# Patient Record
Sex: MALE | Race: WHITE | NOT HISPANIC OR LATINO | Employment: UNEMPLOYED | URBAN - METROPOLITAN AREA
[De-identification: names, ages, dates, MRNs, and addresses within clinical notes are randomized per-mention and may not be internally consistent; named-entity substitution may affect disease eponyms.]

---

## 2023-05-23 LAB
6-ACETYLMORPHINE: <25 NG/ML
7-AMINOCLONAZEPAM: <25 NG/ML
ALPHA-HYDROXYALPRAZOLAM: <25 NG/ML
ALPHA-HYDROXYMIDAZOLAM: <25 NG/ML
ALPRAZOLAM: <25 NG/ML
AMPHETAMINE (PRESENCE) IN URINE BY SCREEN METHOD: ABNORMAL
BARBITURATES PRESENCE IN URINE BY SCREEN METHOD: ABNORMAL
CANNABINOIDS IN URINE BY SCREEN METHOD: ABNORMAL
CHLORDIAZEPOXIDE: <25 NG/ML
CLONAZEPAM: <25 NG/ML
COCAINE (PRESENCE) IN URINE BY SCREEN METHOD: ABNORMAL
CODEINE: <50 NG/ML
CREATINE, URINE FOR DRUG: 32.2 MG/DL
DIAZEPAM: <25 NG/ML
DRUG SCREEN COMMENT URINE: ABNORMAL
EDDP: <25 NG/ML
FENTANYL CONFIRMATION, URINE: <2.5 NG/ML
HYDROCODONE: 955 NG/ML
HYDROMORPHONE: 92 NG/ML
LORAZEPAM: <25 NG/ML
METHADONE CONFIRMATION,URINE: <25 NG/ML
MIDAZOLAM: <25 NG/ML
MORPHINE URINE: <50 NG/ML
NORDIAZEPAM: <25 NG/ML
NORFENTANYL: <2.5 NG/ML
NORHYDROCODONE: 259 NG/ML
NOROXYCODONE: <25 NG/ML
O-DESMETHYLTRAMADOL: <50 NG/ML
OXAZEPAM: <25 NG/ML
OXYCODONE: <25 NG/ML
OXYMORPHONE: <25 NG/ML
PHENCYCLIDINE (PRESENCE) IN URINE BY SCREEN METHOD: ABNORMAL
TEMAZEPAM: <25 NG/ML
TRAMADOL: <50 NG/ML
ZOLPIDEM METABOLITE (ZCA): <25 NG/ML
ZOLPIDEM: <25 NG/ML

## 2023-10-16 PROBLEM — I10 HYPERTENSION: Status: ACTIVE | Noted: 2023-10-16

## 2023-10-16 PROBLEM — M51.36 LUMBAR DISC NARROWING: Status: ACTIVE | Noted: 2023-10-16

## 2023-10-16 PROBLEM — R68.89 COLD INTOLERANCE: Status: ACTIVE | Noted: 2023-10-16

## 2023-10-16 PROBLEM — M25.559: Status: ACTIVE | Noted: 2023-10-16

## 2023-10-16 PROBLEM — E11.9 TYPE 2 DIABETES MELLITUS (MULTI): Status: ACTIVE | Noted: 2023-10-16

## 2023-10-16 PROBLEM — G62.9 PERIPHERAL NEUROPATHY: Status: ACTIVE | Noted: 2023-10-16

## 2023-10-16 PROBLEM — G56.03 CARPAL TUNNEL SYNDROME ON BOTH SIDES: Status: ACTIVE | Noted: 2023-10-16

## 2023-10-16 PROBLEM — Z79.891 LONG TERM (CURRENT) USE OF OPIATE ANALGESIC: Status: ACTIVE | Noted: 2023-10-16

## 2023-10-16 PROBLEM — M54.16 LEFT LUMBAR RADICULOPATHY: Status: ACTIVE | Noted: 2023-10-16

## 2023-10-16 PROBLEM — N52.9 ERECTILE DYSFUNCTION: Status: ACTIVE | Noted: 2023-10-16

## 2023-10-16 PROBLEM — M51.369 LUMBAR DISC NARROWING: Status: ACTIVE | Noted: 2023-10-16

## 2023-10-16 PROBLEM — M17.12 LEFT KNEE DJD: Status: ACTIVE | Noted: 2023-10-16

## 2023-10-16 PROBLEM — M75.80 ROTATOR CUFF TENDONITIS: Status: ACTIVE | Noted: 2023-10-16

## 2023-10-16 PROBLEM — R60.0 BILATERAL EDEMA OF LOWER EXTREMITY: Status: ACTIVE | Noted: 2023-10-16

## 2023-10-16 PROBLEM — K21.9 GASTROESOPHAGEAL REFLUX DISEASE: Status: ACTIVE | Noted: 2023-10-16

## 2023-10-16 PROBLEM — S41.109A: Status: ACTIVE | Noted: 2023-10-16

## 2023-10-16 PROBLEM — E07.9 THYROID DISORDER: Status: ACTIVE | Noted: 2023-10-16

## 2023-10-16 PROBLEM — F41.8 OTHER SPECIFIED ANXIETY DISORDERS: Status: ACTIVE | Noted: 2023-10-16

## 2023-10-16 PROBLEM — G47.00 INSOMNIA: Status: ACTIVE | Noted: 2023-10-16

## 2023-10-16 PROBLEM — E66.01 MORBID OBESITY (MULTI): Status: ACTIVE | Noted: 2023-10-16

## 2023-10-16 PROBLEM — L98.499 SKIN ULCERATION (MULTI): Status: ACTIVE | Noted: 2023-10-16

## 2023-10-16 PROBLEM — S06.0XAA CONCUSSION: Status: ACTIVE | Noted: 2023-10-16

## 2023-10-16 PROBLEM — D64.9 ANEMIA: Status: ACTIVE | Noted: 2023-10-16

## 2023-10-16 PROBLEM — E78.5 HYPERLIPIDEMIA: Status: ACTIVE | Noted: 2023-10-16

## 2023-10-16 RX ORDER — HYDROCODONE BITARTRATE AND ACETAMINOPHEN 5; 325 MG/1; MG/1
1.5 TABLET ORAL EVERY 6 HOURS
COMMUNITY
End: 2023-10-17 | Stop reason: ALTCHOICE

## 2023-10-16 RX ORDER — HYDROXYZINE HYDROCHLORIDE 25 MG/1
25 TABLET, FILM COATED ORAL
COMMUNITY
Start: 2023-03-29

## 2023-10-16 RX ORDER — ATORVASTATIN CALCIUM 40 MG/1
1 TABLET, FILM COATED ORAL DAILY
COMMUNITY
Start: 2018-01-23

## 2023-10-16 RX ORDER — ASPIRIN 325 MG
50000 TABLET, DELAYED RELEASE (ENTERIC COATED) ORAL
COMMUNITY
Start: 2017-08-10

## 2023-10-16 RX ORDER — FUROSEMIDE 40 MG/1
1 TABLET ORAL DAILY
COMMUNITY
Start: 2017-09-06

## 2023-10-16 RX ORDER — IBUPROFEN 800 MG/1
1 TABLET ORAL EVERY 8 HOURS PRN
COMMUNITY
Start: 2017-12-05

## 2023-10-16 RX ORDER — CHLORTHALIDONE 25 MG/1
1 TABLET ORAL DAILY
COMMUNITY
Start: 2017-04-04

## 2023-10-16 RX ORDER — METFORMIN HYDROCHLORIDE 500 MG/1
1 TABLET ORAL
COMMUNITY
Start: 2017-07-28

## 2023-10-16 RX ORDER — INSULIN ASPART 100 [IU]/ML
INJECTION, SOLUTION INTRAVENOUS; SUBCUTANEOUS
COMMUNITY
Start: 2021-07-20

## 2023-10-16 RX ORDER — SIMVASTATIN 20 MG/1
1 TABLET, FILM COATED ORAL DAILY
COMMUNITY
Start: 2013-08-05

## 2023-10-16 RX ORDER — QUETIAPINE 300 MG/1
1 TABLET, FILM COATED, EXTENDED RELEASE ORAL NIGHTLY
COMMUNITY
Start: 2017-10-02

## 2023-10-16 RX ORDER — METOPROLOL SUCCINATE 50 MG/1
1 TABLET, EXTENDED RELEASE ORAL DAILY
COMMUNITY
Start: 2016-06-22

## 2023-10-16 RX ORDER — PEN NEEDLE, DIABETIC 31 GX5/16"
NEEDLE, DISPOSABLE MISCELLANEOUS
COMMUNITY
Start: 2023-01-03

## 2023-10-16 RX ORDER — AMOXICILLIN 250 MG
2 CAPSULE ORAL 2 TIMES DAILY PRN
COMMUNITY
Start: 2023-01-02

## 2023-10-16 RX ORDER — HYDROCODONE BITARTRATE AND ACETAMINOPHEN 10; 325 MG/1; MG/1
1 TABLET ORAL 3 TIMES DAILY
COMMUNITY
End: 2023-10-17 | Stop reason: ALTCHOICE

## 2023-10-16 RX ORDER — METHOCARBAMOL 500 MG/1
1 TABLET, FILM COATED ORAL 4 TIMES DAILY
COMMUNITY
Start: 2017-08-01 | End: 2024-04-16 | Stop reason: ALTCHOICE

## 2023-10-16 RX ORDER — HYDRALAZINE HYDROCHLORIDE 50 MG/1
1 TABLET, FILM COATED ORAL 3 TIMES DAILY
COMMUNITY
Start: 2016-11-29

## 2023-10-16 RX ORDER — NAPROXEN SODIUM 220 MG
TABLET ORAL
COMMUNITY
Start: 2016-09-15

## 2023-10-16 RX ORDER — POTASSIUM CHLORIDE 750 MG/1
1 CAPSULE, EXTENDED RELEASE ORAL DAILY
COMMUNITY
Start: 2017-09-06

## 2023-10-16 RX ORDER — NYSTATIN 100000 [USP'U]/G
POWDER TOPICAL
COMMUNITY
Start: 2023-03-29

## 2023-10-16 RX ORDER — HYDROCODONE BITARTRATE AND ACETAMINOPHEN 7.5; 325 MG/1; MG/1
1 TABLET ORAL 4 TIMES DAILY PRN
COMMUNITY
Start: 2022-09-22 | End: 2023-10-17 | Stop reason: SDUPTHER

## 2023-10-16 RX ORDER — NALOXONE HYDROCHLORIDE 4 MG/.1ML
1 SPRAY NASAL AS NEEDED
COMMUNITY
Start: 2022-06-21

## 2023-10-16 RX ORDER — QUETIAPINE FUMARATE 200 MG/1
200 TABLET, FILM COATED ORAL 2 TIMES DAILY
COMMUNITY
Start: 2023-01-02

## 2023-10-16 RX ORDER — POTASSIUM CHLORIDE 20 MEQ/1
1 TABLET, EXTENDED RELEASE ORAL DAILY
COMMUNITY
Start: 2017-09-03

## 2023-10-16 RX ORDER — FUROSEMIDE 20 MG/1
1 TABLET ORAL DAILY PRN
COMMUNITY
Start: 2017-08-12

## 2023-10-16 RX ORDER — OMEPRAZOLE 40 MG/1
1 CAPSULE, DELAYED RELEASE ORAL DAILY
COMMUNITY
Start: 2016-10-05

## 2023-10-16 RX ORDER — INSULIN LISPRO 200 [IU]/ML
40 INJECTION, SOLUTION SUBCUTANEOUS 2 TIMES DAILY
COMMUNITY
Start: 2017-08-15

## 2023-10-16 RX ORDER — ATORVASTATIN CALCIUM 80 MG/1
1 TABLET, FILM COATED ORAL DAILY
COMMUNITY

## 2023-10-16 RX ORDER — TORSEMIDE 20 MG/1
TABLET ORAL
COMMUNITY
Start: 2023-03-22

## 2023-10-16 RX ORDER — ASPIRIN 81 MG/1
1 TABLET ORAL DAILY
COMMUNITY
Start: 2022-12-24

## 2023-10-16 RX ORDER — CYCLOBENZAPRINE HCL 5 MG
5 TABLET ORAL 2 TIMES DAILY
COMMUNITY
Start: 2019-08-12 | End: 2023-10-17 | Stop reason: SDUPTHER

## 2023-10-16 RX ORDER — VALSARTAN 320 MG/1
1 TABLET ORAL DAILY
COMMUNITY
Start: 2015-06-19

## 2023-10-16 RX ORDER — BENZONATATE 100 MG/1
200 CAPSULE ORAL 3 TIMES DAILY PRN
COMMUNITY
Start: 2017-09-19

## 2023-10-16 RX ORDER — AMLODIPINE BESYLATE 5 MG/1
1 TABLET ORAL DAILY
COMMUNITY
Start: 2016-10-05

## 2023-10-16 RX ORDER — LISINOPRIL 5 MG/1
5 TABLET ORAL 2 TIMES DAILY
COMMUNITY
Start: 2023-01-02

## 2023-10-16 RX ORDER — DULOXETINE 40 MG/1
40 CAPSULE, DELAYED RELEASE ORAL
COMMUNITY
Start: 2023-03-22

## 2023-10-17 ENCOUNTER — OFFICE VISIT (OUTPATIENT)
Dept: PAIN MEDICINE | Facility: CLINIC | Age: 53
End: 2023-10-17
Payer: MEDICARE

## 2023-10-17 DIAGNOSIS — M17.12 PRIMARY OSTEOARTHRITIS OF LEFT KNEE: ICD-10-CM

## 2023-10-17 DIAGNOSIS — E11.9 TYPE 2 DIABETES MELLITUS WITHOUT COMPLICATION, WITH LONG-TERM CURRENT USE OF INSULIN (MULTI): ICD-10-CM

## 2023-10-17 DIAGNOSIS — M51.36 LUMBAR DISC NARROWING: Primary | ICD-10-CM

## 2023-10-17 DIAGNOSIS — Z79.4 TYPE 2 DIABETES MELLITUS WITHOUT COMPLICATION, WITH LONG-TERM CURRENT USE OF INSULIN (MULTI): ICD-10-CM

## 2023-10-17 DIAGNOSIS — M54.16 LEFT LUMBAR RADICULOPATHY: ICD-10-CM

## 2023-10-17 DIAGNOSIS — G63 POLYNEUROPATHY ASSOCIATED WITH UNDERLYING DISEASE (MULTI): ICD-10-CM

## 2023-10-17 PROCEDURE — 3066F NEPHROPATHY DOC TX: CPT | Performed by: PHYSICAL MEDICINE & REHABILITATION

## 2023-10-17 PROCEDURE — 4010F ACE/ARB THERAPY RXD/TAKEN: CPT | Performed by: PHYSICAL MEDICINE & REHABILITATION

## 2023-10-17 PROCEDURE — 99214 OFFICE O/P EST MOD 30 MIN: CPT | Performed by: PHYSICAL MEDICINE & REHABILITATION

## 2023-10-17 RX ORDER — CYCLOBENZAPRINE HCL 5 MG
5 TABLET ORAL 2 TIMES DAILY
Qty: 60 TABLET | Refills: 1 | Status: SHIPPED | OUTPATIENT
Start: 2023-10-17 | End: 2023-11-16

## 2023-10-17 RX ORDER — HYDROCODONE BITARTRATE AND ACETAMINOPHEN 7.5; 325 MG/1; MG/1
1 TABLET ORAL 4 TIMES DAILY PRN
Qty: 112 TABLET | Refills: 0 | Status: SHIPPED | OUTPATIENT
Start: 2023-11-01 | End: 2023-11-29

## 2023-10-17 RX ORDER — HYDROCODONE BITARTRATE AND ACETAMINOPHEN 7.5; 325 MG/1; MG/1
1 TABLET ORAL 4 TIMES DAILY PRN
Qty: 112 TABLET | Refills: 0 | Status: SHIPPED | OUTPATIENT
Start: 2023-11-29 | End: 2023-12-19 | Stop reason: SDUPTHER

## 2023-10-17 NOTE — PROGRESS NOTES
Chief complaint  Back and feet pain    History  Mr Shrestha returned today for follow up office visit      The pain in the lower limb is burning and tingling. worse with forward flexion and bending. that is burning and tingling on a continuous fashion. the pain goes in aggravation intermittently with sharp lancinating, electrical like jolts and sensations. These are felt on the skin and deeper in the tissues.      he is on schedule with the pain medications and using those as prescribed for the pain control. Denied euphoria associated with the use of the pain medications. the pain is rated at 8 /10 without the medications. But, with the pain medications the pain level drops to 5 /10 because of the recent aggravation of the pain.     opioids treatment agreement renewed in March 2023   Oarrs pulled and scanned in the chart Narx id 361 and ORS is 050.   last urine toxicology testing June 2022 and that was compatible with the pain medications prescribed repeat today      Xray updated last year for the lumbar spine and knee  ORT Score is zero  Pain pathology and pain generators from the lumbar spine and left lumbar radiculopathy and knee osteoarthritis      The control of the pain with the pain medications is helping the control of the symptoms and allowing the function and activities of daily living, enjoyment of life, quality of life and sleep with less interruption by the pain. The goal is symptomatic control of the nonmalignant chronic pain and not to repair the permanent damage in the tissues inducing the chronic pain conditions. We are aiming to shift the focus from the nonmalignant chronic pain to other aspects of life by symptomatically treating this chronic pain.      Review of Systems  Denied any fever or chills. No weight loss and no night sweats. No cough or sputum production. No diarrhea   The constipation has been responding to fibers and over the counter medications.      No bladder and bowel incontinence and  no other changes in bladder and bowel. No skin changes. Reports tiredness and fatigability only if the pain is not controlled.   Denied opioids diversion and abuse and denies alcoholism. Denies overuse of the pain medications.     Physical examination  Alert and oriented for time and place. Well groomed,    breathing with normal rate and rhythm   Head and neck showed colored conjunctivae, The pupils are equal, midsize, reactive to light and accommodation.      he has decreased sensation to light touch at the toes. No fungal infection and no skin breakdowns  positive straight leg raising on the left side at 45 Â° with the pain reaching the calf and lateral malleolus  decreased sensory to light touch over the lateral leg        healed ulcer on the left leg but till have open ulcer on the right leg     Mathew negative for axial loading and log rotation . he has cane for balance     No aberrant pain behavior.     Diagnosis   · Peripheral neuropathy (356.9) (G62.9)   · Left lumbar radiculopathy (724.4) (M54.16)   · Lumbar disc narrowing (722.52) (M51.36)   · Left knee DJD (715.96) (M17.12)    Plan  reviewed the pain generators in the lumbar spine and intervertebral disc and knee  Discussed the mechanism of action of intra articular steroid injection and epidural steroid injections   Went over the pain medications and mechanism of action and side effects  Home exercises program      discussed with him about the Non steroidal anti inflammatory medications and risk on the gastro intestinal cardio vascular and renal side effects      Continue with the treatment plan since that is working and controlling the symptoms. the UDS and Oarrs are compliant. No reported side effects and the daily functions and activities of daily living are improved with the pain control. Additionally other modalities including interventional and non opioids modalities were tried without relief to allow improvement of the activities of daily living  , quality of life and quality of sleep.      Given the opioids therapy , we discussed about the risk for accidental over dose on the pain medications. I went over the mechanism of action and mode of use of the Naloxone according to the  recommendations.      We went over the COVID19 precautions and importance of following recommendations including social distancing and hand washing and avoiding unnecessary trips and going out of the house       hydrocodone to 10/325 orally three times a day I explained the side effects from the acetaminophen in the medication and made aware of those. I also explained about the cumulative effects on the organs and mainly the liver.     Based on the above findings and the clinical response to the opioids medications and improvement of the activities of daily living, sleep, and work performance. We made this complex decision to continue the opioids therapy in light of the evidence of the patient's responsibility in using the pain medications as prescribed for the nonmalignant chronic pain condition. We discussed about the use of the pain medications to treat the symptoms of chronic nonmalignant pain and we are not trying the repair the permanent damage in the tissues, rather we are trying to control the symptoms induced by the permanent damage to the tissues inducing the chronic pain condition and resulting disability. I explained the difference and discussed it with the patient and stressed the importance of knowing the difference especially because of the potential side effects and the potential addicting effect and habit forming nature of the dangerous drugs we are using to treat the symptoms of the chronic pain.   The level of clinical decision making in this office visit,  is high, given the high risks of complications with the morbidity and mortality due to the fact that acute and chronic pain may pose a threat to life and bodily function, if under treated, poorly  treated, or with failure to maintain adequate treatment and timely medical follow up. Additionally over treatment has its own set of complications including overdosing on the pain medications and also the habit forming potentials with the use of the medications used to treat chronic painful conditions including therapeutic classes classified as dangerous medications. Given the serious and fluctuating nature of pain level and instensity with extensive consideration for whenever pain changes, there is always the risk of prolonged functional impairment requiring close patient monitoring with regular assessments and reassessments and high level medical decision making at every office visit. The amount and complexity of data reviewed is high given the patient clinical presentation, labs,  data, radiology reports, and other tests as discussed during office visits. Pertinent data whether positive or negative were taken in consideration in the process of making this high level medical decision.    We discussed that we are prescribing the medications on good alirio and legitimate medical reason.     Follow-up 8 weeks or earlier if needed

## 2023-12-19 ENCOUNTER — TELEPHONE (OUTPATIENT)
Dept: PAIN MEDICINE | Facility: CLINIC | Age: 53
End: 2023-12-19

## 2023-12-19 ENCOUNTER — TELEMEDICINE (OUTPATIENT)
Dept: PAIN MEDICINE | Facility: CLINIC | Age: 53
End: 2023-12-19
Payer: MEDICARE

## 2023-12-19 DIAGNOSIS — M54.16 LEFT LUMBAR RADICULOPATHY: ICD-10-CM

## 2023-12-19 DIAGNOSIS — M51.36 LUMBAR DISC NARROWING: Primary | ICD-10-CM

## 2023-12-19 DIAGNOSIS — Z79.891 LONG TERM (CURRENT) USE OF OPIATE ANALGESIC: ICD-10-CM

## 2023-12-19 DIAGNOSIS — M17.12 PRIMARY OSTEOARTHRITIS OF LEFT KNEE: ICD-10-CM

## 2023-12-19 PROCEDURE — 99214 OFFICE O/P EST MOD 30 MIN: CPT | Performed by: PHYSICAL MEDICINE & REHABILITATION

## 2023-12-19 RX ORDER — HYDROCODONE BITARTRATE AND ACETAMINOPHEN 7.5; 325 MG/1; MG/1
1 TABLET ORAL 4 TIMES DAILY PRN
Qty: 112 TABLET | Refills: 0 | Status: SHIPPED | OUTPATIENT
Start: 2023-12-27 | End: 2024-01-24 | Stop reason: SDUPTHER

## 2023-12-19 RX ORDER — HYDROCODONE BITARTRATE AND ACETAMINOPHEN 7.5; 325 MG/1; MG/1
1 TABLET ORAL 4 TIMES DAILY PRN
Qty: 112 TABLET | Refills: 0 | Status: SHIPPED | OUTPATIENT
Start: 2023-12-27 | End: 2023-12-19 | Stop reason: SDUPTHER

## 2023-12-19 NOTE — PROGRESS NOTES
Chief complaint  Back and lower limbs pain    History  Mr Shrestha is back for visit. This virtual visit. H ehas positive COVID and he is sheltering  The pain in the back is deep achy worse in the mid back area.  This is associated with tight muscle bands.  This limits the range of motion of the lumbar spine mainly in forward flexion.  The pain in the back is radiating around the side on the lateral aspect of the   thighs going down toward the lateral aspect of the legs into the lateral malleosli toward the lateral aspect of the feet towards the big toes.    This pain down to the lower limbs is more of a burning tingling sensation.  The pain in the   lower limbs worsens with bending forward or with any lifting, it improves with laying on the side and resting.  This is similar to the associated pain in the middle to lower back.  Denied any bowel or bladder incontinence.  With the worst pain there is tendency to catch the toe especially on carpeted area.  This occurs mostly when tired and toward the end of the day.       Pain level without medication is 8/10 , with the medication pain level 5/10.     The pain meds are helping control the pain and improving Activities of Daily living and quality of life and quality of sleep.    opioids treatment agreement 2023  Oarrs pulled and scanned in the chart  no concerns  last urine toxicology testing earlier this year and it was compliant we will repeat  Xray updated spine   ORT Score is  0   Pain pathology and pain generators spine   Modalities tried injection, surgery, physical therapy, TENS unit, nonsteroidal anti-inflammatory medication       Denied any fever or chills. No weight loss and no night sweats. No cough or sputum production. No diarrhea   The constipation has been responding to fibers and over the counter medications.     No bladder and bowel incontinence and no other changes in bladder and bowel. No skin changes.  Reports tiredness and fatigability only if the pain  is not controlled.   Denied opioids diversion and abuse and denies alcoholism. Denies overuse of the pain medications.    The control of the pain with the pain medications is helping the control of the symptoms and allowing the function and activities of daily living, enjoyment of life, improving the quality of life and sleep with less interruption by the pain. The goal is symptomatic control of the nonmalignant chronic pain and not to repair the permanent damage in the tissues inducing the chronic pain conditions. We are aiming to shift the focus from the nonmalignant chronic pain to other aspects of life by symptomatically treating this chronic pain. If this pain is not treated it will lead to major morbidity and it is also associated with increased risks of mortality. The patient understands those very clearly and also understand high risks of morbidity and mortality if not strictly adherent to the treatment recommendations and reporting any associated side effects. Also patient understand the full responsibility associated with these medications to avoid abuse or overuse or any use of these medications for anything besides treating the patient's own chronic pain and nothing else under any circumstances.      Also The pain in the left  knee is deep achy stabbing.  It is both on the medial and lateral aspects and behind the kneecap.  The knee pain is associated with sensation of crunching upon range of motion and weightbearing.  The pain is continuous at rest associated with stiffness with prolonged sitting and get worse with standing walking or any weightbearing activity.  Occasionally there is knee swelling.  No change in color or texture of the skin.  No pain proximal to the thigh or distal to the leg associated with the knee pain.  With episodes of aggravation of the pain there are occasion of sensation of instability but no falls.     Physical examination  Awake, alert and oriented for time place and persons      This is a virtual visit    Diagnosis  Problem List Items Addressed This Visit       Left knee DJD    Lumbar disc narrowing - Primary    Left lumbar radiculopathy    Long term (current) use of opiate analgesic        Plan  Reviewed the pain generators.  Went over the types of pain with neuropathic and nociceptive and different pathologies and therapeutic modalities. Discussed the mechanism of action of interventions from acupuncture, physical therapy , regular exercises, injections, botox, spinal cord stimulation, and role of surgery     Went over pathology of the intervertebral disc displacement and the anatomical relation to the Nerve roots and relation to the radicular symptoms. Went over treatment modalities with conservative treatment including acupuncture   and epidural steroid injection with fluoroscopy guidance and last resort of surgery    Based on the above findings and the clinical response to the opioids medications and improvement of the activities of daily living, sleep, and work performance. We made this complex decision to continue the opioids therapy in light of the evidence of the patient's responsibility in using the pain medications as prescribed for the nonmalignant chronic pain condition. We discussed about the use of the pain medications to treat the symptoms of chronic nonmalignant pain and we are not trying the repair the permanent damage in the tissues, rather we are trying to control the symptoms induced by the permanent damage to the tissues inducing the chronic pain condition and resulting disability. I explained the difference and discussed it with the patient and stressed the importance of knowing the difference especially because of the potential side effects and the potential addicting effect and habit forming nature of the dangerous drugs we are using to treat the symptoms of the chronic pain.      We discussed that we are prescribing the medications on good alirio and legitimate  medical reason.     We reviewed the side effects and precautions of opioids prescriptions as discussed in the opioids treatment agreement.    realizes the interaction between the therapeutic classes including the respiratory depression and potential death     Random drug testing twice in 6 months we will submit     Hydrocodone 7.5 mg qid     Discussed about NSAIDS and I explained about the opioids sparing effect to allow keeping the opioids dose at minimal effective dose.   I went over the potential side effects of the NSAIDS on the gastrointestinal, renal and cardiovascular systems.      I detailed the side effects from the acetaminophen in the medication and made aware of those. I also explained about the cumulative effects on the organs and mainly the liver.     Given the opioids therapy , we discussed about the risk for accidental over dose on the pain medications, either for patient or other household. I went over the mechanism of action and mode of use of the Naloxone according to the  recommendations. I will provide a prescription for a kit.     Follow-up 8 weeks or earlier if needed     The level of clinical decision making in this office visit,  is high, given the high risks of complications with the morbidity and mortality due to the fact that acute and chronic pain may pose a threat to life and bodily function, if under treated, poorly treated, or with failure to maintain adequate treatment and timely medical follow up. Additionally over treatment has its own set of complications including overdosing on the pain medications and also the habit forming potentials with the use of the medications used to treat chronic painful conditions including therapeutic classes classified as dangerous medications. Given the serious and fluctuating nature of pain level and instensity with extensive consideration for whenever pain changes, there is always the risk of prolonged functional impairment requiring  close patient monitoring with regular assessments and reassessments and high level medical decision making at every office visit. The amount and complexity of data reviewed is high given the patient clinical presentation, labs,  data, radiology reports, and other tests as discussed during office visits. Pertinent data whether positive or negative were taken in consideration in the process of making this high level medical decision.

## 2024-01-24 ENCOUNTER — TELEMEDICINE (OUTPATIENT)
Dept: PAIN MEDICINE | Facility: CLINIC | Age: 54
End: 2024-01-24
Payer: MEDICARE

## 2024-01-24 ENCOUNTER — TELEPHONE (OUTPATIENT)
Dept: PAIN MEDICINE | Facility: CLINIC | Age: 54
End: 2024-01-24

## 2024-01-24 DIAGNOSIS — M51.36 LUMBAR DISC NARROWING: Primary | ICD-10-CM

## 2024-01-24 DIAGNOSIS — M54.16 LEFT LUMBAR RADICULOPATHY: ICD-10-CM

## 2024-01-24 DIAGNOSIS — G63 POLYNEUROPATHY ASSOCIATED WITH UNDERLYING DISEASE (MULTI): ICD-10-CM

## 2024-01-24 PROCEDURE — 99214 OFFICE O/P EST MOD 30 MIN: CPT | Performed by: PHYSICAL MEDICINE & REHABILITATION

## 2024-01-24 RX ORDER — HYDROCODONE BITARTRATE AND ACETAMINOPHEN 7.5; 325 MG/1; MG/1
1 TABLET ORAL 4 TIMES DAILY PRN
Qty: 112 TABLET | Refills: 0 | Status: SHIPPED | OUTPATIENT
Start: 2024-01-24 | End: 2024-02-20 | Stop reason: SDUPTHER

## 2024-01-24 NOTE — PROGRESS NOTES
Chief complaint  Backadn left leg pain   Toe ulceration of hte R big toe    History  He is having upper resp tract infection  This AV visit  Continue with pain in the back left leg The pain in the back is deep achy worse in the mid back area.  This is associated with tight muscle bands.  This limits the range of motion of the lumbar spine mainly in forward flexion.  The pain in the back is radiating around the side on the lateral aspect of the left thigh going down toward the lateral aspect of the left leg into the lateral malleolus toward the lateral aspect of the foot towards the left big toe.    This pain down to the left lower limb is more of a burning tingling sensation.  The pain in the left lower limb worsens with bending forward or with any lifting, it improves with laying on the side and resting.  This is similar to the associated pain in the middle to lower back.  Denied any bowel or bladder incontinence.  With the worst pain there is tendency to catch the toe especially on carpeted area.  This occurs mostly when tired and toward the end of the day.    The skin is intact with no breakdown.  No vesicles.    Alsohaving neuropathy from DM the ulcer is related getting wound care       Pain level without medication is 8/10 , with the medication pain level 5/10.     The pain meds are helping control the pain and improving Activities of Daily living and quality of life and quality of sleep.    opioids treatment agreement will updated at next visit   PDI (Pain Disability Index) score: 40  Oarrs pulled and scanned in the chart  no concerns  last urine toxicology testing earlier this year and it was compliant we will repeat  Xray updated spine   ORT Score is  0  Pain pathology and pain generators spine   Modalities tried injection, surgery, physical therapy, TENS unit, nonsteroidal anti-inflammatory medication       Denied any fever or chills. No weight loss and no night sweats. No cough or sputum production. No  diarrhea   The constipation has been responding to fibers and over the counter medications.     No bladder and bowel incontinence and no other changes in bladder and bowel. No skin changes.  Reports tiredness and fatigability only if the pain is not controlled.   Denied opioids diversion and abuse and denies alcoholism. Denies overuse of the pain medications.    The control of the pain with the pain medications is helping the control of the symptoms and allowing the function and activities of daily living, enjoyment of life, improving the quality of life and sleep with less interruption by the pain. The goal is symptomatic control of the nonmalignant chronic pain and not to repair the permanent damage in the tissues inducing the chronic pain conditions. We are aiming to shift the focus from the nonmalignant chronic pain to other aspects of life by symptomatically treating this chronic pain. If this pain is not treated it will lead to major morbidity and it is also associated with increased risks of mortality. The patient understands those very clearly and also understand high risks of morbidity and mortality if not strictly adherent to the treatment recommendations and reporting any associated side effects. Also patient understand the full responsibility associated with these medications to avoid abuse or overuse or any use of these medications for anything besides treating the patient's own chronic pain and nothing else under any circumstances.        Physical examination  Awake, alert and oriented for time place and persons   This is a secure AV visit      Diagnosis  Problem List Items Addressed This Visit       Lumbar disc narrowing - Primary    Relevant Medications    HYDROcodone-acetaminophen (Norco) 7.5-325 mg tablet    Left lumbar radiculopathy    Peripheral neuropathy        Plan  Reviewed the pain generators.  Went over the types of pain with neuropathic and nociceptive and different pathologies and  therapeutic modalities. Discussed the mechanism of action of interventions from acupuncture, physical therapy , regular exercises, injections, botox, spinal cord stimulation, and role of surgery     Went over pathology of the intervertebral disc displacement and the anatomical relation to the Nerve roots and relation to the radicular symptoms. Went over treatment modalities with conservative treatment including acupuncture   and epidural steroid injection with fluoroscopy guidance and last resort of surgery    Based on the above findings and the clinical response to the opioids medications and improvement of the activities of daily living, sleep, and work performance. We made this complex decision to continue the opioids therapy in light of the evidence of the patient's responsibility in using the pain medications as prescribed for the nonmalignant chronic pain condition. We discussed about the use of the pain medications to treat the symptoms of chronic nonmalignant pain and we are not trying the repair the permanent damage in the tissues, rather we are trying to control the symptoms induced by the permanent damage to the tissues inducing the chronic pain condition and resulting disability. I explained the difference and discussed it with the patient and stressed the importance of knowing the difference especially because of the potential side effects and the potential addicting effect and habit forming nature of the dangerous drugs we are using to treat the symptoms of the chronic pain.      We discussed that we are prescribing the medications on good alirio and legitimate medical reason.     We reviewed the side effects and precautions of opioids prescriptions as discussed in the opioids treatment agreement.    realizes the interaction between the therapeutic classes including the respiratory depression and potential death     Random drug testing twice in 6 months we will submit     Hydrocodone 7.5/325 mg qid    has a narcan at home know how and when to use it if needed.     Discussed about NSAIDS and I explained about the opioids sparing effect to allow keeping the opioids dose at minimal effective dose.   I went over the potential side effects of the NSAIDS on the gastrointestinal, renal and cardiovascular systems.      I detailed the side effects from the acetaminophen in the medication and made aware of those. I also explained about the cumulative effects on the organs and mainly the liver.     Given the opioids therapy , we discussed about the risk for accidental over dose on the pain medications, either for patient or other household. I went over the mechanism of action and mode of use of the Naloxone according to the  recommendations. I will provide a prescription for a kit.     Follow-up 8 weeks or earlier if needed     The level of clinical decision making in this office visit,  is high, given the high risks of complications with the morbidity and mortality due to the fact that acute and chronic pain may pose a threat to life and bodily function, if under treated, poorly treated, or with failure to maintain adequate treatment and timely medical follow up. Additionally over treatment has its own set of complications including overdosing on the pain medications and also the habit forming potentials with the use of the medications used to treat chronic painful conditions including therapeutic classes classified as dangerous medications. Given the serious and fluctuating nature of pain level and instensity with extensive consideration for whenever pain changes, there is always the risk of prolonged functional impairment requiring close patient monitoring with regular assessments and reassessments and high level medical decision making at every office visit. The amount and complexity of data reviewed is high given the patient clinical presentation, labs,  data, radiology reports, and other tests as  discussed during office visits. Pertinent data whether positive or negative were taken in consideration in the process of making this high level medical decision.

## 2024-01-24 NOTE — TELEPHONE ENCOUNTER
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 He was not aware that he only received 1 month after VV.  He is asking for VV today because he still can't get to the office, plus his dad  yesterday.  Pharmacy is [facility] [address]

## 2024-02-20 ENCOUNTER — OFFICE VISIT (OUTPATIENT)
Dept: PAIN MEDICINE | Facility: CLINIC | Age: 54
End: 2024-02-20
Payer: MEDICARE

## 2024-02-20 DIAGNOSIS — G63 POLYNEUROPATHY ASSOCIATED WITH UNDERLYING DISEASE (MULTI): ICD-10-CM

## 2024-02-20 DIAGNOSIS — Z79.891 LONG TERM CURRENT USE OF OPIATE ANALGESIC: Primary | ICD-10-CM

## 2024-02-20 DIAGNOSIS — M54.16 LEFT LUMBAR RADICULOPATHY: ICD-10-CM

## 2024-02-20 DIAGNOSIS — Z79.891 LONG TERM (CURRENT) USE OF OPIATE ANALGESIC: ICD-10-CM

## 2024-02-20 DIAGNOSIS — M51.36 LUMBAR DISC NARROWING: ICD-10-CM

## 2024-02-20 PROCEDURE — 99214 OFFICE O/P EST MOD 30 MIN: CPT | Performed by: PHYSICAL MEDICINE & REHABILITATION

## 2024-02-20 PROCEDURE — 4010F ACE/ARB THERAPY RXD/TAKEN: CPT | Performed by: PHYSICAL MEDICINE & REHABILITATION

## 2024-02-20 RX ORDER — HYDROCODONE BITARTRATE AND ACETAMINOPHEN 7.5; 325 MG/1; MG/1
1 TABLET ORAL 4 TIMES DAILY PRN
Qty: 112 TABLET | Refills: 0 | Status: SHIPPED | OUTPATIENT
Start: 2024-03-20 | End: 2024-04-16 | Stop reason: SDUPTHER

## 2024-02-20 RX ORDER — HYDROCODONE BITARTRATE AND ACETAMINOPHEN 7.5; 325 MG/1; MG/1
1 TABLET ORAL 4 TIMES DAILY PRN
Qty: 112 TABLET | Refills: 0 | Status: SHIPPED | OUTPATIENT
Start: 2024-02-21 | End: 2024-03-20

## 2024-02-20 NOTE — PROGRESS NOTES
Chief complaint  Back and leg pain  Feet pain     History  Mr Jeferson is back for visit. He lost his father  Continue with pain in back and sciatica but worse pain in feet. The pain burning and tingling on a continuous fashion. The pain goes in aggravation intermittently with sharp lancinating, electrical like jolts and sensations. These are felt on the skin and deeper in the tissues.  The pain is affected with colder weather and with wet and humid conditions.   He has open ulcer over r big toe and he gets wound care three times per week  That is close.    Pain meds are helping  Jeffrey did not help neither lyrica     Pain level without medication is 7/10 , with the medication pain level 2/10.     The pain meds are helping control the pain and improving Activities of Daily living and quality of life and quality of sleep.    opioids treatment agreement Feb 2024  PDI (Pain Disability Index) score: 52   Oarrs pulled and scanned in the chart  no concerns  last urine toxicology testing earlier this year and it was compliant we will repeat  Xray updated spein   ORT Score is  0  Pain pathology and pain generators spine   Modalities tried injection, surgery, physical therapy, TENS unit, nonsteroidal anti-inflammatory medication       Denied any fever or chills. No weight loss and no night sweats. No cough or sputum production. No diarrhea   The constipation has been responding to fibers and over the counter medications.     No bladder and bowel incontinence and no other changes in bladder and bowel. No skin changes.  Reports tiredness and fatigability only if the pain is not controlled.   Denied opioids diversion and abuse and denies alcoholism. Denies overuse of the pain medications.    The control of the pain with the pain medications is helping the control of the symptoms and allowing the function and activities of daily living, enjoyment of life, improving the quality of life and sleep with less interruption by the pain. The  goal is symptomatic control of the nonmalignant chronic pain and not to repair the permanent damage in the tissues inducing the chronic pain conditions. We are aiming to shift the focus from the nonmalignant chronic pain to other aspects of life by symptomatically treating this chronic pain. If this pain is not treated it will lead to major morbidity and it is also associated with increased risks of mortality. The patient understands those very clearly and also understand high risks of morbidity and mortality if not strictly adherent to the treatment recommendations and reporting any associated side effects. Also patient understand the full responsibility associated with these medications to avoid abuse or overuse or any use of these medications for anything besides treating the patient's own chronic pain and nothing else under any circumstances.        Physical examination  Awake, alert and oriented for time place and persons   declined Chaperone for the visit and was adequately  draped for the exam.    The skin is mottled colder to touch.  It is sweating compared to the other side and the rest of his skin.  The nail plates are brittle and thin.   The skin is shiny but stiff and difficult to raise a skin fold.  Capillary refill is 4 seconds.  There is diffuse stiffness in the joints.    Hehas healing ulcer over R foot       Diagnosis  Problem List Items Addressed This Visit       Lumbar disc narrowing    Relevant Medications    HYDROcodone-acetaminophen (Norco) 7.5-325 mg tablet (Start on 2/21/2024)    HYDROcodone-acetaminophen (Norco) 7.5-325 mg tablet (Start on 3/20/2024)    Left lumbar radiculopathy    Peripheral neuropathy    Long term current use of opiate analgesic - Primary    Relevant Orders    Opiate/Opioid/Benzo Prescription Compliance        Plan  Reviewed the pain generators.  Went over the types of pain with neuropathic and nociceptive and different pathologies and therapeutic modalities. Discussed the  mechanism of action of interventions from acupuncture, physical therapy , regular exercises, injections, botox, spinal cord stimulation, and role of surgery     Went over pathology of the intervertebral disc displacement and the anatomical relation to the Nerve roots and relation to the radicular symptoms. Went over treatment modalities with conservative treatment including acupuncture   and epidural steroid injection with fluoroscopy guidance and last resort of surgery    Based on the above findings and the clinical response to the opioids medications and improvement of the activities of daily living, sleep, and work performance. We made this complex decision to continue the opioids therapy in light of the evidence of the patient's responsibility in using the pain medications as prescribed for the nonmalignant chronic pain condition. We discussed about the use of the pain medications to treat the symptoms of chronic nonmalignant pain and we are not trying the repair the permanent damage in the tissues, rather we are trying to control the symptoms induced by the permanent damage to the tissues inducing the chronic pain condition and resulting disability. I explained the difference and discussed it with the patient and stressed the importance of knowing the difference especially because of the potential side effects and the potential addicting effect and habit forming nature of the dangerous drugs we are using to treat the symptoms of the chronic pain.      We discussed that we are prescribing the medications on good alirio and legitimate medical reason.     We reviewed the side effects and precautions of opioids prescriptions as discussed in the opioids treatment agreement.    realizes the interaction between the therapeutic classes including the respiratory depression and potential death     Random drug testing twice in 6 months we will submit     Hydrocodone 7.5 mg qid  has a narcan at home know how and when to use  it if needed.  Consider cut back on pain  Continue with foot care     Discussed about NSAIDS and I explained about the opioids sparing effect to allow keeping the opioids dose at minimal effective dose.   I went over the potential side effects of the NSAIDS on the gastrointestinal, renal and cardiovascular systems.      I detailed the side effects from the acetaminophen in the medication and made aware of those. I also explained about the cumulative effects on the organs and mainly the liver.     Given the opioids therapy , we discussed about the risk for accidental over dose on the pain medications, either for patient or other household. I went over the mechanism of action and mode of use of the Naloxone according to the  recommendations. I will provide a prescription for a kit.     Follow-up 8 weeks or earlier if needed     The level of clinical decision making in this office visit,  is high, given the high risks of complications with the morbidity and mortality due to the fact that acute and chronic pain may pose a threat to life and bodily function, if under treated, poorly treated, or with failure to maintain adequate treatment and timely medical follow up. Additionally over treatment has its own set of complications including overdosing on the pain medications and also the habit forming potentials with the use of the medications used to treat chronic painful conditions including therapeutic classes classified as dangerous medications. Given the serious and fluctuating nature of pain level and instensity with extensive consideration for whenever pain changes, there is always the risk of prolonged functional impairment requiring close patient monitoring with regular assessments and reassessments and high level medical decision making at every office visit. The amount and complexity of data reviewed is high given the patient clinical presentation, labs,  data, radiology reports, and other tests as  discussed during office visits. Pertinent data whether positive or negative were taken in consideration in the process of making this high level medical decision.

## 2024-03-15 ENCOUNTER — LAB (OUTPATIENT)
Dept: LAB | Facility: LAB | Age: 54
End: 2024-03-15
Payer: MEDICARE

## 2024-03-15 DIAGNOSIS — Z79.891 LONG TERM CURRENT USE OF OPIATE ANALGESIC: ICD-10-CM

## 2024-03-15 LAB
AMPHETAMINES UR QL SCN: NORMAL
BARBITURATES UR QL SCN: NORMAL
BZE UR QL SCN: NORMAL
CANNABINOIDS UR QL SCN: NORMAL
CREAT UR-MCNC: 68.7 MG/DL (ref 20–370)
PCP UR QL SCN: NORMAL

## 2024-03-15 PROCEDURE — 80346 BENZODIAZEPINES1-12: CPT

## 2024-03-15 PROCEDURE — 80307 DRUG TEST PRSMV CHEM ANLYZR: CPT

## 2024-03-15 PROCEDURE — 82570 ASSAY OF URINE CREATININE: CPT

## 2024-03-15 PROCEDURE — 80361 OPIATES 1 OR MORE: CPT

## 2024-03-15 PROCEDURE — 80373 DRUG SCREENING TRAMADOL: CPT

## 2024-03-15 PROCEDURE — 80354 DRUG SCREENING FENTANYL: CPT

## 2024-03-15 PROCEDURE — 80365 DRUG SCREENING OXYCODONE: CPT

## 2024-03-15 PROCEDURE — 80368 SEDATIVE HYPNOTICS: CPT

## 2024-03-15 PROCEDURE — 80358 DRUG SCREENING METHADONE: CPT

## 2024-03-21 LAB
1OH-MIDAZOLAM UR CFM-MCNC: <25 NG/ML
6MAM UR CFM-MCNC: <25 NG/ML
7AMINOCLONAZEPAM UR CFM-MCNC: <25 NG/ML
A-OH ALPRAZ UR CFM-MCNC: <25 NG/ML
ALPRAZ UR CFM-MCNC: <25 NG/ML
CHLORDIAZEP UR CFM-MCNC: <25 NG/ML
CLONAZEPAM UR CFM-MCNC: <25 NG/ML
CODEINE UR CFM-MCNC: <50 NG/ML
DIAZEPAM UR CFM-MCNC: <25 NG/ML
EDDP UR CFM-MCNC: <25 NG/ML
FENTANYL UR CFM-MCNC: <2.5 NG/ML
HYDROCODONE CTO UR CFM-MCNC: 308 NG/ML
HYDROMORPHONE UR CFM-MCNC: 112 NG/ML
LORAZEPAM UR CFM-MCNC: <25 NG/ML
METHADONE UR CFM-MCNC: <25 NG/ML
MIDAZOLAM UR CFM-MCNC: <25 NG/ML
MORPHINE UR CFM-MCNC: <50 NG/ML
NORDIAZEPAM UR CFM-MCNC: <25 NG/ML
NORFENTANYL UR CFM-MCNC: <2.5 NG/ML
NORHYDROCODONE UR CFM-MCNC: 232 NG/ML
NOROXYCODONE UR CFM-MCNC: <25 NG/ML
NORTRAMADOL UR-MCNC: <50 NG/ML
OXAZEPAM UR CFM-MCNC: <25 NG/ML
OXYCODONE UR CFM-MCNC: <25 NG/ML
OXYMORPHONE UR CFM-MCNC: <25 NG/ML
TEMAZEPAM UR CFM-MCNC: <25 NG/ML
TRAMADOL UR CFM-MCNC: <50 NG/ML
ZOLPIDEM UR CFM-MCNC: <25 NG/ML
ZOLPIDEM UR-MCNC: <25 NG/ML

## 2024-04-16 ENCOUNTER — OFFICE VISIT (OUTPATIENT)
Dept: PAIN MEDICINE | Facility: CLINIC | Age: 54
End: 2024-04-16
Payer: MEDICARE

## 2024-04-16 DIAGNOSIS — G63 POLYNEUROPATHY ASSOCIATED WITH UNDERLYING DISEASE (MULTI): ICD-10-CM

## 2024-04-16 DIAGNOSIS — M51.36 LUMBAR DISC NARROWING: ICD-10-CM

## 2024-04-16 DIAGNOSIS — M54.16 LEFT LUMBAR RADICULOPATHY: Primary | ICD-10-CM

## 2024-04-16 PROCEDURE — 4010F ACE/ARB THERAPY RXD/TAKEN: CPT | Performed by: PHYSICAL MEDICINE & REHABILITATION

## 2024-04-16 PROCEDURE — 3060F POS MICROALBUMINURIA REV: CPT | Performed by: PHYSICAL MEDICINE & REHABILITATION

## 2024-04-16 PROCEDURE — 99214 OFFICE O/P EST MOD 30 MIN: CPT | Performed by: PHYSICAL MEDICINE & REHABILITATION

## 2024-04-16 RX ORDER — HYDROCODONE BITARTRATE AND ACETAMINOPHEN 7.5; 325 MG/1; MG/1
1 TABLET ORAL 4 TIMES DAILY PRN
Qty: 112 TABLET | Refills: 0 | Status: ON HOLD | OUTPATIENT
Start: 2024-05-15 | End: 2024-06-12

## 2024-04-16 RX ORDER — HYDROCODONE BITARTRATE AND ACETAMINOPHEN 7.5; 325 MG/1; MG/1
1 TABLET ORAL 4 TIMES DAILY PRN
Qty: 112 TABLET | Refills: 0 | Status: SHIPPED | OUTPATIENT
Start: 2024-04-17 | End: 2024-05-15

## 2024-04-16 RX ORDER — CYCLOBENZAPRINE HCL 5 MG
5 TABLET ORAL 2 TIMES DAILY PRN
Qty: 60 TABLET | Refills: 1 | Status: SHIPPED | OUTPATIENT
Start: 2024-04-16 | End: 2024-05-16

## 2024-04-16 NOTE — PROGRESS NOTES
Chief complaint  Back and left leg pain     History  Jai Shrestha is back for pain management office visit  Continue with back pain and left sciatica.   Did not want ENIO bc of DM and not SCS  The pain is interfering with activities of daily living, quality of life and quality of sleep. It is limiting the functions and everything takes longer to complete because of the slowing related to the pain. Movements are cautious to avoid aggravation of the symptoms.   The pain in the back is deep achy worse in the mid back area.  This is associated with tight muscle bands.  This limits the range of motion of the lumbar spine mainly in forward flexion.  The pain in the back is radiating around the side on the lateral aspect of the left thigh going down toward the lateral aspect of the left leg into the lateral malleolus toward the lateral aspect of the foot towards the left big toe.    This pain down to the left lower limb is more of a burning tingling sensation.  The pain in the left lower limb worsens with bending forward or with any lifting, it improves with laying on the side and resting.  This is similar to the associated pain in the middle to lower back.  Denied any bowel or bladder incontinence.  With the worst pain there is tendency to catch the toe especially on carpeted area.  This occurs mostly when tired and toward the end of the day.    The skin is intact with no breakdown.  No vesicles.       Pain level without medication is 8/10 , with the medication pain level 2/10.     The pain meds are helping control the pain and improving Activities of Daily living and quality of life and quality of sleep.    opioids treatment agreement jan 2024     Oarrs pulled and scanned in the chart  no concerns  last urine toxicology testing earlier this year and it was compliant we will repeat  Xray updated spine   ORT Score is  1 for depression but that is controlled   Pain pathology and pain generators spine   Modalities tried  injection, surgery, physical therapy, TENS unit, nonsteroidal anti-inflammatory medication       Denied any fever or chills. No weight loss and no night sweats. No cough or sputum production. No diarrhea   The constipation has been responding to fibers and over the counter medications.     No bladder and bowel incontinence and no other changes in bladder and bowel. No skin changes.  Reports tiredness and fatigability only if the pain is not controlled.   Denied opioids diversion and abuse and denies alcoholism. Denies overuse of the pain medications.    The control of the pain with the pain medications is helping the control of the symptoms and allowing the function and activities of daily living, enjoyment of life, improving the quality of life and sleep with less interruption by the pain. The goal is symptomatic control of the nonmalignant chronic pain and not to repair the permanent damage in the tissues inducing the chronic pain conditions. We are aiming to shift the focus from the nonmalignant chronic pain to other aspects of life by symptomatically treating this chronic pain. If this pain is not treated it will lead to major morbidity and it is also associated with increased risks of mortality. The patient understands those very clearly and also understand high risks of morbidity and mortality if not strictly adherent to the treatment recommendations and reporting any associated side effects. Also patient understand the full responsibility associated with these medications to avoid abuse or overuse or any use of these medications for anything besides treating the patient's own chronic pain and nothing else under any circumstances.        Physical examination  Awake, alert and oriented for time place and persons   declined Chaperone for the visit and was adequately  draped for the exam.    Examination of the lumbar spine showed tight muscle bands in the mid and lower back area, this is more pronounced on the  left compared to the right.  Additionally, this is inducing mild reversal of the lumbar lordosis with functional scoliosis with left-sided concavity.  This scoliosis corrects with  bending of the lumbar spine.     Straight leg raising increased the pain in the back and down the lateral aspect of the left knee onto the lateral leg to the left lateral malleolus and foot.     There is decreased sensory to light touch on the lateral aspect of the thigh and around the left knee going down to the lateral aspect of the leg to the left lateral malleolus into the dorsum of the left foot.   Deep tendon reflexes is present for the patellar tendons bilaterally.  Achilles reflexes are present bilaterally and symmetric.    Medial Hamstrings reflex is decreased on the left compared to the right side.  Plantar cutaneous are down going bilaterally.  Ankle extension are  5/5 bilaterally. Plantar flexion of the ankles are 5/5 bilaterally.  Big toe extension is 4/5 on the left compared to 5/5 on the right side.    Negative Tinel's sign over the left peroneal nerve at the fibular neck.  Mathew sign for axial loading and global rotation are negative.  No aberrant pain behavior.         Diagnosis  Problem List Items Addressed This Visit       Lumbar disc narrowing    Relevant Medications    HYDROcodone-acetaminophen (Norco) 7.5-325 mg tablet (Start on 4/17/2024)    HYDROcodone-acetaminophen (Norco) 7.5-325 mg tablet (Start on 5/15/2024)    cyclobenzaprine (Flexeril) 5 mg tablet    Left lumbar radiculopathy - Primary    Relevant Medications    HYDROcodone-acetaminophen (Norco) 7.5-325 mg tablet (Start on 4/17/2024)    HYDROcodone-acetaminophen (Norco) 7.5-325 mg tablet (Start on 5/15/2024)    cyclobenzaprine (Flexeril) 5 mg tablet    Peripheral neuropathy    Relevant Medications    HYDROcodone-acetaminophen (Norco) 7.5-325 mg tablet (Start on 4/17/2024)    HYDROcodone-acetaminophen (Norco) 7.5-325 mg tablet (Start on 5/15/2024)     cyclobenzaprine (Flexeril) 5 mg tablet        Plan  Reviewed the pain generators.  Went over the types of pain with neuropathic and nociceptive and different pathologies and therapeutic modalities. Discussed the mechanism of action of interventions from acupuncture, physical therapy , regular exercises, injections, botox, spinal cord stimulation, and role of surgery     Went over pathology of the intervertebral disc displacement and the anatomical relation to the Nerve roots and relation to the radicular symptoms. Went over treatment modalities with conservative treatment including acupuncture   and epidural steroid injection with fluoroscopy guidance and last resort of surgery    Based on the above findings and the clinical response to the opioids medications and improvement of the activities of daily living, sleep, and work performance. We made this complex decision to continue the opioids therapy in light of the evidence of the patient's responsibility in using the pain medications as prescribed for the nonmalignant chronic pain condition. We discussed about the use of the pain medications to treat the symptoms of chronic nonmalignant pain and we are not trying the repair the permanent damage in the tissues, rather we are trying to control the symptoms induced by the permanent damage to the tissues inducing the chronic pain condition and resulting disability. I explained the difference and discussed it with the patient and stressed the importance of knowing the difference especially because of the potential side effects and the potential addicting effect and habit forming nature of the dangerous drugs we are using to treat the symptoms of the chronic pain.      We discussed that we are prescribing the medications on good alirio and legitimate medical reason.     We reviewed the side effects and precautions of opioids prescriptions as discussed in the opioids treatment agreement.    realizes the interaction between  the therapeutic classes including the respiratory depression and potential death     Random drug testing twice in 6 months we will submit     Long discussion about putting effort in cutting back on pain medications. Discussed about pain level changing and we do not know if the medications at this amount are still needed until we try and cut back slowly on pain medications. If the cut is tolerated then we continue. If cut not tolerated then, will go back on the pain medications level.  The goal from this is to keep the pain medications at the lowest effective dose.  I discussed about  about considering increasing the dose of the long acting and cut back the number of doses of the short acting medications. That will decrease the number of doses being dispensed daily.  Discussed about considering cut back on pain medications  Hydrocodone 7.5 mg qid     Discussed about NSAIDS and I explained about the opioids sparing effect to allow keeping the opioids dose at minimal effective dose.   I went over the potential side effects of the NSAIDS on the gastrointestinal, renal and cardiovascular systems.      I detailed the side effects from the acetaminophen in the medication and made aware of those. I also explained about the cumulative effects on the organs and mainly the liver.     Given the opioids therapy , we discussed about the risk for accidental over dose on the pain medications, either for patient or other household. I went over the mechanism of action and mode of use of the Naloxone according to the  recommendations. I will provide a prescription for a kit.     Follow-up 8 weeks or earlier if needed     The level of clinical decision making in this office visit,  is high, given the high risks of complications with the morbidity and mortality due to the fact that acute and chronic pain may pose a threat to life and bodily function, if under treated, poorly treated, or with failure to maintain adequate  treatment and timely medical follow up. Additionally over treatment has its own set of complications including overdosing on the pain medications and also the habit forming potentials with the use of the medications used to treat chronic painful conditions including therapeutic classes classified as dangerous medications. Given the serious and fluctuating nature of pain level and instensity with extensive consideration for whenever pain changes, there is always the risk of prolonged functional impairment requiring close patient monitoring with regular assessments and reassessments and high level medical decision making at every office visit. The amount and complexity of data reviewed is high given the patient clinical presentation, labs,  data, radiology reports, and other tests as discussed during office visits. Pertinent data whether positive or negative were taken in consideration in the process of making this high level medical decision.

## 2024-06-11 ENCOUNTER — APPOINTMENT (OUTPATIENT)
Dept: CARDIOLOGY | Facility: HOSPITAL | Age: 54
End: 2024-06-11
Payer: MEDICARE

## 2024-06-11 ENCOUNTER — HOSPITAL ENCOUNTER (OUTPATIENT)
Facility: HOSPITAL | Age: 54
Setting detail: OBSERVATION
Discharge: HOME | End: 2024-06-13
Attending: EMERGENCY MEDICINE | Admitting: INTERNAL MEDICINE
Payer: MEDICARE

## 2024-06-11 ENCOUNTER — APPOINTMENT (OUTPATIENT)
Dept: PAIN MEDICINE | Facility: CLINIC | Age: 54
End: 2024-06-11
Payer: MEDICARE

## 2024-06-11 ENCOUNTER — APPOINTMENT (OUTPATIENT)
Dept: RADIOLOGY | Facility: HOSPITAL | Age: 54
End: 2024-06-11
Payer: MEDICARE

## 2024-06-11 DIAGNOSIS — R73.9 HYPERGLYCEMIA: ICD-10-CM

## 2024-06-11 DIAGNOSIS — G89.29 CHRONIC BACK PAIN, UNSPECIFIED BACK LOCATION, UNSPECIFIED BACK PAIN LATERALITY: ICD-10-CM

## 2024-06-11 DIAGNOSIS — Z79.4 TYPE 2 DIABETES MELLITUS WITHOUT COMPLICATION, WITH LONG-TERM CURRENT USE OF INSULIN (MULTI): ICD-10-CM

## 2024-06-11 DIAGNOSIS — M54.9 CHRONIC BACK PAIN, UNSPECIFIED BACK LOCATION, UNSPECIFIED BACK PAIN LATERALITY: ICD-10-CM

## 2024-06-11 DIAGNOSIS — E11.9 TYPE 2 DIABETES MELLITUS WITHOUT COMPLICATION, WITH LONG-TERM CURRENT USE OF INSULIN (MULTI): ICD-10-CM

## 2024-06-11 DIAGNOSIS — N17.9 AKI (ACUTE KIDNEY INJURY) (CMS-HCC): Primary | ICD-10-CM

## 2024-06-11 PROBLEM — N18.9 CKD (CHRONIC KIDNEY DISEASE): Status: ACTIVE | Noted: 2024-06-11

## 2024-06-11 PROBLEM — I50.30: Status: ACTIVE | Noted: 2024-06-11

## 2024-06-11 PROBLEM — R33.9 URINARY RETENTION: Status: ACTIVE | Noted: 2024-06-11

## 2024-06-11 LAB
ALBUMIN SERPL BCP-MCNC: 4.2 G/DL (ref 3.4–5)
ALP SERPL-CCNC: 94 U/L (ref 33–120)
ALT SERPL W P-5'-P-CCNC: 15 U/L (ref 10–52)
ANION GAP SERPL CALC-SCNC: 16 MMOL/L (ref 10–20)
AST SERPL W P-5'-P-CCNC: 17 U/L (ref 9–39)
BASOPHILS # BLD AUTO: 0.04 X10*3/UL (ref 0–0.1)
BASOPHILS NFR BLD AUTO: 0.4 %
BILIRUB SERPL-MCNC: 0.5 MG/DL (ref 0–1.2)
BNP SERPL-MCNC: 57 PG/ML (ref 0–99)
BUN SERPL-MCNC: 69 MG/DL (ref 6–23)
CALCIUM SERPL-MCNC: 9.1 MG/DL (ref 8.6–10.3)
CHLORIDE SERPL-SCNC: 93 MMOL/L (ref 98–107)
CO2 SERPL-SCNC: 26 MMOL/L (ref 21–32)
CREAT SERPL-MCNC: 2.41 MG/DL (ref 0.5–1.3)
EGFRCR SERPLBLD CKD-EPI 2021: 31 ML/MIN/1.73M*2
EOSINOPHIL # BLD AUTO: 0.09 X10*3/UL (ref 0–0.7)
EOSINOPHIL NFR BLD AUTO: 0.9 %
ERYTHROCYTE [DISTWIDTH] IN BLOOD BY AUTOMATED COUNT: 14.8 % (ref 11.5–14.5)
EST. AVERAGE GLUCOSE BLD GHB EST-MCNC: 212 MG/DL
GLUCOSE BLD MANUAL STRIP-MCNC: 144 MG/DL (ref 74–99)
GLUCOSE BLD MANUAL STRIP-MCNC: 200 MG/DL (ref 74–99)
GLUCOSE BLD MANUAL STRIP-MCNC: 538 MG/DL (ref 74–99)
GLUCOSE SERPL-MCNC: 603 MG/DL (ref 74–99)
HBA1C MFR BLD: 9 %
HCT VFR BLD AUTO: 36.4 % (ref 41–52)
HGB BLD-MCNC: 11 G/DL (ref 13.5–17.5)
IMM GRANULOCYTES # BLD AUTO: 0.14 X10*3/UL (ref 0–0.7)
IMM GRANULOCYTES NFR BLD AUTO: 1.4 % (ref 0–0.9)
LACTATE SERPL-SCNC: 1.5 MMOL/L (ref 0.4–2)
LACTATE SERPL-SCNC: 2.4 MMOL/L (ref 0.4–2)
LYMPHOCYTES # BLD AUTO: 1.11 X10*3/UL (ref 1.2–4.8)
LYMPHOCYTES NFR BLD AUTO: 10.8 %
MAGNESIUM SERPL-MCNC: 2.7 MG/DL (ref 1.6–2.4)
MCH RBC QN AUTO: 26.6 PG (ref 26–34)
MCHC RBC AUTO-ENTMCNC: 30.2 G/DL (ref 32–36)
MCV RBC AUTO: 88 FL (ref 80–100)
MONOCYTES # BLD AUTO: 0.5 X10*3/UL (ref 0.1–1)
MONOCYTES NFR BLD AUTO: 4.9 %
NEUTROPHILS # BLD AUTO: 8.42 X10*3/UL (ref 1.2–7.7)
NEUTROPHILS NFR BLD AUTO: 81.6 %
NRBC BLD-RTO: 0 /100 WBCS (ref 0–0)
PLATELET # BLD AUTO: 250 X10*3/UL (ref 150–450)
POTASSIUM SERPL-SCNC: 5.5 MMOL/L (ref 3.5–5.3)
PROT SERPL-MCNC: 7.7 G/DL (ref 6.4–8.2)
RBC # BLD AUTO: 4.14 X10*6/UL (ref 4.5–5.9)
SODIUM SERPL-SCNC: 129 MMOL/L (ref 136–145)
WBC # BLD AUTO: 10.3 X10*3/UL (ref 4.4–11.3)

## 2024-06-11 PROCEDURE — 93005 ELECTROCARDIOGRAM TRACING: CPT

## 2024-06-11 PROCEDURE — 2500000002 HC RX 250 W HCPCS SELF ADMINISTERED DRUGS (ALT 637 FOR MEDICARE OP, ALT 636 FOR OP/ED): Mod: MUE | Performed by: EMERGENCY MEDICINE

## 2024-06-11 PROCEDURE — 83605 ASSAY OF LACTIC ACID: CPT | Mod: 91 | Performed by: INTERNAL MEDICINE

## 2024-06-11 PROCEDURE — 2500000005 HC RX 250 GENERAL PHARMACY W/O HCPCS: Performed by: INTERNAL MEDICINE

## 2024-06-11 PROCEDURE — 83036 HEMOGLOBIN GLYCOSYLATED A1C: CPT | Mod: AHULAB | Performed by: INTERNAL MEDICINE

## 2024-06-11 PROCEDURE — 99285 EMERGENCY DEPT VISIT HI MDM: CPT | Mod: 25

## 2024-06-11 PROCEDURE — 2500000001 HC RX 250 WO HCPCS SELF ADMINISTERED DRUGS (ALT 637 FOR MEDICARE OP): Performed by: INTERNAL MEDICINE

## 2024-06-11 PROCEDURE — 2500000004 HC RX 250 GENERAL PHARMACY W/ HCPCS (ALT 636 FOR OP/ED): Performed by: EMERGENCY MEDICINE

## 2024-06-11 PROCEDURE — 2500000001 HC RX 250 WO HCPCS SELF ADMINISTERED DRUGS (ALT 637 FOR MEDICARE OP): Performed by: NURSE PRACTITIONER

## 2024-06-11 PROCEDURE — 99223 1ST HOSP IP/OBS HIGH 75: CPT | Performed by: INTERNAL MEDICINE

## 2024-06-11 PROCEDURE — 83735 ASSAY OF MAGNESIUM: CPT | Performed by: INTERNAL MEDICINE

## 2024-06-11 PROCEDURE — 36415 COLL VENOUS BLD VENIPUNCTURE: CPT | Performed by: EMERGENCY MEDICINE

## 2024-06-11 PROCEDURE — 74176 CT ABD & PELVIS W/O CONTRAST: CPT | Performed by: RADIOLOGY

## 2024-06-11 PROCEDURE — 83605 ASSAY OF LACTIC ACID: CPT | Performed by: EMERGENCY MEDICINE

## 2024-06-11 PROCEDURE — 51702 INSERT TEMP BLADDER CATH: CPT

## 2024-06-11 PROCEDURE — 74176 CT ABD & PELVIS W/O CONTRAST: CPT

## 2024-06-11 PROCEDURE — G0378 HOSPITAL OBSERVATION PER HR: HCPCS

## 2024-06-11 PROCEDURE — 82947 ASSAY GLUCOSE BLOOD QUANT: CPT

## 2024-06-11 PROCEDURE — 82947 ASSAY GLUCOSE BLOOD QUANT: CPT | Mod: 91

## 2024-06-11 PROCEDURE — 2500000002 HC RX 250 W HCPCS SELF ADMINISTERED DRUGS (ALT 637 FOR MEDICARE OP, ALT 636 FOR OP/ED): Performed by: INTERNAL MEDICINE

## 2024-06-11 PROCEDURE — 85025 COMPLETE CBC W/AUTO DIFF WBC: CPT | Performed by: EMERGENCY MEDICINE

## 2024-06-11 PROCEDURE — 83880 ASSAY OF NATRIURETIC PEPTIDE: CPT | Performed by: INTERNAL MEDICINE

## 2024-06-11 PROCEDURE — 84075 ASSAY ALKALINE PHOSPHATASE: CPT | Performed by: EMERGENCY MEDICINE

## 2024-06-11 RX ORDER — INSULIN LISPRO 100 [IU]/ML
20 INJECTION, SOLUTION INTRAVENOUS; SUBCUTANEOUS
Status: DISCONTINUED | OUTPATIENT
Start: 2024-06-11 | End: 2024-06-11 | Stop reason: RX

## 2024-06-11 RX ORDER — DEXTROSE 50 % IN WATER (D50W) INTRAVENOUS SYRINGE
25
Status: DISCONTINUED | OUTPATIENT
Start: 2024-06-11 | End: 2024-06-13 | Stop reason: HOSPADM

## 2024-06-11 RX ORDER — INSULIN LISPRO 100 [IU]/ML
20 INJECTION, SOLUTION INTRAVENOUS; SUBCUTANEOUS ONCE
Status: COMPLETED | OUTPATIENT
Start: 2024-06-11 | End: 2024-06-11

## 2024-06-11 RX ORDER — INSULIN GLARGINE 100 [IU]/ML
80 INJECTION, SOLUTION SUBCUTANEOUS 3 TIMES DAILY
Status: ON HOLD | COMMUNITY
End: 2024-06-13

## 2024-06-11 RX ORDER — ASPIRIN 81 MG/1
81 TABLET ORAL DAILY
Status: DISCONTINUED | OUTPATIENT
Start: 2024-06-11 | End: 2024-06-13 | Stop reason: HOSPADM

## 2024-06-11 RX ORDER — ACETAMINOPHEN 325 MG/1
975 TABLET ORAL EVERY 8 HOURS PRN
Status: DISCONTINUED | OUTPATIENT
Start: 2024-06-11 | End: 2024-06-13 | Stop reason: HOSPADM

## 2024-06-11 RX ORDER — SPIRONOLACTONE 25 MG/1
50 TABLET ORAL DAILY
Status: DISCONTINUED | OUTPATIENT
Start: 2024-06-11 | End: 2024-06-13 | Stop reason: HOSPADM

## 2024-06-11 RX ORDER — ACETAMINOPHEN 160 MG/5ML
650 SOLUTION ORAL EVERY 6 HOURS PRN
Status: DISCONTINUED | OUTPATIENT
Start: 2024-06-11 | End: 2024-06-13 | Stop reason: HOSPADM

## 2024-06-11 RX ORDER — SPIRONOLACTONE 50 MG/1
50 TABLET, FILM COATED ORAL DAILY
COMMUNITY

## 2024-06-11 RX ORDER — HEPARIN SODIUM 5000 [USP'U]/ML
5000 INJECTION, SOLUTION INTRAVENOUS; SUBCUTANEOUS EVERY 8 HOURS
Status: DISCONTINUED | OUTPATIENT
Start: 2024-06-12 | End: 2024-06-12

## 2024-06-11 RX ORDER — AMOXICILLIN 250 MG
2 CAPSULE ORAL 2 TIMES DAILY
Status: DISCONTINUED | OUTPATIENT
Start: 2024-06-11 | End: 2024-06-13 | Stop reason: HOSPADM

## 2024-06-11 RX ORDER — DEXTROSE 50 % IN WATER (D50W) INTRAVENOUS SYRINGE
12.5
Status: DISCONTINUED | OUTPATIENT
Start: 2024-06-11 | End: 2024-06-13 | Stop reason: HOSPADM

## 2024-06-11 RX ORDER — HYDROCODONE BITARTRATE AND ACETAMINOPHEN 5; 325 MG/1; MG/1
2 TABLET ORAL EVERY 6 HOURS PRN
Status: DISCONTINUED | OUTPATIENT
Start: 2024-06-11 | End: 2024-06-13 | Stop reason: HOSPADM

## 2024-06-11 RX ORDER — DULOXETIN HYDROCHLORIDE 30 MG/1
120 CAPSULE, DELAYED RELEASE ORAL DAILY
Status: DISCONTINUED | OUTPATIENT
Start: 2024-06-11 | End: 2024-06-13 | Stop reason: HOSPADM

## 2024-06-11 RX ORDER — ONDANSETRON 4 MG/1
4 TABLET, FILM COATED ORAL EVERY 8 HOURS PRN
Status: DISCONTINUED | OUTPATIENT
Start: 2024-06-11 | End: 2024-06-13 | Stop reason: HOSPADM

## 2024-06-11 RX ORDER — DIAZEPAM 5 MG/1
10 TABLET ORAL ONCE
Status: COMPLETED | OUTPATIENT
Start: 2024-06-11 | End: 2024-06-11

## 2024-06-11 RX ORDER — INSULIN GLARGINE 100 [IU]/ML
80 INJECTION, SOLUTION SUBCUTANEOUS ONCE
Status: DISCONTINUED | OUTPATIENT
Start: 2024-06-11 | End: 2024-06-11 | Stop reason: RX

## 2024-06-11 RX ORDER — INSULIN LISPRO 100 [IU]/ML
0-10 INJECTION, SOLUTION INTRAVENOUS; SUBCUTANEOUS
Status: DISCONTINUED | OUTPATIENT
Start: 2024-06-11 | End: 2024-06-12

## 2024-06-11 RX ORDER — DULOXETIN HYDROCHLORIDE 60 MG/1
120 CAPSULE, DELAYED RELEASE ORAL DAILY
COMMUNITY

## 2024-06-11 RX ORDER — LISINOPRIL 5 MG/1
5 TABLET ORAL DAILY
Status: DISCONTINUED | OUTPATIENT
Start: 2024-06-11 | End: 2024-06-13 | Stop reason: HOSPADM

## 2024-06-11 RX ORDER — LORAZEPAM 0.5 MG/1
0.5 TABLET ORAL ONCE
Status: COMPLETED | OUTPATIENT
Start: 2024-06-11 | End: 2024-06-11

## 2024-06-11 RX ORDER — TALC
6 POWDER (GRAM) TOPICAL NIGHTLY
Status: DISCONTINUED | OUTPATIENT
Start: 2024-06-11 | End: 2024-06-13 | Stop reason: HOSPADM

## 2024-06-11 RX ORDER — LIDOCAINE 560 MG/1
1 PATCH PERCUTANEOUS; TOPICAL; TRANSDERMAL DAILY
Status: DISCONTINUED | OUTPATIENT
Start: 2024-06-11 | End: 2024-06-13 | Stop reason: HOSPADM

## 2024-06-11 RX ORDER — FERROUS SULFATE 325(65) MG
325 TABLET ORAL EVERY OTHER DAY
COMMUNITY

## 2024-06-11 RX ORDER — ATORVASTATIN CALCIUM 80 MG/1
80 TABLET, FILM COATED ORAL NIGHTLY
Status: DISCONTINUED | OUTPATIENT
Start: 2024-06-11 | End: 2024-06-13 | Stop reason: HOSPADM

## 2024-06-11 RX ORDER — TORSEMIDE 20 MG/1
60 TABLET ORAL 2 TIMES DAILY
Status: DISCONTINUED | OUTPATIENT
Start: 2024-06-11 | End: 2024-06-13 | Stop reason: HOSPADM

## 2024-06-11 RX ORDER — ONDANSETRON HYDROCHLORIDE 2 MG/ML
4 INJECTION, SOLUTION INTRAVENOUS EVERY 8 HOURS PRN
Status: DISCONTINUED | OUTPATIENT
Start: 2024-06-11 | End: 2024-06-13 | Stop reason: HOSPADM

## 2024-06-11 RX ORDER — PANTOPRAZOLE SODIUM 40 MG/1
40 TABLET, DELAYED RELEASE ORAL
Status: DISCONTINUED | OUTPATIENT
Start: 2024-06-11 | End: 2024-06-13 | Stop reason: HOSPADM

## 2024-06-11 RX ORDER — POLYETHYLENE GLYCOL 3350 17 G/17G
17 POWDER, FOR SOLUTION ORAL DAILY
Status: DISCONTINUED | OUTPATIENT
Start: 2024-06-11 | End: 2024-06-13 | Stop reason: HOSPADM

## 2024-06-11 RX ORDER — AMLODIPINE BESYLATE 10 MG/1
10 TABLET ORAL DAILY
Status: DISCONTINUED | OUTPATIENT
Start: 2024-06-11 | End: 2024-06-13 | Stop reason: HOSPADM

## 2024-06-11 RX ORDER — INSULIN GLARGINE 100 [IU]/ML
60 INJECTION, SOLUTION SUBCUTANEOUS ONCE
Status: COMPLETED | OUTPATIENT
Start: 2024-06-11 | End: 2024-06-11

## 2024-06-11 RX ORDER — ACETAMINOPHEN 650 MG/1
650 SUPPOSITORY RECTAL EVERY 4 HOURS PRN
Status: DISCONTINUED | OUTPATIENT
Start: 2024-06-11 | End: 2024-06-13 | Stop reason: HOSPADM

## 2024-06-11 SDOH — SOCIAL STABILITY: SOCIAL INSECURITY: HAS ANYONE EVER THREATENED TO HURT YOUR FAMILY OR YOUR PETS?: NO

## 2024-06-11 SDOH — SOCIAL STABILITY: SOCIAL INSECURITY: HAVE YOU HAD ANY THOUGHTS OF HARMING ANYONE ELSE?: NO

## 2024-06-11 SDOH — SOCIAL STABILITY: SOCIAL INSECURITY: ARE THERE ANY APPARENT SIGNS OF INJURIES/BEHAVIORS THAT COULD BE RELATED TO ABUSE/NEGLECT?: NO

## 2024-06-11 SDOH — SOCIAL STABILITY: SOCIAL INSECURITY: DO YOU FEEL ANYONE HAS EXPLOITED OR TAKEN ADVANTAGE OF YOU FINANCIALLY OR OF YOUR PERSONAL PROPERTY?: NO

## 2024-06-11 SDOH — SOCIAL STABILITY: SOCIAL INSECURITY: WERE YOU ABLE TO COMPLETE ALL THE BEHAVIORAL HEALTH SCREENINGS?: YES

## 2024-06-11 SDOH — SOCIAL STABILITY: SOCIAL INSECURITY: ARE YOU OR HAVE YOU BEEN THREATENED OR ABUSED PHYSICALLY, EMOTIONALLY, OR SEXUALLY BY ANYONE?: NO

## 2024-06-11 SDOH — SOCIAL STABILITY: SOCIAL INSECURITY: HAVE YOU HAD THOUGHTS OF HARMING ANYONE ELSE?: NO

## 2024-06-11 SDOH — SOCIAL STABILITY: SOCIAL INSECURITY: ABUSE: ADULT

## 2024-06-11 SDOH — SOCIAL STABILITY: SOCIAL INSECURITY: DO YOU FEEL UNSAFE GOING BACK TO THE PLACE WHERE YOU ARE LIVING?: NO

## 2024-06-11 SDOH — SOCIAL STABILITY: SOCIAL INSECURITY: DOES ANYONE TRY TO KEEP YOU FROM HAVING/CONTACTING OTHER FRIENDS OR DOING THINGS OUTSIDE YOUR HOME?: NO

## 2024-06-11 ASSESSMENT — ACTIVITIES OF DAILY LIVING (ADL)
LACK_OF_TRANSPORTATION: NO
ADEQUATE_TO_COMPLETE_ADL: YES
GROOMING: NEEDS ASSISTANCE
PATIENT'S MEMORY ADEQUATE TO SAFELY COMPLETE DAILY ACTIVITIES?: YES
FEEDING YOURSELF: NEEDS ASSISTANCE
TOILETING: NEEDS ASSISTANCE
WALKS IN HOME: NEEDS ASSISTANCE
ASSISTIVE_DEVICE: EYEGLASSES
BATHING: NEEDS ASSISTANCE
JUDGMENT_ADEQUATE_SAFELY_COMPLETE_DAILY_ACTIVITIES: YES
HEARING - RIGHT EAR: FUNCTIONAL
DRESSING YOURSELF: NEEDS ASSISTANCE
HEARING - LEFT EAR: FUNCTIONAL

## 2024-06-11 ASSESSMENT — LIFESTYLE VARIABLES
AUDIT-C TOTAL SCORE: 0
HOW MANY STANDARD DRINKS CONTAINING ALCOHOL DO YOU HAVE ON A TYPICAL DAY: PATIENT DOES NOT DRINK
SKIP TO QUESTIONS 9-10: 1
HOW OFTEN DO YOU HAVE 6 OR MORE DRINKS ON ONE OCCASION: NEVER
HOW OFTEN DO YOU HAVE A DRINK CONTAINING ALCOHOL: NEVER
AUDIT-C TOTAL SCORE: 0

## 2024-06-11 ASSESSMENT — COGNITIVE AND FUNCTIONAL STATUS - GENERAL
WALKING IN HOSPITAL ROOM: A LOT
DRESSING REGULAR UPPER BODY CLOTHING: A LITTLE
TOILETING: A LITTLE
STANDING UP FROM CHAIR USING ARMS: A LITTLE
DAILY ACTIVITIY SCORE: 19
CLIMB 3 TO 5 STEPS WITH RAILING: A LOT
STANDING UP FROM CHAIR USING ARMS: A LITTLE
DRESSING REGULAR UPPER BODY CLOTHING: A LITTLE
HELP NEEDED FOR BATHING: A LITTLE
WALKING IN HOSPITAL ROOM: A LOT
MOVING TO AND FROM BED TO CHAIR: A LITTLE
TURNING FROM BACK TO SIDE WHILE IN FLAT BAD: A LITTLE
PERSONAL GROOMING: A LITTLE
PATIENT BASELINE BEDBOUND: NO
CLIMB 3 TO 5 STEPS WITH RAILING: A LOT
DRESSING REGULAR LOWER BODY CLOTHING: A LITTLE
MOVING FROM LYING ON BACK TO SITTING ON SIDE OF FLAT BED WITH BEDRAILS: A LITTLE
MOBILITY SCORE: 16
PERSONAL GROOMING: A LITTLE
DAILY ACTIVITIY SCORE: 19
HELP NEEDED FOR BATHING: A LITTLE
MOVING FROM LYING ON BACK TO SITTING ON SIDE OF FLAT BED WITH BEDRAILS: A LITTLE
MOVING TO AND FROM BED TO CHAIR: A LITTLE
MOBILITY SCORE: 16
TOILETING: A LITTLE
TURNING FROM BACK TO SIDE WHILE IN FLAT BAD: A LITTLE
DRESSING REGULAR LOWER BODY CLOTHING: A LITTLE

## 2024-06-11 ASSESSMENT — PATIENT HEALTH QUESTIONNAIRE - PHQ9
SUM OF ALL RESPONSES TO PHQ9 QUESTIONS 1 & 2: 4
2. FEELING DOWN, DEPRESSED OR HOPELESS: MORE THAN HALF THE DAYS
1. LITTLE INTEREST OR PLEASURE IN DOING THINGS: MORE THAN HALF THE DAYS

## 2024-06-11 ASSESSMENT — ENCOUNTER SYMPTOMS
RESPIRATORY NEGATIVE: 1
MUSCULOSKELETAL NEGATIVE: 1
NEUROLOGICAL NEGATIVE: 1
PSYCHIATRIC NEGATIVE: 1
CONSTITUTIONAL NEGATIVE: 1
GASTROINTESTINAL NEGATIVE: 1

## 2024-06-11 ASSESSMENT — PAIN - FUNCTIONAL ASSESSMENT: PAIN_FUNCTIONAL_ASSESSMENT: 0-10

## 2024-06-11 ASSESSMENT — PAIN SCALES - GENERAL
PAINLEVEL_OUTOF10: 7
PAINLEVEL_OUTOF10: 0 - NO PAIN

## 2024-06-11 ASSESSMENT — COLUMBIA-SUICIDE SEVERITY RATING SCALE - C-SSRS
1. IN THE PAST MONTH, HAVE YOU WISHED YOU WERE DEAD OR WISHED YOU COULD GO TO SLEEP AND NOT WAKE UP?: NO
6. HAVE YOU EVER DONE ANYTHING, STARTED TO DO ANYTHING, OR PREPARED TO DO ANYTHING TO END YOUR LIFE?: NO
2. HAVE YOU ACTUALLY HAD ANY THOUGHTS OF KILLING YOURSELF?: NO

## 2024-06-11 ASSESSMENT — PAIN DESCRIPTION - DESCRIPTORS: DESCRIPTORS: PATIENT UNABLE TO DESCRIBE

## 2024-06-11 NOTE — PROGRESS NOTES
Transitional Care Coordination Progress Note:  Plan per Medical/Surgical team: treatment of hyperglycemia (603) with insulin  Status: Observation  Payor source: medicare A/B  Discharge disposition: Home alone   Self care for foot wounds through [facility]  Potential Barriers: glucose 603, , K5.5, renal 69/2.41, lactate 2.4  depressed over recent death of father, anxious trying to manage estate, follows @ [employer] ([facility])  ADOD: 6/12/2024   SUNG Lyles RN, BSN Transitional Care Coordinator ED# [phone]      06/11/24 1324   Discharge Planning   Living Arrangements Alone   Support Systems Friends/neighbors;/   Assistance Needed feet wound care   Type of Residence Private residence   Number of Stairs to Enter Residence 2   Number of Stairs Within Residence 0   Do you have animals or pets at home? No   Home or Post Acute Services In home services   Type of Home Care Services Home nursing visits   Patient expects to be discharged to: Home alone   HHC nurse 2x week for feet wound care  Agency unknown- message placed to  to clarify   Does the patient need discharge transport arranged? Yes   RoundTrip coordination needed? Yes   Has discharge transport been arranged? No   Financial Resource Strain   How hard is it for you to pay for the very basics like food, housing, medical care, and heating? Somewhat   Housing Stability   In the last 12 months, was there a time when you were not able to pay the mortgage or rent on time? N   In the last 12 months, how many places have you lived? 1   In the last 12 months, was there a time when you did not have a steady place to sleep or slept in a shelter (including now)? N   Transportation Needs   In the past 12 months, has lack of transportation kept you from medical appointments or from getting medications? no   In the past 12 months, has lack of transportation kept you from meetings, work, or from getting things needed for daily living?  No

## 2024-06-11 NOTE — PROGRESS NOTES
Pharmacy Medication History Review   Tried to speak with the patient, he was not a good historian he was unsure of what he takes and the doses.  Called his pharmacy and spoke with a pharmacists, we went over all his current medications. Pt takes some unusual doses. His Lantus is written 80 units TID, the pharmacist double checked the Rx.  Pt took no meds today.  I updated his OMR, only current medications are listed      Jai Shrestha is a 54 y.o. male admitted for No Principal Problem: There is no principal problem currently on the Problem List. Please update the Problem List and refresh.. Pharmacy reviewed the patient's [id number] medications and allergies for accuracy.    The list below reflectives the updated PTA list. Please review each medication in order reconciliation for additional clarification and justification.  Prior to Admission Medications   Prescriptions Last Dose Informant   DULoxetine (Cymbalta) 60 mg DR capsule 6/10/2024    Sig: Take 2 capsules (120 mg) by mouth once daily. Do not crush or chew.   HYDROcodone-acetaminophen (Norco) 7.5-325 mg tablet     Sig: Take 1 tablet by mouth 4 times a day as needed for moderate pain (4 - 6) or severe pain (7 - 10) for up to 28 days. Do not start before May 15, 2024.   QUEtiapine (SEROquel) 200 mg tablet 6/9/2024 at pm    Sig: Take 2 tablets (400 mg) by mouth once daily at bedtime.   amLODIPine (Norvasc) 5 mg tablet 6/10/2024    Sig: Take 2 tablets (10 mg) by mouth once daily.   aspirin 81 mg EC tablet 6/10/2024    Sig: Take 1 tablet (81 mg) by mouth once daily.   atorvastatin (Lipitor) 80 mg tablet 6/10/2024    Sig: Take 1 tablet (80 mg) by mouth once daily.   cholecalciferol (Vitamin D-3) 1,250 mcg (50,000 unit) capsule 6/10/2024    Sig: Take 1 capsule (50,000 Units) by mouth 1 (one) time per week. Every monday   ferrous sulfate, 325 mg ferrous sulfate, tablet 6/9/2024    Sig: Take 1 tablet by mouth every other day.   insulin aspart (NovoLOG U-100  Insulin aspart) 100 unit/mL injection     Sig: Inject 50 Units under the skin 3 times a day before meals.   insulin glargine (Lantus) 100 unit/mL (3 mL) pen 6/10/2024    Sig: Inject 80 Units under the skin 3 times a day. Take as directed per insulin instructions.   lisinopril 5 mg tablet 6/10/2024    Sig: Take 1 tablet (5 mg) by mouth once daily.   naloxone (Narcan) 4 mg/0.1 mL nasal spray     Sig: Administer 1 spray (4 mg) into affected nostril(s) if needed (while patient lay on there back , repeat if needed).   omeprazole (PriLOSEC) 40 mg DR capsule     Sig: Take 1 capsule (40 mg) by mouth 2 times a day.   sennosides-docusate sodium (Yasemin-Colace) 8.6-50 mg tablet     Sig: Take 2 tablets by mouth 2 times a day as needed.   spironolactone (Aldactone) 50 mg tablet 6/10/2024    Sig: Take 1 tablet (50 mg) by mouth once daily.   torsemide (Demadex) 20 mg tablet 6/10/2024    Sig: Take 3 tablets (60 mg) by mouth 2 times a day.      Facility-Administered Medications: None       The list below reflectives the updated allergy list. Please review each documented allergy for additional clarification and justification.  Allergies  Reviewed by Angelika North RN on 6/11/2024        Severity Reactions Comments    Metformin Not Specified Unknown             Below are additional concerns with the patient's PTA list.      Ambreen Green

## 2024-06-11 NOTE — ED PROVIDER NOTES
HPI   No chief complaint on file.      Chief complaint: Elevated blood glucose    History of present illness: Patient is a 54-year-old male with history of type 2 diabetes chronic kidney disease heart failure presenting to the emergency department with complaints of an elevated blood glucose.  According to the patient, he has been feeling increasingly weak recently.  The patient checked his blood sugar and it was elevated at 541.  As result, the patient gave himself insulin.  After giving himself insulin, he checked his blood glucose again and it only read high.  The patient has a known history of diabetes.  Patient denies any fever at this time.  Concern, the patient presents to the emergency department for further evaluation.      History provided by:  Patient   used: No                        No data recorded                   Patient History   No past medical history on file.  No past surgical history on file.  No family history on file.  Social History     Tobacco Use    Smoking status: Not on file    Smokeless tobacco: Not on file   Substance Use Topics    Alcohol use: Not on file    Drug use: Not on file       Physical Exam   ED Triage Vitals   Temp Pulse Resp BP   -- -- -- --      SpO2 Temp src Heart Rate Source Patient Position   -- -- -- --      BP Location FiO2 (%)     -- --       Physical Exam  Vitals and nursing note reviewed.   Constitutional:       General: He is not in acute distress.     Appearance: He is well-developed. He is morbidly obese.   HENT:      Head: Normocephalic and atraumatic.   Eyes:      Conjunctiva/sclera: Conjunctivae normal.   Cardiovascular:      Rate and Rhythm: Normal rate and regular rhythm.      Heart sounds: No murmur heard.  Pulmonary:      Effort: Pulmonary effort is normal. No respiratory distress.      Breath sounds: Normal breath sounds.   Abdominal:      Palpations: Abdomen is soft.      Tenderness: There is abdominal tenderness. There is no  guarding or rebound.      Comments: Patient's abdomen is obese.   Musculoskeletal:         General: No swelling.      Cervical back: Neck supple.   Skin:     General: Skin is warm and dry.      Capillary Refill: Capillary refill takes less than 2 seconds.   Neurological:      Mental Status: He is alert.   Psychiatric:         Mood and Affect: Mood normal.         ED Course & MDM   ED Course as of 06/22/24 2355   Tue Jun 11, 2024   1152 SODIUM(!): 129 [BK]   1152 Creatinine(!): 2.41 [BK]      ED Course User Index  [BK] Montana Bonilla MD         Diagnoses as of 06/22/24 2355   STEPHANIE (acute kidney injury) (CMS-[facility])   Hyperglycemia       Medical Decision Making  Medical decision making: Patient remained stable throughout his time in the emergency department.  CBC demonstrated hemoglobin of 11 but no other significant abnormalities Chem-7 demonstrated glucose of 603 potassium of 5 5 no anion gap and normal bicarb.  Creatinine is 2.41 LFTs are within normal limits.  CAT scan of the patient's abdomen pelvis with IV contrast demonstrated thickening of the urinary bladder with no other significant acute intra intra-abdominal pathology.  Finally, the patient's EKG demonstrated a normal sinus rhythm with a rate of 94 bpm isoelectric ST segments narrow QRS complexes and a QTc of 447.    Patient presents to the emergency department with complaints of hyperglycemia.  Workup was performed as above.  Patient was given insulin in the emergency department.  Patient was given normal saline IV.  The patient's blood work demonstrates that the patient is in an acute kidney injury compared to his baseline as result the patient will require admission to the hospital as his current creatinine is 2 times greater than his baseline.  I spoke with the hospitalist who agreed the patient could be admitted to their service for further evaluation and therapy.  The patient was then admitted to the hospital in otherwise stable condition.    Amount  and/or Complexity of Data Reviewed  Labs: ordered. Decision-making details documented in ED Course.  Radiology: ordered. Decision-making details documented in ED Course.  ECG/medicine tests: ordered and independent interpretation performed. Decision-making details documented in ED Course.        Procedure  Procedures     Montana Bonilla MD  06/23/24 0009

## 2024-06-11 NOTE — PROGRESS NOTES
Home alone  [facility]     06/11/24 4186   Current Planned Discharge Disposition   Current Planned Discharge Disposition Home

## 2024-06-11 NOTE — PROGRESS NOTES
06/11/24 1323   [facility] Disability Status   Are you deaf or do you have serious difficulty hearing? N   Are you blind or do you have serious difficulty seeing, even when wearing glasses? N   Because of a physical, mental, or emotional condition, do you have serious difficulty concentrating, remembering, or making decisions? (5 years old or older) Y  (bipolar, anxiety, depression-recent death of father)   Do you have serious difficulty walking or climbing stairs? Y  (walker)   Do you have serious difficulty dressing or bathing? N   Because of a physical, mental, or emotional condition, do you have serious difficulty doing errands alone such as visiting the doctor? Y  (uses [facility] service)

## 2024-06-11 NOTE — H&P
"History Of Present Illness  Jai Shrestha is a 54 y.o. male presenting with feeling unwell.  Past Medical History:   Diagnosis Date    Chronic back pain     CKD (chronic kidney disease)     Diabetes mellitus (Multi)     HF (heart failure), diastolic (Multi)     Hypertension      He has been feeling unwell the past week; has been intermittently nauseated. Chronic back pain, but fell the other day, and now has worse upper back pain. Father recently passed, which has been hard on him. He has been eating more poorly and not taking his insulin consistently. Neighbor usually helps him with meals, but he is having his own issues. He thinks he is supposed to be on glargine 80 units TID. He sees wound care for his feet. Guo was placed in ED with 1500 ml out.       No past surgical history on file.  Social History     Tobacco Use    Smoking status: Former     Types: Cigarettes   Substance Use Topics    Alcohol use: Yes    Drug use: Not Currently     No family history on file.  Allergies  Metformin    Review of Systems   Constitutional: Negative.    HENT: Negative.     Respiratory: Negative.     Gastrointestinal: Negative.    Genitourinary: Negative.    Musculoskeletal: Negative.    Skin: Negative.    Neurological: Negative.    Psychiatric/Behavioral: Negative.         Last Recorded Vitals  Blood pressure 133/64, pulse 84, temperature 36.6 °C (97.9 °F), temperature source Temporal, resp. rate 18, height 1.803 m (5' 11\"), SpO2 99%.  Physical Exam  Cardiovascular:      Rate and Rhythm: Normal rate and regular rhythm.      Heart sounds: Normal heart sounds.   Pulmonary:      Breath sounds: Normal breath sounds.   Abdominal:      General: Bowel sounds are normal.      Palpations: Abdomen is soft.   Musculoskeletal:         General: Normal range of motion.   Neurological:      General: No focal deficit present.      Mental Status: He is alert and oriented to person, place, and time.   Psychiatric:         Mood and Affect: Mood " normal.           Relevant Results           Assessment/Plan   Principal Problem:    Hyperglycemia  Active Problems:    Chronic back pain    CKD (chronic kidney disease)    HF (heart failure), diastolic (Multi)    STEPHANIE (acute kidney injury) (CMS-HCC)    Urinary retention  - pharm and endo to assist with insulin regimen; will give 80 units lantus now and re-eval tomorrow (80 units TID seems like too much)  - SW to assist with home situation  - not clear why having retention (neurogenic bladder from DM?); will check CT abd; no known hx of BPH  - trend crt  - hold fluids and diuretics         I spent 55 minutes in the professional and overall care of this patient.      Anmol Palmer MD

## 2024-06-11 NOTE — PROGRESS NOTES
Home alone   HHC nurse 2x week for feet wound care  Agency unknown- message placed to  to clarify     06/11/24 2552   Current Planned Discharge Disposition   Current Planned Discharge Disposition Home Health

## 2024-06-11 NOTE — ED TRIAGE NOTES
PT PRESENTS TO THE ED BY [facility]IA EMS FOR INCREASED WEAKNESS AND HIGH BS. PER EMS PATIENT CALLED WHEN HIS BS , PATIENT GAVE HIMSELF INSULIN PRIOR TO ARRIVAL, THEY CHECKED HIS BS AND IT READ HIGH. PTS GCS OF 14. PT HAS HX OF DIABETES AND IS NOT COMPLIANT WITH A DIABETIC DIET.

## 2024-06-12 LAB
ANION GAP SERPL CALC-SCNC: 14 MMOL/L (ref 10–20)
APPEARANCE UR: CLEAR
BILIRUB UR STRIP.AUTO-MCNC: NEGATIVE MG/DL
BUN SERPL-MCNC: 60 MG/DL (ref 6–23)
CALCIUM SERPL-MCNC: 9 MG/DL (ref 8.6–10.3)
CHLORIDE SERPL-SCNC: 97 MMOL/L (ref 98–107)
CO2 SERPL-SCNC: 29 MMOL/L (ref 21–32)
COLOR UR: COLORLESS
CREAT SERPL-MCNC: 2.15 MG/DL (ref 0.5–1.3)
EGFRCR SERPLBLD CKD-EPI 2021: 36 ML/MIN/1.73M*2
GLUCOSE BLD MANUAL STRIP-MCNC: 166 MG/DL (ref 74–99)
GLUCOSE BLD MANUAL STRIP-MCNC: 182 MG/DL (ref 74–99)
GLUCOSE BLD MANUAL STRIP-MCNC: 319 MG/DL (ref 74–99)
GLUCOSE BLD MANUAL STRIP-MCNC: 340 MG/DL (ref 74–99)
GLUCOSE BLD MANUAL STRIP-MCNC: 360 MG/DL (ref 74–99)
GLUCOSE SERPL-MCNC: 296 MG/DL (ref 74–99)
GLUCOSE UR STRIP.AUTO-MCNC: ABNORMAL MG/DL
HOLD SPECIMEN: NORMAL
HOLD SPECIMEN: NORMAL
KETONES UR STRIP.AUTO-MCNC: NEGATIVE MG/DL
LEUKOCYTE ESTERASE UR QL STRIP.AUTO: NEGATIVE
NITRITE UR QL STRIP.AUTO: NEGATIVE
PH UR STRIP.AUTO: 6.5 [PH]
POTASSIUM SERPL-SCNC: 5.1 MMOL/L (ref 3.5–5.3)
PROT UR STRIP.AUTO-MCNC: NEGATIVE MG/DL
RBC # UR STRIP.AUTO: ABNORMAL /UL
RBC #/AREA URNS AUTO: ABNORMAL /HPF
SODIUM SERPL-SCNC: 135 MMOL/L (ref 136–145)
SP GR UR STRIP.AUTO: 1.01
UROBILINOGEN UR STRIP.AUTO-MCNC: NORMAL MG/DL
WBC #/AREA URNS AUTO: ABNORMAL /HPF

## 2024-06-12 PROCEDURE — 36415 COLL VENOUS BLD VENIPUNCTURE: CPT | Performed by: INTERNAL MEDICINE

## 2024-06-12 PROCEDURE — 2500000004 HC RX 250 GENERAL PHARMACY W/ HCPCS (ALT 636 FOR OP/ED): Performed by: NURSE PRACTITIONER

## 2024-06-12 PROCEDURE — 2500000005 HC RX 250 GENERAL PHARMACY W/O HCPCS: Performed by: INTERNAL MEDICINE

## 2024-06-12 PROCEDURE — 2500000001 HC RX 250 WO HCPCS SELF ADMINISTERED DRUGS (ALT 637 FOR MEDICARE OP): Performed by: INTERNAL MEDICINE

## 2024-06-12 PROCEDURE — 99221 1ST HOSP IP/OBS SF/LOW 40: CPT | Performed by: INTERNAL MEDICINE

## 2024-06-12 PROCEDURE — 2500000002 HC RX 250 W HCPCS SELF ADMINISTERED DRUGS (ALT 637 FOR MEDICARE OP, ALT 636 FOR OP/ED): Performed by: INTERNAL MEDICINE

## 2024-06-12 PROCEDURE — G0378 HOSPITAL OBSERVATION PER HR: HCPCS

## 2024-06-12 PROCEDURE — 2500000001 HC RX 250 WO HCPCS SELF ADMINISTERED DRUGS (ALT 637 FOR MEDICARE OP): Performed by: NURSE PRACTITIONER

## 2024-06-12 PROCEDURE — 82947 ASSAY GLUCOSE BLOOD QUANT: CPT | Mod: 91

## 2024-06-12 PROCEDURE — 96372 THER/PROPH/DIAG INJ SC/IM: CPT | Performed by: NURSE PRACTITIONER

## 2024-06-12 PROCEDURE — 81001 URINALYSIS AUTO W/SCOPE: CPT | Performed by: EMERGENCY MEDICINE

## 2024-06-12 PROCEDURE — 80048 BASIC METABOLIC PNL TOTAL CA: CPT | Performed by: INTERNAL MEDICINE

## 2024-06-12 RX ORDER — INSULIN LISPRO 100 [IU]/ML
30 INJECTION, SOLUTION INTRAVENOUS; SUBCUTANEOUS
Status: DISCONTINUED | OUTPATIENT
Start: 2024-06-12 | End: 2024-06-13 | Stop reason: HOSPADM

## 2024-06-12 RX ORDER — INSULIN LISPRO 100 [IU]/ML
0-10 INJECTION, SOLUTION INTRAVENOUS; SUBCUTANEOUS
Status: DISCONTINUED | OUTPATIENT
Start: 2024-06-12 | End: 2024-06-13 | Stop reason: HOSPADM

## 2024-06-12 RX ORDER — HYDROXYZINE HYDROCHLORIDE 25 MG/1
25 TABLET, FILM COATED ORAL EVERY 12 HOURS PRN
Status: DISCONTINUED | OUTPATIENT
Start: 2024-06-12 | End: 2024-06-13 | Stop reason: HOSPADM

## 2024-06-12 RX ORDER — ENOXAPARIN SODIUM 100 MG/ML
60 INJECTION SUBCUTANEOUS 2 TIMES DAILY
Status: DISCONTINUED | OUTPATIENT
Start: 2024-06-12 | End: 2024-06-13 | Stop reason: HOSPADM

## 2024-06-12 RX ORDER — INSULIN GLARGINE 100 [IU]/ML
80 INJECTION, SOLUTION SUBCUTANEOUS EVERY 12 HOURS
Status: DISCONTINUED | OUTPATIENT
Start: 2024-06-12 | End: 2024-06-13

## 2024-06-12 RX ORDER — INSULIN LISPRO 100 [IU]/ML
40 INJECTION, SOLUTION INTRAVENOUS; SUBCUTANEOUS
Status: DISCONTINUED | OUTPATIENT
Start: 2024-06-12 | End: 2024-06-12

## 2024-06-12 ASSESSMENT — COGNITIVE AND FUNCTIONAL STATUS - GENERAL
DRESSING REGULAR UPPER BODY CLOTHING: A LITTLE
DAILY ACTIVITIY SCORE: 19
TOILETING: A LOT
PERSONAL GROOMING: A LITTLE
HELP NEEDED FOR BATHING: A LITTLE
MOVING TO AND FROM BED TO CHAIR: A LITTLE
MOBILITY SCORE: 18
WALKING IN HOSPITAL ROOM: A LOT
TURNING FROM BACK TO SIDE WHILE IN FLAT BAD: A LITTLE
STANDING UP FROM CHAIR USING ARMS: A LITTLE
DRESSING REGULAR LOWER BODY CLOTHING: A LITTLE
PERSONAL GROOMING: A LITTLE
TOILETING: A LITTLE
DRESSING REGULAR UPPER BODY CLOTHING: A LITTLE
MOVING FROM LYING ON BACK TO SITTING ON SIDE OF FLAT BED WITH BEDRAILS: A LITTLE
WALKING IN HOSPITAL ROOM: A LOT
STANDING UP FROM CHAIR USING ARMS: A LITTLE
MOBILITY SCORE: 16
DRESSING REGULAR LOWER BODY CLOTHING: A LITTLE
CLIMB 3 TO 5 STEPS WITH RAILING: A LOT
CLIMB 3 TO 5 STEPS WITH RAILING: A LOT
MOVING TO AND FROM BED TO CHAIR: A LITTLE
DAILY ACTIVITIY SCORE: 18
HELP NEEDED FOR BATHING: A LITTLE

## 2024-06-12 ASSESSMENT — PAIN SCALES - GENERAL
PAINLEVEL_OUTOF10: 7

## 2024-06-12 ASSESSMENT — PAIN - FUNCTIONAL ASSESSMENT
PAIN_FUNCTIONAL_ASSESSMENT: 0-10

## 2024-06-12 ASSESSMENT — ENCOUNTER SYMPTOMS
DYSPHORIC MOOD: 1
WEAKNESS: 1
BACK PAIN: 1

## 2024-06-12 ASSESSMENT — PAIN DESCRIPTION - DESCRIPTORS
DESCRIPTORS: DISCOMFORT

## 2024-06-12 ASSESSMENT — PAIN DESCRIPTION - ORIENTATION: ORIENTATION: MID

## 2024-06-12 ASSESSMENT — PAIN DESCRIPTION - LOCATION: LOCATION: BACK

## 2024-06-12 NOTE — CARE PLAN
The patient's goals for the shift include      The clinical goals for the shift include pt to remain hds/keep glucose down      Problem: Pain - Adult  Goal: Verbalizes/displays adequate comfort level or baseline comfort level  Outcome: Progressing     Problem: Safety - Adult  Goal: Free from fall injury  Outcome: Progressing     Problem: Discharge Planning  Goal: Discharge to home or other facility with appropriate resources  Outcome: Progressing     Problem: Chronic Conditions and Co-morbidities  Goal: Patient's chronic conditions and co-morbidity symptoms are monitored and maintained or improved  Outcome: Progressing     Problem: Skin  Goal: Decreased wound size/increased tissue granulation at next dressing change  Outcome: Progressing  Flowsheets (Taken 6/11/2024 2118)  Decreased wound size/increased tissue granulation at next dressing change: Promote sleep for wound healing  Goal: Participates in plan/prevention/treatment measures  Outcome: Progressing  Flowsheets (Taken 6/11/2024 2118)  Participates in plan/prevention/treatment measures: Elevate heels  Goal: Prevent/manage excess moisture  Outcome: Progressing  Flowsheets (Taken 6/11/2024 2118)  Prevent/manage excess moisture:   Cleanse incontinence/protect with barrier cream   Monitor for/manage infection if present  Goal: Prevent/minimize sheer/friction injuries  Outcome: Progressing  Flowsheets (Taken 6/11/2024 2118)  Prevent/minimize sheer/friction injuries:   Increase activity/out of bed for meals   HOB 30 degrees or less  Goal: Promote/optimize nutrition  Outcome: Progressing  Flowsheets (Taken 6/11/2024 2118)  Promote/optimize nutrition:   Offer water/supplements/favorite foods   Consume > 50% meals/supplements  Goal: Promote skin healing  Outcome: Progressing  Flowsheets (Taken 6/11/2024 2118)  Promote skin healing: Assess skin/pad under line(s)/device(s)     Problem: Fall/Injury  Goal: Not fall by end of shift  Outcome: Progressing  Goal: Be free from  injury by end of the shift  Outcome: Progressing  Goal: Verbalize understanding of personal risk factors for fall in the hospital  Outcome: Progressing  Goal: Verbalize understanding of risk factor reduction measures to prevent injury from fall in the home  Outcome: Progressing  Goal: Use assistive devices by end of the shift  Outcome: Progressing  Goal: Pace activities to prevent fatigue by end of the shift  Outcome: Progressing     Problem: Diabetes  Goal: Achieve decreasing blood glucose levels by end of shift  Outcome: Progressing  Goal: Increase stability of blood glucose readings by end of shift  Outcome: Progressing  Goal: Decrease in ketones present in urine by end of shift  Outcome: Progressing  Goal: Maintain electrolyte levels within acceptable range throughout shift  Outcome: Progressing  Goal: Maintain glucose levels >70mg/dl to <250mg/dl throughout shift  Outcome: Progressing  Goal: No changes in neurological exam by end of shift  Outcome: Progressing  Goal: Learn about and adhere to nutrition recommendations by end of shift  Outcome: Progressing  Goal: Vital signs within normal range for age by end of shift  Outcome: Progressing  Goal: Increase self care and/or family involovement by end of shift  Outcome: Progressing  Goal: Receive DSME education by end of shift  Outcome: Progressing     Problem: Heart Failure  Goal: Improved gas exchange this shift  Outcome: Progressing  Goal: Improved urinary output this shift  Outcome: Progressing  Goal: Reduction in peripheral edema within 24 hours  Outcome: Progressing  Goal: Report improvement of dyspnea/breathlessness this shift  Outcome: Progressing  Goal: Weight from fluid excess reduced over 2-3 days, then stabilize  Outcome: Progressing  Goal: Increase self care and/or family involvement in 24 hours  Outcome: Progressing     Problem: Pain  Goal: Takes deep breaths with improved pain control throughout the shift  Outcome: Progressing  Goal: Turns in bed  with improved pain control throughout the shift  Outcome: Progressing  Goal: Walks with improved pain control throughout the shift  Outcome: Progressing  Goal: Performs ADL's with improved pain control throughout shift  Outcome: Progressing  Goal: Participates in PT with improved pain control throughout the shift  Outcome: Progressing  Goal: Free from opioid side effects throughout the shift  Outcome: Progressing  Goal: Free from acute confusion related to pain meds throughout the shift  Outcome: Progressing

## 2024-06-12 NOTE — NURSING NOTE
Pt's blood sugar 166, Dr. Cornejo notified via secure chat. New order placed, see MAR.   Infection due to Acinetobacter species

## 2024-06-12 NOTE — PROGRESS NOTES
"Jai Shrestha is a 54 y.o. male on day 0 of admission presenting with Hyperglycemia.    Subjective     Without complaint this morning.  He gets emotional telling me about the recent passing of his father.         Objective     Physical Exam  Vitals reviewed.   Constitutional:       General: He is not in acute distress.     Appearance: Normal appearance. He is morbidly obese. He is not ill-appearing, toxic-appearing or diaphoretic.   Eyes:      General: No scleral icterus.     Conjunctiva/sclera: Conjunctivae normal.   Cardiovascular:      Rate and Rhythm: Normal rate and regular rhythm.      Pulses: Normal pulses.      Heart sounds: Normal heart sounds. No murmur heard.     Comments: Red discoloration to bilateral calves  Bilateral feet with dressings in place       Pulmonary:      Effort: Pulmonary effort is normal.      Breath sounds: Normal breath sounds and air entry.   Abdominal:      General: Abdomen is flat. Bowel sounds are normal.      Palpations: Abdomen is soft.      Tenderness: There is no abdominal tenderness.   Genitourinary:     Comments: Guo catheter in place draining yellow urine     Musculoskeletal:         General: Normal range of motion.      Right lower leg: Edema present.      Left lower leg: Edema present.   Skin:     General: Skin is warm and dry.      Capillary Refill: Capillary refill takes less than 2 seconds.   Neurological:      General: No focal deficit present.      Mental Status: He is alert and oriented to person, place, and time.      Motor: Motor function is intact.      Coordination: Coordination is intact.   Psychiatric:         Attention and Perception: Attention normal.         Mood and Affect: Mood normal.         Speech: Speech normal.         Behavior: Behavior normal. Behavior is cooperative.       Last Recorded Vitals  Blood pressure 124/68, pulse 84, temperature 36.9 °C (98.4 °F), temperature source Oral, resp. rate 14, height 1.803 m (5' 11\"), weight (!) 195 kg (430 " lb), SpO2 94%.  Intake/Output last 3 Shifts:  I/O last 3 completed shifts:  In: - (0 mL/kg)   Out: 650 (3.3 mL/kg) [Urine:650 (0.1 mL/kg/hr)]  Weight: 195 kg     Relevant Results           Scheduled medications  amLODIPine, 10 mg, oral, Daily  aspirin, 81 mg, oral, Daily  atorvastatin, 80 mg, oral, Nightly  DULoxetine, 120 mg, oral, Daily  enoxaparin, 60 mg, subcutaneous, BID  insulin glargine, 80 Units, subcutaneous, q12h  insulin lispro, 0-10 Units, subcutaneous, TID AC  insulin lispro, 40 Units, subcutaneous, TID  lidocaine, 1 patch, transdermal, Daily  [Held by provider] lisinopril, 5 mg, oral, Daily  melatonin, 6 mg, oral, Nightly  pantoprazole, 40 mg, oral, BID AC  polyethylene glycol, 17 g, oral, Daily  QUEtiapine, 400 mg, oral, Nightly  sennosides-docusate sodium, 2 tablet, oral, BID  [Held by provider] spironolactone, 50 mg, oral, Daily  [Held by provider] torsemide, 60 mg, oral, BID      Continuous medications     PRN medications  PRN medications: acetaminophen **OR** acetaminophen **OR** acetaminophen, dextrose, dextrose, glucagon, glucagon, HYDROcodone-acetaminophen, hydrOXYzine HCL, ondansetron **OR** ondansetron     Results for orders placed or performed during the hospital encounter of 06/11/24 (from the past 24 hour(s))   POCT GLUCOSE   Result Value Ref Range    POCT Glucose 144 (H) 74 - 99 mg/dL   Lactate   Result Value Ref Range    Lactate 1.5 0.4 - 2.0 mmol/L   POCT GLUCOSE   Result Value Ref Range    POCT Glucose 200 (H) 74 - 99 mg/dL   Lavender Top   Result Value Ref Range    Extra Tube Hold for add-ons.    Basic Metabolic Panel   Result Value Ref Range    Glucose 296 (H) 74 - 99 mg/dL    Sodium 135 (L) 136 - 145 mmol/L    Potassium 5.1 3.5 - 5.3 mmol/L    Chloride 97 (L) 98 - 107 mmol/L    Bicarbonate 29 21 - 32 mmol/L    Anion Gap 14 10 - 20 mmol/L    Urea Nitrogen 60 (H) 6 - 23 mg/dL    Creatinine 2.15 (H) 0.50 - 1.30 mg/dL    eGFR 36 (L) >60 mL/min/1.73m*2    Calcium 9.0 8.6 - 10.3 mg/dL    POCT GLUCOSE   Result Value Ref Range    POCT Glucose 319 (H) 74 - 99 mg/dL   POCT GLUCOSE   Result Value Ref Range    POCT Glucose 360 (H) 74 - 99 mg/dL   Urinalysis with Reflex Culture and Microscopic   Result Value Ref Range    Color, Urine Colorless (N) Light-Yellow, Yellow, Dark-Yellow    Appearance, Urine Clear Clear    Specific Gravity, Urine 1.012 1.005 - 1.035    pH, Urine 6.5 5.0, 5.5, 6.0, 6.5, 7.0, 7.5, 8.0    Protein, Urine NEGATIVE NEGATIVE, 10 (TRACE), 20 (TRACE) mg/dL    Glucose, Urine OVER (4+) (A) Normal mg/dL    Blood, Urine 1.0 (3+) (A) NEGATIVE    Ketones, Urine NEGATIVE NEGATIVE mg/dL    Bilirubin, Urine NEGATIVE NEGATIVE    Urobilinogen, Urine Normal Normal mg/dL    Nitrite, Urine NEGATIVE NEGATIVE    Leukocyte Esterase, Urine NEGATIVE NEGATIVE   Urinalysis Microscopic   Result Value Ref Range    WBC, Urine 1-5 1-5, NONE /HPF    RBC, Urine 11-20 (A) NONE, 1-2, 3-5 /HPF   POCT GLUCOSE   Result Value Ref Range    POCT Glucose 340 (H) 74 - 99 mg/dL     CT abdomen pelvis wo IV contrast    Result Date: 6/11/2024  Interpreted By:  Antionette Elliott, STUDY: CT abdomen pelvis without contrast   INDICATION: Signs/Symptoms:STEPHANIE, rule out retention/obstruction.   COMPARISON: 05/22/2021   ACCESSION NUMBER(S): [id number]   ORDERING CLINICIAN: PATRICIO THOMPSON   TECHNIQUE: Axial noncontrast CT images of the abdomen and pelvis with coronal and sagittal reconstructed images.   FINDINGS: Partially limited evaluation due to patient body habitus with partial exclusion of the abdominal wall and intra-abdominal contents on the right.   LOWER CHEST: Elevation the of the right hemidiaphragm with right basilar atelectasis. Calcifications of the coronary arteries. BONES: No acute osseous abnormality. ABDOMINAL WALL: Ventral abdominal wall stranding   ABDOMEN: Lack of intravenous contrast limits evaluation of vessels and solid organs. LIVER: 25.5 cm, enlarged with diffusely decreased attenuation. BILE DUCTS: Normal  caliber. GALLBLADDER: Gallstones. PANCREAS: Diffuse fatty atrophy. SPLEEN: Within normal limits. ADRENALS: Within normal limits.   KIDNEYS, URETERS, URINARY BLADDER: No hydronephrosis or urinary tract calculus.  The urinary bladder is decompressed with Guo catheter. Indeterminate wall thickening and intraluminal air noted.   VESSELS: Atherosclerotic calcifications without aneurysmal dilatation seen. RETROPERITONEUM: No pathologically enlarged lymph nodes.   PELVIS:   REPRODUCTIVE ORGANS: No abnormality, given limitations of the noncontrast CT.  No significant free pelvic fluid.   BOWEL: Portions of the bowel or occluded from imaging. Diverticulosis without CT evidence of acute diverticulitis. No dilated loops of bowel are identified. Normal appendix. PERITONEUM: No ascites or free air, no fluid collection.       Partially limited imaging.   Wall thickening of the urinary bladder with intraluminal air may be related to decompression with Guo catheter however clinical correlation for cystitis recommended.   Diverticulosis, cholelithiasis and hepatic steatosis with hepatomegaly again noted.   MACRO: None   Signed by: Antionette Elliott 6/11/2024 11:19 PM Dictation workstation:   [id number]    ECG 12 lead    Result Date: 6/11/2024  Normal sinus rhythm Normal ECG When compared with ECG of 29-DEC-2022 22:54, Previous ECG has undetermined rhythm, needs review Nonspecific T wave abnormality now evident in Inferior leads                     Assessment/Plan     Jai Shrestha is a 55 yo male with PMH of IDDM, chronic back pain with left sciatica (follows pain management), generalized anxiety disorder, Bipolar affective disorder, PTSD, major depressive disorder, HTN, asthma with COPD, HLD, CKD stage 3, chronic leg swelling.  He has been feeling unwell the past week; has been intermittently nauseated. Chronic back pain, but fell the other day, and now has worse upper back pain. Father recently passed, which has been hard on  him. He has been eating more poorly and not taking his insulin consistently. Neighbor usually helps him with meals, but he is having his own issues. In ED, blood sugar was 603. He was also found to have urinary retention.  A odom catheter was placed with 1500 mL out.      Poorly controlled Diabetes Mellitus   - appreciate endocrinology consultation   - patient reports home dose is lantus 80 units TID; has not been compliant   - start lantus 80 units twice daily per endocrinology, humalog 40 units TID + sliding scale   - A1C 9.0  - RD consult for patient education   - would benefit from Healthy at Home program on dc (referral sent)     Acute urinary retention   - per patient and chart review, issues with acute urinary retention when hospitalized in 2022--had odom for a few days then passed voiding trial and apparently has not had issues since   - ? Related to neurogenic bladder from DM, possibly constipation is contributing   - CT A/P negative  - UA negative   - voiding trial today; consider urology consult if retaining issues continue     Chronic pain pain   - continue home norco as needed     RUTH  Bipolar affective disorder   PTSD   MDD   - continue home meds: seroquel, cymbalta   - atarax as needed for anxiety     HTN   - stable   - continue home amlodipine   - spironolactone/lisinopril on hold due to elevated creatinine     Asthma/COPD   - not in exacerbation , stable     HLD   - continue statin     CKD 3  Elevated creatinine   - on chart review, creatinine has ranged from 1.2-3.10 over past year   - creatinine may be elevated in the setting of retention, versus progression of CKD   - hold lasix/spironolactone, demadex, continue to trend     Chronic leg swelling   - diuretics on hold as above     Bilateral foot wounds   - follows with wound care as outpatient   - consult wound RN here     Constipation   - bowel regimen in place     VTE Prophylaxis   - subcutaneous lovenox     Discharge Planning   - PT/OT  ordered     Discussed with Dr. Franklin.  Interdisciplinary rounding completed.        I spent 50 minutes in the professional and overall care of this patient.      Sheri Hidalgo, APRN-CNP

## 2024-06-12 NOTE — PROGRESS NOTES
06/12/24 1327   Discharge Planning   Patient expects to be discharged to: Home     Patient plans to return home upon discharge.  Asked MIL Reza to see patient about community resources.   Endo following--patient has not been compliant with his diet or his medications.  Possible need for urology consult.  Will continue to follow for discharge planning needs.

## 2024-06-12 NOTE — CARE PLAN
MARY   reviewed  chart and  found patient's multiple community  resources.     He is linked with [facility] - he has a , Ada. MARY  spoke with  Ada  (numbers are in contacts)  who goes to his home weekly and brings him groceries. Ada will follow up with him on Monday. The pt has a therapy appt on 6/17 to  address grief, MH conditions - Ada will bring an IPAD to his home so he can participate. Ada does intense  work with the pt  weekly  on coping  skills and life skills.     Ada said that the one uncertainty is  if the patient is getting  extra services thru  [facility] or [facility] - [facility] is likely as pt doesn't have Medicaid. MARY  sent  email to Melanie Michelle at  [facility]  to clarify.      MARY  learned  thru  Shakila of the [facility] dept that the pt is  active with [facility] at [phone]. MARY spoke with the Director Maryanne - she says that the pt has been educated multiple, multiple times on every aspect of diabetes care and knows how to manage his DM.  She thinks his non-compliance comes from the social stressors he has including his grief. Currently the pt is  getting  wound  care 2x/wk  from  Maryanne. She will add DM management  to his  care plan due to this  admission - there is no  need for new orders  since PT is obs. MARY did open  a  [facility]  referral to the agency  so communication can  occur.     Libia Schneider, MSW, LSW

## 2024-06-12 NOTE — CONSULTS
Inpatient consult to Endocrinology  Consult performed by: Ashwin Cornejo MD  Consult ordered by: Anmol Palmer MD      Reason For Consult  Diabetes    History Of Present Illness  Jai Shrestha is a 54 y.o. male     Duration of type 2 diabetes mellitus:  4 years  Complications:  Stroke (2022)    Insulin glargine 60 units last night  Insulin lispro 22 units yesterday     Home regimen:  Lantus 80 units QAC  Novolog 60 units QAC    He has been missing many insulin doses, attributes to grief after the death of his father earlier this year    History of left sided weakness from stroke      Medications    Current Facility-Administered Medications:     acetaminophen (Tylenol) tablet 975 mg, 975 mg, oral, q8h PRN **OR** acetaminophen (Tylenol) oral liquid 650 mg, 650 mg, oral, q6h PRN **OR** acetaminophen (Tylenol) suppository 650 mg, 650 mg, rectal, q4h PRN, Anmol Palmer MD    amLODIPine (Norvasc) tablet 10 mg, 10 mg, oral, Daily, Anmol Palmer MD, 10 mg at 06/11/24 1606    aspirin EC tablet 81 mg, 81 mg, oral, Daily, Anmol Palmer MD, 81 mg at 06/11/24 1606    atorvastatin (Lipitor) tablet 80 mg, 80 mg, oral, Nightly, Anmol Palmer MD, 80 mg at 06/11/24 2004    dextrose 50 % injection 12.5 g, 12.5 g, intravenous, q15 min PRN, Anmol Palmer MD    dextrose 50 % injection 25 g, 25 g, intravenous, q15 min PRN, Anmol Palmer MD    DULoxetine (Cymbalta) DR capsule 120 mg, 120 mg, oral, Daily, Anmol Palmer MD, 120 mg at 06/11/24 1605    glucagon (Glucagen) injection 1 mg, 1 mg, intramuscular, q15 min PRN, Anmol Palmer MD    glucagon (Glucagen) injection 1 mg, 1 mg, intramuscular, q15 min PRN, Anmol Palmer MD    heparin (porcine) injection 5,000 Units, 5,000 Units, subcutaneous, q8h, Anmol Palmer MD    HYDROcodone-acetaminophen (Norco) 5-325 mg per tablet 2 tablet, 2 tablet, oral, q6h PRN, Anmol Palmer MD, 2 tablet at 06/12/24 0123    insulin lispro (HumaLOG) injection 0-10 Units, 0-10  Units, subcutaneous, Before meals & nightly, Anmol Palmer MD, 8 Units at 06/12/24 0647    lidocaine 4 % patch 1 patch, 1 patch, transdermal, Daily, Anmol Palmer MD, 1 patch at 06/11/24 1725    [Held by provider] lisinopril tablet 5 mg, 5 mg, oral, Daily, Anmol Palmer MD    melatonin tablet 6 mg, 6 mg, oral, Nightly, Anmol Palmer MD, 6 mg at 06/11/24 2004    ondansetron (Zofran) tablet 4 mg, 4 mg, oral, q8h PRN **OR** ondansetron (Zofran) injection 4 mg, 4 mg, intravenous, q8h PRN, Anmol Palmer MD    pantoprazole (ProtoNix) EC tablet 40 mg, 40 mg, oral, BID AC, Anmol Palmer MD, 40 mg at 06/12/24 0646    polyethylene glycol (Glycolax, Miralax) packet 17 g, 17 g, oral, Daily, Anmol Palmer MD    QUEtiapine (SEROquel) tablet 400 mg, 400 mg, oral, Nightly, Anmol Palmer MD, 400 mg at 06/11/24 2004    sennosides-docusate sodium (Yasemin-Colace) 8.6-50 mg per tablet 2 tablet, 2 tablet, oral, BID, Anmol Palmer MD    [Held by provider] spironolactone (Aldactone) tablet 50 mg, 50 mg, oral, Daily, Anmol Palmer MD    [Held by provider] torsemide (Demadex) tablet 60 mg, 60 mg, oral, BID, Anmol Palmer MD     Past Medical History  He has a past medical history of Chronic back pain, CKD (chronic kidney disease), Diabetes mellitus (Multi), HF (heart failure), diastolic (Multi), and Hypertension.    Surgical History  He has no past surgical history on file.     Social History  He reports that he has quit smoking. His smoking use included cigarettes. He does not have any smokeless tobacco history on file. He reports current alcohol use. He reports that he does not currently use drugs.    Family History  No family history on file.    Allergies  Metformin    Review of Systems   Cardiovascular:  Negative for chest pain.   Musculoskeletal:  Positive for back pain.   Neurological:  Positive for weakness.   Psychiatric/Behavioral:  Positive for dysphoric mood.          Last Recorded Vitals  Blood  "pressure (!) 104/49, pulse 84, temperature 36.4 °C (97.5 °F), temperature source Temporal, resp. rate 18, height 1.803 m (5' 11\"), weight (!) 195 kg (430 lb), SpO2 93%.    Physical Exam  Constitutional:       General: He is not in acute distress.     Appearance: He is obese.   HENT:      Head: Normocephalic.   Eyes:      Extraocular Movements: Extraocular movements intact.   Cardiovascular:      Pulses:           Radial pulses are 2+ on the right side and 2+ on the left side.   Neurological:      Mental Status: He is alert.   Psychiatric:         Mood and Affect: Affect normal.          Relevant Results  Lab Results   Component Value Date    POCGLU 319 (H) 06/12/2024    POCGLU 200 (H) 06/11/2024    POCGLU 144 (H) 06/11/2024    POCGLU 538 (H) 06/11/2024    POCGLU 279 (H) 01/02/2023    GLUCOSE 296 (H) 06/12/2024    GLUCOSE 603 (HH) 06/11/2024    GLUCOSE 160 (H) 01/02/2023    GLUCOSE 106 (H) 01/01/2023    GLUCOSE 150 (H) 12/31/2022      Latest Reference Range & Units 06/12/24 03:52   GLUCOSE 74 - 99 mg/dL 296 (H)   SODIUM 136 - 145 mmol/L 135 (L)   POTASSIUM 3.5 - 5.3 mmol/L 5.1   CHLORIDE 98 - 107 mmol/L 97 (L)   Bicarbonate 21 - 32 mmol/L 29   Anion Gap 10 - 20 mmol/L 14   Blood Urea Nitrogen 6 - 23 mg/dL 60 (H)   Creatinine 0.50 - 1.30 mg/dL 2.15 (H)   EGFR >60 mL/min/1.73m*2 36 (L)   Calcium 8.6 - 10.3 mg/dL 9.0      Latest Reference Range & Units 06/11/24 10:43   GLUCOSE 74 - 99 mg/dL 603 (HH)   SODIUM 136 - 145 mmol/L 129 (L)   POTASSIUM 3.5 - 5.3 mmol/L 5.5 (H)   CHLORIDE 98 - 107 mmol/L 93 (L)   Bicarbonate 21 - 32 mmol/L 26   Anion Gap 10 - 20 mmol/L 16   Blood Urea Nitrogen 6 - 23 mg/dL 69 (H)   Creatinine 0.50 - 1.30 mg/dL 2.41 (H)   EGFR >60 mL/min/1.73m*2 31 (L)   Calcium 8.6 - 10.3 mg/dL 9.1   Albumin 3.4 - 5.0 g/dL 4.2   Alkaline Phosphatase 33 - 120 U/L 94   ALT 10 - 52 U/L 15   AST 9 - 39 U/L 17   Bilirubin Total 0.0 - 1.2 mg/dL 0.5      Latest Reference Range & Units 06/11/24 10:43   Hemoglobin A1C " see below % 9.0 (H)         IMPRESSION  TYPE 2 DIABETES MELLITUS WITH HYPERGLYCEMIA  LONG TERM CURRENT INSULIN USE  Patient has a high and unorthodox insulin dosing program, which I have questioned in the past but was working as of a prior admission 1.5 year ago.  Regardless he has been missing a lot of insulin dosing and his current requirement should be ascertained.       RECOMMENDATIONS  Insulin glargine 80 units BID for now  Lispro 40 units QAC plus corrective scale      Ashwin Cornejo MD

## 2024-06-13 VITALS
DIASTOLIC BLOOD PRESSURE: 73 MMHG | RESPIRATION RATE: 20 BRPM | TEMPERATURE: 97.5 F | HEIGHT: 71 IN | WEIGHT: 315 LBS | OXYGEN SATURATION: 97 % | HEART RATE: 92 BPM | SYSTOLIC BLOOD PRESSURE: 137 MMHG | BODY MASS INDEX: 44.1 KG/M2

## 2024-06-13 LAB
ANION GAP SERPL CALC-SCNC: 14 MMOL/L (ref 10–20)
BUN SERPL-MCNC: 47 MG/DL (ref 6–23)
CALCIUM SERPL-MCNC: 8.7 MG/DL (ref 8.6–10.3)
CHLORIDE SERPL-SCNC: 101 MMOL/L (ref 98–107)
CO2 SERPL-SCNC: 27 MMOL/L (ref 21–32)
CREAT SERPL-MCNC: 1.73 MG/DL (ref 0.5–1.3)
EGFRCR SERPLBLD CKD-EPI 2021: 47 ML/MIN/1.73M*2
ERYTHROCYTE [DISTWIDTH] IN BLOOD BY AUTOMATED COUNT: 14.2 % (ref 11.5–14.5)
GLUCOSE BLD MANUAL STRIP-MCNC: 291 MG/DL (ref 74–99)
GLUCOSE BLD MANUAL STRIP-MCNC: 399 MG/DL (ref 74–99)
GLUCOSE SERPL-MCNC: 180 MG/DL (ref 74–99)
HCT VFR BLD AUTO: 32.2 % (ref 41–52)
HGB BLD-MCNC: 10 G/DL (ref 13.5–17.5)
MCH RBC QN AUTO: 26.9 PG (ref 26–34)
MCHC RBC AUTO-ENTMCNC: 31.1 G/DL (ref 32–36)
MCV RBC AUTO: 87 FL (ref 80–100)
NRBC BLD-RTO: 0 /100 WBCS (ref 0–0)
PLATELET # BLD AUTO: 222 X10*3/UL (ref 150–450)
POTASSIUM SERPL-SCNC: 4.5 MMOL/L (ref 3.5–5.3)
RBC # BLD AUTO: 3.72 X10*6/UL (ref 4.5–5.9)
SODIUM SERPL-SCNC: 137 MMOL/L (ref 136–145)
WBC # BLD AUTO: 7.9 X10*3/UL (ref 4.4–11.3)

## 2024-06-13 PROCEDURE — 85027 COMPLETE CBC AUTOMATED: CPT | Performed by: NURSE PRACTITIONER

## 2024-06-13 PROCEDURE — 80048 BASIC METABOLIC PNL TOTAL CA: CPT | Performed by: NURSE PRACTITIONER

## 2024-06-13 PROCEDURE — G0378 HOSPITAL OBSERVATION PER HR: HCPCS

## 2024-06-13 PROCEDURE — 2500000001 HC RX 250 WO HCPCS SELF ADMINISTERED DRUGS (ALT 637 FOR MEDICARE OP): Performed by: NURSE PRACTITIONER

## 2024-06-13 PROCEDURE — 99231 SBSQ HOSP IP/OBS SF/LOW 25: CPT | Performed by: INTERNAL MEDICINE

## 2024-06-13 PROCEDURE — 2500000002 HC RX 250 W HCPCS SELF ADMINISTERED DRUGS (ALT 637 FOR MEDICARE OP, ALT 636 FOR OP/ED): Performed by: NURSE PRACTITIONER

## 2024-06-13 PROCEDURE — 82947 ASSAY GLUCOSE BLOOD QUANT: CPT | Mod: 91

## 2024-06-13 PROCEDURE — 2500000004 HC RX 250 GENERAL PHARMACY W/ HCPCS (ALT 636 FOR OP/ED): Performed by: NURSE PRACTITIONER

## 2024-06-13 PROCEDURE — 2500000005 HC RX 250 GENERAL PHARMACY W/O HCPCS: Performed by: INTERNAL MEDICINE

## 2024-06-13 PROCEDURE — 2500000002 HC RX 250 W HCPCS SELF ADMINISTERED DRUGS (ALT 637 FOR MEDICARE OP, ALT 636 FOR OP/ED): Performed by: INTERNAL MEDICINE

## 2024-06-13 PROCEDURE — 36415 COLL VENOUS BLD VENIPUNCTURE: CPT | Performed by: NURSE PRACTITIONER

## 2024-06-13 PROCEDURE — 96372 THER/PROPH/DIAG INJ SC/IM: CPT | Performed by: NURSE PRACTITIONER

## 2024-06-13 PROCEDURE — 2500000001 HC RX 250 WO HCPCS SELF ADMINISTERED DRUGS (ALT 637 FOR MEDICARE OP): Performed by: INTERNAL MEDICINE

## 2024-06-13 RX ORDER — INSULIN GLARGINE 100 [IU]/ML
100 INJECTION, SOLUTION SUBCUTANEOUS 2 TIMES DAILY
Qty: 60 ML | Refills: 0 | Status: SHIPPED | OUTPATIENT
Start: 2024-06-13

## 2024-06-13 RX ORDER — INSULIN ASPART 100 [IU]/ML
40 INJECTION, SOLUTION INTRAVENOUS; SUBCUTANEOUS
Start: 2024-06-13

## 2024-06-13 RX ORDER — INSULIN GLARGINE 100 [IU]/ML
20 INJECTION, SOLUTION SUBCUTANEOUS ONCE
Status: COMPLETED | OUTPATIENT
Start: 2024-06-13 | End: 2024-06-13

## 2024-06-13 RX ORDER — PEN NEEDLE, DIABETIC 30 GX3/16"
1 NEEDLE, DISPOSABLE MISCELLANEOUS 2 TIMES DAILY
Qty: 60 EACH | Refills: 0 | Status: SHIPPED | OUTPATIENT
Start: 2024-06-13

## 2024-06-13 RX ORDER — INSULIN GLARGINE 100 [IU]/ML
100 INJECTION, SOLUTION SUBCUTANEOUS 2 TIMES DAILY
Status: DISCONTINUED | OUTPATIENT
Start: 2024-06-13 | End: 2024-06-13 | Stop reason: HOSPADM

## 2024-06-13 RX ORDER — INSULIN GLARGINE 100 [IU]/ML
80 INJECTION, SOLUTION SUBCUTANEOUS ONCE
Status: COMPLETED | OUTPATIENT
Start: 2024-06-13 | End: 2024-06-13

## 2024-06-13 ASSESSMENT — COGNITIVE AND FUNCTIONAL STATUS - GENERAL
WALKING IN HOSPITAL ROOM: A LOT
TOILETING: A LOT
DRESSING REGULAR UPPER BODY CLOTHING: A LITTLE
CLIMB 3 TO 5 STEPS WITH RAILING: A LOT
STANDING UP FROM CHAIR USING ARMS: A LITTLE
HELP NEEDED FOR BATHING: A LITTLE
PERSONAL GROOMING: A LITTLE
MOVING TO AND FROM BED TO CHAIR: A LITTLE
DAILY ACTIVITIY SCORE: 18
DRESSING REGULAR LOWER BODY CLOTHING: A LITTLE
MOBILITY SCORE: 18

## 2024-06-13 ASSESSMENT — PAIN SCALES - GENERAL: PAINLEVEL_OUTOF10: 7

## 2024-06-13 ASSESSMENT — PAIN - FUNCTIONAL ASSESSMENT: PAIN_FUNCTIONAL_ASSESSMENT: 0-10

## 2024-06-13 NOTE — PROGRESS NOTES
"Jai Shrestha is a 54 y.o. male on day 0 of admission presenting with Hyperglycemia.    Subjective   No new complaint    He clarified his Lantus was actually 100 units TID, not 80 units TID     Objective   Physical Exam  Constitutional:       General: He is not in acute distress.     Appearance: He is obese.   Neurological:      Mental Status: He is alert.         Last Recorded Vitals  Blood pressure 159/81, pulse 96, temperature 36.4 °C (97.5 °F), temperature source Temporal, resp. rate 18, height 1.803 m (5' 11\"), weight (!) 195 kg (430 lb), SpO2 94%.  Intake/Output last 3 Shifts:  I/O last 3 completed shifts:  In: 480 (2.5 mL/kg) [P.O.:480]  Out: 2725 (14 mL/kg) [Urine:2725 (0.4 mL/kg/hr)]  Weight: 195 kg     Relevant Results  Results from last 7 days   Lab Units 06/13/24  0753 06/13/24  0355 06/12/24 2001 06/12/24  1525 06/12/24  1114 06/12/24  0808 06/12/24  0638 06/12/24  0352 06/11/24  1700 06/11/24  1043   POCT GLUCOSE mg/dL 291*  --  182* 166* 340* 360*   < >  --    < >  --    GLUCOSE mg/dL  --  180*  --   --   --   --   --  296*  --  603*    < > = values in this interval not displayed.     06/13/24 03:55  GLUCOSE: 180 (H)  SODIUM: 137  POTASSIUM: 4.5  CHLORIDE: 101  Bicarbonate: 27  Anion Gap: 14  Blood Urea Nitrogen: 47 (H)  Creatinine: 1.73 (H)  EGFR: 47 (L)  Calcium: 8.7      Assessment/Plan   Principal Problem:    Hyperglycemia  Active Problems:    Chronic back pain    CKD (chronic kidney disease)    HF (heart failure), diastolic (Multi)    STEPHANIE (acute kidney injury) (CMS-HCC)    Urinary retention      IMPRESSION  TYPE 2 DIABETES MELLITUS WITH HYPERGLYCEMIA  LONG TERM CURRENT INSULIN USE        RECOMMENDATIONS  Increase insulin glargine to 100 units BID for now  Lispro 30 units QAC plus corrective scale    Ashwin Cornejo MD    "

## 2024-06-13 NOTE — DISCHARGE SUMMARY
"Jai Shrestha is a 54 y.o. male on day 0 of admission presenting with Hyperglycemia.      Presenting for worsening back pain and hyperglycemia.  Endocrine was consulted, recommendations for discharge were 100 units Lantus twice daily along with 40 units of NovoLog plus sliding scale.  A referral for endocrinology was requested as this will greatly benefit patient as he is a poorly controlled diabetic.  No further urinary retention, bladder scans have been less than 150.  Patient is requesting new pain management physician, referral placed    Subjective   Denies any further issues with retention. Currently up in wheelchair and endorses this helps         Your medication list        START taking these medications        Instructions Last Dose Given Next Dose Due   pen needle, diabetic 32 gauge x 5/32\" needle  Commonly known as: BD Ultra-Fine Noelle Pen Needle      1 Pen needle 2 times a day.              CHANGE how you take these medications        Instructions Last Dose Given Next Dose Due   insulin aspart 100 unit/mL injection  Commonly known as: NovoLOG U-100 Insulin aspart  What changed: how much to take      Inject 40 Units under the skin 3 times a day before meals.       insulin glargine 100 unit/mL (3 mL) pen  Commonly known as: Lantus  What changed:   how much to take  when to take this      Inject 100 Units under the skin 2 times a day. Take as directed per insulin instructions.              CONTINUE taking these medications        Instructions Last Dose Given Next Dose Due   amLODIPine 5 mg tablet  Commonly known as: Norvasc           aspirin 81 mg EC tablet           atorvastatin 80 mg tablet  Commonly known as: Lipitor           cholecalciferol 50,000 unit capsule  Commonly known as: Vitamin D-3           DULoxetine 60 mg DR capsule  Commonly known as: Cymbalta           ferrous sulfate (325 mg ferrous sulfate) tablet           lisinopril 5 mg tablet           naloxone 4 mg/0.1 mL nasal spray  Commonly known " "as: Narcan           omeprazole 40 mg DR capsule  Commonly known as: PriLOSEC           QUEtiapine 200 mg tablet  Commonly known as: SEROquel           sennosides-docusate sodium 8.6-50 mg tablet  Commonly known as: Yasemin-Colace           spironolactone 50 mg tablet  Commonly known as: Aldactone           torsemide 20 mg tablet  Commonly known as: Demadex                  STOP taking these medications      HYDROcodone-acetaminophen 7.5-325 mg tablet  Commonly known as: Norco                  Where to Get Your Medications        These medications were sent to [facility] [address]  [address] [address]      Phone: [phone]   insulin glargine 100 unit/mL (3 mL) pen  pen needle, diabetic 32 gauge x 5/32\" needle       Information about where to get these medications is not yet available    Ask your nurse or doctor about these medications  insulin aspart 100 unit/mL injection         Objective     Physical Exam  Constitutional:       Appearance: Normal appearance. He is obese.   Cardiovascular:      Rate and Rhythm: Normal rate and regular rhythm.      Pulses: Normal pulses.      Heart sounds: Normal heart sounds. No murmur heard.     No gallop.   Pulmonary:      Effort: Pulmonary effort is normal. No respiratory distress.      Breath sounds: Normal breath sounds. No wheezing or rhonchi.   Abdominal:      General: Abdomen is flat. Bowel sounds are normal. There is no distension.      Palpations: Abdomen is soft.      Tenderness: There is no abdominal tenderness. There is no guarding.   Musculoskeletal:      Right lower leg: Edema present.      Left lower leg: Edema present.   Skin:     General: Skin is warm.      Capillary Refill: Capillary refill takes less than 2 seconds.   Neurological:      Mental Status: He is alert and oriented to person, place, and time.   Psychiatric:         Mood and Affect: Mood normal.         Thought Content: " "Thought content normal.         Judgment: Judgment normal.         Last Recorded Vitals  Blood pressure 137/73, pulse 92, temperature 36.4 °C (97.5 °F), temperature source Temporal, resp. rate 20, height 1.803 m (5' 10.98\"), weight (!) 195 kg (430 lb), SpO2 97%.  Intake/Output last 3 Shifts:  I/O last 3 completed shifts:  In: 480 (2.5 mL/kg) [P.O.:480]  Out: 2725 (14 mL/kg) [Urine:2725 (0.4 mL/kg/hr)]  Weight: 195 kg     Relevant Results  Scheduled medications  amLODIPine, 10 mg, oral, Daily  aspirin, 81 mg, oral, Daily  atorvastatin, 80 mg, oral, Nightly  DULoxetine, 120 mg, oral, Daily  enoxaparin, 60 mg, subcutaneous, BID  insulin glargine, 100 Units, subcutaneous, BID  insulin lispro, 0-10 Units, subcutaneous, TID AC  insulin lispro, 30 Units, subcutaneous, TID  lidocaine, 1 patch, transdermal, Daily  lisinopril, 5 mg, oral, Daily  melatonin, 6 mg, oral, Nightly  pantoprazole, 40 mg, oral, BID AC  polyethylene glycol, 17 g, oral, Daily  QUEtiapine, 400 mg, oral, Nightly  sennosides-docusate sodium, 2 tablet, oral, BID  spironolactone, 50 mg, oral, Daily  torsemide, 60 mg, oral, BID      Continuous medications     PRN medications  PRN medications: acetaminophen **OR** acetaminophen **OR** acetaminophen, dextrose, dextrose, glucagon, glucagon, HYDROcodone-acetaminophen, hydrOXYzine HCL, ondansetron **OR** ondansetron    Results for orders placed or performed during the hospital encounter of 06/11/24 (from the past 24 hour(s))   POCT GLUCOSE   Result Value Ref Range    POCT Glucose 166 (H) 74 - 99 mg/dL   POCT GLUCOSE   Result Value Ref Range    POCT Glucose 182 (H) 74 - 99 mg/dL   CBC   Result Value Ref Range    WBC 7.9 4.4 - 11.3 x10*3/uL    nRBC 0.0 0.0 - 0.0 /100 WBCs    RBC 3.72 (L) 4.50 - 5.90 x10*6/uL    Hemoglobin 10.0 (L) 13.5 - 17.5 g/dL    Hematocrit 32.2 (L) 41.0 - 52.0 %    MCV 87 80 - 100 fL    MCH 26.9 26.0 - 34.0 pg    MCHC 31.1 (L) 32.0 - 36.0 g/dL    RDW 14.2 11.5 - 14.5 %    Platelets 222 150 - " 450 x10*3/uL   Basic Metabolic Panel   Result Value Ref Range    Glucose 180 (H) 74 - 99 mg/dL    Sodium 137 136 - 145 mmol/L    Potassium 4.5 3.5 - 5.3 mmol/L    Chloride 101 98 - 107 mmol/L    Bicarbonate 27 21 - 32 mmol/L    Anion Gap 14 10 - 20 mmol/L    Urea Nitrogen 47 (H) 6 - 23 mg/dL    Creatinine 1.73 (H) 0.50 - 1.30 mg/dL    eGFR 47 (L) >60 mL/min/1.73m*2    Calcium 8.7 8.6 - 10.3 mg/dL   POCT GLUCOSE   Result Value Ref Range    POCT Glucose 291 (H) 74 - 99 mg/dL   POCT GLUCOSE   Result Value Ref Range    POCT Glucose 399 (H) 74 - 99 mg/dL     CT abdomen pelvis wo IV contrast    Result Date: 6/11/2024  Interpreted By:  Antionette Elloitt, STUDY: CT abdomen pelvis without contrast   INDICATION: Signs/Symptoms:STEPHANIE, rule out retention/obstruction.   COMPARISON: 05/22/2021   ACCESSION NUMBER(S): [id number]   ORDERING CLINICIAN: PATRICIO THOMPSON   TECHNIQUE: Axial noncontrast CT images of the abdomen and pelvis with coronal and sagittal reconstructed images.   FINDINGS: Partially limited evaluation due to patient body habitus with partial exclusion of the abdominal wall and intra-abdominal contents on the right.   LOWER CHEST: Elevation the of the right hemidiaphragm with right basilar atelectasis. Calcifications of the coronary arteries. BONES: No acute osseous abnormality. ABDOMINAL WALL: Ventral abdominal wall stranding   ABDOMEN: Lack of intravenous contrast limits evaluation of vessels and solid organs. LIVER: 25.5 cm, enlarged with diffusely decreased attenuation. BILE DUCTS: Normal caliber. GALLBLADDER: Gallstones. PANCREAS: Diffuse fatty atrophy. SPLEEN: Within normal limits. ADRENALS: Within normal limits.   KIDNEYS, URETERS, URINARY BLADDER: No hydronephrosis or urinary tract calculus.  The urinary bladder is decompressed with Guo catheter. Indeterminate wall thickening and intraluminal air noted.   VESSELS: Atherosclerotic calcifications without aneurysmal dilatation seen. RETROPERITONEUM: No  pathologically enlarged lymph nodes.   PELVIS:   REPRODUCTIVE ORGANS: No abnormality, given limitations of the noncontrast CT.  No significant free pelvic fluid.   BOWEL: Portions of the bowel or occluded from imaging. Diverticulosis without CT evidence of acute diverticulitis. No dilated loops of bowel are identified. Normal appendix. PERITONEUM: No ascites or free air, no fluid collection.       Partially limited imaging.   Wall thickening of the urinary bladder with intraluminal air may be related to decompression with Guo catheter however clinical correlation for cystitis recommended.   Diverticulosis, cholelithiasis and hepatic steatosis with hepatomegaly again noted.   MACRO: None   Signed by: Antionette Elliott 6/11/2024 11:19 PM Dictation workstation:   [id number]    ECG 12 lead    Result Date: 6/11/2024  Normal sinus rhythm Normal ECG When compared with ECG of 29-DEC-2022 22:54, Previous ECG has undetermined rhythm, needs review Nonspecific T wave abnormality now evident in Inferior leads                 Assessment/Plan   Principal Problem:    Hyperglycemia  Active Problems:    Chronic back pain    CKD (chronic kidney disease)    HF (heart failure), diastolic (Multi)    STEPHANIE (acute kidney injury) (CMS-HCC)    Urinary retention    Jai Shrestha is a 53 yo male with PMH of IDDM, chronic back pain with left sciatica (follows pain management), generalized anxiety disorder, Bipolar affective disorder, PTSD, major depressive disorder, HTN, asthma with COPD, HLD, CKD stage 3, chronic leg swelling.  He has been feeling unwell the past week; has been intermittently nauseated. Chronic back pain, but fell the other day, and now has worse upper back pain. Father recently passed, which has been hard on him. He has been eating more poorly and not taking his insulin consistently. Neighbor usually helps him with meals, but he is having his own issues. In ED, blood sugar was 603. He was also found to have urinary retention.   A odom catheter was placed with 1500 mL out.       Poorly controlled Diabetes Mellitus   - appreciate endocrinology consultation   - patient reports home dose is lantus 80 units TID; has not been compliant   - start lantus 80 units twice daily per endocrinology, humalog 40 units TID + sliding scale   - A1C 9.0  - RD consult for patient education   - would benefit from Healthy at Home program on dc (referral sent)      Acute urinary retention   - per patient and chart review, issues with acute urinary retention when hospitalized in 2022--had odom for a few days then passed voiding trial and apparently has not had issues since   - ? Related to neurogenic bladder from DM, possibly constipation is contributing   - CT A/P negative  - UA negative   - voiding trial today; consider urology consult if retaining issues continue      Chronic pain pain   - continue home norco as needed      RUTH  Bipolar affective disorder   PTSD   MDD   - continue home meds: seroquel, cymbalta   - atarax as needed for anxiety      HTN   - stable   - continue home amlodipine   - spironolactone/lisinopril on hold due to elevated creatinine      Asthma/COPD   - not in exacerbation , stable      HLD   - continue statin      CKD 3  Elevated creatinine   - on chart review, creatinine has ranged from 1.2-3.10 over past year   - creatinine may be elevated in the setting of retention, versus progression of CKD   - resume lasix/spironolactone, demadex, continue to trend   -is trending down 2.15 -> 1.73     Chronic leg swelling   - diuretics on hold as above      Bilateral foot wounds   - follows with wound care as outpatient   - consult wound RN here      Constipation   - bowel regimen in place      VTE Prophylaxis   - subcutaneous lovenox      Discharge Planning   - PT/OT ordered     Labs/Testing reviewed    Interdisciplinary team rounding completed with hospitalist, nurse, TCC    NP discussed plan and lab/testing results with Dr. Su   DC  pending endo recs      I spent 45 minutes in the professional and overall care of this patient.      Shira Norton, APRN-CNP

## 2024-06-13 NOTE — CONSULTS
Nutrition Assessment Note  Nutrition Assessment      Reason for Assessment  Reason for Assessment: Provider consult order Diet education (T2DM)    Chart reviewed and pt visited.    Jai is a 54 y.o. male admitted for hyperglycemia  PMH includes: CKD, HF, anemia, HLD, T2DM, HTN, thyroid disorder, GERD    Pt states:  -no food allergies  -troubles chewing on left side of mouth since previous stroke (years ago); declines SLP consult at this time  -doesn't check blood sugars often (can't afford continuous glucose monitoring)  -does not follow carb controlled diet  -poor appetite/po intake for last 2 weeks  -neighbor makes dinners for him  -interested in Ozempic (reached out to Endocrinologist)  -limited income, interested in [employer] programs (reached out to [facility])    Discussed T2DM education- left pt with handouts.      Scheduled medications  amLODIPine, 10 mg, oral, Daily  aspirin, 81 mg, oral, Daily  atorvastatin, 80 mg, oral, Nightly  DULoxetine, 120 mg, oral, Daily  enoxaparin, 60 mg, subcutaneous, BID  insulin glargine, 100 Units, subcutaneous, BID  insulin lispro, 0-10 Units, subcutaneous, TID AC  insulin lispro, 30 Units, subcutaneous, TID  lidocaine, 1 patch, transdermal, Daily  lisinopril, 5 mg, oral, Daily  melatonin, 6 mg, oral, Nightly  pantoprazole, 40 mg, oral, BID AC  polyethylene glycol, 17 g, oral, Daily  QUEtiapine, 400 mg, oral, Nightly  sennosides-docusate sodium, 2 tablet, oral, BID  spironolactone, 50 mg, oral, Daily  torsemide, 60 mg, oral, BID      Continuous medications     PRN medications  PRN medications: acetaminophen **OR** acetaminophen **OR** acetaminophen, dextrose, dextrose, glucagon, glucagon, HYDROcodone-acetaminophen, hydrOXYzine HCL, ondansetron **OR** ondansetron       Latest Reference Range & Units 06/12/24 03:52 06/12/24 06:38 06/12/24 08:08 06/12/24 11:14 06/12/24 15:25 06/12/24 20:01 06/13/24 03:55   GLUCOSE 74 - 99 mg/dL 296 (H)      180 (H)   SODIUM 136 - 145 mmol/L 135 (L)  "     137   POTASSIUM 3.5 - 5.3 mmol/L 5.1      4.5   CHLORIDE 98 - 107 mmol/L 97 (L)      101   Blood Urea Nitrogen 6 - 23 mg/dL 60 (H)      47 (H)   Creatinine 0.50 - 1.30 mg/dL 2.15 (H)      1.73 (H)   EGFR >60 mL/min/1.73m*2 36 (L)      47 (L)   Calcium 8.6 - 10.3 mg/dL 9.0      8.7   HEMOGLOBIN 13.5 - 17.5 g/dL       10.0 (L)   HEMATOCRIT 41.0 - 52.0 %       32.2 (L)   POCT Glucose 74 - 99 mg/dL  319 (H) 360 (H) 340 (H) 166 (H) 182 (H)    (H): Data is abnormally high  (L): Data is abnormally low  !: Data is abnormal      Dietary Orders (From admission, onward)       Start     Ordered    06/11/24 1740  May Participate in Room Service With Assistance  Once        Question:  .  Answer:  Yes    06/11/24 1739    06/11/24 1325  Adult diet Carb Controlled, 2-3 grams sodium; 60 gram carb/meal, 30 gram Carb evening snack  Diet effective now        Question Answer Comment   Diet type Carb Controlled    Diet type 2-3 grams sodium    Carb diet selection: 60 gram carb/meal, 30 gram Carb evening snack        06/11/24 1324                  History:  Food and Nutrient History  Energy Intake: Fair 50-75 %, Poor < 50 %  Food and Nutrient History: Pt states that he has been eating poorly for the last 2 weeks. Per chart review, appetite and intake have declined after the recent passing of his father.    Factors Affecting Access to Food and Food / Nutrition Related Supplies  Food / Nutrition Program Participation: Eligibility for government programs, Participation in community programs, Eligibility for community programs    Reached out to [facility] r/t possible programs that pt may be eligible for     Anthropometrics:  Height: 180.3 cm (5' 10.98\")  Weight: (!) 195 kg (430 lb)  BMI (Calculated): 60    Weight Change: 0    Wt Readings from Last 1 Encounters:   06/13/24 (!) 195 kg (430 lb)     Weight Change  Significant Weight Loss: No       IBW/kg (Dietitian Calculated): 78.2 kg  Percent of IBW: 249 %       Energy Needs:  Calculated Energy " "Needs Using Equations  Height: 180.3 cm (5' 10.98\")  Weight Used for Equation Calculations: 195 kg (429 lb 14.4 oz)  Gregory Stack Equation (Overweight or Obese Patients): 2817  Equation Chosen to Use by RD: Jose Garcia  Activity Factor: 1.3  Stress Factor: 1  Temp: 36.4 °C (97.5 °F)    Estimated Energy Needs  Total Energy Estimated Needs (kCal): 2375 kCal  Total Estimated Energy Need per Day (kCal/kg): 2660 kCal/kg  Method for Estimating Needs: MSF (AF 1.2-1.3) no SF; minus 1000kcals for encouraged wt loss    Estimated Protein Needs  Total Protein Estimated Needs (g): 60 g  Total Protein Estimated Needs (g/kg): 80 g/kg  Method for Estimating Needs: 0.8-1.0g/kg (IBW)    Estimated Fluid Needs  Method for Estimating Needs: 1mL/kcal or MD recommendations       Nutrition Focused Physical Findings:  Subcutaneous Fat Loss  Orbital Fat Pads: Mild-Moderate (slight dark circles and slight hollowing)  Buccal Fat Pads: Well nourished (full, rounded cheeks)    Muscle Wasting  Temporalis: Mild-Moderate (slight depression)  Pectoralis (Clavicular Region): Well nourished (clavicle not visible)  Deltoid/Trapezius: Well nourished (rounded appearance at arm, shoulder, neck)  Interosseous: Well nourished (muscle bulges)    Edema  Edema: +1 trace  Edema Location: RLE/LLE       Physical Findings (Nutrition Deficiency/Toxicity)  Skin: Negative       Nutrition Diagnosis   Malnutrition Diagnosis  Patient has Malnutrition Diagnosis: Yes  Diagnosis Status: New  Malnutrition Diagnosis: Mild malnutrition related to acute disease or injury  As Evidenced by: sudden poor intake just prior to admission <50% for greater than 3 days, mild fat loss, mild muscle loss and fluid accumulation       Nutrition Interventions/Recommendations      Food and/or Nutrient Delivery Interventions  Meals and Snacks: Carbohydrate-modified diet, General healthful diet     Encouraged carbohydrate controlled diet, reaching out to outpatient services for help " starting out make dietary changes.    Pt would like to be on Ozempic- per endocrinologist, needs to be discussed with outpatient MD.    Coordination of Nutrition Care by a Nutrition Professional  Collaboration and Referral of Nutrition Care: Collaboration by nutrition professional with other providers    Education Documentation  Nutrition Care Manual, taught by Radha Sparks RDN, LD at 6/13/2024 12:34 PM.  Learner: Patient  Readiness: Acceptance  Method: Explanation, Handout  Response: Verbalizes Understanding  Pt aware of T2DM diet- spoke with an RD years ago. We touched on the highlights (what foods are considered carbohydrates, eat consistently and in small portions throughout day). Encouraged whole grains, watching sodium intake, healthy fats and lean proteins. Encouraged speaking with outpatient RD services.  Left pt with handouts: carbohydrate controlled counting for people with diabetes and outpatient services.    Nutrition Monitoring and Evaluation   Food and Nutrient Related History  Energy Intake: Estimated energy intake  Criteria: >75% of EEN consumed via po    Fluid Intake: Estimated fluid intake    Amount of Food: Estimated amout of food, Types of food  Criteria: >75% of meal trays    Anthropometrics: Body Composition/Growth/Weight History  Weight: Estimated dry weight, Weight change    Weight Change: Weight change percentage, Weight gain, Weight loss    Biochemical Data, Medical Tests and Procedures  Electrolyte and Renal Panel: BUN, Creatinine, Chloride, Sodium  Criteria: As clinically indicated       Glucose/Endocrine Profile: Glucose, casual, Hemoglobin A1c (HgbA1c)  Criteria: As clinically indicated    Nutritional Anemia Profile: Hemoglobin, Hematocrit  Criteria: As clinically indicated       Nutrition Focused Physical Findings  Adipose: Loss of subcutaneous fat       Digestive System: Decrease in appetite    Muscles: Muscle atrophy      Follow Up  Time Spent (min): 60 minutes  Follow up:  Provided information on outpatient nutrition therapy services  Last Date of Nutrition Visit: 06/13/24  Nutrition Follow-Up Needed?: Dietitian to reassess per policy  Follow up Comment: Mild PCM, diet ed.- Erich

## 2024-06-13 NOTE — CARE PLAN
"Social  work  was  informed  by  Shala Daily  of   [employer] of    [facility]  that the  pt        \" is open and active with [employer] and is currently receiving home delivered meals, life alert unit, medical transportation, and homemaking assistance 2 hours weekly and personal care assistance 2 hours weekly. \"    Shala  said that if  more info is  required  she can be  contacted  at    [phone].       MIGUEL Hays, MIL            6-13-24   1600  AVS  emailed to  [email or web address]  Options info sent to her  too and info  on [facility]    Nutrition  asked this writer to speak to pt about  options    for food  as pt  says he  has a limited budgets    SW  asked pt  about food - he admits he has  enough  as he  does get food from the pantry  and OPT[employer]. Pt said  he throws away \"80 percent\" of the Mom's Meals bc  he  is tired  of the  food  he  gets. Pt informed this  is likely  what is  available  but  he  can try being  creative about  food  or speaking up about  meal options  to [facility]    Pt reports he knows he is over income  for food stamps by   $120    MIGUEL Hays, MIL            6-14-24    0971  DC info  sent to  [facility]  MIGUEL Hays, MIL      "

## 2024-06-13 NOTE — CARE PLAN
Review of Systems   Unable to perform ROS: intubated     Objective:     Vital Signs (Most Recent):  Temp: 100 °F (37.8 °C) (09/13/18 1145)  Pulse: 104 (09/13/18 1243)  Resp: 14 (09/13/18 1243)  BP: 125/67 (09/13/18 1145)  SpO2: (!) 94 % (09/13/18 1243) Vital Signs (24h Range):  Temp:  [98.9 °F (37.2 °C)-101.6 °F (38.7 °C)] 100 °F (37.8 °C)  Pulse:  [] 104  Resp:  [14-21] 14  SpO2:  [93 %-99 %] 94 %  BP: ()/(37-78) 125/67     Weight: 93 kg (205 lb 0.4 oz)  Body mass index is 29.42 kg/m².     SpO2: (!) 94 %  O2 Device (Oxygen Therapy): ventilator      Intake/Output Summary (Last 24 hours) at 9/13/2018 1250  Last data filed at 9/13/2018 0736  Gross per 24 hour   Intake 3209.47 ml   Output 1020 ml   Net 2189.47 ml       Lines/Drains/Airways     Central Venous Catheter Line                 Percutaneous Central Line Insertion/Assessment - triple lumen  09/11/18 1124 left internal jugular 2 days          Drain                 NG/OG Tube 09/11/18 1123 orogastric Center mouth 2 days         Urethral Catheter 09/11/18 1123 Double-lumen 2 days          Airway                 Airway - Non-Surgical 09/11/18 1110 Endotracheal Tube 2 days                Physical Exam   Constitutional: No distress.   Appears ill, intubated, nods head to questions   HENT:   Head: Normocephalic and atraumatic.   Neck: Neck supple. No JVD present.   Cardiovascular: S1 normal and intact distal pulses. An irregularly irregular rhythm present. Tachycardia present. Exam reveals no gallop, no S3, no S4 and no friction rub.   Murmur heard.   Harsh midsystolic murmur is present at the upper right sternal border radiating to the neck.  Pulmonary/Chest:   Coarse BS throughout   Abdominal: Soft. He exhibits no distension. There is no tenderness. There is no rebound.   Musculoskeletal: He exhibits no edema.   Neurological:   Nods head to questions   Skin: Skin is warm and dry. No erythema.   Nursing note and vitals reviewed.      Significant    Problem: Pain - Adult  Goal: Verbalizes/displays adequate comfort level or baseline comfort level  Outcome: Progressing     Problem: Safety - Adult  Goal: Free from fall injury  Outcome: Progressing     Problem: Discharge Planning  Goal: Discharge to home or other facility with appropriate resources  Outcome: Progressing     Problem: Chronic Conditions and Co-morbidities  Goal: Patient's chronic conditions and co-morbidity symptoms are monitored and maintained or improved  Outcome: Progressing     Problem: Skin  Goal: Decreased wound size/increased tissue granulation at next dressing change  Outcome: Progressing  Goal: Participates in plan/prevention/treatment measures  Outcome: Progressing  Goal: Prevent/manage excess moisture  Outcome: Progressing  Flowsheets (Taken 6/13/2024 1052)  Prevent/manage excess moisture:   Cleanse incontinence/protect with barrier cream   Monitor for/manage infection if present  Goal: Prevent/minimize sheer/friction injuries  Outcome: Progressing  Goal: Promote/optimize nutrition  Outcome: Progressing  Flowsheets (Taken 6/13/2024 105)  Promote/optimize nutrition: Offer water/supplements/favorite foods  Goal: Promote skin healing  Outcome: Progressing     Problem: Fall/Injury  Goal: Not fall by end of shift  Outcome: Progressing  Goal: Be free from injury by end of the shift  Outcome: Progressing  Goal: Verbalize understanding of personal risk factors for fall in the hospital  Outcome: Progressing  Goal: Verbalize understanding of risk factor reduction measures to prevent injury from fall in the home  Outcome: Progressing  Goal: Use assistive devices by end of the shift  Outcome: Progressing  Goal: Pace activities to prevent fatigue by end of the shift  Outcome: Progressing     Problem: Diabetes  Goal: Achieve decreasing blood glucose levels by end of shift  Outcome: Progressing  Goal: Increase stability of blood glucose readings by end of shift  Outcome: Progressing  Goal: Decrease in  Labs:   CMP   Recent Labs   Lab  09/12/18   0402  09/13/18   0430   NA  138  137   K  4.0  3.9   CL  103  101   CO2  27  26   GLU  132*  193*   BUN  41*  45*   CREATININE  1.2  1.2   CALCIUM  7.5*  7.7*   PROT  5.5*  5.4*   ALBUMIN  2.1*  1.9*   BILITOT  0.9  0.7   ALKPHOS  86  107   AST  41*  42*   ALT  105*  75*   ANIONGAP  8  10   ESTGFRAFRICA  >60  >60   EGFRNONAA  58*  58*   , CBC   Recent Labs   Lab  09/12/18   0402  09/12/18   1100  09/13/18   0430   WBC  18.49*  15.34*  16.24*   HGB  7.8*  7.8*  7.3*   HCT  23.9*  23.9*  22.3*   PLT  230  215  250   , Troponin No results for input(s): TROPONINI in the last 48 hours. and All pertinent lab results from the last 24 hours have been reviewed.    Significant Imaging: Echocardiogram:   2D echo with color flow doppler:   Results for orders placed or performed during the hospital encounter of 09/10/18   2D echo with color flow doppler   Result Value Ref Range    EF 50 55 - 65    Mitral Valve Regurgitation MILD     Diastolic Dysfunction No     Aortic Valve Stenosis MODERATE TO SEVERE (A)     Est. PA Systolic Pressure 56 (A)     Tricuspid Valve Regurgitation MILD TO MODERATE    , EKG: Reviewed and X-Ray: CXR: X-Ray Chest 1 View (CXR):   Results for orders placed or performed during the hospital encounter of 09/10/18   X-Ray Chest 1 View    Narrative    EXAMINATION:  XR CHEST 1 VIEW    CLINICAL HISTORY:  f/u; respiratory distress    COMPARISON:  09/12/2018    FINDINGS:  Line and tubes remain in place.  The size of the heart is normal.  There is a moderate amount of alveolar consolidation in the lower 2/3 of both lungs.  There is no pneumothorax.  There is opacification of the right costophrenic angle.  The left hemidiaphragm is obscured.      Impression    1. Line and tubes remain in place.  2. There is a moderate amount of alveolar consolidation in the lower 2/3 of both lungs.  This is characteristic of pneumonia.  3. There is opacification of the right costophrenic  ketones present in urine by end of shift  Outcome: Progressing  Goal: Maintain electrolyte levels within acceptable range throughout shift  Outcome: Progressing  Goal: Maintain glucose levels >70mg/dl to <250mg/dl throughout shift  Outcome: Progressing  Goal: No changes in neurological exam by end of shift  Outcome: Progressing  Goal: Learn about and adhere to nutrition recommendations by end of shift  Outcome: Progressing  Goal: Vital signs within normal range for age by end of shift  Outcome: Progressing  Goal: Increase self care and/or family involovement by end of shift  Outcome: Progressing  Goal: Receive DSME education by end of shift  Outcome: Progressing     Problem: Heart Failure  Goal: Improved gas exchange this shift  Outcome: Progressing  Goal: Improved urinary output this shift  Outcome: Progressing  Goal: Reduction in peripheral edema within 24 hours  Outcome: Progressing  Goal: Report improvement of dyspnea/breathlessness this shift  Outcome: Progressing  Goal: Weight from fluid excess reduced over 2-3 days, then stabilize  Outcome: Progressing  Goal: Increase self care and/or family involvement in 24 hours  Outcome: Progressing     Problem: Pain  Goal: Takes deep breaths with improved pain control throughout the shift  Outcome: Progressing  Goal: Turns in bed with improved pain control throughout the shift  Outcome: Progressing  Goal: Walks with improved pain control throughout the shift  Outcome: Progressing  Goal: Performs ADL's with improved pain control throughout shift  Outcome: Progressing  Goal: Participates in PT with improved pain control throughout the shift  Outcome: Progressing  Goal: Free from opioid side effects throughout the shift  Outcome: Progressing  Goal: Free from acute confusion related to pain meds throughout the shift  Outcome: Progressing   The patient's goals for the shift include      The clinical goals for the shift include pt will remain HDS throughout the shift         angle. The left hemidiaphragm is obscured.  This is characteristic of pleural effusions.  .      Electronically signed by: Eleazar Samuel MD  Date:    09/13/2018  Time:    08:39    and X-Ray Chest PA and Lateral (CXR): No results found for this visit on 09/10/18.

## 2024-06-13 NOTE — PROGRESS NOTES
"Jai Shrestha is a 54 y.o. male on day 0 of admission presenting with Hyperglycemia.    Subjective   Denies any further issues with retention. Currently up in wheelchair and endorses this helps        Objective     Physical Exam  Constitutional:       Appearance: Normal appearance. He is obese.   Cardiovascular:      Rate and Rhythm: Normal rate and regular rhythm.      Pulses: Normal pulses.      Heart sounds: Normal heart sounds. No murmur heard.     No gallop.   Pulmonary:      Effort: Pulmonary effort is normal. No respiratory distress.      Breath sounds: Normal breath sounds. No wheezing or rhonchi.   Abdominal:      General: Abdomen is flat. Bowel sounds are normal. There is no distension.      Palpations: Abdomen is soft.      Tenderness: There is no abdominal tenderness. There is no guarding.   Musculoskeletal:      Right lower leg: Edema present.      Left lower leg: Edema present.   Skin:     General: Skin is warm.      Capillary Refill: Capillary refill takes less than 2 seconds.   Neurological:      Mental Status: He is alert and oriented to person, place, and time.   Psychiatric:         Mood and Affect: Mood normal.         Thought Content: Thought content normal.         Judgment: Judgment normal.         Last Recorded Vitals  Blood pressure 142/64, pulse 75, temperature 36.3 °C (97.4 °F), temperature source Temporal, resp. rate 16, height 1.803 m (5' 11\"), weight (!) 195 kg (430 lb), SpO2 96%.  Intake/Output last 3 Shifts:  I/O last 3 completed shifts:  In: 480 (2.5 mL/kg) [P.O.:480]  Out: 2725 (14 mL/kg) [Urine:2725 (0.4 mL/kg/hr)]  Weight: 195 kg     Relevant Results  Scheduled medications  amLODIPine, 10 mg, oral, Daily  aspirin, 81 mg, oral, Daily  atorvastatin, 80 mg, oral, Nightly  DULoxetine, 120 mg, oral, Daily  enoxaparin, 60 mg, subcutaneous, BID  insulin glargine, 80 Units, subcutaneous, q12h  insulin lispro, 0-10 Units, subcutaneous, TID AC  insulin lispro, 30 Units, subcutaneous, " TID  lidocaine, 1 patch, transdermal, Daily  [Held by provider] lisinopril, 5 mg, oral, Daily  melatonin, 6 mg, oral, Nightly  pantoprazole, 40 mg, oral, BID AC  polyethylene glycol, 17 g, oral, Daily  QUEtiapine, 400 mg, oral, Nightly  sennosides-docusate sodium, 2 tablet, oral, BID  [Held by provider] spironolactone, 50 mg, oral, Daily  [Held by provider] torsemide, 60 mg, oral, BID      Continuous medications     PRN medications  PRN medications: acetaminophen **OR** acetaminophen **OR** acetaminophen, dextrose, dextrose, glucagon, glucagon, HYDROcodone-acetaminophen, hydrOXYzine HCL, ondansetron **OR** ondansetron    Results for orders placed or performed during the hospital encounter of 06/11/24 (from the past 24 hour(s))   POCT GLUCOSE   Result Value Ref Range    POCT Glucose 360 (H) 74 - 99 mg/dL   Urinalysis with Reflex Culture and Microscopic   Result Value Ref Range    Color, Urine Colorless (N) Light-Yellow, Yellow, Dark-Yellow    Appearance, Urine Clear Clear    Specific Gravity, Urine 1.012 1.005 - 1.035    pH, Urine 6.5 5.0, 5.5, 6.0, 6.5, 7.0, 7.5, 8.0    Protein, Urine NEGATIVE NEGATIVE, 10 (TRACE), 20 (TRACE) mg/dL    Glucose, Urine OVER (4+) (A) Normal mg/dL    Blood, Urine 1.0 (3+) (A) NEGATIVE    Ketones, Urine NEGATIVE NEGATIVE mg/dL    Bilirubin, Urine NEGATIVE NEGATIVE    Urobilinogen, Urine Normal Normal mg/dL    Nitrite, Urine NEGATIVE NEGATIVE    Leukocyte Esterase, Urine NEGATIVE NEGATIVE   Extra Urine Gray Tube   Result Value Ref Range    Extra Tube Hold for add-ons.    Urinalysis Microscopic   Result Value Ref Range    WBC, Urine 1-5 1-5, NONE /HPF    RBC, Urine 11-20 (A) NONE, 1-2, 3-5 /HPF   POCT GLUCOSE   Result Value Ref Range    POCT Glucose 340 (H) 74 - 99 mg/dL   POCT GLUCOSE   Result Value Ref Range    POCT Glucose 166 (H) 74 - 99 mg/dL   POCT GLUCOSE   Result Value Ref Range    POCT Glucose 182 (H) 74 - 99 mg/dL   CBC   Result Value Ref Range    WBC 7.9 4.4 - 11.3 x10*3/uL     nRBC 0.0 0.0 - 0.0 /100 WBCs    RBC 3.72 (L) 4.50 - 5.90 x10*6/uL    Hemoglobin 10.0 (L) 13.5 - 17.5 g/dL    Hematocrit 32.2 (L) 41.0 - 52.0 %    MCV 87 80 - 100 fL    MCH 26.9 26.0 - 34.0 pg    MCHC 31.1 (L) 32.0 - 36.0 g/dL    RDW 14.2 11.5 - 14.5 %    Platelets 222 150 - 450 x10*3/uL   Basic Metabolic Panel   Result Value Ref Range    Glucose 180 (H) 74 - 99 mg/dL    Sodium 137 136 - 145 mmol/L    Potassium 4.5 3.5 - 5.3 mmol/L    Chloride 101 98 - 107 mmol/L    Bicarbonate 27 21 - 32 mmol/L    Anion Gap 14 10 - 20 mmol/L    Urea Nitrogen 47 (H) 6 - 23 mg/dL    Creatinine 1.73 (H) 0.50 - 1.30 mg/dL    eGFR 47 (L) >60 mL/min/1.73m*2    Calcium 8.7 8.6 - 10.3 mg/dL     CT abdomen pelvis wo IV contrast    Result Date: 6/11/2024  Interpreted By:  Antionette Elliott, STUDY: CT abdomen pelvis without contrast   INDICATION: Signs/Symptoms:STEPHANIE, rule out retention/obstruction.   COMPARISON: 05/22/2021   ACCESSION NUMBER(S): [id number]   ORDERING CLINICIAN: PATRICIO THOMPSON   TECHNIQUE: Axial noncontrast CT images of the abdomen and pelvis with coronal and sagittal reconstructed images.   FINDINGS: Partially limited evaluation due to patient body habitus with partial exclusion of the abdominal wall and intra-abdominal contents on the right.   LOWER CHEST: Elevation the of the right hemidiaphragm with right basilar atelectasis. Calcifications of the coronary arteries. BONES: No acute osseous abnormality. ABDOMINAL WALL: Ventral abdominal wall stranding   ABDOMEN: Lack of intravenous contrast limits evaluation of vessels and solid organs. LIVER: 25.5 cm, enlarged with diffusely decreased attenuation. BILE DUCTS: Normal caliber. GALLBLADDER: Gallstones. PANCREAS: Diffuse fatty atrophy. SPLEEN: Within normal limits. ADRENALS: Within normal limits.   KIDNEYS, URETERS, URINARY BLADDER: No hydronephrosis or urinary tract calculus.  The urinary bladder is decompressed with Guo catheter. Indeterminate wall thickening and intraluminal  air noted.   VESSELS: Atherosclerotic calcifications without aneurysmal dilatation seen. RETROPERITONEUM: No pathologically enlarged lymph nodes.   PELVIS:   REPRODUCTIVE ORGANS: No abnormality, given limitations of the noncontrast CT.  No significant free pelvic fluid.   BOWEL: Portions of the bowel or occluded from imaging. Diverticulosis without CT evidence of acute diverticulitis. No dilated loops of bowel are identified. Normal appendix. PERITONEUM: No ascites or free air, no fluid collection.       Partially limited imaging.   Wall thickening of the urinary bladder with intraluminal air may be related to decompression with Guo catheter however clinical correlation for cystitis recommended.   Diverticulosis, cholelithiasis and hepatic steatosis with hepatomegaly again noted.   MACRO: None   Signed by: Antionette Elliott 6/11/2024 11:19 PM Dictation workstation:   [id number]    ECG 12 lead    Result Date: 6/11/2024  Normal sinus rhythm Normal ECG When compared with ECG of 29-DEC-2022 22:54, Previous ECG has undetermined rhythm, needs review Nonspecific T wave abnormality now evident in Inferior leads                  Assessment/Plan   Principal Problem:    Hyperglycemia  Active Problems:    Chronic back pain    CKD (chronic kidney disease)    HF (heart failure), diastolic (Multi)    STEPHANIE (acute kidney injury) (CMS-HCC)    Urinary retention    Jia Shrestha is a 53 yo male with PMH of IDDM, chronic back pain with left sciatica (follows pain management), generalized anxiety disorder, Bipolar affective disorder, PTSD, major depressive disorder, HTN, asthma with COPD, HLD, CKD stage 3, chronic leg swelling.  He has been feeling unwell the past week; has been intermittently nauseated. Chronic back pain, but fell the other day, and now has worse upper back pain. Father recently passed, which has been hard on him. He has been eating more poorly and not taking his insulin consistently. Neighbor usually helps him with  meals, but he is having his own issues. In ED, blood sugar was 603. He was also found to have urinary retention.  A odom catheter was placed with 1500 mL out.       Poorly controlled Diabetes Mellitus   - appreciate endocrinology consultation   - patient reports home dose is lantus 80 units TID; has not been compliant   - start lantus 80 units twice daily per endocrinology, humalog 40 units TID + sliding scale   - A1C 9.0  - RD consult for patient education   - would benefit from Healthy at Home program on dc (referral sent)      Acute urinary retention   - per patient and chart review, issues with acute urinary retention when hospitalized in 2022--had odom for a few days then passed voiding trial and apparently has not had issues since   - ? Related to neurogenic bladder from DM, possibly constipation is contributing   - CT A/P negative  - UA negative   - voiding trial today; consider urology consult if retaining issues continue      Chronic pain pain   - continue home norco as needed      RUTH  Bipolar affective disorder   PTSD   MDD   - continue home meds: seroquel, cymbalta   - atarax as needed for anxiety      HTN   - stable   - continue home amlodipine   - spironolactone/lisinopril on hold due to elevated creatinine      Asthma/COPD   - not in exacerbation , stable      HLD   - continue statin      CKD 3  Elevated creatinine   - on chart review, creatinine has ranged from 1.2-3.10 over past year   - creatinine may be elevated in the setting of retention, versus progression of CKD   - resume lasix/spironolactone, demadex, continue to trend   -is trending down 2.15 -> 1.73     Chronic leg swelling   - diuretics on hold as above      Bilateral foot wounds   - follows with wound care as outpatient   - consult wound RN here      Constipation   - bowel regimen in place      VTE Prophylaxis   - subcutaneous lovenox      Discharge Planning   - PT/OT ordered     Labs/Testing reviewed    Interdisciplinary team rounding  completed with hospitalist, nurse, TCC    NP discussed plan and lab/testing results with Dr. Georges JACKSON pending endo recs      I spent 45 minutes in the professional and overall care of this patient.      Shira Norton, APRN-CNP

## 2024-06-13 NOTE — PROGRESS NOTES
Care Coordinator Note:    Plan: Patient in with Hyperglycemia and STEPHANIE with urinary rentention. Guo has been removed and patient voiding on own. Endocrine recs for increasing lantus and lispro sliding scale. PT OT evals were cancelled as patient is ind at baseline.     Status: OBS  Payor: Medicare  Disposition: Home alone- no needs  Barrier:  Endocrine clearance  ADOD: later today vs tomorrow    Rupa Aguilar [facility]      06/13/24 1219   Discharge Planning   Patient expects to be discharged to: Home alone- no needs

## 2024-06-13 NOTE — PROGRESS NOTES
Spiritual Care Visit    Clinical Encounter Type  Visited With: Patient  Routine Visit: Follow-up  Continue Visiting: No  Referral From: Patient  Referral To:     Patient Spiritual Care Encounters  Suffering Severity: Moderate  Fear Level: Moderate  Feelings of Loneliness: Moderate  Feelings of Hopelessness: Moderate  Coping: Sometimes demonstrated    Assessment: The pt was congratulated for sitting up in the chair.  Concern: There had been a discussion with his physician. The goals discussed were mentioned and the possibility of weight loss seemingly is possible.   Awareness: The pt dicussed his medical history and current concerns with financial  limitations.   Support: According to the pt there will be continue conversations with his doctor concerning his health problems.  Outcome: He said the his mind-set had changed. It was in part because he was given independence of being able to go to the restroom, sitting up in the chair and getting hope from the doctor for medication for diabetes    Follow up:  Follow up will not be needed at this time unless requested.     Chaplain Brannon Wooten

## 2024-06-13 NOTE — CARE PLAN
The patient's goals for the shift include      The clinical goals for the shift include Pt will remain HDS throughout shift    Over the shift, the patient did not make progress toward the following goals. Barriers to progression include . Recommendations to address these barriers include .

## 2024-06-13 NOTE — PROGRESS NOTES
Occupational Therapy                 Therapy Communication Note    Patient Name: Jai Shrestha  MRN: [id number]  Today's Date: 6/13/2024     Discipline: Occupational Therapy    Missed Visit Reason: Other (Comment) (OT orders received. Chart reviewed.  Pt with high AMPAC scores and report of independence at home.  Secure chat with medical team indicates no therapy needs at this time.  Eval not complete.  Orders discontinued.)

## 2024-06-13 NOTE — PROGRESS NOTES
"Spiritual Care Visit    Clinical Encounter Type  Visited With: Patient  Routine Visit: Introduction  Continue Visiting: Yes  Referral From: Nurse  Referral To:     Patient Spiritual Care Encounters  Suffering Severity: Mild  Feelings of Loneliness: Poor  Feelings of Hopelessness: Fair    Assessment: The pt was in spiritual distress due to the deaths in his immediate family. He is the sole survivor. His father  a few months ago. \"I don't know why this had to happen.\" He wept.   Concern: He stated that he had no one to live for. The pt was encouraged to want to survive for the love of self.  Awareness: There were personal goals discussed and set that were reasonably able to obtain.  The goals were primarily concerning weight lost and a healthier life style.  Support: There is a neighbor that cooks his meals.   Outcome: The pt stated that he would have to encourage his neighbor to cook healthier meals. The reality of the goals becoming attainable were discussed and seemingly the process was agreed upon. He would need to have a consult with his doctors.  Follow up: There will be another visit scheduled.    Chaplain Brannon Wooten"

## 2024-06-15 LAB
ATRIAL RATE: 94 BPM
P AXIS: 27 DEGREES
P OFFSET: 189 MS
P ONSET: 128 MS
PR INTERVAL: 184 MS
Q ONSET: 220 MS
QRS COUNT: 15 BEATS
QRS DURATION: 86 MS
QT INTERVAL: 358 MS
QTC CALCULATION(BAZETT): 447 MS
QTC FREDERICIA: 415 MS
R AXIS: 41 DEGREES
T AXIS: 33 DEGREES
T OFFSET: 399 MS
VENTRICULAR RATE: 94 BPM

## 2024-07-03 ENCOUNTER — TELEPHONE (OUTPATIENT)
Dept: PAIN MEDICINE | Facility: CLINIC | Age: 54
End: 2024-07-03

## 2024-07-03 NOTE — TELEPHONE ENCOUNTER
Pt is out of medication. Missed last OV because he was in the hospital. Says he's been out for 2 weeks and is very sick. Pt scheduled for 7/9 for VV, but asking if you can send something over for him. Pt says that his PCP refused to give him anything

## 2024-07-09 ENCOUNTER — APPOINTMENT (OUTPATIENT)
Dept: PAIN MEDICINE | Facility: CLINIC | Age: 54
End: 2024-07-09
Payer: MEDICARE

## 2024-07-09 DIAGNOSIS — M51.36 LUMBAR DISC NARROWING: Primary | ICD-10-CM

## 2024-07-09 DIAGNOSIS — M54.16 LEFT LUMBAR RADICULOPATHY: ICD-10-CM

## 2024-07-09 DIAGNOSIS — M17.12 PRIMARY OSTEOARTHRITIS OF LEFT KNEE: ICD-10-CM

## 2024-07-09 PROCEDURE — 3060F POS MICROALBUMINURIA REV: CPT | Performed by: PHYSICAL MEDICINE & REHABILITATION

## 2024-07-09 PROCEDURE — 99214 OFFICE O/P EST MOD 30 MIN: CPT | Performed by: PHYSICAL MEDICINE & REHABILITATION

## 2024-07-09 PROCEDURE — 4010F ACE/ARB THERAPY RXD/TAKEN: CPT | Performed by: PHYSICAL MEDICINE & REHABILITATION

## 2024-07-09 PROCEDURE — 3052F HG A1C>EQUAL 8.0%<EQUAL 9.0%: CPT | Performed by: PHYSICAL MEDICINE & REHABILITATION

## 2024-07-09 RX ORDER — HYDROCODONE BITARTRATE AND ACETAMINOPHEN 7.5; 325 MG/1; MG/1
1 TABLET ORAL EVERY 6 HOURS PRN
Qty: 112 TABLET | Refills: 0 | Status: SHIPPED | OUTPATIENT
Start: 2024-07-09 | End: 2024-08-06

## 2024-07-09 RX ORDER — HYDROCODONE BITARTRATE AND ACETAMINOPHEN 7.5; 325 MG/1; MG/1
1 TABLET ORAL EVERY 6 HOURS PRN
Qty: 112 TABLET | Refills: 0 | Status: SHIPPED | OUTPATIENT
Start: 2024-08-06 | End: 2024-09-03

## 2024-07-09 NOTE — PROGRESS NOTES
Chief complaint  Back and lower limbs pain     History  Jai Shrestha is back for pain management office visit  Having cough and could not get in. Jai Shrestha was at home.  Could not physically come to the office because of upper respiratory tract symptoms that are being worked up.  I was at the office.  I used secure audiovisual communication provided by the Epic system. Jai Shrestha  agreed on this form of communication and consented to the visit via A/V method.  The patient also understood that this will be billed as office visit.    Continue with back pain and lower limbs The pain in the back is deep achy worse in the mid back area.  This is associated with tight muscle bands.  This limits the range of motion of the lumbar spine mainly in forward flexion.  The pain in the back is radiating around the side on the lateral aspect of the   thighs going down toward the lateral aspect of the legs into the lateral malleosli toward the lateral aspect of the feet towards the big toes.    This pain down to the lower limbs is more of a burning tingling sensation.  The pain in the   lower limbs worsens with bending forward or with any lifting, it improves with laying on the side and resting.  This is similar to the associated pain in the middle to lower back.  Denied any bowel or bladder incontinence.  With the worst pain there is tendency to catch the toe especially on carpeted area.  This occurs mostly when tired and toward the end of the day.    Did not have pain meds bc he was in hospital        Pain level without medication is 8/10 , with the medication pain level 2/10.     The pain meds are helping control the pain and improving Activities of Daily living and quality of life and quality of sleep.    opioids treatment agreement Jan 2024     Oarrs pulled and reviewed, no concerns  last urine toxicology testing earlier this year and it was compliant we will repeat  Xray updated spine   ORT Score is  0   Pain pathology  "and pain generators spine   Modalities tried injection, surgery, physical therapy, TENS unit, nonsteroidal anti-inflammatory medication       Review of Systems :  Denied any fever or chills. No weight loss and no night sweats. No cough or sputum production. No diarrhea   The constipation has been responding to fibers and over the counter medications.     No bladder and bowel incontinence and no other changes in bladder and bowel. No skin changes.  Reports tiredness and fatigability only if the pain is not controlled.   Denied opioids diversion and abuse and denies alcoholism. Denies overuse of the pain medications.  No reported euphoria sensation or getting a \"high\" on the pain medications.    The control of the pain with the pain medications is helping the control of the symptoms and allowing the function and activities of daily living, enjoyment of life, improving the quality of life and sleep with less interruption by the pain. The goal is symptomatic control of the nonmalignant chronic pain and not to repair the permanent damage in the tissues inducing the chronic pain conditions. We are aiming to shift the focus from the nonmalignant chronic pain to other aspects of life by symptomatically treating this chronic pain. If this pain is not treated it will lead to major morbidity and it is also associated with increased risks of mortality. The patient understands those very clearly and also understand high risks of morbidity and mortality if not strictly adherent to the treatment recommendations and reporting any associated side effects. Also patient understand the full responsibility associated with these medications to avoid abuse or overuse or any use of these medications for anything besides treating the patient's own chronic pain and nothing else under any circumstances.        Physical examination  Awake, alert and oriented for time place and persons   This is a secure AV visit    Diagnosis  Problem List " Items Addressed This Visit       Left knee DJD    Relevant Medications    HYDROcodone-acetaminophen (Norco) 7.5-325 mg tablet    HYDROcodone-acetaminophen (Norco) 7.5-325 mg tablet (Start on 8/6/2024)    Lumbar disc narrowing - Primary    Relevant Medications    HYDROcodone-acetaminophen (Norco) 7.5-325 mg tablet    HYDROcodone-acetaminophen (Norco) 7.5-325 mg tablet (Start on 8/6/2024)    Left lumbar radiculopathy    Relevant Medications    HYDROcodone-acetaminophen (Norco) 7.5-325 mg tablet    HYDROcodone-acetaminophen (Norco) 7.5-325 mg tablet (Start on 8/6/2024)        Plan  Reviewed the pain generators.  Went over the types of pain with neuropathic and nociceptive and different pathologies and therapeutic modalities. Discussed the mechanism of action of interventions from acupuncture, physical therapy , regular exercises, injections, botox, spinal cord stimulation, and role of surgery     Went over pathology of the intervertebral disc displacement and the anatomical relation to the Nerve roots and relation to the radicular symptoms. Went over treatment modalities with conservative treatment including acupuncture   and epidural steroid injection with fluoroscopy guidance and last resort of surgery    Based on the above findings and the clinical response to the opioids medications and improvement of the activities of daily living, sleep, and work performance. We made this complex decision to continue the opioids therapy in light of the evidence of the patient's responsibility in using the pain medications as prescribed for the nonmalignant chronic pain condition. We discussed about the use of the pain medications to treat the symptoms of chronic nonmalignant pain and we are not trying the repair the permanent damage in the tissues, rather we are trying to control the symptoms induced by the permanent damage to the tissues inducing the chronic pain condition and resulting disability. I explained the difference and  discussed it with the patient and stressed the importance of knowing the difference especially because of the potential side effects and the potential addicting effect and habit forming nature of the dangerous drugs we are using to treat the symptoms of the chronic pain.      We discussed that we are prescribing the medications on good alirio and legitimate medical reason.     We reviewed the side effects and precautions of opioids prescriptions as discussed in the opioids treatment agreement.    realizes the interaction between the therapeutic classes including the respiratory depression and potential death     Random drug testing   we will submit     Hydrocodone  On benzo from pcp .realizes the interaction between the therapeutic classes including the respiratory depression and potential death   /has a narcan at home know how and when to use it if needed.     Discussed about NSAIDS and I explained about the opioids sparing effect to allow keeping the opioids dose at minimal effective dose.   I went over the potential side effects of the NSAIDS on the gastrointestinal, renal and cardiovascular systems.      I detailed the side effects from the acetaminophen in the medication and made aware of those. I also explained about the cumulative effects on the organs and mainly the liver.     Given the opioids therapy , we discussed about the risk for accidental over dose on the pain medications, either for patient or other household. I went over the mechanism of action and mode of use of the Naloxone according to the  recommendations. I will provide a prescription for a kit.     Follow-up 8 weeks or earlier if needed     The level of clinical decision making in this office visit,  is high, given the high risks of complications with the morbidity and mortality due to the fact that acute and chronic pain may pose a threat to life and bodily function, if under treated, poorly treated, or with failure to maintain  adequate treatment and timely medical follow up. Additionally over treatment has its own set of complications including overdosing on the pain medications and also the habit forming potentials with the use of the medications used to treat chronic painful conditions including therapeutic classes classified as dangerous medications. Given the serious and fluctuating nature of pain level and instensity with extensive consideration for whenever pain changes, there is always the risk of prolonged functional impairment requiring close patient monitoring with regular assessments and reassessments and high level medical decision making at every office visit. The amount and complexity of data reviewed is high given the patient clinical presentation, labs,  data, radiology reports, and other tests as discussed during office visits. Pertinent data whether positive or negative were taken in consideration in the process of making this high level medical decision.

## 2024-07-24 ENCOUNTER — APPOINTMENT (OUTPATIENT)
Dept: RADIOLOGY | Facility: HOSPITAL | Age: 54
DRG: 871 | End: 2024-07-24
Payer: MEDICARE

## 2024-07-24 ENCOUNTER — HOSPITAL ENCOUNTER (INPATIENT)
Facility: HOSPITAL | Age: 54
DRG: 871 | End: 2024-07-24
Attending: EMERGENCY MEDICINE | Admitting: INTERNAL MEDICINE
Payer: MEDICARE

## 2024-07-24 ENCOUNTER — APPOINTMENT (OUTPATIENT)
Dept: CARDIOLOGY | Facility: HOSPITAL | Age: 54
DRG: 871 | End: 2024-07-24
Payer: MEDICARE

## 2024-07-24 DIAGNOSIS — F33.1 MODERATE RECURRENT MAJOR DEPRESSION (MULTI): ICD-10-CM

## 2024-07-24 DIAGNOSIS — J18.9 PNEUMONIA DUE TO INFECTIOUS ORGANISM, UNSPECIFIED LATERALITY, UNSPECIFIED PART OF LUNG: ICD-10-CM

## 2024-07-24 DIAGNOSIS — A41.9 SEPSIS, DUE TO UNSPECIFIED ORGANISM, UNSPECIFIED WHETHER ACUTE ORGAN DYSFUNCTION PRESENT (MULTI): Primary | ICD-10-CM

## 2024-07-24 PROBLEM — R29.6 RECURRENT FALLS: Status: ACTIVE | Noted: 2024-07-24

## 2024-07-24 PROBLEM — K81.9 CHOLECYSTITIS: Status: ACTIVE | Noted: 2024-07-24

## 2024-07-24 PROBLEM — K80.20 CHOLELITHIASIS: Status: ACTIVE | Noted: 2024-07-24

## 2024-07-24 PROBLEM — E87.5 HYPERKALEMIA: Status: ACTIVE | Noted: 2024-07-24

## 2024-07-24 PROBLEM — S90.454A: Status: ACTIVE | Noted: 2024-07-24

## 2024-07-24 PROBLEM — S90.455A: Status: ACTIVE | Noted: 2024-07-24

## 2024-07-24 PROBLEM — J90 PLEURAL EFFUSION: Status: ACTIVE | Noted: 2024-07-24

## 2024-07-24 PROBLEM — D72.829 LEUKOCYTOSIS: Status: ACTIVE | Noted: 2024-07-24

## 2024-07-24 PROBLEM — I95.9 HYPOTENSION: Status: ACTIVE | Noted: 2024-07-24

## 2024-07-24 PROBLEM — E87.1 HYPONATREMIA: Status: ACTIVE | Noted: 2024-07-24

## 2024-07-24 PROBLEM — N18.9 ACUTE ON CHRONIC RENAL FAILURE (CMS-HCC): Status: ACTIVE | Noted: 2024-07-24

## 2024-07-24 PROBLEM — N17.9 ACUTE ON CHRONIC RENAL FAILURE (CMS-HCC): Status: ACTIVE | Noted: 2024-07-24

## 2024-07-24 LAB
ALBUMIN SERPL BCP-MCNC: 3.8 G/DL (ref 3.4–5)
ALP SERPL-CCNC: 113 U/L (ref 33–120)
ALT SERPL W P-5'-P-CCNC: 21 U/L (ref 10–52)
ANION GAP BLDV CALCULATED.4IONS-SCNC: 16 MMOL/L (ref 10–25)
ANION GAP BLDV CALCULATED.4IONS-SCNC: 17 MMOL/L (ref 10–25)
ANION GAP SERPL CALC-SCNC: 20 MMOL/L (ref 10–20)
ANION GAP SERPL CALC-SCNC: 23 MMOL/L (ref 10–20)
AST SERPL W P-5'-P-CCNC: 27 U/L (ref 9–39)
BASE EXCESS BLDV CALC-SCNC: 0 MMOL/L (ref -2–3)
BASE EXCESS BLDV CALC-SCNC: 0.3 MMOL/L (ref -2–3)
BASOPHILS # BLD AUTO: 0.08 X10*3/UL (ref 0–0.1)
BASOPHILS NFR BLD AUTO: 0.4 %
BILIRUB SERPL-MCNC: 0.5 MG/DL (ref 0–1.2)
BNP SERPL-MCNC: 23 PG/ML (ref 0–99)
BODY TEMPERATURE: 37 DEGREES CELSIUS
BODY TEMPERATURE: 37 DEGREES CELSIUS
BUN SERPL-MCNC: 70 MG/DL (ref 6–23)
BUN SERPL-MCNC: 71 MG/DL (ref 6–23)
CA-I BLDV-SCNC: 1.07 MMOL/L (ref 1.1–1.33)
CA-I BLDV-SCNC: 1.1 MMOL/L (ref 1.1–1.33)
CALCIUM SERPL-MCNC: 8.6 MG/DL (ref 8.6–10.3)
CALCIUM SERPL-MCNC: 8.7 MG/DL (ref 8.6–10.3)
CARDIAC TROPONIN I PNL SERPL HS: 15 NG/L (ref 0–20)
CARDIAC TROPONIN I PNL SERPL HS: 15 NG/L (ref 0–20)
CHLORIDE BLDV-SCNC: 92 MMOL/L (ref 98–107)
CHLORIDE BLDV-SCNC: 92 MMOL/L (ref 98–107)
CHLORIDE SERPL-SCNC: 92 MMOL/L (ref 98–107)
CHLORIDE SERPL-SCNC: 93 MMOL/L (ref 98–107)
CK SERPL-CCNC: 217 U/L (ref 0–325)
CO2 SERPL-SCNC: 21 MMOL/L (ref 21–32)
CO2 SERPL-SCNC: 24 MMOL/L (ref 21–32)
CREAT SERPL-MCNC: 4.02 MG/DL (ref 0.5–1.3)
CREAT SERPL-MCNC: 4.05 MG/DL (ref 0.5–1.3)
EGFRCR SERPLBLD CKD-EPI 2021: 17 ML/MIN/1.73M*2
EGFRCR SERPLBLD CKD-EPI 2021: 17 ML/MIN/1.73M*2
EOSINOPHIL # BLD AUTO: 0.12 X10*3/UL (ref 0–0.7)
EOSINOPHIL NFR BLD AUTO: 0.6 %
ERYTHROCYTE [DISTWIDTH] IN BLOOD BY AUTOMATED COUNT: 14.6 % (ref 11.5–14.5)
GLUCOSE BLD MANUAL STRIP-MCNC: 247 MG/DL (ref 74–99)
GLUCOSE BLD MANUAL STRIP-MCNC: 260 MG/DL (ref 74–99)
GLUCOSE BLDV-MCNC: 284 MG/DL (ref 74–99)
GLUCOSE BLDV-MCNC: 300 MG/DL (ref 74–99)
GLUCOSE SERPL-MCNC: 247 MG/DL (ref 74–99)
GLUCOSE SERPL-MCNC: 278 MG/DL (ref 74–99)
HCO3 BLDV-SCNC: 24.8 MMOL/L (ref 22–26)
HCO3 BLDV-SCNC: 25.4 MMOL/L (ref 22–26)
HCT VFR BLD AUTO: 36.4 % (ref 41–52)
HCT VFR BLD EST: 35 % (ref 41–52)
HCT VFR BLD EST: 37 % (ref 41–52)
HGB BLD-MCNC: 11.5 G/DL (ref 13.5–17.5)
HGB BLDV-MCNC: 11.8 G/DL (ref 13.5–17.5)
HGB BLDV-MCNC: 12.2 G/DL (ref 13.5–17.5)
IMM GRANULOCYTES # BLD AUTO: 0.21 X10*3/UL (ref 0–0.7)
IMM GRANULOCYTES NFR BLD AUTO: 1.1 % (ref 0–0.9)
INHALED O2 CONCENTRATION: 21 %
INHALED O2 CONCENTRATION: 21 %
INR PPP: 1.1 (ref 0.9–1.1)
LACTATE BLDV-SCNC: 2.6 MMOL/L (ref 0.4–2)
LACTATE BLDV-SCNC: 2.6 MMOL/L (ref 0.4–2)
LACTATE BLDV-SCNC: 4 MMOL/L (ref 0.4–2)
LACTATE SERPL-SCNC: 2.1 MMOL/L (ref 0.4–2)
LACTATE SERPL-SCNC: 3.5 MMOL/L (ref 0.4–2)
LIPASE SERPL-CCNC: 14 U/L (ref 9–82)
LYMPHOCYTES # BLD AUTO: 1.54 X10*3/UL (ref 1.2–4.8)
LYMPHOCYTES NFR BLD AUTO: 7.7 %
MAGNESIUM SERPL-MCNC: 1.7 MG/DL (ref 1.6–2.4)
MCH RBC QN AUTO: 26.6 PG (ref 26–34)
MCHC RBC AUTO-ENTMCNC: 31.6 G/DL (ref 32–36)
MCV RBC AUTO: 84 FL (ref 80–100)
MONOCYTES # BLD AUTO: 1.19 X10*3/UL (ref 0.1–1)
MONOCYTES NFR BLD AUTO: 6 %
MRSA DNA SPEC QL NAA+PROBE: NOT DETECTED
NEUTROPHILS # BLD AUTO: 16.83 X10*3/UL (ref 1.2–7.7)
NEUTROPHILS NFR BLD AUTO: 84.2 %
NRBC BLD-RTO: 0 /100 WBCS (ref 0–0)
OXYHGB MFR BLDV: 34.2 % (ref 45–75)
OXYHGB MFR BLDV: 67.4 % (ref 45–75)
PCO2 BLDV: 40 MM HG (ref 41–51)
PCO2 BLDV: 42 MM HG (ref 41–51)
PH BLDV: 7.39 PH (ref 7.33–7.43)
PH BLDV: 7.4 PH (ref 7.33–7.43)
PLATELET # BLD AUTO: 357 X10*3/UL (ref 150–450)
PO2 BLDV: 27 MM HG (ref 35–45)
PO2 BLDV: 42 MM HG (ref 35–45)
POTASSIUM BLDV-SCNC: 6.4 MMOL/L (ref 3.5–5.3)
POTASSIUM BLDV-SCNC: 6.7 MMOL/L (ref 3.5–5.3)
POTASSIUM SERPL-SCNC: 5.6 MMOL/L (ref 3.5–5.3)
POTASSIUM SERPL-SCNC: 5.7 MMOL/L (ref 3.5–5.3)
PROT SERPL-MCNC: 6.4 G/DL (ref 6.4–8.2)
PROTHROMBIN TIME: 12.6 SECONDS (ref 9.8–12.8)
RBC # BLD AUTO: 4.32 X10*6/UL (ref 4.5–5.9)
SAO2 % BLDV: 35 % (ref 45–75)
SAO2 % BLDV: 69 % (ref 45–75)
SARS-COV-2 RNA RESP QL NAA+PROBE: NOT DETECTED
SODIUM BLDV-SCNC: 126 MMOL/L (ref 136–145)
SODIUM BLDV-SCNC: 128 MMOL/L (ref 136–145)
SODIUM SERPL-SCNC: 130 MMOL/L (ref 136–145)
SODIUM SERPL-SCNC: 131 MMOL/L (ref 136–145)
WBC # BLD AUTO: 20 X10*3/UL (ref 4.4–11.3)

## 2024-07-24 PROCEDURE — 2500000005 HC RX 250 GENERAL PHARMACY W/O HCPCS: Performed by: EMERGENCY MEDICINE

## 2024-07-24 PROCEDURE — 71250 CT THORAX DX C-: CPT | Mod: FOREIGN READ | Performed by: RADIOLOGY

## 2024-07-24 PROCEDURE — 36415 COLL VENOUS BLD VENIPUNCTURE: CPT | Performed by: EMERGENCY MEDICINE

## 2024-07-24 PROCEDURE — 72125 CT NECK SPINE W/O DYE: CPT | Performed by: STUDENT IN AN ORGANIZED HEALTH CARE EDUCATION/TRAINING PROGRAM

## 2024-07-24 PROCEDURE — 72128 CT CHEST SPINE W/O DYE: CPT | Mod: FOREIGN READ | Performed by: RADIOLOGY

## 2024-07-24 PROCEDURE — 84484 ASSAY OF TROPONIN QUANT: CPT | Performed by: EMERGENCY MEDICINE

## 2024-07-24 PROCEDURE — 74176 CT ABD & PELVIS W/O CONTRAST: CPT | Mod: FOREIGN READ | Performed by: RADIOLOGY

## 2024-07-24 PROCEDURE — 96375 TX/PRO/DX INJ NEW DRUG ADDON: CPT

## 2024-07-24 PROCEDURE — 2500000002 HC RX 250 W HCPCS SELF ADMINISTERED DRUGS (ALT 637 FOR MEDICARE OP, ALT 636 FOR OP/ED): Performed by: EMERGENCY MEDICINE

## 2024-07-24 PROCEDURE — 82947 ASSAY GLUCOSE BLOOD QUANT: CPT

## 2024-07-24 PROCEDURE — 87040 BLOOD CULTURE FOR BACTERIA: CPT | Mod: AHULAB | Performed by: EMERGENCY MEDICINE

## 2024-07-24 PROCEDURE — 70450 CT HEAD/BRAIN W/O DYE: CPT

## 2024-07-24 PROCEDURE — 2500000001 HC RX 250 WO HCPCS SELF ADMINISTERED DRUGS (ALT 637 FOR MEDICARE OP): Performed by: EMERGENCY MEDICINE

## 2024-07-24 PROCEDURE — 73552 X-RAY EXAM OF FEMUR 2/>: CPT | Mod: RT

## 2024-07-24 PROCEDURE — 87641 MR-STAPH DNA AMP PROBE: CPT | Performed by: PHYSICIAN ASSISTANT

## 2024-07-24 PROCEDURE — 76705 ECHO EXAM OF ABDOMEN: CPT

## 2024-07-24 PROCEDURE — 96365 THER/PROPH/DIAG IV INF INIT: CPT

## 2024-07-24 PROCEDURE — 83605 ASSAY OF LACTIC ACID: CPT | Performed by: EMERGENCY MEDICINE

## 2024-07-24 PROCEDURE — 99285 EMERGENCY DEPT VISIT HI MDM: CPT

## 2024-07-24 PROCEDURE — 84132 ASSAY OF SERUM POTASSIUM: CPT | Performed by: EMERGENCY MEDICINE

## 2024-07-24 PROCEDURE — 99291 CRITICAL CARE FIRST HOUR: CPT | Performed by: EMERGENCY MEDICINE

## 2024-07-24 PROCEDURE — 96376 TX/PRO/DX INJ SAME DRUG ADON: CPT

## 2024-07-24 PROCEDURE — 83880 ASSAY OF NATRIURETIC PEPTIDE: CPT | Performed by: EMERGENCY MEDICINE

## 2024-07-24 PROCEDURE — 83690 ASSAY OF LIPASE: CPT | Performed by: EMERGENCY MEDICINE

## 2024-07-24 PROCEDURE — 73630 X-RAY EXAM OF FOOT: CPT | Mod: RIGHT SIDE | Performed by: RADIOLOGY

## 2024-07-24 PROCEDURE — 2060000001 HC INTERMEDIATE ICU ROOM DAILY

## 2024-07-24 PROCEDURE — 2500000004 HC RX 250 GENERAL PHARMACY W/ HCPCS (ALT 636 FOR OP/ED): Performed by: PHYSICIAN ASSISTANT

## 2024-07-24 PROCEDURE — 72131 CT LUMBAR SPINE W/O DYE: CPT

## 2024-07-24 PROCEDURE — 93005 ELECTROCARDIOGRAM TRACING: CPT

## 2024-07-24 PROCEDURE — 87635 SARS-COV-2 COVID-19 AMP PRB: CPT | Performed by: EMERGENCY MEDICINE

## 2024-07-24 PROCEDURE — 85610 PROTHROMBIN TIME: CPT | Performed by: EMERGENCY MEDICINE

## 2024-07-24 PROCEDURE — 83735 ASSAY OF MAGNESIUM: CPT | Performed by: EMERGENCY MEDICINE

## 2024-07-24 PROCEDURE — 87075 CULTR BACTERIA EXCEPT BLOOD: CPT | Mod: 59,AHULAB | Performed by: EMERGENCY MEDICINE

## 2024-07-24 PROCEDURE — 2500000001 HC RX 250 WO HCPCS SELF ADMINISTERED DRUGS (ALT 637 FOR MEDICARE OP): Performed by: PHYSICIAN ASSISTANT

## 2024-07-24 PROCEDURE — 82550 ASSAY OF CK (CPK): CPT | Performed by: EMERGENCY MEDICINE

## 2024-07-24 PROCEDURE — 73630 X-RAY EXAM OF FOOT: CPT | Mod: RT

## 2024-07-24 PROCEDURE — 72128 CT CHEST SPINE W/O DYE: CPT

## 2024-07-24 PROCEDURE — 73552 X-RAY EXAM OF FEMUR 2/>: CPT | Mod: RIGHT SIDE | Performed by: RADIOLOGY

## 2024-07-24 PROCEDURE — 85025 COMPLETE CBC W/AUTO DIFF WBC: CPT | Performed by: EMERGENCY MEDICINE

## 2024-07-24 PROCEDURE — 74176 CT ABD & PELVIS W/O CONTRAST: CPT

## 2024-07-24 PROCEDURE — 72131 CT LUMBAR SPINE W/O DYE: CPT | Mod: FOREIGN READ | Performed by: RADIOLOGY

## 2024-07-24 PROCEDURE — 72125 CT NECK SPINE W/O DYE: CPT

## 2024-07-24 PROCEDURE — 71045 X-RAY EXAM CHEST 1 VIEW: CPT

## 2024-07-24 PROCEDURE — 70450 CT HEAD/BRAIN W/O DYE: CPT | Performed by: STUDENT IN AN ORGANIZED HEALTH CARE EDUCATION/TRAINING PROGRAM

## 2024-07-24 PROCEDURE — 76705 ECHO EXAM OF ABDOMEN: CPT | Mod: FOREIGN READ | Performed by: RADIOLOGY

## 2024-07-24 PROCEDURE — 96361 HYDRATE IV INFUSION ADD-ON: CPT

## 2024-07-24 PROCEDURE — 2500000002 HC RX 250 W HCPCS SELF ADMINISTERED DRUGS (ALT 637 FOR MEDICARE OP, ALT 636 FOR OP/ED): Performed by: PHYSICIAN ASSISTANT

## 2024-07-24 PROCEDURE — 2500000004 HC RX 250 GENERAL PHARMACY W/ HCPCS (ALT 636 FOR OP/ED): Performed by: EMERGENCY MEDICINE

## 2024-07-24 PROCEDURE — 71045 X-RAY EXAM CHEST 1 VIEW: CPT | Mod: FOREIGN READ | Performed by: RADIOLOGY

## 2024-07-24 RX ORDER — PROCHLORPERAZINE MALEATE 10 MG
10 TABLET ORAL EVERY 6 HOURS PRN
Status: DISCONTINUED | OUTPATIENT
Start: 2024-07-24 | End: 2024-07-30 | Stop reason: HOSPADM

## 2024-07-24 RX ORDER — HYDROMORPHONE HYDROCHLORIDE 0.2 MG/ML
0.2 INJECTION INTRAMUSCULAR; INTRAVENOUS; SUBCUTANEOUS EVERY 4 HOURS PRN
Status: DISCONTINUED | OUTPATIENT
Start: 2024-07-24 | End: 2024-07-30 | Stop reason: HOSPADM

## 2024-07-24 RX ORDER — ASPIRIN 81 MG/1
81 TABLET ORAL DAILY
Status: DISCONTINUED | OUTPATIENT
Start: 2024-07-25 | End: 2024-07-30 | Stop reason: HOSPADM

## 2024-07-24 RX ORDER — ACETAMINOPHEN 325 MG/1
650 TABLET ORAL EVERY 4 HOURS PRN
Status: DISCONTINUED | OUTPATIENT
Start: 2024-07-24 | End: 2024-07-30 | Stop reason: HOSPADM

## 2024-07-24 RX ORDER — MIDAZOLAM HYDROCHLORIDE 1 MG/ML
1 INJECTION INTRAMUSCULAR; INTRAVENOUS ONCE
Status: COMPLETED | OUTPATIENT
Start: 2024-07-24 | End: 2024-07-24

## 2024-07-24 RX ORDER — DEXTROSE 50 % IN WATER (D50W) INTRAVENOUS SYRINGE
12.5
Status: DISCONTINUED | OUTPATIENT
Start: 2024-07-24 | End: 2024-07-30 | Stop reason: HOSPADM

## 2024-07-24 RX ORDER — ATORVASTATIN CALCIUM 80 MG/1
80 TABLET, FILM COATED ORAL DAILY
Status: DISCONTINUED | OUTPATIENT
Start: 2024-07-24 | End: 2024-07-30 | Stop reason: HOSPADM

## 2024-07-24 RX ORDER — QUETIAPINE FUMARATE 100 MG/1
400 TABLET, FILM COATED ORAL NIGHTLY
Status: DISCONTINUED | OUTPATIENT
Start: 2024-07-24 | End: 2024-07-30 | Stop reason: HOSPADM

## 2024-07-24 RX ORDER — ONDANSETRON HYDROCHLORIDE 2 MG/ML
4 INJECTION, SOLUTION INTRAVENOUS EVERY 8 HOURS PRN
Status: DISCONTINUED | OUTPATIENT
Start: 2024-07-24 | End: 2024-07-30 | Stop reason: HOSPADM

## 2024-07-24 RX ORDER — VANCOMYCIN HYDROCHLORIDE 1 G/20ML
INJECTION, POWDER, LYOPHILIZED, FOR SOLUTION INTRAVENOUS DAILY PRN
Status: DISCONTINUED | OUTPATIENT
Start: 2024-07-24 | End: 2024-07-24

## 2024-07-24 RX ORDER — DEXTROSE 50 % IN WATER (D50W) INTRAVENOUS SYRINGE
12.5
Status: DISCONTINUED | OUTPATIENT
Start: 2024-07-24 | End: 2024-07-24 | Stop reason: SDUPTHER

## 2024-07-24 RX ORDER — DEXTROSE 50 % IN WATER (D50W) INTRAVENOUS SYRINGE
25
Status: DISCONTINUED | OUTPATIENT
Start: 2024-07-24 | End: 2024-07-24 | Stop reason: SDUPTHER

## 2024-07-24 RX ORDER — ONDANSETRON HYDROCHLORIDE 2 MG/ML
4 INJECTION, SOLUTION INTRAVENOUS ONCE
Status: COMPLETED | OUTPATIENT
Start: 2024-07-24 | End: 2024-07-24

## 2024-07-24 RX ORDER — ACETAMINOPHEN 160 MG/5ML
650 SOLUTION ORAL EVERY 4 HOURS PRN
Status: DISCONTINUED | OUTPATIENT
Start: 2024-07-24 | End: 2024-07-30 | Stop reason: HOSPADM

## 2024-07-24 RX ORDER — ACETAMINOPHEN 325 MG/1
975 TABLET ORAL ONCE
Status: COMPLETED | OUTPATIENT
Start: 2024-07-24 | End: 2024-07-24

## 2024-07-24 RX ORDER — INSULIN LISPRO 100 [IU]/ML
40 INJECTION, SOLUTION INTRAVENOUS; SUBCUTANEOUS
Status: DISCONTINUED | OUTPATIENT
Start: 2024-07-25 | End: 2024-07-27

## 2024-07-24 RX ORDER — INSULIN GLARGINE 100 [IU]/ML
100 INJECTION, SOLUTION SUBCUTANEOUS 2 TIMES DAILY
Status: DISCONTINUED | OUTPATIENT
Start: 2024-07-24 | End: 2024-07-27

## 2024-07-24 RX ORDER — HEPARIN SODIUM 5000 [USP'U]/ML
7500 INJECTION, SOLUTION INTRAVENOUS; SUBCUTANEOUS EVERY 8 HOURS SCHEDULED
Status: DISCONTINUED | OUTPATIENT
Start: 2024-07-24 | End: 2024-07-30 | Stop reason: HOSPADM

## 2024-07-24 RX ORDER — ACETAMINOPHEN 650 MG/1
650 SUPPOSITORY RECTAL EVERY 4 HOURS PRN
Status: DISCONTINUED | OUTPATIENT
Start: 2024-07-24 | End: 2024-07-30 | Stop reason: HOSPADM

## 2024-07-24 RX ORDER — DEXTROSE 50 % IN WATER (D50W) INTRAVENOUS SYRINGE
25
Status: DISCONTINUED | OUTPATIENT
Start: 2024-07-24 | End: 2024-07-30 | Stop reason: HOSPADM

## 2024-07-24 RX ORDER — OXYCODONE HYDROCHLORIDE 5 MG/1
5 TABLET ORAL ONCE
Status: COMPLETED | OUTPATIENT
Start: 2024-07-24 | End: 2024-07-24

## 2024-07-24 RX ORDER — PROCHLORPERAZINE EDISYLATE 5 MG/ML
10 INJECTION INTRAMUSCULAR; INTRAVENOUS EVERY 6 HOURS PRN
Status: DISCONTINUED | OUTPATIENT
Start: 2024-07-24 | End: 2024-07-30 | Stop reason: HOSPADM

## 2024-07-24 RX ORDER — ONDANSETRON 4 MG/1
4 TABLET, ORALLY DISINTEGRATING ORAL EVERY 8 HOURS PRN
Status: DISCONTINUED | OUTPATIENT
Start: 2024-07-24 | End: 2024-07-30 | Stop reason: HOSPADM

## 2024-07-24 RX ORDER — SENNOSIDES 8.6 MG/1
2 TABLET ORAL 2 TIMES DAILY
Status: DISCONTINUED | OUTPATIENT
Start: 2024-07-24 | End: 2024-07-30 | Stop reason: HOSPADM

## 2024-07-24 RX ORDER — INSULIN GLARGINE 100 [IU]/ML
80 INJECTION, SOLUTION SUBCUTANEOUS 3 TIMES DAILY
Status: DISCONTINUED | OUTPATIENT
Start: 2024-07-24 | End: 2024-07-24 | Stop reason: DRUGHIGH

## 2024-07-24 RX ORDER — INSULIN LISPRO 100 [IU]/ML
60 INJECTION, SOLUTION INTRAVENOUS; SUBCUTANEOUS
Status: DISCONTINUED | OUTPATIENT
Start: 2024-07-24 | End: 2024-07-24 | Stop reason: DRUGHIGH

## 2024-07-24 RX ORDER — DULOXETIN HYDROCHLORIDE 30 MG/1
120 CAPSULE, DELAYED RELEASE ORAL DAILY
Status: DISCONTINUED | OUTPATIENT
Start: 2024-07-24 | End: 2024-07-29

## 2024-07-24 RX ORDER — VANCOMYCIN HYDROCHLORIDE 1 G/20ML
INJECTION, POWDER, LYOPHILIZED, FOR SOLUTION INTRAVENOUS DAILY PRN
Status: DISCONTINUED | OUTPATIENT
Start: 2024-07-24 | End: 2024-07-25

## 2024-07-24 RX ORDER — PANTOPRAZOLE SODIUM 40 MG/1
40 TABLET, DELAYED RELEASE ORAL
Status: DISCONTINUED | OUTPATIENT
Start: 2024-07-25 | End: 2024-07-30 | Stop reason: HOSPADM

## 2024-07-24 RX ORDER — SODIUM CHLORIDE 9 MG/ML
45 INJECTION, SOLUTION INTRAVENOUS CONTINUOUS
Status: DISCONTINUED | OUTPATIENT
Start: 2024-07-24 | End: 2024-07-27

## 2024-07-24 SDOH — SOCIAL STABILITY: SOCIAL INSECURITY: ABUSE: ADULT

## 2024-07-24 SDOH — SOCIAL STABILITY: SOCIAL INSECURITY: ARE YOU OR HAVE YOU BEEN THREATENED OR ABUSED PHYSICALLY, EMOTIONALLY, OR SEXUALLY BY ANYONE?: NO

## 2024-07-24 SDOH — SOCIAL STABILITY: SOCIAL INSECURITY: DO YOU FEEL ANYONE HAS EXPLOITED OR TAKEN ADVANTAGE OF YOU FINANCIALLY OR OF YOUR PERSONAL PROPERTY?: NO

## 2024-07-24 SDOH — SOCIAL STABILITY: SOCIAL INSECURITY: HAVE YOU HAD THOUGHTS OF HARMING ANYONE ELSE?: NO

## 2024-07-24 SDOH — SOCIAL STABILITY: SOCIAL INSECURITY: DO YOU FEEL UNSAFE GOING BACK TO THE PLACE WHERE YOU ARE LIVING?: NO

## 2024-07-24 SDOH — SOCIAL STABILITY: SOCIAL INSECURITY: ARE THERE ANY APPARENT SIGNS OF INJURIES/BEHAVIORS THAT COULD BE RELATED TO ABUSE/NEGLECT?: NO

## 2024-07-24 SDOH — SOCIAL STABILITY: SOCIAL INSECURITY: HAVE YOU HAD ANY THOUGHTS OF HARMING ANYONE ELSE?: NO

## 2024-07-24 SDOH — SOCIAL STABILITY: SOCIAL INSECURITY: WERE YOU ABLE TO COMPLETE ALL THE BEHAVIORAL HEALTH SCREENINGS?: YES

## 2024-07-24 SDOH — SOCIAL STABILITY: SOCIAL INSECURITY: DOES ANYONE TRY TO KEEP YOU FROM HAVING/CONTACTING OTHER FRIENDS OR DOING THINGS OUTSIDE YOUR HOME?: NO

## 2024-07-24 SDOH — SOCIAL STABILITY: SOCIAL INSECURITY: HAS ANYONE EVER THREATENED TO HURT YOUR FAMILY OR YOUR PETS?: NO

## 2024-07-24 ASSESSMENT — PAIN - FUNCTIONAL ASSESSMENT
PAIN_FUNCTIONAL_ASSESSMENT: 0-10
PAIN_FUNCTIONAL_ASSESSMENT: 0-10

## 2024-07-24 ASSESSMENT — COGNITIVE AND FUNCTIONAL STATUS - GENERAL
PATIENT BASELINE BEDBOUND: NO
STANDING UP FROM CHAIR USING ARMS: A LITTLE
CLIMB 3 TO 5 STEPS WITH RAILING: A LITTLE
MOBILITY SCORE: 21
DAILY ACTIVITIY SCORE: 24
WALKING IN HOSPITAL ROOM: A LITTLE

## 2024-07-24 ASSESSMENT — ACTIVITIES OF DAILY LIVING (ADL)
FEEDING YOURSELF: INDEPENDENT
LACK_OF_TRANSPORTATION: NO
HEARING - LEFT EAR: FUNCTIONAL
ASSISTIVE_DEVICE: EYEGLASSES
HEARING - LEFT EAR: FUNCTIONAL
WALKS IN HOME: INDEPENDENT
HEARING - RIGHT EAR: FUNCTIONAL
TOILETING: INDEPENDENT
ADEQUATE_TO_COMPLETE_ADL: YES
DRESSING YOURSELF: INDEPENDENT
WALKS IN HOME: INDEPENDENT
ADEQUATE_TO_COMPLETE_ADL: YES
TOILETING: INDEPENDENT
FEEDING YOURSELF: INDEPENDENT
DRESSING YOURSELF: INDEPENDENT
PATIENT'S MEMORY ADEQUATE TO SAFELY COMPLETE DAILY ACTIVITIES?: YES
PATIENT'S MEMORY ADEQUATE TO SAFELY COMPLETE DAILY ACTIVITIES?: YES
JUDGMENT_ADEQUATE_SAFELY_COMPLETE_DAILY_ACTIVITIES: YES
ASSISTIVE_DEVICE: EYEGLASSES
BATHING: INDEPENDENT
HEARING - RIGHT EAR: FUNCTIONAL
JUDGMENT_ADEQUATE_SAFELY_COMPLETE_DAILY_ACTIVITIES: YES
GROOMING: INDEPENDENT
BATHING: INDEPENDENT

## 2024-07-24 ASSESSMENT — PAIN DESCRIPTION - LOCATION: LOCATION: GENERALIZED

## 2024-07-24 ASSESSMENT — LIFESTYLE VARIABLES
AUDIT-C TOTAL SCORE: 0
SKIP TO QUESTIONS 9-10: 1
HOW MANY STANDARD DRINKS CONTAINING ALCOHOL DO YOU HAVE ON A TYPICAL DAY: PATIENT DOES NOT DRINK
HOW OFTEN DO YOU HAVE A DRINK CONTAINING ALCOHOL: NEVER
HOW OFTEN DO YOU HAVE 6 OR MORE DRINKS ON ONE OCCASION: NEVER
AUDIT-C TOTAL SCORE: 0

## 2024-07-24 ASSESSMENT — PAIN SCALES - GENERAL
PAINLEVEL_OUTOF10: 7
PAINLEVEL_OUTOF10: 4
PAINLEVEL_OUTOF10: 8
PAINLEVEL_OUTOF10: 0 - NO PAIN

## 2024-07-24 ASSESSMENT — COLUMBIA-SUICIDE SEVERITY RATING SCALE - C-SSRS
6. HAVE YOU EVER DONE ANYTHING, STARTED TO DO ANYTHING, OR PREPARED TO DO ANYTHING TO END YOUR LIFE?: NO
1. IN THE PAST MONTH, HAVE YOU WISHED YOU WERE DEAD OR WISHED YOU COULD GO TO SLEEP AND NOT WAKE UP?: NO
2. HAVE YOU ACTUALLY HAD ANY THOUGHTS OF KILLING YOURSELF?: NO

## 2024-07-24 ASSESSMENT — PATIENT HEALTH QUESTIONNAIRE - PHQ9
2. FEELING DOWN, DEPRESSED OR HOPELESS: MORE THAN HALF THE DAYS
1. LITTLE INTEREST OR PLEASURE IN DOING THINGS: MORE THAN HALF THE DAYS
SUM OF ALL RESPONSES TO PHQ9 QUESTIONS 1 & 2: 4

## 2024-07-24 NOTE — ED TRIAGE NOTES
Pt to ed for fatigue, n/v and recurrent falls. Pt states that he's been having nausea and vomiting for the past 3-4 days and recurrent falls. Pt has toe bleeding from hitting his toe when falling. Pt also has back pain. Pt states that he just gets weak and falls. Pt also states that he has been having trouble keeping his glucose in the correct range. Pt aox4. Hx; chf, ckd, dm

## 2024-07-24 NOTE — ED PROVIDER NOTES
History/Exam limitations: none.   Additional history was obtained from patient and past medical records.          HPI:    Jai Shrestha is a 54 y.o. male PMH CHF, CKD, diabetes presenting for evaluation of general fatigue, recurrent falls, abdominal pain and retching.  Patient states that over the last 5 days he has had recurrent retching with no vomiting.  States he feels generally weak and is repeatedly falling.  States that he gets little lightheaded with walking with no lateralizing weakness or discoordination.  States that his legs feel activities give out on him due to generalized fatigue.  Reports good p.o. intake.  No prolonged downtime.  Has had 3-4 falls in the last few days.  Denies head strike.  Has some pain in his upper back, lower back, right hip area.  No chest pain, shortness of breath, diarrhea, dysuria.  Has diffuse upper abdominal pain.  Reports his glucose has been fluctuating to low and high.          Physical Exam:  ED Triage Vitals   Temperature Heart Rate Respirations BP   07/24/24 1201 07/24/24 1216 07/24/24 1216 07/24/24 1216   36.4 °C (97.6 °F) 99 18 100/67      Pulse Ox Temp Source Heart Rate Source Patient Position   07/24/24 1216 07/24/24 1216 07/24/24 1216 07/24/24 1216   100 % Oral Monitor Lying      BP Location FiO2 (%)     07/24/24 1216 --     Right arm         GEN:      Alert, mildly fatigued appearing  Eyes:       PERRL, EOMI  HENT:      NC/AT, OP clear, airway patent, MM  CV:      RRR, no MRG, no LE pitting edema, 2+ radial and pedal pulses  PULM:     CTAB, no w/r/r, easy WOB, symmetric chest rise  ABD:      Soft, NT, ND, no masses, BS +  :       No CVA TTP  NEURO:   A/Ox4, CN II-XII intact, strength 5/5 in all 4 ext, SILT, FNF normal b/l, no pronator drift  MSK:      FROM, no joint deformities or swelling, no midline C-spine tenderness, diffuse T and L-spine tenderness, no palpable step-offs, pelvis stable, lower extremity compartments soft, dried blood to right third toe with  no acute appearing wounds redness or swelling  SKIN:       Warm and dry  PSYCH:    Appropriate mood and affect         MDM/ED Course:   Jai Shrestha is a 54 y.o. male PMH CHF, CKD, diabetes presenting for evaluation of general fatigue, recurrent falls, abdominal pain and retching.  Triage vitals markable for heart rate 99, blood pressure 100/67, afebrile satting 100 on room air.  Exam as documented above.  Given worsening generalized fatigue, differential presented with infectious process, ACS, electrolyte abnormality, symptomatic anemia, other metabolic etiology.  No focal weakness, low suspicion for CVA.  No chest pain or shortness of breath, unlikely pulmonary embolism.  Given recurrent falls, underlying traumatic process is on the differential including intracranial hemorrhage.  Patient is overall very well-appearing on assessment with reassuring mental status nontoxic-appearing.  IV placed and labs drawn.  Patient was initially evaluated in the hallway due to high ED volumes.  Patient is given p.o. Tylenol, IV Zofran.  Labs drawn and CBC with leukocytosis to 20.0 with left shift.  Hemoglobin 11.5.  Glucose reassuring at 260, potassium elevated at 5.7, no EKG changes.  Bicarb 21, anion gap 23.  Creatinine 4.02 with BUN 71, most recent creatinine 1.71-month ago consistent with STEPHANIE on CKD.  Venous blood gas with reassuring pH however lactate 4.0.  Unlikely DKA, suspect elevated lactic acid in setting of dehydration potentially infectious process given leukocytosis.  Sepsis alert was initiated and patient was brought to room for further evaluation.  Found to have a MAP in the mid 60s.  Mental status very reassuring however.  Patient was covered with broad-spectrum antibiotics given unclear source including vancomycin/Zosyn.  1 L IV fluids initiated, given patient's CHF history incremental fluid boluses given.  Patient did require 1 mg IV Versed in order to lie flat for CT imaging.  Labs continue to resolve,  troponin 15, stable x 2 unlikely ACS.  Normal lactic acid 3.5.  After 1 L IV fluids improved to 2.5.  Given scant bleeding from distal third toe x-ray foot obtained and displays a possible foreign body in the left fourth toe, no visible wounds in this area potentially chronic.  Podiatry consulted, to see patient.  Patient has no feeling in his feet which is baseline and unchanged.  Given diffuse right femur pain, right femur x-ray obtained and negative for acute findings.  Compartments soft, no evidence of trauma on exam.  Chest x-ray with possible pneumonia.  CT head, C-spine, T-spine, L-spine negative for acute traumatic findings.  CT head does display a likely chronic areas of encephalomalacia potentially from remote CVAs.  CT chest abdomen pelvis displays right basilar atelectasis, prominent gallbladder with gallstones, degenerative changes however no acute traumatic findings.  Patient blood pressure did improve with IV fluids, 3 L total given here in the ED.  Improved to 140s over 60s.  Some of patient's lower blood pressures were when he was on his side due to his chronic back pain feels most comfortable in this position.  Lactate improved to 2.1.  Patient was given p.o. Lokelma given mild hyperkalemia, no EKG changes.  Given gallbladder CT changes, recurrent retching, abdominal pain right upper quadrant ultrasound ordered and surgical team reached, surgical team and admitting team to follow imaging.  Patient care signed out to hospitalist team for continued management on stepdown.    EKG reviewed by me: 12 PM normal sinus rhythm, rate 98, normal axis, normal intervals, no STEMI, no ectopy, no acute ischemic changes, similar to prior EKG.     ED Course as of 07/26/24 1125   Wed Jul 24, 2024   1656 Attempted bedside ultrasound, very poor windows unable to clearly visualized ejection fraction or IVC. [JM]      ED Course User Index  [JM] Xavier Barker MD         Diagnoses as of 07/26/24 1125   Sepsis, due to  unspecified organism, unspecified whether acute organ dysfunction present (Multi)   Pneumonia due to infectious organism, unspecified laterality, unspecified part of lung         Chronic medical conditions affecting care listed in MDM. I independently reviewed imaging studies and agreed with radiology reads. I reviewed recent and relevant outside records including PCP notes, Prior discharge summaries, and prior radiology reports.    Procedure  Critical Care    Performed by: Xavier Barker MD  Authorized by: Xavier Barker MD    Critical care provider statement:     Critical care time (minutes):  42    Critical care time was exclusive of:  Separately billable procedures and treating other patients    Critical care was necessary to treat or prevent imminent or life-threatening deterioration of the following conditions:  Sepsis    Critical care was time spent personally by me on the following activities:  Blood draw for specimens, development of treatment plan with patient or surrogate, evaluation of patient's response to treatment, examination of patient, interpretation of cardiac output measurements, obtaining history from patient or surrogate, ordering and performing treatments and interventions, ordering and review of laboratory studies, ordering and review of radiographic studies, pulse oximetry, re-evaluation of patient's condition and review of old charts    Care discussed with: admitting provider        Diagnosis:   1.  Sepsis  2.  STEPHANIE on CKD  3.  Pneumonia    Dispo: Hospitalist in stable condition      Disclaimer: Portions of this note were dictated by speech recognition. An attempt at proof reading was made to minimize errors. Minor errors in transcription may be present.  Please call if questions.     Xavier Barker MD  07/26/24 8236

## 2024-07-24 NOTE — PROGRESS NOTES
Recent weakness & frequent falls      07/24/24 0379   [facility] Disability Status   Are you deaf or do you have serious difficulty hearing? N   Are you blind or do you have serious difficulty seeing, even when wearing glasses? N   Because of a physical, mental, or emotional condition, do you have serious difficulty concentrating, remembering, or making decisions? (5 years old or older) Y  (bipolar, anxiety, depression)   Do you have serious difficulty walking or climbing stairs? N   Do you have serious difficulty dressing or bathing? N   Because of a physical, mental, or emotional condition, do you have serious difficulty doing errands alone such as visiting the doctor? N

## 2024-07-24 NOTE — PROGRESS NOTES
Home alone      07/24/24 1409   Current Planned Discharge Disposition   Current Planned Discharge Disposition Home

## 2024-07-24 NOTE — PROGRESS NOTES
Transitional Care Coordination Progress Note:  Plan per Medical/Surgical team: treatment of weakness, falls with IV ATB & CTSCANS pending, PT/OT evals pending   Status: ED  Payor source: medicare A/B  Discharge disposition: Home alone, ?new HHC   (patient declines conversation about SNF)   Potential Barriers: glucose 147, , K 5.7, renal 71/4.02  ADOD: 7/26/2024   SUNG Lyles RN, BSN Transitional Care Coordinator ED# [phone]      07/24/24 1410   Discharge Planning   Living Arrangements Alone   Support Systems Friends/neighbors   Assistance Needed ATB plan, glucose control, PT/OT evals   Type of Residence Private residence   Number of Stairs to Enter Residence 2   Number of Stairs Within Residence 0   Do you have animals or pets at home? No   Home or Post Acute Services Post acute facilities (Rehab/SNF/etc)   Type of Post Acute Facility Services Skilled nursing   Expected Discharge Disposition SNF   Does the patient need discharge transport arranged? Yes   RoundTrip coordination needed? Yes   Has discharge transport been arranged? No   Financial Resource Strain   How hard is it for you to pay for the very basics like food, housing, medical care, and heating? Not hard   Housing Stability   In the last 12 months, was there a time when you were not able to pay the mortgage or rent on time? N   In the past 12 months, how many times have you moved where you were living? 1   At any time in the past 12 months, were you homeless or living in a shelter (including now)? N   Transportation Needs   In the past 12 months, has lack of transportation kept you from medical appointments or from getting medications? no   In the past 12 months, has lack of transportation kept you from meetings, work, or from getting things needed for daily living? No

## 2024-07-25 LAB
ALBUMIN SERPL BCP-MCNC: 3.4 G/DL (ref 3.4–5)
ALP SERPL-CCNC: 93 U/L (ref 33–120)
ALT SERPL W P-5'-P-CCNC: 15 U/L (ref 10–52)
ANION GAP SERPL CALC-SCNC: 20 MMOL/L (ref 10–20)
APPEARANCE UR: CLEAR
AST SERPL W P-5'-P-CCNC: 18 U/L (ref 9–39)
ATRIAL RATE: 98 BPM
BILIRUB SERPL-MCNC: 0.7 MG/DL (ref 0–1.2)
BILIRUB UR STRIP.AUTO-MCNC: NEGATIVE MG/DL
BUN SERPL-MCNC: 68 MG/DL (ref 6–23)
CALCIUM SERPL-MCNC: 8.6 MG/DL (ref 8.6–10.3)
CHLORIDE SERPL-SCNC: 93 MMOL/L (ref 98–107)
CO2 SERPL-SCNC: 22 MMOL/L (ref 21–32)
COLOR UR: ABNORMAL
CREAT SERPL-MCNC: 4.18 MG/DL (ref 0.5–1.3)
CREAT UR-MCNC: 96.3 MG/DL (ref 20–370)
CREAT UR-MCNC: 96.3 MG/DL (ref 20–370)
EGFRCR SERPLBLD CKD-EPI 2021: 16 ML/MIN/1.73M*2
ERYTHROCYTE [DISTWIDTH] IN BLOOD BY AUTOMATED COUNT: 14.9 % (ref 11.5–14.5)
GLUCOSE BLD MANUAL STRIP-MCNC: 117 MG/DL (ref 74–99)
GLUCOSE BLD MANUAL STRIP-MCNC: 171 MG/DL (ref 74–99)
GLUCOSE BLD MANUAL STRIP-MCNC: 185 MG/DL (ref 74–99)
GLUCOSE BLD MANUAL STRIP-MCNC: 58 MG/DL (ref 74–99)
GLUCOSE BLD MANUAL STRIP-MCNC: 78 MG/DL (ref 74–99)
GLUCOSE BLD MANUAL STRIP-MCNC: 93 MG/DL (ref 74–99)
GLUCOSE SERPL-MCNC: 198 MG/DL (ref 74–99)
GLUCOSE UR STRIP.AUTO-MCNC: ABNORMAL MG/DL
HCT VFR BLD AUTO: 30 % (ref 41–52)
HGB BLD-MCNC: 9.3 G/DL (ref 13.5–17.5)
KETONES UR STRIP.AUTO-MCNC: NEGATIVE MG/DL
LEUKOCYTE ESTERASE UR QL STRIP.AUTO: NEGATIVE
MCH RBC QN AUTO: 26.3 PG (ref 26–34)
MCHC RBC AUTO-ENTMCNC: 31 G/DL (ref 32–36)
MCV RBC AUTO: 85 FL (ref 80–100)
NITRITE UR QL STRIP.AUTO: NEGATIVE
NRBC BLD-RTO: 0 /100 WBCS (ref 0–0)
P AXIS: 24 DEGREES
P OFFSET: 192 MS
P ONSET: 139 MS
PH UR STRIP.AUTO: 6.5 [PH]
PLATELET # BLD AUTO: 253 X10*3/UL (ref 150–450)
POTASSIUM SERPL-SCNC: 4.8 MMOL/L (ref 3.5–5.3)
PR INTERVAL: 168 MS
PROT SERPL-MCNC: 5.7 G/DL (ref 6.4–8.2)
PROT UR STRIP.AUTO-MCNC: NEGATIVE MG/DL
Q ONSET: 223 MS
QRS COUNT: 16 BEATS
QRS DURATION: 92 MS
QT INTERVAL: 364 MS
QTC CALCULATION(BAZETT): 464 MS
QTC FREDERICIA: 428 MS
R AXIS: 22 DEGREES
RBC # BLD AUTO: 3.53 X10*6/UL (ref 4.5–5.9)
RBC # UR STRIP.AUTO: NEGATIVE /UL
SODIUM SERPL-SCNC: 130 MMOL/L (ref 136–145)
SODIUM UR-SCNC: 25 MMOL/L
SODIUM/CREAT UR-RTO: 26 MMOL/G CREAT
SP GR UR STRIP.AUTO: 1.01
T AXIS: 50 DEGREES
T OFFSET: 405 MS
UREA/CREAT UR-SRTO: 5.2 G/G CREAT
UROBILINOGEN UR STRIP.AUTO-MCNC: NORMAL MG/DL
UUN UR-MCNC: 502 MG/DL
VANCOMYCIN SERPL-MCNC: 11.3 UG/ML (ref 5–20)
VENTRICULAR RATE: 98 BPM
WBC # BLD AUTO: 11 X10*3/UL (ref 4.4–11.3)

## 2024-07-25 PROCEDURE — 99223 1ST HOSP IP/OBS HIGH 75: CPT | Performed by: PHYSICIAN ASSISTANT

## 2024-07-25 PROCEDURE — 2500000002 HC RX 250 W HCPCS SELF ADMINISTERED DRUGS (ALT 637 FOR MEDICARE OP, ALT 636 FOR OP/ED): Performed by: PHYSICIAN ASSISTANT

## 2024-07-25 PROCEDURE — 2500000004 HC RX 250 GENERAL PHARMACY W/ HCPCS (ALT 636 FOR OP/ED): Performed by: INTERNAL MEDICINE

## 2024-07-25 PROCEDURE — 97116 GAIT TRAINING THERAPY: CPT | Mod: GP

## 2024-07-25 PROCEDURE — 80053 COMPREHEN METABOLIC PANEL: CPT | Performed by: PHYSICIAN ASSISTANT

## 2024-07-25 PROCEDURE — 84300 ASSAY OF URINE SODIUM: CPT | Performed by: INTERNAL MEDICINE

## 2024-07-25 PROCEDURE — 97161 PT EVAL LOW COMPLEX 20 MIN: CPT | Mod: GP

## 2024-07-25 PROCEDURE — 99221 1ST HOSP IP/OBS SF/LOW 40: CPT

## 2024-07-25 PROCEDURE — 82947 ASSAY GLUCOSE BLOOD QUANT: CPT

## 2024-07-25 PROCEDURE — 36415 COLL VENOUS BLD VENIPUNCTURE: CPT | Performed by: PHYSICIAN ASSISTANT

## 2024-07-25 PROCEDURE — 84540 ASSAY OF URINE/UREA-N: CPT | Performed by: INTERNAL MEDICINE

## 2024-07-25 PROCEDURE — 80202 ASSAY OF VANCOMYCIN: CPT

## 2024-07-25 PROCEDURE — 2060000001 HC INTERMEDIATE ICU ROOM DAILY

## 2024-07-25 PROCEDURE — 2500000004 HC RX 250 GENERAL PHARMACY W/ HCPCS (ALT 636 FOR OP/ED): Performed by: PHYSICIAN ASSISTANT

## 2024-07-25 PROCEDURE — 2500000001 HC RX 250 WO HCPCS SELF ADMINISTERED DRUGS (ALT 637 FOR MEDICARE OP): Performed by: PHYSICIAN ASSISTANT

## 2024-07-25 PROCEDURE — 97165 OT EVAL LOW COMPLEX 30 MIN: CPT | Mod: GO

## 2024-07-25 PROCEDURE — 81003 URINALYSIS AUTO W/O SCOPE: CPT | Performed by: EMERGENCY MEDICINE

## 2024-07-25 PROCEDURE — 85027 COMPLETE CBC AUTOMATED: CPT | Performed by: PHYSICIAN ASSISTANT

## 2024-07-25 PROCEDURE — 36415 COLL VENOUS BLD VENIPUNCTURE: CPT

## 2024-07-25 RX ORDER — LORAZEPAM 1 MG/1
1 TABLET ORAL ONCE
Status: COMPLETED | OUTPATIENT
Start: 2024-07-25 | End: 2024-07-25

## 2024-07-25 ASSESSMENT — PAIN SCALES - GENERAL
PAINLEVEL_OUTOF10: 0 - NO PAIN
PAINLEVEL_OUTOF10: 0 - NO PAIN
PAINLEVEL_OUTOF10: 7
PAINLEVEL_OUTOF10: 0 - NO PAIN
PAINLEVEL_OUTOF10: 8
PAINLEVEL_OUTOF10: 0 - NO PAIN
PAINLEVEL_OUTOF10: 8
PAINLEVEL_OUTOF10: 0 - NO PAIN

## 2024-07-25 ASSESSMENT — PAIN - FUNCTIONAL ASSESSMENT
PAIN_FUNCTIONAL_ASSESSMENT: 0-10

## 2024-07-25 ASSESSMENT — COGNITIVE AND FUNCTIONAL STATUS - GENERAL
MOBILITY SCORE: 21
DAILY ACTIVITIY SCORE: 24
HELP NEEDED FOR BATHING: A LOT
CLIMB 3 TO 5 STEPS WITH RAILING: A LOT
WALKING IN HOSPITAL ROOM: A LITTLE
MOVING TO AND FROM BED TO CHAIR: A LITTLE
STANDING UP FROM CHAIR USING ARMS: A LITTLE
DAILY ACTIVITIY SCORE: 20
MOBILITY SCORE: 17
WALKING IN HOSPITAL ROOM: A LITTLE
STANDING UP FROM CHAIR USING ARMS: A LITTLE
TURNING FROM BACK TO SIDE WHILE IN FLAT BAD: A LITTLE
CLIMB 3 TO 5 STEPS WITH RAILING: A LITTLE
MOVING FROM LYING ON BACK TO SITTING ON SIDE OF FLAT BED WITH BEDRAILS: A LITTLE
DRESSING REGULAR LOWER BODY CLOTHING: A LOT

## 2024-07-25 ASSESSMENT — ACTIVITIES OF DAILY LIVING (ADL)
ADL_ASSISTANCE: INDEPENDENT
BATHING_ASSISTANCE: MODERATE
BATHING_DEFICIT: LEFT LOWER LEG INCLUDING FOOT;RIGHT LOWER LEG INCLUDING FOOT
ADL_ASSISTANCE: INDEPENDENT
BATHING_ASSISTANCE: MODERATE

## 2024-07-25 ASSESSMENT — PAIN DESCRIPTION - ORIENTATION: ORIENTATION: MID;LOWER

## 2024-07-25 ASSESSMENT — PAIN DESCRIPTION - LOCATION: LOCATION: GENERALIZED

## 2024-07-25 NOTE — H&P
History Of Present Illness  Jai Shrestha is a 54 y.o. male presenting with sepsis, possible right lower lobe pneumonia and pleural effusion, hypotension, nausea, recurrent falls, hypokalemia, prominent gallbladder rule out cholecystitis, leukocytosis, hyponatremia, acute on chronic kidney failure, foreign body in the right fourth toe, injury to the right third toe, recurrent falls.  Patient with history of CVA in 2022, anemia, hypertension, diabetes, hyperlipidemia, venous stasis etc.  Presents to the emergency department complaining that he has been weak and falling and having nausea and dry heaves.  Patient states he is nauseous in the morning but it resolves with eating.  He is hungry currently but also nauseous at the same time.  Normal bowel movement today, patient states he fell today because he was dizzy and weak and tripped on the carpet runner and was not using his walker.  Patient states he has had 3 falls in the past 2 to 3 weeks because he is weak and dizzy and loses muscle control and collapses to the ground.  He denies incontinence.  He has injured his toes on the right foot from his falls.  He has been feeling hot and cold with cold sweats 3-4 times a day.  Patient states he has similar episodes or symptoms in the past although they are usually not as severe as in the last 2 to 3 weeks.  Patient is a poor historian and is difficult to determine a timeline for his symptoms.    Past medical history: CVA in 2022, anemia, lower extremity edema bilaterally, venous stasis, long-term opiate use, carpal tunnel syndrome, GERD, hyperlipidemia, hypertension, insomnia, degenerative joint disease, lumbar radiculopathy and degenerative disc disease, severe morbid obesity, anxiety, peripheral neuropathy, thyroid disorder, type 2 diabetes poorly controlled, chronic back pain, CKD, CHF.    Medications: Amlodipine, aspirin, atorvastatin, vitamin D3, duloxetine, iron, Norco, NovoLog insulin and Lantus insulin,  lisinopril, omeprazole, Seroquel, spironolactone, torsemide    Allergies: Metformin    Social history: Denies tobacco or alcohol use    ED course: Initial CMP revealed glucose 247, sodium 131, potassium 5.7, chloride 93, anion gap 23, BUN 71, creatinine 4.02, GFR 17 and the rest of the CMP was within normal limits.  Creatinine on June 13, 2024 was 1.73.  CK was 217, magnesium 1.70, BNP was 23, lipase was 14, troponin was 15 initially and 15 on repeat.  Initial lactate was 3.5, second lactate was 2.1  Repeat BMP after Lokelma revealed glucose 278, sodium 130, potassium still at 5.6, creatinine 4.05  CBC showed a white count of 20,000 with a left shift, mild anemia, normal platelets.  Blood cultures pending  Negative COVID  Chest x-ray showed a right lower lobe infiltrate with pleural effusion  X-ray of the right femur showed no acute process  X-ray of the right foot revealed no acute osseous abnormality but there is a foreign body in the distal fourth toe soft tissue  Right upper quadrant ultrasound showed cholelithiasis but no gallbladder wall thickening or biliary dilatation.    MRSA swab was negative  CT of the head and C-spine showed no evidence of hemorrhage or acute infarct.  But there was evidence of the infarct found in 2022.  C-spine showed no evidence of acute trauma.  CT of the lumbar spine showed no acute injury and no acute traumatic process in the abdomen.  The gallbladder was prominent.  But the abdomen and pelvis, thoracic spine and lumbar spine showed no acute process.  Patient was treated for sepsis with 3 L of LR, Tylenol, Lokelma for the hyperkalemia, Zofran, Zosyn and vancomycin, oxycodone.  Patient is admitted to stepdown.      Past Medical History:   Diagnosis Date    Chronic back pain     CKD (chronic kidney disease)     Diabetes mellitus (Multi)     HF (heart failure), diastolic (Multi)     Hypertension      No past surgical history on file.  Social History     Tobacco Use    Smoking status:  Former     Types: Cigarettes   Substance Use Topics    Alcohol use: Yes    Drug use: Not Currently        Family History  No family history on file.     Allergies  Metformin    Review of Systems  Patient denies chest pain, shortness of breath,  vomiting, fever, chills, diarrhea, constipation,  vision changes, rashes, dysuria, paresthesias, vertigo, headache, cough or cold symptoms, or any other complaints at this time. A complete review of systems was done, and is as stated in the history of present illness, is otherwise negative or not pertinent to the complaint.    Physical Exam  Physical exam: Vital signs and nurses notes were reviewed.  Patient's blood pressure on arrival was 81/45 and 79/59 but is now 107/53 at the time of this physical exam.    General:  no acute distress. Alert and oriented  x 3.  Talks easily in full sentences  Severely morbidly obese.    Head: atraumatic and normocephalic    Eyes: Pupils equal round reactive to light, EOMs are intact, conjunctivae is not injected.    Oropharynx: No erythema or exudate noted, no trismus or drooling, buccal mucosa is moist.    Ears:  normal external exam, no swelling or erythema,     Nasal: normal external exam,     Neck: Supple, full range of motion,     Cardiac: Regular rate and rhythm. No murmurs noted.     Pulmonary: Lungs clear bilaterally with good aeration. No adventitious breath sounds. No wheezes rales or rhonchi. No accessory muscle use no retraction noted.    Abdomen: Soft, tender on the left side kind of at the waistline area.  No guarding, rigidity, or distention. Normoactive bowel sounds. No pulsatile masses, no bruits.     Extremities:  Full range of motion.  No pitting edema    Skin: No rash seen. Skin is warm and dry     Neuro: Patient is alert and oriented x3. Speech is clear. There is no asymmetry with facial grimaces, and no tongue deviation. Patient moves all extremities independently. Sensation is intact. No obvious neuro deficits are  noted.     Last Recorded Vitals  /61 (BP Location: Left arm, Patient Position: Lying)   Pulse 90   Temp 36.7 °C (98.1 °F) (Oral)   Resp 20   Wt (!) 171 kg (378 lb)   SpO2 98%     Relevant Results  Scheduled medications  aspirin, 81 mg, oral, Daily  atorvastatin, 80 mg, oral, Daily  DULoxetine, 120 mg, oral, Daily  heparin (porcine), 7,500 Units, subcutaneous, q8h TAMIR  insulin glargine, 100 Units, subcutaneous, BID  insulin lispro, 40 Units, subcutaneous, TID  pantoprazole, 40 mg, oral, Daily before breakfast  piperacillin-tazobactam, 3.375 g, intravenous, q6h  QUEtiapine, 400 mg, oral, Nightly  sennosides, 2 tablet, oral, BID      Continuous medications  sodium chloride 0.9%, 75 mL/hr, Last Rate: 75 mL/hr (07/24/24 2123)      PRN medications  PRN medications: acetaminophen **OR** acetaminophen **OR** acetaminophen, dextrose, dextrose, dextrose, glucagon, glucagon, HYDROmorphone, HYDROmorphone, ondansetron ODT **OR** ondansetron, prochlorperazine **OR** prochlorperazine, vancomycin    Results for orders placed or performed during the hospital encounter of 07/24/24 (from the past 24 hour(s))   POCT GLUCOSE   Result Value Ref Range    POCT Glucose 260 (H) 74 - 99 mg/dL   CBC and Auto Differential   Result Value Ref Range    WBC 20.0 (H) 4.4 - 11.3 x10*3/uL    nRBC 0.0 0.0 - 0.0 /100 WBCs    RBC 4.32 (L) 4.50 - 5.90 x10*6/uL    Hemoglobin 11.5 (L) 13.5 - 17.5 g/dL    Hematocrit 36.4 (L) 41.0 - 52.0 %    MCV 84 80 - 100 fL    MCH 26.6 26.0 - 34.0 pg    MCHC 31.6 (L) 32.0 - 36.0 g/dL    RDW 14.6 (H) 11.5 - 14.5 %    Platelets 357 150 - 450 x10*3/uL    Neutrophils % 84.2 40.0 - 80.0 %    Immature Granulocytes %, Automated 1.1 (H) 0.0 - 0.9 %    Lymphocytes % 7.7 13.0 - 44.0 %    Monocytes % 6.0 2.0 - 10.0 %    Eosinophils % 0.6 0.0 - 6.0 %    Basophils % 0.4 0.0 - 2.0 %    Neutrophils Absolute 16.83 (H) 1.20 - 7.70 x10*3/uL    Immature Granulocytes Absolute, Automated 0.21 0.00 - 0.70 x10*3/uL    Lymphocytes  Absolute 1.54 1.20 - 4.80 x10*3/uL    Monocytes Absolute 1.19 (H) 0.10 - 1.00 x10*3/uL    Eosinophils Absolute 0.12 0.00 - 0.70 x10*3/uL    Basophils Absolute 0.08 0.00 - 0.10 x10*3/uL   Comprehensive metabolic panel   Result Value Ref Range    Glucose 247 (H) 74 - 99 mg/dL    Sodium 131 (L) 136 - 145 mmol/L    Potassium 5.7 (H) 3.5 - 5.3 mmol/L    Chloride 93 (L) 98 - 107 mmol/L    Bicarbonate 21 21 - 32 mmol/L    Anion Gap 23 (H) 10 - 20 mmol/L    Urea Nitrogen 71 (H) 6 - 23 mg/dL    Creatinine 4.02 (H) 0.50 - 1.30 mg/dL    eGFR 17 (L) >60 mL/min/1.73m*2    Calcium 8.6 8.6 - 10.3 mg/dL    Albumin 3.8 3.4 - 5.0 g/dL    Alkaline Phosphatase 113 33 - 120 U/L    Total Protein 6.4 6.4 - 8.2 g/dL    AST 27 9 - 39 U/L    Bilirubin, Total 0.5 0.0 - 1.2 mg/dL    ALT 21 10 - 52 U/L   Magnesium   Result Value Ref Range    Magnesium 1.70 1.60 - 2.40 mg/dL   Lipase   Result Value Ref Range    Lipase 14 9 - 82 U/L   Protime-INR   Result Value Ref Range    Protime 12.6 9.8 - 12.8 seconds    INR 1.1 0.9 - 1.1   BLOOD GAS VENOUS FULL PANEL   Result Value Ref Range    POCT pH, Venous 7.39 7.33 - 7.43 pH    POCT pCO2, Venous 42 41 - 51 mm Hg    POCT pO2, Venous 27 (L) 35 - 45 mm Hg    POCT SO2, Venous 35 (L) 45 - 75 %    POCT Oxy Hemoglobin, Venous 34.2 (L) 45.0 - 75.0 %    POCT Hematocrit Calculated, Venous 37.0 (L) 41.0 - 52.0 %    POCT Sodium, Venous 128 (L) 136 - 145 mmol/L    POCT Potassium, Venous 6.4 (HH) 3.5 - 5.3 mmol/L    POCT Chloride, Venous 92 (L) 98 - 107 mmol/L    POCT Ionized Calicum, Venous 1.10 1.10 - 1.33 mmol/L    POCT Glucose, Venous 284 (H) 74 - 99 mg/dL    POCT Lactate, Venous 4.0 (HH) 0.4 - 2.0 mmol/L    POCT Base Excess, Venous 0.3 -2.0 - 3.0 mmol/L    POCT HCO3 Calculated, Venous 25.4 22.0 - 26.0 mmol/L    POCT Hemoglobin, Venous 12.2 (L) 13.5 - 17.5 g/dL    POCT Anion Gap, Venous 17.0 10.0 - 25.0 mmol/L    Patient Temperature 37.0 degrees Celsius    FiO2 21 %   B-Type Natriuretic Peptide   Result Value Ref  Range    BNP 23 0 - 99 pg/mL   Creatine Kinase   Result Value Ref Range    Creatine Kinase 217 0 - 325 U/L   Sars-CoV-2 PCR   Result Value Ref Range    Coronavirus 2019, PCR Not Detected Not Detected   Troponin I, High Sensitivity, Initial   Result Value Ref Range    Troponin I, High Sensitivity 15 0 - 20 ng/L   Lactate   Result Value Ref Range    Lactate 3.5 (H) 0.4 - 2.0 mmol/L   Troponin, High Sensitivity, 1 Hour   Result Value Ref Range    Troponin I, High Sensitivity 15 0 - 20 ng/L   Blood Culture    Specimen: Peripheral Venipuncture; Blood culture   Result Value Ref Range    Blood Culture Loaded on Instrument - Culture in progress    Blood Culture    Specimen: Peripheral Venipuncture; Blood culture   Result Value Ref Range    Blood Culture Loaded on Instrument - Culture in progress    Blood Gas Venous Full Panel   Result Value Ref Range    POCT pH, Venous 7.40 7.33 - 7.43 pH    POCT pCO2, Venous 40 (L) 41 - 51 mm Hg    POCT pO2, Venous 42 35 - 45 mm Hg    POCT SO2, Venous 69 45 - 75 %    POCT Oxy Hemoglobin, Venous 67.4 45.0 - 75.0 %    POCT Hematocrit Calculated, Venous 35.0 (L) 41.0 - 52.0 %    POCT Sodium, Venous 126 (L) 136 - 145 mmol/L    POCT Potassium, Venous 6.7 (HH) 3.5 - 5.3 mmol/L    POCT Chloride, Venous 92 (L) 98 - 107 mmol/L    POCT Ionized Calicum, Venous 1.07 (L) 1.10 - 1.33 mmol/L    POCT Glucose, Venous 300 (H) 74 - 99 mg/dL    POCT Lactate, Venous 2.6 (H) 0.4 - 2.0 mmol/L    POCT Base Excess, Venous 0.0 -2.0 - 3.0 mmol/L    POCT HCO3 Calculated, Venous 24.8 22.0 - 26.0 mmol/L    POCT Hemoglobin, Venous 11.8 (L) 13.5 - 17.5 g/dL    POCT Anion Gap, Venous 16.0 10.0 - 25.0 mmol/L    Patient Temperature 37.0 degrees Celsius    FiO2 21 %   Blood Gas Lactic Acid, Venous   Result Value Ref Range    POCT Lactate, Venous 2.6 (H) 0.4 - 2.0 mmol/L   Lactate   Result Value Ref Range    Lactate 2.1 (H) 0.4 - 2.0 mmol/L   Basic metabolic panel   Result Value Ref Range    Glucose 278 (H) 74 - 99 mg/dL     Sodium 130 (L) 136 - 145 mmol/L    Potassium 5.6 (H) 3.5 - 5.3 mmol/L    Chloride 92 (L) 98 - 107 mmol/L    Bicarbonate 24 21 - 32 mmol/L    Anion Gap 20 10 - 20 mmol/L    Urea Nitrogen 70 (H) 6 - 23 mg/dL    Creatinine 4.05 (H) 0.50 - 1.30 mg/dL    eGFR 17 (L) >60 mL/min/1.73m*2    Calcium 8.7 8.6 - 10.3 mg/dL   POCT GLUCOSE   Result Value Ref Range    POCT Glucose 247 (H) 74 - 99 mg/dL   MRSA Surveillance for Vancomycin De-escalation, PCR    Specimen: Anterior Nares; Swab   Result Value Ref Range    MRSA PCR Not Detected Not Detected     US right upper quadrant   Final Result   Diffuse hepatic steatosis.   Cholelithiasis.  No gallbladder wall thickening or biliary dilatation   is appreciated.    Signed by Tom New MD      CT chest abdomen pelvis wo IV contrast   Final Result   Chest: No acute cardiopulmonary disease and no acute traumatic injury.    Right basilar atelectasis and elevation right hemidiaphragm.   Coronary calcifications are marker for coronary disease.   ABDOMEN: No acute traumatic injury.   Fatty liver morphology.   Prominent gallbladder with luminel gallstones.  If clinically   warranted, ultrasound may be helpful for further evaluation.   Pelvis: No acute inflammatory changes which are medical injury.   Thoracic spine: Mild degenerative changes without acute fracture or   malalignment.   Lumbar spine: Mild degenerative changes without acute fracture or   malalignment..  Findings would better evaluated with MRI.   Signed by Srikanth Hidalgo DO      CT head wo IV contrast   Final Result   CT HEAD:   1. No evidence of hemorrhage, acute CT apparent transcortical   infarct, or other emergent intracranial abnormality.   2. Time indeterminate and likely chronic, but new since prior CT head   imaging in December of 2022, geographic areas of cystic   encephalomalacia and gliosis are present in the posterior right   frontal lobe and right occipital lobe, posterior in the right frontal   lobe  likely corresponding to acute infarct described on previous MRI   in December of 2022.   3. Subtle attenuation changes in the periventricular and subcortical   white matter of bilateral cerebral hemispheres are nonspecific and   likely represent sequela of microvascular disease.        CT C-SPINE:   1. No evidence of acute trauma to the cervical spine.        MACRO:   None        Signed by: Farshad Montejo 7/24/2024 4:28 PM   Dictation workstation:   [id number]      CT cervical spine wo IV contrast   Final Result   CT HEAD:   1. No evidence of hemorrhage, acute CT apparent transcortical   infarct, or other emergent intracranial abnormality.   2. Time indeterminate and likely chronic, but new since prior CT head   imaging in December of 2022, geographic areas of cystic   encephalomalacia and gliosis are present in the posterior right   frontal lobe and right occipital lobe, posterior in the right frontal   lobe likely corresponding to acute infarct described on previous MRI   in December of 2022.   3. Subtle attenuation changes in the periventricular and subcortical   white matter of bilateral cerebral hemispheres are nonspecific and   likely represent sequela of microvascular disease.        CT C-SPINE:   1. No evidence of acute trauma to the cervical spine.        MACRO:   None        Signed by: Farshad Montejo 7/24/2024 4:28 PM   Dictation workstation:   [id number]      CT thoracic spine wo IV contrast   Final Result   Chest: No acute cardiopulmonary disease and no acute traumatic injury.    Right basilar atelectasis and elevation right hemidiaphragm.   Coronary calcifications are marker for coronary disease.   ABDOMEN: No acute traumatic injury.   Fatty liver morphology.   Prominent gallbladder with luminel gallstones.  If clinically   warranted, ultrasound may be helpful for further evaluation.   Pelvis: No acute inflammatory changes which are medical injury.   Thoracic spine: Mild degenerative  changes without acute fracture or   malalignment.   Lumbar spine: Mild degenerative changes without acute fracture or   malalignment..  Findings would better evaluated with MRI.   Signed by Srikanth Hidalgo, DO      CT lumbar spine wo IV contrast   Final Result   Chest: No acute cardiopulmonary disease and no acute traumatic injury.    Right basilar atelectasis and elevation right hemidiaphragm.   Coronary calcifications are marker for coronary disease.   ABDOMEN: No acute traumatic injury.   Fatty liver morphology.   Prominent gallbladder with luminel gallstones.  If clinically   warranted, ultrasound may be helpful for further evaluation.   Pelvis: No acute inflammatory changes which are medical injury.   Thoracic spine: Mild degenerative changes without acute fracture or   malalignment.   Lumbar spine: Mild degenerative changes without acute fracture or   malalignment..  Findings would better evaluated with MRI.   Signed by Srikanth Hidalgo, DO      XR femur right 2+ views   Final Result   No acute osseous abnormality.   Signed by Alfonso Beal MD      XR chest 1 view   Final Result   Right lower lobe infiltrate with pleural effusion.   Signed by Wilner Chapa MD      XR foot right 3+ views   Final Result   No acute osseous abnormality. No osseous erosions or soft tissue gas.   6 mm linear radiopaque structure projected over the distal aspect of   the fourth digit plantar soft tissues.  Findings suggestive of a   foreign body.   Signed by Nilton Zavala MD             Assessment/Plan   Principal Problem:    Sepsis, due to unspecified organism, unspecified whether acute organ dysfunction present (Multi)  Active Problems:    Acute on chronic renal failure (CMS-HCC)    Right lower lobe pneumonia    Pleural effusion    Cholelithiasis    Hypotension    Recurrent falls    Hyperkalemia    Leukocytosis    Hyponatremia    Foreign body in skin of right lesser toe    Cholecystitis      Plan:  Acute care surgery consult for  sepsis and questionably distended gallbladder  Nephrology consult for acute on chronic renal failure and the hyponatremia and hyperkalemia  Podiatry consult for the foreign body in the fourth toe, likely chronic  Social work, PT, OT consults ordered  Telemetry  Strict intake and output  Daily CBC and CMP  Zofran, Tylenol, Compazine as needed  Vancomycin and Zosyn for sepsis and likely right lower lobe pneumonia  Saline at 75 cc an hour for the hyponatremia and acute exacerbation of his kidney failure  Dilaudid as needed  Pantoprazole daily  Continue the patient's aspirin, atorvastatin, duloxetine and his insulin  Insulin dosing discussed with and corrected by pharmacy.  Patient states he takes Lantus 80 units 3 times daily and NovoLog 60 units 3 times daily however the pharmacy corrected it to the most recent orders they found.  Patient's amlodipine and lisinopril, spironolactone, and torsemide were all held due to hypotension and hyponatremia.  Findings, orders, plan all discussed with Dr. French Traylor PA-C

## 2024-07-25 NOTE — CONSULTS
Inpatient consult to Nephrology  Consult performed by: Dao Kingston DO  Consult ordered by: Lien Traylor PA-C      Reason For Consult  Acute kidney injury, hyperkalemia, G3 chronic kidney disease    History Of Present Illness  Jai Shrestha is a 54 y.o. male with a past medical history of morbid obesity, hypertension, diabetes, hyperlipidemia, venous stasis, stroke in 2022, normocytic anemia, and stage G3B with a fluctuating creatinine between 1.7 to 2.4 mg/deciliter as of lately who presents with a 3-week history of nausea with associated dry heaves, poor by mouth intake, fever, chills and multiple falls.    In the ED, his BP went as low as 79/59 mmHg.  He was afebrile.  His white count was 20 with neutrophil predominance.  He was suffering an acute kidney injury with a creatinine of 4 mg as a deciliter, his potassium was 5.7, lactate up to 3.5.  High-sensitivity troponin was low.  COVID-19 negative.  Chest x-ray was concerning for right lower lobe pneumonia albeit CT imaging of the chest showed no acute cardiopulmonary disease.  There was bilateral renal cortical atrophy without hydronephrosis.  There was a question of gallstones.  Ultrasound of the right upper quadrant showed hepatic steatosis without gallbladder wall thickening or biliary dilation.  Cultures were obtained.  He was administered broad-spectrum antimicrobial coverage with vancomycin and Zosyn.  Aggressive IV volume replacement was initiated.  Nephrology is consulted for acute kidney injury, hyperkalemia, and G3 chronic kidney disease.     Past Medical History:   Diagnosis Date    Chronic back pain     CKD (chronic kidney disease)     Diabetes mellitus (Multi)     HF (heart failure), diastolic (Multi)     Hypertension        No past surgical history on file.    Social History     Tobacco Use    Smoking status: Former     Types: Cigarettes   Substance Use Topics    Alcohol use: Yes    Drug use: Not Currently        Family History  No family  "history on file.     Allergies  Metformin    Review of Systems   12 point review of systems obtained and negative unless stated in HPI  Physical Exam   General:  No acute distress. Alert and oriented  x 3. Morbidly obese.  Head: atraumatic and normocephalic  Eyes: Pupils equal round reactive to light, no scleral icterus.  Neck: Supple, difficult to assess JVD due to body habitus.   Cardiac: Regular rate and rhythm. No murmurs noted.   Pulmonary: Lungs clear bilaterally. No wheezes rales or rhonchi. No accessory muscle use.  Abdomen: Soft, morbidly obese, non tender.  No guarding, rigidity, or distention. Normoactive bowel sounds.    Extremities: No pitting edema  Skin: No rash seen. Skin is warm and dry.  Neuro: Patient is alert and oriented x3. Patient moves all extremities. No obvious neuro deficits are noted.       I&O 24HR    Intake/Output Summary (Last 24 hours) at 7/25/2024 1222  Last data filed at 7/24/2024 2153  Gross per 24 hour   Intake 50 ml   Output --   Net 50 ml       Vitals 24HR  Heart Rate:  [75-96]   Temp:  [35.3 °C (95.5 °F)-36.8 °C (98.2 °F)]   Resp:  [15-23]   BP: ()/(45-82)   Height:  [180.4 cm (5' 11.02\")]   Weight:  [171 kg (378 lb)]   SpO2:  [93 %-100 %]     Scheduled Medications  aspirin, 81 mg, oral, Daily  atorvastatin, 80 mg, oral, Daily  DULoxetine, 120 mg, oral, Daily  heparin (porcine), 7,500 Units, subcutaneous, q8h TAMIR  insulin glargine, 100 Units, subcutaneous, BID  insulin lispro, 40 Units, subcutaneous, TID  pantoprazole, 40 mg, oral, Daily before breakfast  piperacillin-tazobactam, 3.375 g, intravenous, q6h  QUEtiapine, 400 mg, oral, Nightly  sennosides, 2 tablet, oral, BID      Continuous medications  sodium chloride 0.9%, 75 mL/hr, Last Rate: 75 mL/hr (07/24/24 2123)        PRN medications: acetaminophen **OR** acetaminophen **OR** acetaminophen, dextrose, dextrose, dextrose, glucagon, glucagon, HYDROmorphone, HYDROmorphone, ondansetron ODT **OR** ondansetron, " prochlorperazine **OR** prochlorperazine, vancomycin     Relevant Results  Results from last 7 days   Lab Units 07/25/24  0530 07/24/24  1239   WBC AUTO x10*3/uL 11.0 20.0*   HEMOGLOBIN g/dL 9.3* 11.5*   HEMATOCRIT % 30.0* 36.4*   PLATELETS AUTO x10*3/uL 253 357   NEUTROS PCT AUTO %  --  84.2   LYMPHS PCT AUTO %  --  7.7   MONOS PCT AUTO %  --  6.0   EOS PCT AUTO %  --  0.6      Results from last 7 days   Lab Units 07/25/24  0530 07/24/24  1834 07/24/24  1239   SODIUM mmol/L 130* 130* 131*   POTASSIUM mmol/L 4.8 5.6* 5.7*   CHLORIDE mmol/L 93* 92* 93*   CO2 mmol/L 22 24 21   BUN mg/dL 68* 70* 71*   CREATININE mg/dL 4.18* 4.05* 4.02*   CALCIUM mg/dL 8.6 8.7 8.6   PROTEIN TOTAL g/dL 5.7*  --  6.4   BILIRUBIN TOTAL mg/dL 0.7  --  0.5   ALK PHOS U/L 93  --  113   ALT U/L 15  --  21   AST U/L 18  --  27   GLUCOSE mg/dL 198* 278* 247*      US right upper quadrant   Final Result   Diffuse hepatic steatosis.   Cholelithiasis.  No gallbladder wall thickening or biliary dilatation   is appreciated.    Signed by Tom New MD      CT chest abdomen pelvis wo IV contrast   Final Result   Chest: No acute cardiopulmonary disease and no acute traumatic injury.    Right basilar atelectasis and elevation right hemidiaphragm.   Coronary calcifications are marker for coronary disease.   ABDOMEN: No acute traumatic injury.   Fatty liver morphology.   Prominent gallbladder with luminel gallstones.  If clinically   warranted, ultrasound may be helpful for further evaluation.   Pelvis: No acute inflammatory changes which are medical injury.   Thoracic spine: Mild degenerative changes without acute fracture or   malalignment.   Lumbar spine: Mild degenerative changes without acute fracture or   malalignment..  Findings would better evaluated with MRI.   Signed by Srikanth Hidalgo DO      CT head wo IV contrast   Final Result   CT HEAD:   1. No evidence of hemorrhage, acute CT apparent transcortical   infarct, or other emergent  intracranial abnormality.   2. Time indeterminate and likely chronic, but new since prior CT head   imaging in December of 2022, geographic areas of cystic   encephalomalacia and gliosis are present in the posterior right   frontal lobe and right occipital lobe, posterior in the right frontal   lobe likely corresponding to acute infarct described on previous MRI   in December of 2022.   3. Subtle attenuation changes in the periventricular and subcortical   white matter of bilateral cerebral hemispheres are nonspecific and   likely represent sequela of microvascular disease.        CT C-SPINE:   1. No evidence of acute trauma to the cervical spine.        MACRO:   None        Signed by: Farshad Montejo 7/24/2024 4:28 PM   Dictation workstation:   [id number]      CT cervical spine wo IV contrast   Final Result   CT HEAD:   1. No evidence of hemorrhage, acute CT apparent transcortical   infarct, or other emergent intracranial abnormality.   2. Time indeterminate and likely chronic, but new since prior CT head   imaging in December of 2022, geographic areas of cystic   encephalomalacia and gliosis are present in the posterior right   frontal lobe and right occipital lobe, posterior in the right frontal   lobe likely corresponding to acute infarct described on previous MRI   in December of 2022.   3. Subtle attenuation changes in the periventricular and subcortical   white matter of bilateral cerebral hemispheres are nonspecific and   likely represent sequela of microvascular disease.        CT C-SPINE:   1. No evidence of acute trauma to the cervical spine.        MACRO:   None        Signed by: Farshad Montejo 7/24/2024 4:28 PM   Dictation workstation:   [id number]      CT thoracic spine wo IV contrast   Final Result   Chest: No acute cardiopulmonary disease and no acute traumatic injury.    Right basilar atelectasis and elevation right hemidiaphragm.   Coronary calcifications are marker for coronary  disease.   ABDOMEN: No acute traumatic injury.   Fatty liver morphology.   Prominent gallbladder with luminel gallstones.  If clinically   warranted, ultrasound may be helpful for further evaluation.   Pelvis: No acute inflammatory changes which are medical injury.   Thoracic spine: Mild degenerative changes without acute fracture or   malalignment.   Lumbar spine: Mild degenerative changes without acute fracture or   malalignment..  Findings would better evaluated with MRI.   Signed by Srikanth Hidalgo, DO      CT lumbar spine wo IV contrast   Final Result   Chest: No acute cardiopulmonary disease and no acute traumatic injury.    Right basilar atelectasis and elevation right hemidiaphragm.   Coronary calcifications are marker for coronary disease.   ABDOMEN: No acute traumatic injury.   Fatty liver morphology.   Prominent gallbladder with luminel gallstones.  If clinically   warranted, ultrasound may be helpful for further evaluation.   Pelvis: No acute inflammatory changes which are medical injury.   Thoracic spine: Mild degenerative changes without acute fracture or   malalignment.   Lumbar spine: Mild degenerative changes without acute fracture or   malalignment..  Findings would better evaluated with MRI.   Signed by Srikanth Hidalgo, DO      XR femur right 2+ views   Final Result   No acute osseous abnormality.   Signed by Alfonso Beal MD      XR chest 1 view   Final Result   Right lower lobe infiltrate with pleural effusion.   Signed by Wilner Chapa MD      XR foot right 3+ views   Final Result   No acute osseous abnormality. No osseous erosions or soft tissue gas.   6 mm linear radiopaque structure projected over the distal aspect of   the fourth digit plantar soft tissues.  Findings suggestive of a   foreign body.   Signed by Nilton Zavala MD            Assessment/Plan     Jai Shrestha is a 54 y.o. male with a past medical history of morbid obesity, hypertension, diabetes, hyperlipidemia, venous stasis,  stroke in 2022, normocytic anemia, and stage G3B with a fluctuating creatinine between 1.7 to 2.4 mg/deciliter as of lately who presents with a 3-week history of nausea with associated dry heaves, poor by mouth intake, fever, chills and multiple falls.    In the ED, his BP went as low as 79/59 mmHg.  He was afebrile.  His white count was 20 with neutrophil predominance.  He was suffering an acute kidney injury with a creatinine of 4 mg as a deciliter, his potassium was 5.7, lactate up to 3.5.  High-sensitivity troponin was low.  COVID-19 negative.  Chest x-ray was concerning for right lower lobe pneumonia albeit CT imaging of the chest showed no acute cardiopulmonary disease.  There was bilateral renal cortical atrophy without hydronephrosis.  There was a question of gallstones.  Ultrasound of the right upper quadrant showed hepatic steatosis without gallbladder wall thickening or biliary dilation.  Cultures were obtained.  He was administered broad-spectrum antimicrobial coverage with vancomycin and Zosyn.  Aggressive IV volume replacement was initiated.  Nephrology is consulted for acute kidney injury, hyperkalemia, and G3 chronic kidney disease.    Mr. Shrestha is suffering ATN in the setting of low blood pressure with concomitant use of lisinopril, spironolactone and torsemide which he continues to take despite the above symptoms.  He reports his symptoms have been present for 3 weeks albeit he was here at [facility] and admitted on 6/11 with nausea and poor by mouth intake thus his symptoms have been present for at least 1.5 months.  He required a Guo for urinary retention with immediate output of 1.5 L upon placement.  He is currently without a catheter.  I have asked for a bladder scan to rule out retention.  He is appropriately off the lisinopril, spironolactone and torsemide.  Urinalysis has been requested.  I will add on urine electrolytes.  Unclear source of infection. Continue supportive care. Trend his RFP.  Will follow.     Principal Problem:    Sepsis, due to unspecified organism, unspecified whether acute organ dysfunction present (Multi)  Active Problems:    Acute on chronic renal failure (CMS-HCC)    Right lower lobe pneumonia    Pleural effusion    Cholelithiasis    Hypotension    Recurrent falls    Hyperkalemia    Leukocytosis    Hyponatremia    Foreign body in skin of right lesser toe    Cholecystitis      I spent 60 minutes in the professional and overall care of this patient.      Dao Kingston, DO

## 2024-07-25 NOTE — PROGRESS NOTES
Physical Therapy    Physical Therapy Evaluation & Treatment    Patient Name: Jai Shrestha  MRN: [id number]  Today's Date: 7/25/2024   Time Calculation  Start Time: 1247  Stop Time: 1308  Time Calculation (min): 21 min    Assessment/Plan   PT Assessment  PT Assessment Results: Decreased endurance, Impaired balance, Decreased mobility  Rehab Prognosis: Fair  Barriers to Discharge: Needing increased endurance and stair trial  Evaluation/Treatment Tolerance: Treatment limited secondary to agitation, Patient tolerated treatment well  Medical Staff Made Aware: Yes  Strengths: Housing layout, Premorbid level of function  Barriers to Participation: Comorbidities, Insight into problems  End of Session Communication: Bedside nurse, Care Coordinator  Assessment Comment: PT eval complete. Pt moving quite well this date, no hands on assist needed for any bed mobility or OOB mobility. Needing occasional verbal cuing, but declining any use of device for ambulation. Appears to be at or very near baseline, will trial acute PT plan of care to determine pt ability to progress.  End of Session Patient Position: Bed, 3 rail up (alarm off adament he did not have alarm on,  nursing notified)   IP OR SWING BED PT PLAN  Inpatient or Swing Bed: Inpatient  PT Plan  Treatment/Interventions: Bed mobility, Transfer training, Gait training, Stair training, Balance training, Neuromuscular re-education, Strengthening, Endurance training, Therapeutic activity, Therapeutic exercise, Home exercise program, Positioning, Postural re-education  PT Plan: Ongoing PT  PT Frequency: 2 times per week  PT Discharge Recommendations: Low intensity level of continued care  Equipment Recommended upon Discharge: Wheeled walker (Owns at home, does not use all the time)  PT Recommended Transfer Status: Stand by assist  PT - OK to Discharge: Yes (Per PT POC)      Subjective     General Visit Information:  General  Reason for Referral: Impaired Mobility, Gait training,  Impaired cognition/safety awareness: Pt is a 54 year old male admitted for fatigue, N/V and recurrent falls. Diagnosed with sepsis while in hospital.  Referred By: MYNOR Wagner PA-C  Past Medical History Relevant to Rehab:   Past Medical History:   Diagnosis Date    Chronic back pain     CKD (chronic kidney disease)     Diabetes mellitus (Multi)     HF (heart failure), diastolic (Multi)     Hypertension      No past surgical history on file.    Family/Caregiver Present: No  Co-Treatment: OT  Co-Treatment Reason: Co eval with OT to maximize pt mobility safety and participation  Prior to Session Communication: Bedside nurse  Patient Position Received: Bed, 3 rail up, Alarm on  Preferred Learning Style: auditory, kinesthetic, verbal, visual, written  General Comment: Pt awake, willing to participate with PT/OT eval as pt wishing to get to restroom.  Home Living:  Home Living  Type of Home: House  Lives With: Alone  Home Adaptive Equipment: Walker rolling or standard  Home Layout: One level  Home Access: Stairs to enter without rails  Entrance Stairs-Rails: None  Entrance Stairs-Number of Steps: 2  Bathroom Shower/Tub: Tub/shower unit  Bathroom Toilet: Handicapped height  Bathroom Equipment: Grab bars in shower (Has shower chair that he does not use as does not fit into shower well)  Prior Level of Function:  Prior Function Per Pt/Caregiver Report  Level of [place]: Needs assistance with homemaking, Independent with ADLs and functional transfers  Receives Help From: Friends, Neighbor, Personal care attendant (HHA 1x wk x2 hrs)  ADL Assistance: Independent  Homemaking Assistance: Needs assistance  Driving/Transportation:  (provide a ride)  Homemaking Assistance Comments: HHA 2 hrs 1x  week  Ambulatory Assistance: Independent (has walker does not use consistently)  Hand Dominance: Right  Precautions:  Precautions  Medical Precautions: Fall precautions  Precautions Comment: HX of psych issues    Objective   Pain:  Pain  "Assessment  Pain Assessment: 0-10  0-10 (Numeric) Pain Score: 0 - No pain  Cognition:  Cognition  Overall Cognitive Status: Within Functional Limits  Orientation Level: Oriented X4  Following Commands: Follows all commands and directions without difficulty  Safety/Judgement: Exceptions to WFL  Complex Functional Tasks: Moderate  Novel Situations: Moderate  Routine Tasks: Minimal  Unable to Self-Monitor and Self-Correct Consistently: Moderate  Insight: Moderate (Does not appear to have great safety awareness or awareness of need for medical intervention)  Impulsive: Mildly    General Assessments:  General Observation  General Observation: Pt largely appears to be at or near functional baseline, appears slightly paranoid, wanting therapists to be \"further away\" during all mobility. Easily agitated.     Activity Tolerance  Endurance: Tolerates 10 - 20 min exercise with multiple rests    Sensation  Light Touch: No apparent deficits  Sensation Comment: Denies numbness/tingling    Strength  Strength Comments: No hands on assist needed throughout session.  Strength  Strength Comments: No hands on assist needed throughout session.    Perception  Perseveration: Perseverates during conversation      Coordination  Movements are Fluid and Coordinated: Yes    Postural Control  Postural Control: Within Functional Limits  Posture Comment: Forward head, rounded shoulders, Wide ARAM    Static Sitting Balance  Static Sitting-Balance Support: Feet supported, No upper extremity supported  Static Sitting-Level of Assistance: Modified independent  Dynamic Sitting Balance  Dynamic Sitting-Balance Support: Feet supported, No upper extremity supported  Dynamic Sitting-Balance: Forward lean, Reaching for objects, Trunk control activities  Dynamic Sitting-Comments: MOD I    Static Standing Balance  Static Standing-Balance Support: No upper extremity supported  Static Standing-Level of Assistance: Distant supervision  Static " "Standing-Comment/Number of Minutes: Pt insistent that PT not be close to him while performing mobility, \"if you are too close I am gonna fall.\"  Dynamic Standing Balance  Dynamic Standing-Balance Support: No upper extremity supported  Dynamic Standing-Balance:  (Ambulation)  Dynamic Standing-Comments: Supervision  Functional Assessments:  Bed Mobility  Bed Mobility: Yes  Bed Mobility 1  Bed Mobility 1: Supine to sitting, Sitting to supine  Level of Assistance 1: Modified independent  Bed Mobility Comments 1: No hands on assist needed, pt heavily reliant on momentum and use of bed rails/HOB elevation, but declines PT being too close.    Transfers  Transfer: Yes  Transfer 1  Technique 1: Sit to stand, Stand to sit  Transfer Level of Assistance 1: Distant supervision  Trials/Comments 1: Pt with wide ARAM, use of bathroom BR and bed surface/rails during multiple trials, no hands on assist, pt insistent that PT be further away every trial.    Ambulation/Gait Training  Ambulation/Gait Training Performed: Yes  Ambulation/Gait Training 1  Surface 1: Level tile  Device 1: No device  Assistance 1: Distant supervision  Quality of Gait 1: Wide base of support, Diminished heel strike, Decreased step length (Increased lateral sway for advancement of BLE due to body habitus, PT attempting to offer use of WW or stabilizing furniture for surfing, pt wanting PT further away during ambulation. No LOB)  Comments/Distance (ft) 1: 10' x2    Stairs  Stairs: No  Extremity/Trunk Assessments:  RUE   RUE : Within Functional Limits  LUE   LUE: Within Functional Limits  RLE   RLE : Within Functional Limits  LLE   LLE : Within Functional Limits  Treatments:  Other Activity  Other Activity Performed: Yes  Other Activity 1: Increased education to pt about plan of care, progression with therapy, and participation. Increased gait over and above assessment needs. Increased dynamic trunk stabilization both in sitting and standing. Unsure of acceptance " of education given, pt largely appears to want to be left alone.  Outcome Measures:  [facility] Basic Mobility  Turning from your back to your side while in a flat bed without using bedrails: A little  Moving from lying on your back to sitting on the side of a flat bed without using bedrails: A little  Moving to and from bed to chair (including a wheelchair): A little  Standing up from a chair using your arms (e.g. wheelchair or bedside chair): A little  To walk in hospital room: A little  Climbing 3-5 steps with railing: A lot  Basic Mobility - Total Score: 17    Encounter Problems       Encounter Problems (Active)       Balance       STG - Maintains dynamic standing balance with upper extremity support At MOD I, safely, without LOB, device PRN        Start:  07/25/24    Expected End:  08/08/24       INTERVENTIONS:  1. Practice standing with minimal support.  2. Educate patient about standing tolerance.  3. Educate patient about independence with gait, transfers, and ADL's.  4. Educate patient about use of assistive device.  5. Educate patient about self-directed care.            Mobility       STG - Patient will ambulate At MOD I using No device for 100' without LOB or gross gait deviations        Start:  07/25/24    Expected End:  08/08/24            STG - Patient will ambulate up and down a curb/step At MOD I while using No device and No HR, using non-reciprocal pattern, no LOB        Start:  07/25/24    Expected End:  08/08/24            Endurance - Pt to tolerate >/= 20 minutes therex, theract, gait and/or NMR with </= 5 minutes of rest breaks        Start:  07/25/24    Expected End:  08/08/24               PT Transfers       STG - Patient will perform bed mobility At MOD I, safely, without LOB, device PRN        Start:  07/25/24    Expected End:  08/08/24            STG - Patient will transfer sit to and from stand At MOD I, safely, without LOB, device PRN        Start:  07/25/24    Expected End:  08/08/24             STG - Patient will perform car transfer At MOD I, safely, without LOB, device PRN        Start:  07/25/24    Expected End:  08/08/24               Safety       Pt will verbalize and apply safety awareness and precautions 100% of time throughout entire session         Start:  07/25/24    Expected End:  08/08/24                   Education Documentation  Body Mechanics, taught by Abe Martinez, PT at 7/25/2024  5:14 PM.  Learner: Patient  Readiness: Nonacceptance  Method: Demonstration, Explanation  Response: Verbalizes Understanding, Needs Reinforcement    Mobility Training, taught by Abe Martinez, PT at 7/25/2024  5:14 PM.  Learner: Patient  Readiness: Nonacceptance  Method: Demonstration, Explanation  Response: Verbalizes Understanding, Needs Reinforcement    Education Comments  No comments found.

## 2024-07-25 NOTE — CONSULTS
"Reason For Consult  cholelithiasis    History Of Present Illness  Jai Shrestha is a 54 y.o. male presenting with weakness, fatigue, frequent falls. He is being worked up for sepsis secondary to PNA and being treated with broad-spectrum antibiotics. He additionally presented with hypotension. He denied abdominal pain. Work up this admission revealed cholelithiasis without evidence of inflammation. Surgery was subsequently consulted.     Past Medical History  He has a past medical history of Chronic back pain, CKD (chronic kidney disease), Diabetes mellitus (Multi), HF (heart failure), diastolic (Multi), and Hypertension.    Surgical History  He has no past surgical history on file.     Social History  He reports that he has quit smoking. His smoking use included cigarettes. He does not have any smokeless tobacco history on file. He reports current alcohol use. He reports that he does not currently use drugs.    Family History  No family history on file.     Allergies  Metformin    Review of Systems  A full 10 point ROS was completed. Nothing positive other than what was mentioned in the HPI.     Physical Exam  PE:  Constitutional: A&Ox3, lethargic, NAD  Cardiovascular: Normal rate and regular rhythm.  Respiratory/Thorax: Good symmetric chest expansion. No labored breathing.  Gastrointestinal: Obese abdomen, soft, nontender diffusely, cannot illicit pain response upon deep palpation of RUQ   Extremities: No peripheral edema  Neurological: A&Ox3, No focal deficits  Psychological: Appropriate mood and behavior  Skin: Warm and dry    Last Recorded Vitals  Blood pressure 96/59, pulse 86, temperature 36.4 °C (97.5 °F), temperature source Temporal, resp. rate 18, height 1.804 m (5' 11.02\"), weight (!) 171 kg (378 lb), SpO2 95%.    Relevant Results  Scheduled medications  aspirin, 81 mg, oral, Daily  atorvastatin, 80 mg, oral, Daily  DULoxetine, 120 mg, oral, Daily  heparin (porcine), 7,500 Units, subcutaneous, q8h " TAMIR  insulin glargine, 100 Units, subcutaneous, BID  insulin lispro, 40 Units, subcutaneous, TID  pantoprazole, 40 mg, oral, Daily before breakfast  piperacillin-tazobactam, 3.375 g, intravenous, q6h  QUEtiapine, 400 mg, oral, Nightly  sennosides, 2 tablet, oral, BID      Continuous medications  sodium chloride 0.9%, 75 mL/hr, Last Rate: 75 mL/hr (07/24/24 2123)      PRN medications  PRN medications: acetaminophen **OR** acetaminophen **OR** acetaminophen, dextrose, dextrose, dextrose, glucagon, glucagon, HYDROmorphone, HYDROmorphone, ondansetron ODT **OR** ondansetron, prochlorperazine **OR** prochlorperazine, vancomycin    Results for orders placed or performed during the hospital encounter of 07/24/24 (from the past 24 hour(s))   Blood Gas Venous Full Panel   Result Value Ref Range    POCT pH, Venous 7.40 7.33 - 7.43 pH    POCT pCO2, Venous 40 (L) 41 - 51 mm Hg    POCT pO2, Venous 42 35 - 45 mm Hg    POCT SO2, Venous 69 45 - 75 %    POCT Oxy Hemoglobin, Venous 67.4 45.0 - 75.0 %    POCT Hematocrit Calculated, Venous 35.0 (L) 41.0 - 52.0 %    POCT Sodium, Venous 126 (L) 136 - 145 mmol/L    POCT Potassium, Venous 6.7 (HH) 3.5 - 5.3 mmol/L    POCT Chloride, Venous 92 (L) 98 - 107 mmol/L    POCT Ionized Calicum, Venous 1.07 (L) 1.10 - 1.33 mmol/L    POCT Glucose, Venous 300 (H) 74 - 99 mg/dL    POCT Lactate, Venous 2.6 (H) 0.4 - 2.0 mmol/L    POCT Base Excess, Venous 0.0 -2.0 - 3.0 mmol/L    POCT HCO3 Calculated, Venous 24.8 22.0 - 26.0 mmol/L    POCT Hemoglobin, Venous 11.8 (L) 13.5 - 17.5 g/dL    POCT Anion Gap, Venous 16.0 10.0 - 25.0 mmol/L    Patient Temperature 37.0 degrees Celsius    FiO2 21 %   Blood Gas Lactic Acid, Venous   Result Value Ref Range    POCT Lactate, Venous 2.6 (H) 0.4 - 2.0 mmol/L   Lactate   Result Value Ref Range    Lactate 2.1 (H) 0.4 - 2.0 mmol/L   Basic metabolic panel   Result Value Ref Range    Glucose 278 (H) 74 - 99 mg/dL    Sodium 130 (L) 136 - 145 mmol/L    Potassium 5.6 (H) 3.5 -  5.3 mmol/L    Chloride 92 (L) 98 - 107 mmol/L    Bicarbonate 24 21 - 32 mmol/L    Anion Gap 20 10 - 20 mmol/L    Urea Nitrogen 70 (H) 6 - 23 mg/dL    Creatinine 4.05 (H) 0.50 - 1.30 mg/dL    eGFR 17 (L) >60 mL/min/1.73m*2    Calcium 8.7 8.6 - 10.3 mg/dL   POCT GLUCOSE   Result Value Ref Range    POCT Glucose 247 (H) 74 - 99 mg/dL   MRSA Surveillance for Vancomycin De-escalation, PCR    Specimen: Anterior Nares; Swab   Result Value Ref Range    MRSA PCR Not Detected Not Detected   POCT GLUCOSE   Result Value Ref Range    POCT Glucose 185 (H) 74 - 99 mg/dL   CBC   Result Value Ref Range    WBC 11.0 4.4 - 11.3 x10*3/uL    nRBC 0.0 0.0 - 0.0 /100 WBCs    RBC 3.53 (L) 4.50 - 5.90 x10*6/uL    Hemoglobin 9.3 (L) 13.5 - 17.5 g/dL    Hematocrit 30.0 (L) 41.0 - 52.0 %    MCV 85 80 - 100 fL    MCH 26.3 26.0 - 34.0 pg    MCHC 31.0 (L) 32.0 - 36.0 g/dL    RDW 14.9 (H) 11.5 - 14.5 %    Platelets 253 150 - 450 x10*3/uL   Comprehensive metabolic panel   Result Value Ref Range    Glucose 198 (H) 74 - 99 mg/dL    Sodium 130 (L) 136 - 145 mmol/L    Potassium 4.8 3.5 - 5.3 mmol/L    Chloride 93 (L) 98 - 107 mmol/L    Bicarbonate 22 21 - 32 mmol/L    Anion Gap 20 10 - 20 mmol/L    Urea Nitrogen 68 (H) 6 - 23 mg/dL    Creatinine 4.18 (H) 0.50 - 1.30 mg/dL    eGFR 16 (L) >60 mL/min/1.73m*2    Calcium 8.6 8.6 - 10.3 mg/dL    Albumin 3.4 3.4 - 5.0 g/dL    Alkaline Phosphatase 93 33 - 120 U/L    Total Protein 5.7 (L) 6.4 - 8.2 g/dL    AST 18 9 - 39 U/L    Bilirubin, Total 0.7 0.0 - 1.2 mg/dL    ALT 15 10 - 52 U/L   POCT GLUCOSE   Result Value Ref Range    POCT Glucose 171 (H) 74 - 99 mg/dL   Vancomycin   Result Value Ref Range    Vancomycin 11.3 5.0 - 20.0 ug/mL   POCT GLUCOSE   Result Value Ref Range    POCT Glucose 117 (H) 74 - 99 mg/dL     Assessment/Plan     Patient presenting with weakness, fatigue, frequent falls. He is being worked up for sepsis secondary to PNA and being treated with broad-spectrum antibiotics. Incidental finding of  cholelithiasis. He abdominal exam is benign without localize RUQ tenderness.    Plan:  - No indications for surgery at this time  - Follow-up OP if he begins to develop abdominal symptoms    Surgery to sign off, we are available as needed if there are any new questions or concerns.    Discussed with Dr Reynolds.     I spent 60 minutes in the professional and overall care of this patient.    Jv Curran PA-C

## 2024-07-25 NOTE — PROGRESS NOTES
Vancomycin Dosing by Pharmacy- Cessation of Therapy    Consult to pharmacy for vancomycin dosing has been discontinued by the prescriber, pharmacy will sign off at this time.    Please call pharmacy if there are further questions or re-enter a consult if vancomycin is resumed.     Lennie Amezquita, PharmD

## 2024-07-25 NOTE — PROGRESS NOTES
07/25/24 0724   Discharge Planning   Living Arrangements Alone   Support Systems Friends/neighbors   Assistance Needed Patient needs some assitance with ADL'S. PT/OT evals pending. Depending on PT/OT recommendations, patient may benefit from a Skilled stay.   Type of Residence Private residence   Number of Stairs to Enter Residence 2   Number of Stairs Within Residence 0   Do you have animals or pets at home? No   Home or Post Acute Services Post acute facilities (Rehab/SNF/etc)   Type of Post Acute Facility Services Skilled nursing   Expected Discharge Disposition SNF   Does the patient need discharge transport arranged? Yes   RoundTrip coordination needed? Yes   Has discharge transport been arranged? No

## 2024-07-25 NOTE — PROGRESS NOTES
Occupational Therapy    Evaluation    Patient Name: Jai Shrestha  MRN: [id number]  Today's Date: 7/25/2024  Time Calculation  Start Time: 1246  Stop Time: 1307  Time Calculation (min): 21 min    Assessment  IP OT Assessment  OT Assessment: Pt seen for OT eval. Pt demonstrates with decreased endurance, assist with mobility and ADLS would benefit from low intensity therapy  Prognosis: Good  Barriers to Discharge: None  Evaluation/Treatment Tolerance: Patient limited by fatigue  Medical Staff Made Aware: Yes  End of Session Communication: Bedside nurse  End of Session Patient Position: Bed, 3 rail up (alarm off adament he did not have alarm on,  nursing notified)  Plan:  Treatment Interventions: ADL retraining, Endurance training, Functional transfer training  OT Frequency: 3 times per week  OT Discharge Recommendations: Low intensity level of continued care  Equipment Recommended upon Discharge: Wheeled walker  OT Recommended Transfer Status: Stand by assist  OT - OK to Discharge: Yes    Subjective   Current Problem:  1. Sepsis, due to unspecified organism, unspecified whether acute organ dysfunction present (Multi)        2. Pneumonia due to infectious organism, unspecified laterality, unspecified part of lung          General:  General  Reason for Referral: sepsis  Referred By: MYNOR DÍAZ  Past Medical History Relevant to Rehab:   Past Medical History:   Diagnosis Date    Chronic back pain     CKD (chronic kidney disease)     Diabetes mellitus (Multi)     HF (heart failure), diastolic (Multi)     Hypertension        Missed Visit: Yes  Missed Visit Reason: Patient sleeping (Attempted to see pt for OT eval. Pt sleeping soundly difficult to arouse  nursing made aware)  Co-Treatment: PT  Co-Treatment Reason: Co eval with PT to maximize pt mobility safety and participation  Prior to Session Communication: Bedside nurse  Patient Position Received: Bed, 3 rail up, Alarm on  Preferred Learning Style: auditory, kinesthetic,  verbal, visual, written  General Comment: {t agreab;e tp PT eval  Precautions:  Medical Precautions: Fall precautions     Pain:  Pain Assessment  Pain Assessment: 0-10  0-10 (Numeric) Pain Score: 0 - No pain    Objective   Cognition:  Overall Cognitive Status: Within Functional Limits  Orientation Level: Oriented X4  Following Commands: Follows all commands and directions without difficulty  Attention: Within Functional Limits  Memory: Within Funtional Limits  Insight: Within function limits  Impulsive: Mildly           Home Living:  Type of Home: House  Lives With: Alone  Home Adaptive Equipment: Walker rolling or standard  Home Layout: One level  Home Access: Stairs to enter without rails  Entrance Stairs-Number of Steps: 2  Bathroom Shower/Tub: Tub/shower unit  Bathroom Toilet: Handicapped height  Bathroom Equipment: Grab bars in shower (Has shower chair that he does not use as does not fit into shower well)   Prior Function:  Level of [place]: Needs assistance with homemaking, Independent with ADLs and functional transfers  Receives Help From: Friends, Neighbor, Personal care attendant (HHA 1x wk x2 hrs)  ADL Assistance: Independent  Homemaking Assistance: Needs assistance  Driving/Transportation:  (provide a ride)  Homemaking Assistance Comments: HHA 2 hrs 1x  week  Ambulatory Assistance: Independent (has walker does not use consistently)  Hand Dominance: Right  IADL History:  Homemaking Responsibilities: Yes  Meal Prep Responsibility: Primary  Laundry Responsibility: No  Cleaning Responsibility: No  Bill Paying/Finance Responsibility: Primary  Shopping Responsibility: No  Homemaking Comments: Pt has HHA 2hrs 1x wk  ADL:  Eating Assistance: Independent  Grooming Assistance: Independent  Bathing Assistance: Moderate  Bathing Deficit: Left lower leg including foot, Right lower leg including foot  UE Dressing Assistance: Independent  LE Dressing Assistance: Moderate  LE Dressing Deficit: Don/doff R sock,  Don/doff L sock, Thread RLE into underwear, Thread LLE into underwear  Toileting Assistance with Device: Independent  Activity Tolerance:  Endurance: Tolerates 10 - 20 min exercise with multiple rests  Bed Mobility/Transfers: Bed Mobility  Bed Mobility: Yes  Bed Mobility 1  Bed Mobility 1: Supine to sitting, Sitting to supine  Level of Assistance 1: Close supervision    Transfers  Transfer: Yes  Transfer 1  Technique 1: Sit to stand, Stand to sit  Transfer Level of Assistance 1: Contact guard      Functional Mobility:  Functional Mobility  Functional Mobility Performed: Yes  Functional Mobility 1  Surface 1: Level tile  Device 1: No device (pt declined walker)  Assistance 1: Close supervision  Comments 1: pt adament to walk to bathroo on his own  Sitting Balance:  Static Sitting Balance  Static Sitting-Balance Support: Feet supported  Static Sitting-Level of Assistance: Independent     IADL's:   Homemaking Responsibilities: Yes  Meal Prep Responsibility: Primary  Laundry Responsibility: No  Cleaning Responsibility: No  Bill Paying/Finance Responsibility: Primary  Shopping Responsibility: No  Homemaking Comments: Pt has HHA 2hrs 1x wk  Vision: Vision - Basic Assessment  Current Vision: No visual deficits  Sensation:  Light Touch: No apparent deficits  Sharp/Dull: No apparent deficits  Sensation Comment: intact  Strength:  Strength Comments: B UE WFL  Perception:     Coordination:  Movements are Fluid and Coordinated: Yes  Finger to Nose: Intact  Rapid Alternating Movements: Intact  Finger to Target: Intact  Coordination Comment: intact   Hand Function:  Hand Function  Gross Grasp: Functional  Coordination: Functional  Extremities: RUE   RUE : Within Functional Limits and LUE   LUE: Within Functional Limits    Outcome Measures: [facility] Daily Activity  Putting on and taking off regular lower body clothing: A lot  Bathing (including washing, rinsing, drying): A lot  Putting on and taking off regular upper body clothing:  None  Toileting, which includes using toilet, bedpan or urinal: None  Taking care of personal grooming such as brushing teeth: None  Eating Meals: None  Daily Activity - Total Score: 20      Education Documentation  Handouts, taught by Melanie Peguero OT at 7/25/2024  2:23 PM.  Learner: Patient  Readiness: Acceptance  Method: Explanation  Response: Verbalizes Understanding, Needs Reinforcement    ADL Training, taught by Melanie Peguero OT at 7/25/2024  2:23 PM.  Learner: Patient  Readiness: Acceptance  Method: Explanation  Response: Verbalizes Understanding, Needs Reinforcement    Education Comments  No comments found.      Goals:   Encounter Problems       Encounter Problems (Active)       ADLs       Patient will perform LB bathing 75% with minimal assist  level of assistance and asdaptive equipment prn. (Progressing)       Start:  07/25/24    Expected End:  08/08/24            Patient with complete lower body dressing with minimal assist  level of assistance donning and doffing all LE clothes  with reacher, shoe horn, sock-aid, dressing stick , and elastic shoe laces while supported sitting (Progressing)       Start:  07/25/24    Expected End:  08/08/24            Pt will tolerate 30 min OT tx session w/o subj/obj c/o fatigue/SOB with activity to increase independence  (Progressing)       Start:  07/25/24    Expected End:  08/08/24               MOBILITY       Patient will perform Functional mobility mod  Household distances/Community Distances with supervision level of assistance and least restrictive device in order to improve safety and functional mobility. (Progressing)       Start:  07/25/24    Expected End:  08/08/24               TRANSFERS       Patient will complete functional transfer to chair  with least restrictive device with modified independent level of assistance. (Progressing)       Start:  07/25/24    Expected End:  08/08/24

## 2024-07-25 NOTE — PROGRESS NOTES
Pharmacy Medication History Review    Jai Shrestha is a 54 y.o. male admitted for Sepsis, due to unspecified organism, unspecified whether acute organ dysfunction present (Multi). Pharmacy reviewed the patient's [id number] medications and allergies for accuracy.  Below are additional concerns with the patient's PTA list.  Patient reported needing a refill of Vit. D3 1,250 mcg capsule  The list below reflectives the updated PTA list. Please review each medication in order reconciliation for additional clarification and justification.  Prior to Admission Medications   Prescriptions Last Dose Informant Patient Reported? Taking?   DULoxetine (Cymbalta) 60 mg DR capsule 7/23/2024 Self Yes Yes   Sig: Take 2 capsules (120 mg) by mouth once daily. Do not crush or chew.   HYDROcodone-acetaminophen (Norco) 7.5-325 mg tablet 7/23/2024 Self No Yes   Sig: Take 1 tablet by mouth every 6 hours if needed for severe pain (7 - 10) for up to 28 days.   HYDROcodone-acetaminophen (Norco) 7.5-325 mg tablet  Self No No   Sig: Take 1 tablet by mouth every 6 hours if needed for severe pain (7 - 10) for up to 28 days. Do not fill before August 6, 2024.   QUEtiapine (SEROquel) 200 mg tablet 7/23/2024 Self Yes Yes   Sig: Take 2 tablets (400 mg) by mouth once daily at bedtime.   amLODIPine (Norvasc) 5 mg tablet 7/23/2024 Self Yes Yes   Sig: Take 2 tablets (10 mg) by mouth once daily.   aspirin 81 mg EC tablet 7/23/2024 Self Yes Yes   Sig: Take 1 tablet (81 mg) by mouth once daily.   atorvastatin (Lipitor) 80 mg tablet 7/23/2024 Self Yes Yes   Sig: Take 1 tablet (80 mg) by mouth once daily.   cholecalciferol (Vitamin D-3) 1,250 mcg (50,000 unit) capsule Unknown Self Yes No   Sig: Take 1 capsule (50,000 Units) by mouth 1 (one) time per week. Every monday   ferrous sulfate, 325 mg ferrous sulfate, tablet Past Week Self Yes Yes   Sig: Take 1 tablet by mouth every other day.   insulin aspart (NovoLOG U-100 Insulin aspart) 100 unit/mL  "injection 2024 Self No Yes   Sig: Inject 40 Units under the skin 3 times a day before meals.   Patient taking differently: Inject 60 Units under the skin 3 times a day before meals.   insulin glargine (Lantus) 100 unit/mL (3 mL) pen 2024 Self No Yes   Sig: Inject 100 Units under the skin 2 times a day. Take as directed per insulin instructions.   Patient taking differently: Inject 80 Units under the skin 3 times a day. Take as directed per insulin instructions.   lisinopril 5 mg tablet 2024 Self Yes Yes   Sig: Take 1 tablet (5 mg) by mouth once daily.   omeprazole (PriLOSEC) 40 mg DR capsule 2024 Self Yes Yes   Sig: Take 1 capsule (40 mg) by mouth 2 times a day.   pen needle, diabetic (BD Ultra-Fine Noelle Pen Needle) 32 gauge x 5/32\" needle  Self No No   Si Pen needle 2 times a day.   spironolactone (Aldactone) 50 mg tablet 2024 Self Yes Yes   Sig: Take 1 tablet (50 mg) by mouth once daily.   torsemide (Demadex) 20 mg tablet 2024 Self Yes Yes   Sig: Take 3 tablets (60 mg) by mouth 2 times a day.      Facility-Administered Medications: None          The list below reflectives the updated allergy list. Please review each documented allergy for additional clarification and justification.  Allergies  Reviewed by Lien Traylor PA-C on 2024        Severity Reactions Comments    Metformin Not Specified Unknown           Source: Patient       Maximilian Goode Foster    "

## 2024-07-25 NOTE — PROGRESS NOTES
07/25/24 4088   Discharge Planning   Assistance Needed Spoke to patient and he would like to use [facility] for RN/PT/OT services.

## 2024-07-25 NOTE — CARE PLAN
Problem: Fall/Injury  Goal: Not fall by end of shift  Outcome: Progressing  Goal: Be free from injury by end of the shift  Outcome: Progressing  Goal: Verbalize understanding of personal risk factors for fall in the hospital  Outcome: Progressing  Goal: Verbalize understanding of risk factor reduction measures to prevent injury from fall in the home  Outcome: Progressing  Goal: Use assistive devices by end of the shift  Outcome: Progressing  Goal: Pace activities to prevent fatigue by end of the shift  Outcome: Progressing   The patient's goals for the shift include      The clinical goals for the shift include      Over the shift, the patient did not make progress toward the following goals. Barriers to progression include . Recommendations to address these barriers include .

## 2024-07-25 NOTE — PROGRESS NOTES
07/25/24 1342   Discharge Planning   Assistance Needed Spoke to [facility] and he would like homecare. Referrals sent to [facility] and [facility]. Awaiting accepting agency.

## 2024-07-25 NOTE — PROGRESS NOTES
Occupational Therapy                 Therapy Communication Note    Patient Name: Jai Shrestha  MRN: [id number]  Today's Date: 7/25/2024     Discipline: Occupational Therapy    Missed Visit Reason: Missed Visit Reason: Patient sleeping (Attempted to see pt for OT eval. Pt sleeping soundly difficult to arouse  nursing made aware)    Missed Time: Attempt

## 2024-07-25 NOTE — CONSULTS
PODIATRIC MEDICINE & SURGERY - CONSULT NOTE    HPI    Patient is a 54 y.o. male consulted to podiatry for chronic foreign body in R 4th toe and 3rd toe bleeding. Patient states that he has been falling a lot recently. Denies any other trauma to his feet. Sees Dr Carlos very regularly at [facility] for his regular foot care. Patient states he has been hitting his foot a lot. Patient is diabetic. No other new pedal problems, thinks his nail may have gotten hit during his last fall. Patient states he has significant neuropathy in his feet. Patient denies any other pedal complaints.      ROS: A 10 point review of systems was performed. Negative except as above.    Past Medical History:   Diagnosis Date    Chronic back pain     CKD (chronic kidney disease)     Diabetes mellitus (Multi)     HF (heart failure), diastolic (Multi)     Hypertension          Problem List Items Addressed This Visit          Infectious Diseases    * (Principal) Sepsis, due to unspecified organism, unspecified whether acute organ dysfunction present (Multi) - Primary     Other Visit Diagnoses       Pneumonia due to infectious organism, unspecified laterality, unspecified part of lung                  Medications and Allergies reviewed.      PHYSICAL EXAM    Vitals:    07/25/24 1135   BP: 96/59   Pulse: 86   Resp: 18   Temp: 36.4 °C (97.5 °F)   SpO2: 95%       General: A&O. NAD. Pleasant. Cooperative.    Vasc: DP and PT pulses are palpable b/l. CFT < 3 seconds to hallux b/l. Skin temperature is warm to warm from proximal to distal b/l. Diffuse edema of B/L feet.    Neuro: Light touch absent feet b/l.     MSK: Strength 5/5 for all pedal groups b/l. Ankle, STJ ROM intact to age and mobility status.     Derm:   No open wounds, rashes or nodules. Dried blood note under R 3rd toenail from previous trauma. No openings noted to R 3rd or 4th toes. No edema, erythema, increased warmth, or malodor noted to toes.     Toenails 1-5 B/L thickened, dystrophic,  elongated.      RESULTS    CBC  Lab Results   Component Value Date    WBC 11.0 07/25/2024    HGB 9.3 (L) 07/25/2024    HCT 30.0 (L) 07/25/2024    MCV 85 07/25/2024     07/25/2024       ESR  Lab Results   Component Value Date    SEDRATE 67 (H) 08/24/2021       CRP  Lab Results   Component Value Date    CRP 10.09 (A) 12/30/2022       HgbA1c  Lab Results   Component Value Date    HGBA1C 9.0 (H) 06/11/2024       Cultures  No results found for the last 90 days.    Radiographs: 7/24/24  Right foot 3 views  No acute osseous abnormality. No osseous erosions or soft tissue gas. 6 mm linear radiopaque structure projected over the distal aspect of the fourth digit plantar soft tissues.  Findings suggestive of a foreign body.        ASSESSMENT   -diabetic neuropathy  -foreign body right foot, chronic      PLAN  - Charts and results reviewed. A comprehensive history was taken and physical exam was performed.   - Educated on findings, diagnosis, treatment plans.  - Explained to patient that there was no evidence of a fracture of the toes on xray and there is no need for surgical intervention. He has no foot complaints or concerns. We discussed how his toenail is bloody from the fall so other providers were concerned but nothing is seen on exam or on imaging.  - The foreign body found in the 4th right toe is likely chronic. There was no redness, swelling, increased warmth or opening noted to the toe. Patient does not remember ever stepping on anything  - Instructed patient to follow up out patient with his podiatrist at [facility] for routine diabetic foot care.   - Podiatry signed off.     This patient was evaluated and discussed with Dr. Hensley.    Lucinda Chung, DPM PGY1  Podiatric Medicine & Surgery

## 2024-07-25 NOTE — CONSULTS
Vancomycin Dosing by Pharmacy- INITIAL    Jai Shrestha is a 54 y.o. year old male who Pharmacy has been consulted for vancomycin dosing for pneumonia. Based on the patient's indication and renal status this patient will be dosed based on a goal trough/random level of 15-20.     Renal function is currently declining. Cr: 4.05 and estimated CrCl: 58 mL/min.    Visit Vitals  BP 95/82   Pulse 84   Temp 36.4 °C (97.6 °F) (Oral)   Resp (!) 23        Lab Results   Component Value Date    CREATININE 4.05 (H) 2024    CREATININE 4.02 (H) 2024    CREATININE 1.73 (H) 2024    CREATININE 2.15 (H) 2024        Patient weight is as follows: There were no vitals filed for this visit.    Cultures:  No results found for the encounter in last 14 days.        No intake/output data recorded.  I/O during current shift:  No intake/output data recorded.    Temp (24hrs), Av.4 °C (97.6 °F), Min:36.4 °C (97.6 °F), Max:36.4 °C (97.6 °F)         Assessment/Plan     Patient has already been given a loading dose of 2000 mg on  @ 1305.  Follow-up level will be ordered on  at 1000 unless clinically indicated sooner.  Will continue to monitor renal function daily while on vancomycin and order serum creatinine at least every 48 hours if not already ordered.  Follow for continued vancomycin needs, clinical response, and signs/symptoms of toxicity.       Tiffanie Perdomo, PharmD

## 2024-07-25 NOTE — PROGRESS NOTES
Vanco was ordered by an ED provider and will be signed off, please re-consult if vanco should be continued.    A 1 time dose of vancomycin 2 gram will be ordered for this patient - due to patient's current vitals and labs.

## 2024-07-25 NOTE — PROGRESS NOTES
Physical Therapy                 Therapy Communication Note    Patient Name: Jai Shrestha  MRN: [id number]  Today's Date: 7/25/2024     Discipline: Physical Therapy    Missed Visit Reason: Missed Visit Reason: Patient sleeping (Attempted to see pt for PT eval. Pt sleeping soundly, attempted various verbal cues and tactile cuing, unable to arouse. Nursing made aware)    Missed Time: Attempt

## 2024-07-25 NOTE — CONSULTS
"INFECTIOUS DISEASE INPATIENT INITIAL CONSULTATION    Referred By: David Franklin    Reason For Consult: sepsis, infection unclear etiology     HPI:  This is a 54 y.o. male with PMH of morbid obesity, DM II, HTN, DLD, CKD who presented with falls.    Patient did not give me much history so some of this is from chart review. He mentioned mostly feeling lightheaded and falling. Doesn't think he passed out. I see he was having dry heaves, poor PO intake, fevers/chills. He denies cough to me. No shortness of breath.    Here is afebrile. WBC was 20 and improved to normal now. MRSA nares negative. Blood cx x2 pending. CXR with signs of PNA and right effusion. COVID negative. Lactate was elevated.     Allergies:  Metformin     Vitals (Last 24 Hours):  Heart Rate:  [75-96]   Temp:  [35.3 °C (95.5 °F)-36.8 °C (98.2 °F)]   Resp:  [15-23]   BP: ()/(45-82)   Height:  [180.4 cm (5' 11.02\")]   Weight:  [171 kg (378 lb)]   SpO2:  [93 %-100 %]      PHYSICAL EXAM:  Gen - NAD, obese, not on O2 support  Heart - RRR, no murmurs  Lungs - diminished in the RLL, no wheezing  Abd - soft, no ttp, BS present  Skin - no rash    MEDS:    Current Facility-Administered Medications:     acetaminophen (Tylenol) tablet 650 mg, 650 mg, oral, q4h PRN **OR** acetaminophen (Tylenol) oral liquid 650 mg, 650 mg, oral, q4h PRN **OR** acetaminophen (Tylenol) suppository 650 mg, 650 mg, rectal, q4h PRN, Lien Traylor PA-C    aspirin EC tablet 81 mg, 81 mg, oral, Daily, Lien Traylor PA-C, 81 mg at 07/25/24 0822    atorvastatin (Lipitor) tablet 80 mg, 80 mg, oral, Daily, Lien Traylor PA-C, 80 mg at 07/25/24 0823    dextrose 50 % injection 12.5 g, 12.5 g, intravenous, q15 min PRN, Lien Traylor PA-C    dextrose 50 % injection 12.5 g, 12.5 g, intravenous, q15 min PRN, Lien Traylor PA-C    dextrose 50 % injection 25 g, 25 g, intravenous, q15 min PRN, Lien Traylor PA-C    DULoxetine (Cymbalta) DR capsule 120 mg, 120 mg, oral, Daily, " Lien Traylor PA-C, 120 mg at 07/25/24 0822    glucagon (Glucagen) injection 1 mg, 1 mg, intramuscular, q15 min PRN, Lien Traylor PA-C    glucagon (Glucagen) injection 1 mg, 1 mg, intramuscular, q15 min PRN, Lien Traylor PA-C    heparin (porcine) injection 7,500 Units, 7,500 Units, subcutaneous, q8h TAMIR, Lien Traylor PA-C, 7,500 Units at 07/25/24 0627    HYDROmorphone (Dilaudid) injection 0.5 mg, 0.5 mg, intravenous, q4h PRN, Lien Traylor PA-C, 0.5 mg at 07/25/24 0123    HYDROmorphone PF (Dilaudid) injection 0.2 mg, 0.2 mg, intravenous, q4h PRN, Lien Traylor PA-C, 0.2 mg at 07/24/24 2123    insulin glargine (Lantus) injection 100 Units, 100 Units, subcutaneous, BID, Lien Traylor PA-C, 100 Units at 07/25/24 0834    insulin lispro (HumaLOG) injection 40 Units, 40 Units, subcutaneous, TID, Lien Traylor PA-C, 40 Units at 07/25/24 1316    ondansetron ODT (Zofran-ODT) disintegrating tablet 4 mg, 4 mg, oral, q8h PRN **OR** ondansetron (Zofran) injection 4 mg, 4 mg, intravenous, q8h PRN, Lien Traylor PA-C    pantoprazole (ProtoNix) EC tablet 40 mg, 40 mg, oral, Daily before breakfast, Lien Traylor PA-C, 40 mg at 07/25/24 0627    piperacillin-tazobactam (Zosyn) 3.375 g in dextrose (iso) IV 50 mL, 3.375 g, intravenous, q6h, Lien Traylor PA-C, Stopped at 07/25/24 0853    prochlorperazine (Compazine) tablet 10 mg, 10 mg, oral, q6h PRN **OR** prochlorperazine (Compazine) injection 10 mg, 10 mg, intravenous, q6h PRN, Lien Traylor PA-C    QUEtiapine (SEROquel) tablet 400 mg, 400 mg, oral, Nightly, Lien Traylor PA-C, 400 mg at 07/24/24 2347    sennosides (Senokot) tablet 17.2 mg, 2 tablet, oral, BID, Lien Traylor PA-C, 17.2 mg at 07/25/24 0822    sodium chloride 0.9% infusion, 75 mL/hr, intravenous, Continuous, Lien Traylor PA-C, Last Rate: 75 mL/hr at 07/24/24 2123, 75 mL/hr at 07/24/24 2123    vancomycin (Vancocin) pharmacy to dose - pharmacy monitoring, , miscellaneous,  Daily PRN, Tiffanie Perdomo, PharmD     LABS:  Lab Results   Component Value Date    WBC 11.0 07/25/2024    HGB 9.3 (L) 07/25/2024    HCT 30.0 (L) 07/25/2024    MCV 85 07/25/2024     07/25/2024      Results from last 72 hours   Lab Units 07/25/24  0530   SODIUM mmol/L 130*   POTASSIUM mmol/L 4.8   CHLORIDE mmol/L 93*   CO2 mmol/L 22   BUN mg/dL 68*   CREATININE mg/dL 4.18*   GLUCOSE mg/dL 198*   CALCIUM mg/dL 8.6   ANION GAP mmol/L 20   EGFR mL/min/1.73m*2 16*     Results from last 72 hours   Lab Units 07/25/24  0530   ALK PHOS U/L 93   BILIRUBIN TOTAL mg/dL 0.7   PROTEIN TOTAL g/dL 5.7*   ALT U/L 15   AST U/L 18   ALBUMIN g/dL 3.4     Estimated Creatinine Clearance: 33 mL/min (A) (by C-G formula based on SCr of 4.18 mg/dL (H)).    IMAGING:  US RUQ, CT C/A/P, CT Head, CT C/T/L Spine, X-Ray right femur, x-ray right foot and CXR reports all reviewed    ASSESSMENT/PLAN:    Sepsis due to Right LL PNA - leukocytosis, elevated HR/RR, elevated lactate  Morbid Obesity BMI 52  CKD - CrCl 33    Changed abx to IV CTX 2g Q24H to cover for PNA.    Monitoring for adverse effects of abx such as rash/itching/diarrhea.    Will follow. Thanks!    Alex Blandon MD  [facility]  Office #[phone]

## 2024-07-26 PROBLEM — K81.9 CHOLECYSTITIS: Status: RESOLVED | Noted: 2024-07-24 | Resolved: 2024-07-26

## 2024-07-26 PROBLEM — K80.20 CHOLELITHIASIS: Status: RESOLVED | Noted: 2024-07-24 | Resolved: 2024-07-26

## 2024-07-26 LAB
ALBUMIN SERPL BCP-MCNC: 3.4 G/DL (ref 3.4–5)
ALP SERPL-CCNC: 90 U/L (ref 33–120)
ALT SERPL W P-5'-P-CCNC: 14 U/L (ref 10–52)
ANION GAP SERPL CALC-SCNC: 15 MMOL/L (ref 10–20)
AST SERPL W P-5'-P-CCNC: 22 U/L (ref 9–39)
BILIRUB SERPL-MCNC: 0.4 MG/DL (ref 0–1.2)
BUN SERPL-MCNC: 56 MG/DL (ref 6–23)
CALCIUM SERPL-MCNC: 8.5 MG/DL (ref 8.6–10.3)
CHLORIDE SERPL-SCNC: 100 MMOL/L (ref 98–107)
CO2 SERPL-SCNC: 25 MMOL/L (ref 21–32)
CREAT SERPL-MCNC: 3.07 MG/DL (ref 0.5–1.3)
EGFRCR SERPLBLD CKD-EPI 2021: 23 ML/MIN/1.73M*2
ERYTHROCYTE [DISTWIDTH] IN BLOOD BY AUTOMATED COUNT: 14.6 % (ref 11.5–14.5)
GLUCOSE BLD MANUAL STRIP-MCNC: 111 MG/DL (ref 74–99)
GLUCOSE BLD MANUAL STRIP-MCNC: 114 MG/DL (ref 74–99)
GLUCOSE BLD MANUAL STRIP-MCNC: 123 MG/DL (ref 74–99)
GLUCOSE BLD MANUAL STRIP-MCNC: 46 MG/DL (ref 74–99)
GLUCOSE BLD MANUAL STRIP-MCNC: 69 MG/DL (ref 74–99)
GLUCOSE BLD MANUAL STRIP-MCNC: 94 MG/DL (ref 74–99)
GLUCOSE SERPL-MCNC: 93 MG/DL (ref 74–99)
HCT VFR BLD AUTO: 28.4 % (ref 41–52)
HGB BLD-MCNC: 8.7 G/DL (ref 13.5–17.5)
HOLD SPECIMEN: NORMAL
MCH RBC QN AUTO: 26.7 PG (ref 26–34)
MCHC RBC AUTO-ENTMCNC: 30.6 G/DL (ref 32–36)
MCV RBC AUTO: 87 FL (ref 80–100)
NRBC BLD-RTO: 0 /100 WBCS (ref 0–0)
PLATELET # BLD AUTO: 233 X10*3/UL (ref 150–450)
POTASSIUM SERPL-SCNC: 4.3 MMOL/L (ref 3.5–5.3)
PROT SERPL-MCNC: 6.4 G/DL (ref 6.4–8.2)
RBC # BLD AUTO: 3.26 X10*6/UL (ref 4.5–5.9)
SODIUM SERPL-SCNC: 136 MMOL/L (ref 136–145)
WBC # BLD AUTO: 9.3 X10*3/UL (ref 4.4–11.3)

## 2024-07-26 PROCEDURE — 82947 ASSAY GLUCOSE BLOOD QUANT: CPT

## 2024-07-26 PROCEDURE — 2500000001 HC RX 250 WO HCPCS SELF ADMINISTERED DRUGS (ALT 637 FOR MEDICARE OP): Performed by: PHYSICIAN ASSISTANT

## 2024-07-26 PROCEDURE — 1200000002 HC GENERAL ROOM WITH TELEMETRY DAILY

## 2024-07-26 PROCEDURE — 99232 SBSQ HOSP IP/OBS MODERATE 35: CPT | Performed by: INTERNAL MEDICINE

## 2024-07-26 PROCEDURE — 80053 COMPREHEN METABOLIC PANEL: CPT | Performed by: PHYSICIAN ASSISTANT

## 2024-07-26 PROCEDURE — 85027 COMPLETE CBC AUTOMATED: CPT | Performed by: PHYSICIAN ASSISTANT

## 2024-07-26 PROCEDURE — 2500000004 HC RX 250 GENERAL PHARMACY W/ HCPCS (ALT 636 FOR OP/ED): Performed by: PHYSICIAN ASSISTANT

## 2024-07-26 PROCEDURE — 36415 COLL VENOUS BLD VENIPUNCTURE: CPT | Performed by: PHYSICIAN ASSISTANT

## 2024-07-26 PROCEDURE — 2500000004 HC RX 250 GENERAL PHARMACY W/ HCPCS (ALT 636 FOR OP/ED): Performed by: INTERNAL MEDICINE

## 2024-07-26 PROCEDURE — 2500000005 HC RX 250 GENERAL PHARMACY W/O HCPCS: Performed by: PHYSICIAN ASSISTANT

## 2024-07-26 PROCEDURE — 2500000002 HC RX 250 W HCPCS SELF ADMINISTERED DRUGS (ALT 637 FOR MEDICARE OP, ALT 636 FOR OP/ED): Performed by: PHYSICIAN ASSISTANT

## 2024-07-26 ASSESSMENT — PAIN - FUNCTIONAL ASSESSMENT
PAIN_FUNCTIONAL_ASSESSMENT: 0-10

## 2024-07-26 ASSESSMENT — COGNITIVE AND FUNCTIONAL STATUS - GENERAL
HELP NEEDED FOR BATHING: A LITTLE
CLIMB 3 TO 5 STEPS WITH RAILING: A LOT
STANDING UP FROM CHAIR USING ARMS: A LITTLE
DRESSING REGULAR LOWER BODY CLOTHING: A LITTLE
MOVING TO AND FROM BED TO CHAIR: A LITTLE
DAILY ACTIVITIY SCORE: 22
MOBILITY SCORE: 19
WALKING IN HOSPITAL ROOM: A LITTLE

## 2024-07-26 ASSESSMENT — PAIN SCALES - WONG BAKER: WONGBAKER_NUMERICALRESPONSE: HURTS WORST

## 2024-07-26 ASSESSMENT — PAIN SCALES - GENERAL
PAINLEVEL_OUTOF10: 8
PAINLEVEL_OUTOF10: 10 - WORST POSSIBLE PAIN
PAINLEVEL_OUTOF10: 9
PAINLEVEL_OUTOF10: 3
PAINLEVEL_OUTOF10: 6
PAINLEVEL_OUTOF10: 3
PAINLEVEL_OUTOF10: 6

## 2024-07-26 ASSESSMENT — PAIN DESCRIPTION - ORIENTATION: ORIENTATION: RIGHT

## 2024-07-26 NOTE — PROGRESS NOTES
"  INFECTIOUS DISEASE DAILY PROGRESS NOTE    SUBJECTIVE:    No overnight events. No new complaints. Afebrile. No rash/itching/diarrhea. He seems to be feeling better.    OBJECTIVE:  VITALS (Last 24 Hours)  /65 (BP Location: Other (Comment), Patient Position: Lying) Comment (BP Location): Left Wrist  Pulse 80   Temp 36.2 °C (97.1 °F) (Tympanic)   Resp 18   Ht 1.804 m (5' 11.02\")   Wt (!) 171 kg (378 lb)   SpO2 95%   BMI 52.68 kg/m²     PHYSICAL EXAM:  Gen - NAD, obese, not on O2 support  Heart - RRR, no murmurs  Lungs - diminished in the RLL, no wheezing  Abd - soft, no ttp, BS present  Skin - no rash    ABX: IV CTX    LABS:  Lab Results   Component Value Date    WBC 9.3 07/26/2024    HGB 8.7 (L) 07/26/2024    HCT 28.4 (L) 07/26/2024    MCV 87 07/26/2024     07/26/2024     Lab Results   Component Value Date    GLUCOSE 93 07/26/2024    CALCIUM 8.5 (L) 07/26/2024     07/26/2024    K 4.3 07/26/2024    CO2 25 07/26/2024     07/26/2024    BUN 56 (H) 07/26/2024    CREATININE 3.07 (H) 07/26/2024     Results from last 72 hours   Lab Units 07/26/24  0507   ALK PHOS U/L 90   BILIRUBIN TOTAL mg/dL 0.4   PROTEIN TOTAL g/dL 6.4   ALT U/L 14   AST U/L 22   ALBUMIN g/dL 3.4     Estimated Creatinine Clearance: 44.9 mL/min (A) (by C-G formula based on SCr of 3.07 mg/dL (H)).    ASSESSMENT/PLAN:     Sepsis due to Right LL PNA - resolving  Morbid Obesity BMI 52  CKD - CrCl 33     IV CTX 2g Q24H to cover for PNA. Vitals better, WBC normal now.     Monitoring for adverse effects of abx such as rash/itching/diarrhea - none present.     Will follow peripherally over the weekend. Please call me with questions. Thanks!    Alex Blandon MD  [facility]  Office #[phone]      "

## 2024-07-26 NOTE — PROGRESS NOTES
"Jai Shrestha is a 54 y.o. male on day 2 of admission presenting with Sepsis, due to unspecified organism, unspecified whether acute organ dysfunction present (Multi).    Subjective   No acute events overnight. Lying in bed comfortably.        Objective     VITALS  Blood pressure 103/57, pulse 89, temperature 36.5 °C (97.7 °F), temperature source Temporal, resp. rate 18, height 1.804 m (5' 11.02\"), weight (!) 171 kg (378 lb), SpO2 97%.  Physical Exam  Vitals and nursing note reviewed.   Constitutional:       General: He is not in acute distress.     Appearance: Normal appearance. He is obese. He is not ill-appearing.   HENT:      Head: Normocephalic and atraumatic.      Mouth/Throat:      Mouth: Mucous membranes are moist.      Pharynx: Oropharynx is clear.   Eyes:      Extraocular Movements: Extraocular movements intact.      Conjunctiva/sclera: Conjunctivae normal.   Cardiovascular:      Rate and Rhythm: Normal rate and regular rhythm.      Heart sounds: Normal heart sounds. No murmur heard.     No friction rub. No gallop.   Pulmonary:      Effort: Pulmonary effort is normal.      Breath sounds: Normal breath sounds. No wheezing or rales.   Abdominal:      General: Bowel sounds are normal. There is no distension.      Palpations: Abdomen is soft.      Tenderness: There is no abdominal tenderness. There is no guarding.   Musculoskeletal:         General: No swelling or tenderness.      Right lower leg: No edema.      Left lower leg: No edema.   Skin:     General: Skin is warm and dry.      Capillary Refill: Capillary refill takes less than 2 seconds.   Neurological:      General: No focal deficit present.      Mental Status: He is alert and oriented to person, place, and time.   Psychiatric:         Mood and Affect: Mood normal.           Intake/Output last 3 Shifts:  I/O last 3 completed shifts:  In: 2405 (14 mL/kg) [P.O.:480; I.V.:1825 (10.6 mL/kg); IV Piggyback:100]  Out: 1400 (8.2 mL/kg) [Urine:1400 (0.2 " mL/kg/hr)]  Weight: 171.5 kg     Relevant Results  Results for orders placed or performed during the hospital encounter of 07/24/24 (from the past 24 hour(s))   POCT GLUCOSE   Result Value Ref Range    POCT Glucose 93 74 - 99 mg/dL   Urinalysis with Reflex Culture and Microscopic   Result Value Ref Range    Color, Urine Light-Yellow Light-Yellow, Yellow, Dark-Yellow    Appearance, Urine Clear Clear    Specific Gravity, Urine 1.012 1.005 - 1.035    pH, Urine 6.5 5.0, 5.5, 6.0, 6.5, 7.0, 7.5, 8.0    Protein, Urine NEGATIVE NEGATIVE, 10 (TRACE), 20 (TRACE) mg/dL    Glucose, Urine 50 (TRACE) (A) Normal mg/dL    Blood, Urine NEGATIVE NEGATIVE    Ketones, Urine NEGATIVE NEGATIVE mg/dL    Bilirubin, Urine NEGATIVE NEGATIVE    Urobilinogen, Urine Normal Normal mg/dL    Nitrite, Urine NEGATIVE NEGATIVE    Leukocyte Esterase, Urine NEGATIVE NEGATIVE   Extra Urine Gray Tube   Result Value Ref Range    Extra Tube Hold for add-ons.    Sodium, Urine Random   Result Value Ref Range    Sodium, Urine Random 25 mmol/L    Creatinine, Urine Random 96.3 20.0 - 370.0 mg/dL    Sodium/Creatinine Ratio 26 Not established. mmol/g Creat   Urea Nitrogen, Urine Random   Result Value Ref Range    Urea Nitrogen, Urine Random 502 mg/dL    Creatinine, Urine Random 96.3 20.0 - 370.0 mg/dL    Urea Nitrogen/Creatinine Ratio 5.2 Not established. g/g creat   POCT GLUCOSE   Result Value Ref Range    POCT Glucose 58 (L) 74 - 99 mg/dL   POCT GLUCOSE   Result Value Ref Range    POCT Glucose 78 74 - 99 mg/dL   POCT GLUCOSE   Result Value Ref Range    POCT Glucose 114 (H) 74 - 99 mg/dL   CBC   Result Value Ref Range    WBC 9.3 4.4 - 11.3 x10*3/uL    nRBC 0.0 0.0 - 0.0 /100 WBCs    RBC 3.26 (L) 4.50 - 5.90 x10*6/uL    Hemoglobin 8.7 (L) 13.5 - 17.5 g/dL    Hematocrit 28.4 (L) 41.0 - 52.0 %    MCV 87 80 - 100 fL    MCH 26.7 26.0 - 34.0 pg    MCHC 30.6 (L) 32.0 - 36.0 g/dL    RDW 14.6 (H) 11.5 - 14.5 %    Platelets 233 150 - 450 x10*3/uL   Comprehensive  metabolic panel   Result Value Ref Range    Glucose 93 74 - 99 mg/dL    Sodium 136 136 - 145 mmol/L    Potassium 4.3 3.5 - 5.3 mmol/L    Chloride 100 98 - 107 mmol/L    Bicarbonate 25 21 - 32 mmol/L    Anion Gap 15 10 - 20 mmol/L    Urea Nitrogen 56 (H) 6 - 23 mg/dL    Creatinine 3.07 (H) 0.50 - 1.30 mg/dL    eGFR 23 (L) >60 mL/min/1.73m*2    Calcium 8.5 (L) 8.6 - 10.3 mg/dL    Albumin 3.4 3.4 - 5.0 g/dL    Alkaline Phosphatase 90 33 - 120 U/L    Total Protein 6.4 6.4 - 8.2 g/dL    AST 22 9 - 39 U/L    Bilirubin, Total 0.4 0.0 - 1.2 mg/dL    ALT 14 10 - 52 U/L   POCT GLUCOSE   Result Value Ref Range    POCT Glucose 123 (H) 74 - 99 mg/dL   POCT GLUCOSE   Result Value Ref Range    POCT Glucose 94 74 - 99 mg/dL       Imaging Results  US right upper quadrant   Final Result   Diffuse hepatic steatosis.   Cholelithiasis.  No gallbladder wall thickening or biliary dilatation   is appreciated.    Signed by Tmo New MD      CT chest abdomen pelvis wo IV contrast   Final Result   Chest: No acute cardiopulmonary disease and no acute traumatic injury.    Right basilar atelectasis and elevation right hemidiaphragm.   Coronary calcifications are marker for coronary disease.   ABDOMEN: No acute traumatic injury.   Fatty liver morphology.   Prominent gallbladder with luminel gallstones.  If clinically   warranted, ultrasound may be helpful for further evaluation.   Pelvis: No acute inflammatory changes which are medical injury.   Thoracic spine: Mild degenerative changes without acute fracture or   malalignment.   Lumbar spine: Mild degenerative changes without acute fracture or   malalignment..  Findings would better evaluated with MRI.   Signed by Srikanth Hidalgo DO      CT head wo IV contrast   Final Result   CT HEAD:   1. No evidence of hemorrhage, acute CT apparent transcortical   infarct, or other emergent intracranial abnormality.   2. Time indeterminate and likely chronic, but new since prior CT head   imaging in  December of 2022, geographic areas of cystic   encephalomalacia and gliosis are present in the posterior right   frontal lobe and right occipital lobe, posterior in the right frontal   lobe likely corresponding to acute infarct described on previous MRI   in December of 2022.   3. Subtle attenuation changes in the periventricular and subcortical   white matter of bilateral cerebral hemispheres are nonspecific and   likely represent sequela of microvascular disease.        CT C-SPINE:   1. No evidence of acute trauma to the cervical spine.        MACRO:   None        Signed by: Farshad Montejo 7/24/2024 4:28 PM   Dictation workstation:   [id number]      CT cervical spine wo IV contrast   Final Result   CT HEAD:   1. No evidence of hemorrhage, acute CT apparent transcortical   infarct, or other emergent intracranial abnormality.   2. Time indeterminate and likely chronic, but new since prior CT head   imaging in December of 2022, geographic areas of cystic   encephalomalacia and gliosis are present in the posterior right   frontal lobe and right occipital lobe, posterior in the right frontal   lobe likely corresponding to acute infarct described on previous MRI   in December of 2022.   3. Subtle attenuation changes in the periventricular and subcortical   white matter of bilateral cerebral hemispheres are nonspecific and   likely represent sequela of microvascular disease.        CT C-SPINE:   1. No evidence of acute trauma to the cervical spine.        MACRO:   None        Signed by: Farshad Montejo 7/24/2024 4:28 PM   Dictation workstation:   [id number]      CT thoracic spine wo IV contrast   Final Result   Chest: No acute cardiopulmonary disease and no acute traumatic injury.    Right basilar atelectasis and elevation right hemidiaphragm.   Coronary calcifications are marker for coronary disease.   ABDOMEN: No acute traumatic injury.   Fatty liver morphology.   Prominent gallbladder with luminel  gallstones.  If clinically   warranted, ultrasound may be helpful for further evaluation.   Pelvis: No acute inflammatory changes which are medical injury.   Thoracic spine: Mild degenerative changes without acute fracture or   malalignment.   Lumbar spine: Mild degenerative changes without acute fracture or   malalignment..  Findings would better evaluated with MRI.   Signed by Srikanth Hidalgo, DO      CT lumbar spine wo IV contrast   Final Result   Chest: No acute cardiopulmonary disease and no acute traumatic injury.    Right basilar atelectasis and elevation right hemidiaphragm.   Coronary calcifications are marker for coronary disease.   ABDOMEN: No acute traumatic injury.   Fatty liver morphology.   Prominent gallbladder with luminel gallstones.  If clinically   warranted, ultrasound may be helpful for further evaluation.   Pelvis: No acute inflammatory changes which are medical injury.   Thoracic spine: Mild degenerative changes without acute fracture or   malalignment.   Lumbar spine: Mild degenerative changes without acute fracture or   malalignment..  Findings would better evaluated with MRI.   Signed by Srikanth Hidalgo,       XR femur right 2+ views   Final Result   No acute osseous abnormality.   Signed by Alfonso Beal MD      XR chest 1 view   Final Result   Right lower lobe infiltrate with pleural effusion.   Signed by Wilner Chapa MD      XR foot right 3+ views   Final Result   No acute osseous abnormality. No osseous erosions or soft tissue gas.   6 mm linear radiopaque structure projected over the distal aspect of   the fourth digit plantar soft tissues.  Findings suggestive of a   foreign body.   Signed by Nilton Zavala MD          Medications:  aspirin, 81 mg, oral, Daily  atorvastatin, 80 mg, oral, Daily  cefTRIAXone, 2 g, intravenous, q24h  DULoxetine, 120 mg, oral, Daily  heparin (porcine), 7,500 Units, subcutaneous, q8h TAMIR  insulin glargine, 100 Units, subcutaneous, BID  insulin  lispro, 40 Units, subcutaneous, TID  pantoprazole, 40 mg, oral, Daily before breakfast  QUEtiapine, 400 mg, oral, Nightly  sennosides, 2 tablet, oral, BID       PRN medications: acetaminophen **OR** acetaminophen **OR** acetaminophen, dextrose, dextrose, dextrose, glucagon, glucagon, HYDROmorphone, HYDROmorphone, ondansetron ODT **OR** ondansetron, prochlorperazine **OR** prochlorperazine        Assessment/Plan   Principal Problem:    Sepsis, due to unspecified organism, unspecified whether acute organ dysfunction present (Multi)  Active Problems:    Acute on chronic renal failure (CMS-HCC)    Right lower lobe pneumonia    Pleural effusion    Cholelithiasis    Hypotension    Recurrent falls    Hyperkalemia    Leukocytosis    Hyponatremia    Foreign body in skin of right lesser toe    Cholecystitis    Sepsis 2/2 RLL PNA   -ID following  -Abx switched to Ceftriaxone     STEPHANIE on CKD  -Likely 2/2 hypotension and continued bp medication use at home   -Improving     DM type II  -Lantus   -Lispo   -Corrective insulin  -blood sugar checks with meals and at bedtime   -Hypoglycemic protocol     Foreign body in the R foot  -Pod eval'd, chronic, rec follow up with outpatient pod at [facility]     Hyponatremia  -Resolved       DVT Prophylaxis:  Heparin subq    Disposition:  Suspect could likely DC over the weekend     David Franklin [facility]

## 2024-07-26 NOTE — PROGRESS NOTES
Jai Shrestha is a 54 y.o. male on day 2 of admission presenting with Sepsis, due to unspecified organism, unspecified whether acute organ dysfunction present (Multi).      Subjective   Jai Shrestha is a 54 y.o. male with a past medical history of morbid obesity, hypertension, diabetes, hyperlipidemia, venous stasis, stroke in 2022, normocytic anemia, and stage G3B with a fluctuating creatinine between 1.7 to 2.4 mg/deciliter as of lately who presents with a 3-week history of nausea with associated dry heaves, poor by mouth intake, fever, chills and multiple falls.     In the ED, his BP went as low as 79/59 mmHg.  He was afebrile.  His white count was 20 with neutrophil predominance.  He was suffering an acute kidney injury with a creatinine of 4 mg as a deciliter, his potassium was 5.7, lactate up to 3.5.  High-sensitivity troponin was low.  COVID-19 negative.  Chest x-ray was concerning for right lower lobe pneumonia albeit CT imaging of the chest showed no acute cardiopulmonary disease.  There was bilateral renal cortical atrophy without hydronephrosis.  There was a question of gallstones.  Ultrasound of the right upper quadrant showed hepatic steatosis without gallbladder wall thickening or biliary dilation.  Cultures were obtained.  He was administered broad-spectrum antimicrobial coverage with vancomycin and Zosyn.  Aggressive IV volume replacement was initiated.  Nephrology is consulted for acute kidney injury, hyperkalemia, and G3 chronic kidney disease.    Improved nausea. Tolerating oral intake.  No acute events.  He does not offer specific complaints          Objective          Vitals 24HR  Heart Rate:  []   Temp:  [35.7 °C (96.3 °F)-36.8 °C (98.2 °F)]   Resp:  [16-20]   BP: ()/(59-78)   SpO2:  [92 %-98 %]     Intake/Output last 3 Shifts:    Intake/Output Summary (Last 24 hours) at 7/26/2024 1049  Last data filed at 7/26/2024 0951  Gross per 24 hour   Intake 2718.75 ml   Output 2000 ml   Net  718.75 ml       Physical Exam  General:  No acute distress. Alert and oriented  x 3. Morbidly obese.  Head: atraumatic and normocephalic  Eyes: Pupils equal round reactive to light, no scleral icterus.  Neck: Supple, difficult to assess JVD due to body habitus.   Cardiac: Regular rate and rhythm. No murmurs noted.   Pulmonary: Lungs clear bilaterally. No wheezes rales or rhonchi. No accessory muscle use.  Abdomen: Soft, morbidly obese, non tender. No guarding, rigidity, or distention. Normoactive bowel sounds.    Extremities: No pitting edema  Skin: No rash seen. Skin is warm and dry.  Neuro: Patient is alert and oriented x3. Patient moves all extremities. No obvious neuro deficits are noted.    Scheduled Medications  aspirin, 81 mg, oral, Daily  atorvastatin, 80 mg, oral, Daily  cefTRIAXone, 2 g, intravenous, q24h  DULoxetine, 120 mg, oral, Daily  heparin (porcine), 7,500 Units, subcutaneous, q8h TAMIR  insulin glargine, 100 Units, subcutaneous, BID  insulin lispro, 40 Units, subcutaneous, TID  pantoprazole, 40 mg, oral, Daily before breakfast  QUEtiapine, 400 mg, oral, Nightly  sennosides, 2 tablet, oral, BID      Continuous medications  sodium chloride 0.9%, 75 mL/hr, Last Rate: 75 mL/hr (07/26/24 0951)        PRN medications: acetaminophen **OR** acetaminophen **OR** acetaminophen, dextrose, dextrose, dextrose, glucagon, glucagon, HYDROmorphone, HYDROmorphone, ondansetron ODT **OR** ondansetron, prochlorperazine **OR** prochlorperazine     Relevant Results  Results from last 7 days   Lab Units 07/26/24  0507 07/25/24  0530 07/24/24  1239   WBC AUTO x10*3/uL 9.3 11.0 20.0*   HEMOGLOBIN g/dL 8.7* 9.3* 11.5*   HEMATOCRIT % 28.4* 30.0* 36.4*   PLATELETS AUTO x10*3/uL 233 253 357   NEUTROS PCT AUTO %  --   --  84.2   LYMPHS PCT AUTO %  --   --  7.7   MONOS PCT AUTO %  --   --  6.0   EOS PCT AUTO %  --   --  0.6     Results from last 7 days   Lab Units 07/26/24  0507 07/25/24  0530 07/24/24  1834   SODIUM mmol/L 136  130* 130*   POTASSIUM mmol/L 4.3 4.8 5.6*   CHLORIDE mmol/L 100 93* 92*   CO2 mmol/L 25 22 24   BUN mg/dL 56* 68* 70*   CREATININE mg/dL 3.07* 4.18* 4.05*   GLUCOSE mg/dL 93 198* 278*   CALCIUM mg/dL 8.5* 8.6 8.7       US right upper quadrant   Final Result   Diffuse hepatic steatosis.   Cholelithiasis.  No gallbladder wall thickening or biliary dilatation   is appreciated.    Signed by Tom New MD      CT chest abdomen pelvis wo IV contrast   Final Result   Chest: No acute cardiopulmonary disease and no acute traumatic injury.    Right basilar atelectasis and elevation right hemidiaphragm.   Coronary calcifications are marker for coronary disease.   ABDOMEN: No acute traumatic injury.   Fatty liver morphology.   Prominent gallbladder with luminel gallstones.  If clinically   warranted, ultrasound may be helpful for further evaluation.   Pelvis: No acute inflammatory changes which are medical injury.   Thoracic spine: Mild degenerative changes without acute fracture or   malalignment.   Lumbar spine: Mild degenerative changes without acute fracture or   malalignment..  Findings would better evaluated with MRI.   Signed by Srikanth Hidalgo DO      CT head wo IV contrast   Final Result   CT HEAD:   1. No evidence of hemorrhage, acute CT apparent transcortical   infarct, or other emergent intracranial abnormality.   2. Time indeterminate and likely chronic, but new since prior CT head   imaging in December of 2022, geographic areas of cystic   encephalomalacia and gliosis are present in the posterior right   frontal lobe and right occipital lobe, posterior in the right frontal   lobe likely corresponding to acute infarct described on previous MRI   in December of 2022.   3. Subtle attenuation changes in the periventricular and subcortical   white matter of bilateral cerebral hemispheres are nonspecific and   likely represent sequela of microvascular disease.        CT C-SPINE:   1. No evidence of acute trauma  to the cervical spine.        MACRO:   None        Signed by: Farshad Montejo 7/24/2024 4:28 PM   Dictation workstation:   [id number]      CT cervical spine wo IV contrast   Final Result   CT HEAD:   1. No evidence of hemorrhage, acute CT apparent transcortical   infarct, or other emergent intracranial abnormality.   2. Time indeterminate and likely chronic, but new since prior CT head   imaging in December of 2022, geographic areas of cystic   encephalomalacia and gliosis are present in the posterior right   frontal lobe and right occipital lobe, posterior in the right frontal   lobe likely corresponding to acute infarct described on previous MRI   in December of 2022.   3. Subtle attenuation changes in the periventricular and subcortical   white matter of bilateral cerebral hemispheres are nonspecific and   likely represent sequela of microvascular disease.        CT C-SPINE:   1. No evidence of acute trauma to the cervical spine.        MACRO:   None        Signed by: Farshad Montejo 7/24/2024 4:28 PM   Dictation workstation:   [id number]      CT thoracic spine wo IV contrast   Final Result   Chest: No acute cardiopulmonary disease and no acute traumatic injury.    Right basilar atelectasis and elevation right hemidiaphragm.   Coronary calcifications are marker for coronary disease.   ABDOMEN: No acute traumatic injury.   Fatty liver morphology.   Prominent gallbladder with luminel gallstones.  If clinically   warranted, ultrasound may be helpful for further evaluation.   Pelvis: No acute inflammatory changes which are medical injury.   Thoracic spine: Mild degenerative changes without acute fracture or   malalignment.   Lumbar spine: Mild degenerative changes without acute fracture or   malalignment..  Findings would better evaluated with MRI.   Signed by Srikanth Hidalgo, DO      CT lumbar spine wo IV contrast   Final Result   Chest: No acute cardiopulmonary disease and no acute traumatic injury.     Right basilar atelectasis and elevation right hemidiaphragm.   Coronary calcifications are marker for coronary disease.   ABDOMEN: No acute traumatic injury.   Fatty liver morphology.   Prominent gallbladder with luminel gallstones.  If clinically   warranted, ultrasound may be helpful for further evaluation.   Pelvis: No acute inflammatory changes which are medical injury.   Thoracic spine: Mild degenerative changes without acute fracture or   malalignment.   Lumbar spine: Mild degenerative changes without acute fracture or   malalignment..  Findings would better evaluated with MRI.   Signed by Srikanth Hidalgo,       XR femur right 2+ views   Final Result   No acute osseous abnormality.   Signed by Alfonso Beal MD      XR chest 1 view   Final Result   Right lower lobe infiltrate with pleural effusion.   Signed by Wilner Chapa MD      XR foot right 3+ views   Final Result   No acute osseous abnormality. No osseous erosions or soft tissue gas.   6 mm linear radiopaque structure projected over the distal aspect of   the fourth digit plantar soft tissues.  Findings suggestive of a   foreign body.   Signed by Nilton Zavala MD               Assessment/Plan   This patient currently has cardiac telemetry ordered; if you would like to modify or discontinue the telemetry order, click here to go to the orders activity to modify/discontinue the order.      Jai Shrestha is a 54 y.o. male with a past medical history of morbid obesity, hypertension, diabetes, hyperlipidemia, venous stasis, stroke in 2022, normocytic anemia, and stage G3B with a fluctuating creatinine between 1.7 to 2.4 mg/deciliter as of lately who presents with a 3-week history of nausea with associated dry heaves, poor by mouth intake, fever, chills and multiple falls.     In the ED, his BP went as low as 79/59 mmHg.  He was afebrile.  His white count was 20 with neutrophil predominance.  He was suffering an acute kidney injury with a creatinine of 4 mg  as a deciliter, his potassium was 5.7, lactate up to 3.5.  High-sensitivity troponin was low.  COVID-19 negative.  Chest x-ray was concerning for right lower lobe pneumonia albeit CT imaging of the chest showed no acute cardiopulmonary disease.  There was bilateral renal cortical atrophy without hydronephrosis.  There was a question of gallstones.  Ultrasound of the right upper quadrant showed hepatic steatosis without gallbladder wall thickening or biliary dilation.  Cultures were obtained.  He was administered broad-spectrum antimicrobial coverage with vancomycin and Zosyn.  Aggressive IV volume replacement was initiated.  Nephrology is consulted for acute kidney injury, hyperkalemia, and G3 chronic kidney disease.     Mr. Shrestha is suffering ATN in the setting hypotension from LLL PNA with concomitant use of lisinopril, spironolactone and torsemide. He reports his symptoms have been present for 3 weeks albeit he was here at [facility] and admitted on 6/11 with nausea and poor by mouth intake thus his symptoms have been present for at least 1.5 months. He required a Guo for urinary retention with immediate output of 1.5 L upon placement last admission. He is currently voiding without issue. He is appropriately off the lisinopril, spironolactone and torsemide. His blood pressures are now trending higher with IV volume expansion + antibiotics. Sepsis appears to be resolving. He is tolerating oral intake. I will cut the rate of his IV fluids back to avoid overloading him. His creatinine has turned the corner and started to improve. Continue supportive care. Trend his RFP. Will follow.     Principal Problem:    Sepsis, due to unspecified organism, unspecified whether acute organ dysfunction present (Multi)  Active Problems:    Acute on chronic renal failure (CMS-HCC)    Right lower lobe pneumonia    Pleural effusion    Cholelithiasis    Hypotension    Recurrent falls    Hyperkalemia    Leukocytosis    Hyponatremia     Foreign body in skin of right lesser toe    Cholecystitis      I spent 40 minutes in the professional and overall care of this patient.      Dao Kingston, DO

## 2024-07-26 NOTE — DOCUMENTATION CLARIFICATION NOTE
"    PATIENT:               JOSSE MARQUEZ  ACCT #:                  [id number]  MRN:                       [id number]  :                       1970  ADMIT DATE:       2024 11:48 AM  DISCH DATE:  RESPONDING PROVIDER #:        [id number]          PROVIDER RESPONSE TEXT:    Sepsis with multi-system organ dysfunction of  ATN, hypotension and lactic acidosis    CDI QUERY TEXT:    Clarification    Instruction:    Based on your assessment of the patient and the clinical information, please provide the requested documentation by clicking on the appropriate radio button and enter any additional information if prompted.    Question: Please further clarify if a relationship exists between the Sepsis and acute organ dysfunction    When answering this query, please exercise your independent professional judgment. The fact that a question is being asked, does not imply that any particular answer is desired or expected.    The patient's clinical indicators include:  Clinical Information: 53 y/o male presented with fatigue, abdominal pain, multiple falls. Admitted with sepsis, pneumonia, and ATN.    Clinical Indicators: On arrival to ED T 36.4 HR 99 RR 18 /67 BMI 52.6   WBC 20 lactate 3.5 Cr 4.02,  4.18,  3.07  CXR: RLL infiltrate, right pleural effusion.  RUQ US: cholelithiasis.  Nephrology consult: \"ATN\"  ID consult: \"Sepsis due to Right LL PNA - leukocytosis, elevated HR/RR, elevated lactate. IV CTX.\"    Treatment: IV zosyn x 3 doses, LR IVF bolus x 3 L, NS IV 75mL/hr, Rocephin IV q 24 hrs.    Risk Factors: CKD, DM, obesity, pneumonia  Options provided:  -- Sepsis with multi-system organ dysfunction of  ATN, hypotension and lactic acidosis  -- Sepsis with renal organ dysfunction of ATN  -- Sepsis with cardiac organ dysfunction of hypotension  -- Other - I will add my own diagnosis  -- Refer to Clinical Documentation Reviewer    Query created by: Cele Arzate on 2024 7:16 AM      Electronically signed " by:  DORON SOL DO 7/26/2024 7:27 AM

## 2024-07-26 NOTE — CARE PLAN
The patient's goals for the shift include      The clinical goals for the shift include pt. will remain safe    Over the shift, the patient did not make progress toward the following goals. Barriers to progression include . Recommendations to address these barriers include .

## 2024-07-26 NOTE — PROGRESS NOTES
Message sent to attending about need for outgoing home care referral at dc. Per previous DC planning notes, patient chose [facility] and needs PT/OT/RN. Agency has accepted.   1320 Per MD, could likely dc over the weekend. [facility] advised.     Chloé Landin, RN

## 2024-07-27 LAB
ALBUMIN SERPL BCP-MCNC: 3.6 G/DL (ref 3.4–5)
ALP SERPL-CCNC: 90 U/L (ref 33–120)
ALT SERPL W P-5'-P-CCNC: 14 U/L (ref 10–52)
ANION GAP SERPL CALC-SCNC: 16 MMOL/L (ref 10–20)
AST SERPL W P-5'-P-CCNC: 21 U/L (ref 9–39)
BILIRUB SERPL-MCNC: 0.4 MG/DL (ref 0–1.2)
BUN SERPL-MCNC: 36 MG/DL (ref 6–23)
CALCIUM SERPL-MCNC: 8.8 MG/DL (ref 8.6–10.3)
CHLORIDE SERPL-SCNC: 102 MMOL/L (ref 98–107)
CO2 SERPL-SCNC: 22 MMOL/L (ref 21–32)
CREAT SERPL-MCNC: 2.2 MG/DL (ref 0.5–1.3)
EGFRCR SERPLBLD CKD-EPI 2021: 35 ML/MIN/1.73M*2
ERYTHROCYTE [DISTWIDTH] IN BLOOD BY AUTOMATED COUNT: 14.6 % (ref 11.5–14.5)
GLUCOSE BLD MANUAL STRIP-MCNC: 134 MG/DL (ref 74–99)
GLUCOSE BLD MANUAL STRIP-MCNC: 139 MG/DL (ref 74–99)
GLUCOSE BLD MANUAL STRIP-MCNC: 200 MG/DL (ref 74–99)
GLUCOSE BLD MANUAL STRIP-MCNC: 43 MG/DL (ref 74–99)
GLUCOSE BLD MANUAL STRIP-MCNC: 71 MG/DL (ref 74–99)
GLUCOSE BLD MANUAL STRIP-MCNC: 87 MG/DL (ref 74–99)
GLUCOSE BLD MANUAL STRIP-MCNC: 96 MG/DL (ref 74–99)
GLUCOSE SERPL-MCNC: 132 MG/DL (ref 74–99)
HCT VFR BLD AUTO: 31 % (ref 41–52)
HGB BLD-MCNC: 9.5 G/DL (ref 13.5–17.5)
MCH RBC QN AUTO: 26.9 PG (ref 26–34)
MCHC RBC AUTO-ENTMCNC: 30.6 G/DL (ref 32–36)
MCV RBC AUTO: 88 FL (ref 80–100)
NRBC BLD-RTO: 0 /100 WBCS (ref 0–0)
PLATELET # BLD AUTO: 252 X10*3/UL (ref 150–450)
POTASSIUM SERPL-SCNC: 4.7 MMOL/L (ref 3.5–5.3)
PROT SERPL-MCNC: 6.7 G/DL (ref 6.4–8.2)
RBC # BLD AUTO: 3.53 X10*6/UL (ref 4.5–5.9)
SODIUM SERPL-SCNC: 135 MMOL/L (ref 136–145)
WBC # BLD AUTO: 8.8 X10*3/UL (ref 4.4–11.3)

## 2024-07-27 PROCEDURE — 82947 ASSAY GLUCOSE BLOOD QUANT: CPT

## 2024-07-27 PROCEDURE — 2500000001 HC RX 250 WO HCPCS SELF ADMINISTERED DRUGS (ALT 637 FOR MEDICARE OP): Performed by: PHYSICIAN ASSISTANT

## 2024-07-27 PROCEDURE — 2500000004 HC RX 250 GENERAL PHARMACY W/ HCPCS (ALT 636 FOR OP/ED): Performed by: PHYSICIAN ASSISTANT

## 2024-07-27 PROCEDURE — 2500000004 HC RX 250 GENERAL PHARMACY W/ HCPCS (ALT 636 FOR OP/ED): Performed by: INTERNAL MEDICINE

## 2024-07-27 PROCEDURE — 85027 COMPLETE CBC AUTOMATED: CPT | Performed by: PHYSICIAN ASSISTANT

## 2024-07-27 PROCEDURE — 1200000002 HC GENERAL ROOM WITH TELEMETRY DAILY

## 2024-07-27 PROCEDURE — 2500000002 HC RX 250 W HCPCS SELF ADMINISTERED DRUGS (ALT 637 FOR MEDICARE OP, ALT 636 FOR OP/ED): Performed by: PHYSICIAN ASSISTANT

## 2024-07-27 PROCEDURE — 36415 COLL VENOUS BLD VENIPUNCTURE: CPT | Performed by: PHYSICIAN ASSISTANT

## 2024-07-27 PROCEDURE — 80053 COMPREHEN METABOLIC PANEL: CPT | Performed by: PHYSICIAN ASSISTANT

## 2024-07-27 PROCEDURE — 99232 SBSQ HOSP IP/OBS MODERATE 35: CPT | Performed by: INTERNAL MEDICINE

## 2024-07-27 PROCEDURE — 2500000002 HC RX 250 W HCPCS SELF ADMINISTERED DRUGS (ALT 637 FOR MEDICARE OP, ALT 636 FOR OP/ED): Performed by: INTERNAL MEDICINE

## 2024-07-27 PROCEDURE — 2500000005 HC RX 250 GENERAL PHARMACY W/O HCPCS: Performed by: PHYSICIAN ASSISTANT

## 2024-07-27 RX ORDER — INSULIN LISPRO 100 [IU]/ML
30 INJECTION, SOLUTION INTRAVENOUS; SUBCUTANEOUS
Status: DISCONTINUED | OUTPATIENT
Start: 2024-07-28 | End: 2024-07-30 | Stop reason: HOSPADM

## 2024-07-27 RX ORDER — INSULIN GLARGINE 100 [IU]/ML
80 INJECTION, SOLUTION SUBCUTANEOUS 2 TIMES DAILY
Status: DISCONTINUED | OUTPATIENT
Start: 2024-07-27 | End: 2024-07-30 | Stop reason: HOSPADM

## 2024-07-27 ASSESSMENT — PAIN SCALES - GENERAL
PAINLEVEL_OUTOF10: 0 - NO PAIN
PAINLEVEL_OUTOF10: 9
PAINLEVEL_OUTOF10: 8
PAINLEVEL_OUTOF10: 9
PAINLEVEL_OUTOF10: 7
PAINLEVEL_OUTOF10: 5 - MODERATE PAIN
PAINLEVEL_OUTOF10: 4

## 2024-07-27 ASSESSMENT — COGNITIVE AND FUNCTIONAL STATUS - GENERAL
CLIMB 3 TO 5 STEPS WITH RAILING: A LITTLE
MOVING TO AND FROM BED TO CHAIR: A LITTLE
DAILY ACTIVITIY SCORE: 23
STANDING UP FROM CHAIR USING ARMS: A LITTLE
DRESSING REGULAR LOWER BODY CLOTHING: A LITTLE
MOBILITY SCORE: 21
CLIMB 3 TO 5 STEPS WITH RAILING: A LITTLE
DAILY ACTIVITIY SCORE: 23
DRESSING REGULAR LOWER BODY CLOTHING: A LITTLE
MOVING TO AND FROM BED TO CHAIR: A LITTLE
MOBILITY SCORE: 21
STANDING UP FROM CHAIR USING ARMS: A LITTLE

## 2024-07-27 ASSESSMENT — PAIN DESCRIPTION - LOCATION
LOCATION: GENERALIZED
LOCATION: JAW
LOCATION: GENERALIZED
LOCATION: GENERALIZED

## 2024-07-27 ASSESSMENT — PAIN - FUNCTIONAL ASSESSMENT
PAIN_FUNCTIONAL_ASSESSMENT: 0-10

## 2024-07-27 NOTE — CARE PLAN
Problem: Fall/Injury  Goal: Not fall by end of shift  Outcome: Progressing  Goal: Be free from injury by end of the shift  Outcome: Progressing  Goal: Verbalize understanding of personal risk factors for fall in the hospital  Outcome: Progressing  Goal: Verbalize understanding of risk factor reduction measures to prevent injury from fall in the home  Outcome: Progressing  Goal: Use assistive devices by end of the shift  Outcome: Progressing  Goal: Pace activities to prevent fatigue by end of the shift  Outcome: Progressing   The patient's goals for the shift include      The clinical goals for the shift include pt will be free from injury or falls throughout shift

## 2024-07-27 NOTE — PROGRESS NOTES
Jai Shrestha is a 54 y.o. male on day 3 of admission presenting with Sepsis, due to unspecified organism, unspecified whether acute organ dysfunction present (Multi).      Subjective   Jai Shrestha is a 54 y.o. male with a past medical history of morbid obesity, hypertension, diabetes, hyperlipidemia, venous stasis, stroke in 2022, normocytic anemia, and stage G3B with a fluctuating creatinine between 1.7 to 2.4 mg/deciliter as of lately who presents with a 3-week history of nausea with associated dry heaves, poor by mouth intake, fever, chills and multiple falls.     In the ED, his BP went as low as 79/59 mmHg.  He was afebrile.  His white count was 20 with neutrophil predominance.  He was suffering an acute kidney injury with a creatinine of 4 mg as a deciliter, his potassium was 5.7, lactate up to 3.5.  High-sensitivity troponin was low.  COVID-19 negative.  Chest x-ray was concerning for right lower lobe pneumonia albeit CT imaging of the chest showed no acute cardiopulmonary disease.  There was bilateral renal cortical atrophy without hydronephrosis.  There was a question of gallstones.  Ultrasound of the right upper quadrant showed hepatic steatosis without gallbladder wall thickening or biliary dilation.  Cultures were obtained.  He was administered broad-spectrum antimicrobial coverage with vancomycin and Zosyn.  Aggressive IV volume replacement was initiated.  Nephrology is consulted for acute kidney injury, hyperkalemia, and G3 chronic kidney disease.    Improved nausea. Tolerating oral intake.  IV access appears infiltrated.   No acute events.  He does not offer specific complaints          Objective          Vitals 24HR  Heart Rate:  [79-95]   Temp:  [35.8 °C (96.4 °F)-37 °C (98.6 °F)]   Resp:  [16-20]   BP: ()/(46-74)   SpO2:  [93 %-97 %]     Intake/Output last 3 Shifts:    Intake/Output Summary (Last 24 hours) at 7/27/2024 1357  Last data filed at 7/27/2024 1000  Gross per 24 hour   Intake 975 ml    Output 550 ml   Net 425 ml       Physical Exam  General:  No acute distress. Alert and oriented  x 3. Morbidly obese.  Head: atraumatic and normocephalic  Eyes: Pupils equal round reactive to light, no scleral icterus.  Neck: Supple, difficult to assess JVD due to body habitus.   Cardiac: Regular rate and rhythm. No murmurs noted.   Pulmonary: Lungs clear bilaterally. No wheezes rales or rhonchi. No accessory muscle use.  Abdomen: Soft, morbidly obese, non tender. No guarding, rigidity, or distention. Normoactive bowel sounds.    Extremities: No pitting edema  Skin: No rash seen. Skin is warm and dry.  Neuro: Patient is alert and oriented x3. Patient moves all extremities. No obvious neuro deficits are noted.    Scheduled Medications  aspirin, 81 mg, oral, Daily  atorvastatin, 80 mg, oral, Daily  cefTRIAXone, 2 g, intravenous, q24h  DULoxetine, 120 mg, oral, Daily  heparin (porcine), 7,500 Units, subcutaneous, q8h TAMIR  insulin glargine, 100 Units, subcutaneous, BID  insulin lispro, 40 Units, subcutaneous, TID  pantoprazole, 40 mg, oral, Daily before breakfast  QUEtiapine, 400 mg, oral, Nightly  sennosides, 2 tablet, oral, BID      Continuous medications         PRN medications: acetaminophen **OR** acetaminophen **OR** acetaminophen, dextrose, dextrose, dextrose, glucagon, glucagon, HYDROmorphone, HYDROmorphone, ondansetron ODT **OR** ondansetron, prochlorperazine **OR** prochlorperazine     Relevant Results  Results from last 7 days   Lab Units 07/27/24  0609 07/26/24  0507 07/25/24  0530 07/24/24  1239   WBC AUTO x10*3/uL 8.8 9.3 11.0 20.0*   HEMOGLOBIN g/dL 9.5* 8.7* 9.3* 11.5*   HEMATOCRIT % 31.0* 28.4* 30.0* 36.4*   PLATELETS AUTO x10*3/uL 252 233 253 357   NEUTROS PCT AUTO %  --   --   --  84.2   LYMPHS PCT AUTO %  --   --   --  7.7   MONOS PCT AUTO %  --   --   --  6.0   EOS PCT AUTO %  --   --   --  0.6     Results from last 7 days   Lab Units 07/27/24  0609 07/26/24  0507 07/25/24  0530   SODIUM mmol/L  135* 136 130*   POTASSIUM mmol/L 4.7 4.3 4.8   CHLORIDE mmol/L 102 100 93*   CO2 mmol/L 22 25 22   BUN mg/dL 36* 56* 68*   CREATININE mg/dL 2.20* 3.07* 4.18*   GLUCOSE mg/dL 132* 93 198*   CALCIUM mg/dL 8.8 8.5* 8.6       US right upper quadrant   Final Result   Diffuse hepatic steatosis.   Cholelithiasis.  No gallbladder wall thickening or biliary dilatation   is appreciated.    Signed by Tom New MD      CT chest abdomen pelvis wo IV contrast   Final Result   Chest: No acute cardiopulmonary disease and no acute traumatic injury.    Right basilar atelectasis and elevation right hemidiaphragm.   Coronary calcifications are marker for coronary disease.   ABDOMEN: No acute traumatic injury.   Fatty liver morphology.   Prominent gallbladder with luminel gallstones.  If clinically   warranted, ultrasound may be helpful for further evaluation.   Pelvis: No acute inflammatory changes which are medical injury.   Thoracic spine: Mild degenerative changes without acute fracture or   malalignment.   Lumbar spine: Mild degenerative changes without acute fracture or   malalignment..  Findings would better evaluated with MRI.   Signed by Srikanth Hidalgo DO      CT head wo IV contrast   Final Result   CT HEAD:   1. No evidence of hemorrhage, acute CT apparent transcortical   infarct, or other emergent intracranial abnormality.   2. Time indeterminate and likely chronic, but new since prior CT head   imaging in December of 2022, geographic areas of cystic   encephalomalacia and gliosis are present in the posterior right   frontal lobe and right occipital lobe, posterior in the right frontal   lobe likely corresponding to acute infarct described on previous MRI   in December of 2022.   3. Subtle attenuation changes in the periventricular and subcortical   white matter of bilateral cerebral hemispheres are nonspecific and   likely represent sequela of microvascular disease.        CT C-SPINE:   1. No evidence of acute  trauma to the cervical spine.        MACRO:   None        Signed by: Farshad Montejo 7/24/2024 4:28 PM   Dictation workstation:   [id number]      CT cervical spine wo IV contrast   Final Result   CT HEAD:   1. No evidence of hemorrhage, acute CT apparent transcortical   infarct, or other emergent intracranial abnormality.   2. Time indeterminate and likely chronic, but new since prior CT head   imaging in December of 2022, geographic areas of cystic   encephalomalacia and gliosis are present in the posterior right   frontal lobe and right occipital lobe, posterior in the right frontal   lobe likely corresponding to acute infarct described on previous MRI   in December of 2022.   3. Subtle attenuation changes in the periventricular and subcortical   white matter of bilateral cerebral hemispheres are nonspecific and   likely represent sequela of microvascular disease.        CT C-SPINE:   1. No evidence of acute trauma to the cervical spine.        MACRO:   None        Signed by: Farshad Montejo 7/24/2024 4:28 PM   Dictation workstation:   [id number]      CT thoracic spine wo IV contrast   Final Result   Chest: No acute cardiopulmonary disease and no acute traumatic injury.    Right basilar atelectasis and elevation right hemidiaphragm.   Coronary calcifications are marker for coronary disease.   ABDOMEN: No acute traumatic injury.   Fatty liver morphology.   Prominent gallbladder with luminel gallstones.  If clinically   warranted, ultrasound may be helpful for further evaluation.   Pelvis: No acute inflammatory changes which are medical injury.   Thoracic spine: Mild degenerative changes without acute fracture or   malalignment.   Lumbar spine: Mild degenerative changes without acute fracture or   malalignment..  Findings would better evaluated with MRI.   Signed by Srikanth Hidalgo, DO      CT lumbar spine wo IV contrast   Final Result   Chest: No acute cardiopulmonary disease and no acute traumatic  injury.    Right basilar atelectasis and elevation right hemidiaphragm.   Coronary calcifications are marker for coronary disease.   ABDOMEN: No acute traumatic injury.   Fatty liver morphology.   Prominent gallbladder with luminel gallstones.  If clinically   warranted, ultrasound may be helpful for further evaluation.   Pelvis: No acute inflammatory changes which are medical injury.   Thoracic spine: Mild degenerative changes without acute fracture or   malalignment.   Lumbar spine: Mild degenerative changes without acute fracture or   malalignment..  Findings would better evaluated with MRI.   Signed by Srikanth Hidalgo,       XR femur right 2+ views   Final Result   No acute osseous abnormality.   Signed by Alfonso Beal MD      XR chest 1 view   Final Result   Right lower lobe infiltrate with pleural effusion.   Signed by Wilner Chapa MD      XR foot right 3+ views   Final Result   No acute osseous abnormality. No osseous erosions or soft tissue gas.   6 mm linear radiopaque structure projected over the distal aspect of   the fourth digit plantar soft tissues.  Findings suggestive of a   foreign body.   Signed by Nilton Zavala MD               Assessment/Plan          Jai Shrestha is a 54 y.o. male with a past medical history of morbid obesity, hypertension, diabetes, hyperlipidemia, venous stasis, stroke in 2022, normocytic anemia, and stage G3B with a fluctuating creatinine between 1.7 to 2.4 mg/deciliter as of lately who presents with a 3-week history of nausea with associated dry heaves, poor by mouth intake, fever, chills and multiple falls.     In the ED, his BP went as low as 79/59 mmHg.  He was afebrile.  His white count was 20 with neutrophil predominance.  He was suffering an acute kidney injury with a creatinine of 4 mg as a deciliter, his potassium was 5.7, lactate up to 3.5.  High-sensitivity troponin was low.  COVID-19 negative.  Chest x-ray was concerning for right lower lobe pneumonia albeit  CT imaging of the chest showed no acute cardiopulmonary disease.  There was bilateral renal cortical atrophy without hydronephrosis.  There was a question of gallstones.  Ultrasound of the right upper quadrant showed hepatic steatosis without gallbladder wall thickening or biliary dilation.  Cultures were obtained.  He was administered broad-spectrum antimicrobial coverage with vancomycin and Zosyn.  Aggressive IV volume replacement was initiated.  Nephrology is consulted for acute kidney injury, hyperkalemia, and G3 chronic kidney disease.     Mr. Shrestha is suffering ATN in the setting hypotension from LLL PNA with concomitant use of lisinopril, spironolactone and torsemide. He was here at [facility] and admitted on 6/11 with nausea and poor by mouth intake thus his symptoms have been present for at least 1.5 months. He required a Guo for urinary retention with immediate output of 1.5 L upon placement last admission. He is currently voiding without issue. He is appropriately off the lisinopril, spironolactone and torsemide. His blood pressures are remain borderline soft. IV volume expansion + antibiotics are ordered. Sepsis appears to be resolving. He is tolerating oral intake. Despite his borderline pressures, I will stop the IV fluids all together to avoid overloading him. His creatinine continues to improve. Continue supportive care. Trend his RFP. Will follow.     Principal Problem:    Sepsis, due to unspecified organism, unspecified whether acute organ dysfunction present (Multi)  Active Problems:    Acute on chronic renal failure (CMS-HCC)    Right lower lobe pneumonia    Pleural effusion    Hypotension    Recurrent falls    Hyperkalemia    Leukocytosis    Hyponatremia    Foreign body in skin of right lesser toe      I spent 40 minutes in the professional and overall care of this patient.      Dao Kingston, DO

## 2024-07-27 NOTE — PROGRESS NOTES
"Jai Shrestha is a 54 y.o. male on day 3 of admission presenting with Sepsis, due to unspecified organism, unspecified whether acute organ dysfunction present (Multi).    Subjective   No acute events overnight. Lying in bed comfortably.        Objective     VITALS  Blood pressure 102/62, pulse 85, temperature 36.6 °C (97.9 °F), temperature source Temporal, resp. rate 16, height 1.804 m (5' 11.02\"), weight (!) 171 kg (378 lb), SpO2 95%.  Physical Exam  Vitals and nursing note reviewed.   Constitutional:       General: He is not in acute distress.     Appearance: Normal appearance. He is obese. He is not ill-appearing.   HENT:      Head: Normocephalic and atraumatic.      Mouth/Throat:      Mouth: Mucous membranes are moist.      Pharynx: Oropharynx is clear.   Eyes:      Extraocular Movements: Extraocular movements intact.      Conjunctiva/sclera: Conjunctivae normal.   Cardiovascular:      Rate and Rhythm: Normal rate and regular rhythm.      Heart sounds: Normal heart sounds. No murmur heard.     No friction rub. No gallop.   Pulmonary:      Effort: Pulmonary effort is normal.      Breath sounds: Normal breath sounds. No wheezing or rales.   Abdominal:      General: Bowel sounds are normal. There is no distension.      Palpations: Abdomen is soft.      Tenderness: There is no abdominal tenderness. There is no guarding.   Musculoskeletal:         General: No swelling or tenderness.      Right lower leg: No edema.      Left lower leg: No edema.   Skin:     General: Skin is warm and dry.      Capillary Refill: Capillary refill takes less than 2 seconds.   Neurological:      General: No focal deficit present.      Mental Status: He is alert and oriented to person, place, and time.   Psychiatric:         Mood and Affect: Mood normal.           Intake/Output last 3 Shifts:  I/O last 3 completed shifts:  In: 1853.8 (10.8 mL/kg) [P.O.:480; I.V.:1323.8 (7.7 mL/kg); IV Piggyback:50]  Out: 2350 (13.7 mL/kg) [Urine:2350 (0.4 " mL/kg/hr)]  Weight: 171.5 kg     Relevant Results  Results for orders placed or performed during the hospital encounter of 07/24/24 (from the past 24 hour(s))   POCT GLUCOSE   Result Value Ref Range    POCT Glucose 94 74 - 99 mg/dL   POCT GLUCOSE   Result Value Ref Range    POCT Glucose 46 (L) 74 - 99 mg/dL   POCT GLUCOSE   Result Value Ref Range    POCT Glucose 69 (L) 74 - 99 mg/dL   POCT GLUCOSE   Result Value Ref Range    POCT Glucose 87 74 - 99 mg/dL   POCT GLUCOSE   Result Value Ref Range    POCT Glucose 111 (H) 74 - 99 mg/dL   CBC   Result Value Ref Range    WBC 8.8 4.4 - 11.3 x10*3/uL    nRBC 0.0 0.0 - 0.0 /100 WBCs    RBC 3.53 (L) 4.50 - 5.90 x10*6/uL    Hemoglobin 9.5 (L) 13.5 - 17.5 g/dL    Hematocrit 31.0 (L) 41.0 - 52.0 %    MCV 88 80 - 100 fL    MCH 26.9 26.0 - 34.0 pg    MCHC 30.6 (L) 32.0 - 36.0 g/dL    RDW 14.6 (H) 11.5 - 14.5 %    Platelets 252 150 - 450 x10*3/uL   Comprehensive metabolic panel   Result Value Ref Range    Glucose 132 (H) 74 - 99 mg/dL    Sodium 135 (L) 136 - 145 mmol/L    Potassium 4.7 3.5 - 5.3 mmol/L    Chloride 102 98 - 107 mmol/L    Bicarbonate 22 21 - 32 mmol/L    Anion Gap 16 10 - 20 mmol/L    Urea Nitrogen 36 (H) 6 - 23 mg/dL    Creatinine 2.20 (H) 0.50 - 1.30 mg/dL    eGFR 35 (L) >60 mL/min/1.73m*2    Calcium 8.8 8.6 - 10.3 mg/dL    Albumin 3.6 3.4 - 5.0 g/dL    Alkaline Phosphatase 90 33 - 120 U/L    Total Protein 6.7 6.4 - 8.2 g/dL    AST 21 9 - 39 U/L    Bilirubin, Total 0.4 0.0 - 1.2 mg/dL    ALT 14 10 - 52 U/L   POCT GLUCOSE   Result Value Ref Range    POCT Glucose 139 (H) 74 - 99 mg/dL       Imaging Results  US right upper quadrant   Final Result   Diffuse hepatic steatosis.   Cholelithiasis.  No gallbladder wall thickening or biliary dilatation   is appreciated.    Signed by Tom New MD      CT chest abdomen pelvis wo IV contrast   Final Result   Chest: No acute cardiopulmonary disease and no acute traumatic injury.    Right basilar atelectasis and elevation  right hemidiaphragm.   Coronary calcifications are marker for coronary disease.   ABDOMEN: No acute traumatic injury.   Fatty liver morphology.   Prominent gallbladder with luminel gallstones.  If clinically   warranted, ultrasound may be helpful for further evaluation.   Pelvis: No acute inflammatory changes which are medical injury.   Thoracic spine: Mild degenerative changes without acute fracture or   malalignment.   Lumbar spine: Mild degenerative changes without acute fracture or   malalignment..  Findings would better evaluated with MRI.   Signed by Srikanth Hidalgo, DO      CT head wo IV contrast   Final Result   CT HEAD:   1. No evidence of hemorrhage, acute CT apparent transcortical   infarct, or other emergent intracranial abnormality.   2. Time indeterminate and likely chronic, but new since prior CT head   imaging in December of 2022, geographic areas of cystic   encephalomalacia and gliosis are present in the posterior right   frontal lobe and right occipital lobe, posterior in the right frontal   lobe likely corresponding to acute infarct described on previous MRI   in December of 2022.   3. Subtle attenuation changes in the periventricular and subcortical   white matter of bilateral cerebral hemispheres are nonspecific and   likely represent sequela of microvascular disease.        CT C-SPINE:   1. No evidence of acute trauma to the cervical spine.        MACRO:   None        Signed by: Farshad Montejo 7/24/2024 4:28 PM   Dictation workstation:   [id number]      CT cervical spine wo IV contrast   Final Result   CT HEAD:   1. No evidence of hemorrhage, acute CT apparent transcortical   infarct, or other emergent intracranial abnormality.   2. Time indeterminate and likely chronic, but new since prior CT head   imaging in December of 2022, geographic areas of cystic   encephalomalacia and gliosis are present in the posterior right   frontal lobe and right occipital lobe, posterior in the right  frontal   lobe likely corresponding to acute infarct described on previous MRI   in December of 2022.   3. Subtle attenuation changes in the periventricular and subcortical   white matter of bilateral cerebral hemispheres are nonspecific and   likely represent sequela of microvascular disease.        CT C-SPINE:   1. No evidence of acute trauma to the cervical spine.        MACRO:   None        Signed by: Farshad Montejo 7/24/2024 4:28 PM   Dictation workstation:   [id number]      CT thoracic spine wo IV contrast   Final Result   Chest: No acute cardiopulmonary disease and no acute traumatic injury.    Right basilar atelectasis and elevation right hemidiaphragm.   Coronary calcifications are marker for coronary disease.   ABDOMEN: No acute traumatic injury.   Fatty liver morphology.   Prominent gallbladder with luminel gallstones.  If clinically   warranted, ultrasound may be helpful for further evaluation.   Pelvis: No acute inflammatory changes which are medical injury.   Thoracic spine: Mild degenerative changes without acute fracture or   malalignment.   Lumbar spine: Mild degenerative changes without acute fracture or   malalignment..  Findings would better evaluated with MRI.   Signed by Srikanth Hidalgo, DO      CT lumbar spine wo IV contrast   Final Result   Chest: No acute cardiopulmonary disease and no acute traumatic injury.    Right basilar atelectasis and elevation right hemidiaphragm.   Coronary calcifications are marker for coronary disease.   ABDOMEN: No acute traumatic injury.   Fatty liver morphology.   Prominent gallbladder with luminel gallstones.  If clinically   warranted, ultrasound may be helpful for further evaluation.   Pelvis: No acute inflammatory changes which are medical injury.   Thoracic spine: Mild degenerative changes without acute fracture or   malalignment.   Lumbar spine: Mild degenerative changes without acute fracture or   malalignment..  Findings would better evaluated  with MRI.   Signed by Srikanth Hidalgo,       XR femur right 2+ views   Final Result   No acute osseous abnormality.   Signed by Alfonso Beal MD      XR chest 1 view   Final Result   Right lower lobe infiltrate with pleural effusion.   Signed by Wilner Chapa MD      XR foot right 3+ views   Final Result   No acute osseous abnormality. No osseous erosions or soft tissue gas.   6 mm linear radiopaque structure projected over the distal aspect of   the fourth digit plantar soft tissues.  Findings suggestive of a   foreign body.   Signed by Nilton Zavala MD          Medications:  aspirin, 81 mg, oral, Daily  atorvastatin, 80 mg, oral, Daily  cefTRIAXone, 2 g, intravenous, q24h  DULoxetine, 120 mg, oral, Daily  heparin (porcine), 7,500 Units, subcutaneous, q8h TAMIR  insulin glargine, 100 Units, subcutaneous, BID  insulin lispro, 40 Units, subcutaneous, TID  pantoprazole, 40 mg, oral, Daily before breakfast  QUEtiapine, 400 mg, oral, Nightly  sennosides, 2 tablet, oral, BID       PRN medications: acetaminophen **OR** acetaminophen **OR** acetaminophen, dextrose, dextrose, dextrose, glucagon, glucagon, HYDROmorphone, HYDROmorphone, ondansetron ODT **OR** ondansetron, prochlorperazine **OR** prochlorperazine        Assessment/Plan   Principal Problem:    Sepsis, due to unspecified organism, unspecified whether acute organ dysfunction present (Multi)  Active Problems:    Acute on chronic renal failure (CMS-HCC)    Right lower lobe pneumonia    Pleural effusion    Hypotension    Recurrent falls    Hyperkalemia    Leukocytosis    Hyponatremia    Foreign body in skin of right lesser toe    Sepsis 2/2 RLL PNA   -ID following  -Ceftriaxone can switch to oral once ready for DC per discussion with Dr. Barber BROWN on CKD  -Likely 2/2 hypotension and continued bp medication use at home   -Improving     DM type II  -Lantus   -Lispo   -Corrective insulin  -blood sugar checks with meals and at bedtime   -Hypoglycemic protocol      Foreign body in the R foot  -Pod eval'd, chronic, rec follow up with outpatient pod at [facility], [place], operated by [facility]     Hyponatremia  -Resolved       DVT Prophylaxis:  Heparin subq    Disposition:  Suspect could likely DC tomorrow      David Franklin, [facility]

## 2024-07-27 NOTE — CARE PLAN
The patient's goals for the shift include      The clinical goals for the shift include pt will be free from injury or falls throughout shift      Problem: Fall/Injury  Goal: Not fall by end of shift  Outcome: Progressing  Goal: Be free from injury by end of the shift  Outcome: Progressing  Goal: Verbalize understanding of personal risk factors for fall in the hospital  Outcome: Progressing  Goal: Verbalize understanding of risk factor reduction measures to prevent injury from fall in the home  Outcome: Progressing  Goal: Use assistive devices by end of the shift  Outcome: Progressing  Goal: Pace activities to prevent fatigue by end of the shift  Outcome: Progressing

## 2024-07-28 VITALS
BODY MASS INDEX: 44.1 KG/M2 | SYSTOLIC BLOOD PRESSURE: 114 MMHG | HEART RATE: 86 BPM | WEIGHT: 315 LBS | HEIGHT: 71 IN | RESPIRATION RATE: 16 BRPM | DIASTOLIC BLOOD PRESSURE: 65 MMHG | OXYGEN SATURATION: 99 % | TEMPERATURE: 98 F

## 2024-07-28 LAB
ANION GAP SERPL CALC-SCNC: 12 MMOL/L (ref 10–20)
BACTERIA BLD CULT: NORMAL
BACTERIA BLD CULT: NORMAL
BUN SERPL-MCNC: 25 MG/DL (ref 6–23)
CALCIUM SERPL-MCNC: 9.1 MG/DL (ref 8.6–10.3)
CHLORIDE SERPL-SCNC: 105 MMOL/L (ref 98–107)
CO2 SERPL-SCNC: 25 MMOL/L (ref 21–32)
CREAT SERPL-MCNC: 1.74 MG/DL (ref 0.5–1.3)
EGFRCR SERPLBLD CKD-EPI 2021: 46 ML/MIN/1.73M*2
GLUCOSE BLD MANUAL STRIP-MCNC: 150 MG/DL (ref 74–99)
GLUCOSE BLD MANUAL STRIP-MCNC: 169 MG/DL (ref 74–99)
GLUCOSE BLD MANUAL STRIP-MCNC: 237 MG/DL (ref 74–99)
GLUCOSE BLD MANUAL STRIP-MCNC: 96 MG/DL (ref 74–99)
GLUCOSE SERPL-MCNC: 90 MG/DL (ref 74–99)
HOLD SPECIMEN: NORMAL
HOLD SPECIMEN: NORMAL
POTASSIUM SERPL-SCNC: 4.6 MMOL/L (ref 3.5–5.3)
SODIUM SERPL-SCNC: 137 MMOL/L (ref 136–145)

## 2024-07-28 PROCEDURE — 2500000002 HC RX 250 W HCPCS SELF ADMINISTERED DRUGS (ALT 637 FOR MEDICARE OP, ALT 636 FOR OP/ED): Performed by: INTERNAL MEDICINE

## 2024-07-28 PROCEDURE — 99232 SBSQ HOSP IP/OBS MODERATE 35: CPT | Performed by: INTERNAL MEDICINE

## 2024-07-28 PROCEDURE — 2500000005 HC RX 250 GENERAL PHARMACY W/O HCPCS: Performed by: PHYSICIAN ASSISTANT

## 2024-07-28 PROCEDURE — 2500000002 HC RX 250 W HCPCS SELF ADMINISTERED DRUGS (ALT 637 FOR MEDICARE OP, ALT 636 FOR OP/ED): Performed by: PHYSICIAN ASSISTANT

## 2024-07-28 PROCEDURE — 1200000002 HC GENERAL ROOM WITH TELEMETRY DAILY

## 2024-07-28 PROCEDURE — 2500000004 HC RX 250 GENERAL PHARMACY W/ HCPCS (ALT 636 FOR OP/ED): Performed by: INTERNAL MEDICINE

## 2024-07-28 PROCEDURE — 82947 ASSAY GLUCOSE BLOOD QUANT: CPT

## 2024-07-28 PROCEDURE — 2500000001 HC RX 250 WO HCPCS SELF ADMINISTERED DRUGS (ALT 637 FOR MEDICARE OP): Performed by: PHYSICIAN ASSISTANT

## 2024-07-28 PROCEDURE — 36415 COLL VENOUS BLD VENIPUNCTURE: CPT | Performed by: INTERNAL MEDICINE

## 2024-07-28 PROCEDURE — 2500000004 HC RX 250 GENERAL PHARMACY W/ HCPCS (ALT 636 FOR OP/ED): Performed by: PHYSICIAN ASSISTANT

## 2024-07-28 PROCEDURE — 80048 BASIC METABOLIC PNL TOTAL CA: CPT | Performed by: INTERNAL MEDICINE

## 2024-07-28 ASSESSMENT — PAIN DESCRIPTION - LOCATION
LOCATION: GENERALIZED
LOCATION: GENERALIZED
LOCATION: BACK
LOCATION: JAW

## 2024-07-28 ASSESSMENT — PAIN - FUNCTIONAL ASSESSMENT
PAIN_FUNCTIONAL_ASSESSMENT: 0-10
PAIN_FUNCTIONAL_ASSESSMENT: 0-10

## 2024-07-28 ASSESSMENT — COGNITIVE AND FUNCTIONAL STATUS - GENERAL
MOBILITY SCORE: 24
DRESSING REGULAR LOWER BODY CLOTHING: A LITTLE
DAILY ACTIVITIY SCORE: 23

## 2024-07-28 ASSESSMENT — PAIN SCALES - GENERAL
PAINLEVEL_OUTOF10: 4
PAINLEVEL_OUTOF10: 8
PAINLEVEL_OUTOF10: 6
PAINLEVEL_OUTOF10: 3
PAINLEVEL_OUTOF10: 7
PAINLEVEL_OUTOF10: 8
PAINLEVEL_OUTOF10: 7
PAINLEVEL_OUTOF10: 6
PAINLEVEL_OUTOF10: 7

## 2024-07-28 ASSESSMENT — PAIN DESCRIPTION - ORIENTATION
ORIENTATION: MID
ORIENTATION: RIGHT

## 2024-07-28 NOTE — PROGRESS NOTES
Jai Shrestha is a 54 y.o. male on day 4 of admission presenting with Sepsis, due to unspecified organism, unspecified whether acute organ dysfunction present (Multi).      Subjective   Jai Shrestha is a 54 y.o. male with a past medical history of morbid obesity, hypertension, diabetes, hyperlipidemia, venous stasis, stroke in 2022, normocytic anemia, and stage G3B with a fluctuating creatinine between 1.7 to 2.4 mg/deciliter as of lately who presents with a 3-week history of nausea with associated dry heaves, poor by mouth intake, fever, chills and multiple falls.     In the ED, his BP went as low as 79/59 mmHg.  He was afebrile.  His white count was 20 with neutrophil predominance.  He was suffering an acute kidney injury with a creatinine of 4 mg as a deciliter, his potassium was 5.7, lactate up to 3.5.  High-sensitivity troponin was low.  COVID-19 negative.  Chest x-ray was concerning for right lower lobe pneumonia albeit CT imaging of the chest showed no acute cardiopulmonary disease.  There was bilateral renal cortical atrophy without hydronephrosis.  There was a question of gallstones.  Ultrasound of the right upper quadrant showed hepatic steatosis without gallbladder wall thickening or biliary dilation.  Cultures were obtained.  He was administered broad-spectrum antimicrobial coverage with vancomycin and Zosyn.  Aggressive IV volume replacement was initiated.  Nephrology is consulted for acute kidney injury, hyperkalemia, and G3 chronic kidney disease.    No acute events.  He does not offer specific complaints          Objective          Vitals 24HR  Heart Rate:  []   Temp:  [36.1 °C (97 °F)-37.2 °C (98.9 °F)]   Resp:  [16-18]   BP: (105-182)/(51-88)   SpO2:  [97 %-98 %]     Intake/Output last 3 Shifts:    Intake/Output Summary (Last 24 hours) at 7/28/2024 1229  Last data filed at 7/27/2024 1613  Gross per 24 hour   Intake 1097.75 ml   Output --   Net 1097.75 ml       Physical Exam  General:  No acute  distress. Alert and oriented  x 3. Morbidly obese.  Head: atraumatic and normocephalic  Eyes: Pupils equal round reactive to light, no scleral icterus.  Neck: Supple, difficult to assess JVD due to body habitus.   Cardiac: Regular rate and rhythm. No murmurs noted.   Pulmonary: Lungs clear bilaterally. No wheezes rales or rhonchi. No accessory muscle use.  Abdomen: Soft, morbidly obese, non tender. No guarding, rigidity, or distention. Normoactive bowel sounds.    Extremities: No pitting edema  Skin: No rash seen. Skin is warm and dry.  Neuro: Patient is alert and oriented x3. Patient moves all extremities. No obvious neuro deficits are noted.    Scheduled Medications  aspirin, 81 mg, oral, Daily  atorvastatin, 80 mg, oral, Daily  cefTRIAXone, 2 g, intravenous, q24h  DULoxetine, 120 mg, oral, Daily  heparin (porcine), 7,500 Units, subcutaneous, q8h TAMIR  insulin glargine, 80 Units, subcutaneous, BID  insulin lispro, 30 Units, subcutaneous, TID  pantoprazole, 40 mg, oral, Daily before breakfast  QUEtiapine, 400 mg, oral, Nightly  sennosides, 2 tablet, oral, BID      Continuous medications         PRN medications: acetaminophen **OR** acetaminophen **OR** acetaminophen, dextrose, dextrose, dextrose, glucagon, glucagon, HYDROmorphone, HYDROmorphone, ondansetron ODT **OR** ondansetron, prochlorperazine **OR** prochlorperazine     Relevant Results  Results from last 7 days   Lab Units 07/27/24  0609 07/26/24  0507 07/25/24  0530 07/24/24  1239   WBC AUTO x10*3/uL 8.8 9.3 11.0 20.0*   HEMOGLOBIN g/dL 9.5* 8.7* 9.3* 11.5*   HEMATOCRIT % 31.0* 28.4* 30.0* 36.4*   PLATELETS AUTO x10*3/uL 252 233 253 357   NEUTROS PCT AUTO %  --   --   --  84.2   LYMPHS PCT AUTO %  --   --   --  7.7   MONOS PCT AUTO %  --   --   --  6.0   EOS PCT AUTO %  --   --   --  0.6     Results from last 7 days   Lab Units 07/28/24  0746 07/27/24  0609 07/26/24  0507   SODIUM mmol/L 137 135* 136   POTASSIUM mmol/L 4.6 4.7 4.3   CHLORIDE mmol/L 105 102  100   CO2 mmol/L 25 22 25   BUN mg/dL 25* 36* 56*   CREATININE mg/dL 1.74* 2.20* 3.07*   GLUCOSE mg/dL 90 132* 93   CALCIUM mg/dL 9.1 8.8 8.5*       US right upper quadrant   Final Result   Diffuse hepatic steatosis.   Cholelithiasis.  No gallbladder wall thickening or biliary dilatation   is appreciated.    Signed by Tom New MD      CT chest abdomen pelvis wo IV contrast   Final Result   Chest: No acute cardiopulmonary disease and no acute traumatic injury.    Right basilar atelectasis and elevation right hemidiaphragm.   Coronary calcifications are marker for coronary disease.   ABDOMEN: No acute traumatic injury.   Fatty liver morphology.   Prominent gallbladder with luminel gallstones.  If clinically   warranted, ultrasound may be helpful for further evaluation.   Pelvis: No acute inflammatory changes which are medical injury.   Thoracic spine: Mild degenerative changes without acute fracture or   malalignment.   Lumbar spine: Mild degenerative changes without acute fracture or   malalignment..  Findings would better evaluated with MRI.   Signed by Srikanth Hidalgo DO      CT head wo IV contrast   Final Result   CT HEAD:   1. No evidence of hemorrhage, acute CT apparent transcortical   infarct, or other emergent intracranial abnormality.   2. Time indeterminate and likely chronic, but new since prior CT head   imaging in December of 2022, geographic areas of cystic   encephalomalacia and gliosis are present in the posterior right   frontal lobe and right occipital lobe, posterior in the right frontal   lobe likely corresponding to acute infarct described on previous MRI   in December of 2022.   3. Subtle attenuation changes in the periventricular and subcortical   white matter of bilateral cerebral hemispheres are nonspecific and   likely represent sequela of microvascular disease.        CT C-SPINE:   1. No evidence of acute trauma to the cervical spine.        MACRO:   None        Signed by: Farshad  Cherimcelio 7/24/2024 4:28 PM   Dictation workstation:   [id number]      CT cervical spine wo IV contrast   Final Result   CT HEAD:   1. No evidence of hemorrhage, acute CT apparent transcortical   infarct, or other emergent intracranial abnormality.   2. Time indeterminate and likely chronic, but new since prior CT head   imaging in December of 2022, geographic areas of cystic   encephalomalacia and gliosis are present in the posterior right   frontal lobe and right occipital lobe, posterior in the right frontal   lobe likely corresponding to acute infarct described on previous MRI   in December of 2022.   3. Subtle attenuation changes in the periventricular and subcortical   white matter of bilateral cerebral hemispheres are nonspecific and   likely represent sequela of microvascular disease.        CT C-SPINE:   1. No evidence of acute trauma to the cervical spine.        MACRO:   None        Signed by: Farshad Montejo 7/24/2024 4:28 PM   Dictation workstation:   [id number]      CT thoracic spine wo IV contrast   Final Result   Chest: No acute cardiopulmonary disease and no acute traumatic injury.    Right basilar atelectasis and elevation right hemidiaphragm.   Coronary calcifications are marker for coronary disease.   ABDOMEN: No acute traumatic injury.   Fatty liver morphology.   Prominent gallbladder with luminel gallstones.  If clinically   warranted, ultrasound may be helpful for further evaluation.   Pelvis: No acute inflammatory changes which are medical injury.   Thoracic spine: Mild degenerative changes without acute fracture or   malalignment.   Lumbar spine: Mild degenerative changes without acute fracture or   malalignment..  Findings would better evaluated with MRI.   Signed by Srikanth Hidalgo,       CT lumbar spine wo IV contrast   Final Result   Chest: No acute cardiopulmonary disease and no acute traumatic injury.    Right basilar atelectasis and elevation right hemidiaphragm.    Coronary calcifications are marker for coronary disease.   ABDOMEN: No acute traumatic injury.   Fatty liver morphology.   Prominent gallbladder with luminel gallstones.  If clinically   warranted, ultrasound may be helpful for further evaluation.   Pelvis: No acute inflammatory changes which are medical injury.   Thoracic spine: Mild degenerative changes without acute fracture or   malalignment.   Lumbar spine: Mild degenerative changes without acute fracture or   malalignment..  Findings would better evaluated with MRI.   Signed by Srikanth Hiadlgo,       XR femur right 2+ views   Final Result   No acute osseous abnormality.   Signed by Alfonso Beal MD      XR chest 1 view   Final Result   Right lower lobe infiltrate with pleural effusion.   Signed by Wilner Chapa MD      XR foot right 3+ views   Final Result   No acute osseous abnormality. No osseous erosions or soft tissue gas.   6 mm linear radiopaque structure projected over the distal aspect of   the fourth digit plantar soft tissues.  Findings suggestive of a   foreign body.   Signed by Nilton Zavala MD               Assessment/Plan          Jai Shrestha is a 54 y.o. male with a past medical history of morbid obesity, hypertension, diabetes, hyperlipidemia, venous stasis, stroke in 2022, normocytic anemia, and stage G3B with a fluctuating creatinine between 1.7 to 2.4 mg/deciliter as of lately who presents with a 3-week history of nausea with associated dry heaves, poor by mouth intake, fever, chills and multiple falls.     In the ED, his BP went as low as 79/59 mmHg.  He was afebrile.  His white count was 20 with neutrophil predominance.  He was suffering an acute kidney injury with a creatinine of 4 mg as a deciliter, his potassium was 5.7, lactate up to 3.5.  High-sensitivity troponin was low.  COVID-19 negative.  Chest x-ray was concerning for right lower lobe pneumonia albeit CT imaging of the chest showed no acute cardiopulmonary disease.  There  was bilateral renal cortical atrophy without hydronephrosis.  There was a question of gallstones.  Ultrasound of the right upper quadrant showed hepatic steatosis without gallbladder wall thickening or biliary dilation.  Cultures were obtained.  He was administered broad-spectrum antimicrobial coverage with vancomycin and Zosyn.  Aggressive IV volume replacement was initiated.  Nephrology is consulted for acute kidney injury, hyperkalemia, and G3 chronic kidney disease.     Mr. Shrestha suffered ATN in the setting hypotension from LLL PNA/sepsis with concomitant use of lisinopril, spironolactone and torsemide. He was here at [facility] and admitted on 6/11 with nausea and poor by mouth intake thus his symptoms have been present for at least 1.5 months. He required a Guo for urinary retention with immediate output of 1.5 L upon placement last admission. He is currently voiding without issue. He is appropriately off the lisinopril, spironolactone and torsemide. His blood pressures remain borderline soft. IV volume expansion + antibiotics were started. Sepsis has resolved. He is tolerating oral intake. Despite his borderline pressures, I stopped the IV fluids all together to avoid overloading him. His creatinine continues to improve. He will go out off the  lisinopril, spironolactone and torsemide. He needs to either establish with a new PCP or schedule an appointment with his current provider within 2 weeks of discharge to evaluate for medication resumption.   rend his RFP. Will follow.     Principal Problem:    Sepsis, due to unspecified organism, unspecified whether acute organ dysfunction present (Multi)  Active Problems:    Acute on chronic renal failure (CMS-HCC)    Right lower lobe pneumonia    Pleural effusion    Hypotension    Recurrent falls    Hyperkalemia    Leukocytosis    Hyponatremia    Foreign body in skin of right lesser toe      I spent 40 minutes in the professional and overall care of this  patient.      Dao Kingston, DO

## 2024-07-28 NOTE — CARE PLAN
Problem: Fall/Injury  Goal: Not fall by end of shift  Outcome: Progressing  Goal: Be free from injury by end of the shift  Outcome: Progressing  Goal: Verbalize understanding of personal risk factors for fall in the hospital  Outcome: Progressing  Goal: Verbalize understanding of risk factor reduction measures to prevent injury from fall in the home  Outcome: Progressing  Goal: Use assistive devices by end of the shift  Outcome: Progressing  Goal: Pace activities to prevent fatigue by end of the shift  Outcome: Progressing   The patient's goals for the shift include      The clinical goals for the shift include pain managment and safety

## 2024-07-28 NOTE — PROGRESS NOTES
"Jai Shrestha is a 54 y.o. male on day 4 of admission presenting with Sepsis, due to unspecified organism, unspecified whether acute organ dysfunction present (Multi).    Subjective   No acute events overnight. Feeling pretty well today. Had some episodes of hypoglycemia overnight. Notes he is eating well.        Objective     VITALS  Blood pressure 121/67, pulse 86, temperature 36.1 °C (96.9 °F), temperature source Temporal, resp. rate 16, height 1.804 m (5' 11.02\"), weight (!) 171 kg (378 lb), SpO2 97%.  Physical Exam  Vitals and nursing note reviewed.   Constitutional:       General: He is not in acute distress.     Appearance: Normal appearance. He is obese. He is not ill-appearing.   HENT:      Head: Normocephalic and atraumatic.      Mouth/Throat:      Mouth: Mucous membranes are moist.      Pharynx: Oropharynx is clear.   Eyes:      Extraocular Movements: Extraocular movements intact.      Conjunctiva/sclera: Conjunctivae normal.   Cardiovascular:      Rate and Rhythm: Normal rate and regular rhythm.      Heart sounds: Normal heart sounds. No murmur heard.     No friction rub. No gallop.   Pulmonary:      Effort: Pulmonary effort is normal.      Breath sounds: Normal breath sounds. No wheezing or rales.   Abdominal:      General: Bowel sounds are normal. There is no distension.      Palpations: Abdomen is soft.      Tenderness: There is no abdominal tenderness. There is no guarding.   Musculoskeletal:         General: No swelling or tenderness.      Right lower leg: No edema.      Left lower leg: No edema.   Skin:     General: Skin is warm and dry.      Capillary Refill: Capillary refill takes less than 2 seconds.   Neurological:      General: No focal deficit present.      Mental Status: He is alert and oriented to person, place, and time.   Psychiatric:         Mood and Affect: Mood normal.           Intake/Output last 3 Shifts:  I/O last 3 completed shifts:  In: 1937.8 (11.3 mL/kg) [P.O.:1340; I.V.:597.8 " (3.5 mL/kg)]  Out: - (0 mL/kg)   Weight: 171.5 kg     Relevant Results  Results for orders placed or performed during the hospital encounter of 07/24/24 (from the past 24 hour(s))   POCT GLUCOSE   Result Value Ref Range    POCT Glucose 71 (L) 74 - 99 mg/dL   POCT GLUCOSE   Result Value Ref Range    POCT Glucose 96 74 - 99 mg/dL   POCT GLUCOSE   Result Value Ref Range    POCT Glucose 200 (H) 74 - 99 mg/dL   POCT GLUCOSE   Result Value Ref Range    POCT Glucose 96 74 - 99 mg/dL   Lavender Top   Result Value Ref Range    Extra Tube Hold for add-ons.    SST TOP   Result Value Ref Range    Extra Tube Hold for add-ons.    Basic metabolic panel   Result Value Ref Range    Glucose 90 74 - 99 mg/dL    Sodium 137 136 - 145 mmol/L    Potassium 4.6 3.5 - 5.3 mmol/L    Chloride 105 98 - 107 mmol/L    Bicarbonate 25 21 - 32 mmol/L    Anion Gap 12 10 - 20 mmol/L    Urea Nitrogen 25 (H) 6 - 23 mg/dL    Creatinine 1.74 (H) 0.50 - 1.30 mg/dL    eGFR 46 (L) >60 mL/min/1.73m*2    Calcium 9.1 8.6 - 10.3 mg/dL   POCT GLUCOSE   Result Value Ref Range    POCT Glucose 169 (H) 74 - 99 mg/dL   POCT GLUCOSE   Result Value Ref Range    POCT Glucose 150 (H) 74 - 99 mg/dL       Imaging Results  US right upper quadrant   Final Result   Diffuse hepatic steatosis.   Cholelithiasis.  No gallbladder wall thickening or biliary dilatation   is appreciated.    Signed by Tom New MD      CT chest abdomen pelvis wo IV contrast   Final Result   Chest: No acute cardiopulmonary disease and no acute traumatic injury.    Right basilar atelectasis and elevation right hemidiaphragm.   Coronary calcifications are marker for coronary disease.   ABDOMEN: No acute traumatic injury.   Fatty liver morphology.   Prominent gallbladder with luminel gallstones.  If clinically   warranted, ultrasound may be helpful for further evaluation.   Pelvis: No acute inflammatory changes which are medical injury.   Thoracic spine: Mild degenerative changes without acute  fracture or   malalignment.   Lumbar spine: Mild degenerative changes without acute fracture or   malalignment..  Findings would better evaluated with MRI.   Signed by Srikanth Hidalgo, DO      CT head wo IV contrast   Final Result   CT HEAD:   1. No evidence of hemorrhage, acute CT apparent transcortical   infarct, or other emergent intracranial abnormality.   2. Time indeterminate and likely chronic, but new since prior CT head   imaging in December of 2022, geographic areas of cystic   encephalomalacia and gliosis are present in the posterior right   frontal lobe and right occipital lobe, posterior in the right frontal   lobe likely corresponding to acute infarct described on previous MRI   in December of 2022.   3. Subtle attenuation changes in the periventricular and subcortical   white matter of bilateral cerebral hemispheres are nonspecific and   likely represent sequela of microvascular disease.        CT C-SPINE:   1. No evidence of acute trauma to the cervical spine.        MACRO:   None        Signed by: Farshad Montejo 7/24/2024 4:28 PM   Dictation workstation:   [id number]      CT cervical spine wo IV contrast   Final Result   CT HEAD:   1. No evidence of hemorrhage, acute CT apparent transcortical   infarct, or other emergent intracranial abnormality.   2. Time indeterminate and likely chronic, but new since prior CT head   imaging in December of 2022, geographic areas of cystic   encephalomalacia and gliosis are present in the posterior right   frontal lobe and right occipital lobe, posterior in the right frontal   lobe likely corresponding to acute infarct described on previous MRI   in December of 2022.   3. Subtle attenuation changes in the periventricular and subcortical   white matter of bilateral cerebral hemispheres are nonspecific and   likely represent sequela of microvascular disease.        CT C-SPINE:   1. No evidence of acute trauma to the cervical spine.        MACRO:   None         Signed by: Farshad Alonzomcelio 7/24/2024 4:28 PM   Dictation workstation:   [id number]      CT thoracic spine wo IV contrast   Final Result   Chest: No acute cardiopulmonary disease and no acute traumatic injury.    Right basilar atelectasis and elevation right hemidiaphragm.   Coronary calcifications are marker for coronary disease.   ABDOMEN: No acute traumatic injury.   Fatty liver morphology.   Prominent gallbladder with luminel gallstones.  If clinically   warranted, ultrasound may be helpful for further evaluation.   Pelvis: No acute inflammatory changes which are medical injury.   Thoracic spine: Mild degenerative changes without acute fracture or   malalignment.   Lumbar spine: Mild degenerative changes without acute fracture or   malalignment..  Findings would better evaluated with MRI.   Signed by Srikanth Hidalgo, DO      CT lumbar spine wo IV contrast   Final Result   Chest: No acute cardiopulmonary disease and no acute traumatic injury.    Right basilar atelectasis and elevation right hemidiaphragm.   Coronary calcifications are marker for coronary disease.   ABDOMEN: No acute traumatic injury.   Fatty liver morphology.   Prominent gallbladder with luminel gallstones.  If clinically   warranted, ultrasound may be helpful for further evaluation.   Pelvis: No acute inflammatory changes which are medical injury.   Thoracic spine: Mild degenerative changes without acute fracture or   malalignment.   Lumbar spine: Mild degenerative changes without acute fracture or   malalignment..  Findings would better evaluated with MRI.   Signed by Srikanth Hidalgo,       XR femur right 2+ views   Final Result   No acute osseous abnormality.   Signed by Alfonso Beal MD      XR chest 1 view   Final Result   Right lower lobe infiltrate with pleural effusion.   Signed by Wilner Chapa MD      XR foot right 3+ views   Final Result   No acute osseous abnormality. No osseous erosions or soft tissue gas.   6 mm linear  radiopaque structure projected over the distal aspect of   the fourth digit plantar soft tissues.  Findings suggestive of a   foreign body.   Signed by Nilton Zavala MD          Medications:  aspirin, 81 mg, oral, Daily  atorvastatin, 80 mg, oral, Daily  cefTRIAXone, 2 g, intravenous, q24h  DULoxetine, 120 mg, oral, Daily  heparin (porcine), 7,500 Units, subcutaneous, q8h TAMIR  insulin glargine, 80 Units, subcutaneous, BID  insulin lispro, 30 Units, subcutaneous, TID  pantoprazole, 40 mg, oral, Daily before breakfast  QUEtiapine, 400 mg, oral, Nightly  sennosides, 2 tablet, oral, BID       PRN medications: acetaminophen **OR** acetaminophen **OR** acetaminophen, dextrose, dextrose, dextrose, glucagon, glucagon, HYDROmorphone, HYDROmorphone, ondansetron ODT **OR** ondansetron, prochlorperazine **OR** prochlorperazine        Assessment/Plan   Principal Problem:    Sepsis, due to unspecified organism, unspecified whether acute organ dysfunction present (Multi)  Active Problems:    Acute on chronic renal failure (CMS-HCC)    Right lower lobe pneumonia    Pleural effusion    Hypotension    Recurrent falls    Hyperkalemia    Leukocytosis    Hyponatremia    Foreign body in skin of right lesser toe    Sepsis 2/2 RLL PNA   -ID following  -Ceftriaxone can switch to oral once ready for DC per discussion with Dr. Barber BROWN on CKD  -Likely 2/2 hypotension and continued bp medication use at home   -Improving     DM type II  -Lantus   -Lispo   -Corrective insulin  -blood sugar checks with meals and at bedtime   -Hypoglycemic protocol     Foreign body in the R foot  -Pod eval'd, chronic, rec follow up with outpatient pod at [facility]     Hyponatremia  -Resolved       DVT Prophylaxis:  Heparin subq    Disposition:  Plan on DC tomorrow morning      David Franklin, DO [facility]

## 2024-07-29 ENCOUNTER — PHARMACY VISIT (OUTPATIENT)
Dept: PHARMACY | Facility: CLINIC | Age: 54
End: 2024-07-29
Payer: COMMERCIAL

## 2024-07-29 LAB
GLUCOSE BLD MANUAL STRIP-MCNC: 203 MG/DL (ref 74–99)
GLUCOSE BLD MANUAL STRIP-MCNC: 243 MG/DL (ref 74–99)
GLUCOSE BLD MANUAL STRIP-MCNC: 249 MG/DL (ref 74–99)
GLUCOSE BLD MANUAL STRIP-MCNC: 90 MG/DL (ref 74–99)

## 2024-07-29 PROCEDURE — 99233 SBSQ HOSP IP/OBS HIGH 50: CPT | Performed by: INTERNAL MEDICINE

## 2024-07-29 PROCEDURE — 97116 GAIT TRAINING THERAPY: CPT | Mod: GP,CQ

## 2024-07-29 PROCEDURE — 2500000002 HC RX 250 W HCPCS SELF ADMINISTERED DRUGS (ALT 637 FOR MEDICARE OP, ALT 636 FOR OP/ED): Performed by: INTERNAL MEDICINE

## 2024-07-29 PROCEDURE — 2500000004 HC RX 250 GENERAL PHARMACY W/ HCPCS (ALT 636 FOR OP/ED): Performed by: PSYCHIATRY & NEUROLOGY

## 2024-07-29 PROCEDURE — 2500000002 HC RX 250 W HCPCS SELF ADMINISTERED DRUGS (ALT 637 FOR MEDICARE OP, ALT 636 FOR OP/ED): Performed by: PHYSICIAN ASSISTANT

## 2024-07-29 PROCEDURE — RXMED WILLOW AMBULATORY MEDICATION CHARGE

## 2024-07-29 PROCEDURE — 2500000005 HC RX 250 GENERAL PHARMACY W/O HCPCS: Performed by: PHYSICIAN ASSISTANT

## 2024-07-29 PROCEDURE — 1200000002 HC GENERAL ROOM WITH TELEMETRY DAILY

## 2024-07-29 PROCEDURE — 97530 THERAPEUTIC ACTIVITIES: CPT | Mod: GP,CQ

## 2024-07-29 PROCEDURE — 99222 1ST HOSP IP/OBS MODERATE 55: CPT | Performed by: PSYCHIATRY & NEUROLOGY

## 2024-07-29 PROCEDURE — 2500000001 HC RX 250 WO HCPCS SELF ADMINISTERED DRUGS (ALT 637 FOR MEDICARE OP): Performed by: INTERNAL MEDICINE

## 2024-07-29 PROCEDURE — 2500000004 HC RX 250 GENERAL PHARMACY W/ HCPCS (ALT 636 FOR OP/ED): Performed by: PHYSICIAN ASSISTANT

## 2024-07-29 PROCEDURE — 82947 ASSAY GLUCOSE BLOOD QUANT: CPT

## 2024-07-29 PROCEDURE — 2500000001 HC RX 250 WO HCPCS SELF ADMINISTERED DRUGS (ALT 637 FOR MEDICARE OP): Performed by: PHYSICIAN ASSISTANT

## 2024-07-29 RX ORDER — BENZTROPINE MESYLATE 1 MG/ML
1 INJECTION, SOLUTION INTRAMUSCULAR; INTRAVENOUS 2 TIMES DAILY PRN
Status: DISCONTINUED | OUTPATIENT
Start: 2024-07-29 | End: 2024-07-30 | Stop reason: HOSPADM

## 2024-07-29 RX ORDER — DULOXETIN HYDROCHLORIDE 60 MG/1
60 CAPSULE, DELAYED RELEASE ORAL DAILY
Qty: 30 CAPSULE | Refills: 1 | Status: SHIPPED | OUTPATIENT
Start: 2024-07-30

## 2024-07-29 RX ORDER — DULOXETIN HYDROCHLORIDE 30 MG/1
60 CAPSULE, DELAYED RELEASE ORAL DAILY
Status: DISCONTINUED | OUTPATIENT
Start: 2024-07-30 | End: 2024-07-30 | Stop reason: HOSPADM

## 2024-07-29 RX ORDER — AMOXICILLIN 500 MG/1
1000 CAPSULE ORAL EVERY 8 HOURS SCHEDULED
Status: DISCONTINUED | OUTPATIENT
Start: 2024-07-29 | End: 2024-07-30 | Stop reason: HOSPADM

## 2024-07-29 RX ORDER — AMOXICILLIN 500 MG/1
1000 CAPSULE ORAL EVERY 8 HOURS SCHEDULED
Qty: 30 CAPSULE | Refills: 0 | Status: SHIPPED | OUTPATIENT
Start: 2024-07-29 | End: 2024-08-03

## 2024-07-29 RX ORDER — BENZTROPINE MESYLATE 1 MG/ML
1 INJECTION, SOLUTION INTRAMUSCULAR; INTRAVENOUS ONCE
Status: COMPLETED | OUTPATIENT
Start: 2024-07-29 | End: 2024-07-29

## 2024-07-29 ASSESSMENT — COGNITIVE AND FUNCTIONAL STATUS - GENERAL
CLIMB 3 TO 5 STEPS WITH RAILING: A LITTLE
DRESSING REGULAR LOWER BODY CLOTHING: A LITTLE
DRESSING REGULAR LOWER BODY CLOTHING: A LITTLE
DAILY ACTIVITIY SCORE: 21
HELP NEEDED FOR BATHING: A LITTLE
CLIMB 3 TO 5 STEPS WITH RAILING: A LITTLE
CLIMB 3 TO 5 STEPS WITH RAILING: A LITTLE
DRESSING REGULAR UPPER BODY CLOTHING: A LITTLE
MOVING TO AND FROM BED TO CHAIR: A LITTLE
STANDING UP FROM CHAIR USING ARMS: A LITTLE
DAILY ACTIVITIY SCORE: 23
STANDING UP FROM CHAIR USING ARMS: A LITTLE
MOBILITY SCORE: 20
WALKING IN HOSPITAL ROOM: A LITTLE
WALKING IN HOSPITAL ROOM: A LITTLE
STANDING UP FROM CHAIR USING ARMS: A LITTLE
MOBILITY SCORE: 21
WALKING IN HOSPITAL ROOM: A LITTLE

## 2024-07-29 ASSESSMENT — PAIN SCALES - GENERAL
PAINLEVEL_OUTOF10: 8
PAINLEVEL_OUTOF10: 4
PAINLEVEL_OUTOF10: 5 - MODERATE PAIN
PAINLEVEL_OUTOF10: 7
PAINLEVEL_OUTOF10: 9
PAINLEVEL_OUTOF10: 5 - MODERATE PAIN
PAINLEVEL_OUTOF10: 3
PAINLEVEL_OUTOF10: 3

## 2024-07-29 ASSESSMENT — PAIN - FUNCTIONAL ASSESSMENT
PAIN_FUNCTIONAL_ASSESSMENT: 0-10
PAIN_FUNCTIONAL_ASSESSMENT: VAS (VISUAL ANALOG SCALE)

## 2024-07-29 ASSESSMENT — PAIN DESCRIPTION - LOCATION: LOCATION: JAW

## 2024-07-29 NOTE — CARE PLAN
SW  informed by PTA that pt had  expressed  SI, very  tearful, wanting to  speak with  dietary. Pt is known  to this  writer  from previous admissions - he does have a  mental health hx, sees his [facility]     weekly, is homebound, has  a lack of  social support - and says  the friends he has are encouraging  suicide.     Message  sent to     DO  to request   psych  eval    Libia Schneider MSW, LSW          4796 7-29-24  SW had  long talk with pt  about  him  wanting  a HHC  with a  dietician. Although pt's current  HHC, [facility], does not have one, the nurse thru the agency, Maryanne, has educated pt multiple times on diabetes management. Pt discussed that  Maryanne does  hold  him  accountable   although sometimes he does not  get along  with her. He was  offered  a  new HHC  but  agreed that  Maryanne should  return. Pt explained he  would like an aide to  be present  while showering - SW  spoke to  Maryanne and she said that this can be provided if  ordered.         Pt  also  gets  food thru [employer]; his  CM; food al; and trips to the  grocery store with his neighbor.  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     Waiting on psych  consult, would like to  speak to nutritionist if possible    Libia Schneider MSW, LSW

## 2024-07-29 NOTE — PROGRESS NOTES
Physical Therapy    Physical Therapy Treatment    Patient Name: Jai Shrestha  MRN: [id number]  Today's Date: 7/29/2024  Time Calculation  Start Time: 0857  Stop Time: 0930  Time Calculation (min): 33 min    Assessment/Plan   PT Assessment  Rehab Prognosis: Fair  Barriers to Discharge: Needing increased endurance and stair trial  End of Session Communication: Bedside nurse, Care Coordinator, Physician, PCT/NA/CTA  Assessment Comment: Pt demosntrates decreased activity tolerance and is limited by pain. No overt LOB noted although unsafe WW mechanics. Pt does not adhere to VC and requires max relaxation techniques, appears to be depressed and very anxious  End of Session Patient Position: Up in chair, Alarm off, not on at start of session (RN cleared)  PT Plan  Inpatient/Swing Bed or Outpatient: Inpatient  PT Plan  Treatment/Interventions: Bed mobility, Transfer training, Gait training  PT Plan: Ongoing PT  PT Frequency: 2 times per week  PT Discharge Recommendations: Low intensity level of continued care  Equipment Recommended upon Discharge: Wheeled walker (Owns at home, does not use all the time)  PT Recommended Transfer Status: Stand by assist  PT - OK to Discharge: Yes (per POC)      General Visit Information:   PT  Visit  PT Received On: 07/29/24  General  Reason for Referral: Impaired Mobility, Gait training, Impaired cognition/safety awareness: Pt is a 54 year old male admitted for fatigue, N/V and recurrent falls. Diagnosed with sepsis while in hospital.  Referred By: MYNOR Wagner PA-C  Past Medical History Relevant to Rehab:   Past Medical History:   Diagnosis Date    Chronic back pain     CKD (chronic kidney disease)     Diabetes mellitus (Multi)     HF (heart failure), diastolic (Multi)     Hypertension        Prior to Session Communication: Bedside nurse  Patient Position Received: Bed, 3 rail up, Alarm off, not on at start of session  General Comment: Pt becoming frustrated with VC throughout tx session, with  "increasing agitation. At end of session, pt tearful and stating he has no support, his pain is uncontrollable, and he has poor knowlege and ability to lose weight and cook himself healthy meals. Pt states that he has negative support of friends, stating \"they have told me I should kill myself\". Relaxation techniques required and medical team notified of mental state of pt.    Subjective   Precautions:  Precautions  Medical Precautions: Fall precautions  Precautions Comment: HX of psych issues       Objective   Pain:  Pain Assessment  Pain Assessment: 0-10  0-10 (Numeric) Pain Score: 3  Pain Type: Chronic pain  Pain Location: Back  Cognition:  Cognition  Overall Cognitive Status: Within Functional Limits       Postural Control:  Static Sitting Balance  Static Sitting-Balance Support: Feet supported, No upper extremity supported  Static Sitting-Level of Assistance: Modified independent  Static Sitting-Comment/Number of Minutes: Pt able to tolerate for extended periods of time  Static Standing Balance  Static Standing-Balance Support: Bilateral upper extremity supported  Static Standing-Level of Assistance: Distant supervision  Static Standing-Comment/Number of Minutes: Pt relies heavily on WW.       Treatments:     Therapeutic Activity  Therapeutic Activity Performed: Yes  Therapeutic Activity 1: Max relaxation techiques required sitting at EOB.    Bed Mobility  Bed Mobility: Yes  Bed Mobility 1  Bed Mobility 1: Supine to sitting  Level of Assistance 1: Modified independent  Bed Mobility Comments 1: Pt uses bed rail and momentum strategies to perform trunk elevation    Ambulation/Gait Training  Ambulation/Gait Training Performed: Yes  Ambulation/Gait Training 1  Surface 1: Level tile  Device 1: Rolling walker  Gait Support Devices: Gait belt (doffed long-term throughout session 2/2 discomfort)  Assistance 1: Distant supervision  Quality of Gait 1: Wide base of support, Diminished heel strike, Forward flexed " posture  Comments/Distance (ft) 1: 150 x 2. 10 (Mod VC for WW safety. Pt growing agitated with VC, not adhereing to them. Near end of ambulation distance, pt reports increased pain generalized, and reports need to sit and rest.)  Transfers  Transfer: Yes  Transfer 1  Technique 1: Sit to stand, Stand to sit  Transfer Device 1: Gait belt  Transfer Level of Assistance 1: Distant supervision  Trials/Comments 1: Max VC and education on correct hand placement and sequencing. Pt does not adhere to safety education and becomes increasingly agitated with education    Stairs  Stairs: Yes  Stairs  Rails 1: Right  Curb Step 1: No  Device 1: Railing  Support Devices 1: Gait belt  Assistance 1: Contact guard  Comment/Number of Steps 1: Pt negotiated 1 step forward and 1 step backward with no LOB, mod VC for safety and sequencing.    Outcome Measures:  [facility] Basic Mobility  Turning from your back to your side while in a flat bed without using bedrails: None  Moving from lying on your back to sitting on the side of a flat bed without using bedrails: None  Moving to and from bed to chair (including a wheelchair): A little  Standing up from a chair using your arms (e.g. wheelchair or bedside chair): A little  To walk in hospital room: A little  Climbing 3-5 steps with railing: A little  Basic Mobility - Total Score: 20    Education Documentation  Body Mechanics, taught by Karen Woo PTA at 7/29/2024 10:20 AM.  Learner: Patient  Readiness: Nonacceptance  Method: Explanation  Response: Needs Reinforcement, No Evidence of Learning    Mobility Training, taught by Karen Woo PTA at 7/29/2024 10:20 AM.  Learner: Patient  Readiness: Nonacceptance  Method: Explanation  Response: Needs Reinforcement, No Evidence of Learning    Education Comments  No comments found.        OP EDUCATION:       Encounter Problems       Encounter Problems (Active)       Balance       STG - Maintains dynamic standing balance with upper extremity support At  [facility] I, safely, without LOB, device PRN  (Progressing)       Start:  07/25/24    Expected End:  08/08/24       INTERVENTIONS:  1. Practice standing with minimal support.  2. Educate patient about standing tolerance.  3. Educate patient about independence with gait, transfers, and ADL's.  4. Educate patient about use of assistive device.  5. Educate patient about self-directed care.            Mobility       STG - Patient will ambulate At [facility] I using No device for 100' without LOB or gross gait deviations  (Progressing)       Start:  07/25/24    Expected End:  08/08/24            STG - Patient will ambulate up and down a curb/step At [facility] I while using No device and No HR, using non-reciprocal pattern, no LOB  (Progressing)       Start:  07/25/24    Expected End:  08/08/24            Endurance - Pt to tolerate >/= 20 minutes therex, theract, gait and/or NMR with </= 5 minutes of rest breaks  (Progressing)       Start:  07/25/24    Expected End:  08/08/24               PT Transfers       STG - Patient will perform bed mobility At [facility] I, safely, without LOB, device PRN  (Progressing)       Start:  07/25/24    Expected End:  08/08/24            STG - Patient will transfer sit to and from stand At [facility] I, safely, without LOB, device PRN  (Progressing)       Start:  07/25/24    Expected End:  08/08/24            STG - Patient will perform car transfer At [facility] I, safely, without LOB, device PRN        Start:  07/25/24    Expected End:  08/08/24               Safety       Pt will verbalize and apply safety awareness and precautions 100% of time throughout entire session   (Progressing)       Start:  07/25/24    Expected End:  08/08/24

## 2024-07-29 NOTE — PROGRESS NOTES
07/29/24 0800   Discharge Planning   Home or Post Acute Services In home services   Type of Home Care Services Home OT;Home PT;Home nursing visits   Expected Discharge Disposition  Services     [facility]  ID signed off  DISP: home  ADOD: today  Carri Godwin RN      Abel Pineda(Resident)

## 2024-07-29 NOTE — PROGRESS NOTES
Nephrology Consult Progress Note    Admit Date: 7/24/2024    Interval history:  No complaints     CURRENT MEDICATIONS:    Current Facility-Administered Medications:     acetaminophen (Tylenol) tablet 650 mg, 650 mg, oral, q4h PRN **OR** acetaminophen (Tylenol) oral liquid 650 mg, 650 mg, oral, q4h PRN **OR** acetaminophen (Tylenol) suppository 650 mg, 650 mg, rectal, q4h PRN, Lien Traylor PA-C    amoxicillin (Amoxil) capsule 1,000 mg, 1,000 mg, oral, q8h TAMIR, Alex Blandon MD    aspirin EC tablet 81 mg, 81 mg, oral, Daily, Lien Traylor PA-C, 81 mg at 07/29/24 0903    atorvastatin (Lipitor) tablet 80 mg, 80 mg, oral, Daily, Lien Traylor PA-C, 80 mg at 07/29/24 0904    dextrose 50 % injection 12.5 g, 12.5 g, intravenous, q15 min PRN, Lien Traylro PA-C    dextrose 50 % injection 12.5 g, 12.5 g, intravenous, q15 min PRN, RENATO LoveC, 12.5 g at 07/26/24 1547    dextrose 50 % injection 25 g, 25 g, intravenous, q15 min PRN, Lien Traylor PA-C    DULoxetine (Cymbalta) DR capsule 120 mg, 120 mg, oral, Daily, Lien Traylor PA-C, 120 mg at 07/29/24 0903    glucagon (Glucagen) injection 1 mg, 1 mg, intramuscular, q15 min PRN, Lien Traylor PA-C    glucagon (Glucagen) injection 1 mg, 1 mg, intramuscular, q15 min PRN, Lien Traylor PA-C    heparin (porcine) injection 7,500 Units, 7,500 Units, subcutaneous, q8h TAMIR, Lien Traylor PA-C, 7,500 Units at 07/29/24 0612    HYDROmorphone (Dilaudid) injection 0.5 mg, 0.5 mg, intravenous, q4h PRN, Lien Traylor PA-C, 0.5 mg at 07/29/24 1212    HYDROmorphone PF (Dilaudid) injection 0.2 mg, 0.2 mg, intravenous, q4h PRN, Lien Traylor PA-C, 0.2 mg at 07/26/24 1131    insulin glargine (Lantus) injection 80 Units, 80 Units, subcutaneous, BID, David Franklin DO, 80 Units at 07/29/24 0903    insulin lispro (HumaLOG) injection 30 Units, 30 Units, subcutaneous, TID, David Franklin DO, 30 Units at 07/29/24 1212    ondansetron ODT (Zofran-ODT)  "disintegrating tablet 4 mg, 4 mg, oral, q8h PRN, 4 mg at 07/29/24 0616 **OR** ondansetron (Zofran) injection 4 mg, 4 mg, intravenous, q8h PRN, ELVER Love-MAGY, 4 mg at 07/26/24 1137    pantoprazole (ProtoNix) EC tablet 40 mg, 40 mg, oral, Daily before breakfast, Lien Traylor PA-C, 40 mg at 07/29/24 0612    prochlorperazine (Compazine) tablet 10 mg, 10 mg, oral, q6h PRN **OR** prochlorperazine (Compazine) injection 10 mg, 10 mg, intravenous, q6h PRN, Lien Traylor PA-C    QUEtiapine (SEROquel) tablet 400 mg, 400 mg, oral, Nightly, Lien Traylor PA-C, 400 mg at 07/28/24 2151    sennosides (Senokot) tablet 17.2 mg, 2 tablet, oral, BID, Lien Traylor PA-C, 17.2 mg at 07/29/24 0903     No intake or output data in the 24 hours ending 07/29/24 1404    PHYSICAL EXAM:  /74   Pulse 91   Temp 36.5 °C (97.7 °F) (Temporal)   Resp 16   Ht 1.804 m (5' 11.02\")   Wt (!) 171 kg (378 lb)   SpO2 94%   BMI 52.68 kg/m²   No intake or output data in the 24 hours ending 07/29/24 1404  Gen: AAO, NAD, obese  Neck: No JVD  Cardiac: RRR  Resp: clear BS  Abd: Soft, non tender, +BS, non distended   Ext: No edema   Neuro: moves 4 ext  Peripheral Pulses: Capillary refill <2secs, strong peripheral pulses.  Skin: Skin color, texture, turgor normal, no suspicious rashes or lesions.    Labs:  Results for orders placed or performed during the hospital encounter of 07/24/24 (from the past 24 hour(s))   POCT GLUCOSE   Result Value Ref Range    POCT Glucose 150 (H) 74 - 99 mg/dL   POCT GLUCOSE   Result Value Ref Range    POCT Glucose 237 (H) 74 - 99 mg/dL   POCT GLUCOSE   Result Value Ref Range    POCT Glucose 90 74 - 99 mg/dL   POCT GLUCOSE   Result Value Ref Range    POCT Glucose 243 (H) 74 - 99 mg/dL        DATA:   Diagnostic tests reviewed for today's visit:    New labs and imaging     Assessment and Plan:  Jai Shrestha has a history of morbid obesity, hypertension, diabetes, hyperlipidemia, venous stasis, stroke in " 2022, normocytic anemia, and stage G3B, coming with sepsis and PNA  - STEPHANIE on CKD stage 3b: baseline Scr 1.7-2.4  Resolved STEPHANIE, Scr at baseline  Euvolemic, off any ivf  BP: controlled    PLAN:  - dialysis labs and I won't oppose resumption of lisinopril, needs to establish out-pt care, given my card for appointment  - OK to discharge from my stand point          Will continue to follow.     Signature: Calin Guy MD

## 2024-07-29 NOTE — CARE PLAN
The patient's goals for the shift include      The clinical goals for the shift include prevent symptoms of hypoglycemia this shift    Problem: Fall/Injury  Goal: Not fall by end of shift  Outcome: Progressing  Goal: Be free from injury by end of the shift  Outcome: Progressing  Goal: Verbalize understanding of personal risk factors for fall in the hospital  Outcome: Progressing  Goal: Verbalize understanding of risk factor reduction measures to prevent injury from fall in the home  Outcome: Progressing  Goal: Use assistive devices by end of the shift  Outcome: Progressing  Goal: Pace activities to prevent fatigue by end of the shift  Outcome: Progressing

## 2024-07-29 NOTE — CONSULTS
"Reason For Consult  Suicidal ideation    History Of Present Illness  Jai Shrestha is a 54 y.o. male, PMH T2DM, HTN, HLD, morbid obesity, anxiety, CHF, chronic LE edema, reporting suicidal ideation on the day of discharge. Lives independently with [facility] in [facility] in [place]. Admitted 7/24 for recurrent falls, abd pain, retching, trouble walking, abnl glucose.   Pt was scheduled to discharge today and reportedly stated he was suicidal.   Pt absolutely denies suicidal ideation or statement, I never said that. They're an effing liar.  Reports having horrible effing jaw problem - jaws clenching, aching, both sides of my head swelled up, hit the wall when he fell.     Past h.o. [facility]  Pt describes self as \"loner\" which he prefers    Long time use of opioids x years - sees Dr. 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   Last rx 20 days ago  07/09/2024 07/09/2024 1 Hydrocodone-Acetamin 7.5-325 112.00 28 [place] [id number] [place] (9904) 0 30.00 MME [place]     Psych medication:   Seroquel 400 mg hs  Cymbalta 120 mg daily    Pt reports only change has been the way his opioids have been prescribed, has been receiving dilaudid in hospital - it helps the pain for 2 hrs but still have the stiffness in jaw    Is angry, profane, blames health care providers for not knowing what's wrong with him.     Past Medical History  He has a past medical history of Chronic back pain, CKD (chronic kidney disease), Diabetes mellitus (Multi), HF (heart failure), diastolic (Multi), and Hypertension.    Surgical History  He has no past surgical history on file.     Social History  He reports that he has quit smoking. His smoking use included cigarettes. He does not have any smokeless tobacco history on file. He reports current alcohol use. He reports that he does not " "currently use drugs.    Family History  No family history on file.     Allergies  Metformin     Physical Exam  Physical Exam  Psychiatric:         Attention and Perception: He is inattentive. He does not perceive auditory or visual hallucinations.         Mood and Affect: Mood is depressed. Affect is angry and inappropriate.         Speech: Speech is rapid and pressured.         Behavior: Behavior is agitated and aggressive.         Thought Content: Thought content is not paranoid. Thought content does not include homicidal or suicidal ideation.         Cognition and Memory: Cognition normal.         Judgment: Judgment is impulsive.       Last Recorded Vitals  Blood pressure 155/75, pulse 102, temperature 36.3 °C (97.3 °F), temperature source Temporal, resp. rate 18, height 1.804 m (5' 11.02\"), weight (!) 171 kg (378 lb), SpO2 96%.    Relevant Results  Scheduled medications  amoxicillin, 1,000 mg, oral, q8h TAMIR  aspirin, 81 mg, oral, Daily  atorvastatin, 80 mg, oral, Daily  DULoxetine, 120 mg, oral, Daily  heparin (porcine), 7,500 Units, subcutaneous, q8h TAMIR  insulin glargine, 80 Units, subcutaneous, BID  insulin lispro, 30 Units, subcutaneous, TID  pantoprazole, 40 mg, oral, Daily before breakfast  QUEtiapine, 400 mg, oral, Nightly  sennosides, 2 tablet, oral, BID    PRN medications: acetaminophen **OR** acetaminophen **OR** acetaminophen, dextrose, dextrose, dextrose, glucagon, glucagon, HYDROmorphone, HYDROmorphone, ondansetron ODT **OR** ondansetron, prochlorperazine **OR** prochlorperazine    Results for orders placed or performed during the hospital encounter of 07/24/24 (from the past 96 hour(s))   POCT GLUCOSE   Result Value Ref Range    POCT Glucose 58 (L) 74 - 99 mg/dL   POCT GLUCOSE   Result Value Ref Range    POCT Glucose 78 74 - 99 mg/dL   POCT GLUCOSE   Result Value Ref Range    POCT Glucose 114 (H) 74 - 99 mg/dL   CBC   Result Value Ref Range    WBC 9.3 4.4 - 11.3 x10*3/uL    nRBC 0.0 0.0 - 0.0 /100 " WBCs    RBC 3.26 (L) 4.50 - 5.90 x10*6/uL    Hemoglobin 8.7 (L) 13.5 - 17.5 g/dL    Hematocrit 28.4 (L) 41.0 - 52.0 %    MCV 87 80 - 100 fL    MCH 26.7 26.0 - 34.0 pg    MCHC 30.6 (L) 32.0 - 36.0 g/dL    RDW 14.6 (H) 11.5 - 14.5 %    Platelets 233 150 - 450 x10*3/uL   Comprehensive metabolic panel   Result Value Ref Range    Glucose 93 74 - 99 mg/dL    Sodium 136 136 - 145 mmol/L    Potassium 4.3 3.5 - 5.3 mmol/L    Chloride 100 98 - 107 mmol/L    Bicarbonate 25 21 - 32 mmol/L    Anion Gap 15 10 - 20 mmol/L    Urea Nitrogen 56 (H) 6 - 23 mg/dL    Creatinine 3.07 (H) 0.50 - 1.30 mg/dL    eGFR 23 (L) >60 mL/min/1.73m*2    Calcium 8.5 (L) 8.6 - 10.3 mg/dL    Albumin 3.4 3.4 - 5.0 g/dL    Alkaline Phosphatase 90 33 - 120 U/L    Total Protein 6.4 6.4 - 8.2 g/dL    AST 22 9 - 39 U/L    Bilirubin, Total 0.4 0.0 - 1.2 mg/dL    ALT 14 10 - 52 U/L   POCT GLUCOSE   Result Value Ref Range    POCT Glucose 123 (H) 74 - 99 mg/dL   POCT GLUCOSE   Result Value Ref Range    POCT Glucose 94 74 - 99 mg/dL   POCT GLUCOSE   Result Value Ref Range    POCT Glucose 46 (L) 74 - 99 mg/dL   POCT GLUCOSE   Result Value Ref Range    POCT Glucose 69 (L) 74 - 99 mg/dL   POCT GLUCOSE   Result Value Ref Range    POCT Glucose 87 74 - 99 mg/dL   POCT GLUCOSE   Result Value Ref Range    POCT Glucose 111 (H) 74 - 99 mg/dL   CBC   Result Value Ref Range    WBC 8.8 4.4 - 11.3 x10*3/uL    nRBC 0.0 0.0 - 0.0 /100 WBCs    RBC 3.53 (L) 4.50 - 5.90 x10*6/uL    Hemoglobin 9.5 (L) 13.5 - 17.5 g/dL    Hematocrit 31.0 (L) 41.0 - 52.0 %    MCV 88 80 - 100 fL    MCH 26.9 26.0 - 34.0 pg    MCHC 30.6 (L) 32.0 - 36.0 g/dL    RDW 14.6 (H) 11.5 - 14.5 %    Platelets 252 150 - 450 x10*3/uL   Comprehensive metabolic panel   Result Value Ref Range    Glucose 132 (H) 74 - 99 mg/dL    Sodium 135 (L) 136 - 145 mmol/L    Potassium 4.7 3.5 - 5.3 mmol/L    Chloride 102 98 - 107 mmol/L    Bicarbonate 22 21 - 32 mmol/L    Anion Gap 16 10 - 20 mmol/L    Urea Nitrogen 36 (H) 6 - 23  mg/dL    Creatinine 2.20 (H) 0.50 - 1.30 mg/dL    eGFR 35 (L) >60 mL/min/1.73m*2    Calcium 8.8 8.6 - 10.3 mg/dL    Albumin 3.6 3.4 - 5.0 g/dL    Alkaline Phosphatase 90 33 - 120 U/L    Total Protein 6.7 6.4 - 8.2 g/dL    AST 21 9 - 39 U/L    Bilirubin, Total 0.4 0.0 - 1.2 mg/dL    ALT 14 10 - 52 U/L   POCT GLUCOSE   Result Value Ref Range    POCT Glucose 139 (H) 74 - 99 mg/dL   POCT GLUCOSE   Result Value Ref Range    POCT Glucose 134 (H) 74 - 99 mg/dL   POCT GLUCOSE   Result Value Ref Range    POCT Glucose 43 (L) 74 - 99 mg/dL   POCT GLUCOSE   Result Value Ref Range    POCT Glucose 71 (L) 74 - 99 mg/dL   POCT GLUCOSE   Result Value Ref Range    POCT Glucose 96 74 - 99 mg/dL   POCT GLUCOSE   Result Value Ref Range    POCT Glucose 200 (H) 74 - 99 mg/dL   POCT GLUCOSE   Result Value Ref Range    POCT Glucose 96 74 - 99 mg/dL   Lavender Top   Result Value Ref Range    Extra Tube Hold for add-ons.    SST TOP   Result Value Ref Range    Extra Tube Hold for add-ons.    Basic metabolic panel   Result Value Ref Range    Glucose 90 74 - 99 mg/dL    Sodium 137 136 - 145 mmol/L    Potassium 4.6 3.5 - 5.3 mmol/L    Chloride 105 98 - 107 mmol/L    Bicarbonate 25 21 - 32 mmol/L    Anion Gap 12 10 - 20 mmol/L    Urea Nitrogen 25 (H) 6 - 23 mg/dL    Creatinine 1.74 (H) 0.50 - 1.30 mg/dL    eGFR 46 (L) >60 mL/min/1.73m*2    Calcium 9.1 8.6 - 10.3 mg/dL   POCT GLUCOSE   Result Value Ref Range    POCT Glucose 169 (H) 74 - 99 mg/dL   POCT GLUCOSE   Result Value Ref Range    POCT Glucose 150 (H) 74 - 99 mg/dL   POCT GLUCOSE   Result Value Ref Range    POCT Glucose 237 (H) 74 - 99 mg/dL   POCT GLUCOSE   Result Value Ref Range    POCT Glucose 90 74 - 99 mg/dL   POCT GLUCOSE   Result Value Ref Range    POCT Glucose 243 (H) 74 - 99 mg/dL   POCT GLUCOSE   Result Value Ref Range    POCT Glucose 249 (H) 74 - 99 mg/dL       Assessment/Plan   IMP:  Medication induced Trismus/Dystonia (likely cymbalta, although seroquel could be  contributing)  Recurrent major depression  Chronic pain    PLAN:  Pt is not acutely suicidal, does not require inpatient psychiatry  Benztropine 1 mg twice daily IV x 24 hrs  Decrease cymbalta 60 mg daily  Thiamine 500 mg IV daily for polyneuropathy    I spent 60 minutes in the professional and overall care of this patient.      Herminia Fraser MD

## 2024-07-29 NOTE — CARE PLAN
The patient's goals for the shift include  ambulate safely. Use call light    The clinical goals for the shift include pain manangment    Over the shift, the patient did not make progress toward the following goals. Barriers to progression include . Recommendations to address these barriers include .

## 2024-07-29 NOTE — PROGRESS NOTES
"Jai Shrestha is a 54 y.o. male on day 5 of admission presenting with Sepsis, due to unspecified organism, unspecified whether acute organ dysfunction present (Multi).    Subjective   No acute events overnight. Tearful this AM. Per PT team, patient told them that he wanted to kill himself.        Objective     VITALS  Blood pressure 135/74, pulse 91, temperature 36.5 °C (97.7 °F), temperature source Temporal, resp. rate 16, height 1.804 m (5' 11.02\"), weight (!) 171 kg (378 lb), SpO2 94%.  Physical Exam  Vitals and nursing note reviewed.   Constitutional:       General: He is not in acute distress.     Appearance: Normal appearance. He is obese. He is not ill-appearing.   HENT:      Head: Normocephalic and atraumatic.      Mouth/Throat:      Mouth: Mucous membranes are moist.      Pharynx: Oropharynx is clear.   Eyes:      Extraocular Movements: Extraocular movements intact.      Conjunctiva/sclera: Conjunctivae normal.   Cardiovascular:      Rate and Rhythm: Normal rate and regular rhythm.      Heart sounds: Normal heart sounds. No murmur heard.     No friction rub. No gallop.   Pulmonary:      Effort: Pulmonary effort is normal.      Breath sounds: Normal breath sounds. No wheezing or rales.   Abdominal:      General: Bowel sounds are normal. There is no distension.      Palpations: Abdomen is soft.      Tenderness: There is no abdominal tenderness. There is no guarding.   Musculoskeletal:         General: No swelling or tenderness.      Right lower leg: No edema.      Left lower leg: No edema.   Skin:     General: Skin is warm and dry.      Capillary Refill: Capillary refill takes less than 2 seconds.   Neurological:      General: No focal deficit present.      Mental Status: He is alert and oriented to person, place, and time.   Psychiatric:         Mood and Affect: Mood normal.           Intake/Output last 3 Shifts:  No intake/output data recorded.    Relevant Results  Results for orders placed or performed " during the hospital encounter of 07/24/24 (from the past 24 hour(s))   POCT GLUCOSE   Result Value Ref Range    POCT Glucose 150 (H) 74 - 99 mg/dL   POCT GLUCOSE   Result Value Ref Range    POCT Glucose 237 (H) 74 - 99 mg/dL   POCT GLUCOSE   Result Value Ref Range    POCT Glucose 90 74 - 99 mg/dL   POCT GLUCOSE   Result Value Ref Range    POCT Glucose 243 (H) 74 - 99 mg/dL       Imaging Results  US right upper quadrant   Final Result   Diffuse hepatic steatosis.   Cholelithiasis.  No gallbladder wall thickening or biliary dilatation   is appreciated.    Signed by Tom New MD      CT chest abdomen pelvis wo IV contrast   Final Result   Chest: No acute cardiopulmonary disease and no acute traumatic injury.    Right basilar atelectasis and elevation right hemidiaphragm.   Coronary calcifications are marker for coronary disease.   ABDOMEN: No acute traumatic injury.   Fatty liver morphology.   Prominent gallbladder with luminel gallstones.  If clinically   warranted, ultrasound may be helpful for further evaluation.   Pelvis: No acute inflammatory changes which are medical injury.   Thoracic spine: Mild degenerative changes without acute fracture or   malalignment.   Lumbar spine: Mild degenerative changes without acute fracture or   malalignment..  Findings would better evaluated with MRI.   Signed by Srikanth Hidalgo DO      CT head wo IV contrast   Final Result   CT HEAD:   1. No evidence of hemorrhage, acute CT apparent transcortical   infarct, or other emergent intracranial abnormality.   2. Time indeterminate and likely chronic, but new since prior CT head   imaging in December of 2022, geographic areas of cystic   encephalomalacia and gliosis are present in the posterior right   frontal lobe and right occipital lobe, posterior in the right frontal   lobe likely corresponding to acute infarct described on previous MRI   in December of 2022.   3. Subtle attenuation changes in the periventricular and  subcortical   white matter of bilateral cerebral hemispheres are nonspecific and   likely represent sequela of microvascular disease.        CT C-SPINE:   1. No evidence of acute trauma to the cervical spine.        MACRO:   None        Signed by: Farshad Montejo 7/24/2024 4:28 PM   Dictation workstation:   [id number]      CT cervical spine wo IV contrast   Final Result   CT HEAD:   1. No evidence of hemorrhage, acute CT apparent transcortical   infarct, or other emergent intracranial abnormality.   2. Time indeterminate and likely chronic, but new since prior CT head   imaging in December of 2022, geographic areas of cystic   encephalomalacia and gliosis are present in the posterior right   frontal lobe and right occipital lobe, posterior in the right frontal   lobe likely corresponding to acute infarct described on previous MRI   in December of 2022.   3. Subtle attenuation changes in the periventricular and subcortical   white matter of bilateral cerebral hemispheres are nonspecific and   likely represent sequela of microvascular disease.        CT C-SPINE:   1. No evidence of acute trauma to the cervical spine.        MACRO:   None        Signed by: Farshad Montejo 7/24/2024 4:28 PM   Dictation workstation:   [id number]      CT thoracic spine wo IV contrast   Final Result   Chest: No acute cardiopulmonary disease and no acute traumatic injury.    Right basilar atelectasis and elevation right hemidiaphragm.   Coronary calcifications are marker for coronary disease.   ABDOMEN: No acute traumatic injury.   Fatty liver morphology.   Prominent gallbladder with luminel gallstones.  If clinically   warranted, ultrasound may be helpful for further evaluation.   Pelvis: No acute inflammatory changes which are medical injury.   Thoracic spine: Mild degenerative changes without acute fracture or   malalignment.   Lumbar spine: Mild degenerative changes without acute fracture or   malalignment..  Findings  would better evaluated with MRI.   Signed by Srikanth Hidalgo, DO      CT lumbar spine wo IV contrast   Final Result   Chest: No acute cardiopulmonary disease and no acute traumatic injury.    Right basilar atelectasis and elevation right hemidiaphragm.   Coronary calcifications are marker for coronary disease.   ABDOMEN: No acute traumatic injury.   Fatty liver morphology.   Prominent gallbladder with luminel gallstones.  If clinically   warranted, ultrasound may be helpful for further evaluation.   Pelvis: No acute inflammatory changes which are medical injury.   Thoracic spine: Mild degenerative changes without acute fracture or   malalignment.   Lumbar spine: Mild degenerative changes without acute fracture or   malalignment..  Findings would better evaluated with MRI.   Signed by Srikanth Hidalgo, DO      XR femur right 2+ views   Final Result   No acute osseous abnormality.   Signed by Alfonso Beal MD      XR chest 1 view   Final Result   Right lower lobe infiltrate with pleural effusion.   Signed by Wilner Chapa MD      XR foot right 3+ views   Final Result   No acute osseous abnormality. No osseous erosions or soft tissue gas.   6 mm linear radiopaque structure projected over the distal aspect of   the fourth digit plantar soft tissues.  Findings suggestive of a   foreign body.   Signed by Nilton Zavala MD          Medications:  amoxicillin, 1,000 mg, oral, q8h TAMIR  aspirin, 81 mg, oral, Daily  atorvastatin, 80 mg, oral, Daily  DULoxetine, 120 mg, oral, Daily  heparin (porcine), 7,500 Units, subcutaneous, q8h TAMIR  insulin glargine, 80 Units, subcutaneous, BID  insulin lispro, 30 Units, subcutaneous, TID  pantoprazole, 40 mg, oral, Daily before breakfast  QUEtiapine, 400 mg, oral, Nightly  sennosides, 2 tablet, oral, BID       PRN medications: acetaminophen **OR** acetaminophen **OR** acetaminophen, dextrose, dextrose, dextrose, glucagon, glucagon, HYDROmorphone, HYDROmorphone, ondansetron ODT **OR**  ondansetron, prochlorperazine **OR** prochlorperazine        Assessment/Plan   Principal Problem:    Sepsis, due to unspecified organism, unspecified whether acute organ dysfunction present (Multi)  Active Problems:    Acute on chronic renal failure (CMS-HCC)    Right lower lobe pneumonia    Pleural effusion    Hypotension    Recurrent falls    Hyperkalemia    Leukocytosis    Hyponatremia    Foreign body in skin of right lesser toe    Sepsis 2/2 RLL PNA   -ID following  -Ceftriaxone can switch to oral once ready for DC per discussion with Dr. Blandon     STEPHANIE on CKD  -Likely 2/2 hypotension and continued bp medication use at home   -Improving     DM type II  -Lantus   -Lispo   -Corrective insulin  -blood sugar checks with meals and at bedtime   -Hypoglycemic protocol     Foreign body in the R foot  -Pod eval'd, chronic, rec follow up with outpatient pod at [facility]     Hyponatremia  -Resolved     SI  -Sitter  -Psych consult       DVT Prophylaxis:  Heparin subq    Disposition:  Would like to DC though appears patient is having SI.     David Franklin, [facility]

## 2024-07-29 NOTE — PROGRESS NOTES
"  INFECTIOUS DISEASE DAILY PROGRESS NOTE    SUBJECTIVE:    No new issues. Is feeling much better overall. We discussed going home with PO abx which he was OK with. Asking for more pain meds for home.    OBJECTIVE:  VITALS (Last 24 Hours)  /64   Pulse 83   Temp 37 °C (98.6 °F)   Resp 16   Ht 1.804 m (5' 11.02\")   Wt (!) 171 kg (378 lb)   SpO2 97%   BMI 52.68 kg/m²     PHYSICAL EXAM:  Gen - NAD, obese, in bed  Heart - RRR  Lungs - no wheezing  Abd - soft, no ttp, BS present  Skin - no rashes    ABX: IV CTX    LABS:  Lab Results   Component Value Date    WBC 8.8 07/27/2024    HGB 9.5 (L) 07/27/2024    HCT 31.0 (L) 07/27/2024    MCV 88 07/27/2024     07/27/2024     Lab Results   Component Value Date    GLUCOSE 90 07/28/2024    CALCIUM 9.1 07/28/2024     07/28/2024    K 4.6 07/28/2024    CO2 25 07/28/2024     07/28/2024    BUN 25 (H) 07/28/2024    CREATININE 1.74 (H) 07/28/2024     Results from last 72 hours   Lab Units 07/27/24  0609   ALK PHOS U/L 90   BILIRUBIN TOTAL mg/dL 0.4   PROTEIN TOTAL g/dL 6.7   ALT U/L 14   AST U/L 21   ALBUMIN g/dL 3.6     Estimated Creatinine Clearance: 79.2 mL/min (A) (by C-G formula based on SCr of 1.74 mg/dL (H)).    ASSESSMENT/PLAN:     Sepsis due to Right LL PNA - resolving  Morbid Obesity BMI 52  Acute on Chronic Renal Failure - improving, CrCl >60    Overall better. Will change to PO Amoxicillin 1g TID for 6 more days (10D total treatment). Higher dose given obesity.     Monitoring for adverse effects of abx such as rash/itching/diarrhea - none apparent.     Will sign off. Please call back with questions. Thanks!    Alex Blandon MD  [facility]  Office #[phone]      "

## 2024-07-30 ENCOUNTER — PHARMACY VISIT (OUTPATIENT)
Dept: PHARMACY | Facility: CLINIC | Age: 54
End: 2024-07-30
Payer: COMMERCIAL

## 2024-07-30 VITALS
HEART RATE: 85 BPM | WEIGHT: 315 LBS | RESPIRATION RATE: 16 BRPM | OXYGEN SATURATION: 97 % | DIASTOLIC BLOOD PRESSURE: 83 MMHG | BODY MASS INDEX: 44.1 KG/M2 | TEMPERATURE: 98.4 F | HEIGHT: 71 IN | SYSTOLIC BLOOD PRESSURE: 130 MMHG

## 2024-07-30 LAB
ALBUMIN SERPL BCP-MCNC: 3.6 G/DL (ref 3.4–5)
ANION GAP SERPL CALC-SCNC: 15 MMOL/L (ref 10–20)
BUN SERPL-MCNC: 22 MG/DL (ref 6–23)
CALCIUM SERPL-MCNC: 8.9 MG/DL (ref 8.6–10.3)
CHLORIDE SERPL-SCNC: 103 MMOL/L (ref 98–107)
CO2 SERPL-SCNC: 24 MMOL/L (ref 21–32)
CREAT SERPL-MCNC: 1.61 MG/DL (ref 0.5–1.3)
EGFRCR SERPLBLD CKD-EPI 2021: 51 ML/MIN/1.73M*2
GLUCOSE BLD MANUAL STRIP-MCNC: 174 MG/DL (ref 74–99)
GLUCOSE BLD MANUAL STRIP-MCNC: 192 MG/DL (ref 74–99)
GLUCOSE SERPL-MCNC: 198 MG/DL (ref 74–99)
PHOSPHATE SERPL-MCNC: 1.8 MG/DL (ref 2.5–4.9)
POTASSIUM SERPL-SCNC: 5.6 MMOL/L (ref 3.5–5.3)
SODIUM SERPL-SCNC: 136 MMOL/L (ref 136–145)

## 2024-07-30 PROCEDURE — 2500000001 HC RX 250 WO HCPCS SELF ADMINISTERED DRUGS (ALT 637 FOR MEDICARE OP): Performed by: PSYCHIATRY & NEUROLOGY

## 2024-07-30 PROCEDURE — 2500000001 HC RX 250 WO HCPCS SELF ADMINISTERED DRUGS (ALT 637 FOR MEDICARE OP): Performed by: INTERNAL MEDICINE

## 2024-07-30 PROCEDURE — 2500000004 HC RX 250 GENERAL PHARMACY W/ HCPCS (ALT 636 FOR OP/ED): Performed by: PSYCHIATRY & NEUROLOGY

## 2024-07-30 PROCEDURE — 2500000002 HC RX 250 W HCPCS SELF ADMINISTERED DRUGS (ALT 637 FOR MEDICARE OP, ALT 636 FOR OP/ED): Performed by: INTERNAL MEDICINE

## 2024-07-30 PROCEDURE — 80069 RENAL FUNCTION PANEL: CPT | Performed by: INTERNAL MEDICINE

## 2024-07-30 PROCEDURE — 36415 COLL VENOUS BLD VENIPUNCTURE: CPT | Performed by: INTERNAL MEDICINE

## 2024-07-30 PROCEDURE — 82947 ASSAY GLUCOSE BLOOD QUANT: CPT

## 2024-07-30 PROCEDURE — 2500000004 HC RX 250 GENERAL PHARMACY W/ HCPCS (ALT 636 FOR OP/ED): Performed by: PHYSICIAN ASSISTANT

## 2024-07-30 PROCEDURE — 99239 HOSP IP/OBS DSCHRG MGMT >30: CPT | Performed by: INTERNAL MEDICINE

## 2024-07-30 PROCEDURE — 2500000001 HC RX 250 WO HCPCS SELF ADMINISTERED DRUGS (ALT 637 FOR MEDICARE OP): Performed by: PHYSICIAN ASSISTANT

## 2024-07-30 PROCEDURE — RXMED WILLOW AMBULATORY MEDICATION CHARGE

## 2024-07-30 PROCEDURE — 97535 SELF CARE MNGMENT TRAINING: CPT | Mod: GO

## 2024-07-30 ASSESSMENT — COGNITIVE AND FUNCTIONAL STATUS - GENERAL
MOBILITY SCORE: 21
TOILETING: A LITTLE
DRESSING REGULAR LOWER BODY CLOTHING: A LOT
DRESSING REGULAR LOWER BODY CLOTHING: A LITTLE
DAILY ACTIVITIY SCORE: 21
DRESSING REGULAR UPPER BODY CLOTHING: A LITTLE
MOVING TO AND FROM BED TO CHAIR: A LITTLE
DRESSING REGULAR UPPER BODY CLOTHING: A LITTLE
HELP NEEDED FOR BATHING: A LOT
CLIMB 3 TO 5 STEPS WITH RAILING: A LITTLE
WALKING IN HOSPITAL ROOM: A LITTLE
HELP NEEDED FOR BATHING: A LITTLE
DAILY ACTIVITIY SCORE: 18

## 2024-07-30 ASSESSMENT — ACTIVITIES OF DAILY LIVING (ADL): HOME_MANAGEMENT_TIME_ENTRY: 16

## 2024-07-30 ASSESSMENT — PAIN - FUNCTIONAL ASSESSMENT
PAIN_FUNCTIONAL_ASSESSMENT: 0-10
PAIN_FUNCTIONAL_ASSESSMENT: 0-10
PAIN_FUNCTIONAL_ASSESSMENT: VAS (VISUAL ANALOG SCALE)

## 2024-07-30 ASSESSMENT — PAIN DESCRIPTION - LOCATION: LOCATION: GENERALIZED

## 2024-07-30 ASSESSMENT — PAIN SCALES - GENERAL
PAINLEVEL_OUTOF10: 4
PAINLEVEL_OUTOF10: 8
PAINLEVEL_OUTOF10: 4

## 2024-07-30 NOTE — CARE PLAN
The patient's goals for the shift include remaining safe and free of falls.    The clinical goals for the shift include Pt will remain safe throughout the shift    Over the shift, the patient did make progress toward the following goals.

## 2024-07-30 NOTE — DISCHARGE SUMMARY
"Discharge Diagnosis  Sepsis, due to unspecified organism, unspecified whether acute organ dysfunction present (Multi)    Issues Requiring Follow-Up  PCP follow-up    Discharge Meds     Your medication list        START taking these medications        Instructions Last Dose Given Next Dose Due   amoxicillin 500 mg capsule  Commonly known as: Amoxil      Take 2 capsules (1,000 mg) by mouth every 8 hours for 5 days.              CONTINUE taking these medications        Instructions Last Dose Given Next Dose Due   amLODIPine 5 mg tablet  Commonly known as: Norvasc           aspirin 81 mg EC tablet           atorvastatin 80 mg tablet  Commonly known as: Lipitor           cholecalciferol 50,000 unit capsule  Commonly known as: Vitamin D-3           DULoxetine 60 mg DR capsule  Commonly known as: Cymbalta           ferrous sulfate (325 mg ferrous sulfate) tablet           HYDROcodone-acetaminophen 7.5-325 mg tablet  Commonly known as: Norco      Take 1 tablet by mouth every 6 hours if needed for severe pain (7 - 10) for up to 28 days.       HYDROcodone-acetaminophen 7.5-325 mg tablet  Commonly known as: Norco  Start taking on: August 6, 2024      Take 1 tablet by mouth every 6 hours if needed for severe pain (7 - 10) for up to 28 days. Do not fill before August 6, 2024.       insulin aspart 100 unit/mL injection  Commonly known as: NovoLOG U-100 Insulin aspart      Inject 40 Units under the skin 3 times a day before meals.       insulin glargine 100 unit/mL (3 mL) pen  Commonly known as: Lantus      Inject 100 Units under the skin 2 times a day. Take as directed per insulin instructions.       omeprazole 40 mg DR capsule  Commonly known as: PriLOSEC           pen needle, diabetic 32 gauge x 5/32\" needle  Commonly known as: BD Ultra-Fine Noelle Pen Needle      1 Pen needle 2 times a day.       QUEtiapine 200 mg tablet  Commonly known as: SEROquel                  STOP taking these medications      lisinopril 5 mg tablet      " "  spironolactone 50 mg tablet  Commonly known as: Aldactone        torsemide 20 mg tablet  Commonly known as: Demadex                  Where to Get Your Medications        These medications were sent to [facility]  [address], [address], [place] [address]      Hours: 8 AM to 6 PM Mon-Fri, 9 AM to 1 PM Saturday Phone: [phone]   amoxicillin 500 mg capsule         Test Results Pending At Discharge  Pending Labs       No current pending labs.            Procedures       Hospital Course   Jai was Admitted to Hospital with Sepsis Secondary to Likely Pneumonia.  He Was Evaluated by the Infectious Disease Team and Started on IV Antibiotics.  He Did Slowly Improved.  He did seem very anxious here in  At 1 point discussed wanting to hurt himself.  He was evaluated by the psychiatry team who noted that he denied seeing any of these things.  His mood did seem fairly labile here.  His blood sugars were also fairly labile.  At this time he is otherwise hemodynamically stable and appropriate for discharge.  He will need to follow-up closely with his primary care physician.  He will also need to finish out a course of oral antibiotics.  This plan was discussed with him and he is in agreement.  All questions were answered.    Blood pressure 115/66, pulse 80, temperature 36.2 °C (97.2 °F), temperature source Temporal, resp. rate 16, height 1.804 m (5' 11.02\"), weight (!) 171 kg (378 lb), SpO2 94%.  Pertinent Physical Exam At Time of Discharge  Physical Exam  Vitals and nursing note reviewed.   Constitutional:       General: He is not in acute distress.     Appearance: Normal appearance. He is obese. He is not ill-appearing.   HENT:      Head: Normocephalic and atraumatic.      Mouth/Throat:      Mouth: Mucous membranes are moist.      Pharynx: Oropharynx is clear.   Eyes:      Extraocular Movements: Extraocular movements intact.      Conjunctiva/sclera: Conjunctivae normal.   Cardiovascular:      Rate and " Rhythm: Normal rate and regular rhythm.      Heart sounds: Normal heart sounds. No murmur heard.     No friction rub. No gallop.   Pulmonary:      Effort: Pulmonary effort is normal.      Breath sounds: Normal breath sounds. No wheezing or rales.   Abdominal:      General: Bowel sounds are normal. There is no distension.      Palpations: Abdomen is soft.      Tenderness: There is no abdominal tenderness. There is no guarding.   Musculoskeletal:         General: No swelling or tenderness.      Right lower leg: No edema.      Left lower leg: No edema.   Skin:     General: Skin is warm and dry.      Capillary Refill: Capillary refill takes less than 2 seconds.   Neurological:      General: No focal deficit present.      Mental Status: He is alert and oriented to person, place, and time.   Psychiatric:         Mood and Affect: Mood normal.         Outpatient Follow-Up  No future appointments.      David Franklin DO FACP     Time spent during this discharge: >30 min

## 2024-07-30 NOTE — PROGRESS NOTES
Occupational Therapy    OT Treatment    Patient Name: Jai Shrestha  MRN: [id number]  Today's Date: 7/30/2024  Time Calculation  Start Time: 1240  Stop Time: 1256  Time Calculation (min): 16 min        Assessment:  OT Assessment: Patient demonstrates inappropriate and aggressive behavior throughout session. Pt demonstrates a low tolerance for incconvenience/ agitation. Pt would benefit from low intensity OT at d/c for safety transitioning home however unsure patient would tolerate.  Prognosis: Fair  Barriers to Discharge: Decreased caregiver support  Evaluation/Treatment Tolerance: Treatment limited secondary to agitation  Medical Staff Made Aware: Yes  End of Session Communication: Bedside nurse  End of Session Patient Position: Bed, 3 rail up, Alarm on  Prognosis: Fair  Barriers to Discharge: Decreased caregiver support  Evaluation/Treatment Tolerance: Treatment limited secondary to agitation  Medical Staff Made Aware: Yes  Plan:  Treatment Interventions: ADL retraining, Endurance training, Functional transfer training  OT Frequency: 3 times per week  OT Discharge Recommendations: Low intensity level of continued care  Equipment Recommended upon Discharge: Wheeled walker  OT Recommended Transfer Status: Stand by assist  OT - OK to Discharge: Yes  Treatment Interventions: ADL retraining, Endurance training, Functional transfer training    Subjective   Previous Visit Info:  OT Last Visit  OT Received On: 07/30/24  General:  General  Reason for Referral: Impaired Mobility, Gait training, Impaired cognition/safety awareness: Pt is a 54 year old male admitted for fatigue, N/V and recurrent falls. Diagnosed with sepsis while in hospital.  Referred By: MYNOR Wagner PA-C  Past Medical History Relevant to Rehab:   Past Medical History:   Diagnosis Date    Chronic back pain     CKD (chronic kidney disease)     Diabetes mellitus (Multi)     HF (heart failure), diastolic (Multi)     Hypertension      Prior to Session Communication:  "Bedside nurse  Patient Position Received: Bed, 3 rail up, Alarm on  General Comment: agreeable to OT to dress for dc. Verbally agressive and inappropriate towards OT and nursing throughout session. Demonstrating physical aggression with personal belongings. generally inappropriate behaviors throughout session and not receptive to redirection  Precautions:  Medical Precautions: Fall precautions  Vital Signs:     Pain:  Pain Assessment  0-10 (Numeric) Pain Score:  (Pt states \"for the 387th time today, yes im in pain, my head, neck, shoulder, back, legs, feet. everything hurts. just like to told every fu**-* person that asked.\" Nursing notified of c/o pain and behaviors.)  Pain Location: Generalized    Objective    Cognition:  Cognition  Orientation Level: Oriented X4  Coordination:     Activities of Daily Living: UE Dressing  UE Dressing Level of Assistance: Close supervision, Setup (becomes irate that his shirt is inside out, begins to throw items and scream/ swear at OT. requires s/u to switch shirt to right side out.)  UE Dressing Where Assessed: Edge of bed    LE Dressing  LE Dressing: Yes  Pants Level of Assistance: Moderate assistance  Adult Briefs Level of Assistance: Moderate assistance  LE Dressing Where Assessed: Edge of bed  LE Dressing Comments: assist to thread BLEs into underwear and pants, assist to pull up socks. inappropriate beavior noted, refuses to attempt task, states the terrible accommodations of the hospital are the reasons he cannot adriane LB clothing, as his setup at home is \"ideal.\"    Bed Mobility/Transfers: Bed Mobility 1  Bed Mobility 1: Supine to sitting  Level of Assistance 1: Minimum assistance  Bed Mobility Comments 1: refuses to use bed rail, pulls on OT's arm, attempts to use momentum to sit up but is not successful  Bed Mobility 2  Bed Mobility  2: Sitting to supine  Level of Assistance 2: Modified independent    Transfer 1  Transfer From 1: Bed to  Transfer to 1: Stand  Technique 1: " Sit to stand, Stand to sit  Transfer Device 1: Walker  Transfer Level of Assistance 1: Contact guard      Outcome Measures:[facility] Daily Activity  Putting on and taking off regular lower body clothing: A lot  Bathing (including washing, rinsing, drying): A lot  Putting on and taking off regular upper body clothing: A little  Toileting, which includes using toilet, bedpan or urinal: A little  Taking care of personal grooming such as brushing teeth: None  Eating Meals: None  Daily Activity - Total Score: 18    Education Documentation  Precautions, taught by Wendy Soto OT at 7/30/2024  1:10 PM.  Learner: Patient  Readiness: Nonacceptance  Method: Explanation  Response: Needs Reinforcement, No Evidence of Learning    ADL Training, taught by Wendy Soto OT at 7/30/2024  1:10 PM.  Learner: Patient  Readiness: Nonacceptance  Method: Explanation  Response: Needs Reinforcement, No Evidence of Learning    Education Comments  No comments found.      Goals:  Encounter Problems       Encounter Problems (Active)       ADLs       Patient will perform LB bathing 75% with minimal assist  level of assistance and asdaptive equipment prn. (Not Progressing)       Start:  07/25/24    Expected End:  08/08/24            Patient with complete lower body dressing with minimal assist  level of assistance donning and doffing all LE clothes  with reacher, shoe horn, sock-aid, dressing stick , and elastic shoe laces while supported sitting (Not Progressing)       Start:  07/25/24    Expected End:  08/08/24            Pt will tolerate 30 min OT tx session w/o subj/obj c/o fatigue/SOB with activity to increase independence  (Met)       Start:  07/25/24    Expected End:  08/08/24    Resolved:  07/30/24            MOBILITY       Patient will perform Functional mobility mod  Household distances/Community Distances with supervision level of assistance and least restrictive device in order to improve safety and functional mobility. (Progressing)        Start:  07/25/24    Expected End:  08/08/24               TRANSFERS       Patient will complete functional transfer to chair  with least restrictive device with modified independent level of assistance. (Progressing)       Start:  07/25/24    Expected End:  08/08/24

## 2024-08-15 DIAGNOSIS — M54.16 LEFT LUMBAR RADICULOPATHY: ICD-10-CM

## 2024-08-15 DIAGNOSIS — M17.12 PRIMARY OSTEOARTHRITIS OF LEFT KNEE: ICD-10-CM

## 2024-08-15 DIAGNOSIS — M51.36 LUMBAR DISC NARROWING: ICD-10-CM

## 2024-08-15 RX ORDER — HYDROCODONE BITARTRATE AND ACETAMINOPHEN 7.5; 325 MG/1; MG/1
1 TABLET ORAL EVERY 6 HOURS PRN
Qty: 112 TABLET | Refills: 0 | Status: SHIPPED | OUTPATIENT
Start: 2024-08-15 | End: 2024-09-12

## 2024-09-04 ENCOUNTER — OFFICE VISIT (OUTPATIENT)
Dept: PAIN MEDICINE | Facility: CLINIC | Age: 54
End: 2024-09-04
Payer: MEDICARE

## 2024-09-04 ENCOUNTER — HOSPITAL ENCOUNTER (OUTPATIENT)
Dept: RADIOLOGY | Facility: CLINIC | Age: 54
Discharge: HOME | End: 2024-09-04
Payer: MEDICARE

## 2024-09-04 DIAGNOSIS — M51.36 LUMBAR DISC NARROWING: ICD-10-CM

## 2024-09-04 DIAGNOSIS — M25.551 PAIN OF RIGHT HIP: ICD-10-CM

## 2024-09-04 DIAGNOSIS — M54.16 LEFT LUMBAR RADICULOPATHY: ICD-10-CM

## 2024-09-04 DIAGNOSIS — M17.12 PRIMARY OSTEOARTHRITIS OF LEFT KNEE: ICD-10-CM

## 2024-09-04 DIAGNOSIS — M25.551 PAIN OF RIGHT HIP: Primary | ICD-10-CM

## 2024-09-04 PROCEDURE — 3052F HG A1C>EQUAL 8.0%<EQUAL 9.0%: CPT | Performed by: PHYSICAL MEDICINE & REHABILITATION

## 2024-09-04 PROCEDURE — 3060F POS MICROALBUMINURIA REV: CPT | Performed by: PHYSICAL MEDICINE & REHABILITATION

## 2024-09-04 PROCEDURE — 99214 OFFICE O/P EST MOD 30 MIN: CPT | Performed by: PHYSICAL MEDICINE & REHABILITATION

## 2024-09-04 RX ORDER — HYDROCODONE BITARTRATE AND ACETAMINOPHEN 7.5; 325 MG/1; MG/1
1 TABLET ORAL EVERY 6 HOURS PRN
Qty: 112 TABLET | Refills: 0 | Status: SHIPPED | OUTPATIENT
Start: 2024-09-12 | End: 2024-10-10

## 2024-09-04 RX ORDER — HYDROCODONE BITARTRATE AND ACETAMINOPHEN 7.5; 325 MG/1; MG/1
1 TABLET ORAL EVERY 6 HOURS PRN
Qty: 112 TABLET | Refills: 0 | Status: SHIPPED | OUTPATIENT
Start: 2024-10-10 | End: 2024-11-07

## 2024-09-04 NOTE — PROGRESS NOTES
Chief complaint  Back pain   New R hip pain since a fall    History  Jai Shrestha is back for pain management office visit  He tripped and fell and injured R hip last week  Having pain   The pain in the right  hip  is deep achy stabbing.  It is both on the anterior and posterior aspects and in the right groin.  The hip  pain is associated with sensation of crunching upon range of motion and weightbearing.  The pain is continuous at rest associated with stiffness with prolonged sitting and get worse with standing walking or any weightbearing activity.  Occasionally there is a feeling of fullness .  No change in color or texture of the skin.  No pain in the distal thigh or distal to the leg associated with the hip pain.  With episodes of aggravation of the pain there are occasion of sensation of instability but no falls.     Ambulating slowly    The pain is interfering with activities of daily living, quality of life and quality of sleep. It is limiting the functions and everything takes longer to complete because of the slowing related to the pain. Movements are cautious to avoid aggravation of the symptoms.       Pain level without medication is 8/10 , with the medication pain level 4 to 5 /10. Bc of hte aggravation      The pain meds are helping control the pain and improving Activities of Daily living and quality of life and quality of sleep.    opioids treatment agreement Jan 2024  Pill count today, using count tray, and in front of patient :  2    pills , last fill was on 8/15  for 112 tabs,  the count is correct  Oarrs pulled and reviewed, no concerns  last urine toxicology testing earlier this year and it was compliant we will repeat  Xray updated spine will need xray of R hip   ORT Score is  0  Pain pathology and pain generators spine nad hip  Modalities tried injection, surgery, physical therapy, TENS unit, nonsteroidal anti-inflammatory medication       Review of Systems :  Denied any fever or chills. No  "weight loss and no night sweats. No cough or sputum production. No diarrhea   The constipation has been responding to fibers and over the counter medications.     No bladder and bowel incontinence and no other changes in bladder and bowel. No skin changes.  Reports tiredness and fatigability only if the pain is not controlled.     Denied opioids diversion and abuse and denies alcoholism. Denies overuse of the pain medications.  No reported euphoria sensation or getting a \"high\" on the pain medications.    The control of the pain with the pain medications is helping the control of the symptoms and allowing the function and activities of daily living, enjoyment of life, improving the quality of life and sleep with less interruption by the pain. The goal is symptomatic control of the nonmalignant chronic pain and not to repair the permanent damage in the tissues inducing the chronic pain conditions. We are aiming to shift the focus from the nonmalignant chronic pain to other aspects of life by symptomatically treating this chronic pain. If this pain is not treated it will lead to major morbidity and it is also associated with increased risks of mortality. The patient understands those very clearly and also understand high risks of morbidity and mortality if not strictly adherent to the treatment recommendations and reporting any associated side effects. Also patient understand the full responsibility associated with these medications to avoid abuse or overuse or any use of these medications for anything besides treating the patient's own chronic pain and nothing else under any circumstances.        Physical examination  Awake, alert and oriented for time place and persons   declined Chaperone for the visit and was adequately  draped for the exam.    Examination of the right hip pain with range of motion mainly with internal rotation. Normal skin color and texture.  There is a  sensation of crepitation upon range of " motion.  Range of motion from 5  degrees internal rotation increases the pain . No contracture. . No medial lateral instability.    Homans' sign is negative.  Calves are soft.  Hip flexion and extension is 4/5 and limited by the pain.   No aberrant pain behavior.     Diagnosis  Problem List Items Addressed This Visit       Left knee DJD    Relevant Medications    HYDROcodone-acetaminophen (Norco) 7.5-325 mg tablet (Start on 9/12/2024)    HYDROcodone-acetaminophen (Norco) 7.5-325 mg tablet (Start on 10/10/2024)    Lumbar disc narrowing    Relevant Medications    HYDROcodone-acetaminophen (Norco) 7.5-325 mg tablet (Start on 9/12/2024)    HYDROcodone-acetaminophen (Norco) 7.5-325 mg tablet (Start on 10/10/2024)    Left lumbar radiculopathy    Relevant Medications    HYDROcodone-acetaminophen (Norco) 7.5-325 mg tablet (Start on 9/12/2024)    HYDROcodone-acetaminophen (Norco) 7.5-325 mg tablet (Start on 10/10/2024)    Pain of right hip - Primary    Relevant Orders    XR hip right with pelvis when performed 2 or 3 views        Plan  Reviewed the pain generators.  Went over the types of pain with neuropathic and nociceptive and different pathologies and therapeutic modalities. Discussed the mechanism of action of interventions from acupuncture, physical therapy , regular exercises, injections, botox, spinal cord stimulation, and role of surgery     Went over pathology of the intervertebral disc displacement and the anatomical relation to the Nerve roots and relation to the radicular symptoms. Went over treatment modalities with conservative treatment including acupuncture   and epidural steroid injection with fluoroscopy guidance and last resort of surgery    Based on the above findings and the clinical response to the opioids medications and improvement of the activities of daily living, sleep, and work performance. We made this complex decision to continue the opioids therapy in light of the evidence of the patient's  responsibility in using the pain medications as prescribed for the nonmalignant chronic pain condition. We discussed about the use of the pain medications to treat the symptoms of chronic nonmalignant pain and we are not trying the repair the permanent damage in the tissues, rather we are trying to control the symptoms induced by the permanent damage to the tissues inducing the chronic pain condition and resulting disability. I explained the difference and discussed it with the patient and stressed the importance of knowing the difference especially because of the potential side effects and the potential addicting effect and habit forming nature of the dangerous drugs we are using to treat the symptoms of the chronic pain.      We discussed that we are prescribing the medications on good alirio and legitimate medical reason.     We reviewed the side effects and precautions of opioids prescriptions as discussed in the opioids treatment agreement.    realizes the interaction between the therapeutic classes including the respiratory depression and potential death     Random drug testing   we will submit     R hip pain   Continue with pain meds  He is early on meds bc of pain but will not allow early refill. Will stay on fill schedule . Dw him abotu avoiding to run out early again or will have to cut back on pain meds     Discussed about NSAIDS and I explained about the opioids sparing effect to allow keeping the opioids dose at minimal effective dose.   I went over the potential side effects of the NSAIDS on the gastrointestinal, renal and cardiovascular systems.      I detailed the side effects from the acetaminophen in the medication and made aware of those. I also explained about the cumulative effects on the organs and mainly the liver.     Given the opioids therapy , we discussed about the risk for accidental over dose on the pain medications, either for patient or other household. I went over the mechanism of  action and mode of use of the Naloxone according to the  recommendations. I will provide a prescription for a kit.     Follow-up 8 weeks or earlier if needed     The level of clinical decision making in this office visit,  is high, given the high risks of complications with the morbidity and mortality due to the fact that acute and chronic pain may pose a threat to life and bodily function, if under treated, poorly treated, or with failure to maintain adequate treatment and timely medical follow up. Additionally over treatment has its own set of complications including overdosing on the pain medications and also the habit forming potentials with the use of the medications used to treat chronic painful conditions including therapeutic classes classified as dangerous medications. Given the serious and fluctuating nature of pain level and instensity with extensive consideration for whenever pain changes, there is always the risk of prolonged functional impairment requiring close patient monitoring with regular assessments and reassessments and high level medical decision making at every office visit. The amount and complexity of data reviewed is high given the patient clinical presentation, labs,  data, radiology reports, and other tests as discussed during office visits. Pertinent data whether positive or negative were taken in consideration in the process of making this high level medical decision.

## 2024-09-17 ENCOUNTER — APPOINTMENT (OUTPATIENT)
Dept: PAIN MEDICINE | Facility: CLINIC | Age: 54
End: 2024-09-17
Payer: MEDICARE

## 2024-10-29 ENCOUNTER — HOSPITAL ENCOUNTER (EMERGENCY)
Facility: HOSPITAL | Age: 54
Discharge: HOME | End: 2024-10-29
Attending: EMERGENCY MEDICINE
Payer: MEDICARE

## 2024-10-29 ENCOUNTER — APPOINTMENT (OUTPATIENT)
Dept: RADIOLOGY | Facility: HOSPITAL | Age: 54
End: 2024-10-29
Payer: MEDICARE

## 2024-10-29 VITALS
SYSTOLIC BLOOD PRESSURE: 167 MMHG | TEMPERATURE: 98.1 F | HEART RATE: 87 BPM | DIASTOLIC BLOOD PRESSURE: 78 MMHG | RESPIRATION RATE: 18 BRPM | OXYGEN SATURATION: 97 %

## 2024-10-29 DIAGNOSIS — R00.2 PALPITATIONS: Primary | ICD-10-CM

## 2024-10-29 LAB
ALBUMIN SERPL BCP-MCNC: 3.8 G/DL (ref 3.4–5)
ALP SERPL-CCNC: 119 U/L (ref 33–120)
ALT SERPL W P-5'-P-CCNC: 14 U/L (ref 10–52)
ANION GAP SERPL CALC-SCNC: 12 MMOL/L (ref 10–20)
AST SERPL W P-5'-P-CCNC: 20 U/L (ref 9–39)
BASOPHILS # BLD AUTO: 0.03 X10*3/UL (ref 0–0.1)
BASOPHILS NFR BLD AUTO: 0.2 %
BILIRUB SERPL-MCNC: 0.4 MG/DL (ref 0–1.2)
BNP SERPL-MCNC: 64 PG/ML (ref 0–99)
BUN SERPL-MCNC: 29 MG/DL (ref 6–23)
CALCIUM SERPL-MCNC: 9.6 MG/DL (ref 8.6–10.3)
CARDIAC TROPONIN I PNL SERPL HS: 22 NG/L (ref 0–20)
CARDIAC TROPONIN I PNL SERPL HS: 23 NG/L (ref 0–20)
CHLORIDE SERPL-SCNC: 96 MMOL/L (ref 98–107)
CO2 SERPL-SCNC: 34 MMOL/L (ref 21–32)
CREAT SERPL-MCNC: 1.46 MG/DL (ref 0.5–1.3)
EGFRCR SERPLBLD CKD-EPI 2021: 57 ML/MIN/1.73M*2
EOSINOPHIL # BLD AUTO: 0.3 X10*3/UL (ref 0–0.7)
EOSINOPHIL NFR BLD AUTO: 2.4 %
ERYTHROCYTE [DISTWIDTH] IN BLOOD BY AUTOMATED COUNT: 16.4 % (ref 11.5–14.5)
FLUAV RNA RESP QL NAA+PROBE: NOT DETECTED
FLUBV RNA RESP QL NAA+PROBE: NOT DETECTED
GLUCOSE SERPL-MCNC: 386 MG/DL (ref 74–99)
HCT VFR BLD AUTO: 33.1 % (ref 41–52)
HGB BLD-MCNC: 9.3 G/DL (ref 13.5–17.5)
IMM GRANULOCYTES # BLD AUTO: 0.17 X10*3/UL (ref 0–0.7)
IMM GRANULOCYTES NFR BLD AUTO: 1.4 % (ref 0–0.9)
LYMPHOCYTES # BLD AUTO: 1.11 X10*3/UL (ref 1.2–4.8)
LYMPHOCYTES NFR BLD AUTO: 9 %
MCH RBC QN AUTO: 22.9 PG (ref 26–34)
MCHC RBC AUTO-ENTMCNC: 28.1 G/DL (ref 32–36)
MCV RBC AUTO: 82 FL (ref 80–100)
MONOCYTES # BLD AUTO: 0.69 X10*3/UL (ref 0.1–1)
MONOCYTES NFR BLD AUTO: 5.6 %
NEUTROPHILS # BLD AUTO: 10.07 X10*3/UL (ref 1.2–7.7)
NEUTROPHILS NFR BLD AUTO: 81.4 %
NRBC BLD-RTO: 0 /100 WBCS (ref 0–0)
PLATELET # BLD AUTO: 237 X10*3/UL (ref 150–450)
POTASSIUM SERPL-SCNC: 4.3 MMOL/L (ref 3.5–5.3)
PROT SERPL-MCNC: 7.1 G/DL (ref 6.4–8.2)
RBC # BLD AUTO: 4.06 X10*6/UL (ref 4.5–5.9)
RSV RNA RESP QL NAA+PROBE: NOT DETECTED
SARS-COV-2 RNA RESP QL NAA+PROBE: NOT DETECTED
SODIUM SERPL-SCNC: 138 MMOL/L (ref 136–145)
WBC # BLD AUTO: 12.4 X10*3/UL (ref 4.4–11.3)

## 2024-10-29 PROCEDURE — 84484 ASSAY OF TROPONIN QUANT: CPT | Performed by: EMERGENCY MEDICINE

## 2024-10-29 PROCEDURE — 85025 COMPLETE CBC W/AUTO DIFF WBC: CPT | Performed by: EMERGENCY MEDICINE

## 2024-10-29 PROCEDURE — 99284 EMERGENCY DEPT VISIT MOD MDM: CPT | Mod: 25

## 2024-10-29 PROCEDURE — 2500000001 HC RX 250 WO HCPCS SELF ADMINISTERED DRUGS (ALT 637 FOR MEDICARE OP): Performed by: EMERGENCY MEDICINE

## 2024-10-29 PROCEDURE — 36415 COLL VENOUS BLD VENIPUNCTURE: CPT | Performed by: EMERGENCY MEDICINE

## 2024-10-29 PROCEDURE — 71045 X-RAY EXAM CHEST 1 VIEW: CPT

## 2024-10-29 PROCEDURE — 83880 ASSAY OF NATRIURETIC PEPTIDE: CPT | Performed by: EMERGENCY MEDICINE

## 2024-10-29 PROCEDURE — 96374 THER/PROPH/DIAG INJ IV PUSH: CPT

## 2024-10-29 PROCEDURE — 87637 SARSCOV2&INF A&B&RSV AMP PRB: CPT | Performed by: EMERGENCY MEDICINE

## 2024-10-29 PROCEDURE — 2500000004 HC RX 250 GENERAL PHARMACY W/ HCPCS (ALT 636 FOR OP/ED): Performed by: EMERGENCY MEDICINE

## 2024-10-29 PROCEDURE — 84075 ASSAY ALKALINE PHOSPHATASE: CPT | Performed by: EMERGENCY MEDICINE

## 2024-10-29 PROCEDURE — 71045 X-RAY EXAM CHEST 1 VIEW: CPT | Performed by: RADIOLOGY

## 2024-10-29 RX ORDER — HYDROMORPHONE HYDROCHLORIDE 1 MG/ML
1 INJECTION, SOLUTION INTRAMUSCULAR; INTRAVENOUS; SUBCUTANEOUS ONCE
Status: COMPLETED | OUTPATIENT
Start: 2024-10-29 | End: 2024-10-29

## 2024-10-29 RX ORDER — FAMOTIDINE 20 MG/1
20 TABLET, FILM COATED ORAL ONCE
Status: COMPLETED | OUTPATIENT
Start: 2024-10-29 | End: 2024-10-29

## 2024-10-29 RX ORDER — ALUMINUM HYDROXIDE, MAGNESIUM HYDROXIDE, AND SIMETHICONE 1200; 120; 1200 MG/30ML; MG/30ML; MG/30ML
30 SUSPENSION ORAL ONCE
Status: COMPLETED | OUTPATIENT
Start: 2024-10-29 | End: 2024-10-29

## 2024-10-29 ASSESSMENT — PAIN - FUNCTIONAL ASSESSMENT: PAIN_FUNCTIONAL_ASSESSMENT: 0-10

## 2024-10-29 ASSESSMENT — PAIN SCALES - GENERAL
PAINLEVEL_OUTOF10: 1
PAINLEVEL_OUTOF10: 10 - WORST POSSIBLE PAIN

## 2024-10-29 ASSESSMENT — PAIN DESCRIPTION - LOCATION: LOCATION: BACK

## 2024-10-29 ASSESSMENT — PAIN DESCRIPTION - ORIENTATION: ORIENTATION: LOWER

## 2024-11-05 ENCOUNTER — APPOINTMENT (OUTPATIENT)
Dept: PAIN MEDICINE | Facility: CLINIC | Age: 54
End: 2024-11-05
Payer: MEDICARE

## 2024-11-05 DIAGNOSIS — M17.12 PRIMARY OSTEOARTHRITIS OF LEFT KNEE: ICD-10-CM

## 2024-11-05 DIAGNOSIS — M54.16 LEFT LUMBAR RADICULOPATHY: ICD-10-CM

## 2024-11-05 DIAGNOSIS — M51.369 LUMBAR DISC NARROWING: ICD-10-CM

## 2024-11-05 PROCEDURE — G2211 COMPLEX E/M VISIT ADD ON: HCPCS | Performed by: PHYSICAL MEDICINE & REHABILITATION

## 2024-11-05 PROCEDURE — 99214 OFFICE O/P EST MOD 30 MIN: CPT | Performed by: PHYSICAL MEDICINE & REHABILITATION

## 2024-11-05 PROCEDURE — 3060F POS MICROALBUMINURIA REV: CPT | Performed by: PHYSICAL MEDICINE & REHABILITATION

## 2024-11-05 PROCEDURE — 3052F HG A1C>EQUAL 8.0%<EQUAL 9.0%: CPT | Performed by: PHYSICAL MEDICINE & REHABILITATION

## 2024-11-05 RX ORDER — HYDROCODONE BITARTRATE AND ACETAMINOPHEN 7.5; 325 MG/1; MG/1
1 TABLET ORAL EVERY 6 HOURS PRN
Qty: 112 TABLET | Refills: 0 | Status: SHIPPED | OUTPATIENT
Start: 2024-11-05 | End: 2024-12-03

## 2024-11-05 RX ORDER — HYDROCODONE BITARTRATE AND ACETAMINOPHEN 7.5; 325 MG/1; MG/1
1 TABLET ORAL EVERY 6 HOURS PRN
Qty: 112 TABLET | Refills: 0 | Status: SHIPPED | OUTPATIENT
Start: 2024-12-03 | End: 2024-12-31

## 2024-11-05 NOTE — PROGRESS NOTES
"Chief complaint  Back and legs pain     History  Jai Shrestha is back for pain management office visit  Having low fever. Jut came out of [facility]". Jai Shrestha was at home.  Could not physically come to the office because of upper respiratory tract symptoms that are being worked up.  I was at the office.  I used secure audiovisual communication provided by the Epic system. Jai Shrestha  agreed on this form of communication and consented to the visit via A/V method.  The patient also understood that this will be billed as office visit..     the chronic non malignant pain  Keeping at minimal effective dose    Pain similar to before in back and knee. characteristics  as before intensity controled       Pain level without medication is 8/10 , with the medication pain level 2/10.     The pain meds are helping control the pain and improving Activities of Daily living and quality of life and quality of sleep.    opioids treatment agreement Jan 2024  Pill count on time   Oarrs pulled and reviewed, no concerns  last urine toxicology testing earlier this year and it was compliant we will repeat  Xray updated spien   ORT Score is   0  Pain pathology and pain generators spine   Modalities tried injection, surgery, physical therapy, TENS unit, nonsteroidal anti-inflammatory medication       Review of Systems :  Denied any fever or chills. No weight loss and no night sweats. No cough or sputum production. No diarrhea   The constipation has been responding to fibers and over the counter medications.     No bladder and bowel incontinence and no other changes in bladder and bowel. No skin changes.  Reports tiredness and fatigability only if the pain is not controlled.     Denied opioids diversion and abuse and denies alcoholism. Denies overuse of the pain medications.  No reported euphoria sensation or getting a \"high\" on the pain medications.    The control of the pain with the pain medications is helping the control of the " symptoms and allowing the function and activities of daily living, enjoyment of life, improving the quality of life and sleep with less interruption by the pain. The goal is symptomatic control of the nonmalignant chronic pain and not to repair the permanent damage in the tissues inducing the chronic pain conditions. We are aiming to shift the focus from the nonmalignant chronic pain to other aspects of life by symptomatically treating this chronic pain. If this pain is not treated it will lead to major morbidity and it is also associated with increased risks of mortality. The patient understands those very clearly and also understand high risks of morbidity and mortality if not strictly adherent to the treatment recommendations and reporting any associated side effects. Also patient understand the full responsibility associated with these medications to avoid abuse or overuse or any use of these medications for anything besides treating the patient's own chronic pain and nothing else under any circumstances.        Physical examination  Awake, alert and oriented for time place and persons   This is a secure AV visit     Diagnosis  Problem List Items Addressed This Visit       Left knee DJD    Relevant Medications    HYDROcodone-acetaminophen (Norco) 7.5-325 mg tablet    HYDROcodone-acetaminophen (Norco) 7.5-325 mg tablet (Start on 12/3/2024)    Lumbar disc narrowing    Relevant Medications    HYDROcodone-acetaminophen (Norco) 7.5-325 mg tablet    HYDROcodone-acetaminophen (Norco) 7.5-325 mg tablet (Start on 12/3/2024)    Left lumbar radiculopathy    Relevant Medications    HYDROcodone-acetaminophen (Norco) 7.5-325 mg tablet    HYDROcodone-acetaminophen (Norco) 7.5-325 mg tablet (Start on 12/3/2024)        Plan  Reviewed the pain generators.  Went over the types of pain with neuropathic and nociceptive and different pathologies and therapeutic modalities. Discussed the mechanism of action of interventions from  acupuncture, physical therapy , regular exercises, injections, botox, spinal cord stimulation, and role of surgery     Went over pathology of the intervertebral disc displacement and the anatomical relation to the Nerve roots and relation to the radicular symptoms. Went over treatment modalities with conservative treatment including acupuncture   and epidural steroid injection with fluoroscopy guidance and last resort of surgery    Based on the above findings and the clinical response to the opioids medications and improvement of the activities of daily living, sleep, and work performance. We made this complex decision to continue the opioids therapy in light of the evidence of the patient's responsibility in using the pain medications as prescribed for the nonmalignant chronic pain condition. We discussed about the use of the pain medications to treat the symptoms of chronic nonmalignant pain and we are not trying the repair the permanent damage in the tissues, rather we are trying to control the symptoms induced by the permanent damage to the tissues inducing the chronic pain condition and resulting disability. I explained the difference and discussed it with the patient and stressed the importance of knowing the difference especially because of the potential side effects and the potential addicting effect and habit forming nature of the dangerous drugs we are using to treat the symptoms of the chronic pain.      We discussed that we are prescribing the medications on good alirio and legitimate medical reason.     We reviewed the side effects and precautions of opioids prescriptions as discussed in the opioids treatment agreement.    realizes the interaction between the therapeutic classes including the respiratory depression and potential death     Random drug testing   we will submit     Continue with pain meds  realizes the interaction between the therapeutic classes including the respiratory depression and  potential death   has a narcan at home know how and when to use it if needed.     Discussed about NSAIDS and I explained about the opioids sparing effect to allow keeping the opioids dose at minimal effective dose.   I went over the potential side effects of the NSAIDS on the gastrointestinal, renal and cardiovascular systems.      I detailed the side effects from the acetaminophen in the medication and made aware of those. I also explained about the cumulative effects on the organs and mainly the liver.     Given the opioids therapy , we discussed about the risk for accidental over dose on the pain medications, either for patient or other household. I went over the mechanism of action and mode of use of the Naloxone according to the  recommendations. I will provide a prescription for a kit.     Follow-up 8 weeks or earlier if needed     The level of clinical decision making in this office visit,  is high, given the high risks of complications with the morbidity and mortality due to the fact that acute and chronic pain may pose a threat to life and bodily function, if under treated, poorly treated, or with failure to maintain adequate treatment and timely medical follow up. Additionally over treatment has its own set of complications including overdosing on the pain medications and also the habit forming potentials with the use of the medications used to treat chronic painful conditions including therapeutic classes classified as dangerous medications. Given the serious and fluctuating nature of pain level and instensity with extensive consideration for whenever pain changes, there is always the risk of prolonged functional impairment requiring close patient monitoring with regular assessments and reassessments and high level medical decision making at every office visit. The amount and complexity of data reviewed is high given the patient clinical presentation, labs,  data, radiology reports, and  other tests as discussed during office visits. Pertinent data whether positive or negative were taken in consideration in the process of making this high level medical decision.

## 2024-11-11 NOTE — PROGRESS NOTES
Physical Therapy                 Therapy Communication Note    Patient Name: Jai Shrestha  MRN: [id number]  Today's Date: 6/13/2024     Discipline: Physical Therapy    Missed Visit Reason: Missed Visit Reason: Other (Comment)    Comment: PT consult received, performed chart review. Pt AMPAC of 18 per charting, also per charting up and moving independently in room. Per [facility] charting, pt planning on home going when medically cleared, spoke with team via secure chat, ok with Screen and DC, will DC consult from PT caseload.   Use miralax: start with 1 capful daily and in 3-4 days, depending on the response--adjust dose if needed.  1 capful if not regular bowel movements OR 1/4 capful is having frequent stools.    Please stay on miralax for 3-4 weeks and work on eating better to see if your can wean off of miralax.    See primary for follow up next week.     Return if worsening symptoms or seek care in ER.

## 2025-01-06 ENCOUNTER — HOSPITAL ENCOUNTER (EMERGENCY)
Facility: HOSPITAL | Age: 55
Discharge: HOME | End: 2025-01-06
Attending: STUDENT IN AN ORGANIZED HEALTH CARE EDUCATION/TRAINING PROGRAM
Payer: MEDICARE

## 2025-01-06 ENCOUNTER — APPOINTMENT (OUTPATIENT)
Dept: RADIOLOGY | Facility: HOSPITAL | Age: 55
End: 2025-01-06
Payer: MEDICARE

## 2025-01-06 VITALS
DIASTOLIC BLOOD PRESSURE: 76 MMHG | SYSTOLIC BLOOD PRESSURE: 147 MMHG | TEMPERATURE: 98 F | HEART RATE: 83 BPM | OXYGEN SATURATION: 96 % | RESPIRATION RATE: 18 BRPM

## 2025-01-06 DIAGNOSIS — S93.402A SPRAIN OF LEFT ANKLE, UNSPECIFIED LIGAMENT, INITIAL ENCOUNTER: Primary | ICD-10-CM

## 2025-01-06 LAB
FLUAV RNA RESP QL NAA+PROBE: NOT DETECTED
FLUBV RNA RESP QL NAA+PROBE: NOT DETECTED
RSV RNA RESP QL NAA+PROBE: NOT DETECTED
SARS-COV-2 RNA RESP QL NAA+PROBE: NOT DETECTED

## 2025-01-06 PROCEDURE — 96372 THER/PROPH/DIAG INJ SC/IM: CPT | Performed by: STUDENT IN AN ORGANIZED HEALTH CARE EDUCATION/TRAINING PROGRAM

## 2025-01-06 PROCEDURE — 73564 X-RAY EXAM KNEE 4 OR MORE: CPT | Mod: LT

## 2025-01-06 PROCEDURE — 72131 CT LUMBAR SPINE W/O DYE: CPT | Performed by: RADIOLOGY

## 2025-01-06 PROCEDURE — 99285 EMERGENCY DEPT VISIT HI MDM: CPT | Mod: 25 | Performed by: STUDENT IN AN ORGANIZED HEALTH CARE EDUCATION/TRAINING PROGRAM

## 2025-01-06 PROCEDURE — 2500000004 HC RX 250 GENERAL PHARMACY W/ HCPCS (ALT 636 FOR OP/ED): Performed by: STUDENT IN AN ORGANIZED HEALTH CARE EDUCATION/TRAINING PROGRAM

## 2025-01-06 PROCEDURE — 73590 X-RAY EXAM OF LOWER LEG: CPT | Mod: LT

## 2025-01-06 PROCEDURE — 73564 X-RAY EXAM KNEE 4 OR MORE: CPT | Mod: LEFT SIDE | Performed by: RADIOLOGY

## 2025-01-06 PROCEDURE — 73610 X-RAY EXAM OF ANKLE: CPT | Mod: LT

## 2025-01-06 PROCEDURE — 73610 X-RAY EXAM OF ANKLE: CPT | Mod: LEFT SIDE | Performed by: RADIOLOGY

## 2025-01-06 PROCEDURE — 72131 CT LUMBAR SPINE W/O DYE: CPT

## 2025-01-06 PROCEDURE — 87637 SARSCOV2&INF A&B&RSV AMP PRB: CPT | Performed by: STUDENT IN AN ORGANIZED HEALTH CARE EDUCATION/TRAINING PROGRAM

## 2025-01-06 RX ORDER — IBUPROFEN 600 MG/1
600 TABLET ORAL EVERY 6 HOURS PRN
Qty: 28 TABLET | Refills: 0 | Status: SHIPPED | OUTPATIENT
Start: 2025-01-06 | End: 2025-01-13

## 2025-01-06 RX ORDER — IBUPROFEN 600 MG/1
600 TABLET ORAL EVERY 6 HOURS PRN
Qty: 28 TABLET | Refills: 0 | Status: SHIPPED | OUTPATIENT
Start: 2025-01-06 | End: 2025-01-06

## 2025-01-06 RX ORDER — CYCLOBENZAPRINE HCL 10 MG
10 TABLET ORAL 2 TIMES DAILY PRN
Qty: 10 TABLET | Refills: 0 | Status: SHIPPED | OUTPATIENT
Start: 2025-01-06 | End: 2025-01-11

## 2025-01-06 RX ADMIN — HYDROMORPHONE HYDROCHLORIDE 0.5 MG: 1 INJECTION, SOLUTION INTRAMUSCULAR; INTRAVENOUS; SUBCUTANEOUS at 14:24

## 2025-01-06 ASSESSMENT — COLUMBIA-SUICIDE SEVERITY RATING SCALE - C-SSRS
1. IN THE PAST MONTH, HAVE YOU WISHED YOU WERE DEAD OR WISHED YOU COULD GO TO SLEEP AND NOT WAKE UP?: NO
2. HAVE YOU ACTUALLY HAD ANY THOUGHTS OF KILLING YOURSELF?: NO
6. HAVE YOU EVER DONE ANYTHING, STARTED TO DO ANYTHING, OR PREPARED TO DO ANYTHING TO END YOUR LIFE?: NO

## 2025-01-06 ASSESSMENT — PAIN SCALES - GENERAL: PAINLEVEL_OUTOF10: 5 - MODERATE PAIN

## 2025-01-06 ASSESSMENT — PAIN - FUNCTIONAL ASSESSMENT: PAIN_FUNCTIONAL_ASSESSMENT: 0-10

## 2025-01-06 NOTE — ED TRIAGE NOTES
Pt to ED from home with c/o lower back pain. Pt states he fell outside a couple days ago, only hurts when he moves. Pt normally uses a  walker to ambulate, pt did not take any medications for pain. Pt has a home health aid.

## 2025-01-06 NOTE — ED PROVIDER NOTES
"HPI   Chief Complaint   Patient presents with    Back Pain       HPI  64-year-old with history of chronic back pain, CKD, CHF, T2DM, hypertension presents after mechanical fall with complaints of right lower back pain, pain in his left ankle and left knee.  He states he stepped off the curb onto a patch of ice, felt a \"pop\" in his left ankle and twisted his left leg, then fell backwards into his car, hitting his right flank on the side of the car.  He states he was able to bear weight but had significant pain in his left ankle, left knee, and right lower side of his back.  He states the pain has been gradually improving since the incident. He did not hit his head and did not lose consciousness.  He states he has chronic back pain and follows with pain management, he states his pain management doctor is out of the country for the next month, and asks if we can give him \"a shot of Dilaudid for his anxiety\" and then asks for a 30-day refill of his pain management medications.  He denies lightheadedness, dizziness, vision changes, chest pain, shortness of breath, arm pain, abdominal pain, nausea, vomiting, diarrhea, inability to bear weight, numbness, tingling, weakness, dysuria, hematuria.      Patient History   Past Medical History:   Diagnosis Date    Chronic back pain     CKD (chronic kidney disease)     Diabetes mellitus (Multi)     HF (heart failure), diastolic (Multi)     Hypertension      No past surgical history on file.  No family history on file.  Social History     Tobacco Use    Smoking status: Former     Types: Cigarettes    Smokeless tobacco: Not on file   Substance Use Topics    Alcohol use: Yes    Drug use: Not Currently       Physical Exam   ED Triage Vitals [01/06/25 1236]   Temperature Heart Rate Respirations BP   37.2 °C (98.9 °F) 88 20 140/80      Pulse Ox Temp Source Heart Rate Source Patient Position   96 % Oral -- --      BP Location FiO2 (%)     -- --       Physical Exam  Constitutional:       " "Appearance: He is obese.   HENT:      Head: Normocephalic and atraumatic.      Comments: No hemotympanum, no hyatt sign, no raccoon eyes     Mouth/Throat:      Pharynx: Oropharynx is clear.   Eyes:      Pupils: Pupils are equal, round, and reactive to light.   Cardiovascular:      Rate and Rhythm: Normal rate and regular rhythm.   Pulmonary:      Effort: Pulmonary effort is normal.      Breath sounds: Normal breath sounds.   Abdominal:      Palpations: Abdomen is soft.      Tenderness: There is no abdominal tenderness. There is no right CVA tenderness, left CVA tenderness, guarding or rebound.   Musculoskeletal:         General: Normal range of motion.      Cervical back: Normal range of motion. Tenderness (+ midline lumbar tenderness. No midline cervical or thoracic tenderness. No step offs or deformities.) present.   Neurological:      Mental Status: He is alert.           ED Course & MDM   ED Course as of 01/07/25 1506   Mon Jan 06, 2025   1439 Patient now requesting swabs for COVID flu and RSV as he states he has had a cough productive of some mucus.  Viral swabs added. [JG]      ED Course User Index  [JG] Radha Sanchez MD         Diagnoses as of 01/07/25 1506   Sprain of left ankle, unspecified ligament, initial encounter                 No data recorded                           Medical Decision Making  64-year-old with history of chronic back pain, CKD, CHF, T2DM, hypertension presents after mechanical fall with complaints of right lower back pain, pain in his left ankle and left knee.  He states he stepped off the curb onto a patch of ice, felt a \"pop\" in his left ankle and twisted his left leg, then fell backwards into his car, hitting his right flank on the side of the car. Has been able to bear weight with pain. He reports the pain has been improving over the day. He follows with pain management but states his provider is out of town. When I walk in the room he states \"it's a good thing you came " "in now and not 5 minutes later because I've been waiting and I was about to freak out and create a scene, I need a shot of dilaudid\". He asks if we can give him \"a shot of Dilaudid for his anxiety\" and then asks for a 30-day refill of his pain management medications. On exam, well appearing and nontoxic. Vitals stable, normotensive, not tachycardic, not tachypneic. Afebrile. Lungs CTAB. Abdomen soft and nontender in all quadrants, no rebound rigidity or guarding, no CVA tenderness. No focal neurologic deficits. No red flag back pain symptoms. 2+ DP and PT pulses in left foot. No edema or erythema. No open wounds. Calf circumference equal bilaterally. Will obtain imaging to rule out any bony fracture or dislocation. Discussed with patient that we will not be prescribing narcotics at discharge as patient needs to follow with his pain management provider. Discussed that while workup is pending we will give a one time small dose as a courtesy, but that is not to be expected during visits to the ER and that patient will only be receiving this one time dose if he remains calm and cooperative throughout the workup. He is aware that aggression and threatening staff will not be tolerated in our emergency department. He was thereafter calm and cooperative. Patient voiced understanding. Imaging showing no lumbar compression fracture, no fracture or dislocation of ankle, tib/fib or knee. Impression is sprain of unspecific ligament of ankle. Patient given instructions including elevating the extremity. Discussed follow up with primary care and pain management. Ambulating with a steady gait without assistance. Patient voiced understanding and agreement with plan.      Procedure  Procedures     Radha Sanchez MD  01/07/25 1507    "

## 2025-01-08 ENCOUNTER — TELEMEDICINE (OUTPATIENT)
Dept: PAIN MEDICINE | Facility: CLINIC | Age: 55
End: 2025-01-08
Payer: MEDICARE

## 2025-01-08 DIAGNOSIS — M51.369 LUMBAR DISC NARROWING: Primary | ICD-10-CM

## 2025-01-08 DIAGNOSIS — M54.16 LEFT LUMBAR RADICULOPATHY: ICD-10-CM

## 2025-01-08 DIAGNOSIS — Z79.891 LONG TERM CURRENT USE OF OPIATE ANALGESIC: ICD-10-CM

## 2025-01-08 DIAGNOSIS — M75.82 TENDINITIS OF LEFT ROTATOR CUFF: ICD-10-CM

## 2025-01-08 PROCEDURE — G2211 COMPLEX E/M VISIT ADD ON: HCPCS | Performed by: PHYSICAL MEDICINE & REHABILITATION

## 2025-01-08 PROCEDURE — 99214 OFFICE O/P EST MOD 30 MIN: CPT | Performed by: PHYSICAL MEDICINE & REHABILITATION

## 2025-01-08 RX ORDER — HYDROCODONE BITARTRATE AND ACETAMINOPHEN 7.5; 325 MG/1; MG/1
1 TABLET ORAL EVERY 6 HOURS PRN
Qty: 100 TABLET | Refills: 0 | Status: SHIPPED | OUTPATIENT
Start: 2025-01-08 | End: 2025-02-05

## 2025-01-08 RX ORDER — NALOXONE HYDROCHLORIDE 4 MG/.1ML
1 SPRAY NASAL AS NEEDED
Qty: 2 EACH | Refills: 0 | Status: SHIPPED | OUTPATIENT
Start: 2025-01-08

## 2025-01-08 NOTE — PROGRESS NOTES
Chief complaint  Back pain   Left shoulder pain     History  Jai Shrestha is back for pain management office visit  Jai Shrestha was at home.  Could not physically come to the office because of upper respiratory tract symptoms .  I was at the office.  I used secure audiovisual communication provided by the Epic system. Jai Shrestha  agreed on this form of communication and consented to the visit via A/V method.  The patient also understood that this will be billed as office visit.    Felt better last week and went out of the house for first time and slipped and fell went to ER he was evaluated but did not get any pain medication from the emergency room.  He is currently at home.  He has not done the urine drug testing.  I explained to him about that and the importance of getting urine drug testing in order to show compliance.  At this time I will give him 1 month supply and he will have a month to get to the lab and do the UDS.  I explained to him that without the UDS I will not be able to release next month supply he mentions that he will do his best  The pain in the back is across the low back area it does radiate to the lower limb he does have shoulder pain intermittent basis.  He also has peripheral neuropathy related to his insulin-dependent diabetes in the past he tried gabapentin and Lyrica but he did not tolerate those he is faring well on the Cymbalta    Long discussion about putting effort in cutting back on pain medications. Discussed about pain level changing and we do not know if the medications at this amount are still needed until we try and cut back slowly on pain medications. If the cut is tolerated then we continue. If cut not tolerated then, will go back on the pain medications level.  The goal from this is to keep the pain medications at the lowest effective dose.    Pain level without medication is 8/10 , with the medication pain level 3-4/10.     The pain meds are helping control the pain and  "improving Activities of Daily living and quality of life and quality of sleep.    opioids treatment agreement discussed that with him again today and sent him a digital copy to sign  Pill count he is due to fill today we will count the pills at the next office visit  Oarrs pulled and reviewed, no concerns  last urine toxicology testing earlier this year and it was compliant we will repeat  Xray updated spine  ORT Score is 1 because of the depression but this is controlled  Pain pathology and pain generators spine and joint  Modalities tried injection, surgery, physical therapy, TENS unit, nonsteroidal anti-inflammatory medication       Review of Systems :  Denied any fever or chills. No weight loss and no night sweats. No cough or sputum production. No diarrhea   The constipation has been responding to fibers and over the counter medications.     No bladder and bowel incontinence and no other changes in bladder and bowel. No skin changes.  Reports tiredness and fatigability only if the pain is not controlled.     Denied opioids diversion and abuse and denies alcoholism. Denies overuse of the pain medications.  No reported euphoria sensation or getting a \"high\" on the pain medications.    The control of the pain with the pain medications is helping the control of the symptoms and allowing the function and activities of daily living, enjoyment of life, improving the quality of life and sleep with less interruption by the pain. The goal is symptomatic control of the nonmalignant chronic pain and not to repair the permanent damage in the tissues inducing the chronic pain conditions. We are aiming to shift the focus from the nonmalignant chronic pain to other aspects of life by symptomatically treating this chronic pain. If this pain is not treated it will lead to major morbidity and it is also associated with increased risks of mortality. The patient understands those very clearly and also understand high risks of " morbidity and mortality if not strictly adherent to the treatment recommendations and reporting any associated side effects. Also patient understand the full responsibility associated with these medications to avoid abuse or overuse or any use of these medications for anything besides treating the patient's own chronic pain and nothing else under any circumstances.        Physical examination  Awake, alert and oriented for time place and persons   This is a secure A/V visit    Diagnosis  Problem List Items Addressed This Visit       Lumbar disc narrowing - Primary    Relevant Medications    HYDROcodone-acetaminophen (Norco) 7.5-325 mg tablet    Left lumbar radiculopathy    Relevant Medications    HYDROcodone-acetaminophen (Norco) 7.5-325 mg tablet    Rotator cuff tendonitis    Relevant Medications    HYDROcodone-acetaminophen (Norco) 7.5-325 mg tablet    Long term current use of opiate analgesic    Relevant Medications    naloxone (Narcan) 4 mg/0.1 mL nasal spray    Other Relevant Orders    Drug Screen, Urine With Reflex to Confirmation        Plan  Reviewed the pain generators.  Went over the types of pain with neuropathic and nociceptive and different pathologies and therapeutic modalities. Discussed the mechanism of action of interventions from acupuncture, physical therapy , regular exercises, injections, botox, spinal cord stimulation, and role of surgery     Went over pathology of the intervertebral disc displacement and the anatomical relation to the Nerve roots and relation to the radicular symptoms. Went over treatment modalities with conservative treatment including acupuncture   and epidural steroid injection with fluoroscopy guidance and last resort of surgery    Based on the above findings and the clinical response to the opioids medications and improvement of the activities of daily living, sleep, and work performance. We made this complex decision to continue the opioids therapy in light of the  evidence of the patient's responsibility in using the pain medications as prescribed for the nonmalignant chronic pain condition. We discussed about the use of the pain medications to treat the symptoms of chronic nonmalignant pain and we are not trying the repair the permanent damage in the tissues, rather we are trying to control the symptoms induced by the permanent damage to the tissues inducing the chronic pain condition and resulting disability. I explained the difference and discussed it with the patient and stressed the importance of knowing the difference especially because of the potential side effects and the potential addicting effect and habit forming nature of the dangerous drugs we are using to treat the symptoms of the chronic pain.      We discussed that we are prescribing the medications on good alirio and legitimate medical reason.     We reviewed the side effects and precautions of opioids prescriptions as discussed in the opioids treatment agreement.    realizes the interaction between the therapeutic classes including the respiratory depression and potential death     Random drug testing   we will submit     Will change hydrocodone to 100 tabs for the month for one month, will release next month Rx after the UDS   He is worried that he has not been to be able to get out of the house for the next 4 weeks.  However I explained to him that he just went to emergency room and he was out and about as he explained.  Certainly he will need to rest after the recent fall since he already checked with the emergency room but I do not anticipate that to be for 4 weeks.  He will go out to the lab after he improve and he is able to do so at the safe manner    Discussed about NSAIDS and I explained about the opioids sparing effect to allow keeping the opioids dose at minimal effective dose.   I went over the potential side effects of the NSAIDS on the gastrointestinal, renal and cardiovascular systems.      I  detailed the side effects from the acetaminophen in the medication and made aware of those. I also explained about the cumulative effects on the organs and mainly the liver.     Given the opioids therapy , we discussed about the risk for accidental over dose on the pain medications, either for patient or other household. I went over the mechanism of action and mode of use of the Naloxone according to the  recommendations. I will provide a prescription for a kit.     Follow-up 8 weeks or earlier if needed but again I will release next month prescription after we have the urine drug test that we discussed above    The level of clinical decision making in this office visit,  is high, given the high risks of complications with the morbidity and mortality due to the fact that acute and chronic pain may pose a threat to life and bodily function, if under treated, poorly treated, or with failure to maintain adequate treatment and timely medical follow up. Additionally over treatment has its own set of complications including overdosing on the pain medications and also the habit forming potentials with the use of the medications used to treat chronic painful conditions including therapeutic classes classified as dangerous medications. Given the serious and fluctuating nature of pain level and instensity with extensive consideration for whenever pain changes, there is always the risk of prolonged functional impairment requiring close patient monitoring with regular assessments and reassessments and high level medical decision making at every office visit. The amount and complexity of data reviewed is high given the patient clinical presentation, labs,  data, radiology reports, and other tests as discussed during office visits. Pertinent data whether positive or negative were taken in consideration in the process of making this high level medical decision.

## 2025-02-05 NOTE — PROGRESS NOTES
PDMP checked, patient wants Rx but I dont see the UDS on chart!!, at the last visit I explained to him that will need the UDS before further Rx   Will wait on UDS

## 2025-02-07 LAB
AMPHETAMINES UR QL: NEGATIVE NG/ML
BARBITURATES UR QL: NEGATIVE NG/ML
BENZODIAZ UR QL: NEGATIVE NG/ML
BZE UR QL: NEGATIVE NG/ML
CODEINE UR-MCNC: NEGATIVE NG/ML
CREAT UR-MCNC: 51.4 MG/DL
DRUG SCREEN COMMENT UR-IMP: ABNORMAL
HYDROCODONE UR-MCNC: 348 NG/ML
HYDROMORPHONE UR-MCNC: 87 NG/ML
METHADONE UR QL: NEGATIVE NG/ML
MORPHINE UR-MCNC: NEGATIVE NG/ML
NORHYDROCODONE UR CFM-MCNC: 157 NG/ML
OPIATES UR QL: POSITIVE NG/ML
OXIDANTS UR QL: NEGATIVE MCG/ML
OXYCODONE UR QL: NEGATIVE NG/ML
PCP UR QL: NEGATIVE NG/ML
PH UR: 7.2 [PH] (ref 4.5–9)
QUEST NOTES AND COMMENTS: ABNORMAL
THC UR QL: NEGATIVE NG/ML

## 2025-02-10 DIAGNOSIS — M51.369 LUMBAR DISC NARROWING: ICD-10-CM

## 2025-02-10 DIAGNOSIS — M75.82 TENDINITIS OF LEFT ROTATOR CUFF: ICD-10-CM

## 2025-02-10 DIAGNOSIS — M54.16 LEFT LUMBAR RADICULOPATHY: ICD-10-CM

## 2025-02-10 RX ORDER — HYDROCODONE BITARTRATE AND ACETAMINOPHEN 7.5; 325 MG/1; MG/1
1 TABLET ORAL EVERY 6 HOURS PRN
Qty: 100 TABLET | Refills: 0 | Status: SHIPPED | OUTPATIENT
Start: 2025-02-10 | End: 2025-03-10

## 2025-03-03 ENCOUNTER — APPOINTMENT (OUTPATIENT)
Dept: PAIN MEDICINE | Facility: CLINIC | Age: 55
End: 2025-03-03
Payer: MEDICARE

## 2025-03-03 DIAGNOSIS — M51.369 LUMBAR DISC NARROWING: ICD-10-CM

## 2025-03-03 DIAGNOSIS — M54.16 LEFT LUMBAR RADICULOPATHY: ICD-10-CM

## 2025-03-03 DIAGNOSIS — M75.82 TENDINITIS OF LEFT ROTATOR CUFF: ICD-10-CM

## 2025-03-03 PROCEDURE — G2211 COMPLEX E/M VISIT ADD ON: HCPCS | Performed by: PHYSICAL MEDICINE & REHABILITATION

## 2025-03-03 PROCEDURE — 99214 OFFICE O/P EST MOD 30 MIN: CPT | Performed by: PHYSICAL MEDICINE & REHABILITATION

## 2025-03-03 RX ORDER — HYDROCODONE BITARTRATE AND ACETAMINOPHEN 7.5; 325 MG/1; MG/1
1 TABLET ORAL EVERY 6 HOURS PRN
Qty: 100 TABLET | Refills: 0 | Status: SHIPPED | OUTPATIENT
Start: 2025-03-11 | End: 2025-04-08

## 2025-03-03 RX ORDER — HYDROCODONE BITARTRATE AND ACETAMINOPHEN 7.5; 325 MG/1; MG/1
1 TABLET ORAL EVERY 6 HOURS PRN
Qty: 100 TABLET | Refills: 0 | Status: SHIPPED | OUTPATIENT
Start: 2025-04-08 | End: 2025-05-06

## 2025-03-03 NOTE — PROGRESS NOTES
Chief complaint  Back pain and left shoulder pain and limited range of motion     History  Jai Shrestha is back for pain management office visit  Jai Shrestha was at home.  Could not physically come to the office today .  I was at the office.  I used secure audiovisual communication provided by the Epic system. Jai Shrestha  agreed on this form of communication and consented to the visit via A/V method.  The patient also understood that this will be billed as office visit.   He continues to have the pain in the back as the worst.  He is having difficulty ambulation and stretching and that is also leading to aggravation of the pain.  He continued to have the pain in the left shoulder with impingement.  The pain in the left shoulder is limited to the shoulder joint no radicular symptoms to the upper limb.  In the past he had an injection he does not want to repeat those at this time  We have been trying to cut back on the medication we are doing that slowly to avoid aggravation of the symptoms he was able to cut back the hydrocodone 7.5 from 4 a day to 3-1/2 tablet a day  Detailed discussion and explanation about the need to try  and cut back on pain medications.  Entertained question about   pain level changing from acute, subacute to chronic pain.  Currently the pain and chronic.  The higher amount of medication were for the acute and subacute phase.  Therefore   we do not know exactly if the medications at this amount are still needed until we try and cut back slowly on pain medications. If the cut is tolerated then we continue trying to cut back further. If cut not tolerated then, will try additional none chemical methods like injection or physical therapy or we can go back on the pain medications level.  The goal from this is to keep the pain medications at the lowest effective dose and to control the tolerance that develops from the chronic use of opioid therapy.  Our goal is to keep the pain controlled with  "minimal effective dose.  That will help cutting back on the potential for side effects, and also will help decrease the amount of tolerance developing from the chronic use of opioid medications.  Pain level without medication is 6-8/10 , with the medication pain level between 2 and 3/10.     Pain disability index (PDI) improvement by 3-4 points, across different functional categories, with the pain control with the meds.  Went over the questionnaires during the AV visit today. Will fill the forms at the next in person visit.  The pain meds are helping control the pain and improving Activities of Daily living and quality of life and quality of sleep.    opioids treatment agreement January 2025    Pill count last fill was on 2.11 for 100 tabs of hydrocodone 7.5 mg , reported on schedule with the meds will do pill count at next OV    Oarrs pulled and reviewed, no concerns  last urine toxicology testing was compliant this was done on : January  Xray updated spine and shoulder  ORT (opioid risk tool) score is 1 for anxiety but this is controlled  Pain pathology and pain generators spine and shoulder  Modalities tried injection, surgery, physical therapy, TENS unit, nonsteroidal anti-inflammatory medication       Review of Systems :  Denied any fever or chills. No weight loss and no night sweats. No cough or sputum production. No diarrhea   The constipation has been responding to fibers and over the counter medications.     No bladder and bowel incontinence and no other changes in bladder and bowel. No skin changes.  Reports tiredness and fatigability only if the pain is not controlled.     Denied opioids diversion and abuse and denies alcoholism. Denies overuse of the pain medications.  No reported euphoria sensation or getting a \"high\" on the pain medications.    The control of the pain with the pain medications is helping the control of the symptoms and allowing the function and activities of daily living, enjoyment of " life, improving the quality of life and sleep with less interruption by the pain. The goal is symptomatic control of the nonmalignant chronic pain and not to repair the permanent damage in the tissues inducing the chronic pain conditions. We are aiming to shift the focus from the nonmalignant chronic pain to other aspects of life by symptomatically treating this chronic pain. If this pain is not treated it will lead to major morbidity and it is also associated with increased risks of mortality. The patient understands those very clearly and also understand high risks of morbidity and mortality if not strictly adherent to the treatment recommendations and reporting any associated side effects. Also patient understand the full responsibility associated with these medications to avoid abuse or overuse or any use of these medications for anything besides treating the patient's own chronic pain and nothing else under any circumstances.        Physical examination  Awake, alert and oriented for time place and persons   Pupils are equal and reactive to light and accommodation    This is a secure A/V visit    Diagnosis  Problem List Items Addressed This Visit       Lumbar disc narrowing    Relevant Medications    HYDROcodone-acetaminophen (Norco) 7.5-325 mg tablet (Start on 3/11/2025)    HYDROcodone-acetaminophen (Norco) 7.5-325 mg tablet (Start on 4/8/2025)    Left lumbar radiculopathy    Relevant Medications    HYDROcodone-acetaminophen (Norco) 7.5-325 mg tablet (Start on 3/11/2025)    HYDROcodone-acetaminophen (Norco) 7.5-325 mg tablet (Start on 4/8/2025)    Rotator cuff tendonitis    Relevant Medications    HYDROcodone-acetaminophen (Norco) 7.5-325 mg tablet (Start on 3/11/2025)    HYDROcodone-acetaminophen (Norco) 7.5-325 mg tablet (Start on 4/8/2025)        Plan  Reviewed the pain generators.  Went over the types of pain with neuropathic and nociceptive and different pathologies and therapeutic modalities. Discussed  the mechanism of action of interventions from acupuncture, physical therapy , regular exercises, injections, botox, spinal cord stimulation, and role of surgery     Went over pathology of the intervertebral disc displacement and the anatomical relation to the Nerve roots and relation to the radicular symptoms. Went over treatment modalities with conservative treatment including acupuncture   and epidural steroid injection with fluoroscopy guidance and last resort of surgery    Based on the above findings and the clinical response to the opioids medications and improvement of the activities of daily living, sleep, and work performance. We made this complex decision to continue the opioids therapy in light of the evidence of the patient's responsibility in using the pain medications as prescribed for the nonmalignant chronic pain condition. We discussed about the use of the pain medications to treat the symptoms of chronic nonmalignant pain and we are not trying the repair the permanent damage in the tissues, rather we are trying to control the symptoms induced by the permanent damage to the tissues inducing the chronic pain condition and resulting disability. I explained the difference and discussed it with the patient and stressed the importance of knowing the difference especially because of the potential side effects and the potential addicting effect and habit forming nature of the dangerous drugs we are using to treat the symptoms of the chronic pain.      We discussed that we are prescribing the medications on good alirio and legitimate medical reason.     We reviewed the side effects and precautions of opioids prescriptions as discussed in the opioids treatment agreement.    realizes the interaction between the therapeutic classes including the respiratory depression and potential death     Random drug testing   we will submit     Continue with hydrocodone 7.5 mg 100 tablets for the month we will continue to  try to cut back further with the weather warming up.  Consider repeat injection for aggravation of the symptoms    Discussed about NSAIDS and I explained about the opioids sparing effect to allow keeping the opioids dose at minimal effective dose.   I went over the potential side effects of the NSAIDS on the gastrointestinal, renal and cardiovascular systems.      I detailed the side effects from the acetaminophen in the medication and made aware of those. I also explained about the cumulative effects on the organs and mainly the liver.     Given the opioids therapy , we discussed about the risk for accidental over dose on the pain medications, either for patient or other household. I went over the mechanism of action and mode of use of the Naloxone according to the  recommendations. I will provide a prescription for a kit.     Follow-up 8 weeks or earlier if needed     The level of clinical decision making in this office visit,  is high, given the high risks of complications with the morbidity and mortality due to the fact that acute and chronic pain may pose a threat to life and bodily function, if under treated, poorly treated, or with failure to maintain adequate treatment and timely medical follow up. Additionally over treatment has its own set of complications including overdosing on the pain medications and also the habit forming potentials with the use of the medications used to treat chronic painful conditions including therapeutic classes classified as dangerous medications. Given the serious and fluctuating nature of pain level and instensity with extensive consideration for whenever pain changes, there is always the risk of prolonged functional impairment requiring close patient monitoring with regular assessments and reassessments and high level medical decision making at every office visit. The amount and complexity of data reviewed is high given the patient clinical presentation, labs,   data, radiology reports, and other tests as discussed during office visits. Pertinent data whether positive or negative were taken in consideration in the process of making this high level medical decision.

## 2025-04-14 ENCOUNTER — APPOINTMENT (OUTPATIENT)
Dept: RADIOLOGY | Facility: HOSPITAL | Age: 55
DRG: 177 | End: 2025-04-14
Payer: MEDICARE

## 2025-04-14 ENCOUNTER — APPOINTMENT (OUTPATIENT)
Dept: CARDIOLOGY | Facility: HOSPITAL | Age: 55
DRG: 177 | End: 2025-04-14
Payer: MEDICARE

## 2025-04-14 ENCOUNTER — HOSPITAL ENCOUNTER (INPATIENT)
Facility: HOSPITAL | Age: 55
DRG: 177 | End: 2025-04-14
Attending: EMERGENCY MEDICINE | Admitting: INTERNAL MEDICINE
Payer: MEDICARE

## 2025-04-14 DIAGNOSIS — R79.89 ELEVATED TROPONIN: ICD-10-CM

## 2025-04-14 DIAGNOSIS — R06.02 SHORTNESS OF BREATH: ICD-10-CM

## 2025-04-14 DIAGNOSIS — G89.29 CHRONIC BACK PAIN, UNSPECIFIED BACK LOCATION, UNSPECIFIED BACK PAIN LATERALITY: ICD-10-CM

## 2025-04-14 DIAGNOSIS — I50.9 HEART FAILURE, UNSPECIFIED: ICD-10-CM

## 2025-04-14 DIAGNOSIS — I50.31 ACUTE DIASTOLIC HEART FAILURE: ICD-10-CM

## 2025-04-14 DIAGNOSIS — R60.0 BILATERAL EDEMA OF LOWER EXTREMITY: ICD-10-CM

## 2025-04-14 DIAGNOSIS — M54.9 CHRONIC BACK PAIN, UNSPECIFIED BACK LOCATION, UNSPECIFIED BACK PAIN LATERALITY: ICD-10-CM

## 2025-04-14 DIAGNOSIS — I50.9 ACUTE ON CHRONIC CONGESTIVE HEART FAILURE, UNSPECIFIED HEART FAILURE TYPE: ICD-10-CM

## 2025-04-14 DIAGNOSIS — J18.9 HEALTHCARE-ASSOCIATED PNEUMONIA: Primary | ICD-10-CM

## 2025-04-14 LAB
ALBUMIN SERPL BCP-MCNC: 3.8 G/DL (ref 3.4–5)
ALP SERPL-CCNC: 127 U/L (ref 33–120)
ALT SERPL W P-5'-P-CCNC: 12 U/L (ref 10–52)
ANION GAP SERPL CALC-SCNC: 13 MMOL/L (ref 10–20)
AST SERPL W P-5'-P-CCNC: 17 U/L (ref 9–39)
BILIRUB SERPL-MCNC: 0.5 MG/DL (ref 0–1.2)
BNP SERPL-MCNC: 78 PG/ML (ref 0–99)
BUN SERPL-MCNC: 35 MG/DL (ref 6–23)
CALCIUM SERPL-MCNC: 9.7 MG/DL (ref 8.6–10.3)
CARDIAC TROPONIN I PNL SERPL HS: 233 NG/L (ref 0–20)
CARDIAC TROPONIN I PNL SERPL HS: 264 NG/L (ref 0–20)
CHLORIDE SERPL-SCNC: 97 MMOL/L (ref 98–107)
CO2 SERPL-SCNC: 35 MMOL/L (ref 21–32)
CREAT SERPL-MCNC: 1.31 MG/DL (ref 0.5–1.3)
EGFRCR SERPLBLD CKD-EPI 2021: 65 ML/MIN/1.73M*2
ERYTHROCYTE [DISTWIDTH] IN BLOOD BY AUTOMATED COUNT: 17.2 % (ref 11.5–14.5)
FLUAV RNA RESP QL NAA+PROBE: NOT DETECTED
FLUBV RNA RESP QL NAA+PROBE: NOT DETECTED
GLUCOSE SERPL-MCNC: 63 MG/DL (ref 74–99)
HCT VFR BLD AUTO: 35.2 % (ref 41–52)
HGB BLD-MCNC: 10.2 G/DL (ref 13.5–17.5)
MCH RBC QN AUTO: 23.6 PG (ref 26–34)
MCHC RBC AUTO-ENTMCNC: 29 G/DL (ref 32–36)
MCV RBC AUTO: 81 FL (ref 80–100)
NRBC BLD-RTO: 0.2 /100 WBCS (ref 0–0)
PLATELET # BLD AUTO: 299 X10*3/UL (ref 150–450)
POTASSIUM SERPL-SCNC: 3.8 MMOL/L (ref 3.5–5.3)
PROT SERPL-MCNC: 7.1 G/DL (ref 6.4–8.2)
RBC # BLD AUTO: 4.33 X10*6/UL (ref 4.5–5.9)
RSV RNA RESP QL NAA+PROBE: NOT DETECTED
SARS-COV-2 RNA RESP QL NAA+PROBE: NOT DETECTED
SODIUM SERPL-SCNC: 141 MMOL/L (ref 136–145)
WBC # BLD AUTO: 12.1 X10*3/UL (ref 4.4–11.3)

## 2025-04-14 PROCEDURE — 96375 TX/PRO/DX INJ NEW DRUG ADDON: CPT

## 2025-04-14 PROCEDURE — 36415 COLL VENOUS BLD VENIPUNCTURE: CPT | Performed by: EMERGENCY MEDICINE

## 2025-04-14 PROCEDURE — 84484 ASSAY OF TROPONIN QUANT: CPT | Performed by: EMERGENCY MEDICINE

## 2025-04-14 PROCEDURE — 87641 MR-STAPH DNA AMP PROBE: CPT | Performed by: EMERGENCY MEDICINE

## 2025-04-14 PROCEDURE — 83880 ASSAY OF NATRIURETIC PEPTIDE: CPT | Performed by: EMERGENCY MEDICINE

## 2025-04-14 PROCEDURE — 87637 SARSCOV2&INF A&B&RSV AMP PRB: CPT | Performed by: EMERGENCY MEDICINE

## 2025-04-14 PROCEDURE — 83605 ASSAY OF LACTIC ACID: CPT | Performed by: EMERGENCY MEDICINE

## 2025-04-14 PROCEDURE — 99285 EMERGENCY DEPT VISIT HI MDM: CPT | Mod: 25 | Performed by: EMERGENCY MEDICINE

## 2025-04-14 PROCEDURE — 2550000001 HC RX 255 CONTRASTS: Performed by: EMERGENCY MEDICINE

## 2025-04-14 PROCEDURE — 71045 X-RAY EXAM CHEST 1 VIEW: CPT

## 2025-04-14 PROCEDURE — 93971 EXTREMITY STUDY: CPT | Performed by: RADIOLOGY

## 2025-04-14 PROCEDURE — 2500000004 HC RX 250 GENERAL PHARMACY W/ HCPCS (ALT 636 FOR OP/ED): Mod: JZ | Performed by: EMERGENCY MEDICINE

## 2025-04-14 PROCEDURE — 93005 ELECTROCARDIOGRAM TRACING: CPT

## 2025-04-14 PROCEDURE — 80053 COMPREHEN METABOLIC PANEL: CPT | Performed by: EMERGENCY MEDICINE

## 2025-04-14 PROCEDURE — 71045 X-RAY EXAM CHEST 1 VIEW: CPT | Performed by: RADIOLOGY

## 2025-04-14 PROCEDURE — 93970 EXTREMITY STUDY: CPT

## 2025-04-14 PROCEDURE — 85610 PROTHROMBIN TIME: CPT | Performed by: EMERGENCY MEDICINE

## 2025-04-14 PROCEDURE — 87040 BLOOD CULTURE FOR BACTERIA: CPT | Mod: AHULAB | Performed by: EMERGENCY MEDICINE

## 2025-04-14 PROCEDURE — 71275 CT ANGIOGRAPHY CHEST: CPT

## 2025-04-14 PROCEDURE — 96365 THER/PROPH/DIAG IV INF INIT: CPT

## 2025-04-14 PROCEDURE — 71275 CT ANGIOGRAPHY CHEST: CPT | Mod: FOREIGN READ | Performed by: RADIOLOGY

## 2025-04-14 PROCEDURE — 85027 COMPLETE CBC AUTOMATED: CPT | Performed by: EMERGENCY MEDICINE

## 2025-04-14 RX ORDER — NAPROXEN SODIUM 220 MG/1
324 TABLET, FILM COATED ORAL ONCE
Status: COMPLETED | OUTPATIENT
Start: 2025-04-14 | End: 2025-04-15

## 2025-04-14 RX ORDER — ONDANSETRON HYDROCHLORIDE 2 MG/ML
4 INJECTION, SOLUTION INTRAVENOUS ONCE
Status: COMPLETED | OUTPATIENT
Start: 2025-04-14 | End: 2025-04-15

## 2025-04-14 RX ORDER — DEXTROSE 50 % IN WATER (D50W) INTRAVENOUS SYRINGE
12.5
Status: DISCONTINUED | OUTPATIENT
Start: 2025-04-14 | End: 2025-04-27 | Stop reason: HOSPADM

## 2025-04-14 RX ORDER — DEXTROSE 50 % IN WATER (D50W) INTRAVENOUS SYRINGE
25
Status: DISCONTINUED | OUTPATIENT
Start: 2025-04-14 | End: 2025-04-27 | Stop reason: HOSPADM

## 2025-04-14 RX ADMIN — IOHEXOL 90 ML: 350 INJECTION, SOLUTION INTRAVENOUS at 20:41

## 2025-04-14 RX ADMIN — PIPERACILLIN SODIUM AND TAZOBACTAM SODIUM 4.5 G: 4; .5 INJECTION, SOLUTION INTRAVENOUS at 23:36

## 2025-04-14 RX ADMIN — HYDROMORPHONE HYDROCHLORIDE 0.5 MG: 1 INJECTION, SOLUTION INTRAMUSCULAR; INTRAVENOUS; SUBCUTANEOUS at 19:51

## 2025-04-14 ASSESSMENT — PAIN SCALES - GENERAL
PAINLEVEL_OUTOF10: 9
PAINLEVEL_OUTOF10: 10 - WORST POSSIBLE PAIN

## 2025-04-14 ASSESSMENT — PAIN - FUNCTIONAL ASSESSMENT: PAIN_FUNCTIONAL_ASSESSMENT: 0-10

## 2025-04-14 NOTE — ED TRIAGE NOTES
"Pt arrives from home via EMS with c/o intermittent SOB for the past few days. States he sometimes has CP but is not having it today. Also states he has pain \"all over his body\".  "

## 2025-04-15 ENCOUNTER — APPOINTMENT (OUTPATIENT)
Dept: CARDIOLOGY | Facility: HOSPITAL | Age: 55
DRG: 177 | End: 2025-04-15
Payer: MEDICARE

## 2025-04-15 PROBLEM — J18.9 HEALTHCARE-ASSOCIATED PNEUMONIA: Status: ACTIVE | Noted: 2025-04-15

## 2025-04-15 LAB
ANION GAP SERPL CALC-SCNC: 11 MMOL/L (ref 10–20)
AORTIC VALVE PEAK VELOCITY: 1.34 M/S
AV PEAK GRADIENT: 7 MMHG
AVA (PEAK VEL): 4.08 CM2
BUN SERPL-MCNC: 32 MG/DL (ref 6–23)
CALCIUM SERPL-MCNC: 8.6 MG/DL (ref 8.6–10.3)
CHLORIDE SERPL-SCNC: 96 MMOL/L (ref 98–107)
CHOLEST SERPL-MCNC: 114 MG/DL (ref 0–199)
CHOLESTEROL/HDL RATIO: 3.3
CO2 SERPL-SCNC: 34 MMOL/L (ref 21–32)
CREAT SERPL-MCNC: 1.41 MG/DL (ref 0.5–1.3)
EGFRCR SERPLBLD CKD-EPI 2021: 59 ML/MIN/1.73M*2
EJECTION FRACTION APICAL 4 CHAMBER: 49.1
EJECTION FRACTION: 53 %
ERYTHROCYTE [DISTWIDTH] IN BLOOD BY AUTOMATED COUNT: 17.6 % (ref 11.5–14.5)
GLUCOSE BLD MANUAL STRIP-MCNC: 137 MG/DL (ref 74–99)
GLUCOSE BLD MANUAL STRIP-MCNC: 138 MG/DL (ref 74–99)
GLUCOSE BLD MANUAL STRIP-MCNC: 157 MG/DL (ref 74–99)
GLUCOSE BLD MANUAL STRIP-MCNC: 176 MG/DL (ref 74–99)
GLUCOSE BLD MANUAL STRIP-MCNC: 304 MG/DL (ref 74–99)
GLUCOSE BLD MANUAL STRIP-MCNC: 73 MG/DL (ref 74–99)
GLUCOSE SERPL-MCNC: 288 MG/DL (ref 74–99)
HCT VFR BLD AUTO: 32.1 % (ref 41–52)
HDLC SERPL-MCNC: 34.1 MG/DL
HGB BLD-MCNC: 9.1 G/DL (ref 13.5–17.5)
HOLD SPECIMEN: NORMAL
INR PPP: 1.2 (ref 0.9–1.1)
LACTATE SERPL-SCNC: 1.2 MMOL/L (ref 0.4–2)
LDLC SERPL CALC-MCNC: 30 MG/DL
LEFT ATRIUM VOLUME AREA LENGTH INDEX BSA: 30.6 ML/M2
LEFT VENTRICLE INTERNAL DIMENSION DIASTOLE: 5.67 CM (ref 3.5–6)
LEFT VENTRICULAR OUTFLOW TRACT DIAMETER: 2.63 CM
LEGIONELLA AG UR QL: NEGATIVE
MCH RBC QN AUTO: 23.7 PG (ref 26–34)
MCHC RBC AUTO-ENTMCNC: 28.3 G/DL (ref 32–36)
MCV RBC AUTO: 84 FL (ref 80–100)
MRSA DNA SPEC QL NAA+PROBE: NOT DETECTED
NON HDL CHOLESTEROL: 80 MG/DL (ref 0–149)
NRBC BLD-RTO: 0 /100 WBCS (ref 0–0)
PLATELET # BLD AUTO: 226 X10*3/UL (ref 150–450)
POTASSIUM SERPL-SCNC: 4 MMOL/L (ref 3.5–5.3)
PROTHROMBIN TIME: 13.3 SECONDS (ref 9.8–12.4)
RBC # BLD AUTO: 3.84 X10*6/UL (ref 4.5–5.9)
RIGHT VENTRICLE FREE WALL PEAK S': 15.91 CM/S
S PNEUM AG UR QL: NEGATIVE
SODIUM SERPL-SCNC: 137 MMOL/L (ref 136–145)
TRICUSPID ANNULAR PLANE SYSTOLIC EXCURSION: 1.9 CM
TRIGL SERPL-MCNC: 251 MG/DL (ref 0–149)
VLDL: 50 MG/DL (ref 0–40)
WBC # BLD AUTO: 12.3 X10*3/UL (ref 4.4–11.3)

## 2025-04-15 PROCEDURE — 1200000002 HC GENERAL ROOM WITH TELEMETRY DAILY

## 2025-04-15 PROCEDURE — 93306 TTE W/DOPPLER COMPLETE: CPT | Performed by: INTERNAL MEDICINE

## 2025-04-15 PROCEDURE — 82374 ASSAY BLOOD CARBON DIOXIDE: CPT | Performed by: INTERNAL MEDICINE

## 2025-04-15 PROCEDURE — 87449 NOS EACH ORGANISM AG IA: CPT | Mod: AHULAB | Performed by: INTERNAL MEDICINE

## 2025-04-15 PROCEDURE — 2500000005 HC RX 250 GENERAL PHARMACY W/O HCPCS: Performed by: INTERNAL MEDICINE

## 2025-04-15 PROCEDURE — 36415 COLL VENOUS BLD VENIPUNCTURE: CPT | Performed by: INTERNAL MEDICINE

## 2025-04-15 PROCEDURE — 2500000004 HC RX 250 GENERAL PHARMACY W/ HCPCS (ALT 636 FOR OP/ED)

## 2025-04-15 PROCEDURE — 2500000002 HC RX 250 W HCPCS SELF ADMINISTERED DRUGS (ALT 637 FOR MEDICARE OP, ALT 636 FOR OP/ED): Performed by: INTERNAL MEDICINE

## 2025-04-15 PROCEDURE — 82947 ASSAY GLUCOSE BLOOD QUANT: CPT

## 2025-04-15 PROCEDURE — 85027 COMPLETE CBC AUTOMATED: CPT | Performed by: INTERNAL MEDICINE

## 2025-04-15 PROCEDURE — 96375 TX/PRO/DX INJ NEW DRUG ADDON: CPT

## 2025-04-15 PROCEDURE — 87899 AGENT NOS ASSAY W/OPTIC: CPT | Mod: AHULAB | Performed by: INTERNAL MEDICINE

## 2025-04-15 PROCEDURE — 83718 ASSAY OF LIPOPROTEIN: CPT | Performed by: INTERNAL MEDICINE

## 2025-04-15 PROCEDURE — 96376 TX/PRO/DX INJ SAME DRUG ADON: CPT

## 2025-04-15 PROCEDURE — 2500000004 HC RX 250 GENERAL PHARMACY W/ HCPCS (ALT 636 FOR OP/ED): Performed by: EMERGENCY MEDICINE

## 2025-04-15 PROCEDURE — 2500000001 HC RX 250 WO HCPCS SELF ADMINISTERED DRUGS (ALT 637 FOR MEDICARE OP): Performed by: INTERNAL MEDICINE

## 2025-04-15 PROCEDURE — 2500000004 HC RX 250 GENERAL PHARMACY W/ HCPCS (ALT 636 FOR OP/ED): Performed by: INTERNAL MEDICINE

## 2025-04-15 PROCEDURE — 99221 1ST HOSP IP/OBS SF/LOW 40: CPT | Performed by: INTERNAL MEDICINE

## 2025-04-15 PROCEDURE — 2500000001 HC RX 250 WO HCPCS SELF ADMINISTERED DRUGS (ALT 637 FOR MEDICARE OP): Performed by: EMERGENCY MEDICINE

## 2025-04-15 PROCEDURE — 99222 1ST HOSP IP/OBS MODERATE 55: CPT | Performed by: INTERNAL MEDICINE

## 2025-04-15 PROCEDURE — C8929 TTE W OR WO FOL WCON,DOPPLER: HCPCS

## 2025-04-15 RX ORDER — OXYCODONE HYDROCHLORIDE 5 MG/1
10 TABLET ORAL EVERY 6 HOURS PRN
Status: DISCONTINUED | OUTPATIENT
Start: 2025-04-15 | End: 2025-04-17

## 2025-04-15 RX ORDER — DEXTROSE 50 % IN WATER (D50W) INTRAVENOUS SYRINGE
25
Status: DISCONTINUED | OUTPATIENT
Start: 2025-04-15 | End: 2025-04-15 | Stop reason: SDUPTHER

## 2025-04-15 RX ORDER — INSULIN LISPRO 100 [IU]/ML
0-10 INJECTION, SOLUTION INTRAVENOUS; SUBCUTANEOUS
Status: DISCONTINUED | OUTPATIENT
Start: 2025-04-15 | End: 2025-04-27 | Stop reason: HOSPADM

## 2025-04-15 RX ORDER — ATORVASTATIN CALCIUM 80 MG/1
80 TABLET, FILM COATED ORAL DAILY
Status: DISCONTINUED | OUTPATIENT
Start: 2025-04-15 | End: 2025-04-27 | Stop reason: HOSPADM

## 2025-04-15 RX ORDER — FUROSEMIDE 10 MG/ML
40 INJECTION INTRAMUSCULAR; INTRAVENOUS EVERY 12 HOURS
Status: DISCONTINUED | OUTPATIENT
Start: 2025-04-15 | End: 2025-04-15

## 2025-04-15 RX ORDER — INSULIN LISPRO 100 [IU]/ML
30 INJECTION, SOLUTION INTRAVENOUS; SUBCUTANEOUS
Status: DISCONTINUED | OUTPATIENT
Start: 2025-04-15 | End: 2025-04-17

## 2025-04-15 RX ORDER — ENOXAPARIN SODIUM 100 MG/ML
60 INJECTION SUBCUTANEOUS EVERY 12 HOURS SCHEDULED
Status: DISCONTINUED | OUTPATIENT
Start: 2025-04-15 | End: 2025-04-27 | Stop reason: HOSPADM

## 2025-04-15 RX ORDER — INSULIN GLARGINE 100 [IU]/ML
80 INJECTION, SOLUTION SUBCUTANEOUS EVERY 12 HOURS
Status: DISCONTINUED | OUTPATIENT
Start: 2025-04-15 | End: 2025-04-17

## 2025-04-15 RX ORDER — ASPIRIN 81 MG/1
81 TABLET ORAL DAILY
Status: DISCONTINUED | OUTPATIENT
Start: 2025-04-16 | End: 2025-04-27 | Stop reason: HOSPADM

## 2025-04-15 RX ORDER — PANTOPRAZOLE SODIUM 40 MG/1
40 TABLET, DELAYED RELEASE ORAL
Status: DISCONTINUED | OUTPATIENT
Start: 2025-04-15 | End: 2025-04-27 | Stop reason: HOSPADM

## 2025-04-15 RX ORDER — FUROSEMIDE 10 MG/ML
80 INJECTION INTRAMUSCULAR; INTRAVENOUS
Status: DISCONTINUED | OUTPATIENT
Start: 2025-04-15 | End: 2025-04-27

## 2025-04-15 RX ORDER — ACETAMINOPHEN 325 MG/1
650 TABLET ORAL EVERY 4 HOURS PRN
Status: DISCONTINUED | OUTPATIENT
Start: 2025-04-15 | End: 2025-04-17

## 2025-04-15 RX ORDER — ONDANSETRON HYDROCHLORIDE 2 MG/ML
4 INJECTION, SOLUTION INTRAVENOUS EVERY 8 HOURS PRN
Status: DISCONTINUED | OUTPATIENT
Start: 2025-04-15 | End: 2025-04-27 | Stop reason: HOSPADM

## 2025-04-15 RX ORDER — DULOXETIN HYDROCHLORIDE 30 MG/1
60 CAPSULE, DELAYED RELEASE ORAL DAILY
Status: DISCONTINUED | OUTPATIENT
Start: 2025-04-15 | End: 2025-04-27 | Stop reason: HOSPADM

## 2025-04-15 RX ORDER — TORSEMIDE 20 MG/1
60 TABLET ORAL 2 TIMES DAILY
COMMUNITY

## 2025-04-15 RX ORDER — VANCOMYCIN HYDROCHLORIDE 1 G/20ML
INJECTION, POWDER, LYOPHILIZED, FOR SOLUTION INTRAVENOUS DAILY PRN
Status: DISCONTINUED | OUTPATIENT
Start: 2025-04-15 | End: 2025-04-15

## 2025-04-15 RX ORDER — AMLODIPINE BESYLATE 10 MG/1
10 TABLET ORAL DAILY
Status: DISCONTINUED | OUTPATIENT
Start: 2025-04-15 | End: 2025-04-27 | Stop reason: HOSPADM

## 2025-04-15 RX ORDER — ACETAMINOPHEN 160 MG/5ML
650 SOLUTION ORAL EVERY 4 HOURS PRN
Status: DISCONTINUED | OUTPATIENT
Start: 2025-04-15 | End: 2025-04-17

## 2025-04-15 RX ORDER — ONDANSETRON 4 MG/1
4 TABLET, FILM COATED ORAL EVERY 8 HOURS PRN
Status: DISCONTINUED | OUTPATIENT
Start: 2025-04-15 | End: 2025-04-27 | Stop reason: HOSPADM

## 2025-04-15 RX ORDER — ACETAMINOPHEN 650 MG/1
650 SUPPOSITORY RECTAL EVERY 4 HOURS PRN
Status: DISCONTINUED | OUTPATIENT
Start: 2025-04-15 | End: 2025-04-17

## 2025-04-15 RX ORDER — AMINOPHYLLINE 25 MG/ML
125 INJECTION, SOLUTION INTRAVENOUS ONCE AS NEEDED
Status: DISCONTINUED | OUTPATIENT
Start: 2025-04-15 | End: 2025-04-27 | Stop reason: HOSPADM

## 2025-04-15 RX ORDER — INSULIN LISPRO 100 [IU]/ML
0-20 INJECTION, SOLUTION INTRAVENOUS; SUBCUTANEOUS
Status: DISCONTINUED | OUTPATIENT
Start: 2025-04-15 | End: 2025-04-15

## 2025-04-15 RX ORDER — DEXTROSE 50 % IN WATER (D50W) INTRAVENOUS SYRINGE
12.5
Status: DISCONTINUED | OUTPATIENT
Start: 2025-04-15 | End: 2025-04-15 | Stop reason: SDUPTHER

## 2025-04-15 RX ORDER — OXYCODONE HYDROCHLORIDE 5 MG/1
5 TABLET ORAL EVERY 6 HOURS PRN
Status: DISCONTINUED | OUTPATIENT
Start: 2025-04-15 | End: 2025-04-27 | Stop reason: HOSPADM

## 2025-04-15 RX ORDER — REGADENOSON 0.08 MG/ML
0.4 INJECTION, SOLUTION INTRAVENOUS
Status: DISCONTINUED | OUTPATIENT
Start: 2025-04-15 | End: 2025-04-27 | Stop reason: HOSPADM

## 2025-04-15 RX ORDER — PANTOPRAZOLE SODIUM 40 MG/10ML
40 INJECTION, POWDER, LYOPHILIZED, FOR SOLUTION INTRAVENOUS
Status: DISCONTINUED | OUTPATIENT
Start: 2025-04-15 | End: 2025-04-27 | Stop reason: HOSPADM

## 2025-04-15 RX ADMIN — VANCOMYCIN HYDROCHLORIDE 2 G: 5 INJECTION, POWDER, LYOPHILIZED, FOR SOLUTION INTRAVENOUS at 01:17

## 2025-04-15 RX ADMIN — INSULIN LISPRO 30 UNITS: 100 INJECTION, SOLUTION INTRAVENOUS; SUBCUTANEOUS at 09:20

## 2025-04-15 RX ADMIN — ATORVASTATIN CALCIUM 80 MG: 80 TABLET, FILM COATED ORAL at 09:13

## 2025-04-15 RX ADMIN — AMLODIPINE BESYLATE 10 MG: 10 TABLET ORAL at 09:13

## 2025-04-15 RX ADMIN — ENOXAPARIN SODIUM 60 MG: 60 INJECTION SUBCUTANEOUS at 20:33

## 2025-04-15 RX ADMIN — OXYCODONE HYDROCHLORIDE 10 MG: 5 TABLET ORAL at 09:12

## 2025-04-15 RX ADMIN — FUROSEMIDE 40 MG: 10 INJECTION, SOLUTION INTRAMUSCULAR; INTRAVENOUS at 14:03

## 2025-04-15 RX ADMIN — PIPERACILLIN SODIUM AND TAZOBACTAM SODIUM 3.38 G: 3; .375 INJECTION, SOLUTION INTRAVENOUS at 18:39

## 2025-04-15 RX ADMIN — OXYCODONE HYDROCHLORIDE 10 MG: 5 TABLET ORAL at 04:00

## 2025-04-15 RX ADMIN — INSULIN GLARGINE 80 UNITS: 100 INJECTION, SOLUTION SUBCUTANEOUS at 09:12

## 2025-04-15 RX ADMIN — INSULIN LISPRO 8 UNITS: 100 INJECTION, SOLUTION INTRAVENOUS; SUBCUTANEOUS at 13:15

## 2025-04-15 RX ADMIN — DULOXETINE HYDROCHLORIDE 60 MG: 30 CAPSULE, DELAYED RELEASE ORAL at 09:13

## 2025-04-15 RX ADMIN — INSULIN LISPRO 2 UNITS: 100 INJECTION, SOLUTION INTRAVENOUS; SUBCUTANEOUS at 09:12

## 2025-04-15 RX ADMIN — ENOXAPARIN SODIUM 60 MG: 60 INJECTION SUBCUTANEOUS at 09:12

## 2025-04-15 RX ADMIN — ONDANSETRON 4 MG: 2 INJECTION, SOLUTION INTRAMUSCULAR; INTRAVENOUS at 00:07

## 2025-04-15 RX ADMIN — AZITHROMYCIN MONOHYDRATE 500 MG: 500 INJECTION, POWDER, LYOPHILIZED, FOR SOLUTION INTRAVENOUS at 09:12

## 2025-04-15 RX ADMIN — INSULIN GLARGINE 80 UNITS: 100 INJECTION, SOLUTION SUBCUTANEOUS at 20:41

## 2025-04-15 RX ADMIN — FUROSEMIDE 80 MG: 10 INJECTION, SOLUTION INTRAMUSCULAR; INTRAVENOUS at 18:39

## 2025-04-15 RX ADMIN — OXYCODONE HYDROCHLORIDE 10 MG: 5 TABLET ORAL at 15:34

## 2025-04-15 RX ADMIN — PIPERACILLIN SODIUM AND TAZOBACTAM SODIUM 3.38 G: 3; .375 INJECTION, SOLUTION INTRAVENOUS at 13:17

## 2025-04-15 RX ADMIN — ASPIRIN 81 MG CHEWABLE TABLET 324 MG: 81 TABLET CHEWABLE at 00:07

## 2025-04-15 RX ADMIN — HYDROMORPHONE HYDROCHLORIDE 0.5 MG: 1 INJECTION, SOLUTION INTRAMUSCULAR; INTRAVENOUS; SUBCUTANEOUS at 00:07

## 2025-04-15 RX ADMIN — PIPERACILLIN SODIUM AND TAZOBACTAM SODIUM 3.38 G: 3; .375 INJECTION, SOLUTION INTRAVENOUS at 06:08

## 2025-04-15 RX ADMIN — INSULIN LISPRO 30 UNITS: 100 INJECTION, SOLUTION INTRAVENOUS; SUBCUTANEOUS at 16:39

## 2025-04-15 RX ADMIN — OXYCODONE HYDROCHLORIDE 10 MG: 5 TABLET ORAL at 22:29

## 2025-04-15 RX ADMIN — PANTOPRAZOLE SODIUM 40 MG: 40 INJECTION, POWDER, FOR SOLUTION INTRAVENOUS at 06:09

## 2025-04-15 RX ADMIN — FUROSEMIDE 40 MG: 10 INJECTION, SOLUTION INTRAMUSCULAR; INTRAVENOUS at 01:58

## 2025-04-15 RX ADMIN — PERFLUTREN 4 ML OF DILUTION: 6.52 INJECTION, SUSPENSION INTRAVENOUS at 14:55

## 2025-04-15 RX ADMIN — PIPERACILLIN SODIUM AND TAZOBACTAM SODIUM 3.38 G: 3; .375 INJECTION, SOLUTION INTRAVENOUS at 20:34

## 2025-04-15 RX ADMIN — INSULIN LISPRO 30 UNITS: 100 INJECTION, SOLUTION INTRAVENOUS; SUBCUTANEOUS at 13:16

## 2025-04-15 ASSESSMENT — COGNITIVE AND FUNCTIONAL STATUS - GENERAL
STANDING UP FROM CHAIR USING ARMS: A LOT
MOVING FROM LYING ON BACK TO SITTING ON SIDE OF FLAT BED WITH BEDRAILS: A LITTLE
HELP NEEDED FOR BATHING: A LOT
TURNING FROM BACK TO SIDE WHILE IN FLAT BAD: A LITTLE
MOVING TO AND FROM BED TO CHAIR: A LOT
TOILETING: A LOT
WALKING IN HOSPITAL ROOM: TOTAL
MOBILITY SCORE: 12
PERSONAL GROOMING: A LOT
DRESSING REGULAR UPPER BODY CLOTHING: A LOT
CLIMB 3 TO 5 STEPS WITH RAILING: TOTAL
DRESSING REGULAR LOWER BODY CLOTHING: A LOT
DAILY ACTIVITIY SCORE: 14

## 2025-04-15 ASSESSMENT — ENCOUNTER SYMPTOMS
SHORTNESS OF BREATH: 1
MYALGIAS: 1
ALLERGIC/IMMUNOLOGIC NEGATIVE: 1
ACTIVITY CHANGE: 1
ENDOCRINE NEGATIVE: 1
HEMATOLOGIC/LYMPHATIC NEGATIVE: 1
BACK PAIN: 1
CARDIOVASCULAR NEGATIVE: 1
UNEXPECTED WEIGHT CHANGE: 0
EYES NEGATIVE: 1
RESPIRATORY NEGATIVE: 1
PSYCHIATRIC NEGATIVE: 1
FATIGUE: 1
JOINT SWELLING: 1
CHILLS: 1
GASTROINTESTINAL NEGATIVE: 1
NEUROLOGICAL NEGATIVE: 1
ENDOCRINE COMMENTS: AS ABOVE
APPETITE CHANGE: 1

## 2025-04-15 ASSESSMENT — PAIN DESCRIPTION - ORIENTATION
ORIENTATION: LOWER
ORIENTATION: DISTAL

## 2025-04-15 ASSESSMENT — PAIN SCALES - GENERAL
PAINLEVEL_OUTOF10: 9
PAINLEVEL_OUTOF10: 7
PAINLEVEL_OUTOF10: 0 - NO PAIN

## 2025-04-15 ASSESSMENT — PAIN DESCRIPTION - LOCATION
LOCATION: PELVIS
LOCATION: BACK

## 2025-04-15 ASSESSMENT — ACTIVITIES OF DAILY LIVING (ADL): LACK_OF_TRANSPORTATION: NO

## 2025-04-15 ASSESSMENT — PAIN DESCRIPTION - PAIN TYPE: TYPE: ACUTE PAIN

## 2025-04-15 ASSESSMENT — PAIN - FUNCTIONAL ASSESSMENT
PAIN_FUNCTIONAL_ASSESSMENT: 0-10
PAIN_FUNCTIONAL_ASSESSMENT: 0-10

## 2025-04-15 ASSESSMENT — PAIN SCALES - PAIN ASSESSMENT IN ADVANCED DEMENTIA (PAINAD): TOTALSCORE: MEDICATION (SEE MAR)

## 2025-04-15 NOTE — PROGRESS NOTES
Emergency Medicine Transition of Care Note.    I received Jai Shrestha in signout from Dr. Prather.  Please see the previous ED provider note for all HPI, PE and MDM up to the time of signout. This is in addition to the primary record.    In brief Jai Shrestha is an 55 y.o. male presenting for   Chief Complaint   Patient presents with    Shortness of Breath     At the time of signout we were awaiting: Admission    ED Course as of 04/18/25 2206 Mon Apr 14, 2025 2357 Patient remains chest pain-free. [JM]      ED Course User Index  [JM] Xavier Prather MD         Diagnoses as of 04/18/25 2206   Healthcare-associated pneumonia   Acute on chronic congestive heart failure, unspecified heart failure type   Elevated troponin       Medical Decision Making  Patient admitted.    Final diagnoses:   [J18.9] Healthcare-associated pneumonia   [I50.9] Acute on chronic congestive heart failure, unspecified heart failure type   [R79.89] Elevated troponin           Procedure  Procedures    James Roque MD

## 2025-04-15 NOTE — ED PROVIDER NOTES
History/Exam limitations: none.   Additional history was obtained from patient and past medical records.          HPI:    Jai Shrestha is a 55 y.o. male PMH chronic back pain, peripheral neuropathy, diabetes, CKD, CHF, present for evaluation of shortness of breath, lower extremity pain.  Patient states that he has been intermittently having some chest discomfort but none currently.  He states that the chest discomfort is been midsternal, burning type sensation.  He states that he has been feeling some shortness of breath as well.  His primary care currently because he has pain all over his body.  He states he is diffuse overall body pain.  States that he was told he may have injured his Achilles several weeks ago and states that since then he started having worsening redness in his lower extremities and worsening swelling.  He states he was seen by foot and ankle specialist and told there may be a partial tear but is not a surgical candidate.          Physical Exam:  ED Triage Vitals [04/14/25 1901]   Temperature Heart Rate Respirations BP   37.2 °C (98.9 °F) 91 (!) 25 157/80      Pulse Ox Temp src Heart Rate Source Patient Position   99 % -- -- --      BP Location FiO2 (%)     -- --        GEN:      Alert, anxious appearing  Eyes:       PERRL, EOMI  HENT:      NC/AT, OP clear, airway patent, MM  CV:      RRR, no MRG, 2+ bilateral LE pitting edema, 2+ radial and pedal pulses  PULM:     Crackles in bilateral lower lung fields, easy WOB, symmetric chest rise  ABD:      Soft, NT, ND, no masses, BS +  :       No CVA TTP  NEURO:   A&Ox3, no focal deficits    MSK:      FROM, no joint deformities or swelling, no e/o trauma  SKIN:       Warm and dry, VA stasis changes bilateral lower extremities, significant warmth and erythema to the left lower leg with no palpable crepitus or fluctuance  PSYCH:    Appropriate mood and affect         MDM/ED Course:   Jai Shrestha is a 55 y.o. male PMH chronic back pain, peripheral  neuropathy, diabetes, CKD, CHF, present for evaluation of shortness of breath, lower extremity pain.  Vitals markable for mild tachypnea.  Exam as documented above.  Differential includes ACS, PNA, PE, aortic dissection/pathology, pneumothorax, pericardial effusion, pericarditis, myocarditis, GERD, musculoskeletal pain, pleurisy.  Differential includes CHF exacerbation.  Differential includes DVT/PE.  Differential includes lower extremity infectious process given significant warmth and erythema to the left lower extremity.  Patient has palpable peripheral pulses and warm well-perfused lower extremity suspicion for acute arterial occlusion.  There is no palpable crepitus or fluctuance, overall low suspicion for necrotizing soft tissue infection or underlying abscess at this time.  IV placed and labs drawn.  Chemistry a baseline creatinine, low glucose at 63.  Alkaline phosphatase mildly elevated at 127, no abdominal tenderness less likely acute biliary pathology.  BMP reassuring in the context of significant elevated BMI.  Lactate negative.  Troponin was elevated at 264 with repeat 233, likely demand in the setting of volume overload, no active chest pain currently, has had some chest pain recently.  Hypoglycemia protocol was ordered.  Mental status reassuring.  Patient given IV Dilaudid for pain control.  Chest x-ray with no acute findings.  CT angio chest for PE and lower extremity duplex was obtained, CT display signs of left upper lobe pneumonia, no visible DVT on limited study.  Patient was covered with broad-spectrum antibiotics given concerns for both pneumonia and left leg cellulitis with vancomycin/Zosyn/azithromycin and IV Lasix to be given.  Patient to take p.o. and recheck of glucose to be obtained.  Patient was accepted by admitting provider for continued management in stable condition.    EKG reviewed by me: 7:01 PM sinus rhythm, PVC present with appropriate discordance, borderline ST elevation in lead  III only, no STEMI, QTc prolonged at 512 ms, QRS normal.    EKG reviewed by me: 8:51 PM normal sinus rhythm, rate 89, normal axis, normal intervals, no STEMI, no ectopy, QTc normal at 413 ms.     ED Course as of 04/18/25 0912 Mon Apr 14, 2025 2357 Patient remains chest pain-free. [JM]      ED Course User Index  [JM] Xavier Barker MD         Diagnoses as of 04/18/25 0912   Healthcare-associated pneumonia   Acute on chronic congestive heart failure, unspecified heart failure type   Elevated troponin         Chronic medical conditions affecting care listed in MDM. I independently reviewed imaging studies and agreed with radiology reads. I reviewed recent and relevant outside records including PCP notes, Prior discharge summaries, and prior radiology reports.    Procedure  Procedures    Diagnosis:   1.  Pneumonia  2.  Left leg cellulitis  3.  Volume overload/CHF exacerbation  4.  Elevated troponin    Dispo: Hospitalized in stable condition      Disclaimer: Portions of this note were dictated by speech recognition. An attempt at proof reading was made to minimize errors. Minor errors in transcription may be present.  Please call if questions.     Xavier Barker MD  04/18/25 0912

## 2025-04-15 NOTE — PROGRESS NOTES
Pharmacy Medication History    Spoke to the patient, went over all medications. A few changes    Source of Information:     Additional concerns with the patient's PTA list.     Notified Provider via Haiku : Yes    The following updates were made to the Prior to Admission medication list:     Medications ADDED:   Torsemide 60 mg BID  Medications CHANGED:  Lantus 80 units TID  Novolog 50 units TID  Cymbalta 2 caps 120 mg  Norvasc decreased 5 mg  Medications REMOVED:   Lisinopril   Medications NOT TAKING:   Flexeril    Allergy reviewed : Yes    Meds 2 Beds : No    Outpatient pharmacy confirmed and updated in chart : Yes    Pharmacy name: [facility]    The list below reflectives the updated PTA list. Please review each medication in order reconciliation for additional clarification and justification.    Prior to Admission Medications   Prescriptions Last Dose   DULoxetine (Cymbalta) 60 mg DR capsule       Patient taking differently: Take 2 capsules (120 mg) by mouth once daily in the morning. Do not crush or chew.          HYDROcodone-acetaminophen (Norco) 7.5-325 mg tablet    Sig: Take 1 tablet by mouth every 6 hours if needed for severe pain (7 - 10) for up to 28 days. Do not fill before April 8, 2025.   QUEtiapine (SEROquel) 200 mg tablet    Sig: Take 2 tablets (400 mg) by mouth once daily at bedtime.   amLODIPine (Norvasc) 5 mg tablet    Sig: Take 1 tablet (5 mg) by mouth once daily. Decreased dose   aspirin 81 mg EC tablet    Sig: Take 1 tablet (81 mg) by mouth once daily.   atorvastatin (Lipitor) 80 mg tablet    Sig: Take 1 tablet (80 mg) by mouth once daily at bedtime.   cholecalciferol (Vitamin D-3) 1,250 mcg (50,000 unit) capsule    Sig: Take 1 capsule (50,000 Units) by mouth 1 (one) time per week. Every monday          ferrous sulfate, 325 mg ferrous sulfate, tablet    Sig: Take 1 tablet (325 mg) by mouth once a day on Monday, Wednesday, and Friday.   insulin aspart (NovoLOG U-100 Insulin aspart) 100 unit/mL  "injection       Patient taking differently: Inject 50 Units under the skin 3 times a day before meals.   insulin glargine (Lantus) 100 unit/mL (3 mL) pen       Patient taking differently: Inject 80 Units under the skin 3 times a day. Take as directed per insulin instructions.   naloxone (Narcan) 4 mg/0.1 mL nasal spray    Sig: Administer 1 spray (4 mg) into affected nostril(s) if needed for opioid reversal. May repeat every 2-3 minutes if needed, alternating nostrils, until medical assistance becomes available.   omeprazole (PriLOSEC) 40 mg DR capsule    Sig: Take 1 capsule (40 mg) by mouth 2 times a day.   pen needle, diabetic (BD Ultra-Fine Noelle Pen Needle) 32 gauge x 5/32\" needle    Si Pen needle 2 times a day.   torsemide (Demadex) 20 mg tablet    Sig: Take 3 tablets (60 mg) by mouth 2 times a day.      Facility-Administered Medications: None       The list below reflectives the updated allergy list. Please review each documented allergy for additional clarification and justification.    Allergies   Allergen Reactions    Metformin Unknown          04/15/25 at 9:22 AM - Ambreen Green     "

## 2025-04-15 NOTE — NURSING NOTE
"Attempted to call nurse taking this patient over 3x. Secure chatted report to nurse.       \"Patient coming is a 55 year old male. Full code. chronic back pain, peripheral neuropathy, diabetes mellitus type 2 with insulin resistance, morbid obesity BMI 57.3, CKD 3, diastolic CHF, hypertension who presented to [facility] ER complaining of shortness of breath and lower extremity edema. CXR showed pneumonia. Also has cellulitis and has IV antibiotics on board. Patient is alert and oriented x4. On 3L NC (none at baseline but very overweight so probably hangs out in low 90s). Is on a consistent carb diet/ACHS. Takes 30 units humalog plus sliding scale and 80 units lantus BID. Very uncontrolled and asks for snacks often. Is on lovenox and IV lasix which they just increased to 80 so strict I&Os. Skin is excoriated in folds and redness/edema is generalized but otherwise, intact. ECHO was done today. Is a 1x assist up to bathroom with wheeled walker. Has a 20 in the R AC. Saline locked.\"  "

## 2025-04-15 NOTE — CONSULTS
Inpatient consult to Endocrinology  Consult performed by: Ashwin Cronejo MD  Consult ordered by: Gomez Arnold DO        Reason For Consult  Diabetes    History Of Present Illness  Jai Shrestha is a 55 y.o. male admitted yesterday with pneumonia.     Duration of type 2 diabetes mellitus:  5 years  Complications:  Stroke (2022)  CKD  Diastolic CHF    Home regimen  Lantus 80 units QAC  Lispro 50 units QAC      Medications    Current Facility-Administered Medications:     acetaminophen (Tylenol) tablet 650 mg, 650 mg, oral, q4h PRN **OR** acetaminophen (Tylenol) oral liquid 650 mg, 650 mg, oral, q4h PRN **OR** acetaminophen (Tylenol) suppository 650 mg, 650 mg, rectal, q4h PRN, Gomez Arnold DO    amLODIPine (Norvasc) tablet 10 mg, 10 mg, oral, Daily, Gomez Arnold DO    [START ON 4/16/2025] aspirin EC tablet 81 mg, 81 mg, oral, Daily, Gomez Arnold DO    atorvastatin (Lipitor) tablet 80 mg, 80 mg, oral, Daily, Gomez Arnold DO    azithromycin (Zithromax) 500 mg in dextrose 5%  mL, 500 mg, intravenous, Once, Gomez Arnold DO    azithromycin (Zithromax) 500 mg in dextrose 5%  mL, 500 mg, intravenous, Daily, Gomez Arnold DO    dextrose 50 % injection 12.5 g, 12.5 g, intravenous, q15 min PRN, Gomez Arnold DO    dextrose 50 % injection 25 g, 25 g, intravenous, q15 min PRN, Gomez Arnold DO    DULoxetine (Cymbalta) DR capsule 60 mg, 60 mg, oral, Daily, Gomez Arnold DO    enoxaparin (Lovenox) syringe 60 mg, 60 mg, subcutaneous, q12h TAMIR, Gomez Arnold DO    furosemide (Lasix) injection 40 mg, 40 mg, intravenous, q12h, Gomez Arnold DO, 40 mg at 04/15/25 0158    glucagon (Glucagen) injection 1 mg, 1 mg, intramuscular, q15 min PRN, Gomez Arnold DO    glucagon (Glucagen) injection 1 mg, 1 mg, intramuscular, q15 min PRN, Gomez Arnold,     insulin glargine (Lantus) injection 80 Units, 80 Units, subcutaneous, q12h, Gomez Arnold,     insulin lispro injection 0-20  Units, 0-20 Units, subcutaneous, TID AC, Gomez SEPULVEDA Rudiomone, DO    ondansetron (Zofran) tablet 4 mg, 4 mg, oral, q8h PRN **OR** ondansetron (Zofran) injection 4 mg, 4 mg, intravenous, q8h PRN, Gomez SEPULVEDA Salomone, DO    oxyCODONE (Roxicodone) immediate release tablet 10 mg, 10 mg, oral, q6h PRN, Gomez SEPULVEDA Salomone, DO, 10 mg at 04/15/25 0400    oxyCODONE (Roxicodone) immediate release tablet 5 mg, 5 mg, oral, q6h PRN, Gomez SEPULVEDA Salomone, DO    pantoprazole (ProtoNix) EC tablet 40 mg, 40 mg, oral, Daily before breakfast **OR** pantoprazole (Protonix) injection 40 mg, 40 mg, intravenous, Daily before breakfast, Gomez SEPULVEDA Rudiomone, DO, 40 mg at 04/15/25 0609    piperacillin-tazobactam (Zosyn) 3.375 g in dextrose (iso) IV 50 mL, 3.375 g, intravenous, q6h, Gomez SEPULVEDA Salomone, DO, Stopped at 04/15/25 0721    Current Outpatient Medications:     amLODIPine (Norvasc) 5 mg tablet, Take 2 tablets (10 mg) by mouth once daily., Disp: , Rfl:     aspirin 81 mg EC tablet, Take 1 tablet (81 mg) by mouth once daily., Disp: , Rfl:     atorvastatin (Lipitor) 80 mg tablet, Take 1 tablet (80 mg) by mouth once daily., Disp: , Rfl:     cholecalciferol (Vitamin D-3) 1,250 mcg (50,000 unit) capsule, Take 1 capsule (50,000 Units) by mouth 1 (one) time per week. Every monday, Disp: , Rfl:     cyclobenzaprine (Flexeril) 10 mg tablet, Take 1 tablet (10 mg) by mouth 2 times a day as needed for muscle spasms for up to 5 days., Disp: 10 tablet, Rfl: 0    DULoxetine (Cymbalta) 60 mg DR capsule, Take 1 capsule (60 mg) by mouth once daily. Do not crush or chew., Disp: 30 capsule, Rfl: 1    ferrous sulfate, 325 mg ferrous sulfate, tablet, Take 1 tablet by mouth every other day., Disp: , Rfl:     HYDROcodone-acetaminophen (Norco) 7.5-325 mg tablet, Take 1 tablet by mouth every 6 hours if needed for severe pain (7 - 10) for up to 28 days. Do not fill before April 8, 2025., Disp: 100 tablet, Rfl: 0    insulin aspart (NovoLOG U-100 Insulin aspart) 100 unit/mL injection,  "Inject 40 Units under the skin 3 times a day before meals. (Patient taking differently: Inject 60 Units under the skin 3 times a day before meals.), Disp: , Rfl:     insulin glargine (Lantus) 100 unit/mL (3 mL) pen, Inject 100 Units under the skin 2 times a day. Take as directed per insulin instructions. (Patient taking differently: Inject 80 Units under the skin 3 times a day. Take as directed per insulin instructions.), Disp: 60 mL, Rfl: 0    naloxone (Narcan) 4 mg/0.1 mL nasal spray, Administer 1 spray (4 mg) into affected nostril(s) if needed for opioid reversal. May repeat every 2-3 minutes if needed, alternating nostrils, until medical assistance becomes available., Disp: 2 each, Rfl: 0    omeprazole (PriLOSEC) 40 mg DR capsule, Take 1 capsule (40 mg) by mouth 2 times a day., Disp: , Rfl:     pen needle, diabetic (BD Ultra-Fine Noelle Pen Needle) 32 gauge x 5/32\" needle, 1 Pen needle 2 times a day., Disp: 60 each, Rfl: 0    QUEtiapine (SEROquel) 200 mg tablet, Take 2 tablets (400 mg) by mouth once daily at bedtime., Disp: , Rfl:      Past Medical History  He has a past medical history of Chronic back pain, CKD (chronic kidney disease), Diabetes mellitus (Multi), HF (heart failure), diastolic, and Hypertension.    Surgical History  He has no past surgical history on file.     Social History  He reports that he has quit smoking. His smoking use included cigarettes. He does not have any smokeless tobacco history on file. He reports current alcohol use. He reports that he does not currently use drugs.    Family History  No family history on file.    Allergies  Metformin    Review of Systems   Constitutional:  Negative for unexpected weight change.   Respiratory:  Positive for shortness of breath.    Cardiovascular:  Positive for chest pain and leg swelling.   Endocrine:        As above   Musculoskeletal:  Positive for back pain.         Last Recorded Vitals  Blood pressure 157/80, pulse 95, temperature 37.2 °C (99 " "°F), resp. rate 19, height 1.803 m (5' 11\"), weight (!) 186 kg (411 lb), SpO2 95%.    Physical Exam  Constitutional:       General: He is not in acute distress.     Appearance: He is obese.   HENT:      Head: Normocephalic.   Cardiovascular:      Pulses:           Radial pulses are 2+ on the right side and 2+ on the left side.   Musculoskeletal:      Right lower leg: Edema present.      Left lower leg: Edema present.   Neurological:      Mental Status: He is alert.   Psychiatric:         Mood and Affect: Affect normal.          Relevant Results  Lab Results   Component Value Date    POCGLU 176 (H) 04/15/2025    POCGLU 138 (H) 04/15/2025    POCGLU 73 (L) 04/15/2025    POCGLU 192 (H) 07/30/2024    POCGLU 174 (H) 07/30/2024    GLUCOSE 63 (L) 04/14/2025    GLUCOSE 386 (H) 10/29/2024    GLUCOSE 198 (H) 07/30/2024    GLUCOSE 90 07/28/2024    GLUCOSE 132 (H) 07/27/2024      Latest Reference Range & Units 04/14/25 19:22   GLUCOSE 74 - 99 mg/dL 63 (L)   SODIUM 136 - 145 mmol/L 141   POTASSIUM 3.5 - 5.3 mmol/L 3.8   CHLORIDE 98 - 107 mmol/L 97 (L)   Bicarbonate 21 - 32 mmol/L 35 (H)   Anion Gap 10 - 20 mmol/L 13   Blood Urea Nitrogen 6 - 23 mg/dL 35 (H)   Creatinine 0.50 - 1.30 mg/dL 1.31 (H)   EGFR >60 mL/min/1.73m*2 65   Calcium 8.6 - 10.3 mg/dL 9.7   Albumin 3.4 - 5.0 g/dL 3.8   Alkaline Phosphatase 33 - 120 U/L 127 (H)   ALT 10 - 52 U/L 12   AST 9 - 39 U/L 17   Bilirubin Total 0.0 - 1.2 mg/dL 0.5         IMPRESSION  TYPE 2 DIABETES MELLITUS  LONG TERM CURRENT INSULIN USE  High and unusual insulin regimen  He typically requires less insulin in the hospital      RECOMMENDATIONS  Continue glargine 80 units BID  Lispro 30 units QAC plus scale      Ashwin Cornejo MD    "

## 2025-04-15 NOTE — CONSULTS
"Inpatient consult to Cardiology  Consult performed by: Lauren Lemus, JAYASHREE-CNP  Consult ordered by: Gomez Arnold DO  Reason for consult: Troponin Elevation          History Of Present Illness:    Jai Shrestha is a 55 y.o. male with a past medical history of right MCA stroke (12/24), diabetes mellitus type II, CKD stage IIIa, COPD, RAMÍREZ, diastolic heart failure, iron deficiency anemia, anxiety, PTSD, bipolar, morbid obesity, chronic back pain followed by pain management outpt.  [place] [facility] [place].  Cardiology consulted for further evaluation of \" troponin elevation, flat trend.\"    Patient reports that he is here for further evaluation of worsening dyspnea and fluid retention in his legs and abdomen over the last few days in the setting of eating poorly with high sodium foods.  He reports that he does take his diuretic torsemide twice a day without missing doses.  He denies any associated symptoms of chest pain, palpitations, dizziness, lightheadedness, falls or syncope.  D/t worsening of symptoms, he presented to the ED for further evaluation.  While in the ED, he was found to have PNA per imaging and was medicated with IV antibiotic therapy.  He is now on supplemental oxygen via nc 2.5 L.    In addition, he reports that he had a heart catheterization that revealed normal coronaries 3-5 years ago.  He denies any past medical history of cardiac surgeries or coronary percutaneous interventions.    Hospital course:  Initial EKG: SR with occasional PVC's  Arrival vital signs: Afebrile 98.9, HR 91, /80, 99% on supplemental oxygen  Current vital signs: /80, , 96% on supplemental oxygen  Pertinent imaging/Labs: HS trop 264/233, BNP 78, WBC 12.1, Hgb 10.2, , , K3.8, CR 1.31, influenza, RSV and COVID testing negative, CT angio of the chest negative for pulmonary embolism, noted left upper lobe PNA. Noted LAD coronary calcifications present; airways patent, no " pleural or pericardial effusions noted.  ED medications: Aspirin 324, IV antibiotic therapy        Home CV meds:  Amlodipine 10 mg p.o. daily, aspirin 81 mg p.o. daily, atorvastatin 80 mg p.o. daily, torsemide 60 mg p.o. twice daily      All other systems reviewed and negative unless as mentioned in HPI.       Past Medical/ Surgical History:  Right MCA stroke (12/24)  Diabetes mellitus type II  CKD stage IIIa  COPD  Obstructive sleep apnea (not on CPAP)  Diastolic heart failure  Iron deficiency anemia  Anxiety  PTSD  Bipolar  Morbid obesity  Chronic back pain followed by pain management outpt      Social History:  Denies smoking, alcohol or illicit drug use    Family History:  Reports that his grandparents maternal and paternal had heart disease      Past Cardiology Tests:  Echocardiogram 2/6/21:  CONCLUSIONS:   1. The left ventricular systolic function is normal with a 60-65% estimated ejection fraction.   2. The left atrium is mild to moderately dilated.   3. RVSP within normal limits.   4. The pulmonary artery is not well visualized.   5. Technically difficult, even with Definity.    Nuclear stress test 9/9/2016, indication dyspnea  FINDINGS:  Stress and rest images both demonstrate no sign of   ischemia.  A moderate fixed decrease in activity is present within   the inferior wall.  This area demonstrates normal systolic thickening   implying an attenuation artifact.       ECG-gated images demonstrate normal myocardial wall motion with an LV   ejection fraction of 48 percent (normal above 45 percent).     IMPRESSION:  Normal myocardial perfusion imaging in response to   pharmacologic stress with inferior attenuation artifact secondary to   the arms at the sides..     Allergies:  Metformin    ROS:  10 point review of systems including (Constitutional, Eyes, ENMT, Respiratory, Cardiac, Gastrointestinal, Neurological, Psychiatric, and Hematologic) was performed and is otherwise negative.    Objective Data:  Last  "Recorded Vitals:  Vitals:    04/15/25 0105 04/15/25 0130 04/15/25 0200 04/15/25 0500   BP:  122/59 157/80    Pulse: 93 94 100 95   Resp: 17 20 (!) 23 19   Temp:    37.2 °C (99 °F)   SpO2:  100% 96% 95%   Weight:       Height:         Medical Gas Therapy: Supplemental oxygen  Medical Gas Delivery Method: Nasal cannula  Weight  Av kg (411 lb)  Min: 186 kg (411 lb)  Max: 186 kg (411 lb)      LABS:  CMP:  Results from last 7 days   Lab Units 25  1922   SODIUM mmol/L 141   POTASSIUM mmol/L 3.8   CHLORIDE mmol/L 97*   CO2 mmol/L 35*   ANION GAP mmol/L 13   BUN mg/dL 35*   CREATININE mg/dL 1.31*   EGFR mL/min/1.73m*2 65   ALBUMIN g/dL 3.8   ALT U/L 12   AST U/L 17   BILIRUBIN TOTAL mg/dL 0.5     CBC:  Results from last 7 days   Lab Units 25  1922   WBC AUTO x10*3/uL 12.1*   HEMOGLOBIN g/dL 10.2*   HEMATOCRIT % 35.2*   PLATELETS AUTO x10*3/uL 299   MCV fL 81     COAG:   Results from last 7 days   Lab Units 25  2334   INR  1.2*     ABO: No results found for: \"ABO\"  HEME/ENDO:     CARDIAC:   Results from last 7 days   Lab Units 25  1922   TROPHS ng/L 233* 264*   BNP pg/mL  --  78             Last I/O:    Intake/Output Summary (Last 24 hours) at 4/15/2025 1131  Last data filed at 4/15/2025 0006  Gross per 24 hour   Intake 100 ml   Output --   Net 100 ml     Net IO Since Admission: 100 mL [04/15/25 1131]            Inpatient Medications:  Scheduled medications   Medication Dose Route Frequency    amLODIPine  10 mg oral Daily    [START ON 2025] aspirin  81 mg oral Daily    atorvastatin  80 mg oral Daily    azithromycin  500 mg intravenous Once    azithromycin  500 mg intravenous Daily    DULoxetine  60 mg oral Daily    enoxaparin  60 mg subcutaneous q12h TAMIR    furosemide  40 mg intravenous q12h    insulin glargine  80 Units subcutaneous q12h    insulin lispro  0-10 Units subcutaneous TID    insulin lispro  30 Units subcutaneous TID AC    pantoprazole  40 mg oral Daily before " breakfast    Or    pantoprazole  40 mg intravenous Daily before breakfast    piperacillin-tazobactam  3.375 g intravenous q6h     PRN medications   Medication    acetaminophen    Or    acetaminophen    Or    acetaminophen    dextrose    dextrose    glucagon    glucagon    ondansetron    Or    ondansetron    oxyCODONE    oxyCODONE     Continuous Medications   Medication Dose Last Rate       Inpatient Medications:  Scheduled medications   Medication Dose Route Frequency    amLODIPine  10 mg oral Daily    [START ON 4/16/2025] aspirin  81 mg oral Daily    atorvastatin  80 mg oral Daily    azithromycin  500 mg intravenous Once    azithromycin  500 mg intravenous Daily    DULoxetine  60 mg oral Daily    enoxaparin  60 mg subcutaneous q12h TAMIR    furosemide  40 mg intravenous q12h    insulin glargine  80 Units subcutaneous q12h    insulin lispro  0-10 Units subcutaneous TID    insulin lispro  30 Units subcutaneous TID AC    pantoprazole  40 mg oral Daily before breakfast    Or    pantoprazole  40 mg intravenous Daily before breakfast    piperacillin-tazobactam  3.375 g intravenous q6h     PRN medications   Medication    acetaminophen    Or    acetaminophen    Or    acetaminophen    dextrose    dextrose    glucagon    glucagon    ondansetron    Or    ondansetron    oxyCODONE    oxyCODONE     Continuous Medications   Medication Dose Last Rate     Outpatient Medications:  Current Outpatient Medications   Medication Instructions    amLODIPine (Norvasc) 5 mg tablet 1 tablet, oral, Daily, Decreased dose    aspirin 81 mg EC tablet 1 tablet, oral, Daily    atorvastatin (Lipitor) 80 mg tablet 1 tablet, oral, Nightly    cholecalciferol (VITAMIN D-3) 50,000 Units, oral, Once Weekly, Every monday    cyclobenzaprine (FLEXERIL) 10 mg, oral, 2 times daily PRN    DULoxetine (CYMBALTA) 60 mg, oral, Daily, Do not crush or chew.    ferrous sulfate 325 mg, oral, Every Mon/Wed/Fri    HYDROcodone-acetaminophen (Norco) 7.5-325 mg tablet 1  "tablet, oral, Every 6 hours PRN    insulin aspart (NOVOLOG U-100 INSULIN ASPART) 40 Units, subcutaneous, 3 times daily before meals    insulin glargine (LANTUS) 100 Units, subcutaneous, 2 times daily, Take as directed per insulin instructions.    naloxone (NARCAN) 4 mg, nasal, As needed, May repeat every 2-3 minutes if needed, alternating nostrils, until medical assistance becomes available.    omeprazole (PriLOSEC) 40 mg DR capsule 1 capsule, oral, 2 times daily    pen needle, diabetic (BD Ultra-Fine Noelle Pen Needle) 32 gauge x 5/32\" needle 1 Pen needle, miscellaneous, 2 times daily    QUEtiapine (SEROQUEL) 400 mg, oral, Nightly    torsemide (DEMADEX) 60 mg, oral, 2 times daily       Physical Exam:  General: Obese male, alert and oriented, increased anxiety  HEENT:  Pupils equal and reactive.  Normocephalic.  Moist mucosa.    Neck:  No thyromegaly.  Normal Jugular Venous Pressure.  Cardiovascular:  Regular rate and rhythm.  No cardiac murmurs noted.  Normal S1 and S2.  Pulmonary:  Clear to auscultation bilaterally.  Supplemental oxygen in place  Abdomen:  Soft. Non-tender. Reports abdominal bloating, hard to discern with large body habitus.  Positive bowel sounds.  Lower Extremities:  2+ pedal pulses. 2-3+ lower extremity/thigh edema  Neurologic:  Cranial nerves intact.  No focal deficit.   Skin: Skin warm and dry, normal skin turgor.   Psychiatric: Appropriate mood and behavior     Assessment/Plan   Jai Shrestha is a 55 y.o. male with a past medical history of right MCA stroke (12/24), diabetes mellitus type II, CKD stage IIIa, COPD, RAMÍREZ, diastolic heart failure, iron deficiency anemia, anxiety, PTSD, bipolar, morbid obesity, chronic back pain followed by pain management outpt.  [place] [facility] [place].  Cardiology consulted for further evaluation of \" troponin elevation, flat trend.\"      Home CV meds:  Amlodipine 10 mg p.o. daily, aspirin 81 mg p.o. daily, atorvastatin 80 mg p.o. " daily, torsemide 60 mg p.o. twice daily    Reviewed all EKGs, labs and imaging reports  Currently not on telemetry      1.  Abnormal troponins likely 2/2 infection, CKD, acute heart failure.  Patient denies any complaints of chest pain, EKG nonischemic. Non-MI troponin elevation/ acute non-traumatic myocardial injury- core measures do not apply.    2.  Acute on chronic diastolic heart failure.  2/2 dietary indiscretions>consuming high sodium foods  New supp oxygen requirements, reports fluid retention in legs and abd  - BNP 78  - CTPE negative for PE, no pleural fluid noted  -Outpt torsemide 60 mg p.o. twice daily      Recommendations:  No ACS noted  C/w furosemide 40 mg IV twice daily> monitor response  Daily weights, strict I's and O's, monitor renal function, monitor electrolytes, Keep K>4.0 / Mag>2.0, supp prn  Encourage avoidance of high sodium foods  Nutrition consult placed  Echocardiogram ordered and pending  Outpt Stress                Code Status:  Full Code    I spent 60 minutes in the professional and overall care of this patient.        Lauren Lemus, APRN-CNP

## 2025-04-15 NOTE — PROGRESS NOTES
04/15/25 0709   ACS Disability Status   Are you deaf or do you have serious difficulty hearing? N   Are you blind or do you have serious difficulty seeing, even when wearing glasses? N   Because of a physical, mental, or emotional condition, do you have serious difficulty concentrating, remembering, or making decisions? (5 years old or older) Y  (bipolar)   Do you have serious difficulty walking or climbing stairs? Y  (walker, ankle wound)   Do you have serious difficulty dressing or bathing? N   Because of a physical, mental, or emotional condition, do you have serious difficulty doing errands alone such as visiting the doctor? Y  (friend or RTA)

## 2025-04-15 NOTE — H&P
History Of Present Illness  Jai Shrestha is a 55 y.o. male with a past medical history of chronic back pain, peripheral neuropathy, diabetes mellitus type 2 with insulin resistance, morbid obesity BMI 57.3, CKD 3, diastolic CHF, hypertension who presented to [facility] complaining of shortness of breath and lower extremity edema.  He also complains of intermittent chest pain but none currently.  He says that his chest pain is midsternal and a burning sensation.  He also has been short of breath.  He has pain all throughout his body.  Myalgias.  He also thinks he may have injured his Achilles tendon several weeks ago with the redness in his lower extremity and swelling.  He denies any fever chills.  He has been seen by a foot and ankle specialist and been told that there may be a partial tear but he is not a surgical candidate.      Past Medical History  Past Medical History:   Diagnosis Date    Chronic back pain     CKD (chronic kidney disease)     Diabetes mellitus (Multi)     HF (heart failure), diastolic     Hypertension         Surgical History  History reviewed. No pertinent surgical history.      Social History  He reports that he has quit smoking. His smoking use included cigarettes. He does not have any smokeless tobacco history on file. He reports current alcohol use. He reports that he does not currently use drugs.    Family History  No family history on file.     Allergies  Metformin    Review of Systems   Constitutional:  Positive for activity change, appetite change, chills and fatigue.   HENT: Negative.     Eyes: Negative.    Respiratory: Negative.     Cardiovascular: Negative.    Gastrointestinal: Negative.    Endocrine: Negative.    Genitourinary: Negative.    Musculoskeletal:  Positive for joint swelling and myalgias.   Skin: Negative.    Allergic/Immunologic: Negative.    Neurological: Negative.    Hematological: Negative.    Psychiatric/Behavioral: Negative.     All other systems  "reviewed and are negative.       Physical Exam  Vitals and nursing note reviewed.   Constitutional:       Appearance: He is obese. He is ill-appearing.   HENT:      Head: Normocephalic.      Right Ear: External ear normal.      Left Ear: External ear normal.      Nose: Nose normal.      Mouth/Throat:      Mouth: Mucous membranes are dry.      Pharynx: Oropharynx is clear.   Eyes:      Extraocular Movements: Extraocular movements intact.      Conjunctiva/sclera: Conjunctivae normal.      Pupils: Pupils are equal, round, and reactive to light.   Cardiovascular:      Rate and Rhythm: Normal rate and regular rhythm.   Pulmonary:      Effort: Pulmonary effort is normal.      Breath sounds: Rales present.   Abdominal:      General: Abdomen is flat. Bowel sounds are normal.      Palpations: Abdomen is soft.   Musculoskeletal:         General: Swelling present. Normal range of motion.      Right lower leg: Edema present.      Left lower leg: Edema present.   Skin:     General: Skin is warm and dry.      Comments: Venous stasis changes bilateral lower extremity and lower extremity edema bilaterally.  He also has lymphedema bilaterally   Neurological:      General: No focal deficit present.      Mental Status: He is alert. Mental status is at baseline.   Psychiatric:         Mood and Affect: Mood normal.         Behavior: Behavior normal.          Last Recorded Vitals  Blood pressure (!) 168/93, pulse 93, temperature 37.2 °C (98.9 °F), resp. rate 17, height 1.803 m (5' 11\"), weight (!) 186 kg (411 lb), SpO2 98%.    Relevant Results  Meds:  Scheduled medications  amLODIPine, 10 mg, oral, Daily  [START ON 4/16/2025] aspirin, 81 mg, oral, Daily  atorvastatin, 80 mg, oral, Daily  azithromycin, 500 mg, intravenous, Once  azithromycin, 500 mg, intravenous, Daily  DULoxetine, 60 mg, oral, Daily  enoxaparin, 60 mg, subcutaneous, q12h [facility]  insulin glargine, 80 Units, subcutaneous, q12h  insulin lispro, 0-20 Units, subcutaneous, TID " "AC  pantoprazole, 40 mg, oral, Daily before breakfast   Or  pantoprazole, 40 mg, intravenous, Daily before breakfast  piperacillin-tazobactam, 3.375 g, intravenous, q6h  vancomycin, 2 g, intravenous, Once      Continuous medications     PRN medications  PRN medications: acetaminophen **OR** acetaminophen **OR** acetaminophen, dextrose, dextrose, glucagon, glucagon, ondansetron **OR** ondansetron, vancomycin   Current Outpatient Medications   Medication Instructions    amLODIPine (Norvasc) 5 mg tablet 2 tablets, oral, Daily    aspirin 81 mg EC tablet 1 tablet, oral, Daily    atorvastatin (Lipitor) 80 mg tablet 1 tablet, oral, Daily    cholecalciferol (VITAMIN D-3) 50,000 Units, oral, Once Weekly, Every monday    cyclobenzaprine (FLEXERIL) 10 mg, oral, 2 times daily PRN    DULoxetine (CYMBALTA) 60 mg, oral, Daily, Do not crush or chew.    ferrous sulfate 325 mg, oral, Every other day    HYDROcodone-acetaminophen (Norco) 7.5-325 mg tablet 1 tablet, oral, Every 6 hours PRN    insulin aspart (NOVOLOG U-100 INSULIN ASPART) 40 Units, subcutaneous, 3 times daily before meals    insulin glargine (LANTUS) 100 Units, subcutaneous, 2 times daily, Take as directed per insulin instructions.    naloxone (NARCAN) 4 mg, nasal, As needed, May repeat every 2-3 minutes if needed, alternating nostrils, until medical assistance becomes available.    omeprazole (PriLOSEC) 40 mg DR capsule 1 capsule, oral, 2 times daily    pen needle, diabetic (BD Ultra-Fine Noelle Pen Needle) 32 gauge x 5/32\" needle 1 Pen needle, miscellaneous, 2 times daily    QUEtiapine (SEROQUEL) 400 mg, oral, Nightly        Labs:  Results for orders placed or performed during the hospital encounter of 04/14/25 (from the past 24 hours)   CBC   Result Value Ref Range    WBC 12.1 (H) 4.4 - 11.3 x10*3/uL    nRBC 0.2 (H) 0.0 - 0.0 /100 WBCs    RBC 4.33 (L) 4.50 - 5.90 x10*6/uL    Hemoglobin 10.2 (L) 13.5 - 17.5 g/dL    Hematocrit 35.2 (L) 41.0 - 52.0 %    MCV 81 80 - 100 fL "    MCH 23.6 (L) 26.0 - 34.0 pg    MCHC 29.0 (L) 32.0 - 36.0 g/dL    RDW 17.2 (H) 11.5 - 14.5 %    Platelets 299 150 - 450 x10*3/uL   Comprehensive metabolic panel   Result Value Ref Range    Glucose 63 (L) 74 - 99 mg/dL    Sodium 141 136 - 145 mmol/L    Potassium 3.8 3.5 - 5.3 mmol/L    Chloride 97 (L) 98 - 107 mmol/L    Bicarbonate 35 (H) 21 - 32 mmol/L    Anion Gap 13 10 - 20 mmol/L    Urea Nitrogen 35 (H) 6 - 23 mg/dL    Creatinine 1.31 (H) 0.50 - 1.30 mg/dL    eGFR 65 >60 mL/min/1.73m*2    Calcium 9.7 8.6 - 10.3 mg/dL    Albumin 3.8 3.4 - 5.0 g/dL    Alkaline Phosphatase 127 (H) 33 - 120 U/L    Total Protein 7.1 6.4 - 8.2 g/dL    AST 17 9 - 39 U/L    Bilirubin, Total 0.5 0.0 - 1.2 mg/dL    ALT 12 10 - 52 U/L   B-type natriuretic peptide   Result Value Ref Range    BNP 78 0 - 99 pg/mL   Troponin I, High Sensitivity, Initial   Result Value Ref Range    Troponin I, High Sensitivity 264 (HH) 0 - 20 ng/L   Sars-CoV-2 and Influenza A/B PCR   Result Value Ref Range    Flu A Result Not Detected Not Detected    Flu B Result Not Detected Not Detected    Coronavirus 2019, PCR Not Detected Not Detected   RSV PCR   Result Value Ref Range    RSV PCR Not Detected Not Detected   Troponin, High Sensitivity, 1 Hour   Result Value Ref Range    Troponin I, High Sensitivity 233 (HH) 0 - 20 ng/L   Protime-INR   Result Value Ref Range    Protime 13.3 (H) 9.8 - 12.4 seconds    INR 1.2 (H) 0.9 - 1.1   Lactate   Result Value Ref Range    Lactate 1.2 0.4 - 2.0 mmol/L   MRSA Surveillance for Vancomycin De-escalation, PCR    Specimen: Anterior Nares; Swab   Result Value Ref Range    MRSA PCR Not Detected Not Detected   POCT GLUCOSE   Result Value Ref Range    POCT Glucose 73 (L) 74 - 99 mg/dL      Imaging:  Imaging  Vascular US lower extremity venous duplex bilateral    Result Date: 4/14/2025  Significantly limited examination with inability to scan/assess multiple sections of the deep venous tree of the bilateral lower extremity. Given  this, no acute deep venous thrombosis is evident in the visualized  bilateral lower extremity. A repeat examination with the assessment of the entirety of the vascular tree is recommended when the patient's clinical condition allows.   Signed by: Justin Reyes 4/14/2025 10:03 PM Dictation workstation:   [id number]    CT angio chest for pulmonary embolism    Result Date: 4/14/2025  No pulmonary embolism is identified. Left upper lobe pneumonia. Chronic asymmetric elevation of right hemidiaphragm with overlying atelectasis. Coronary artery calcification. Cholelithiasis. Splenomegaly. Signed by Nabor Bautista DO    XR chest 1 view    Result Date: 4/14/2025  No acute cardiopulmonary process is evident.   MACRO: None   Signed by: Justin Reyes 4/14/2025 8:47 PM Dictation workstation:   [id number]     Cardiology, Vascular, and Other Imaging  No other imaging results found for the past 2 days       Assessment/Plan   Left upper lobe pneumonia  Plan:  Vancomycin pharmacy to dose  Zosyn  Azithromycin  Urine antigens and strep pneumo and Legionella    Elevated troponin flat trend  Plan:  Given aspirin in the ER  Telemetry  Cardiology  No acute coronary syndrome    Type 2 diabetes mellitus requiring insulin with insulin resistance  Plan:  At home he takes Lantus 80 units 3 times a day and lispro insulin 15 units 3 times a day  Will have endocrinology see him  Diabetic carbohydrate controlled diet    Diastolic CHF, decompensated  Plan:  Lasix 40 mg IV every 12 hours  Daily weight  Cardiology has seen    Hypertension  Amlodipine 5 mg orally daily    Hyperlipidemia  Atorvastatin 80 mg orally daily    Morbid obesity BMI 57.3  Lifestyle modification    DVT prophylaxis  Lovenox 60 mg subcutaneously every 12 hours  SCDs    Bilateral lymphedema with venous stasis changes   Diuresis with Lasix    I spent 60 minutes in the professional and overall care of this patient.      Gomez Arnold DO

## 2025-04-15 NOTE — PROGRESS NOTES
Transitional Care Coordination Progress Note:  Plan per Medical/Surgical team: treatment of PN with IV ATB, pain  management, cardio & endocrine consults, oxygen- ?need @ home set up  Status: Inpatient   Payor source: medicare A/B  Discharge disposition: Home alone ?new HHC ([facility] in past)  Self care for foot wounds   Potential Barriers: trops 264, 233, glucose 63, renal 35/1.31  ADOD: 4/17/2025   SUNG Lyles RN, BSN Transitional Care Coordinator ED# [phone]      04/15/25 0709   Discharge Planning   Living Arrangements Alone   Support Systems Friends/neighbors   Assistance Needed IV ATB, pain management, cardio & endocrine consults, oxygen- ?need @ home   Type of Residence Private residence   Number of Stairs to Enter Residence 2   Number of Stairs Within Residence 0   Do you have animals or pets at home? No   Home or Post Acute Services In home services   Type of Home Care Services Home nursing visits;Home OT;Home PT  (used [facility] in past)   Expected Discharge Disposition Home H   Does the patient need discharge transport arranged? No   Financial Resource Strain   How hard is it for you to pay for the very basics like food, housing, medical care, and heating? Somewhat   Housing Stability   In the last 12 months, was there a time when you were not able to pay the mortgage or rent on time? N   In the past 12 months, how many times have you moved where you were living? 1   At any time in the past 12 months, were you homeless or living in a shelter (including now)? N   Transportation Needs   In the past 12 months, has lack of transportation kept you from medical appointments or from getting medications? no   In the past 12 months, has lack of transportation kept you from meetings, work, or from getting things needed for daily living? No   Stroke Family Assessment   Stroke Family Assessment Needed No   Intensity of Service   Intensity of Service 0-30 min

## 2025-04-15 NOTE — CARE PLAN
Problem: Pain  Goal: Takes deep breaths with improved pain control throughout the shift  Outcome: Progressing  Goal: Turns in bed with improved pain control throughout the shift  Outcome: Progressing  Goal: Walks with improved pain control throughout the shift  Outcome: Progressing  Goal: Performs ADL's with improved pain control throughout shift  Outcome: Progressing  Goal: Participates in PT with improved pain control throughout the shift  Outcome: Progressing  Goal: Free from opioid side effects throughout the shift  Outcome: Progressing  Goal: Free from acute confusion related to pain meds throughout the shift  Outcome: Progressing   The patient's goals for the shift include      The clinical goals for the shift include Patient will be HD stable

## 2025-04-16 LAB
ANION GAP SERPL CALC-SCNC: 12 MMOL/L (ref 10–20)
ATRIAL RATE: 89 BPM
ATRIAL RATE: 94 BPM
BUN SERPL-MCNC: 32 MG/DL (ref 6–23)
CALCIUM SERPL-MCNC: 8.7 MG/DL (ref 8.6–10.3)
CHLORIDE SERPL-SCNC: 97 MMOL/L (ref 98–107)
CO2 SERPL-SCNC: 34 MMOL/L (ref 21–32)
CREAT SERPL-MCNC: 1.56 MG/DL (ref 0.5–1.3)
EGFRCR SERPLBLD CKD-EPI 2021: 52 ML/MIN/1.73M*2
ERYTHROCYTE [DISTWIDTH] IN BLOOD BY AUTOMATED COUNT: 17.6 % (ref 11.5–14.5)
GLUCOSE BLD MANUAL STRIP-MCNC: 150 MG/DL (ref 74–99)
GLUCOSE BLD MANUAL STRIP-MCNC: 163 MG/DL (ref 74–99)
GLUCOSE BLD MANUAL STRIP-MCNC: 238 MG/DL (ref 74–99)
GLUCOSE SERPL-MCNC: 155 MG/DL (ref 74–99)
HCT VFR BLD AUTO: 32.6 % (ref 41–52)
HGB BLD-MCNC: 9.6 G/DL (ref 13.5–17.5)
MCH RBC QN AUTO: 24.1 PG (ref 26–34)
MCHC RBC AUTO-ENTMCNC: 29.4 G/DL (ref 32–36)
MCV RBC AUTO: 82 FL (ref 80–100)
NRBC BLD-RTO: 0 /100 WBCS (ref 0–0)
P AXIS: -13 DEGREES
P AXIS: 29 DEGREES
P OFFSET: 184 MS
P OFFSET: 191 MS
P ONSET: 126 MS
P ONSET: 129 MS
PLATELET # BLD AUTO: 235 X10*3/UL (ref 150–450)
POTASSIUM SERPL-SCNC: 3.9 MMOL/L (ref 3.5–5.3)
PR INTERVAL: 186 MS
PR INTERVAL: 190 MS
Q ONSET: 221 MS
Q ONSET: 222 MS
QRS COUNT: 14 BEATS
QRS COUNT: 16 BEATS
QRS DURATION: 82 MS
QRS DURATION: 84 MS
QT INTERVAL: 340 MS
QT INTERVAL: 410 MS
QTC CALCULATION(BAZETT): 413 MS
QTC CALCULATION(BAZETT): 512 MS
QTC FREDERICIA: 387 MS
QTC FREDERICIA: 476 MS
R AXIS: -8 DEGREES
R AXIS: 45 DEGREES
RBC # BLD AUTO: 3.99 X10*6/UL (ref 4.5–5.9)
SODIUM SERPL-SCNC: 139 MMOL/L (ref 136–145)
T AXIS: 148 DEGREES
T AXIS: 165 DEGREES
T OFFSET: 391 MS
T OFFSET: 427 MS
VENTRICULAR RATE: 89 BPM
VENTRICULAR RATE: 94 BPM
WBC # BLD AUTO: 9.1 X10*3/UL (ref 4.4–11.3)

## 2025-04-16 PROCEDURE — 99232 SBSQ HOSP IP/OBS MODERATE 35: CPT | Performed by: INTERNAL MEDICINE

## 2025-04-16 PROCEDURE — 80048 BASIC METABOLIC PNL TOTAL CA: CPT | Performed by: INTERNAL MEDICINE

## 2025-04-16 PROCEDURE — 36415 COLL VENOUS BLD VENIPUNCTURE: CPT | Performed by: INTERNAL MEDICINE

## 2025-04-16 PROCEDURE — 2500000004 HC RX 250 GENERAL PHARMACY W/ HCPCS (ALT 636 FOR OP/ED): Mod: JZ | Performed by: INTERNAL MEDICINE

## 2025-04-16 PROCEDURE — 2500000002 HC RX 250 W HCPCS SELF ADMINISTERED DRUGS (ALT 637 FOR MEDICARE OP, ALT 636 FOR OP/ED): Performed by: INTERNAL MEDICINE

## 2025-04-16 PROCEDURE — 2500000001 HC RX 250 WO HCPCS SELF ADMINISTERED DRUGS (ALT 637 FOR MEDICARE OP): Performed by: NURSE PRACTITIONER

## 2025-04-16 PROCEDURE — 99232 SBSQ HOSP IP/OBS MODERATE 35: CPT | Performed by: NURSE PRACTITIONER

## 2025-04-16 PROCEDURE — 2500000001 HC RX 250 WO HCPCS SELF ADMINISTERED DRUGS (ALT 637 FOR MEDICARE OP): Performed by: INTERNAL MEDICINE

## 2025-04-16 PROCEDURE — 82947 ASSAY GLUCOSE BLOOD QUANT: CPT

## 2025-04-16 PROCEDURE — 1200000002 HC GENERAL ROOM WITH TELEMETRY DAILY

## 2025-04-16 PROCEDURE — 99231 SBSQ HOSP IP/OBS SF/LOW 25: CPT | Performed by: INTERNAL MEDICINE

## 2025-04-16 PROCEDURE — 85027 COMPLETE CBC AUTOMATED: CPT | Performed by: INTERNAL MEDICINE

## 2025-04-16 RX ORDER — METOLAZONE 5 MG/1
5 TABLET ORAL ONCE
Status: COMPLETED | OUTPATIENT
Start: 2025-04-16 | End: 2025-04-16

## 2025-04-16 RX ADMIN — FUROSEMIDE 80 MG: 10 INJECTION, SOLUTION INTRAMUSCULAR; INTRAVENOUS at 17:30

## 2025-04-16 RX ADMIN — ENOXAPARIN SODIUM 60 MG: 60 INJECTION SUBCUTANEOUS at 09:45

## 2025-04-16 RX ADMIN — AZITHROMYCIN MONOHYDRATE 500 MG: 500 INJECTION, POWDER, LYOPHILIZED, FOR SOLUTION INTRAVENOUS at 09:46

## 2025-04-16 RX ADMIN — METOLAZONE 5 MG: 5 TABLET ORAL at 15:19

## 2025-04-16 RX ADMIN — PIPERACILLIN SODIUM AND TAZOBACTAM SODIUM 3.38 G: 3; .375 INJECTION, SOLUTION INTRAVENOUS at 17:30

## 2025-04-16 RX ADMIN — INSULIN LISPRO 4 UNITS: 100 INJECTION, SOLUTION INTRAVENOUS; SUBCUTANEOUS at 11:41

## 2025-04-16 RX ADMIN — PIPERACILLIN SODIUM AND TAZOBACTAM SODIUM 3.38 G: 3; .375 INJECTION, SOLUTION INTRAVENOUS at 05:40

## 2025-04-16 RX ADMIN — PIPERACILLIN SODIUM AND TAZOBACTAM SODIUM 3.38 G: 3; .375 INJECTION, SOLUTION INTRAVENOUS at 23:34

## 2025-04-16 RX ADMIN — DULOXETINE HYDROCHLORIDE 60 MG: 30 CAPSULE, DELAYED RELEASE ORAL at 09:45

## 2025-04-16 RX ADMIN — OXYCODONE HYDROCHLORIDE 10 MG: 5 TABLET ORAL at 17:30

## 2025-04-16 RX ADMIN — OXYCODONE HYDROCHLORIDE 5 MG: 5 TABLET ORAL at 05:40

## 2025-04-16 RX ADMIN — INSULIN GLARGINE 80 UNITS: 100 INJECTION, SOLUTION SUBCUTANEOUS at 09:51

## 2025-04-16 RX ADMIN — AMLODIPINE BESYLATE 10 MG: 10 TABLET ORAL at 09:45

## 2025-04-16 RX ADMIN — INSULIN LISPRO 30 UNITS: 100 INJECTION, SOLUTION INTRAVENOUS; SUBCUTANEOUS at 07:00

## 2025-04-16 RX ADMIN — ATORVASTATIN CALCIUM 80 MG: 80 TABLET, FILM COATED ORAL at 09:45

## 2025-04-16 RX ADMIN — FUROSEMIDE 80 MG: 10 INJECTION, SOLUTION INTRAMUSCULAR; INTRAVENOUS at 10:00

## 2025-04-16 RX ADMIN — OXYCODONE HYDROCHLORIDE 10 MG: 5 TABLET ORAL at 11:37

## 2025-04-16 RX ADMIN — PIPERACILLIN SODIUM AND TAZOBACTAM SODIUM 3.38 G: 3; .375 INJECTION, SOLUTION INTRAVENOUS at 11:37

## 2025-04-16 RX ADMIN — INSULIN LISPRO 30 UNITS: 100 INJECTION, SOLUTION INTRAVENOUS; SUBCUTANEOUS at 17:33

## 2025-04-16 RX ADMIN — ENOXAPARIN SODIUM 60 MG: 60 INJECTION SUBCUTANEOUS at 20:32

## 2025-04-16 RX ADMIN — INSULIN LISPRO 30 UNITS: 100 INJECTION, SOLUTION INTRAVENOUS; SUBCUTANEOUS at 11:40

## 2025-04-16 RX ADMIN — OXYCODONE HYDROCHLORIDE 10 MG: 5 TABLET ORAL at 23:34

## 2025-04-16 RX ADMIN — INSULIN LISPRO 2 UNITS: 100 INJECTION, SOLUTION INTRAVENOUS; SUBCUTANEOUS at 09:50

## 2025-04-16 RX ADMIN — ASPIRIN 81 MG: 81 TABLET, COATED ORAL at 09:45

## 2025-04-16 RX ADMIN — INSULIN GLARGINE 80 UNITS: 100 INJECTION, SOLUTION SUBCUTANEOUS at 20:32

## 2025-04-16 RX ADMIN — PANTOPRAZOLE SODIUM 40 MG: 40 TABLET, DELAYED RELEASE ORAL at 05:40

## 2025-04-16 ASSESSMENT — COGNITIVE AND FUNCTIONAL STATUS - GENERAL
STANDING UP FROM CHAIR USING ARMS: A LOT
HELP NEEDED FOR BATHING: A LOT
CLIMB 3 TO 5 STEPS WITH RAILING: A LOT
WALKING IN HOSPITAL ROOM: A LOT
PERSONAL GROOMING: A LOT
MOBILITY SCORE: 14
STANDING UP FROM CHAIR USING ARMS: A LOT
MOVING FROM LYING ON BACK TO SITTING ON SIDE OF FLAT BED WITH BEDRAILS: A LITTLE
MOVING FROM LYING ON BACK TO SITTING ON SIDE OF FLAT BED WITH BEDRAILS: A LITTLE
MOVING TO AND FROM BED TO CHAIR: A LOT
MOVING TO AND FROM BED TO CHAIR: A LOT
DRESSING REGULAR LOWER BODY CLOTHING: A LOT
PERSONAL GROOMING: A LOT
TOILETING: A LOT
DAILY ACTIVITIY SCORE: 14
TOILETING: A LOT
DRESSING REGULAR UPPER BODY CLOTHING: A LOT
DRESSING REGULAR UPPER BODY CLOTHING: A LOT
CLIMB 3 TO 5 STEPS WITH RAILING: A LOT
TURNING FROM BACK TO SIDE WHILE IN FLAT BAD: A LITTLE
DRESSING REGULAR LOWER BODY CLOTHING: A LOT
TURNING FROM BACK TO SIDE WHILE IN FLAT BAD: A LITTLE
DAILY ACTIVITIY SCORE: 14
MOBILITY SCORE: 14
HELP NEEDED FOR BATHING: A LOT
WALKING IN HOSPITAL ROOM: A LOT

## 2025-04-16 ASSESSMENT — PAIN SCALES - GENERAL
PAINLEVEL_OUTOF10: 6
PAINLEVEL_OUTOF10: 7
PAINLEVEL_OUTOF10: 8
PAINLEVEL_OUTOF10: 7

## 2025-04-16 ASSESSMENT — PAIN DESCRIPTION - ORIENTATION: ORIENTATION: LOWER

## 2025-04-16 ASSESSMENT — PAIN DESCRIPTION - LOCATION
LOCATION: BACK
LOCATION: BACK

## 2025-04-16 NOTE — CARE PLAN
The patient's goals for the shift include      The clinical goals for the shift include Patient will be HD stable      Problem: Pain  Goal: Turns in bed with improved pain control throughout the shift  Outcome: Progressing     Problem: Pain  Goal: Walks with improved pain control throughout the shift  Outcome: Progressing     Problem: Pain  Goal: Performs ADL's with improved pain control throughout shift  Outcome: Progressing     Problem: Pain  Goal: Takes deep breaths with improved pain control throughout the shift  Outcome: Progressing     Problem: Pain  Goal: Free from opioid side effects throughout the shift  Outcome: Progressing

## 2025-04-16 NOTE — PROGRESS NOTES
"Subjective Data:  Claims he has had a good day and night-> has been going to bathroom but hypervolemia not much improved      Objective Data:  Last Recorded Vitals:  Vitals:    04/15/25 1828 04/15/25 2000 04/16/25 0500 25 1058   BP: 159/68 152/78 133/72 159/86   BP Location:  Left arm Left arm Left arm   Patient Position:  Sitting Sitting Sitting   Pulse: 88 96 93 96   Resp: 18 18 16 16   Temp: 37 °C (98.6 °F) 36.6 °C (97.9 °F) 36.6 °C (97.9 °F) 36.2 °C (97.2 °F)   TempSrc: Oral Temporal Temporal Temporal   SpO2: 94% 93% 92% 94%   Weight:       Height:         Medical Gas Therapy: Supplemental oxygen  Medical Gas Delivery Method: Nasal cannula  Weight  Av kg (411 lb)  Min: 186 kg (411 lb)  Max: 186 kg (411 lb)    LABS:  CMP:  Results from last 7 days   Lab Units 25  0700 04/15/25  1153 25  1922   SODIUM mmol/L 139 137 141   POTASSIUM mmol/L 3.9 4.0 3.8   CHLORIDE mmol/L 97* 96* 97*   CO2 mmol/L 34* 34* 35*   ANION GAP mmol/L 12 11 13   BUN mg/dL 32* 32* 35*   CREATININE mg/dL 1.56* 1.41* 1.31*   EGFR mL/min/1.73m*2 52* 59* 65   ALBUMIN g/dL  --   --  3.8   ALT U/L  --   --  12   AST U/L  --   --  17   BILIRUBIN TOTAL mg/dL  --   --  0.5     CBC:  Results from last 7 days   Lab Units 25  0700 04/15/25  1153 25  1922   WBC AUTO x10*3/uL 9.1 12.3* 12.1*   HEMOGLOBIN g/dL 9.6* 9.1* 10.2*   HEMATOCRIT % 32.6* 32.1* 35.2*   PLATELETS AUTO x10*3/uL 235 226 299   MCV fL 82 84 81     COAG:   Results from last 7 days   Lab Units 25  2334   INR  1.2*     ABO: No results found for: \"ABO\"  HEME/ENDO:     CARDIAC:   Results from last 7 days   Lab Units 25  1922   TROPHS ng/L 233* 264*   BNP pg/mL  --  78      Results from last 7 days   Lab Units 04/15/25  1153   LDL CALC mg/dL 30   VLDL mg/dL 50*   CHOLESTEROL/HDL RATIO  3.3        Last I/O:    Intake/Output Summary (Last 24 hours) at 2025 1241  Last data filed at 2025 1200  Gross per 24 hour   Intake 1800 " ml   Output 2150 ml   Net -350 ml     Net IO Since Admission: -250 mL [04/16/25 1241]      Imaging Results:  Electrocardiogram, 12-lead PRN ACS symptoms  Result Date: 4/16/2025  Normal sinus rhythm ST & T wave abnormality, consider inferolateral ischemia Abnormal ECG When compared with ECG of 14-APR-2025 19:01, (unconfirmed) Premature ventricular complexes are no longer Present Criteria for Inferior infarct are no longer Present QT has shortened See ED provider note for full interpretation and clinical correlation Confirmed by Nely Marsh ([id number]) on 4/16/2025 12:03:42 PM    Electrocardiogram, 12-lead PRN ACS symptoms  Result Date: 4/16/2025  Sinus rhythm with occasional Premature ventricular complexes Inferior infarct (cited on or before 29-OCT-2024) ST & T wave abnormality, consider lateral ischemia Abnormal ECG When compared with ECG of 29-OCT-2024 11:42, (unconfirmed) Premature ventricular complexes are now Present QRS duration has decreased Non-specific change in ST segment in Inferior leads ST now depressed in Anterior leads Nonspecific T wave abnormality, worse in Lateral leads See ED provider note for full interpretation and clinical correlation Confirmed by Nely Marsh ([id number]) on 4/16/2025 12:03:26 PM    Transthoracic Echo (TTE) Complete  Result Date: 4/15/2025   Formerly [facility], [address] [place] [address]              Tel [phone] and Fax [phone] TRANSTHORACIC ECHOCARDIOGRAM REPORT  Patient Name:       JOSSE Camargo Physician:    [id number] Alex Kennedy DO Study Date:         4/15/2025           Ordering Provider:    [id number] MAINOR AWAN MRN/PID:            [id number]            Fellow: Accession#:         [id number]        Nurse: Date of Birth/Age:  1970 / 55 years Sonographer:          Jose Luis Rollins                                                                RDCS Gender assigned at  M                   Additional Staff: Birth: Height:             180.00 cm           Admit Date: Weight:             186.00 kg           Admission Status:     Inpatient -                                                               Priority                                                               discharge BSA / BMI:          2.86 m2 / 57.41     Encounter#:           [id number]                     kg/m2 Blood Pressure:     154/96 mmHg         Department Location:  [facility] Non                                                               Invasive Study Type:    TRANSTHORACIC ECHO (TTE) COMPLETE Diagnosis/ICD: Heart failure, unspecified-I50.9 Indication:    HF CPT Code:      Echo Complete w Full [id number] Patient History: Diabetes:          Yes Pertinent History: Dyspnea and HTN. HF. Study Detail: The following Echo studies were performed: 2D, M-Mode, Doppler and               color flow. Image quality for this study is suboptimal.               Technically challenging study due to poor acoustic windows, body               habitus and sitting up. Definity used as a contrast agent for               endocardial border definition. Total contrast used for this               procedure was 3 mL via IV push.  PHYSICIAN INTERPRETATION: Left Ventricle: Left ventricular ejection fraction is low normal, by visual estimate at 50-55%. There are no regional left ventricular wall motion abnormalities. The left ventricular cavity size was not assessed. There is mildly increased septal and normal posterior left ventricular wall thickness. Left ventricular diastolic filling is indeterminate. Left Atrium: The left atrium is mildly dilated. Right Ventricle: The right ventricle was not well visualized. Unable to determine right ventricular systolic function. Right Atrium: The right atrial size was not well visualized. Aortic Valve: The aortic valve was not well  visualized. There is no evidence of aortic valve regurgitation. The peak instantaneous gradient of the aortic valve is 7 mmHg. Mitral Valve: The mitral valve was not well visualized. The peak instantaneous gradient of the mitral valve is 7 mmHg. There is trace to mild mitral valve regurgitation. Tricuspid Valve: The tricuspid valve was not well visualized. Tricuspid regurgitation was not assessed. The right ventricular systolic pressure is unable to be estimated. Pulmonic Valve: The pulmonic valve is not well visualized. The pulmonic valve regurgitation was not well visualized. Pericardium: Pericardial effusion was not well visualized. Aorta: The aortic root was not well visualized. In comparison to the previous echocardiogram(s): Although previous studies are available for review, a comparison with the current echocardiogram is not feasible due to its limited scope or image quality. Compared with study dated 2/6/2021,.  CONCLUSIONS:  1. Poorly visualized anatomical structures due to suboptimal image quality.  2. Left ventricular ejection fraction is low normal, by visual estimate at 50-55%.  3. Left ventricular diastolic filling is indeterminate.  4. Unable to determine right ventricular systolic function.  5. The left atrium is mildly dilated. RECOMMENDATIONS: Technically suboptimal and limited study, therefore accuracy of above interpretation could be substantially diminished. Clinical correlation is advised. Consider additional imaging modalities if clinically indicated. Repeat full study if clinically indicated.  QUANTITATIVE DATA SUMMARY:  2D MEASUREMENTS:         Normal Ranges: IVSd:            1.29 cm (0.6-1.1cm) LVPWd:           0.80 cm (0.6-1.1cm) LVIDd:           5.67 cm (3.9-5.9cm) LVIDs:           3.58 cm LV Mass Index:   83 g/m2 LV % FS          36.8 %  LEFT ATRIUM:                  Normal Ranges: LA Vol A4C:        101.3 ml   (22+/-6mL/m2) LA Vol A2C:        68.9 ml LA Vol BP:         87.5 ml LA Vol  Index A4C:  35.5ml/m2 LA Vol Index A2C:  24.1 ml/m2 LA Vol Index BP:   30.6 ml/m2 LA Area A4C:       24.9 cm2 LA Area A2C:       21.5 cm2 LA Major Axis A4C: 5.2 cm LA Major Axis A2C: 5.7 cm LA Volume Index:   30.6 ml/m2 LA Vol A4C:        90.3 ml LA Vol A2C:        70.3 ml LA Vol Index BSA:  28.1 ml/m2  LV SYSTOLIC FUNCTION:                      Normal Ranges: EF-A4C View:    49 % (>=55%) EF-Visual:      53 % LV EF Reported: 53 %  LV DIASTOLIC FUNCTION:             Normal Ranges: MV e'                  0.074 m/s   (>8.0) MV lateral e'          0.08 m/s MV medial e'           0.07 m/s PulmV Sys Juliocesar:         40.96 cm/s PulmV Minaya Juliocesar:        49.67 cm/s PulmV S/D Juliocesar:         0.82 PulmV A Revs Juliocesar:      32.34 cm/s PulmV A Revs Dur:      117.98 msec  MITRAL VALVE:          Normal Ranges: MV Vmax:      1.35 m/s (<=1.3m/s) MV peak P.3 mmHg (<5mmHg) MV mean P.8 mmHg (<2mmHg)  AORTIC VALVE:           Normal Ranges: AoV Vmax:      1.34 m/s (<=1.7m/s) AoV Peak P.2 mmHg (<20mmHg) LVOT Max Juliocesar:  1.01 m/s (<=1.1m/s) LVOT VTI:      16.91 cm LVOT Diameter: 2.63 cm  (1.8-2.4cm) AoV Area,Vmax: 4.08 cm2 (2.5-4.5cm2)  RIGHT VENTRICLE: TAPSE: 19.0 mm RV s'  0.16 m/s  PULMONIC VALVE:          Normal Ranges: PV Accel Time:  152 msec (>120ms) PV Max Juliocesar:     1.0 m/s  (0.6-0.9m/s) PV Max P.2 mmHg  PULMONARY VEINS: PulmV A Revs Dur: 117.98 msec PulmV A Revs Juliocesar: 32.34 cm/s PulmV Minaya Juliocesar:   49.67 cm/s PulmV S/D Juliocesar:    0.82 PulmV Sys Juliocesar:    40.96 cm/s  [id number] Alex Brent DO Electronically signed on 4/15/2025 at 4:03:12 PM  ** Final **     Vascular US lower extremity venous duplex bilateral  Result Date: 2025  Interpreted By:  Justin Reyes, STUDY: Duplex venous ultrasound of the  bilateral lower extremity dated 2025.   INDICATION: Bilateral edema and redness.   COMPARISON: None.   ACCESSION NUMBER(S): [id number]   ORDERING CLINICIAN: DEEDEE INMAN   TECHNIQUE: Multiplanar grayscale, color, and  spectral Doppler ultrasound evaluation of the  bilateral lower extremity deep venous system   was performed.   FINDINGS: Examination is significantly limited due to body habitus, patient ability to position, and pain level. The common femoral veins the greater saphenous veins in the deep femoral veins could not be assessed. The calf veins are not well visualized due to edema and body habitus.     BILATERAL:   There is normal compressibility of the  femoral (including proximal, mid, and distal aspects), and popliteal veins. There is a normal, spontaneous and phasic venous waveforms the visualized  lower extremity.       Significantly limited examination with inability to scan/assess multiple sections of the deep venous tree of the bilateral lower extremity. Given this, no acute deep venous thrombosis is evident in the visualized  bilateral lower extremity. A repeat examination with the assessment of the entirety of the vascular tree is recommended when the patient's clinical condition allows.   Signed by: Justin Reyes 4/14/2025 10:03 PM Dictation workstation:   [id number]    CT angio chest for pulmonary embolism  Result Date: 4/14/2025  STUDY: CT Angiogram of the Chest; 4/14/2025 at 8:43 p.m. INDICATION: Chest pain.  Shortness of breath.  Lower extremity edema. COMPARISON: One-view CXR 10/29/2024.  CT CAP 7/24/2024. ACCESSION NUMBER(S): [id number] ORDERING CLINICIAN: DEEDEE INMAN TECHNIQUE:  CTA of the chest was performed with intravenous contrast. Images are reviewed and processed at a workstation according to the CT angiogram protocol with 3-D and/or MIP post processing imaging generated.  Omnipaque 350 90 mL was administered intravenously.  Automated mA/kV exposure control was utilized and patient examination was performed in strict accordance with principles of ALARA. FINDINGS: Images are limited secondary to patient body habitus.  Normal heart size.  No pericardial effusion.  LAD coronary artery  calcification present. No aneurysm or dissection of thoracic aorta.  Scattered calcification of the thoracic aorta.  No evidence of pulmonary embolism.  No mediastinal hematoma or fluid collection.  The esophagus is nondilated. The heart is normal in size without pericardial effusion.  Thoracic lymph nodes are not enlarged. There is no pleural effusion, pleural thickening, or pneumothorax. The airways are patent. There is prominent asymmetric elevation of the right hemidiaphragm again demonstrated.  Associated vascular crowding and chronic atelectasis increased from baseline examination in the right lower lobe and right middle lobe.  Right lung is otherwise unchanged.  There is patchy airspace disease in the left upper lobe consistent with pneumonia.  No other consolidation left lung.  No pleural effusion or pneumothorax.  The central airways are patent. Upper abdomen demonstrates no acute pathology.  Splenomegaly unchanged maximum dimension approximately 15 cm.  Generalized fatty replacement of the pancreas.  Cholelithiasis with multiple small dependently layering stones in the gallbladder.  No adrenal lesions. There are no acute fractures.  No suspicious bony lesions. Generalized thoracic spondylosis.  No acute findings in the chest wall soft tissues.    No pulmonary embolism is identified. Left upper lobe pneumonia. Chronic asymmetric elevation of right hemidiaphragm with overlying atelectasis. Coronary artery calcification. Cholelithiasis. Splenomegaly. Signed by Nabor Bautista DO    XR chest 1 view  Result Date: 4/14/2025  Interpreted By:  Justin Reyes, STUDY: Chest dated  4/14/2025.   INDICATION: Signs/Symptoms:dyspnea   COMPARISON: Chest dated 10/29/2024.   ACCESSION NUMBER(S): [id number]   ORDERING CLINICIAN: DEEDEE INMAN   TECHNIQUE: One view of the chest.   FINDINGS: The lungs are clear.  No pneumothorax or effusion is evident. 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       No acute cardiopulmonary process is evident.   MACRO: None   Signed by: Justin Reyes 4/14/2025 8:47 PM Dictation workstation:   [id number]          Past Cardiology Tests (Last 3 Years):  EKG:  Results for orders placed during the hospital encounter of 04/14/25    Electrocardiogram, 12-lead PRN ACS symptoms    Narrative  Normal sinus rhythm  ST & T wave abnormality, consider inferolateral ischemia  Abnormal ECG  When compared with ECG of 14-APR-2025 19:01, (unconfirmed)  Premature ventricular complexes are no longer Present  Criteria for Inferior infarct are no longer Present  QT has shortened  See ED provider note for full interpretation and clinical correlation  Confirmed by Nely Marsh ([id number]) on 4/16/2025 12:03:42 PM      Electrocardiogram, 12-lead PRN ACS symptoms    Narrative  Sinus rhythm with occasional Premature ventricular complexes  Inferior infarct (cited on or before 29-OCT-2024)  ST & T wave abnormality, consider lateral ischemia  Abnormal ECG  When compared with ECG of 29-OCT-2024 11:42, (unconfirmed)  Premature ventricular complexes are now Present  QRS duration has decreased  Non-specific change in ST segment in Inferior leads  ST now depressed in Anterior leads  Nonspecific T wave abnormality, worse in Lateral leads  See ED provider note for full interpretation and clinical correlation  Confirmed by Nely Marsh ([id number]) on 4/16/2025 12:03:26 PM      Results for orders placed during the hospital encounter of 07/24/24    Electrocardiogram, 12-lead PRN ACS symptoms    Narrative  Normal sinus rhythm  Normal ECG    See ED provider note for full interpretation and clinical correlation  Confirmed by Nella Booth ([id number]) on 7/25/2024 10:02:21 AM      Results for orders placed during the hospital encounter of 06/11/24    ECG 12 lead    Narrative  Normal sinus rhythm  Normal ECG  When compared  with ECG of 29-DEC-2022 22:54,  Previous ECG has undetermined rhythm, needs review  Nonspecific T wave abnormality now evident in Inferior leads  See ED provider note for full interpretation and clinical correlation  Confirmed by Nella Booth ([id number]) on 6/15/2024 12:12:25 PM    Echo:  Results for orders placed during the hospital encounter of 04/14/25    Transthoracic Echo (TTE) Complete    Narrative  [facility], [address] [place] [address]  Tel [phone] and Fax [phone]    TRANSTHORACIC ECHOCARDIOGRAM REPORT      Patient Name:       JOSSE MARQUEZ        Raman Physician:    [id number] Alex Kennedy DO  Study Date:         4/15/2025           Ordering Provider:    [id number] MAINOR AWAN  MRN/PID:            [id number]            Fellow:  Accession#:         [id number]        Nurse:  Date of Birth/Age:  1970 / 55 years Sonographer:          Jose Luis Rollins RDCS  Gender assigned at  M                   Additional Staff:  Birth:  Height:             180.00 cm           Admit Date:  Weight:             186.00 kg           Admission Status:     Inpatient -  Priority  discharge  BSA / BMI:          2.86 m2 / 57.41     Encounter#:           [id number]  kg/m2  Blood Pressure:     154/96 mmHg         Department Location:  [facility] Non  Invasive    Study Type:    TRANSTHORACIC ECHO (TTE) COMPLETE  Diagnosis/ICD: Heart failure, unspecified-I50.9  Indication:    HF  CPT Code:      Echo Complete w Full Doppler-04412    Patient History:  Diabetes:          Yes  Pertinent History: Dyspnea and HTN. HF.    Study Detail: The following Echo studies were performed: 2D, M-Mode, Doppler and  color flow. Image quality for this study is suboptimal.  Technically challenging study due to poor acoustic windows, body  habitus and sitting up. Definity used as a contrast agent for  endocardial border definition. Total contrast used for this  procedure was 3 mL via IV push.      PHYSICIAN INTERPRETATION:  Left  Ventricle: Left ventricular ejection fraction is low normal, by visual estimate at 50-55%. There are no regional left ventricular wall motion abnormalities. The left ventricular cavity size was not assessed. There is mildly increased septal and normal posterior left ventricular wall thickness. Left ventricular diastolic filling is indeterminate.  Left Atrium: The left atrium is mildly dilated.  Right Ventricle: The right ventricle was not well visualized. Unable to determine right ventricular systolic function.  Right Atrium: The right atrial size was not well visualized.  Aortic Valve: The aortic valve was not well visualized. There is no evidence of aortic valve regurgitation. The peak instantaneous gradient of the aortic valve is 7 mmHg.  Mitral Valve: The mitral valve was not well visualized. The peak instantaneous gradient of the mitral valve is 7 mmHg. There is trace to mild mitral valve regurgitation.  Tricuspid Valve: The tricuspid valve was not well visualized. Tricuspid regurgitation was not assessed. The right ventricular systolic pressure is unable to be estimated.  Pulmonic Valve: The pulmonic valve is not well visualized. The pulmonic valve regurgitation was not well visualized.  Pericardium: Pericardial effusion was not well visualized.  Aorta: The aortic root was not well visualized.  In comparison to the previous echocardiogram(s): Although previous studies are available for review, a comparison with the current echocardiogram is not feasible due to its limited scope or image quality. Compared with study dated 2/6/2021,.      CONCLUSIONS:  1. Poorly visualized anatomical structures due to suboptimal image quality.  2. Left ventricular ejection fraction is low normal, by visual estimate at 50-55%.  3. Left ventricular diastolic filling is indeterminate.  4. Unable to determine right ventricular systolic function.  5. The left atrium is mildly dilated.    RECOMMENDATIONS:  Technically suboptimal and  limited study, therefore accuracy of above interpretation could be substantially diminished. Clinical correlation is advised. Consider additional imaging modalities if clinically indicated. Repeat full study if clinically indicated.      QUANTITATIVE DATA SUMMARY:    2D MEASUREMENTS:         Normal Ranges:  IVSd:            1.29 cm (0.6-1.1cm)  LVPWd:           0.80 cm (0.6-1.1cm)  LVIDd:           5.67 cm (3.9-5.9cm)  LVIDs:           3.58 cm  LV Mass Index:   83 g/m2  LV % FS          36.8 %      LEFT ATRIUM:                  Normal Ranges:  LA Vol A4C:        101.3 ml   (22+/-6mL/m2)  LA Vol A2C:        68.9 ml  LA Vol BP:         87.5 ml  LA Vol Index A4C:  35.5ml/m2  LA Vol Index A2C:  24.1 ml/m2  LA Vol Index BP:   30.6 ml/m2  LA Area A4C:       24.9 cm2  LA Area A2C:       21.5 cm2  LA Major Axis A4C: 5.2 cm  LA Major Axis A2C: 5.7 cm  LA Volume Index:   30.6 ml/m2  LA Vol A4C:        90.3 ml  LA Vol A2C:        70.3 ml  LA Vol Index BSA:  28.1 ml/m2      LV SYSTOLIC FUNCTION:  Normal Ranges:  EF-A4C View:    49 % (>=55%)  EF-Visual:      53 %  LV EF Reported: 53 %      LV DIASTOLIC FUNCTION:             Normal Ranges:  MV e'                  0.074 m/s   (>8.0)  MV lateral e'          0.08 m/s  MV medial e'           0.07 m/s  PulmV Sys Juliocesar:         40.96 cm/s  PulmV Minaya Juliocesar:        49.67 cm/s  PulmV S/D Juliocesar:         0.82  PulmV A Revs Juliocesar:      32.34 cm/s  PulmV A Revs Dur:      117.98 msec      MITRAL VALVE:          Normal Ranges:  MV Vmax:      1.35 m/s (<=1.3m/s)  MV peak P.3 mmHg (<5mmHg)  MV mean P.8 mmHg (<2mmHg)      AORTIC VALVE:           Normal Ranges:  AoV Vmax:      1.34 m/s (<=1.7m/s)  AoV Peak P.2 mmHg (<20mmHg)  LVOT Max Juliocesar:  1.01 m/s (<=1.1m/s)  LVOT VTI:      16.91 cm  LVOT Diameter: 2.63 cm  (1.8-2.4cm)  AoV Area,Vmax: 4.08 cm2 (2.5-4.5cm2)      RIGHT VENTRICLE:  TAPSE: 19.0 mm  RV s'  0.16 m/s      PULMONIC VALVE:          Normal Ranges:  PV Accel Time:  152 msec  "(>120ms)  PV Max Juliocesar:     1.0 m/s  (0.6-0.9m/s)  PV Max P.2 mmHg      PULMONARY VEINS:  PulmV A Revs Dur: 117.98 msec  PulmV A Revs Juliocesar: 32.34 cm/s  PulmV Minaya Juliocesar:   49.67 cm/s  PulmV S/D Juliocesar:    0.82  PulmV Sys Juliocesar:    40.96 cm/s      [id number] Alex Brent DO  Electronically signed on 4/15/2025 at 4:03:12 PM        ** Final **    Ejection Fractions:  LV EF   Date/Time Value Ref Range Status   04/15/2025 03:05 PM 53 %      Cath:  No results found for this or any previous visit.    Stress Test:  No results found for this or any previous visit.    Cardiac Imaging:  No results found for this or any previous visit.      Inpatient Medications:  Scheduled Medications[1]  PRN Medications[2]  Continuous Medications[3]    Physical Exam:  General:  Patient is awake, alert, and oriented.  Patient is in no acute distress.  HEENT:  Pupils equal and reactive.  Normocephalic.  Moist mucosa.    Neck:  No thyromegaly.  Normal Jugular Venous Pressure.  Cardiovascular:  Regular rate and rhythm.  Normal S1 and S2.  Pulmonary:  Clear to auscultation bilaterally.  Abdomen:  Soft. Non-tender.   Non-distended.  Positive bowel sounds.  Lower Extremities:  2+ pedal pulses. No LE edema.  Neurologic:  Cranial nerves intact.  No focal deficit.   Skin: Skin warm and dry, normal skin turgor.   Psychiatric: Normal affect.     Assessment/Plan   Jai Shrestha is a 55 y.o. male with a past medical history of right MCA stroke (), diabetes mellitus type II, CKD stage IIIa, COPD, RAMÍREZ, diastolic heart failure, iron deficiency anemia, anxiety, PTSD, bipolar, morbid obesity, chronic back pain followed by pain management outpt.  Patient presents to [facility] with complaints of dyspnea.  Cardiology consulted for further evaluation of \" troponin elevation, flat trend.\"       TTE 4/15/25   CONCLUSIONS:   1. Poorly visualized anatomical structures due to suboptimal image quality.   2. Left ventricular ejection fraction is low normal, by visual estimate " at 50-55%.   3. Left ventricular diastolic filling is indeterminate.   4. Unable to determine right ventricular systolic function.   5. The left atrium is mildly dilated.      Home CV meds:  Amlodipine 10 mg p.o. daily, aspirin 81 mg p.o. daily, atorvastatin 80 mg p.o. daily, torsemide 60 mg p.o. twice daily      1.  Abnormal troponins likely 2/2 infection, CKD, acute heart failure.  Patient denies any complaints of chest pain, EKG nonischemic. Non-MI troponin elevation/ acute non-traumatic myocardial injury- core measures do not apply.     2.  Acute on chronic diastolic heart failure.  2/2 dietary indiscretions>consuming high sodium foods  New supp oxygen requirements, reports fluid retention in legs and abd  - BNP 79  - CTPE negative for PE, no pleural fluid noted  -Outpt torsemide 60 mg p.o. twice daily    Recs:  -will c/w dirueios with lasix 80mg BID-> will augment today with Metolazone 5mg once  -may need Lasix gtt if not a good response     Code Status:  Full Code    I spent 40 minutes in the professional and overall care of this patient.        Marlene Quinn, APRN-CNP         [1]   Scheduled medications   Medication Dose Route Frequency    amLODIPine  10 mg oral Daily    aspirin  81 mg oral Daily    atorvastatin  80 mg oral Daily    DULoxetine  60 mg oral Daily    enoxaparin  60 mg subcutaneous q12h TAMIR    furosemide  80 mg intravenous BID    insulin glargine  80 Units subcutaneous q12h    insulin lispro  0-10 Units subcutaneous TID    insulin lispro  30 Units subcutaneous TID AC    pantoprazole  40 mg oral Daily before breakfast    Or    pantoprazole  40 mg intravenous Daily before breakfast    perflutren protein A microsphere  0.5 mL intravenous Once in imaging    piperacillin-tazobactam  3.375 g intravenous q6h    regadenoson  0.4 mg intravenous Once in imaging    sulfur hexafluoride microsphr  2 mL intravenous Once in imaging   [2]   PRN medications   Medication    acetaminophen    Or    acetaminophen     Or    acetaminophen    aminophylline    dextrose    dextrose    glucagon    glucagon    ondansetron    Or    ondansetron    oxyCODONE    oxyCODONE   [3]   Continuous Medications   Medication Dose Last Rate

## 2025-04-16 NOTE — CARE PLAN
The patient's goals for the shift include      The clinical goals for the shift include reman hds, safe, pain managed

## 2025-04-16 NOTE — CONSULTS
"Nutrition Education Note  Nutrition Assessment      Reason for Assessment: Provider consult order, Diet education    History:  Energy Intake: Fair 50-75 %  Food and Nutrient History: pt states his appetite has been fair PTA, pt stated he tore his achilles tendon and can barely walk to microwave to make a meal. pt stated he has assitants for home care, however time is very limited.       Dietary Orders (From admission, onward)       Start     Ordered    04/15/25 1307  Adult diet Cardiac, Consistent Carb, 2-3 grams sodium; CCD 90 gm/meal; 1500 mL fluid; 70 gm fat; 2 - 3 grams Sodium  Diet effective now        Question Answer Comment   Diet type Cardiac    Diet type Consistent Carb    Diet type 2-3 grams sodium    Carb diet selection: CCD 90 gm/meal    Dietary fluid restriction / 24h: 1500 mL fluid    Fat restriction: 70 gm fat    Sodium restriction: 2 - 3 grams Sodium        04/15/25 1306                    Anthropometrics:  Height: 180.3 cm (5' 10.98\")  Weight: (!) 186 kg (410 lb 15 oz)  BMI (Calculated): 57.34    Weight History / % Weight Change: 195 kg 6/13/24  Significant Weight Loss: No    IBW/kg (Dietitian Calculated): 78.2 kg  Percent of IBW: 238 %     Meds noted, include lantus, lasix, lispro, protonix, cymbalta. Zosyn.      Latest Reference Range & Units 04/15/25 11:53 04/16/25 07:00   GLUCOSE 74 - 99 mg/dL 288 (H) 155 (H)   SODIUM 136 - 145 mmol/L 137 139   POTASSIUM 3.5 - 5.3 mmol/L 4.0 3.9   CHLORIDE 98 - 107 mmol/L 96 (L) 97 (L)   Bicarbonate 21 - 32 mmol/L 34 (H) 34 (H)   Anion Gap 10 - 20 mmol/L 11 12   Blood Urea Nitrogen 6 - 23 mg/dL 32 (H) 32 (H)   Creatinine 0.50 - 1.30 mg/dL 1.41 (H) 1.56 (H)   EGFR >60 mL/min/1.73m*2 59 (L) 52 (L)   Calcium 8.6 - 10.3 mg/dL 8.6 8.7   HDL CHOLESTEROL mg/dL 34.1    Cholesterol/HDL Ratio  3.3    LDL Calculated <=99 mg/dL 30    VLDL 0 - 40 mg/dL 50 (H)    TRIGLYCERIDES 0 - 149 mg/dL 251 (H)    Non HDL Cholesterol 0 - 149 mg/dL 80    CHOLESTEROL 0 - 199 mg/dL 114  " "  (H): Data is abnormally high  (L): Data is abnormally low     Latest Reference Range & Units 06/11/24 10:43   Hemoglobin A1C see below % 9.0 (H)   (H): Data is abnormally high      Nutrition Focused Physical Findings:  Orbital Fat Pads: Well nourished (slightly bulging fat pads)  Buccal Fat Pads: Well nourished (full, rounded cheeks)  Triceps: Well nourished (ample fat tissue)    Temporalis: Well nourished (well-defined muscle)  Pectoralis (Clavicular Region): Well nourished (clavicle not visible)  Deltoid/Trapezius: Well nourished (rounded appearance at arm, shoulder, neck)  Interosseous: Well nourished (muscle bulges)  Gastrocnemius: Well nourished (well developed bulbous muscle)        Education Documentation  Nutrition Related Education, taught by Radha Parikh RD at 4/16/2025 12:50 PM.  Learner: Patient  Readiness: Acceptance  Method: Explanation, Handout, Teach-back  Response: No Evidence of Learning, Refused Teaching, Needs Reinforcement  Comment: attempted to provide HF diet education to pt. pt was emotional, tearful intermittently throughout the conversation, then expressed anger at other times re: the lack of assistance he receives at home, and the lack of attention from his PCP.      - when attempted to talk to pt about following a low sodium diet, pt stated he will not follow that, and what is the point of eating if it doesn't taste good. Also attempted to explain the reasoning for a low sodium diet (fluid retention) with heart failure, pt restated he would not follow this. He tried to have meals delivered to his home, and [employer] foods, and none of it tasted good.   - pt also expressed frustration with his weight, and that he tried Ozempic but it made his depression 4x worse and he felt that he wanted to \"off\" himself.   - pt stated he has home health aides but they're all a \"gang\" and now they limit him to only one hour per week. I asked about getting food delivered from grocery store and " he said he doesn't know how to do this.   - noted pt's R arm with dried blood, IV noted on floor, pt state it just feel out. RN notified.  - pt pleasant overall, just emotional    - written diet education materials left on bedside table re: Heart Failure Nutrition Therapy (AND), heart healthy cooking and shopping tips (AND), and [employer] inforgraphic (AHA).      Recommend:   -Psych consult   -PT/OT consults (sounds like pt is barely mobile at home, pt stated he can barely make it to microwave to heat up a meal)   - MVI once daily   - check hemoglobin A1C   - continue oral diet as ordered, FR as per MD     Interaction with pt relayed to MD, cardiology APPs, SW, discharge coordinator and RN via secure chat.     Time Spent (min): 45 minutes  Last Date of Nutrition Visit: 04/16/25  Nutrition Follow-Up Needed?: Dietitian to reassess per policy  Follow up Comment: diet educ, BRYANT

## 2025-04-17 ENCOUNTER — APPOINTMENT (OUTPATIENT)
Dept: CARDIOLOGY | Facility: HOSPITAL | Age: 55
DRG: 177 | End: 2025-04-17
Payer: MEDICARE

## 2025-04-17 LAB
ANION GAP SERPL CALC-SCNC: 12 MMOL/L (ref 10–20)
BUN SERPL-MCNC: 31 MG/DL (ref 6–23)
CALCIUM SERPL-MCNC: 8.2 MG/DL (ref 8.6–10.3)
CHLORIDE SERPL-SCNC: 94 MMOL/L (ref 98–107)
CO2 SERPL-SCNC: 35 MMOL/L (ref 21–32)
CREAT SERPL-MCNC: 1.54 MG/DL (ref 0.5–1.3)
EGFRCR SERPLBLD CKD-EPI 2021: 53 ML/MIN/1.73M*2
ERYTHROCYTE [DISTWIDTH] IN BLOOD BY AUTOMATED COUNT: 17.2 % (ref 11.5–14.5)
EST. AVERAGE GLUCOSE BLD GHB EST-MCNC: 160 MG/DL
GLUCOSE BLD MANUAL STRIP-MCNC: 156 MG/DL (ref 74–99)
GLUCOSE BLD MANUAL STRIP-MCNC: 227 MG/DL (ref 74–99)
GLUCOSE BLD MANUAL STRIP-MCNC: 230 MG/DL (ref 74–99)
GLUCOSE BLD MANUAL STRIP-MCNC: 44 MG/DL (ref 74–99)
GLUCOSE BLD MANUAL STRIP-MCNC: 96 MG/DL (ref 74–99)
GLUCOSE SERPL-MCNC: 55 MG/DL (ref 74–99)
HBA1C MFR BLD: 7.2 % (ref ?–5.7)
HCT VFR BLD AUTO: 30.1 % (ref 41–52)
HGB BLD-MCNC: 8.7 G/DL (ref 13.5–17.5)
MCH RBC QN AUTO: 23.5 PG (ref 26–34)
MCHC RBC AUTO-ENTMCNC: 28.9 G/DL (ref 32–36)
MCV RBC AUTO: 81 FL (ref 80–100)
NRBC BLD-RTO: 0 /100 WBCS (ref 0–0)
PLATELET # BLD AUTO: 230 X10*3/UL (ref 150–450)
POTASSIUM SERPL-SCNC: 3.2 MMOL/L (ref 3.5–5.3)
RBC # BLD AUTO: 3.7 X10*6/UL (ref 4.5–5.9)
SODIUM SERPL-SCNC: 138 MMOL/L (ref 136–145)
WBC # BLD AUTO: 11.3 X10*3/UL (ref 4.4–11.3)

## 2025-04-17 PROCEDURE — 2500000001 HC RX 250 WO HCPCS SELF ADMINISTERED DRUGS (ALT 637 FOR MEDICARE OP): Performed by: INTERNAL MEDICINE

## 2025-04-17 PROCEDURE — 99232 SBSQ HOSP IP/OBS MODERATE 35: CPT | Performed by: INTERNAL MEDICINE

## 2025-04-17 PROCEDURE — 83036 HEMOGLOBIN GLYCOSYLATED A1C: CPT | Mod: AHULAB | Performed by: INTERNAL MEDICINE

## 2025-04-17 PROCEDURE — 80048 BASIC METABOLIC PNL TOTAL CA: CPT | Performed by: INTERNAL MEDICINE

## 2025-04-17 PROCEDURE — 2500000004 HC RX 250 GENERAL PHARMACY W/ HCPCS (ALT 636 FOR OP/ED): Mod: JZ | Performed by: INTERNAL MEDICINE

## 2025-04-17 PROCEDURE — 2500000002 HC RX 250 W HCPCS SELF ADMINISTERED DRUGS (ALT 637 FOR MEDICARE OP, ALT 636 FOR OP/ED): Performed by: INTERNAL MEDICINE

## 2025-04-17 PROCEDURE — 2500000005 HC RX 250 GENERAL PHARMACY W/O HCPCS: Performed by: INTERNAL MEDICINE

## 2025-04-17 PROCEDURE — 82947 ASSAY GLUCOSE BLOOD QUANT: CPT

## 2025-04-17 PROCEDURE — 85027 COMPLETE CBC AUTOMATED: CPT | Performed by: INTERNAL MEDICINE

## 2025-04-17 PROCEDURE — 99231 SBSQ HOSP IP/OBS SF/LOW 25: CPT | Performed by: INTERNAL MEDICINE

## 2025-04-17 PROCEDURE — 36415 COLL VENOUS BLD VENIPUNCTURE: CPT | Performed by: INTERNAL MEDICINE

## 2025-04-17 PROCEDURE — 1200000002 HC GENERAL ROOM WITH TELEMETRY DAILY

## 2025-04-17 PROCEDURE — 93005 ELECTROCARDIOGRAM TRACING: CPT

## 2025-04-17 RX ORDER — INSULIN LISPRO 100 [IU]/ML
20 INJECTION, SOLUTION INTRAVENOUS; SUBCUTANEOUS
Status: DISCONTINUED | OUTPATIENT
Start: 2025-04-17 | End: 2025-04-19

## 2025-04-17 RX ORDER — HYDROCODONE BITARTRATE AND ACETAMINOPHEN 7.5; 325 MG/1; MG/1
1 TABLET ORAL EVERY 6 HOURS PRN
Refills: 0 | Status: DISCONTINUED | OUTPATIENT
Start: 2025-04-17 | End: 2025-04-17

## 2025-04-17 RX ORDER — INSULIN GLARGINE 100 [IU]/ML
60 INJECTION, SOLUTION SUBCUTANEOUS EVERY 12 HOURS
Status: DISCONTINUED | OUTPATIENT
Start: 2025-04-17 | End: 2025-04-27 | Stop reason: HOSPADM

## 2025-04-17 RX ORDER — METHOCARBAMOL 500 MG/1
500 TABLET, FILM COATED ORAL EVERY 6 HOURS PRN
Status: DISCONTINUED | OUTPATIENT
Start: 2025-04-17 | End: 2025-04-27 | Stop reason: HOSPADM

## 2025-04-17 RX ORDER — HYDROCODONE BITARTRATE AND ACETAMINOPHEN 5; 325 MG/1; MG/1
1 TABLET ORAL EVERY 6 HOURS PRN
Refills: 0 | Status: DISCONTINUED | OUTPATIENT
Start: 2025-04-17 | End: 2025-04-27 | Stop reason: HOSPADM

## 2025-04-17 RX ADMIN — INSULIN LISPRO 20 UNITS: 100 INJECTION, SOLUTION INTRAVENOUS; SUBCUTANEOUS at 12:35

## 2025-04-17 RX ADMIN — FUROSEMIDE 80 MG: 10 INJECTION, SOLUTION INTRAMUSCULAR; INTRAVENOUS at 17:03

## 2025-04-17 RX ADMIN — PIPERACILLIN SODIUM AND TAZOBACTAM SODIUM 3.38 G: 3; .375 INJECTION, SOLUTION INTRAVENOUS at 12:34

## 2025-04-17 RX ADMIN — INSULIN LISPRO 2 UNITS: 100 INJECTION, SOLUTION INTRAVENOUS; SUBCUTANEOUS at 12:35

## 2025-04-17 RX ADMIN — AMLODIPINE BESYLATE 10 MG: 10 TABLET ORAL at 08:17

## 2025-04-17 RX ADMIN — PIPERACILLIN SODIUM AND TAZOBACTAM SODIUM 3.38 G: 3; .375 INJECTION, SOLUTION INTRAVENOUS at 17:45

## 2025-04-17 RX ADMIN — FUROSEMIDE 80 MG: 10 INJECTION, SOLUTION INTRAMUSCULAR; INTRAVENOUS at 07:35

## 2025-04-17 RX ADMIN — PIPERACILLIN SODIUM AND TAZOBACTAM SODIUM 3.38 G: 3; .375 INJECTION, SOLUTION INTRAVENOUS at 05:50

## 2025-04-17 RX ADMIN — ATORVASTATIN CALCIUM 80 MG: 80 TABLET, FILM COATED ORAL at 08:17

## 2025-04-17 RX ADMIN — INSULIN LISPRO 20 UNITS: 100 INJECTION, SOLUTION INTRAVENOUS; SUBCUTANEOUS at 17:10

## 2025-04-17 RX ADMIN — ENOXAPARIN SODIUM 60 MG: 60 INJECTION SUBCUTANEOUS at 21:29

## 2025-04-17 RX ADMIN — DULOXETINE HYDROCHLORIDE 60 MG: 30 CAPSULE, DELAYED RELEASE ORAL at 08:17

## 2025-04-17 RX ADMIN — HYDROCODONE BITARTRATE AND ACETAMINOPHEN 1 TABLET: 5; 325 TABLET ORAL at 23:21

## 2025-04-17 RX ADMIN — PIPERACILLIN SODIUM AND TAZOBACTAM SODIUM 3.38 G: 3; .375 INJECTION, SOLUTION INTRAVENOUS at 23:21

## 2025-04-17 RX ADMIN — METHOCARBAMOL 500 MG: 500 TABLET ORAL at 08:16

## 2025-04-17 RX ADMIN — PANTOPRAZOLE SODIUM 40 MG: 40 INJECTION, POWDER, FOR SOLUTION INTRAVENOUS at 06:04

## 2025-04-17 RX ADMIN — OXYCODONE HYDROCHLORIDE 10 MG: 5 TABLET ORAL at 05:50

## 2025-04-17 RX ADMIN — DEXTROSE MONOHYDRATE 25 G: 25 INJECTION, SOLUTION INTRAVENOUS at 07:32

## 2025-04-17 RX ADMIN — OXYCODONE HYDROCHLORIDE 5 MG: 5 TABLET ORAL at 17:48

## 2025-04-17 RX ADMIN — ASPIRIN 81 MG: 81 TABLET, COATED ORAL at 08:17

## 2025-04-17 RX ADMIN — HYDROCODONE BITARTRATE AND ACETAMINOPHEN 1 TABLET: 5; 325 TABLET ORAL at 12:44

## 2025-04-17 RX ADMIN — INSULIN GLARGINE 60 UNITS: 100 INJECTION, SOLUTION SUBCUTANEOUS at 21:28

## 2025-04-17 RX ADMIN — ENOXAPARIN SODIUM 60 MG: 60 INJECTION SUBCUTANEOUS at 08:16

## 2025-04-17 RX ADMIN — INSULIN LISPRO 4 UNITS: 100 INJECTION, SOLUTION INTRAVENOUS; SUBCUTANEOUS at 17:09

## 2025-04-17 ASSESSMENT — PAIN SCALES - GENERAL
PAINLEVEL_OUTOF10: 0 - NO PAIN
PAINLEVEL_OUTOF10: 6
PAINLEVEL_OUTOF10: 4
PAINLEVEL_OUTOF10: 0 - NO PAIN
PAINLEVEL_OUTOF10: 9
PAINLEVEL_OUTOF10: 0 - NO PAIN
PAINLEVEL_OUTOF10: 2
PAINLEVEL_OUTOF10: 3
PAINLEVEL_OUTOF10: 7
PAINLEVEL_OUTOF10: 9
PAINLEVEL_OUTOF10: 8
PAINLEVEL_OUTOF10: 3

## 2025-04-17 ASSESSMENT — COGNITIVE AND FUNCTIONAL STATUS - GENERAL
MOVING TO AND FROM BED TO CHAIR: A LITTLE
TOILETING: A LITTLE
MOBILITY SCORE: 21
HELP NEEDED FOR BATHING: A LITTLE
DAILY ACTIVITIY SCORE: 23
WALKING IN HOSPITAL ROOM: A LITTLE
DAILY ACTIVITIY SCORE: 21
MOBILITY SCORE: 21
MOVING TO AND FROM BED TO CHAIR: A LITTLE
WALKING IN HOSPITAL ROOM: A LITTLE
TOILETING: A LITTLE
DRESSING REGULAR LOWER BODY CLOTHING: A LITTLE
HELP NEEDED FOR BATHING: A LITTLE
DRESSING REGULAR LOWER BODY CLOTHING: A LITTLE
CLIMB 3 TO 5 STEPS WITH RAILING: A LITTLE
MOBILITY SCORE: 22
DAILY ACTIVITIY SCORE: 21
CLIMB 3 TO 5 STEPS WITH RAILING: A LITTLE
CLIMB 3 TO 5 STEPS WITH RAILING: A LITTLE
TOILETING: A LITTLE
WALKING IN HOSPITAL ROOM: A LITTLE

## 2025-04-17 ASSESSMENT — PAIN DESCRIPTION - DESCRIPTORS
DESCRIPTORS: CRAMPING
DESCRIPTORS: CRAMPING
DESCRIPTORS: ACHING
DESCRIPTORS: CRAMPING

## 2025-04-17 ASSESSMENT — PAIN DESCRIPTION - LOCATION
LOCATION: LEG
LOCATION: LEG
LOCATION: BACK
LOCATION: LEG

## 2025-04-17 ASSESSMENT — PAIN - FUNCTIONAL ASSESSMENT
PAIN_FUNCTIONAL_ASSESSMENT: 0-10

## 2025-04-17 ASSESSMENT — PAIN DESCRIPTION - ORIENTATION
ORIENTATION: RIGHT;LEFT
ORIENTATION: LOWER
ORIENTATION: RIGHT;LEFT

## 2025-04-17 NOTE — PROGRESS NOTES
"Jai Shrestha is a 55 y.o. male on day 2 of admission presenting with Healthcare-associated pneumonia.    Subjective   Hypoglycemia this morning  Patient states he was denied his usual snacks all night.  Confirmed he eats junk food all day to keep up with insulin.    Scheduled medications  Scheduled Medications[1]  Continuous medications  Continuous Medications[2]  PRN medications  PRN Medications[3]    Objective   Physical Exam  Constitutional:       General: He is not in acute distress.  Neurological:      Mental Status: He is alert.         Last Recorded Vitals  Blood pressure 146/69, pulse 87, temperature 35.8 °C (96.4 °F), temperature source Temporal, resp. rate 18, height 1.803 m (5' 10.98\"), weight (!) 186 kg (410 lb 11.5 oz), SpO2 93%.  Intake/Output last 3 Shifts:  I/O last 3 completed shifts:  In: 2090 (11.2 mL/kg) [P.O.:1240; IV Piggyback:850]  Out: 4950 (26.6 mL/kg) [Urine:4950 (0.7 mL/kg/hr)]  Weight: 186.3 kg     Relevant Results  Results from last 7 days   Lab Units 04/17/25  0755 04/17/25  0727 04/17/25  0640 04/16/25 2010 04/16/25  1611 04/16/25  1112 04/16/25  0700 04/15/25  1611 04/15/25  1153 04/15/25  0006 04/14/25  1922   POCT GLUCOSE mg/dL 96 44*  --  163* 150* 238*  --    < >  --    < >  --    GLUCOSE mg/dL  --   --  55*  --   --   --  155*  --  288*  --  63*    < > = values in this interval not displayed.       Assessment & Plan  Healthcare-associated pneumonia    IMPRESSION  TYPE 2 DIABETES MELLITUS  LONG TERM CURRENT INSULIN USE  High and unusual insulin regimen  He typically requires less insulin in the hospital  Verified that patient eats junk food to keep up with his insulin likely explaining whey he requires less insulin in the hospital        RECOMMENDATIONS  Hold insulin this morning  Resume glargine tonight at 60 units BID  Lispro 20 units QAC plus scale  Diabetes education      Ashwin Cornejo MD         [1] amLODIPine, 10 mg, oral, Daily  aspirin, 81 mg, oral, " Daily  atorvastatin, 80 mg, oral, Daily  DULoxetine, 60 mg, oral, Daily  enoxaparin, 60 mg, subcutaneous, q12h TAMIR  furosemide, 80 mg, intravenous, BID  insulin glargine, 80 Units, subcutaneous, q12h  insulin lispro, 0-10 Units, subcutaneous, TID  insulin lispro, 30 Units, subcutaneous, TID AC  pantoprazole, 40 mg, oral, Daily before breakfast   Or  pantoprazole, 40 mg, intravenous, Daily before breakfast  perflutren protein A microsphere, 0.5 mL, intravenous, Once in imaging  piperacillin-tazobactam, 3.375 g, intravenous, q6h  regadenoson, 0.4 mg, intravenous, Once in imaging  sulfur hexafluoride microsphr, 2 mL, intravenous, Once in imaging  [2]    [3] PRN medications: aminophylline, dextrose, dextrose, glucagon, glucagon, HYDROcodone-acetaminophen, methocarbamol, ondansetron **OR** ondansetron, oxyCODONE

## 2025-04-17 NOTE — CARE PLAN
The patient's goals for the shift include      The clinical goals for the shift include reman hds, safe, pain managed    Over the shift, the patient did not make progress toward the following goals. Barriers to progression include . Recommendations to address these barriers include   Problem: Pain  Goal: Takes deep breaths with improved pain control throughout the shift  Outcome: Progressing  Goal: Turns in bed with improved pain control throughout the shift  Outcome: Progressing  Goal: Walks with improved pain control throughout the shift  Outcome: Progressing  Goal: Performs ADL's with improved pain control throughout shift  Outcome: Progressing  Goal: Participates in PT with improved pain control throughout the shift  Outcome: Progressing  Goal: Free from opioid side effects throughout the shift  Outcome: Progressing  Goal: Free from acute confusion related to pain meds throughout the shift  Outcome: Progressing   .

## 2025-04-17 NOTE — PROGRESS NOTES
"Subjective Data:  No new complaints. Reports improved urine output.        Objective Data:  Last Recorded Vitals:  Vitals:    25 2334 25 0331 25 0600 25 0739   BP: 153/76 137/59  146/69   BP Location: Left arm Left arm     Patient Position: Sitting Lying     Pulse:  90  87   Resp:  18  18   Temp: 36.5 °C (97.7 °F) 36.4 °C (97.5 °F)  35.8 °C (96.4 °F)   TempSrc: Temporal Temporal  Temporal   SpO2: 96% 97%  93%   Weight:   (!) 186 kg (410 lb 11.5 oz)    Height:         Medical Gas Therapy: Supplemental oxygen  Medical Gas Delivery Method: Nasal cannula  Weight  Av kg (410 lb 14.2 oz)  Min: 186 kg (410 lb 11.5 oz)  Max: 186 kg (411 lb)    LABS:  CMP:  Results from last 7 days   Lab Units 25  0640 25  0700 04/15/25  1153 25  1922   SODIUM mmol/L 138 139 137 141   POTASSIUM mmol/L 3.2* 3.9 4.0 3.8   CHLORIDE mmol/L 94* 97* 96* 97*   CO2 mmol/L 35* 34* 34* 35*   ANION GAP mmol/L 12 12 11 13   BUN mg/dL 31* 32* 32* 35*   CREATININE mg/dL 1.54* 1.56* 1.41* 1.31*   EGFR mL/min/1.73m*2 53* 52* 59* 65   ALBUMIN g/dL  --   --   --  3.8   ALT U/L  --   --   --  12   AST U/L  --   --   --  17   BILIRUBIN TOTAL mg/dL  --   --   --  0.5     CBC:  Results from last 7 days   Lab Units 25  0640 25  0700 04/15/25  1153 25  1922   WBC AUTO x10*3/uL 11.3 9.1 12.3* 12.1*   HEMOGLOBIN g/dL 8.7* 9.6* 9.1* 10.2*   HEMATOCRIT % 30.1* 32.6* 32.1* 35.2*   PLATELETS AUTO x10*3/uL 230 235 226 299   MCV fL 81 82 84 81     COAG:   Results from last 7 days   Lab Units 25  2334   INR  1.2*     ABO: No results found for: \"ABO\"  HEME/ENDO:     CARDIAC:   Results from last 7 days   Lab Units 25  1922   TROPHS ng/L 233* 264*   BNP pg/mL  --  78      Results from last 7 days   Lab Units 04/15/25  1153   LDL CALC mg/dL 30   VLDL mg/dL 50*   CHOLESTEROL/HDL RATIO  3.3        Last I/O:    Intake/Output Summary (Last 24 hours) at 2025 1124  Last data filed at " 4/17/2025 1025  Gross per 24 hour   Intake 590 ml   Output 5130 ml   Net -4540 ml     Net IO Since Admission: -4,690 mL [04/17/25 1124]            Inpatient Medications:  Scheduled Medications[1]  PRN Medications[2]  Continuous Medications[3]        Physical Exam:  Gen Well appearing middle aged male sitting up in NAD. Body mass index is 57.31 kg/m².   CV rrr. No m/r/g appreciated. No JVD. +3 bilateral leg edema.   Pulm Lungs clear with normal respiratory effort.  Neuro Alert and conversant. Grossly nonfocal.         Assessment:  Acute on chronic HFpEF  Volume++ Urine output improved.     2. Elevated troponin  Non-MI troponin elevation / acute non-traumatic myocardial injury. Core measures do not apply. Noted in the setting of volume overload and PNA.    3. Hypertension   BP variable but acceptable under the present circumstances. Monitoring with diuresis.       Recommendations:  Con't IV diuretics. Strict I/O's. Daily weights. Monitor Mag/K and supplement to >2/4. Close monitoring of renal function. Plan for outpatient stress testing.          Robby Gutierrez MD        [1]   Scheduled medications   Medication Dose Route Frequency    amLODIPine  10 mg oral Daily    aspirin  81 mg oral Daily    atorvastatin  80 mg oral Daily    DULoxetine  60 mg oral Daily    enoxaparin  60 mg subcutaneous q12h TAMIR    furosemide  80 mg intravenous BID    insulin glargine  60 Units subcutaneous q12h    insulin lispro  0-10 Units subcutaneous TID    insulin lispro  20 Units subcutaneous TID AC    pantoprazole  40 mg oral Daily before breakfast    Or    pantoprazole  40 mg intravenous Daily before breakfast    perflutren protein A microsphere  0.5 mL intravenous Once in imaging    piperacillin-tazobactam  3.375 g intravenous q6h    regadenoson  0.4 mg intravenous Once in imaging    sulfur hexafluoride microsphr  2 mL intravenous Once in imaging   [2]   PRN medications   Medication    aminophylline    dextrose    dextrose    glucagon     glucagon    HYDROcodone-acetaminophen    methocarbamol    ondansetron    Or    ondansetron    oxyCODONE   [3]   Continuous Medications   Medication Dose Last Rate

## 2025-04-17 NOTE — PROGRESS NOTES
Jai Shrestha is a 55 y.o. male on day 1 of admission presenting with Healthcare-associated pneumonia.      Subjective   Patient was seen and examined bedside this morning, stated that he is managing to tolerate his pain, but overall feeling much better compared to on admission, denies fever, chills, nausea, vomiting or any other symptoms at that time.       Objective     Last Recorded Vitals  /78 (BP Location: Left arm, Patient Position: Sitting)   Pulse 88   Temp 36.7 °C (98.1 °F) (Temporal)   Resp 18   Wt (!) 186 kg (410 lb 15 oz)   SpO2 97%   Intake/Output last 3 Shifts:    Intake/Output Summary (Last 24 hours) at 4/16/2025 2126  Last data filed at 4/16/2025 1950  Gross per 24 hour   Intake 1970 ml   Output 2600 ml   Net -630 ml       Admission Weight  Weight: (!) 186 kg (411 lb) (04/14/25 1901)    Daily Weight  04/16/25 : (!) 186 kg (410 lb 15 oz)    Image Results  Electrocardiogram, 12-lead PRN ACS symptoms  Normal sinus rhythm  ST & T wave abnormality, consider inferolateral ischemia  Abnormal ECG  When compared with ECG of 14-APR-2025 19:01, (unconfirmed)  Premature ventricular complexes are no longer Present  Criteria for Inferior infarct are no longer Present  QT has shortened  See ED provider note for full interpretation and clinical correlation  Confirmed by Nely Marsh ([id number]) on 4/16/2025 12:03:42 PM  Electrocardiogram, 12-lead PRN ACS symptoms  Sinus rhythm with occasional Premature ventricular complexes  Inferior infarct (cited on or before 29-OCT-2024)  ST & T wave abnormality, consider lateral ischemia  Abnormal ECG  When compared with ECG of 29-OCT-2024 11:42, (unconfirmed)  Premature ventricular complexes are now Present  QRS duration has decreased  Non-specific change in ST segment in Inferior leads  ST now depressed in Anterior leads  Nonspecific T wave abnormality, worse in Lateral leads  See ED provider note for full interpretation and clinical correlation  Confirmed by  Nely Marsh ([id number]) on 4/16/2025 12:03:26 PM      Physical Exam  Constitutional: Obese gentleman, conversant without dyspnea, alert active, cooperative not in acute distress  Eyes: PERRLA, clear sclera  ENMT: Moist mucosal membranes, no exudate  Head / Neck: Atraumatic, normocephalic, supple neck, JVP not visualized  Lungs: Patent airways, CTABL  Heart: RRR, S1S2, no murmurs appreciated, palpable pulses in all extremities  GI: Soft, NT, ND, bowel sounds present in all quadrants  MSK: Moves all extremities freely, no restriction  of ROM, no joint edema  Extremities: Chronic lymphedema with venous ulcers, bilateral lower extremities peripheral edema  : No Guo catheter inserted  Breast: Deferred  Neurological: AAO x 3 to person, place and date, facial muscles symmetrical, sensation intact, strength 4/4, no acute focal neurological deficits appreciated  Psychological: Appropriate mood and behavior    Relevant Results                Scheduled medications  Scheduled Medications[1]  Continuous medications  Continuous Medications[2]  PRN medications  PRN Medications[3]        Assessment & Plan  Healthcare-associated pneumonia     55 y.o. male with a past medical history of chronic back pain, peripheral neuropathy, diabetes mellitus type 2 with insulin resistance, morbid obesity BMI 57.3, CKD 3, diastolic CHF, hypertension who presented to [facility] ER complaining of shortness of breath and lower extremity edema.  He also complains of intermittent chest pain but none currently.  He says that his chest pain is midsternal and a burning sensation      Left upper lobe pneumonia  Plan:  Vancomycin pharmacy to dose  Zosyn  Azithromycin  Urine antigens and strep pneumo and Legionella     Elevated troponin flat trend  Plan:  Given aspirin in the ER  Telemetry  Cardiology  No acute coronary syndrome     Type 2 diabetes mellitus requiring insulin with insulin resistance  Plan:  At home he takes Lantus 80 units 3  times a day and lispro insulin 15 units 3 times a day  Endocrine consulted: Appreciate evaluation management  Diabetic carbohydrate controlled diet     Diastolic CHF, decompensated  Plan:  Lasix 40 mg IV every 12 hours  Daily weight  Cardiology consulted: Appreciate evaluation and recommendations     Hypertension  Amlodipine 5 mg orally daily     Hyperlipidemia  Atorvastatin 80 mg orally daily    Bilateral lymphedema with venous stasis changes   Diuresis with Lasix     Morbid obesity BMI 57.3  Lifestyle modification     DVT prophylaxis  Lovenox 60 mg subcutaneously every 12 hours  SCDs     Disposition: Presented with shortness of breath, found to be heart failure and pneumonia, discharge pending clinical improvement    ADOD 2 to 3 days          Sergio Collins DO           [1] amLODIPine, 10 mg, oral, Daily  aspirin, 81 mg, oral, Daily  atorvastatin, 80 mg, oral, Daily  DULoxetine, 60 mg, oral, Daily  enoxaparin, 60 mg, subcutaneous, q12h TAMIR  furosemide, 80 mg, intravenous, BID  insulin glargine, 80 Units, subcutaneous, q12h  insulin lispro, 0-10 Units, subcutaneous, TID  insulin lispro, 30 Units, subcutaneous, TID AC  pantoprazole, 40 mg, oral, Daily before breakfast   Or  pantoprazole, 40 mg, intravenous, Daily before breakfast  perflutren protein A microsphere, 0.5 mL, intravenous, Once in imaging  piperacillin-tazobactam, 3.375 g, intravenous, q6h  regadenoson, 0.4 mg, intravenous, Once in imaging  sulfur hexafluoride microsphr, 2 mL, intravenous, Once in imaging  [2]    [3] PRN medications: acetaminophen **OR** acetaminophen **OR** acetaminophen, aminophylline, dextrose, dextrose, glucagon, glucagon, ondansetron **OR** ondansetron, oxyCODONE, oxyCODONE

## 2025-04-17 NOTE — CARE PLAN
The patient's goals for the shift include      The clinical goals for the shift include hds, safety, pain managed

## 2025-04-18 ENCOUNTER — APPOINTMENT (OUTPATIENT)
Dept: CARDIOLOGY | Facility: HOSPITAL | Age: 55
DRG: 177 | End: 2025-04-18
Payer: MEDICARE

## 2025-04-18 LAB
ANION GAP SERPL CALC-SCNC: 13 MMOL/L (ref 10–20)
BUN SERPL-MCNC: 31 MG/DL (ref 6–23)
CALCIUM SERPL-MCNC: 8.8 MG/DL (ref 8.6–10.3)
CHLORIDE SERPL-SCNC: 90 MMOL/L (ref 98–107)
CO2 SERPL-SCNC: 38 MMOL/L (ref 21–32)
CREAT SERPL-MCNC: 1.67 MG/DL (ref 0.5–1.3)
EGFRCR SERPLBLD CKD-EPI 2021: 48 ML/MIN/1.73M*2
ERYTHROCYTE [DISTWIDTH] IN BLOOD BY AUTOMATED COUNT: 17.2 % (ref 11.5–14.5)
GLUCOSE BLD MANUAL STRIP-MCNC: 248 MG/DL (ref 74–99)
GLUCOSE BLD MANUAL STRIP-MCNC: 286 MG/DL (ref 74–99)
GLUCOSE BLD MANUAL STRIP-MCNC: 295 MG/DL (ref 74–99)
GLUCOSE BLD MANUAL STRIP-MCNC: 311 MG/DL (ref 74–99)
GLUCOSE SERPL-MCNC: 296 MG/DL (ref 74–99)
HCT VFR BLD AUTO: 32.1 % (ref 41–52)
HGB BLD-MCNC: 9.5 G/DL (ref 13.5–17.5)
MCH RBC QN AUTO: 23.7 PG (ref 26–34)
MCHC RBC AUTO-ENTMCNC: 29.6 G/DL (ref 32–36)
MCV RBC AUTO: 80 FL (ref 80–100)
NRBC BLD-RTO: 0 /100 WBCS (ref 0–0)
PLATELET # BLD AUTO: 240 X10*3/UL (ref 150–450)
POTASSIUM SERPL-SCNC: 3.6 MMOL/L (ref 3.5–5.3)
RBC # BLD AUTO: 4.01 X10*6/UL (ref 4.5–5.9)
SODIUM SERPL-SCNC: 137 MMOL/L (ref 136–145)
WBC # BLD AUTO: 8.8 X10*3/UL (ref 4.4–11.3)

## 2025-04-18 PROCEDURE — 93005 ELECTROCARDIOGRAM TRACING: CPT

## 2025-04-18 PROCEDURE — 2500000002 HC RX 250 W HCPCS SELF ADMINISTERED DRUGS (ALT 637 FOR MEDICARE OP, ALT 636 FOR OP/ED): Performed by: INTERNAL MEDICINE

## 2025-04-18 PROCEDURE — 99232 SBSQ HOSP IP/OBS MODERATE 35: CPT | Performed by: INTERNAL MEDICINE

## 2025-04-18 PROCEDURE — 97165 OT EVAL LOW COMPLEX 30 MIN: CPT | Mod: GO

## 2025-04-18 PROCEDURE — 80048 BASIC METABOLIC PNL TOTAL CA: CPT | Performed by: INTERNAL MEDICINE

## 2025-04-18 PROCEDURE — 2500000004 HC RX 250 GENERAL PHARMACY W/ HCPCS (ALT 636 FOR OP/ED): Mod: JZ | Performed by: INTERNAL MEDICINE

## 2025-04-18 PROCEDURE — 82947 ASSAY GLUCOSE BLOOD QUANT: CPT

## 2025-04-18 PROCEDURE — 97161 PT EVAL LOW COMPLEX 20 MIN: CPT | Mod: GP

## 2025-04-18 PROCEDURE — 1200000002 HC GENERAL ROOM WITH TELEMETRY DAILY

## 2025-04-18 PROCEDURE — 85027 COMPLETE CBC AUTOMATED: CPT | Performed by: INTERNAL MEDICINE

## 2025-04-18 PROCEDURE — 2500000001 HC RX 250 WO HCPCS SELF ADMINISTERED DRUGS (ALT 637 FOR MEDICARE OP): Performed by: INTERNAL MEDICINE

## 2025-04-18 PROCEDURE — 36415 COLL VENOUS BLD VENIPUNCTURE: CPT | Performed by: INTERNAL MEDICINE

## 2025-04-18 PROCEDURE — 99231 SBSQ HOSP IP/OBS SF/LOW 25: CPT | Performed by: INTERNAL MEDICINE

## 2025-04-18 RX ORDER — CEFTRIAXONE 2 G/50ML
2 INJECTION, SOLUTION INTRAVENOUS EVERY 24 HOURS
Status: DISCONTINUED | OUTPATIENT
Start: 2025-04-18 | End: 2025-04-21

## 2025-04-18 RX ORDER — HYDROMORPHONE HYDROCHLORIDE 1 MG/ML
0.6 INJECTION, SOLUTION INTRAMUSCULAR; INTRAVENOUS; SUBCUTANEOUS EVERY 4 HOURS PRN
Status: DISCONTINUED | OUTPATIENT
Start: 2025-04-18 | End: 2025-04-27 | Stop reason: HOSPADM

## 2025-04-18 RX ADMIN — OXYCODONE HYDROCHLORIDE 5 MG: 5 TABLET ORAL at 21:06

## 2025-04-18 RX ADMIN — DULOXETINE HYDROCHLORIDE 60 MG: 30 CAPSULE, DELAYED RELEASE ORAL at 08:03

## 2025-04-18 RX ADMIN — INSULIN LISPRO 6 UNITS: 100 INJECTION, SOLUTION INTRAVENOUS; SUBCUTANEOUS at 12:41

## 2025-04-18 RX ADMIN — ENOXAPARIN SODIUM 60 MG: 60 INJECTION SUBCUTANEOUS at 21:06

## 2025-04-18 RX ADMIN — QUETIAPINE FUMARATE 400 MG: 300 TABLET ORAL at 21:06

## 2025-04-18 RX ADMIN — PIPERACILLIN SODIUM AND TAZOBACTAM SODIUM 3.38 G: 3; .375 INJECTION, SOLUTION INTRAVENOUS at 06:26

## 2025-04-18 RX ADMIN — ATORVASTATIN CALCIUM 80 MG: 80 TABLET, FILM COATED ORAL at 08:03

## 2025-04-18 RX ADMIN — METHOCARBAMOL 500 MG: 500 TABLET ORAL at 00:40

## 2025-04-18 RX ADMIN — INSULIN LISPRO 20 UNITS: 100 INJECTION, SOLUTION INTRAVENOUS; SUBCUTANEOUS at 16:12

## 2025-04-18 RX ADMIN — OXYCODONE HYDROCHLORIDE 5 MG: 5 TABLET ORAL at 14:55

## 2025-04-18 RX ADMIN — HYDROCODONE BITARTRATE AND ACETAMINOPHEN 1 TABLET: 5; 325 TABLET ORAL at 06:26

## 2025-04-18 RX ADMIN — INSULIN LISPRO 8 UNITS: 100 INJECTION, SOLUTION INTRAVENOUS; SUBCUTANEOUS at 16:12

## 2025-04-18 RX ADMIN — INSULIN LISPRO 6 UNITS: 100 INJECTION, SOLUTION INTRAVENOUS; SUBCUTANEOUS at 08:08

## 2025-04-18 RX ADMIN — ASPIRIN 81 MG: 81 TABLET, COATED ORAL at 08:03

## 2025-04-18 RX ADMIN — INSULIN GLARGINE 60 UNITS: 100 INJECTION, SOLUTION SUBCUTANEOUS at 08:08

## 2025-04-18 RX ADMIN — ENOXAPARIN SODIUM 60 MG: 60 INJECTION SUBCUTANEOUS at 08:03

## 2025-04-18 RX ADMIN — FUROSEMIDE 80 MG: 10 INJECTION, SOLUTION INTRAMUSCULAR; INTRAVENOUS at 08:03

## 2025-04-18 RX ADMIN — FUROSEMIDE 80 MG: 10 INJECTION, SOLUTION INTRAMUSCULAR; INTRAVENOUS at 16:12

## 2025-04-18 RX ADMIN — CEFTRIAXONE SODIUM 2 G: 2 INJECTION, SOLUTION INTRAVENOUS at 10:05

## 2025-04-18 RX ADMIN — PANTOPRAZOLE SODIUM 40 MG: 40 INJECTION, POWDER, FOR SOLUTION INTRAVENOUS at 06:26

## 2025-04-18 RX ADMIN — OXYCODONE HYDROCHLORIDE 5 MG: 5 TABLET ORAL at 01:18

## 2025-04-18 RX ADMIN — INSULIN GLARGINE 60 UNITS: 100 INJECTION, SOLUTION SUBCUTANEOUS at 21:06

## 2025-04-18 RX ADMIN — INSULIN LISPRO 20 UNITS: 100 INJECTION, SOLUTION INTRAVENOUS; SUBCUTANEOUS at 12:41

## 2025-04-18 RX ADMIN — AMLODIPINE BESYLATE 10 MG: 10 TABLET ORAL at 08:03

## 2025-04-18 RX ADMIN — INSULIN LISPRO 20 UNITS: 100 INJECTION, SOLUTION INTRAVENOUS; SUBCUTANEOUS at 08:08

## 2025-04-18 ASSESSMENT — COGNITIVE AND FUNCTIONAL STATUS - GENERAL
MOVING TO AND FROM BED TO CHAIR: A LITTLE
TOILETING: A LITTLE
MOVING TO AND FROM BED TO CHAIR: A LITTLE
HELP NEEDED FOR BATHING: A LITTLE
DAILY ACTIVITIY SCORE: 21
STANDING UP FROM CHAIR USING ARMS: A LITTLE
CLIMB 3 TO 5 STEPS WITH RAILING: A LOT
TOILETING: A LITTLE
DRESSING REGULAR LOWER BODY CLOTHING: TOTAL
MOVING TO AND FROM BED TO CHAIR: A LITTLE
MOBILITY SCORE: 21
DAILY ACTIVITIY SCORE: 21
WALKING IN HOSPITAL ROOM: A LITTLE
MOBILITY SCORE: 21
DRESSING REGULAR LOWER BODY CLOTHING: A LITTLE
HELP NEEDED FOR BATHING: A LOT
DRESSING REGULAR UPPER BODY CLOTHING: A LITTLE
DAILY ACTIVITIY SCORE: 16
MOVING FROM LYING ON BACK TO SITTING ON SIDE OF FLAT BED WITH BEDRAILS: A LITTLE
DRESSING REGULAR LOWER BODY CLOTHING: A LITTLE
CLIMB 3 TO 5 STEPS WITH RAILING: A LITTLE
HELP NEEDED FOR BATHING: A LITTLE
CLIMB 3 TO 5 STEPS WITH RAILING: A LITTLE
TURNING FROM BACK TO SIDE WHILE IN FLAT BAD: A LITTLE
TOILETING: A LITTLE
MOBILITY SCORE: 17
PERSONAL GROOMING: A LITTLE
WALKING IN HOSPITAL ROOM: A LITTLE
WALKING IN HOSPITAL ROOM: A LITTLE

## 2025-04-18 ASSESSMENT — PAIN DESCRIPTION - ORIENTATION
ORIENTATION: LOWER
ORIENTATION: RIGHT;LEFT
ORIENTATION: RIGHT;LEFT
ORIENTATION: LOWER

## 2025-04-18 ASSESSMENT — PAIN - FUNCTIONAL ASSESSMENT
PAIN_FUNCTIONAL_ASSESSMENT: 0-10

## 2025-04-18 ASSESSMENT — PAIN SCALES - GENERAL
PAINLEVEL_OUTOF10: 0 - NO PAIN
PAINLEVEL_OUTOF10: 7
PAINLEVEL_OUTOF10: 7
PAINLEVEL_OUTOF10: 3
PAINLEVEL_OUTOF10: 3
PAINLEVEL_OUTOF10: 6
PAINLEVEL_OUTOF10: 6
PAINLEVEL_OUTOF10: 0 - NO PAIN
PAINLEVEL_OUTOF10: 8
PAINLEVEL_OUTOF10: 7
PAINLEVEL_OUTOF10: 0 - NO PAIN
PAINLEVEL_OUTOF10: 6

## 2025-04-18 ASSESSMENT — PAIN DESCRIPTION - LOCATION
LOCATION: LEG
LOCATION: BACK
LOCATION: LEG
LOCATION: BACK

## 2025-04-18 ASSESSMENT — ACTIVITIES OF DAILY LIVING (ADL)
ADL_ASSISTANCE: INDEPENDENT
ADL_ASSISTANCE: INDEPENDENT
BATHING_ASSISTANCE: MODERATE

## 2025-04-18 ASSESSMENT — PAIN DESCRIPTION - DESCRIPTORS: DESCRIPTORS: ACHING

## 2025-04-18 NOTE — PROGRESS NOTES
Jai Shrestha is a 55 y.o. male on day 2 of admission presenting with Healthcare-associated pneumonia.      Subjective   Patient was seen and examined bedside this morning, stated that his sleep was disrupted by nursing staff activity.  Stated that current pain medication working however wears off quickly, and also experiencing muscle spasms frequently.     Objective     Last Recorded Vitals  /71 (BP Location: Right arm, Patient Position: Lying)   Pulse 91   Temp 36.4 °C (97.5 °F) (Temporal)   Resp 18   Wt (!) 186 kg (410 lb 11.5 oz)   SpO2 92%   Intake/Output last 3 Shifts:    Intake/Output Summary (Last 24 hours) at 4/17/2025 2012  Last data filed at 4/17/2025 1830  Gross per 24 hour   Intake 120 ml   Output 4630 ml   Net -4510 ml       Admission Weight  Weight: (!) 186 kg (411 lb) (04/14/25 1901)    Daily Weight  04/17/25 : (!) 186 kg (410 lb 11.5 oz)    Image Results  Electrocardiogram, 12-lead PRN ACS symptoms  Normal sinus rhythm  ST & T wave abnormality, consider inferolateral ischemia  Abnormal ECG  When compared with ECG of 14-APR-2025 19:01, (unconfirmed)  Premature ventricular complexes are no longer Present  Criteria for Inferior infarct are no longer Present  QT has shortened  See ED provider note for full interpretation and clinical correlation  Confirmed by Nely Marsh ([id number]) on 4/16/2025 12:03:42 PM  Electrocardiogram, 12-lead PRN ACS symptoms  Sinus rhythm with occasional Premature ventricular complexes  Inferior infarct (cited on or before 29-OCT-2024)  ST & T wave abnormality, consider lateral ischemia  Abnormal ECG  When compared with ECG of 29-OCT-2024 11:42, (unconfirmed)  Premature ventricular complexes are now Present  QRS duration has decreased  Non-specific change in ST segment in Inferior leads  ST now depressed in Anterior leads  Nonspecific T wave abnormality, worse in Lateral leads  See ED provider note for full interpretation and clinical correlation  Confirmed by  Nely Marsh ([id number]) on 4/16/2025 12:03:26 PM      Physical Exam  Constitutional: Obese gentleman, conversant without dyspnea, alert active, cooperative not in acute distress  Eyes: PERRLA, clear sclera  ENMT: Moist mucosal membranes, no exudate  Head / Neck: Atraumatic, normocephalic, supple neck, JVP not visualized  Lungs: Patent airways, CTABL  Heart: RRR, S1S2, no murmurs appreciated, palpable pulses in all extremities  GI: Soft, NT, ND, bowel sounds present in all quadrants  MSK: Moves all extremities freely, no restriction  of ROM, no joint edema  Extremities: Chronic lymphedema with venous ulcers, bilateral lower extremities peripheral edema  : No Guo catheter inserted  Breast: Deferred  Neurological: AAO x 3 to person, place and date, facial muscles symmetrical, sensation intact, strength 4/4, no acute focal neurological deficits appreciated  Psychological: Appropriate mood and behavior     Relevant Results             Scheduled medications  Scheduled Medications[1]  Continuous medications  Continuous Medications[2]  PRN medications  PRN Medications[3]           Assessment & Plan  Healthcare-associated pneumonia    55 y.o. male with a past medical history of chronic back pain, peripheral neuropathy, diabetes mellitus type 2 with insulin resistance, morbid obesity BMI 57.3, CKD 3, diastolic CHF, hypertension who presented to [facility] ER complaining of shortness of breath and lower extremity edema.  He also complains of intermittent chest pain but none currently.  He says that his chest pain is midsternal and a burning sensation       Left upper lobe pneumonia  Vancomycin pharmacy to dose  Zosyn  Azithromycin  Urine antigens and strep pneumo and Legionella     Elevated troponin flat trend  Given aspirin in the ER  Telemetry  Cardiology: Appreciate evaluation recommendations  No acute coronary syndrome     Type 2 diabetes mellitus requiring insulin with insulin resistance  At home he  takes Lantus 80 units 3 times a day and lispro insulin 15 units 3 times a day  Endocrine consulted: Appreciate evaluation management  Diabetic carbohydrate controlled diet     Diastolic CHF, decompensated  Lasix 40 mg IV every 12 hours  Daily weight  Cardiology consulted: Appreciate evaluation and recommendations     Hypertension  Amlodipine 5 mg orally daily     Hyperlipidemia  Atorvastatin 80 mg orally daily     Bilateral lymphedema with venous stasis changes   Diuresis with Lasix  Pain control: Oxycodone IR discontinued, Percocet started, methocarbamol 500 mg twice daily added     Morbid obesity BMI 57.3  Lifestyle modification     DVT prophylaxis  Lovenox 60 mg subcutaneously every 12 hours  SCDs     Disposition: Presented with shortness of breath, found to be heart failure and pneumonia, discharge pending clinical improvement     ADOD in 2 days            Sergio Collins DO           [1] amLODIPine, 10 mg, oral, Daily  aspirin, 81 mg, oral, Daily  atorvastatin, 80 mg, oral, Daily  DULoxetine, 60 mg, oral, Daily  enoxaparin, 60 mg, subcutaneous, q12h TAMIR  furosemide, 80 mg, intravenous, BID  insulin glargine, 60 Units, subcutaneous, q12h  insulin lispro, 0-10 Units, subcutaneous, TID  insulin lispro, 20 Units, subcutaneous, TID AC  pantoprazole, 40 mg, oral, Daily before breakfast   Or  pantoprazole, 40 mg, intravenous, Daily before breakfast  perflutren protein A microsphere, 0.5 mL, intravenous, Once in imaging  piperacillin-tazobactam, 3.375 g, intravenous, q6h  regadenoson, 0.4 mg, intravenous, Once in imaging  sulfur hexafluoride microsphr, 2 mL, intravenous, Once in imaging  [2]    [3] PRN medications: aminophylline, dextrose, dextrose, glucagon, glucagon, HYDROcodone-acetaminophen, methocarbamol, ondansetron **OR** ondansetron, oxyCODONE

## 2025-04-18 NOTE — PROGRESS NOTES
Occupational Therapy    Evaluation    Patient Name: Jai Shrestha  MRN: [id number]  Department: [facility] A 6  Room: [address] [place] [address]  Today's Date: 4/18/2025  Time Calculation  Start Time: 1045  Stop Time: 1055  Time Calculation (min): 10 min    Assessment  IP OT Assessment  OT Assessment:  (OT Eval complete, patient requires increased assist with ADLs. Patient would benefit from LOW intensity therapy to maximize functional independence. Patient would benefit from use of Hip Kit.)  Prognosis: Good  Barriers to Discharge Home: No anticipated barriers  Evaluation/Treatment Tolerance: Patient limited by fatigue  Medical Staff Made Aware: Yes  End of Session Communication: Bedside nurse  End of Session Patient Position: Up in chair, Alarm on  Plan:  Treatment Interventions: ADL retraining, Functional transfer training, Endurance training, Patient/family training, Neuromuscular reeducation  OT Frequency: 3 times per week  OT Discharge Recommendations: Low intensity level of continued care  Equipment Recommended upon Discharge: Wheeled walker  OT Recommended Transfer Status: Stand by assist  OT - OK to Discharge: Yes (Per POC)    Subjective   Current Problem:  1. Healthcare-associated pneumonia        2. Acute diastolic heart failure  Transthoracic Echo (TTE) Complete    Transthoracic Echo (TTE) Complete    Nuclear Stress Test      3. Shortness of breath  Transthoracic Echo (TTE) Complete    Transthoracic Echo (TTE) Complete    Nuclear Stress Test      4. Heart failure, unspecified  Transthoracic Echo (TTE) Complete      5. Acute on chronic congestive heart failure, unspecified heart failure type        6. Elevated troponin          General:  General  Reason for Referral: To ED with SOB, increased LE swelling, and increased difficulty getting around. CT (+) for PNA. Additionally pt injured R ankle ~3 weeks ago and has had increased pain since.  Referred By: Sergio Collins DO  Past Medical History Relevant to Rehab: Medical  History[1]    Family/Caregiver Present: No  Prior to Session Communication: Bedside nurse  Patient Position Received: Up in chair, Alarm on  General Comment:  (Patient requires increased assist with ADLs, will benefit from Hip kit for increased independence.)  Precautions:  Medical Precautions: Fall precautions    Pain:  Pain Assessment  Pain Assessment: 0-10  0-10 (Numeric) Pain Score: 7  Pain Type: Chronic pain  Pain Location: Back    Objective   Cognition:  Overall Cognitive Status: Within Functional Limits  Orientation Level: Oriented X4     Home Living:  Type of Home:  (Ranch home with 2 steps, bed/bath on 1st floor, tub/shower, raised toilet seat.)  Lives With: Alone  Home Adaptive Equipment: Walker rolling or standard   Prior Function:  Level of [place]: Independent with homemaking with ambulation, Independent with ADLs and functional transfers  Receives Help From:  (Neighbor)  ADL Assistance: Independent  Homemaking Assistance: Needs assistance (HHA completes home management tasks.)  Ambulatory Assistance: Independent (Patient used a cane prior, recently wheeled walker.)  IADL History:  Homemaking Responsibilities: No (HHA completes)  ADL:  Eating Assistance: Independent  Grooming Assistance: Stand by  Bathing Assistance: Moderate (Limited functional reach, requires assist to bathe below knees.)  UE Dressing Assistance: Minimal (Assist with hospital gown around shoulders.)  LE Dressing Assistance: Total (Unable to don socks, pants, limited functional reach. Would benefit from reacher.)  Toileting Assistance with Device: Stand by  Functional Assistance: Moderate  Activity Tolerance:  Endurance: Tolerates 10 - 20 min exercise with multiple rests  Activity Tolerance Comments:  (Fair activity tolerance.)  Bed Mobility/Transfers: Bed Mobility  Bed Mobility: No    Transfers  Transfer: Yes  Transfer 1  Transfer From 1: Sit to  Transfer to 1: Stand  Technique 1: Sit to stand  Transfer Device 1: Walker  Transfer  Level of Assistance 1: Close supervision  Trials/Comments 1:  (Cues for hand placement)    Sitting Balance:  Static Sitting Balance  Static Sitting-Balance Support: Feet supported  Static Sitting-Level of Assistance: Close supervision  Dynamic Sitting Balance  Dynamic Sitting-Balance Support: Feet supported  Dynamic Sitting-Level of Assistance: Close supervision  Dynamic Sitting-Balance: Reaching for objects  Standing Balance:  Static Standing Balance  Static Standing-Balance Support: Bilateral upper extremity supported  Static Standing-Level of Assistance: Close supervision  Dynamic Standing Balance  Dynamic Standing-Balance Support: Bilateral upper extremity supported  Dynamic Standing-Level of Assistance: Close supervision  Dynamic Standing-Balance: Reaching for objects    IADL's:   Homemaking Responsibilities: No (HHA completes)  Vision: Vision - Basic Assessment  Current Vision: No visual deficits    Strength:  Strength Comments:  (B UEs- 4/5 Throughout)    Coordination:  Movements are Fluid and Coordinated: Yes   Hand Function:  Hand Function  Gross Grasp: Functional  Coordination: Functional    Outcome Measures: [facility] Daily Activity  Putting on and taking off regular lower body clothing: Total  Bathing (including washing, rinsing, drying): A lot  Putting on and taking off regular upper body clothing: A little  Toileting, which includes using toilet, bedpan or urinal: A little  Taking care of personal grooming such as brushing teeth: A little  Eating Meals: None  Daily Activity - Total Score: 16    Goals:   Encounter Problems       Encounter Problems (Active)       ADLs       Patient will perform UB and LB bathing  with independent level of assistance.        Start:  04/18/25    Expected End:  05/02/25            Patient with complete upper body dressing with independent level of assistance donning and doffing all UE clothes with no adaptive equipment while edge of bed        Start:  04/18/25    Expected End:   05/02/25            Patient with complete lower body dressing with independent level of assistance donning and doffing all LE clothes with adaptive equipment while edge of bed        Start:  04/18/25    Expected End:  05/02/25            Patient will complete daily grooming tasks brushing teeth and washing face/hair with independent level of assistance while standing.       Start:  04/18/25    Expected End:  05/02/25            Patient will complete toileting including hygiene clothing management/hygiene with independent level of assistance and raised toilet seat.       Start:  04/18/25    Expected End:  05/01/25               BALANCE       Pt will maintain dynamic standing balance during ADL task with independent level of assistance in order to demonstrate decreased risk of falling and improved postural control.       Start:  04/18/25    Expected End:  05/01/25               TRANSFERS       Patient will perform bed mobility independent level of assistance and bed rails in order to improve safety and independence with mobility       Start:  04/18/25    Expected End:  05/02/25            Patient will complete functional transfer to all surfaces with front wheeled walker with modified independent level of assistance.       Start:  04/18/25    Expected End:  05/02/25                         [1]   Past Medical History:  Diagnosis Date    Chronic back pain     CKD (chronic kidney disease)     Diabetes mellitus (Multi)     HF (heart failure), diastolic     Hypertension

## 2025-04-18 NOTE — PROGRESS NOTES
04/18/25 1531   Discharge Planning   Assistance Needed per ID chart review;Changed to IV CTX 2g Q24H. When ready for home can complete abx for 7D total course, PO Keflex 1g TID.  referral sent to [facility], PT/OT rec low intensity;Attending notfied patient will need OUTGOING  referral PT/OT/NURSE; SW followed up with patient's concern re; meal resources   Expected Discharge Disposition Home Health  ([facility])   Does the patient need discharge transport arranged? Yes  (patient will need transport home)   RoundTrip coordination needed? Yes   Has discharge transport been arranged? No

## 2025-04-18 NOTE — PROGRESS NOTES
"Jai Shrestha is a 55 y.o. male on day 3 of admission presenting with Healthcare-associated pneumonia.    Subjective   No new complaints     Scheduled medications  Scheduled Medications[1]  Continuous medications  Continuous Medications[2]  PRN medications  PRN Medications[3]    Objective   Physical Exam  Constitutional:       General: He is not in acute distress.  Neurological:      Mental Status: He is alert.         Last Recorded Vitals  Blood pressure 117/51, pulse 78, temperature 36.5 °C (97.7 °F), temperature source Temporal, resp. rate 16, height 1.803 m (5' 10.98\"), weight (!) 186 kg (410 lb 11.5 oz), SpO2 98%.  Intake/Output last 3 Shifts:  I/O last 3 completed shifts:  In: 720 (3.9 mL/kg) [P.O.:720]  Out: 7630 (41 mL/kg) [Urine:7630 (1.1 mL/kg/hr)]  Weight: 186.3 kg     Relevant Results  Results from last 7 days   Lab Units 04/17/25  1952 04/17/25  1648 04/17/25  1141 04/17/25  0755 04/17/25  0727 04/17/25  0640 04/16/25  1112 04/16/25  0700 04/15/25  1611 04/15/25  1153 04/15/25  0006 04/14/25  1922   POCT GLUCOSE mg/dL 230* 227* 156* 96 44*  --    < >  --    < >  --    < >  --    GLUCOSE mg/dL  --   --   --   --   --  55*  --  155*  --  288*  --  63*    < > = values in this interval not displayed.      Latest Reference Range & Units 04/17/25 06:40   Hemoglobin A1C See comment % 7.2 (H)           Assessment & Plan  Healthcare-associated pneumonia    IMPRESSION  TYPE 2 DIABETES MELLITUS  LONG TERM CURRENT INSULIN USE  High and unusual insulin regimen  He typically requires less insulin in the hospital  No glucose data yet this morning        RECOMMENDATIONS  Insulin glargine 60 units BID  Lispro 20 units QAC plus scale, HOLD if NPO or glucose under 100 mg/dl.     Diabetes education      Ashwin Cornejo MD         [1] amLODIPine, 10 mg, oral, Daily  aspirin, 81 mg, oral, Daily  atorvastatin, 80 mg, oral, Daily  DULoxetine, 60 mg, oral, Daily  enoxaparin, 60 mg, subcutaneous, q12h TAMIR  furosemide, 80 mg, " intravenous, BID  insulin glargine, 60 Units, subcutaneous, q12h  insulin lispro, 0-10 Units, subcutaneous, TID  insulin lispro, 20 Units, subcutaneous, TID AC  pantoprazole, 40 mg, oral, Daily before breakfast   Or  pantoprazole, 40 mg, intravenous, Daily before breakfast  perflutren protein A microsphere, 0.5 mL, intravenous, Once in imaging  piperacillin-tazobactam, 3.375 g, intravenous, q6h  regadenoson, 0.4 mg, intravenous, Once in imaging  sulfur hexafluoride microsphr, 2 mL, intravenous, Once in imaging  [2]    [3] PRN medications: aminophylline, dextrose, dextrose, glucagon, glucagon, HYDROcodone-acetaminophen, HYDROmorphone, methocarbamol, ondansetron **OR** ondansetron, oxyCODONE

## 2025-04-18 NOTE — PROGRESS NOTES
04/18/25 1452   Discharge Planning   Living Arrangements Alone   Support Systems Friends/neighbors;/  (patient's neighbor ; cooks, take garbage out and house keeping; patient has a mental health therapist @ [facility]; she schedules appointments and transportation)   Assistance Needed TCC met with patient to follow up re; discharge plan; currently on oxygen 3 liters, but not on home oxygen, patient has a walker, rollator  cane and wheelchair   Type of Residence Private residence  (demo correct)   Home or Post Acute Services In home services   Type of Home Care Services Home nursing visits  (SN; for wound care,vital signs etc)   Expected Discharge Disposition Home Health  (patient is active w/ [facility])   Transportation Needs   In the past 12 months, has lack of transportation kept you from medical appointments or from getting medications? no  (PCP @ [facility]; Alfonso Soto; patient uses senior transportation to get to appointments;)

## 2025-04-18 NOTE — SIGNIFICANT EVENT
Pt on moderate dose of cymbalta for pain  Has no behaviors or acute psychiatric symptoms.   Can be assessed by outpatient providers  Discussed with attending,   Please reconsult if acute psychiatric needs arise.

## 2025-04-18 NOTE — CARE PLAN
The patient's goals for the shift include      The clinical goals for the shift include hds, safety, pain managed    Over the shift, the patient did not make progress toward the following goals. Barriers to progression include . Recommendations to address these barriers include   Problem: Pain  Goal: Takes deep breaths with improved pain control throughout the shift  Outcome: Progressing  Goal: Turns in bed with improved pain control throughout the shift  Outcome: Progressing  Goal: Walks with improved pain control throughout the shift  Outcome: Progressing  Goal: Performs ADL's with improved pain control throughout shift  Outcome: Progressing  Goal: Participates in PT with improved pain control throughout the shift  Outcome: Progressing  Goal: Free from opioid side effects throughout the shift  Outcome: Progressing  Goal: Free from acute confusion related to pain meds throughout the shift  Outcome: Progressing   .

## 2025-04-18 NOTE — CONSULTS
"Reason For Consult  Uncontrolled T2DM    History Of Present Illness  Jai Shrestha is a 55 y.o. male presenting with healthcare-associated pneumonia.     Past Medical History  He has a past medical history of Chronic back pain, CKD (chronic kidney disease), Diabetes mellitus (Multi), HF (heart failure), diastolic, and Hypertension.    Surgical History  He has no past surgical history on file.     Social History  He reports that he has quit smoking. His smoking use included cigarettes. He does not have any smokeless tobacco history on file. He reports current alcohol use. He reports that he does not currently use drugs.    Family History  Family History[1]     Allergies  Metformin    Review of Systems  N/a     Physical Exam  Emotional at times. Sitting in chair, conversational  Morbid obesity; BMI of 57     Last Recorded Vitals  Blood pressure 147/79, pulse 76, temperature 36.5 °C (97.7 °F), temperature source Temporal, resp. rate 18, height 1.803 m (5' 10.98\"), weight (!) 186 kg (410 lb 11.5 oz), SpO2 98%.    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, CKD (unclear as to what stage)  -recent orthopedic injury in addition to chronic pain   -not candidate for surgical interventions    -makes ambulation difficult   -unable to stand for more than a few seconds  -minimal family/friends support   -endorses depression, lack of quality of life   -psych consulted  -social work consult as well   -wants to improve quality of life- doesn't \"know where to start\"  -not interested in any AL or LTC " "facility   -\"Id rather die then lose my independence\"    BG Monitoring/Medications  -Does use Dexcom G7   -will send message to [employer]- mail order DME company   -they can bill Medicare Part B and send diabetic supplies    -not able to go pick things up himself relies on neighbor to go for him  -extensive medication list  -profound insulin resistance and use   -80u glargine TID; 50u aspart TID  -states Ozempic worsened depression    Diet/Exercise  -morbid obesity; BMI is 57.31kg/m2  -unable to stand to prepare meals  -very poor dietary habits   -mostly microwaved meals   -\"junk food\" constantly; pop    -delivery services too expensive    -does not know how to cook  -dietician consult done   -agree with psych consult recommendation   -not willing to make dietary changes such as low sodium or diabetic plate method      Yrn Kearney, RN         [1] No family history on file.    "

## 2025-04-18 NOTE — PROGRESS NOTES
"Subjective Data:  No new complaints. Reports continued robust urine output.        Objective Data:  Last Recorded Vitals:  Vitals:    25 0010 25 0402 25 0738 25 0842   BP: 137/65 117/51 147/79    BP Location: Right arm Right arm Right arm    Patient Position: Lying Lying Sitting    Pulse: 78 78 76    Resp:  18    Temp: 36.2 °C (97.1 °F) 36.5 °C (97.7 °F)  35.7 °C (96.2 °F)   TempSrc: Temporal Temporal     SpO2: 99% 98% 98%    Weight:       Height:         Medical Gas Therapy: Supplemental oxygen  Medical Gas Delivery Method: Nasal cannula  Weight  Av kg (410 lb 14.2 oz)  Min: 186 kg (410 lb 11.5 oz)  Max: 186 kg (411 lb)    LABS:  CMP:  Results from last 7 days   Lab Units 25  0744 25  0640 25  0700 04/15/25  1153 04/14/25  1922   SODIUM mmol/L 137 138 139 137 141   POTASSIUM mmol/L 3.6 3.2* 3.9 4.0 3.8   CHLORIDE mmol/L 90* 94* 97* 96* 97*   CO2 mmol/L 38* 35* 34* 34* 35*   ANION GAP mmol/L 13 12 12 11 13   BUN mg/dL 31* 31* 32* 32* 35*   CREATININE mg/dL 1.67* 1.54* 1.56* 1.41* 1.31*   EGFR mL/min/1.73m*2 48* 53* 52* 59* 65   ALBUMIN g/dL  --   --   --   --  3.8   ALT U/L  --   --   --   --  12   AST U/L  --   --   --   --  17   BILIRUBIN TOTAL mg/dL  --   --   --   --  0.5     CBC:  Results from last 7 days   Lab Units 25  0744 25  0640 25  0700 04/15/25  1153 25  1922   WBC AUTO x10*3/uL 8.8 11.3 9.1 12.3* 12.1*   HEMOGLOBIN g/dL 9.5* 8.7* 9.6* 9.1* 10.2*   HEMATOCRIT % 32.1* 30.1* 32.6* 32.1* 35.2*   PLATELETS AUTO x10*3/uL 240 230 235 226 299   MCV fL 80 81 82 84 81     COAG:   Results from last 7 days   Lab Units 25  2334   INR  1.2*     ABO: No results found for: \"ABO\"  HEME/ENDO:  Results from last 7 days   Lab Units 25  0640   HEMOGLOBIN A1C % 7.2*      CARDIAC:   Results from last 7 days   Lab Units 25  1922   TROPHS ng/L 233* 264*   BNP pg/mL  --  78      Results from last 7 days   Lab Units " 04/17/25  0640 04/15/25  1153   HEMOGLOBIN A1C % 7.2*  --    LDL CALC mg/dL  --  30   VLDL mg/dL  --  50*   CHOLESTEROL/HDL RATIO   --  3.3        Last I/O:    Intake/Output Summary (Last 24 hours) at 4/18/2025 1133  Last data filed at 4/18/2025 1126  Gross per 24 hour   Intake 830 ml   Output 5200 ml   Net -4370 ml     Net IO Since Admission: -9,060 mL [04/18/25 1133]            Inpatient Medications:  Scheduled Medications[1]  PRN Medications[2]  Continuous Medications[3]        Physical Exam:  Gen Well appearing middle aged male sitting up in NAD. Body mass index is 57.31 kg/m².   CV rrr. No m/r/g appreciated. No JVD. +3 bilateral leg edema.   Pulm Lungs clear with normal respiratory effort.  Neuro Alert and conversant. Grossly nonfocal.         Assessment:  Acute on chronic HFpEF  Volume++ Urine output improved.     2. Elevated troponin  Non-MI troponin elevation / acute non-traumatic myocardial injury. Core measures do not apply. Noted in the setting of volume overload and PNA.    3. Hypertension   BP variable but acceptable under the present circumstances. Monitoring with diuresis.       Recommendations:  Con't IV diuretics. Strict I/O's. Daily weights. Monitor Mag/K and supplement to >2/4. Close monitoring of renal function. Plan for outpatient stress testing.          Robby Gutierrez MD          [1]   Scheduled medications   Medication Dose Route Frequency    amLODIPine  10 mg oral Daily    aspirin  81 mg oral Daily    atorvastatin  80 mg oral Daily    cefTRIAXone  2 g intravenous q24h    DULoxetine  60 mg oral Daily    enoxaparin  60 mg subcutaneous q12h TAMIR    furosemide  80 mg intravenous BID    insulin glargine  60 Units subcutaneous q12h    insulin lispro  0-10 Units subcutaneous TID    insulin lispro  20 Units subcutaneous TID AC    pantoprazole  40 mg oral Daily before breakfast    Or    pantoprazole  40 mg intravenous Daily before breakfast    perflutren protein A microsphere  0.5 mL intravenous  Once in imaging    regadenoson  0.4 mg intravenous Once in imaging    sulfur hexafluoride microsphr  2 mL intravenous Once in imaging   [2]   PRN medications   Medication    aminophylline    dextrose    dextrose    glucagon    glucagon    HYDROcodone-acetaminophen    HYDROmorphone    methocarbamol    ondansetron    Or    ondansetron    oxyCODONE   [3]   Continuous Medications   Medication Dose Last Rate

## 2025-04-18 NOTE — PROGRESS NOTES
Jai Shrestha is a 55 y.o. male on day 3 of admission presenting with Healthcare-associated pneumonia.      Subjective   Patient was seen and evaluated at bedside this morning, stated that muscle spasm are resolved with current regimen, only experiencing pain as usual.   Patient told TCC that he takes 2 tablets of 200 mg of quetiapine at bedtime instead of 200 mg.    Objective     Last Recorded Vitals  BP (!) 116/49 (BP Location: Right arm, Patient Position: Lying)   Pulse 77   Temp 36.2 °C (97.1 °F) (Temporal)   Resp 17   Wt (!) 186 kg (410 lb 11.5 oz)   SpO2 96%   Intake/Output last 3 Shifts:    Intake/Output Summary (Last 24 hours) at 4/18/2025 1923  Last data filed at 4/18/2025 1719  Gross per 24 hour   Intake 830 ml   Output 5750 ml   Net -4920 ml       Admission Weight  Weight: (!) 186 kg (411 lb) (04/14/25 1901)    Daily Weight  04/17/25 : (!) 186 kg (410 lb 11.5 oz)    Image Results  Electrocardiogram, 12-lead PRN ACS symptoms  Normal sinus rhythm  ST & T wave abnormality, consider inferolateral ischemia  Abnormal ECG  When compared with ECG of 14-APR-2025 19:01, (unconfirmed)  Premature ventricular complexes are no longer Present  Criteria for Inferior infarct are no longer Present  QT has shortened  See ED provider note for full interpretation and clinical correlation  Confirmed by Nely Marsh ([id number]) on 4/16/2025 12:03:42 PM  Electrocardiogram, 12-lead PRN ACS symptoms  Sinus rhythm with occasional Premature ventricular complexes  Inferior infarct (cited on or before 29-OCT-2024)  ST & T wave abnormality, consider lateral ischemia  Abnormal ECG  When compared with ECG of 29-OCT-2024 11:42, (unconfirmed)  Premature ventricular complexes are now Present  QRS duration has decreased  Non-specific change in ST segment in Inferior leads  ST now depressed in Anterior leads  Nonspecific T wave abnormality, worse in Lateral leads  See ED provider note for full interpretation and clinical  correlation  Confirmed by Nely Marsh ([id number]) on 4/16/2025 12:03:26 PM      Physical Exam  Constitutional: Obese gentleman, conversant without dyspnea, alert active, cooperative not in acute distress  Eyes: PERRLA, clear sclera  ENMT: Moist mucosal membranes, no exudate  Head / Neck: Atraumatic, normocephalic, supple neck, JVP not visualized  Lungs: Patent airways, CTABL  Heart: RRR, S1S2, no murmurs appreciated, palpable pulses in all extremities  GI: Soft, NT, ND, bowel sounds present in all quadrants  MSK: Moves all extremities freely, no restriction  of ROM, no joint edema  Extremities: Chronic lymphedema with venous ulcers, bilateral lower extremities peripheral edema  : No Guo catheter inserted  Breast: Deferred  Neurological: AAO x 3 to person, place and date, facial muscles symmetrical, sensation intact, strength 4/4, no acute focal neurological deficits appreciated  Psychological: Appropriate mood and behavior     Relevant Results             Scheduled medications  Scheduled Medications[1]  Continuous medications  Continuous Medications[2]  PRN medications  PRN Medications[3]       Assessment & Plan  Healthcare-associated pneumonia    55 y.o. male with a past medical history of chronic back pain, peripheral neuropathy, diabetes mellitus type 2 with insulin resistance, morbid obesity BMI 57.3, CKD 3, diastolic CHF, hypertension who presented to [facility] ER complaining of shortness of breath and lower extremity edema.  He also complains of intermittent chest pain but none currently.  He says that his chest pain is midsternal and a burning sensation       Left upper lobe pneumonia  Vancomycin pharmacy to dose  Zosyn  Azithromycin  Urine antigens and strep pneumo and Legionella negative     Elevated troponin flat trend  Given aspirin in the ER  Telemetry  Cardiology: Appreciate evaluation recommendations  No acute coronary syndrome     Type 2 diabetes mellitus requiring insulin with  insulin resistance  At home he takes Lantus 80 units 3 times a day and lispro insulin 15 units 3 times a day  Endocrine consulted: Appreciate evaluation management  Diabetic carbohydrate controlled diet     Diastolic CHF, decompensated  Lasix 40 mg IV every 12 hours  Daily weight  Cardiology consulted: Appreciate evaluation and recommendations     Hypertension  Amlodipine 5 mg orally daily     Hyperlipidemia  Atorvastatin 80 mg orally daily     Bilateral lymphedema with venous stasis changes and chronic non healing ulcers  Chronic cellulitis sees wound care nurse at home for venous ulcers  Diuresis with Lasix  Pain control: Oxycodone IR discontinued, Percocet started, methocarbamol 500 mg twice daily added  ID consulted: Appreciate recommendation     Morbid obesity BMI 57.3  Lifestyle modification     DVT prophylaxis  Lovenox 60 mg subcutaneously every 12 hours  SCDs     Disposition: Presented with shortness of breath, found to be heart failure and pneumonia, discharge pending clinical improvement     ADOD in 2 days           Sergio Collins DO           [1] amLODIPine, 10 mg, oral, Daily  aspirin, 81 mg, oral, Daily  atorvastatin, 80 mg, oral, Daily  cefTRIAXone, 2 g, intravenous, q24h  DULoxetine, 60 mg, oral, Daily  enoxaparin, 60 mg, subcutaneous, q12h TAMIR  furosemide, 80 mg, intravenous, BID  insulin glargine, 60 Units, subcutaneous, q12h  insulin lispro, 0-10 Units, subcutaneous, TID  insulin lispro, 20 Units, subcutaneous, TID AC  pantoprazole, 40 mg, oral, Daily before breakfast   Or  pantoprazole, 40 mg, intravenous, Daily before breakfast  perflutren protein A microsphere, 0.5 mL, intravenous, Once in imaging  QUEtiapine, 400 mg, oral, Nightly  regadenoson, 0.4 mg, intravenous, Once in imaging  sulfur hexafluoride microsphr, 2 mL, intravenous, Once in imaging  [2]    [3] PRN medications: aminophylline, dextrose, dextrose, glucagon, glucagon, HYDROcodone-acetaminophen, HYDROmorphone, methocarbamol,  ondansetron **OR** ondansetron, oxyCODONE

## 2025-04-18 NOTE — PROGRESS NOTES
Physical Therapy    Physical Therapy Evaluation    Patient Name: Jai Shrestha  MRN: [id number]  Department: [place] [id number]  Room: [address] [place] [address]  Today's Date: 4/18/2025   Time Calculation  Start Time: 1031  Stop Time: 1054  Time Calculation (min): 23 min    Assessment/Plan   PT Assessment  PT Assessment Results: Decreased strength, Decreased range of motion, Decreased endurance, Impaired balance, Decreased mobility, Pain  Rehab Prognosis: Good  Barriers to Discharge Home: No anticipated barriers  End of Session Communication: Bedside nurse  Assessment Comment: PT Evaluation Completed. The patient presented with decreased mobility, balance, endurance, strength and increased pain and fatigue. These impairments are negatively impacting his ability to perform at baseline functional level, in home environment. This patient would benefit from skilled therapy intervention to address limitations and progress towards the PT goals.  Anticipate low frequency PT needs at discharge  End of Session Patient Position: Up in chair, Alarm on  IP OR SWING BED PT PLAN  Inpatient or Swing Bed: Inpatient  PT Plan  Treatment/Interventions: Bed mobility, Transfer training, Gait training, Stair training, Balance training, Strengthening, Endurance training, Range of motion, Therapeutic exercise, Home exercise program, Therapeutic activity, Positioning  PT Plan: Ongoing PT  PT Frequency: 3 times per week  PT Discharge Recommendations: Low intensity level of continued care  PT Recommended Transfer Status: Assist x1  PT - OK to Discharge: Yes (PT POC established)    Subjective   General Visit Information:  General  Reason for Referral: To ED with SOB, increased LE swelling, and increased difficulty getting around. CT (+) for PNA. Additionally pt injured R ankle ~3 weeks ago and has had increased pain since.  Referred By: Sergio Collins DO  Past Medical History Relevant to Rehab: Medical History[1]    Prior to Session Communication: Bedside  nurse  Patient Position Received: Alarm on, Up in chair  General Comment: Pt pleasant and agreeable to PT eval.  Home Living:  Home Living  Type of Home: House  Lives With: Alone  Home Adaptive Equipment: Walker rolling or standard, Cane  Home Layout: One level  Home Access: Stairs to enter with rails  Entrance Stairs-Number of Steps: 2  Bathroom Shower/Tub: Tub/shower unit  Bathroom Toilet: Handicapped height  Bathroom Equipment: Grab bars in shower (owns a shower seat but unable to fit in shower)  Prior Level of Function:  Prior Function Per Pt/Caregiver Report  Level of [place]: Needs assistance with homemaking, Independent with ADLs and functional transfers  Receives Help From: Neighbor (HHA 1x/wk for 2 hrs)  ADL Assistance: Independent (increased difficulty completing recently.)  Homemaking Assistance: Needs assistance (HHA completes household tasks)  Ambulatory Assistance: Independent (was using cane prior to R ankle injury, has been using RW since)  Prior Function Comments: Pt reports he wants to obtain a power scooter but hasnt yet.  Precautions:  Precautions  Medical Precautions: Fall precautions  Precautions Comment: Per previous podiatry notes after initial ankle injury, no intervention at this time.             Objective   Pain:  Pain Assessment  Pain Assessment: 0-10  0-10 (Numeric) Pain Score: 7  Pain Type: Chronic pain  Pain Location: Back  Pain Interventions: Ambulation/increased activity, Repositioned  Cognition:  Cognition  Overall Cognitive Status: Within Functional Limits  Orientation Level: Oriented X4    General Assessments:  Activity Tolerance  Endurance: Tolerates 10 - 20 min exercise with multiple rests    Sensation  Sensation Comment: Severe neuropathy in art UEs from forearms down and art LEs below knees.         Postural Control  Postural Control: Within Functional Limits    Static Sitting Balance  Static Sitting-Balance Support: Feet supported, Bilateral upper extremity  supported  Static Sitting-Level of Assistance: Close supervision  Dynamic Sitting Balance  Dynamic Sitting-Balance Support: Feet supported, Bilateral upper extremity supported  Dynamic Sitting-Level of Assistance: Close supervision    Static Standing Balance  Static Standing-Balance Support: Bilateral upper extremity supported  Static Standing-Level of Assistance: Contact guard  Dynamic Standing Balance  Dynamic Standing-Balance Support: Bilateral upper extremity supported  Dynamic Standing-Level of Assistance: Contact guard  Functional Assessments:       Transfers  Transfer: Yes  Transfer 1  Technique 1: Sit to stand, Stand to sit  Transfer Device 1: Walker  Transfer Level of Assistance 1: Close supervision  Trials/Comments 1: Cues for hand placement.    Ambulation/Gait Training  Ambulation/Gait Training Performed: Yes  Ambulation/Gait Training 1  Surface 1: Level tile  Device 1: Rolling walker  Assistance 1: Contact guard  Comments/Distance (ft) 1: 40 ft. Pt ambulates with decreased nicole and step length. Cues for walker placement and sequencing. Overall demonstrates good stability.       Extremity/Trunk Assessments:  RUE   RUE : Within Functional Limits  LUE   LUE: Within Functional Limits  RLE   RLE :  (R hip flexion 3-/5, knee ext 3-/5, DF >3/5, PF 0/5. PF PROM limited due to pain. Otherwise ROM WFL.)  LLE   LLE :  (R hip flexion 3/5, knee ext 3/5, DF/PF >3/5. ROM WFL)  Outcome Measures:  [facility] Basic Mobility  Turning from your back to your side while in a flat bed without using bedrails: A little  Moving from lying on your back to sitting on the side of a flat bed without using bedrails: A little  Moving to and from bed to chair (including a wheelchair): A little  Standing up from a chair using your arms (e.g. wheelchair or bedside chair): A little  To walk in hospital room: A little  Climbing 3-5 steps with railing: A lot  Basic Mobility - Total Score: 17    Encounter Problems       Encounter Problems  (Active)       Balance       complete all mobility with normal balance while dual tasking, negotiating in a dynamic environment, carrying items, etc., with proactive and reactive static and dynamic standing and sitting tasks, with mod I and RW, >15 minutes.         Start:  04/18/25    Expected End:  05/02/25               Mobility       STG - Patient will ambulate 150 ft mod I with a RW       Start:  04/18/25    Expected End:  05/02/25            STG - Patient will ascend and descend 2 stairs with CGA using LRAD.        Start:  04/18/25    Expected End:  05/02/25               PT Transfers       STG - Patient will perform bed mobility independently.        Start:  04/18/25    Expected End:  05/02/25            STG - Patient will transfer sit to and from stand mod I with a RW       Start:  04/18/25    Expected End:  05/02/25               Pain - Adult              Education Documentation  Body Mechanics, taught by Noreen Brar, PT at 4/18/2025 11:57 AM.  Learner: Patient  Readiness: Acceptance  Method: Explanation  Response: Verbalizes Understanding    Mobility Training, taught by Noreen Brar, PT at 4/18/2025 11:57 AM.  Learner: Patient  Readiness: Acceptance  Method: Explanation  Response: Verbalizes Understanding    Education Comments  No comments found.                 [1]   Past Medical History:  Diagnosis Date    Chronic back pain     CKD (chronic kidney disease)     Diabetes mellitus (Multi)     HF (heart failure), diastolic     Hypertension

## 2025-04-18 NOTE — CONSULTS
INFECTIOUS DISEASE INPATIENT INITIAL CONSULTATION    Referred By: Sergio Collins    Reason For Consult: Recurrent cellulitis with chronic venous ulcers     HPI:  This is a 55 y.o. male with PMH of morbid obesity, DM II, HTN, DLD, CKD who presented with shortness of breath and LE edema.    Afebrile here. WBC is 12.1-12.3. Has been diagnosed with possible PNA, HF exacerbation and getting diuresis, also bilateral LE cellulitis. Is on IV Zosyn. Overall he is feeling much better with improvement in redness of both legs, LE edema, shortness of breath.     Allergies:  Metformin     Vitals (Last 24 Hours):  Heart Rate:  [76-91]   Temp:  [36.2 °C (97.1 °F)-36.5 °C (97.7 °F)]   Resp:  [16-18]   BP: (117-150)/(51-79)   SpO2:  [92 %-99 %]      PHYSICAL EXAM:  Gen - NAD  Heart - RRR  Lungs - no wheezing  Abd - soft, no ttp, BS present  Ext - both legs with edema, venous stasis changes  Skin - no rash    MEDS:  Current Medications[1]     LABS:  Lab Results   Component Value Date    WBC 11.3 04/17/2025    HGB 8.7 (L) 04/17/2025    HCT 30.1 (L) 04/17/2025    MCV 81 04/17/2025     04/17/2025      Results from last 72 hours   Lab Units 04/17/25  0640   SODIUM mmol/L 138   POTASSIUM mmol/L 3.2*   CHLORIDE mmol/L 94*   CO2 mmol/L 35*   BUN mg/dL 31*   CREATININE mg/dL 1.54*   GLUCOSE mg/dL 55*   CALCIUM mg/dL 8.2*   ANION GAP mmol/L 12   EGFR mL/min/1.73m*2 53*         Estimated Creatinine Clearance: 93.1 mL/min (A) (by C-G formula based on SCr of 1.54 mg/dL (H)).    IMAGING:  CT/PE 4/14  IMPRESSION:  No pulmonary embolism is identified.  Left upper lobe pneumonia.  Chronic asymmetric elevation of right hemidiaphragm with overlying  atelectasis.  Coronary artery calcification.  Cholelithiasis.  Splenomegaly.      ASSESSMENT/PLAN:    PATRIZIA PNA - resolving. I suspect this may be more all pulmonary edema and not PNA though.  Bilateral LE Edema and Cellulitis - resolving    Changed to IV CTX 2g Q24H. When ready for home can  complete abx for 7D total course, PO Keflex 1g TID.    Monitoring for adverse effects of abx such as rash/itching/diarrhea - none.    Will sign off. Please call back with questions. Thanks!    Alex Blandon MD  [facility]  Office #[phone]           [1]   Current Facility-Administered Medications:     aminophylline injection 125 mg, 125 mg, intravenous, Once PRN, Lauren Lemus, APRN-CNP    amLODIPine (Norvasc) tablet 10 mg, 10 mg, oral, Daily, Gomez G Salomone, DO, 10 mg at 04/18/25 0803    aspirin EC tablet 81 mg, 81 mg, oral, Daily, Gomez G Salomone, DO, 81 mg at 04/18/25 0803    atorvastatin (Lipitor) tablet 80 mg, 80 mg, oral, Daily, Gomez G Salomone, DO, 80 mg at 04/18/25 0803    dextrose 50 % injection 12.5 g, 12.5 g, intravenous, q15 min PRN, Gomez G Salomone, DO    dextrose 50 % injection 25 g, 25 g, intravenous, q15 min PRN, Gomez G Salomone, DO, 25 g at 04/17/25 0732    DULoxetine (Cymbalta) DR capsule 60 mg, 60 mg, oral, Daily, Gomez G Salomone, DO, 60 mg at 04/18/25 0803    enoxaparin (Lovenox) syringe 60 mg, 60 mg, subcutaneous, q12h TAMIR, Gomez G Salomone, DO, 60 mg at 04/18/25 0803    furosemide (Lasix) injection 80 mg, 80 mg, intravenous, BID, Robby Gutierrez MD, 80 mg at 04/18/25 0803    glucagon (Glucagen) injection 1 mg, 1 mg, intramuscular, q15 min PRN, Gomez G Salomone, DO    glucagon (Glucagen) injection 1 mg, 1 mg, intramuscular, q15 min PRN, Gomez G Salomone, DO    HYDROcodone-acetaminophen (Norco) 5-325 mg per tablet 1 tablet, 1 tablet, oral, q6h PRN, Sergio Goudiaby, DO, 1 tablet at 04/18/25 0626    HYDROmorphone (Dilaudid) injection 0.6 mg, 0.6 mg, intravenous, q4h PRN, Gomez Arnold,     insulin glargine (Lantus) injection 60 Units, 60 Units, subcutaneous, q12h, Ashwin Cornejo MD, 60 Units at 04/18/25 0808    insulin lispro injection 0-10 Units, 0-10 Units, subcutaneous, TID, Ashwin Cornejo MD, 6 Units at 04/18/25 08    insulin lispro injection 20 Units, 20  Units, subcutaneous, TID AC, Ashwin Cornejo MD, 20 Units at 04/18/25 0808    methocarbamol (Robaxin) tablet 500 mg, 500 mg, oral, q6h PRN, Sergio Goudiaby, DO, 500 mg at 04/18/25 0040    ondansetron (Zofran) tablet 4 mg, 4 mg, oral, q8h PRN **OR** ondansetron (Zofran) injection 4 mg, 4 mg, intravenous, q8h PRN, Gomez SEPULVEDA Salomone, DO    oxyCODONE (Roxicodone) immediate release tablet 5 mg, 5 mg, oral, q6h PRN, Gomez SEPULVEDA Salomone, DO, 5 mg at 04/18/25 0118    pantoprazole (ProtoNix) EC tablet 40 mg, 40 mg, oral, Daily before breakfast, 40 mg at 04/16/25 0540 **OR** pantoprazole (Protonix) injection 40 mg, 40 mg, intravenous, Daily before breakfast, Gomez SEPULVEDA Salomone, DO, 40 mg at 04/18/25 0626    perflutren protein A microsphere (Optison) injection 0.5 mL, 0.5 mL, intravenous, Once in imaging, LIZ Dolan    piperacillin-tazobactam (Zosyn) 3.375 g in dextrose (iso) IV 50 mL, 3.375 g, intravenous, q6h, Gomez SEPULVEDA Salomone, DO, Stopped at 04/18/25 0723    regadenoson (Lexiscan) injection 0.4 mg, 0.4 mg, intravenous, Once in imaging, LIZ Dolan    sulfur hexafluoride microsphr (Lumason) injection 24.28 mg, 2 mL, intravenous, Once in imaging, LIZ Dolan

## 2025-04-18 NOTE — PROGRESS NOTES
4/18/2025 1:50 PM I met with patient. He has home nursing from [facility].   He has a home health aide from the [facility]. He was receiving  meals from [employer]. I provided information about [employer] and [facility]. Nano JAEGER

## 2025-04-19 LAB
ANION GAP SERPL CALC-SCNC: 12 MMOL/L (ref 10–20)
BACTERIA BLD CULT: NORMAL
BACTERIA BLD CULT: NORMAL
BUN SERPL-MCNC: 30 MG/DL (ref 6–23)
CALCIUM SERPL-MCNC: 9 MG/DL (ref 8.6–10.3)
CHLORIDE SERPL-SCNC: 93 MMOL/L (ref 98–107)
CO2 SERPL-SCNC: 38 MMOL/L (ref 21–32)
CREAT SERPL-MCNC: 1.5 MG/DL (ref 0.5–1.3)
EGFRCR SERPLBLD CKD-EPI 2021: 55 ML/MIN/1.73M*2
GLUCOSE BLD MANUAL STRIP-MCNC: 187 MG/DL (ref 74–99)
GLUCOSE BLD MANUAL STRIP-MCNC: 197 MG/DL (ref 74–99)
GLUCOSE BLD MANUAL STRIP-MCNC: 280 MG/DL (ref 74–99)
GLUCOSE BLD MANUAL STRIP-MCNC: 359 MG/DL (ref 74–99)
GLUCOSE SERPL-MCNC: 197 MG/DL (ref 74–99)
HOLD SPECIMEN: NORMAL
POTASSIUM SERPL-SCNC: 3.5 MMOL/L (ref 3.5–5.3)
SODIUM SERPL-SCNC: 139 MMOL/L (ref 136–145)

## 2025-04-19 PROCEDURE — 2500000002 HC RX 250 W HCPCS SELF ADMINISTERED DRUGS (ALT 637 FOR MEDICARE OP, ALT 636 FOR OP/ED): Performed by: INTERNAL MEDICINE

## 2025-04-19 PROCEDURE — 2500000001 HC RX 250 WO HCPCS SELF ADMINISTERED DRUGS (ALT 637 FOR MEDICARE OP): Performed by: INTERNAL MEDICINE

## 2025-04-19 PROCEDURE — 99231 SBSQ HOSP IP/OBS SF/LOW 25: CPT | Performed by: INTERNAL MEDICINE

## 2025-04-19 PROCEDURE — 99232 SBSQ HOSP IP/OBS MODERATE 35: CPT | Performed by: INTERNAL MEDICINE

## 2025-04-19 PROCEDURE — 82947 ASSAY GLUCOSE BLOOD QUANT: CPT

## 2025-04-19 PROCEDURE — 1200000002 HC GENERAL ROOM WITH TELEMETRY DAILY

## 2025-04-19 PROCEDURE — 2500000004 HC RX 250 GENERAL PHARMACY W/ HCPCS (ALT 636 FOR OP/ED): Mod: JZ | Performed by: INTERNAL MEDICINE

## 2025-04-19 PROCEDURE — 36415 COLL VENOUS BLD VENIPUNCTURE: CPT | Performed by: INTERNAL MEDICINE

## 2025-04-19 PROCEDURE — 93010 ELECTROCARDIOGRAM REPORT: CPT | Performed by: INTERNAL MEDICINE

## 2025-04-19 PROCEDURE — 80048 BASIC METABOLIC PNL TOTAL CA: CPT | Performed by: INTERNAL MEDICINE

## 2025-04-19 RX ORDER — INSULIN LISPRO 100 [IU]/ML
24 INJECTION, SOLUTION INTRAVENOUS; SUBCUTANEOUS
Status: DISCONTINUED | OUTPATIENT
Start: 2025-04-19 | End: 2025-04-20

## 2025-04-19 RX ORDER — QUETIAPINE FUMARATE 100 MG/1
200 TABLET, FILM COATED ORAL NIGHTLY
Status: DISCONTINUED | OUTPATIENT
Start: 2025-04-19 | End: 2025-04-22

## 2025-04-19 RX ADMIN — INSULIN LISPRO 24 UNITS: 100 INJECTION, SOLUTION INTRAVENOUS; SUBCUTANEOUS at 17:00

## 2025-04-19 RX ADMIN — INSULIN LISPRO 24 UNITS: 100 INJECTION, SOLUTION INTRAVENOUS; SUBCUTANEOUS at 13:11

## 2025-04-19 RX ADMIN — INSULIN LISPRO 6 UNITS: 100 INJECTION, SOLUTION INTRAVENOUS; SUBCUTANEOUS at 13:11

## 2025-04-19 RX ADMIN — CEFTRIAXONE SODIUM 2 G: 2 INJECTION, SOLUTION INTRAVENOUS at 10:26

## 2025-04-19 RX ADMIN — ENOXAPARIN SODIUM 60 MG: 60 INJECTION SUBCUTANEOUS at 21:14

## 2025-04-19 RX ADMIN — INSULIN LISPRO 20 UNITS: 100 INJECTION, SOLUTION INTRAVENOUS; SUBCUTANEOUS at 10:28

## 2025-04-19 RX ADMIN — FUROSEMIDE 80 MG: 10 INJECTION, SOLUTION INTRAMUSCULAR; INTRAVENOUS at 10:14

## 2025-04-19 RX ADMIN — FUROSEMIDE 80 MG: 10 INJECTION, SOLUTION INTRAMUSCULAR; INTRAVENOUS at 16:59

## 2025-04-19 RX ADMIN — ATORVASTATIN CALCIUM 80 MG: 80 TABLET, FILM COATED ORAL at 10:14

## 2025-04-19 RX ADMIN — INSULIN GLARGINE 60 UNITS: 100 INJECTION, SOLUTION SUBCUTANEOUS at 10:17

## 2025-04-19 RX ADMIN — INSULIN GLARGINE 60 UNITS: 100 INJECTION, SOLUTION SUBCUTANEOUS at 21:13

## 2025-04-19 RX ADMIN — HYDROMORPHONE HYDROCHLORIDE 0.6 MG: 1 INJECTION, SOLUTION INTRAMUSCULAR; INTRAVENOUS; SUBCUTANEOUS at 23:14

## 2025-04-19 RX ADMIN — HYDROCODONE BITARTRATE AND ACETAMINOPHEN 1 TABLET: 5; 325 TABLET ORAL at 10:17

## 2025-04-19 RX ADMIN — INSULIN LISPRO 2 UNITS: 100 INJECTION, SOLUTION INTRAVENOUS; SUBCUTANEOUS at 17:00

## 2025-04-19 RX ADMIN — PANTOPRAZOLE SODIUM 40 MG: 40 TABLET, DELAYED RELEASE ORAL at 07:04

## 2025-04-19 RX ADMIN — OXYCODONE HYDROCHLORIDE 5 MG: 5 TABLET ORAL at 19:40

## 2025-04-19 RX ADMIN — DULOXETINE HYDROCHLORIDE 60 MG: 30 CAPSULE, DELAYED RELEASE ORAL at 10:14

## 2025-04-19 RX ADMIN — INSULIN LISPRO 2 UNITS: 100 INJECTION, SOLUTION INTRAVENOUS; SUBCUTANEOUS at 10:18

## 2025-04-19 RX ADMIN — ASPIRIN 81 MG: 81 TABLET, COATED ORAL at 10:14

## 2025-04-19 RX ADMIN — OXYCODONE HYDROCHLORIDE 5 MG: 5 TABLET ORAL at 07:04

## 2025-04-19 RX ADMIN — AMLODIPINE BESYLATE 10 MG: 10 TABLET ORAL at 10:14

## 2025-04-19 RX ADMIN — ENOXAPARIN SODIUM 60 MG: 60 INJECTION SUBCUTANEOUS at 10:14

## 2025-04-19 ASSESSMENT — COGNITIVE AND FUNCTIONAL STATUS - GENERAL
DAILY ACTIVITIY SCORE: 20
DRESSING REGULAR LOWER BODY CLOTHING: A LOT
TURNING FROM BACK TO SIDE WHILE IN FLAT BAD: A LITTLE
MOBILITY SCORE: 18
CLIMB 3 TO 5 STEPS WITH RAILING: A LITTLE
CLIMB 3 TO 5 STEPS WITH RAILING: A LITTLE
MOVING FROM LYING ON BACK TO SITTING ON SIDE OF FLAT BED WITH BEDRAILS: A LITTLE
TOILETING: A LITTLE
WALKING IN HOSPITAL ROOM: A LITTLE
HELP NEEDED FOR BATHING: A LITTLE
MOVING TO AND FROM BED TO CHAIR: A LITTLE
TOILETING: A LITTLE
WALKING IN HOSPITAL ROOM: A LITTLE
HELP NEEDED FOR BATHING: A LITTLE
DRESSING REGULAR LOWER BODY CLOTHING: A LOT
MOVING FROM LYING ON BACK TO SITTING ON SIDE OF FLAT BED WITH BEDRAILS: A LITTLE
TURNING FROM BACK TO SIDE WHILE IN FLAT BAD: A LITTLE
STANDING UP FROM CHAIR USING ARMS: A LITTLE
DAILY ACTIVITIY SCORE: 20
MOVING TO AND FROM BED TO CHAIR: A LITTLE
STANDING UP FROM CHAIR USING ARMS: A LITTLE
MOBILITY SCORE: 18

## 2025-04-19 ASSESSMENT — PAIN DESCRIPTION - ORIENTATION
ORIENTATION: LOWER
ORIENTATION: LOWER

## 2025-04-19 ASSESSMENT — PAIN DESCRIPTION - LOCATION
LOCATION: BACK
LOCATION: GENERALIZED
LOCATION: BACK
LOCATION: BACK

## 2025-04-19 ASSESSMENT — PAIN SCALES - GENERAL
PAINLEVEL_OUTOF10: 6
PAINLEVEL_OUTOF10: 9
PAINLEVEL_OUTOF10: 6
PAINLEVEL_OUTOF10: 9
PAINLEVEL_OUTOF10: 0 - NO PAIN
PAINLEVEL_OUTOF10: 6
PAINLEVEL_OUTOF10: 5 - MODERATE PAIN

## 2025-04-19 ASSESSMENT — PAIN - FUNCTIONAL ASSESSMENT
PAIN_FUNCTIONAL_ASSESSMENT: 0-10
PAIN_FUNCTIONAL_ASSESSMENT: UNABLE TO SELF-REPORT

## 2025-04-19 NOTE — NURSING NOTE
"Patient stating he feels like he was overdosed with his 400mg seroquel last night. He's hardly been able to keep his eyes open enough to hold a conversation, but patient did say this AM that 400 mg of seroquel is what he takes at home when I administered his morning medications around 1030am. Patient stated he hasn't slept for 4 days now, drowsiness could be from body's lack of sleep. Also stated he didn't even know what the doctor this morning said to him and that he didn't even know where he was. Patient keeps stating to staff that he \"just wants to be left alone.\" Notified Dr. Collins.    @1963  Dr. Collins decreased patient's seroquel to 200mg and requested an EKG to check for prolonged QT.    @1630  No QT prolongation noted in EKG, seroquel is now held.  "

## 2025-04-19 NOTE — PROGRESS NOTES
"Jai Shrestha is a 55 y.o. male on day 4 of admission presenting with Healthcare-associated pneumonia.    Subjective   No immediate complaint     Scheduled medications  Scheduled Medications[1]  Continuous medications  Continuous Medications[2]  PRN medications  PRN Medications[3]    Objective   Physical Exam  Constitutional:       General: He is not in acute distress.        Last Recorded Vitals  Blood pressure 153/74, pulse 94, temperature 36.3 °C (97.3 °F), temperature source Temporal, resp. rate 18, height 1.803 m (5' 10.98\"), weight (!) 186 kg (410 lb 11.5 oz), SpO2 99%.  Intake/Output last 3 Shifts:  I/O last 3 completed shifts:  In: 1190 (6.4 mL/kg) [P.O.:840; IV Piggyback:350]  Out: 7150 (38.4 mL/kg) [Urine:7150 (1.1 mL/kg/hr)]  Weight: 186.3 kg     Relevant Results  Results from last 7 days   Lab Units 04/19/25  0802 04/18/25 2018 04/18/25  1513 04/18/25  1238 04/18/25  0744 04/18/25  0733 04/17/25  0727 04/17/25  0640 04/16/25  1112 04/16/25  0700 04/15/25  1611 04/15/25  1153 04/15/25  0006 04/14/25  1922   POCT GLUCOSE mg/dL 197* 248* 311* 295*  --  286*   < >  --    < >  --    < >  --    < >  --    GLUCOSE mg/dL  --   --   --   --  296*  --   --  55*  --  155*  --  288*  --  63*    < > = values in this interval not displayed.       Assessment & Plan  Healthcare-associated pneumonia    IMPRESSION  TYPE 2 DIABETES MELLITUS  LONG TERM CURRENT INSULIN USE  High and unusual insulin regimen  He typically requires less insulin in the hospital  More hyperglycemic yesterday        RECOMMENDATIONS  Insulin glargine 60 units BID  Lispro 24 units QAC plus scale, HOLD if NPO or glucose under 100 mg/dl.      Appreciate Diabetes education    Ashwin Cornejo MD         [1] amLODIPine, 10 mg, oral, Daily  aspirin, 81 mg, oral, Daily  atorvastatin, 80 mg, oral, Daily  cefTRIAXone, 2 g, intravenous, q24h  DULoxetine, 60 mg, oral, Daily  enoxaparin, 60 mg, subcutaneous, q12h TAMIR  furosemide, 80 mg, intravenous, " BID  insulin glargine, 60 Units, subcutaneous, q12h  insulin lispro, 0-10 Units, subcutaneous, TID  insulin lispro, 20 Units, subcutaneous, TID AC  pantoprazole, 40 mg, oral, Daily before breakfast   Or  pantoprazole, 40 mg, intravenous, Daily before breakfast  perflutren protein A microsphere, 0.5 mL, intravenous, Once in imaging  QUEtiapine, 400 mg, oral, Nightly  regadenoson, 0.4 mg, intravenous, Once in imaging  sulfur hexafluoride microsphr, 2 mL, intravenous, Once in imaging  [2]    [3] PRN medications: aminophylline, dextrose, dextrose, glucagon, glucagon, HYDROcodone-acetaminophen, HYDROmorphone, methocarbamol, ondansetron **OR** ondansetron, oxyCODONE

## 2025-04-19 NOTE — CARE PLAN
The patient's goals for the shift include      The clinical goals for the shift include Remain safe, HDS, free of falls, manage/control pain, and maintain skin integrity throughout shift.      Problem: Pain  Goal: Takes deep breaths with improved pain control throughout the shift  Outcome: Progressing  Goal: Turns in bed with improved pain control throughout the shift  Outcome: Progressing  Goal: Walks with improved pain control throughout the shift  Outcome: Progressing  Goal: Performs ADL's with improved pain control throughout shift  Outcome: Progressing  Goal: Participates in PT with improved pain control throughout the shift  Outcome: Progressing  Goal: Free from opioid side effects throughout the shift  Outcome: Progressing  Goal: Free from acute confusion related to pain meds throughout the shift  Outcome: Progressing     Problem: Skin  Goal: Decreased wound size/increased tissue granulation at next dressing change  Outcome: Progressing  Flowsheets (Taken 4/19/2025 1421)  Decreased wound size/increased tissue granulation at next dressing change:   Promote sleep for wound healing   Protective dressings over bony prominences  Goal: Participates in plan/prevention/treatment measures  Outcome: Progressing  Flowsheets (Taken 4/19/2025 1421)  Participates in plan/prevention/treatment measures:   Discuss with provider PT/OT consult   Elevate heels   Increase activity/out of bed for meals  Goal: Prevent/manage excess moisture  Outcome: Progressing  Flowsheets (Taken 4/19/2025 1421)  Prevent/manage excess moisture:   Monitor for/manage infection if present   Moisturize dry skin  Goal: Prevent/minimize sheer/friction injuries  Outcome: Progressing  Flowsheets (Taken 4/19/2025 1421)  Prevent/minimize sheer/friction injuries:   Complete micro-shifts as needed if patient unable. Adjust patient position to relieve pressure points, not a full turn   Increase activity/out of bed for meals   Use pull sheet   HOB 30 degrees or  less   Turn/reposition every 2 hours/use positioning/transfer devices  Goal: Promote/optimize nutrition  Outcome: Progressing  Flowsheets (Taken 4/19/2025 1421)  Promote/optimize nutrition: Monitor/record intake including meals  Goal: Promote skin healing  Outcome: Progressing  Flowsheets (Taken 4/19/2025 1421)  Promote skin healing:   Assess skin/pad under line(s)/device(s)   Protective dressings over bony prominences   Turn/reposition every 2 hours/use positioning/transfer devices   Ensure correct size (line/device) and apply per  instructions   Rotate device position/do not position patient on device     Problem: Pain - Adult  Goal: Verbalizes/displays adequate comfort level or baseline comfort level  Outcome: Progressing     Problem: Safety - Adult  Goal: Free from fall injury  Outcome: Progressing     Problem: Discharge Planning  Goal: Discharge to home or other facility with appropriate resources  Outcome: Progressing     Problem: Chronic Conditions and Co-morbidities  Goal: Patient's chronic conditions and co-morbidity symptoms are monitored and maintained or improved  Outcome: Progressing     Problem: Nutrition  Goal: Nutrient intake appropriate for maintaining nutritional needs  Outcome: Progressing     Problem: Fall/Injury  Goal: Not fall by end of shift  Outcome: Progressing  Goal: Be free from injury by end of the shift  Outcome: Progressing  Goal: Verbalize understanding of personal risk factors for fall in the hospital  Outcome: Progressing  Goal: Verbalize understanding of risk factor reduction measures to prevent injury from fall in the home  Outcome: Progressing  Goal: Use assistive devices by end of the shift  Outcome: Progressing  Goal: Pace activities to prevent fatigue by end of the shift  Outcome: Progressing     Problem: Diabetes  Goal: Achieve decreasing blood glucose levels by end of shift  Outcome: Progressing  Goal: Increase stability of blood glucose readings by end of  shift  Outcome: Progressing  Goal: Decrease in ketones present in urine by end of shift  Outcome: Progressing  Goal: Maintain electrolyte levels within acceptable range throughout shift  Outcome: Progressing  Goal: Maintain glucose levels >70mg/dl to <250mg/dl throughout shift  Outcome: Progressing  Goal: No changes in neurological exam by end of shift  Outcome: Progressing  Goal: Learn about and adhere to nutrition recommendations by end of shift  Outcome: Progressing  Goal: Vital signs within normal range for age by end of shift  Outcome: Progressing  Goal: Increase self care and/or family involovement by end of shift  Outcome: Progressing  Goal: Receive DSME education by end of shift  Outcome: Progressing

## 2025-04-19 NOTE — DOCUMENTATION CLARIFICATION NOTE
"    PATIENT:               JOSSE MARQUEZ  ACCT #:                  [id number]  MRN:                       [id number]  :                       1970  ADMIT DATE:       2025 6:51 PM  DISCH DATE:  RESPONDING PROVIDER #:        [id number]          PROVIDER RESPONSE TEXT:    Gram Negative PNA    CDI QUERY TEXT:    Clarification    Instruction:    Based on your assessment of the patient and the clinical information, please provide the requested documentation by clicking on the appropriate radio button and enter any additional information if prompted.    Question: Please further specify the type of pneumonia being treated    When answering this query, please exercise your independent professional judgment. The fact that a question is being asked, does not imply that any particular answer is desired or expected.    The patient's clinical indicators include:  Clinical Information: 54 y/o male presented with SOB, body aches/pain. Admitted with pneumonia, acute on chronic diastolic heart failure.    Clinical Indicators:   CT PE: Left upper lobe pneumonia.  WBC  12.1, 4/15 12.3,  9.1  S. pneumoniae Ag, urine negative  L. pneumophila Urine Ag negative  NRSA not detected    H&P: \"Rales present\" \"Left upper lobe pneumonia\"     IM Progress note: \"Healthcare-associated pneumonia\" \"Left upper lobe pneumonia\" \"Vancomycin pharmacy to dose\" \"Zosyn\" \"Azithromycin\"    Treatment:   Zosyn 4.5gm IV x 1  4/15 Vancomycin 2 gm IV x 1, Azithromycin 500 mg IV x 1  4/15 - current Zosyn 3.375 gm IV q 6 hrs, Azithromycin 500 mg IV daily    Risk Factors: morbid obesity, BMI 57.3, CKD 3, DM, HFpEF, former smoker  Options provided:  -- Gram Negative PNA  -- Other - I will add my own diagnosis  -- Refer to Clinical Documentation Reviewer    Query created by: Cele Arzate on 2025 11:52 AM      Electronically signed by:  FRED PEDERSEN DO 2025 6:59 AM          "

## 2025-04-19 NOTE — PROGRESS NOTES
Jai Shrestha is a 55 y.o. male on day 4 of admission presenting with Healthcare-associated pneumonia.      Subjective   Patient was seen and examined bedside this morning, stated that he did not sleep too well, and appeared drowsy, denied having any other symptom at that time.  Patient requested yesterday for his dose of Seroquel to be increased to 400 mg at bedtime stated that this was his home dose and message was passed on to [facility].  Etiology of his somnolence may be likely from Seroquel.  He is on telemetry, ECG was done to monitor QT intervals, and it was negative for QTc prolongation.       Objective     Last Recorded Vitals  /82   Pulse 94   Temp 36.5 °C (97.7 °F) (Temporal)   Resp 18   Wt (!) 186 kg (410 lb 11.5 oz)   SpO2 97%   Intake/Output last 3 Shifts:    Intake/Output Summary (Last 24 hours) at 4/19/2025 1623  Last data filed at 4/19/2025 1241  Gross per 24 hour   Intake 650 ml   Output 4400 ml   Net -3750 ml       Admission Weight  Weight: (!) 186 kg (411 lb) (04/14/25 1901)    Daily Weight  04/17/25 : (!) 186 kg (410 lb 11.5 oz)    Image Results  Electrocardiogram, 12-lead PRN ACS symptoms  Normal sinus rhythm  ST & T wave abnormality, consider inferolateral ischemia  Abnormal ECG  When compared with ECG of 14-APR-2025 19:01, (unconfirmed)  Premature ventricular complexes are no longer Present  Criteria for Inferior infarct are no longer Present  QT has shortened  See ED provider note for full interpretation and clinical correlation  Confirmed by Nely Marsh ([id number]) on 4/16/2025 12:03:42 PM  Electrocardiogram, 12-lead PRN ACS symptoms  Sinus rhythm with occasional Premature ventricular complexes  Inferior infarct (cited on or before 29-OCT-2024)  ST & T wave abnormality, consider lateral ischemia  Abnormal ECG  When compared with ECG of 29-OCT-2024 11:42, (unconfirmed)  Premature ventricular complexes are now Present  QRS duration has decreased  Non-specific change in ST segment in  Inferior leads  ST now depressed in Anterior leads  Nonspecific T wave abnormality, worse in Lateral leads  See ED provider note for full interpretation and clinical correlation  Confirmed by Nely Marsh ([id number]) on 4/16/2025 12:03:26 PM      Physical Exam  Constitutional: Obese gentleman, appears drowsy, somnolent, but respond to questions, cooperative not in acute distress  Eyes: PERRLA, clear sclera  ENMT: Moist mucosal membranes, no exudate  Head / Neck: Atraumatic, normocephalic, supple neck, JVP not visualized  Lungs: Patent airways, CTABL  Heart: RRR, S1S2, no murmurs appreciated, palpable pulses in all extremities  GI: Soft, NT, ND, bowel sounds present in all quadrants  MSK: Moves all extremities freely, no restriction  of ROM, no joint edema  Extremities: Chronic lymphedema with venous ulcers, bilateral lower extremities peripheral edema  : No Guo catheter inserted  Breast: Deferred  Neurological: AAO x 3 to person, place and date, facial muscles symmetrical, sensation intact, strength 4/4, no acute focal neurological deficits appreciated  Psychological: Appropriate mood and behavior  Relevant Results             Scheduled medications  Scheduled Medications[1]  Continuous medications  Continuous Medications[2]  PRN medications  PRN Medications[3]       Assessment & Plan  Healthcare-associated pneumonia    55 y.o. male with a past medical history of chronic back pain, peripheral neuropathy, diabetes mellitus type 2 with insulin resistance, morbid obesity BMI 57.3, CKD 3, diastolic CHF, hypertension who presented to [facility] ER complaining of shortness of breath and lower extremity edema.  He also complains of intermittent chest pain but none currently.  He says that his chest pain is midsternal and a burning sensation       Left upper lobe pneumonia  -s/p Vancomycin and Zosyn on admission  -Ceftriaxone 2 g IV piggyback daily  -Urine antigens and strep pneumo and Legionella  negative    Drowsy in the a.m. likely from Seroquel (took 400 mg at bedtime, reportedly his home dose)  -ECG negative for QTc prolongation  -Telemetry monitoring  -Seroquel reduced to 200 mg at bedtime, will put on hold for tonight     Elevated troponin flat trend  -Given aspirin in the ER  -Telemetry  -Cardiology: Appreciate evaluation recommendations, continue diuresis to achieve euvolemic state  -No acute coronary syndrome     Type 2 diabetes mellitus requiring insulin with insulin resistance  -At home he takes Lantus 80 units 3 times a day and lispro insulin 15 units 3 times a day  -Endocrine consulted: Appreciate evaluation management  -Diabetic carbohydrate controlled diet     Diastolic CHF, decompensated  -Lasix 40 mg IV every 12 hours  -Daily weight  -Cardiology consulted: Appreciate evaluation and recommendations     Hypertension  Amlodipine 5 mg orally daily     Hyperlipidemia  Atorvastatin 80 mg orally daily     Bilateral lymphedema with venous stasis changes and chronic non healing ulcers  Chronic cellulitis sees wound care nurse at home for venous ulcers  Diuresis with Lasix  Pain control: Oxycodone IR discontinued, Percocet started, methocarbamol 500 mg twice daily added  ID consulted: Appreciate recommendation     Morbid obesity BMI 57.3  Lifestyle modification     DVT prophylaxis  Lovenox 60 mg subcutaneously every 12 hours  SCDs     Disposition: Presented with shortness of breath, found to be heart failure and pneumonia, discharge pending clinical improvement     ADOD in 2 days           Sergio Collins DO           [1] amLODIPine, 10 mg, oral, Daily  aspirin, 81 mg, oral, Daily  atorvastatin, 80 mg, oral, Daily  cefTRIAXone, 2 g, intravenous, q24h  DULoxetine, 60 mg, oral, Daily  enoxaparin, 60 mg, subcutaneous, q12h TAMIR  furosemide, 80 mg, intravenous, BID  insulin glargine, 60 Units, subcutaneous, q12h  insulin lispro, 0-10 Units, subcutaneous, TID  insulin lispro, 24 Units, subcutaneous, TID  AC  pantoprazole, 40 mg, oral, Daily before breakfast   Or  pantoprazole, 40 mg, intravenous, Daily before breakfast  perflutren protein A microsphere, 0.5 mL, intravenous, Once in imaging  QUEtiapine, 200 mg, oral, Nightly  regadenoson, 0.4 mg, intravenous, Once in imaging  sulfur hexafluoride microsphr, 2 mL, intravenous, Once in imaging  [2]    [3] PRN medications: aminophylline, dextrose, dextrose, glucagon, glucagon, HYDROcodone-acetaminophen, HYDROmorphone, methocarbamol, ondansetron **OR** ondansetron, oxyCODONE

## 2025-04-20 VITALS
TEMPERATURE: 97.9 F | RESPIRATION RATE: 18 BRPM | HEART RATE: 85 BPM | OXYGEN SATURATION: 98 % | SYSTOLIC BLOOD PRESSURE: 140 MMHG | BODY MASS INDEX: 44.1 KG/M2 | WEIGHT: 315 LBS | HEIGHT: 71 IN | DIASTOLIC BLOOD PRESSURE: 71 MMHG

## 2025-04-20 LAB
ALBUMIN SERPL BCP-MCNC: 3.8 G/DL (ref 3.4–5)
ANION GAP SERPL CALC-SCNC: 10 MMOL/L (ref 10–20)
BUN SERPL-MCNC: 30 MG/DL (ref 6–23)
CALCIUM SERPL-MCNC: 9.1 MG/DL (ref 8.6–10.3)
CHLORIDE SERPL-SCNC: 90 MMOL/L (ref 98–107)
CO2 SERPL-SCNC: 39 MMOL/L (ref 21–32)
CREAT SERPL-MCNC: 1.34 MG/DL (ref 0.5–1.3)
EGFRCR SERPLBLD CKD-EPI 2021: 63 ML/MIN/1.73M*2
ERYTHROCYTE [DISTWIDTH] IN BLOOD BY AUTOMATED COUNT: 16.6 % (ref 11.5–14.5)
GLUCOSE BLD MANUAL STRIP-MCNC: 112 MG/DL (ref 74–99)
GLUCOSE BLD MANUAL STRIP-MCNC: 117 MG/DL (ref 74–99)
GLUCOSE BLD MANUAL STRIP-MCNC: 203 MG/DL (ref 74–99)
GLUCOSE BLD MANUAL STRIP-MCNC: 335 MG/DL (ref 74–99)
GLUCOSE SERPL-MCNC: 206 MG/DL (ref 74–99)
HCT VFR BLD AUTO: 32.6 % (ref 41–52)
HGB BLD-MCNC: 9.6 G/DL (ref 13.5–17.5)
MCH RBC QN AUTO: 23.6 PG (ref 26–34)
MCHC RBC AUTO-ENTMCNC: 29.4 G/DL (ref 32–36)
MCV RBC AUTO: 80 FL (ref 80–100)
NRBC BLD-RTO: 0 /100 WBCS (ref 0–0)
PHOSPHATE SERPL-MCNC: 2.6 MG/DL (ref 2.5–4.9)
PLATELET # BLD AUTO: 229 X10*3/UL (ref 150–450)
POTASSIUM SERPL-SCNC: 3.4 MMOL/L (ref 3.5–5.3)
RBC # BLD AUTO: 4.06 X10*6/UL (ref 4.5–5.9)
SODIUM SERPL-SCNC: 136 MMOL/L (ref 136–145)
URATE SERPL-MCNC: 10.1 MG/DL (ref 4–7.5)
WBC # BLD AUTO: 9 X10*3/UL (ref 4.4–11.3)

## 2025-04-20 PROCEDURE — 99231 SBSQ HOSP IP/OBS SF/LOW 25: CPT | Performed by: INTERNAL MEDICINE

## 2025-04-20 PROCEDURE — 2500000002 HC RX 250 W HCPCS SELF ADMINISTERED DRUGS (ALT 637 FOR MEDICARE OP, ALT 636 FOR OP/ED): Performed by: INTERNAL MEDICINE

## 2025-04-20 PROCEDURE — 2500000001 HC RX 250 WO HCPCS SELF ADMINISTERED DRUGS (ALT 637 FOR MEDICARE OP): Performed by: INTERNAL MEDICINE

## 2025-04-20 PROCEDURE — 99232 SBSQ HOSP IP/OBS MODERATE 35: CPT | Performed by: INTERNAL MEDICINE

## 2025-04-20 PROCEDURE — 2500000004 HC RX 250 GENERAL PHARMACY W/ HCPCS (ALT 636 FOR OP/ED): Mod: JZ | Performed by: INTERNAL MEDICINE

## 2025-04-20 PROCEDURE — 36415 COLL VENOUS BLD VENIPUNCTURE: CPT | Performed by: INTERNAL MEDICINE

## 2025-04-20 PROCEDURE — 85027 COMPLETE CBC AUTOMATED: CPT | Performed by: INTERNAL MEDICINE

## 2025-04-20 PROCEDURE — 82947 ASSAY GLUCOSE BLOOD QUANT: CPT

## 2025-04-20 PROCEDURE — 84550 ASSAY OF BLOOD/URIC ACID: CPT | Performed by: INTERNAL MEDICINE

## 2025-04-20 PROCEDURE — 80069 RENAL FUNCTION PANEL: CPT | Performed by: INTERNAL MEDICINE

## 2025-04-20 PROCEDURE — 1200000002 HC GENERAL ROOM WITH TELEMETRY DAILY

## 2025-04-20 RX ORDER — INSULIN LISPRO 100 [IU]/ML
35 INJECTION, SOLUTION INTRAVENOUS; SUBCUTANEOUS
Status: DISCONTINUED | OUTPATIENT
Start: 2025-04-20 | End: 2025-04-25

## 2025-04-20 RX ADMIN — OXYCODONE HYDROCHLORIDE 5 MG: 5 TABLET ORAL at 02:07

## 2025-04-20 RX ADMIN — FUROSEMIDE 80 MG: 10 INJECTION, SOLUTION INTRAMUSCULAR; INTRAVENOUS at 17:54

## 2025-04-20 RX ADMIN — ATORVASTATIN CALCIUM 80 MG: 80 TABLET, FILM COATED ORAL at 09:28

## 2025-04-20 RX ADMIN — AMLODIPINE BESYLATE 10 MG: 10 TABLET ORAL at 09:28

## 2025-04-20 RX ADMIN — ENOXAPARIN SODIUM 60 MG: 60 INJECTION SUBCUTANEOUS at 21:06

## 2025-04-20 RX ADMIN — INSULIN LISPRO 35 UNITS: 100 INJECTION, SOLUTION INTRAVENOUS; SUBCUTANEOUS at 11:55

## 2025-04-20 RX ADMIN — HYDROCODONE BITARTRATE AND ACETAMINOPHEN 1 TABLET: 5; 325 TABLET ORAL at 11:57

## 2025-04-20 RX ADMIN — INSULIN GLARGINE 60 UNITS: 100 INJECTION, SOLUTION SUBCUTANEOUS at 21:09

## 2025-04-20 RX ADMIN — INSULIN LISPRO 35 UNITS: 100 INJECTION, SOLUTION INTRAVENOUS; SUBCUTANEOUS at 15:43

## 2025-04-20 RX ADMIN — ASPIRIN 81 MG: 81 TABLET, COATED ORAL at 09:28

## 2025-04-20 RX ADMIN — DULOXETINE HYDROCHLORIDE 60 MG: 30 CAPSULE, DELAYED RELEASE ORAL at 09:28

## 2025-04-20 RX ADMIN — INSULIN LISPRO 8 UNITS: 100 INJECTION, SOLUTION INTRAVENOUS; SUBCUTANEOUS at 11:55

## 2025-04-20 RX ADMIN — FUROSEMIDE 80 MG: 10 INJECTION, SOLUTION INTRAMUSCULAR; INTRAVENOUS at 09:28

## 2025-04-20 RX ADMIN — CEFTRIAXONE SODIUM 2 G: 2 INJECTION, SOLUTION INTRAVENOUS at 09:40

## 2025-04-20 RX ADMIN — OXYCODONE HYDROCHLORIDE 5 MG: 5 TABLET ORAL at 09:30

## 2025-04-20 RX ADMIN — HYDROCODONE BITARTRATE AND ACETAMINOPHEN 1 TABLET: 5; 325 TABLET ORAL at 17:56

## 2025-04-20 RX ADMIN — INSULIN LISPRO 24 UNITS: 100 INJECTION, SOLUTION INTRAVENOUS; SUBCUTANEOUS at 09:28

## 2025-04-20 RX ADMIN — PANTOPRAZOLE SODIUM 40 MG: 40 TABLET, DELAYED RELEASE ORAL at 06:44

## 2025-04-20 RX ADMIN — OXYCODONE HYDROCHLORIDE 5 MG: 5 TABLET ORAL at 21:06

## 2025-04-20 RX ADMIN — INSULIN GLARGINE 60 UNITS: 100 INJECTION, SOLUTION SUBCUTANEOUS at 09:28

## 2025-04-20 RX ADMIN — OXYCODONE HYDROCHLORIDE 5 MG: 5 TABLET ORAL at 15:43

## 2025-04-20 RX ADMIN — INSULIN LISPRO 4 UNITS: 100 INJECTION, SOLUTION INTRAVENOUS; SUBCUTANEOUS at 09:28

## 2025-04-20 RX ADMIN — ENOXAPARIN SODIUM 60 MG: 60 INJECTION SUBCUTANEOUS at 09:28

## 2025-04-20 ASSESSMENT — COGNITIVE AND FUNCTIONAL STATUS - GENERAL
DAILY ACTIVITIY SCORE: 21
CLIMB 3 TO 5 STEPS WITH RAILING: A LITTLE
TOILETING: A LITTLE
STANDING UP FROM CHAIR USING ARMS: A LITTLE
HELP NEEDED FOR BATHING: A LITTLE
MOBILITY SCORE: 18
MOVING FROM LYING ON BACK TO SITTING ON SIDE OF FLAT BED WITH BEDRAILS: A LITTLE
DRESSING REGULAR LOWER BODY CLOTHING: A LOT
TURNING FROM BACK TO SIDE WHILE IN FLAT BAD: A LITTLE
MOVING TO AND FROM BED TO CHAIR: A LITTLE
MOBILITY SCORE: 23
WALKING IN HOSPITAL ROOM: A LITTLE
DRESSING REGULAR LOWER BODY CLOTHING: A LOT
DAILY ACTIVITIY SCORE: 20
CLIMB 3 TO 5 STEPS WITH RAILING: A LITTLE
HELP NEEDED FOR BATHING: A LITTLE

## 2025-04-20 ASSESSMENT — PAIN DESCRIPTION - ORIENTATION
ORIENTATION: LOWER
ORIENTATION: RIGHT;LEFT
ORIENTATION: RIGHT;LEFT

## 2025-04-20 ASSESSMENT — PAIN SCALES - GENERAL
PAINLEVEL_OUTOF10: 8
PAINLEVEL_OUTOF10: 6
PAINLEVEL_OUTOF10: 7
PAINLEVEL_OUTOF10: 6
PAINLEVEL_OUTOF10: 8
PAINLEVEL_OUTOF10: 9
PAINLEVEL_OUTOF10: 7
PAINLEVEL_OUTOF10: 8
PAINLEVEL_OUTOF10: 5 - MODERATE PAIN
PAINLEVEL_OUTOF10: 9
PAINLEVEL_OUTOF10: 6

## 2025-04-20 ASSESSMENT — PAIN DESCRIPTION - LOCATION
LOCATION: TOE (COMMENT WHICH ONE)
LOCATION: BACK
LOCATION: FOOT
LOCATION: GENERALIZED
LOCATION: BACK

## 2025-04-20 ASSESSMENT — PAIN - FUNCTIONAL ASSESSMENT
PAIN_FUNCTIONAL_ASSESSMENT: 0-10

## 2025-04-20 NOTE — PROGRESS NOTES
Jai Shrestha is a 55 y.o. male on day 5 of admission presenting with Healthcare-associated pneumonia.      Subjective   Patient was seen and examined bedside this morning, appeared much more alert than yesterday, when he had a high dose of Seroquel at bedtime and appears drowsy all day.       Objective     Last Recorded Vitals  /65   Pulse 90   Temp 36.5 °C (97.7 °F) (Temporal)   Resp 18   Wt (!) 186 kg (410 lb 11.5 oz)   SpO2 98%   Intake/Output last 3 Shifts:    Intake/Output Summary (Last 24 hours) at 4/20/2025 1809  Last data filed at 4/20/2025 1155  Gross per 24 hour   Intake 1370 ml   Output 2800 ml   Net -1430 ml       Admission Weight  Weight: (!) 186 kg (411 lb) (04/14/25 1901)    Daily Weight  04/17/25 : (!) 186 kg (410 lb 11.5 oz)    Image Results  Electrocardiogram, 12-lead PRN ACS symptoms  Normal sinus rhythm  ST & T wave abnormality, consider inferolateral ischemia  Abnormal ECG  When compared with ECG of 14-APR-2025 19:01, (unconfirmed)  Premature ventricular complexes are no longer Present  Criteria for Inferior infarct are no longer Present  QT has shortened  See ED provider note for full interpretation and clinical correlation  Confirmed by Nely Marsh ([id number]) on 4/16/2025 12:03:42 PM  Electrocardiogram, 12-lead PRN ACS symptoms  Sinus rhythm with occasional Premature ventricular complexes  Inferior infarct (cited on or before 29-OCT-2024)  ST & T wave abnormality, consider lateral ischemia  Abnormal ECG  When compared with ECG of 29-OCT-2024 11:42, (unconfirmed)  Premature ventricular complexes are now Present  QRS duration has decreased  Non-specific change in ST segment in Inferior leads  ST now depressed in Anterior leads  Nonspecific T wave abnormality, worse in Lateral leads  See ED provider note for full interpretation and clinical correlation  Confirmed by Nely Marsh ([id number]) on 4/16/2025 12:03:26 PM      Physical Exam  Constitutional: Obese gentleman, appears  drowsy, somnolent, but respond to questions, cooperative not in acute distress  Eyes: PERRLA, clear sclera  ENMT: Moist mucosal membranes, no exudate  Head / Neck: Atraumatic, normocephalic, supple neck, JVP not visualized  Lungs: Patent airways, CTABL  Heart: RRR, S1S2, no murmurs appreciated, palpable pulses in all extremities  GI: Soft, NT, ND, bowel sounds present in all quadrants  MSK: Moves all extremities freely, no restriction  of ROM, no joint edema  Extremities: Chronic lymphedema with venous ulcers, bilateral lower extremities peripheral edema  : No Guo catheter inserted  Breast: Deferred  Neurological: AAO x 3 to person, place and date, facial muscles symmetrical, sensation intact, strength 4/4, no acute focal neurological deficits appreciated  Psychological: Appropriate mood and behavior  Relevant Results           Scheduled medications  Scheduled Medications[1]  Continuous medications  Continuous Medications[2]  PRN medications  PRN Medications[3]        Assessment & Plan  Healthcare-associated pneumonia    55 y.o. male with a past medical history of chronic back pain, peripheral neuropathy, diabetes mellitus type 2 with insulin resistance, morbid obesity BMI 57.3, CKD 3, diastolic CHF, hypertension who presented to [facility] ER complaining of shortness of breath and lower extremity edema.  He also complains of intermittent chest pain but none currently.  He says that his chest pain is midsternal and a burning sensation       Left upper lobe pneumonia  -s/p Vancomycin and Zosyn on admission  -Ceftriaxone 2 g IV piggyback daily  -Urine antigens and strep pneumo and Legionella negative     Drowsy in the a.m. likely from Seroquel (took 400 mg at bedtime, reportedly his home dose)  -ECG negative for QTc prolongation  -Telemetry monitoring  -Seroquel reduced to 200 mg at bedtime, will put on hold for tonight     Elevated troponin flat trend  -Given aspirin in the ER  -Telemetry  -Cardiology:  Appreciate evaluation recommendations, continue diuresis to achieve euvolemic state  -No acute coronary syndrome     Type 2 diabetes mellitus requiring insulin with insulin resistance  -At home he takes Lantus 80 units 3 times a day and lispro insulin 15 units 3 times a day  -Endocrine consulted: Appreciate evaluation management  -Diabetic carbohydrate controlled diet     Diastolic CHF, decompensated  -Lasix 40 mg IV every 12 hours  -Daily weight  -Cardiology consulted: Appreciate evaluation and recommendations, continue diuresis     Hypertension  Amlodipine 5 mg orally daily    Gout  -Uric acid level check     Hyperlipidemia  Atorvastatin 80 mg orally daily     Bilateral lymphedema with venous stasis changes and chronic non healing ulcers  Chronic cellulitis sees wound care nurse at home for venous ulcers  Diuresis with Lasix  Pain control: Oxycodone IR discontinued, Percocet started, methocarbamol 500 mg twice daily added  ID consulted: Appreciate recommendation     Morbid obesity BMI 57.3  Lifestyle modification     DVT prophylaxis  Lovenox 60 mg subcutaneously every 12 hours  SCDs     Disposition: Presented with shortness of breath, found to be heart failure and pneumonia, discharge pending clinical improvement            Sergio Collins DO           [1] amLODIPine, 10 mg, oral, Daily  aspirin, 81 mg, oral, Daily  atorvastatin, 80 mg, oral, Daily  cefTRIAXone, 2 g, intravenous, q24h  DULoxetine, 60 mg, oral, Daily  enoxaparin, 60 mg, subcutaneous, q12h TAMIR  furosemide, 80 mg, intravenous, BID  insulin glargine, 60 Units, subcutaneous, q12h  insulin lispro, 0-10 Units, subcutaneous, TID  insulin lispro, 35 Units, subcutaneous, TID AC  pantoprazole, 40 mg, oral, Daily before breakfast   Or  pantoprazole, 40 mg, intravenous, Daily before breakfast  perflutren protein A microsphere, 0.5 mL, intravenous, Once in imaging  [Held by provider] QUEtiapine, 200 mg, oral, Nightly  regadenoson, 0.4 mg, intravenous, Once  in imaging  sulfur hexafluoride microsphr, 2 mL, intravenous, Once in imaging  [2]    [3] PRN medications: aminophylline, dextrose, dextrose, glucagon, glucagon, HYDROcodone-acetaminophen, HYDROmorphone, methocarbamol, ondansetron **OR** ondansetron, oxyCODONE

## 2025-04-20 NOTE — CARE PLAN
The patient's goals for the shift include      The clinical goals for the shift include Remain safe, HDS, free of falls, manage/control pain, and maintain skin integrity by end of shift.      Problem: Pain  Goal: Takes deep breaths with improved pain control throughout the shift  Outcome: Progressing  Goal: Turns in bed with improved pain control throughout the shift  Outcome: Progressing  Goal: Walks with improved pain control throughout the shift  Outcome: Progressing  Goal: Performs ADL's with improved pain control throughout shift  Outcome: Progressing  Goal: Participates in PT with improved pain control throughout the shift  Outcome: Progressing  Goal: Free from opioid side effects throughout the shift  Outcome: Progressing  Goal: Free from acute confusion related to pain meds throughout the shift  Outcome: Progressing     Problem: Skin  Goal: Decreased wound size/increased tissue granulation at next dressing change  Outcome: Progressing  Flowsheets (Taken 4/20/2025 1108)  Decreased wound size/increased tissue granulation at next dressing change:   Promote sleep for wound healing   Protective dressings over bony prominences  Goal: Participates in plan/prevention/treatment measures  Outcome: Progressing  Flowsheets (Taken 4/20/2025 1108)  Participates in plan/prevention/treatment measures:   Discuss with provider PT/OT consult   Elevate heels   Increase activity/out of bed for meals  Goal: Prevent/manage excess moisture  Outcome: Progressing  Flowsheets (Taken 4/20/2025 1108)  Prevent/manage excess moisture: Monitor for/manage infection if present  Goal: Prevent/minimize sheer/friction injuries  Outcome: Progressing  Flowsheets (Taken 4/20/2025 1108)  Prevent/minimize sheer/friction injuries:   Complete micro-shifts as needed if patient unable. Adjust patient position to relieve pressure points, not a full turn   Increase activity/out of bed for meals  Goal: Promote/optimize nutrition  Outcome:  Progressing  Flowsheets (Taken 4/20/2025 1108)  Promote/optimize nutrition: Monitor/record intake including meals  Goal: Promote skin healing  Outcome: Progressing  Flowsheets (Taken 4/20/2025 1108)  Promote skin healing:   Assess skin/pad under line(s)/device(s)   Protective dressings over bony prominences   Ensure correct size (line/device) and apply per  instructions   Rotate device position/do not position patient on device     Problem: Pain - Adult  Goal: Verbalizes/displays adequate comfort level or baseline comfort level  Outcome: Progressing     Problem: Safety - Adult  Goal: Free from fall injury  Outcome: Progressing     Problem: Discharge Planning  Goal: Discharge to home or other facility with appropriate resources  Outcome: Progressing     Problem: Chronic Conditions and Co-morbidities  Goal: Patient's chronic conditions and co-morbidity symptoms are monitored and maintained or improved  Outcome: Progressing     Problem: Nutrition  Goal: Nutrient intake appropriate for maintaining nutritional needs  Outcome: Progressing     Problem: Fall/Injury  Goal: Not fall by end of shift  Outcome: Progressing  Goal: Be free from injury by end of the shift  Outcome: Progressing  Goal: Verbalize understanding of personal risk factors for fall in the hospital  Outcome: Progressing  Goal: Verbalize understanding of risk factor reduction measures to prevent injury from fall in the home  Outcome: Progressing  Goal: Use assistive devices by end of the shift  Outcome: Progressing  Goal: Pace activities to prevent fatigue by end of the shift  Outcome: Progressing     Problem: Diabetes  Goal: Achieve decreasing blood glucose levels by end of shift  Outcome: Progressing  Goal: Increase stability of blood glucose readings by end of shift  Outcome: Progressing  Goal: Decrease in ketones present in urine by end of shift  Outcome: Progressing  Goal: Maintain electrolyte levels within acceptable range throughout  shift  Outcome: Progressing  Goal: Maintain glucose levels >70mg/dl to <250mg/dl throughout shift  Outcome: Progressing  Goal: No changes in neurological exam by end of shift  Outcome: Progressing  Goal: Learn about and adhere to nutrition recommendations by end of shift  Outcome: Progressing  Goal: Vital signs within normal range for age by end of shift  Outcome: Progressing  Goal: Increase self care and/or family involovement by end of shift  Outcome: Progressing  Goal: Receive DSME education by end of shift  Outcome: Progressing

## 2025-04-20 NOTE — NURSING NOTE
No 8AM telemetry strip due to patient being on free standing monitor, patient broke portal telemetry monitor overnight.    No 12PM telemetry strip due to being on free standing monitor, and free standing monitor battery needing to be changed, but no batteries are on unit.    No 4pm telemetry strip due to being on free standing monitor and monitor needing battery replaced.

## 2025-04-20 NOTE — PROGRESS NOTES
04/20/25 1213   Rapid Rounds   Today we still await: Clinical stability;Diagnostic workup;Other (Comment)   Additional comments: still iv aravind     Jai Shrestha is a 55 y.o. male on day 5 of admission presenting with Healthcare-associated pneumonia.    Jasmin Montes De Oca RN

## 2025-04-20 NOTE — PROGRESS NOTES
"Subjective Data:  No new complaints. Reports continued robust urine output.        Objective Data:  Last Recorded Vitals:  Vitals:    25 2300 25 0330 25 0721 25 1112   BP: 146/75 140/73 159/88 159/82   BP Location: Right arm Right arm     Patient Position: Sitting Sitting     Pulse: 86 85 85 94   Resp: 15 16 18 18   Temp: 36.3 °C (97.3 °F) 36.3 °C (97.3 °F) 36.5 °C (97.7 °F) 36.1 °C (97 °F)   TempSrc: Temporal Temporal  Temporal   SpO2: 100% 100% 92% 98%   Weight:       Height:         Medical Gas Therapy: Supplemental oxygen  Medical Gas Delivery Method: Nasal cannula  Weight  Av kg (410 lb 14.2 oz)  Min: 186 kg (410 lb 11.5 oz)  Max: 186 kg (411 lb)    LABS:  CMP:  Results from last 7 days   Lab Units 25  0706 25  0927 25  0744 25  0640 25  0700 04/15/25  1153 25  1922   SODIUM mmol/L 136 139 137 138 139 137 141   POTASSIUM mmol/L 3.4* 3.5 3.6 3.2* 3.9 4.0 3.8   CHLORIDE mmol/L 90* 93* 90* 94* 97* 96* 97*   CO2 mmol/L 39* 38* 38* 35* 34* 34* 35*   ANION GAP mmol/L 10 12 13 12 12 11 13   BUN mg/dL 30* 30* 31* 31* 32* 32* 35*   CREATININE mg/dL 1.34* 1.50* 1.67* 1.54* 1.56* 1.41* 1.31*   EGFR mL/min/1.73m*2 63 55* 48* 53* 52* 59* 65   ALBUMIN g/dL 3.8  --   --   --   --   --  3.8   ALT U/L  --   --   --   --   --   --  12   AST U/L  --   --   --   --   --   --  17   BILIRUBIN TOTAL mg/dL  --   --   --   --   --   --  0.5     CBC:  Results from last 7 days   Lab Units 25  0706 25  0744 25  0640 25  0700 04/15/25  1153 25  1922   WBC AUTO x10*3/uL 9.0 8.8 11.3 9.1 12.3* 12.1*   HEMOGLOBIN g/dL 9.6* 9.5* 8.7* 9.6* 9.1* 10.2*   HEMATOCRIT % 32.6* 32.1* 30.1* 32.6* 32.1* 35.2*   PLATELETS AUTO x10*3/uL 229 240 230 235 226 299   MCV fL 80 80 81 82 84 81     COAG:   Results from last 7 days   Lab Units 25  2334   INR  1.2*     ABO: No results found for: \"ABO\"  HEME/ENDO:  Results from last 7 days   Lab Units 25  0640 "   HEMOGLOBIN A1C % 7.2*      CARDIAC:   Results from last 7 days   Lab Units 04/14/25 2056 04/14/25  1922   TROPHS ng/L 233* 264*   BNP pg/mL  --  78      Results from last 7 days   Lab Units 04/17/25  0640 04/15/25  1153   HEMOGLOBIN A1C % 7.2*  --    LDL CALC mg/dL  --  30   VLDL mg/dL  --  50*   CHOLESTEROL/HDL RATIO   --  3.3        Last I/O:    Intake/Output Summary (Last 24 hours) at 4/20/2025 1119  Last data filed at 4/20/2025 0644  Gross per 24 hour   Intake 1010 ml   Output 4200 ml   Net -3190 ml     Net IO Since Admission: -16,200 mL [04/20/25 1119]            Inpatient Medications:  Scheduled Medications[1]  PRN Medications[2]  Continuous Medications[3]        Physical Exam:  Gen Well appearing middle aged male sitting up in NAD. Body mass index is 57.31 kg/m².   CV rrr. No m/r/g appreciated. No JVD. +3 bilateral leg edema.   Pulm Lungs clear with normal respiratory effort.  Neuro Alert and conversant. Grossly nonfocal.         Assessment:  Acute on chronic HFpEF  Volume++ with some improvement. Urine output acceptable as of late.     2. Elevated troponin  Non-MI troponin elevation / acute non-traumatic myocardial injury. Core measures do not apply. Noted in the setting of volume overload and PNA.    3. Hypertension   BP variable but acceptable under the present circumstances. Monitoring with diuresis.       Recommendations:  Con't IV diuretics. Strict I/O's. Daily weights. Monitor Mag/K and supplement to >2/4. Close monitoring of renal function. Plan for outpatient stress testing.          Robby Gutierrez MD          [1]   Scheduled medications   Medication Dose Route Frequency    amLODIPine  10 mg oral Daily    aspirin  81 mg oral Daily    atorvastatin  80 mg oral Daily    cefTRIAXone  2 g intravenous q24h    DULoxetine  60 mg oral Daily    enoxaparin  60 mg subcutaneous q12h TAMIR    furosemide  80 mg intravenous BID    insulin glargine  60 Units subcutaneous q12h    insulin lispro  0-10 Units  subcutaneous TID    insulin lispro  35 Units subcutaneous TID AC    pantoprazole  40 mg oral Daily before breakfast    Or    pantoprazole  40 mg intravenous Daily before breakfast    perflutren protein A microsphere  0.5 mL intravenous Once in imaging    [Held by provider] QUEtiapine  200 mg oral Nightly    regadenoson  0.4 mg intravenous Once in imaging    sulfur hexafluoride microsphr  2 mL intravenous Once in imaging   [2]   PRN medications   Medication    aminophylline    dextrose    dextrose    glucagon    glucagon    HYDROcodone-acetaminophen    HYDROmorphone    methocarbamol    ondansetron    Or    ondansetron    oxyCODONE   [3]   Continuous Medications   Medication Dose Last Rate

## 2025-04-20 NOTE — CARE PLAN
Problem: Pain  Goal: Takes deep breaths with improved pain control throughout the shift  Outcome: Progressing  Goal: Turns in bed with improved pain control throughout the shift  Outcome: Progressing  Goal: Walks with improved pain control throughout the shift  Outcome: Progressing  Goal: Performs ADL's with improved pain control throughout shift  Outcome: Progressing  Goal: Participates in PT with improved pain control throughout the shift  Outcome: Progressing  Goal: Free from opioid side effects throughout the shift  Outcome: Progressing  Goal: Free from acute confusion related to pain meds throughout the shift  Outcome: Progressing     Problem: Skin  Goal: Decreased wound size/increased tissue granulation at next dressing change  Outcome: Progressing  Goal: Participates in plan/prevention/treatment measures  Outcome: Progressing  Goal: Prevent/manage excess moisture  Outcome: Progressing  Goal: Prevent/minimize sheer/friction injuries  Outcome: Progressing  Goal: Promote/optimize nutrition  Outcome: Progressing  Goal: Promote skin healing  Outcome: Progressing     Problem: Pain - Adult  Goal: Verbalizes/displays adequate comfort level or baseline comfort level  Outcome: Progressing     Problem: Safety - Adult  Goal: Free from fall injury  Outcome: Progressing     Problem: Discharge Planning  Goal: Discharge to home or other facility with appropriate resources  Outcome: Progressing     Problem: Chronic Conditions and Co-morbidities  Goal: Patient's chronic conditions and co-morbidity symptoms are monitored and maintained or improved  Outcome: Progressing     Problem: Nutrition  Goal: Nutrient intake appropriate for maintaining nutritional needs  Outcome: Progressing     Problem: Fall/Injury  Goal: Not fall by end of shift  Outcome: Progressing  Goal: Be free from injury by end of the shift  Outcome: Progressing  Goal: Verbalize understanding of personal risk factors for fall in the hospital  Outcome:  Progressing  Goal: Verbalize understanding of risk factor reduction measures to prevent injury from fall in the home  Outcome: Progressing  Goal: Use assistive devices by end of the shift  Outcome: Progressing  Goal: Pace activities to prevent fatigue by end of the shift  Outcome: Progressing     Problem: Diabetes  Goal: Achieve decreasing blood glucose levels by end of shift  Outcome: Progressing  Goal: Increase stability of blood glucose readings by end of shift  Outcome: Progressing  Goal: Decrease in ketones present in urine by end of shift  Outcome: Progressing  Goal: Maintain electrolyte levels within acceptable range throughout shift  Outcome: Progressing  Goal: Maintain glucose levels >70mg/dl to <250mg/dl throughout shift  Outcome: Progressing  Goal: No changes in neurological exam by end of shift  Outcome: Progressing  Goal: Learn about and adhere to nutrition recommendations by end of shift  Outcome: Progressing  Goal: Vital signs within normal range for age by end of shift  Outcome: Progressing  Goal: Increase self care and/or family involovement by end of shift  Outcome: Progressing  Goal: Receive DSME education by end of shift  Outcome: Progressing   The patient's goals for the shift include      The clinical goals for the shift include Remain safe, HDS, free of falls, manage/control pain, and maintain skin integrity throughout shift.

## 2025-04-20 NOTE — PROGRESS NOTES
"Jai Shrestha is a 55 y.o. male on day 5 of admission presenting with Healthcare-associated pneumonia.    Subjective   No new complaint    Scheduled medications  Scheduled Medications[1]  Continuous medications  Continuous Medications[2]  PRN medications  PRN Medications[3]       Objective   Physical Exam  Constitutional:       General: He is not in acute distress.     Appearance: He is obese.   Neurological:      Mental Status: He is alert.         Last Recorded Vitals  Blood pressure 159/88, pulse 85, temperature 36.5 °C (97.7 °F), resp. rate 18, height 1.803 m (5' 10.98\"), weight (!) 186 kg (410 lb 11.5 oz), SpO2 92%.  Intake/Output last 3 Shifts:  I/O last 3 completed shifts:  In: 1610 (8.6 mL/kg) [P.O.:1560; IV Piggyback:50]  Out: 6800 (36.5 mL/kg) [Urine:6800 (1 mL/kg/hr)]  Weight: 186.3 kg     Relevant Results  Results from last 7 days   Lab Units 04/20/25  0726 04/19/25  2043 04/19/25  1628 04/19/25  1227 04/19/25  0927 04/19/25  0802 04/18/25  1238 04/18/25  0744 04/17/25  0727 04/17/25  0640 04/16/25  1112 04/16/25  0700 04/15/25  1611 04/15/25  1153   POCT GLUCOSE mg/dL 203* 359* 187* 280*  --  197*   < >  --    < >  --    < >  --    < >  --    GLUCOSE mg/dL  --   --   --   --  197*  --   --  296*  --  55*  --  155*  --  288*    < > = values in this interval not displayed.       Assessment & Plan  Healthcare-associated pneumonia    IMPRESSION  TYPE 2 DIABETES MELLITUS  LONG TERM CURRENT INSULIN USE  High and unusual insulin regimen  He typically requires less insulin in the hospital  Postprandial hyperglycemia        RECOMMENDATIONS  Insulin glargine 60 units BID  Lispro 35 units QAC plus scale (home dose is 50 units), HOLD if NPO or glucose under 100 mg/dl.       Ashwin Cornejo MD         [1] amLODIPine, 10 mg, oral, Daily  aspirin, 81 mg, oral, Daily  atorvastatin, 80 mg, oral, Daily  cefTRIAXone, 2 g, intravenous, q24h  DULoxetine, 60 mg, oral, Daily  enoxaparin, 60 mg, subcutaneous, q12h " TAMIR  furosemide, 80 mg, intravenous, BID  insulin glargine, 60 Units, subcutaneous, q12h  insulin lispro, 0-10 Units, subcutaneous, TID  insulin lispro, 24 Units, subcutaneous, TID AC  pantoprazole, 40 mg, oral, Daily before breakfast   Or  pantoprazole, 40 mg, intravenous, Daily before breakfast  perflutren protein A microsphere, 0.5 mL, intravenous, Once in imaging  [Held by provider] QUEtiapine, 200 mg, oral, Nightly  regadenoson, 0.4 mg, intravenous, Once in imaging  sulfur hexafluoride microsphr, 2 mL, intravenous, Once in imaging  [2]    [3] PRN medications: aminophylline, dextrose, dextrose, glucagon, glucagon, HYDROcodone-acetaminophen, HYDROmorphone, methocarbamol, ondansetron **OR** ondansetron, oxyCODONE

## 2025-04-21 ENCOUNTER — APPOINTMENT (OUTPATIENT)
Dept: CARDIOLOGY | Facility: HOSPITAL | Age: 55
DRG: 177 | End: 2025-04-21
Payer: MEDICARE

## 2025-04-21 LAB
ALBUMIN SERPL BCP-MCNC: 3.5 G/DL (ref 3.4–5)
ANION GAP SERPL CALC-SCNC: 9 MMOL/L (ref 10–20)
BUN SERPL-MCNC: 29 MG/DL (ref 6–23)
CALCIUM SERPL-MCNC: 8.7 MG/DL (ref 8.6–10.3)
CHLORIDE SERPL-SCNC: 90 MMOL/L (ref 98–107)
CO2 SERPL-SCNC: 39 MMOL/L (ref 21–32)
CREAT SERPL-MCNC: 1.35 MG/DL (ref 0.5–1.3)
EGFRCR SERPLBLD CKD-EPI 2021: 62 ML/MIN/1.73M*2
ERYTHROCYTE [DISTWIDTH] IN BLOOD BY AUTOMATED COUNT: 16.2 % (ref 11.5–14.5)
GLUCOSE BLD MANUAL STRIP-MCNC: 158 MG/DL (ref 74–99)
GLUCOSE BLD MANUAL STRIP-MCNC: 224 MG/DL (ref 74–99)
GLUCOSE BLD MANUAL STRIP-MCNC: 263 MG/DL (ref 74–99)
GLUCOSE BLD MANUAL STRIP-MCNC: 381 MG/DL (ref 74–99)
GLUCOSE SERPL-MCNC: 280 MG/DL (ref 74–99)
HCT VFR BLD AUTO: 31.4 % (ref 41–52)
HGB BLD-MCNC: 9.3 G/DL (ref 13.5–17.5)
MCH RBC QN AUTO: 23.5 PG (ref 26–34)
MCHC RBC AUTO-ENTMCNC: 29.6 G/DL (ref 32–36)
MCV RBC AUTO: 80 FL (ref 80–100)
NRBC BLD-RTO: 0 /100 WBCS (ref 0–0)
PHOSPHATE SERPL-MCNC: 3 MG/DL (ref 2.5–4.9)
PLATELET # BLD AUTO: 230 X10*3/UL (ref 150–450)
POTASSIUM SERPL-SCNC: 3.3 MMOL/L (ref 3.5–5.3)
RBC # BLD AUTO: 3.95 X10*6/UL (ref 4.5–5.9)
SODIUM SERPL-SCNC: 135 MMOL/L (ref 136–145)
WBC # BLD AUTO: 7.8 X10*3/UL (ref 4.4–11.3)

## 2025-04-21 PROCEDURE — 85027 COMPLETE CBC AUTOMATED: CPT | Performed by: INTERNAL MEDICINE

## 2025-04-21 PROCEDURE — 99231 SBSQ HOSP IP/OBS SF/LOW 25: CPT | Performed by: INTERNAL MEDICINE

## 2025-04-21 PROCEDURE — 76937 US GUIDE VASCULAR ACCESS: CPT

## 2025-04-21 PROCEDURE — 97535 SELF CARE MNGMENT TRAINING: CPT | Mod: GO

## 2025-04-21 PROCEDURE — 82947 ASSAY GLUCOSE BLOOD QUANT: CPT

## 2025-04-21 PROCEDURE — 97116 GAIT TRAINING THERAPY: CPT | Mod: GP,CQ

## 2025-04-21 PROCEDURE — 2500000001 HC RX 250 WO HCPCS SELF ADMINISTERED DRUGS (ALT 637 FOR MEDICARE OP): Performed by: INTERNAL MEDICINE

## 2025-04-21 PROCEDURE — 93005 ELECTROCARDIOGRAM TRACING: CPT

## 2025-04-21 PROCEDURE — 2500000002 HC RX 250 W HCPCS SELF ADMINISTERED DRUGS (ALT 637 FOR MEDICARE OP, ALT 636 FOR OP/ED): Performed by: INTERNAL MEDICINE

## 2025-04-21 PROCEDURE — 97530 THERAPEUTIC ACTIVITIES: CPT | Mod: GP,CQ

## 2025-04-21 PROCEDURE — 99232 SBSQ HOSP IP/OBS MODERATE 35: CPT | Performed by: INTERNAL MEDICINE

## 2025-04-21 PROCEDURE — 36415 COLL VENOUS BLD VENIPUNCTURE: CPT | Performed by: INTERNAL MEDICINE

## 2025-04-21 PROCEDURE — 2500000004 HC RX 250 GENERAL PHARMACY W/ HCPCS (ALT 636 FOR OP/ED): Mod: JZ | Performed by: INTERNAL MEDICINE

## 2025-04-21 PROCEDURE — 84100 ASSAY OF PHOSPHORUS: CPT | Performed by: INTERNAL MEDICINE

## 2025-04-21 PROCEDURE — 1100000001 HC PRIVATE ROOM DAILY

## 2025-04-21 RX ORDER — CEPHALEXIN 500 MG/1
1000 CAPSULE ORAL 3 TIMES DAILY
Status: DISCONTINUED | OUTPATIENT
Start: 2025-04-22 | End: 2025-04-22

## 2025-04-21 RX ORDER — COLCHICINE 0.6 MG/1
0.6 TABLET ORAL DAILY
Status: DISCONTINUED | OUTPATIENT
Start: 2025-04-21 | End: 2025-04-27 | Stop reason: HOSPADM

## 2025-04-21 RX ADMIN — COLCHICINE 0.6 MG: 0.6 TABLET, FILM COATED ORAL at 15:22

## 2025-04-21 RX ADMIN — INSULIN LISPRO 35 UNITS: 100 INJECTION, SOLUTION INTRAVENOUS; SUBCUTANEOUS at 15:22

## 2025-04-21 RX ADMIN — HYDROCODONE BITARTRATE AND ACETAMINOPHEN 1 TABLET: 5; 325 TABLET ORAL at 03:24

## 2025-04-21 RX ADMIN — HYDROCODONE BITARTRATE AND ACETAMINOPHEN 1 TABLET: 5; 325 TABLET ORAL at 15:23

## 2025-04-21 RX ADMIN — AMLODIPINE BESYLATE 10 MG: 10 TABLET ORAL at 08:50

## 2025-04-21 RX ADMIN — QUETIAPINE FUMARATE 200 MG: 100 TABLET ORAL at 21:46

## 2025-04-21 RX ADMIN — ENOXAPARIN SODIUM 60 MG: 60 INJECTION SUBCUTANEOUS at 21:17

## 2025-04-21 RX ADMIN — INSULIN GLARGINE 60 UNITS: 100 INJECTION, SOLUTION SUBCUTANEOUS at 08:50

## 2025-04-21 RX ADMIN — HYDROCODONE BITARTRATE AND ACETAMINOPHEN 1 TABLET: 5; 325 TABLET ORAL at 21:17

## 2025-04-21 RX ADMIN — INSULIN LISPRO 6 UNITS: 100 INJECTION, SOLUTION INTRAVENOUS; SUBCUTANEOUS at 11:58

## 2025-04-21 RX ADMIN — FUROSEMIDE 80 MG: 10 INJECTION, SOLUTION INTRAMUSCULAR; INTRAVENOUS at 17:16

## 2025-04-21 RX ADMIN — ATORVASTATIN CALCIUM 80 MG: 80 TABLET, FILM COATED ORAL at 08:49

## 2025-04-21 RX ADMIN — ASPIRIN 81 MG: 81 TABLET, COATED ORAL at 08:49

## 2025-04-21 RX ADMIN — INSULIN LISPRO 10 UNITS: 100 INJECTION, SOLUTION INTRAVENOUS; SUBCUTANEOUS at 08:50

## 2025-04-21 RX ADMIN — DULOXETINE HYDROCHLORIDE 60 MG: 30 CAPSULE, DELAYED RELEASE ORAL at 08:49

## 2025-04-21 RX ADMIN — CEFTRIAXONE SODIUM 2 G: 2 INJECTION, SOLUTION INTRAVENOUS at 08:59

## 2025-04-21 RX ADMIN — INSULIN LISPRO 35 UNITS: 100 INJECTION, SOLUTION INTRAVENOUS; SUBCUTANEOUS at 11:58

## 2025-04-21 RX ADMIN — ENOXAPARIN SODIUM 60 MG: 60 INJECTION SUBCUTANEOUS at 08:50

## 2025-04-21 RX ADMIN — OXYCODONE HYDROCHLORIDE 5 MG: 5 TABLET ORAL at 17:17

## 2025-04-21 RX ADMIN — METHOCARBAMOL 500 MG: 500 TABLET ORAL at 13:44

## 2025-04-21 RX ADMIN — INSULIN GLARGINE 60 UNITS: 100 INJECTION, SOLUTION SUBCUTANEOUS at 21:18

## 2025-04-21 RX ADMIN — PANTOPRAZOLE SODIUM 40 MG: 40 TABLET, DELAYED RELEASE ORAL at 08:49

## 2025-04-21 RX ADMIN — FUROSEMIDE 80 MG: 10 INJECTION, SOLUTION INTRAMUSCULAR; INTRAVENOUS at 08:50

## 2025-04-21 RX ADMIN — INSULIN LISPRO 2 UNITS: 100 INJECTION, SOLUTION INTRAVENOUS; SUBCUTANEOUS at 17:16

## 2025-04-21 RX ADMIN — INSULIN LISPRO 35 UNITS: 100 INJECTION, SOLUTION INTRAVENOUS; SUBCUTANEOUS at 08:50

## 2025-04-21 RX ADMIN — OXYCODONE HYDROCHLORIDE 5 MG: 5 TABLET ORAL at 08:52

## 2025-04-21 RX ADMIN — METHOCARBAMOL 500 MG: 500 TABLET ORAL at 21:17

## 2025-04-21 ASSESSMENT — COGNITIVE AND FUNCTIONAL STATUS - GENERAL
HELP NEEDED FOR BATHING: A LOT
STANDING UP FROM CHAIR USING ARMS: A LITTLE
DAILY ACTIVITIY SCORE: 16
DAILY ACTIVITIY SCORE: 19
DRESSING REGULAR LOWER BODY CLOTHING: A LOT
MOVING TO AND FROM BED TO CHAIR: A LITTLE
TOILETING: A LITTLE
DRESSING REGULAR UPPER BODY CLOTHING: A LITTLE
TURNING FROM BACK TO SIDE WHILE IN FLAT BAD: A LITTLE
CLIMB 3 TO 5 STEPS WITH RAILING: A LITTLE
HELP NEEDED FOR BATHING: A LITTLE
STANDING UP FROM CHAIR USING ARMS: A LITTLE
MOVING TO AND FROM BED TO CHAIR: A LITTLE
TOILETING: A LITTLE
DRESSING REGULAR LOWER BODY CLOTHING: A LOT
MOVING FROM LYING ON BACK TO SITTING ON SIDE OF FLAT BED WITH BEDRAILS: A LITTLE
WALKING IN HOSPITAL ROOM: A LITTLE
MOBILITY SCORE: 20
TOILETING: A LITTLE
HELP NEEDED FOR BATHING: A LITTLE
MOBILITY SCORE: 17
STANDING UP FROM CHAIR USING ARMS: A LITTLE
DRESSING REGULAR LOWER BODY CLOTHING: TOTAL
DRESSING REGULAR UPPER BODY CLOTHING: A LITTLE
PERSONAL GROOMING: A LITTLE
MOBILITY SCORE: 20
DAILY ACTIVITIY SCORE: 20
CLIMB 3 TO 5 STEPS WITH RAILING: A LOT
WALKING IN HOSPITAL ROOM: A LITTLE
CLIMB 3 TO 5 STEPS WITH RAILING: A LITTLE
MOVING TO AND FROM BED TO CHAIR: A LITTLE
WALKING IN HOSPITAL ROOM: A LITTLE

## 2025-04-21 ASSESSMENT — ACTIVITIES OF DAILY LIVING (ADL): HOME_MANAGEMENT_TIME_ENTRY: 26

## 2025-04-21 ASSESSMENT — PAIN SCALES - GENERAL
PAINLEVEL_OUTOF10: 8
PAINLEVEL_OUTOF10: 9
PAINLEVEL_OUTOF10: 7
PAINLEVEL_OUTOF10: 7
PAINLEVEL_OUTOF10: 5 - MODERATE PAIN
PAINLEVEL_OUTOF10: 7
PAINLEVEL_OUTOF10: 4
PAINLEVEL_OUTOF10: 8

## 2025-04-21 ASSESSMENT — PAIN DESCRIPTION - LOCATION
LOCATION: FOOT
LOCATION: BACK
LOCATION: BACK
LOCATION: FOOT

## 2025-04-21 ASSESSMENT — PAIN - FUNCTIONAL ASSESSMENT
PAIN_FUNCTIONAL_ASSESSMENT: 0-10

## 2025-04-21 ASSESSMENT — PAIN DESCRIPTION - DESCRIPTORS: DESCRIPTORS: ACHING;CRAMPING;SPASM

## 2025-04-21 ASSESSMENT — PAIN DESCRIPTION - ORIENTATION
ORIENTATION: RIGHT;LEFT
ORIENTATION: RIGHT;LEFT
ORIENTATION: LOWER

## 2025-04-21 NOTE — PROGRESS NOTES
Occupational Therapy    Occupational Therapy Treatment    Name: Jai Shrestha  MRN: [id number]  Department: [facility]   Room: [address] [place] [address]  Date: 04/21/25  Time Calculation  Start Time: 1401  Stop Time: 1427  Time Calculation (min): 26 min    Assessment:  OT Assessment: pt making progress towards goals  Prognosis: Good  Barriers to Discharge Home: No anticipated barriers  Evaluation/Treatment Tolerance: Patient limited by fatigue  Medical Staff Made Aware: Yes  End of Session Communication: Bedside nurse  End of Session Patient Position: Bed, 3 rail up, Alarm on  Plan:  Treatment Interventions: ADL retraining, Functional transfer training, Endurance training, Equipment evaluation/education, Neuromuscular reeducation, Patient/family training  OT Frequency: 3 times per week  OT Discharge Recommendations: Low intensity level of continued care  Equipment Recommended upon Discharge: Wheeled walker  OT Recommended Transfer Status: Stand by assist  OT - OK to Discharge: Yes (Per POC)    Subjective   Previous Visit Info:  OT Last Visit  OT Received On: 04/21/25  General:  General  Reason for Referral: To ED with SOB, increased LE swelling, and increased difficulty getting around. CT (+) for PNA. Additionally pt injured R ankle ~3 weeks ago and has had increased pain since.  Prior to Session Communication: Bedside nurse  Patient Position Received: Up in chair, Alarm on  General Comment: agreeable to OT tx  Precautions:  Medical Precautions: Fall precautions           Pain Assessment:  Pain Assessment  Pain Assessment: 0-10  0-10 (Numeric) Pain Score: 7  Pain Type: Chronic pain  Pain Location: Generalized  Pain Orientation: Right, Left  Pain Descriptors: Aching, Cramping, Spasm  Pain Frequency: Intermittent  Pain Onset: Ongoing  Pain Interventions: Repositioned, Ambulation/increased activity, Distraction, Emotional support    Objective     LE Dressing  LE Dressing: Yes  Sock Level of Assistance: Dependent  LE Dressing Where Assessed:  Chair  LE Dressing Comments: discussed sock aid use. pt reports he has an expensive one at home that was cutomized for him and he can not use it he has tried several time and can not pull the sock over his foot. GOes with out socks        Bed Mobility/Transfers: Bed Mobility  Bed Mobility: Yes  Bed Mobility 1  Bed Mobility 1: Sitting to supine  Level of Assistance 1: Close supervision  Bed Mobility Comments 1: HOB slightly elevated    Transfers  Transfer: Yes  Transfer 1  Transfer From 1: Chair with arms to  Transfer to 1: Stand  Technique 1: Sit to stand, Stand to sit  Transfer Device 1: Walker, Gait belt  Transfer Level of Assistance 1: Minimum assistance  Trials/Comments 1: pt with spasms in his legs and feet in  Transfers 2  Technique 2: Stand to sit  Transfer Device 2: Walker, Gait belt  Transfer Level of Assistance 2: Minimum assistance      Functional Mobility:  Functional Mobility  Functional Mobility Performed: Yes  Functional Mobility 1  Surface 1: Level tile  Device 1: Rolling walker  Functional Mobility Support Devices: Gait belt  Assistance 1: Contact guard  Comments 1: 3 feet pt fatigued with activity and wanting to go back to bed  Sitting Balance:  Static Sitting Balance  Static Sitting-Balance Support: Feet supported  Static Sitting-Level of Assistance: Close supervision  Dynamic Sitting Balance  Dynamic Sitting-Balance Support: Feet supported  Dynamic Sitting-Level of Assistance: Close supervision  Dynamic Sitting-Balance: Reaching for objects  Standing Balance:  Static Standing Balance  Static Standing-Balance Support: Bilateral upper extremity supported  Static Standing-Level of Assistance: Contact guard  Dynamic Standing Balance  Dynamic Standing-Balance Support: Bilateral upper extremity supported  Dynamic Standing-Level of Assistance: Contact guard  Dynamic Standing-Balance: Reaching for objects     Therapy/Activity: Therapeutic Activity  Therapeutic Activity Performed: Yes  Therapeutic  Activity 1: instructed in energy conservation techniques to assist with functional mobility    Outcome Measures:  [facility] Daily Activity  Putting on and taking off regular lower body clothing: Total  Bathing (including washing, rinsing, drying): A lot  Putting on and taking off regular upper body clothing: A little  Toileting, which includes using toilet, bedpan or urinal: A little  Taking care of personal grooming such as brushing teeth: A little  Eating Meals: None  Daily Activity - Total Score: 16        Education Documentation  Precautions, taught by Melanie Peguero, OT at 4/21/2025  3:02 PM.  Learner: Patient  Readiness: Acceptance  Method: Explanation, Demonstration  Response: Verbalizes Understanding, Needs Reinforcement    ADL Training, taught by Melanie Peguero OT at 4/21/2025  3:02 PM.  Learner: Patient  Readiness: Acceptance  Method: Explanation, Demonstration  Response: Verbalizes Understanding, Needs Reinforcement    Education Comments  No comments found.      Goals:  Encounter Problems       Encounter Problems (Active)       ADLs       Patient will perform UB and LB bathing  with independent level of assistance.  (Progressing)       Start:  04/18/25    Expected End:  05/02/25            Patient with complete upper body dressing with independent level of assistance donning and doffing all UE clothes with no adaptive equipment while edge of bed  (Progressing)       Start:  04/18/25    Expected End:  05/02/25            Patient with complete lower body dressing with independent level of assistance donning and doffing all LE clothes with adaptive equipment while edge of bed  (Progressing)       Start:  04/18/25    Expected End:  05/02/25            Patient will complete daily grooming tasks brushing teeth and washing face/hair with independent level of assistance while standing. (Progressing)       Start:  04/18/25    Expected End:  05/02/25            Patient will complete toileting including  hygiene clothing management/hygiene with independent level of assistance and raised toilet seat. (Progressing)       Start:  04/18/25    Expected End:  05/01/25               BALANCE       Pt will maintain dynamic standing balance during ADL task with independent level of assistance in order to demonstrate decreased risk of falling and improved postural control. (Progressing)       Start:  04/18/25    Expected End:  05/01/25               TRANSFERS       Patient will perform bed mobility independent level of assistance and bed rails in order to improve safety and independence with mobility (Progressing)       Start:  04/18/25    Expected End:  05/02/25            Patient will complete functional transfer to all surfaces with front wheeled walker with modified independent level of assistance. (Progressing)       Start:  04/18/25    Expected End:  05/02/25

## 2025-04-21 NOTE — PROGRESS NOTES
Physical Therapy    Physical Therapy Treatment    Patient Name: Jai Shrestha  MRN: [id number]  Department: [place] [id number]  Room: [address] [place] [address]  Today's Date: 4/21/2025  Time Calculation  Start Time: 0952  Stop Time: 1030  Time Calculation (min): 38 min         Assessment/Plan   PT Assessment  Rehab Prognosis: Good  Barriers to Discharge Home: No anticipated barriers  End of Session Communication: Bedside nurse  Assessment Comment: Pt requires continuous redirection throughout tx session. Pt appears SOB and requires extended seated rest break in between ambulation distances. Physical assist required for transfers although progressing throughout tx session.  End of Session Patient Position: Up in chair, Alarm on  PT Plan  Inpatient/Swing Bed or Outpatient: Inpatient  PT Plan  Treatment/Interventions: Bed mobility, Transfer training, Gait training  PT Plan: Ongoing PT  PT Frequency: 3 times per week  PT Discharge Recommendations: Low intensity level of continued care  PT Recommended Transfer Status: Assist x1  PT - OK to Discharge: Yes (per POC)      General Visit Information:   PT  Visit  PT Received On: 04/21/25  General  Reason for Referral: To ED with SOB, increased LE swelling, and increased difficulty getting around. CT (+) for PNA. Additionally pt injured R ankle ~3 weeks ago and has had increased pain since.  Referred By: Sergio Collins DO  Past Medical History Relevant to Rehab: Medical History[1]    Prior to Session Communication: Bedside nurse  Patient Position Received: Bed, 3 rail up, Alarm on  General Comment: Pt requires continuous redirection. Pt using prophanaties and frustrated during beginnning of tx and then during tx tearful.    Subjective   Precautions:  Precautions  Medical Precautions: Fall precautions  Precautions Comment: Per previous podiatry notes after initial ankle injury, no intervention at this time.            Objective   Pain:  Pain Assessment  Pain Assessment: 0-10  0-10 (Numeric) Pain Score:  7 (RN notified)  Pain Type: Chronic pain  Pain Location: Generalized  Cognition:  Cognition  Orientation Level: Oriented X4       Postural Control:  Static Sitting Balance  Static Sitting-Balance Support: Feet supported, Bilateral upper extremity supported  Static Sitting-Level of Assistance: Distant supervision  Static Standing Balance  Static Standing-Balance Support: Bilateral upper extremity supported  Static Standing-Level of Assistance: Close supervision       Treatments:       Therapeutic Activity  Therapeutic Activity Performed: Yes  Therapeutic Activity 1: Pt performs seated and static sitting for extended periods of time working on upright posture and trunk control. Close supervsion for safety    Bed Mobility  Bed Mobility: Yes  Bed Mobility 1  Bed Mobility 1: Supine to sitting  Level of Assistance 1: Close supervision  Bed Mobility Comments 1: HOB elevated. Pt required use of bed rail for trunk elevation. Pt requires increased time and appears effortful    Ambulation/Gait Training  Ambulation/Gait Training Performed: Yes  Ambulation/Gait Training 1  Surface 1: Level tile  Device 1: Rolling walker  Gait Support Devices: Gait belt  Assistance 1: Contact guard  Quality of Gait 1: Diminished heel strike, Inconsistent stride length, Decreased step length, Forward flexed posture  Comments/Distance (ft) 1: 125 x 2 (Pt requires max VC for upright posture, decreased nicole, and to remain within ARAM of WW. Pt fatigues easily although denies standing rest break and termination of activity. One extended sitting rest break required ambulation distances. VC for PLB)  Transfers  Transfer: Yes  Transfer 1  Transfer From 1: Chair with arms to  Transfer to 1: Stand  Technique 1: Sit to stand, Stand to sit  Transfer Device 1: Walker, Gait belt  Transfer Level of Assistance 1: Minimum assistance, Contact guard  Trials/Comments 1: Multiple trials performed. Max VC required for safety and correct hand placement.  Transfers  2  Transfer From 2: Bed to  Transfer to 2: Stand  Technique 2: Sit to stand  Transfer Device 2: Walker, Gait belt  Transfer Level of Assistance 2: Minimum assistance  Trials/Comments 2: Zahida for trunk elevation. Max VC for correct hand placement and safety.    Stairs  Stairs: No (pt declined this date)    Outcome Measures:  [facility] Basic Mobility  Turning from your back to your side while in a flat bed without using bedrails: A little  Moving from lying on your back to sitting on the side of a flat bed without using bedrails: A little  Moving to and from bed to chair (including a wheelchair): A little  Standing up from a chair using your arms (e.g. wheelchair or bedside chair): A little  To walk in hospital room: A little  Climbing 3-5 steps with railing: A lot  Basic Mobility - Total Score: 17    Education Documentation  Body Mechanics, taught by Karen Isbell PTA at 4/21/2025 11:55 AM.  Learner: Patient  Readiness: Acceptance  Method: Explanation  Response: Needs Reinforcement, No Evidence of Learning    Mobility Training, taught by Karen Isbell PTA at 4/21/2025 11:55 AM.  Learner: Patient  Readiness: Acceptance  Method: Explanation  Response: Needs Reinforcement, No Evidence of Learning    Education Comments  No comments found.           Encounter Problems       Encounter Problems (Active)       Balance       complete all mobility with normal balance while dual tasking, negotiating in a dynamic environment, carrying items, etc., with proactive and reactive static and dynamic standing and sitting tasks, with mod I and RW, >15 minutes.   (Progressing)       Start:  04/18/25    Expected End:  05/02/25               Mobility       STG - Patient will ambulate 150 ft mod I with a RW (Progressing)       Start:  04/18/25    Expected End:  05/02/25            STG - Patient will ascend and descend 2 stairs with CGA using LRAD.  (Not Progressing)       Start:  04/18/25    Expected End:  05/02/25                PT Transfers       STG - Patient will perform bed mobility independently.  (Progressing)       Start:  04/18/25    Expected End:  05/02/25            STG - Patient will transfer sit to and from stand mod I with a RW (Progressing)       Start:  04/18/25    Expected End:  05/02/25               Pain - Adult                   [1]   Past Medical History:  Diagnosis Date    Chronic back pain     CKD (chronic kidney disease)     Diabetes mellitus (Multi)     HF (heart failure), diastolic     Hypertension

## 2025-04-21 NOTE — PROGRESS NOTES
Jai Shrestha is a 55 y.o. male on day 6 of admission presenting with Healthcare-associated pneumonia.      Subjective   Patient was seen and examined bedside this morning, stated that he is right foot hurts more than usual and suspected gout reason why he has 2 uric acid level yesterday which was elevated, likely confirming gout flare.       Objective     Last Recorded Vitals  /61 (BP Location: Right arm, Patient Position: Lying)   Pulse 87   Temp 36.7 °C (98 °F) (Temporal)   Resp 20   Wt (!) 181 kg (399 lb 4.1 oz)   SpO2 97%   Intake/Output last 3 Shifts:    Intake/Output Summary (Last 24 hours) at 4/21/2025 1955  Last data filed at 4/21/2025 1300  Gross per 24 hour   Intake 1000 ml   Output 1200 ml   Net -200 ml       Admission Weight  Weight: (!) 186 kg (411 lb) (04/14/25 1901)    Daily Weight  04/21/25 : (!) 181 kg (399 lb 4.1 oz)    Image Results  Electrocardiogram, 12-lead PRN ACS symptoms  Normal sinus rhythm  Nonspecific T wave abnormality  Abnormal ECG  When compared with ECG of 14-APR-2025 20:51,  ST no longer depressed in Anterior leads  Nonspecific T wave abnormality no longer evident in Anterior leads      Physical Exam  Constitutional: Obese gentleman, appears drowsy, somnolent, but respond to questions, cooperative not in acute distress  Eyes: PERRLA, clear sclera  ENMT: Moist mucosal membranes, no exudate  Head / Neck: Atraumatic, normocephalic, supple neck, JVP not visualized  Lungs: Patent airways, CTABL  Heart: RRR, S1S2, no murmurs appreciated, palpable pulses in all extremities  GI: Soft, NT, ND, bowel sounds present in all quadrants  MSK: Moves all extremities freely, no restriction  of ROM, no joint edema  Extremities: Chronic lymphedema with venous ulcers, bilateral lower extremities peripheral edema  : No Guo catheter inserted  Breast: Deferred  Neurological: AAO x 3 to person, place and date, facial muscles symmetrical, sensation intact, strength 4/4, no acute focal  neurological deficits appreciated  Psychological: Appropriate mood and behavior  Relevant Results                Scheduled medications  Scheduled Medications[1]  Continuous medications  Continuous Medications[2]  PRN medications  PRN Medications[3]        Assessment & Plan  Healthcare-associated pneumonia    55 y.o. male with a past medical history of chronic back pain, peripheral neuropathy, diabetes mellitus type 2 with insulin resistance, morbid obesity BMI 57.3, CKD 3, diastolic CHF, hypertension who presented to [facility] ER complaining of shortness of breath and lower extremity edema.  He also complains of intermittent chest pain but none currently.  He says that his chest pain is midsternal and a burning sensation       Left upper lobe pneumonia  -s/p Vancomycin and Zosyn on admission  - Completed ceftriaxone 2 g IV piggyback daily  -Urine antigens and strep pneumo and Legionella negative     Drowsy in the a.m. on April 19, likely from Seroquel (took 400 mg at bedtime, reportedly his home dose)  -ECG negative for QTc prolongation  -Telemetry monitoring  -Seroquel reduced to 200 mg at bedtime, please monitor for drowsiness in the morning and hold indefinitely if drowsy will reduce to 100 mg      Elevated troponin flat trend  -Given aspirin in the ER  -Telemetry  -Cardiology: Appreciate evaluation recommendations, continue diuresis to achieve euvolemic state  -No acute coronary syndrome     Type 2 diabetes mellitus requiring insulin with insulin resistance  -At home he takes Lantus 80 units 3 times a day and lispro insulin 15 units 3 times a day  -Endocrine consulted: Appreciate evaluation management  -Diabetic carbohydrate controlled diet     Diastolic CHF, decompensated  -Lasix 40 mg IV every 12 hours  -Daily weight  -Cardiology consulted: Appreciate evaluation and recommendations, continue diuresis     Hypertension  -Amlodipine 5 mg orally daily     Gout  -Uric acid level check  -Start colchicine  0.6 mg p.o. daily     Hyperlipidemia  -Atorvastatin 80 mg orally daily     Bilateral lymphedema with venous stasis changes and chronic non healing ulcers  -Chronic cellulitis sees wound care nurse at home for venous ulcers  -Diuresis with Lasix  -Pain control: Oxycodone IR discontinued, Percocet started, methocarbamol 500 mg twice daily added  - ID consulted: Appreciate recommendation, recommend transitioning to Keflex 1000 mg p.o. 3 times daily x 7 days at discharge.  Transition to oral currently after completing ceftriaxone for pneumonia treatment     Morbid obesity BMI 57.3  -Lifestyle modification     DVT prophylaxis  -Lovenox 60 mg subcutaneously every 12 hours  -SCDs     Disposition: Presented with shortness of breath, found to be heart failure and pneumonia, discharge pending clinical improvement, final recommendation by cardiology        ADOD pending clearance by cardiology      Sergio Collins DO           [1] amLODIPine, 10 mg, oral, Daily  aspirin, 81 mg, oral, Daily  atorvastatin, 80 mg, oral, Daily  [START ON 4/22/2025] cephalexin, 1,000 mg, oral, TID  colchicine, 0.6 mg, oral, Daily  DULoxetine, 60 mg, oral, Daily  enoxaparin, 60 mg, subcutaneous, q12h TAMIR  furosemide, 80 mg, intravenous, BID  insulin glargine, 60 Units, subcutaneous, q12h  insulin lispro, 0-10 Units, subcutaneous, TID  insulin lispro, 35 Units, subcutaneous, TID AC  pantoprazole, 40 mg, oral, Daily before breakfast   Or  pantoprazole, 40 mg, intravenous, Daily before breakfast  perflutren protein A microsphere, 0.5 mL, intravenous, Once in imaging  QUEtiapine, 200 mg, oral, Nightly  regadenoson, 0.4 mg, intravenous, Once in imaging  sulfur hexafluoride microsphr, 2 mL, intravenous, Once in imaging  [2]    [3] PRN medications: aminophylline, dextrose, dextrose, glucagon, glucagon, HYDROcodone-acetaminophen, HYDROmorphone, methocarbamol, ondansetron **OR** ondansetron, oxyCODONE

## 2025-04-21 NOTE — CARE PLAN
The patient's goals for the shift include      The clinical goals for the shift include Remain safe, HDS, manage/control pain, maintain some skin integrity, and ambulate in room/halls more throughout shift.      Problem: Pain  Goal: Takes deep breaths with improved pain control throughout the shift  Outcome: Progressing  Goal: Turns in bed with improved pain control throughout the shift  Outcome: Progressing  Goal: Walks with improved pain control throughout the shift  Outcome: Progressing  Goal: Performs ADL's with improved pain control throughout shift  Outcome: Progressing  Goal: Participates in PT with improved pain control throughout the shift  Outcome: Progressing  Goal: Free from opioid side effects throughout the shift  Outcome: Progressing  Goal: Free from acute confusion related to pain meds throughout the shift  Outcome: Progressing     Problem: Skin  Goal: Decreased wound size/increased tissue granulation at next dressing change  Outcome: Progressing  Flowsheets (Taken 4/21/2025 1050)  Decreased wound size/increased tissue granulation at next dressing change:   Promote sleep for wound healing   Protective dressings over bony prominences  Goal: Participates in plan/prevention/treatment measures  Outcome: Progressing  Flowsheets (Taken 4/21/2025 1050)  Participates in plan/prevention/treatment measures:   Discuss with provider PT/OT consult   Elevate heels   Increase activity/out of bed for meals  Goal: Prevent/manage excess moisture  Outcome: Progressing  Flowsheets (Taken 4/21/2025 1050)  Prevent/manage excess moisture:   Cleanse incontinence/protect with barrier cream   Monitor for/manage infection if present   Moisturize dry skin   Use wicking fabric (obtain order)  Goal: Prevent/minimize sheer/friction injuries  Outcome: Progressing  Flowsheets (Taken 4/21/2025 1050)  Prevent/minimize sheer/friction injuries:   Complete micro-shifts as needed if patient unable. Adjust patient position to relieve  pressure points, not a full turn   Increase activity/out of bed for meals  Goal: Promote/optimize nutrition  Outcome: Progressing  Flowsheets (Taken 4/21/2025 1050)  Promote/optimize nutrition: Monitor/record intake including meals  Goal: Promote skin healing  Outcome: Progressing  Flowsheets (Taken 4/21/2025 1050)  Promote skin healing:   Assess skin/pad under line(s)/device(s)   Protective dressings over bony prominences   Ensure correct size (line/device) and apply per  instructions   Rotate device position/do not position patient on device     Problem: Pain - Adult  Goal: Verbalizes/displays adequate comfort level or baseline comfort level  Outcome: Progressing     Problem: Safety - Adult  Goal: Free from fall injury  Outcome: Progressing     Problem: Discharge Planning  Goal: Discharge to home or other facility with appropriate resources  Outcome: Progressing     Problem: Chronic Conditions and Co-morbidities  Goal: Patient's chronic conditions and co-morbidity symptoms are monitored and maintained or improved  Outcome: Progressing     Problem: Nutrition  Goal: Nutrient intake appropriate for maintaining nutritional needs  Outcome: Progressing     Problem: Fall/Injury  Goal: Not fall by end of shift  Outcome: Progressing  Goal: Be free from injury by end of the shift  Outcome: Progressing  Goal: Verbalize understanding of personal risk factors for fall in the hospital  Outcome: Progressing  Goal: Verbalize understanding of risk factor reduction measures to prevent injury from fall in the home  Outcome: Progressing  Goal: Use assistive devices by end of the shift  Outcome: Progressing  Goal: Pace activities to prevent fatigue by end of the shift  Outcome: Progressing     Problem: Diabetes  Goal: Achieve decreasing blood glucose levels by end of shift  Outcome: Progressing  Goal: Increase stability of blood glucose readings by end of shift  Outcome: Progressing  Goal: Decrease in ketones present in  urine by end of shift  Outcome: Progressing  Goal: Maintain electrolyte levels within acceptable range throughout shift  Outcome: Progressing  Goal: Maintain glucose levels >70mg/dl to <250mg/dl throughout shift  Outcome: Progressing  Goal: No changes in neurological exam by end of shift  Outcome: Progressing  Goal: Learn about and adhere to nutrition recommendations by end of shift  Outcome: Progressing  Goal: Vital signs within normal range for age by end of shift  Outcome: Progressing  Goal: Increase self care and/or family involovement by end of shift  Outcome: Progressing  Goal: Receive DSME education by end of shift  Outcome: Progressing

## 2025-04-21 NOTE — PROGRESS NOTES
Subjective Data:  No new complaints. Reports continued robust urine output (states yesterday staff did not measure his urine output).        Objective Data:  Last Recorded Vitals:  Vitals:    25 0300 25 0448 25 0538 25 0822   BP: 161/75   157/76   BP Location: Left arm   Right arm   Patient Position: Lying   Sitting   Pulse: 81   92   Resp: 18   18   Temp: 36.3 °C (97.4 °F)   36.4 °C (97.5 °F)   TempSrc: Temporal   Temporal   SpO2: 100%   98%   Weight:  (!) 181 kg (399 lb 4.1 oz) (!) 181 kg (399 lb 4.1 oz)    Height:         Medical Gas Therapy: Supplemental oxygen  Medical Gas Delivery Method: Nasal cannula  Weight  Av kg (406 lb 3.7 oz)  Min: 181 kg (399 lb 4.1 oz)  Max: 186 kg (411 lb)    LABS:  CMP:  Results from last 7 days   Lab Units 25  0652 25  0706 25  0927 25  0744 25  0640 25  0700 04/15/25  1153 25  1922   SODIUM mmol/L 135* 136 139 137 138 139 137 141   POTASSIUM mmol/L 3.3* 3.4* 3.5 3.6 3.2* 3.9 4.0 3.8   CHLORIDE mmol/L 90* 90* 93* 90* 94* 97* 96* 97*   CO2 mmol/L 39* 39* 38* 38* 35* 34* 34* 35*   ANION GAP mmol/L 9* 10 12 13 12 12 11 13   BUN mg/dL 29* 30* 30* 31* 31* 32* 32* 35*   CREATININE mg/dL 1.35* 1.34* 1.50* 1.67* 1.54* 1.56* 1.41* 1.31*   EGFR mL/min/1.73m*2 62 63 55* 48* 53* 52* 59* 65   ALBUMIN g/dL 3.5 3.8  --   --   --   --   --  3.8   ALT U/L  --   --   --   --   --   --   --  12   AST U/L  --   --   --   --   --   --   --  17   BILIRUBIN TOTAL mg/dL  --   --   --   --   --   --   --  0.5     CBC:  Results from last 7 days   Lab Units 25  0652 25  0706 25  0744 25  0640 25  0700 04/15/25  1153 25  1922   WBC AUTO x10*3/uL 7.8 9.0 8.8 11.3 9.1 12.3* 12.1*   HEMOGLOBIN g/dL 9.3* 9.6* 9.5* 8.7* 9.6* 9.1* 10.2*   HEMATOCRIT % 31.4* 32.6* 32.1* 30.1* 32.6* 32.1* 35.2*   PLATELETS AUTO x10*3/uL 230 229 240 230 235 226 299   MCV fL 80 80 80 81 82 84 81     COAG:   Results from last 7  "days   Lab Units 04/14/25  2334   INR  1.2*     ABO: No results found for: \"ABO\"  HEME/ENDO:  Results from last 7 days   Lab Units 04/17/25  0640   HEMOGLOBIN A1C % 7.2*      CARDIAC:   Results from last 7 days   Lab Units 04/14/25 2056 04/14/25  1922   TROPHS ng/L 233* 264*   BNP pg/mL  --  78      Results from last 7 days   Lab Units 04/17/25  0640 04/15/25  1153   HEMOGLOBIN A1C % 7.2*  --    LDL CALC mg/dL  --  30   VLDL mg/dL  --  50*   CHOLESTEROL/HDL RATIO   --  3.3        Last I/O:    Intake/Output Summary (Last 24 hours) at 4/21/2025 1021  Last data filed at 4/21/2025 0822  Gross per 24 hour   Intake 1530 ml   Output 1200 ml   Net 330 ml     Net IO Since Admission: -15,870 mL [04/21/25 1021]            Inpatient Medications:  Scheduled Medications[1]  PRN Medications[2]  Continuous Medications[3]        Physical Exam:  Gen Well appearing middle aged male sitting up in NAD. Body mass index is 55.71 kg/m².   CV rrr. No m/r/g appreciated. No JVD. +3 bilateral leg edema.   Pulm Lungs clear with normal respiratory effort.  Neuro Alert and conversant. Grossly nonfocal.         Assessment:  Acute on chronic HFpEF  Volume++ with some improvement. Urine output acceptable as of late.     2. Elevated troponin  Non-MI troponin elevation / acute non-traumatic myocardial injury. Core measures do not apply. Noted in the setting of volume overload and PNA.    3. Hypertension   BP variable but acceptable under the present circumstances. Monitoring with diuresis.       Recommendations:  Con't IV diuretics. Strict I/O's. Daily weights. Monitor Mag/K and supplement to >2/4. Close monitoring of renal function. Plan for outpatient stress testing.          Robby Gutierrez MD          [1]   Scheduled medications   Medication Dose Route Frequency    amLODIPine  10 mg oral Daily    aspirin  81 mg oral Daily    atorvastatin  80 mg oral Daily    cefTRIAXone  2 g intravenous q24h    DULoxetine  60 mg oral Daily    enoxaparin  60 mg " subcutaneous q12h TAMIR    furosemide  80 mg intravenous BID    insulin glargine  60 Units subcutaneous q12h    insulin lispro  0-10 Units subcutaneous TID    insulin lispro  35 Units subcutaneous TID AC    pantoprazole  40 mg oral Daily before breakfast    Or    pantoprazole  40 mg intravenous Daily before breakfast    perflutren protein A microsphere  0.5 mL intravenous Once in imaging    [Held by provider] QUEtiapine  200 mg oral Nightly    regadenoson  0.4 mg intravenous Once in imaging    sulfur hexafluoride microsphr  2 mL intravenous Once in imaging   [2]   PRN medications   Medication    aminophylline    dextrose    dextrose    glucagon    glucagon    HYDROcodone-acetaminophen    HYDROmorphone    methocarbamol    ondansetron    Or    ondansetron    oxyCODONE   [3]   Continuous Medications   Medication Dose Last Rate

## 2025-04-21 NOTE — NURSING NOTE
#20 placed in the [facility] via ultrasound after unsuccessful attempts of bedside and charge nurse. Patient tolerated well.

## 2025-04-21 NOTE — PROGRESS NOTES
"Jai Shrestha is a 55 y.o. male on day 6 of admission presenting with Healthcare-associated pneumonia.    Subjective   No new complaint     Scheduled medications  Scheduled Medications[1]  Continuous medications  Continuous Medications[2]  PRN medications  PRN Medications[3]    Objective   Physical Exam  Constitutional:       General: He is not in acute distress.  Neurological:      Mental Status: He is alert.         Last Recorded Vitals  Blood pressure 161/75, pulse 81, temperature 36.3 °C (97.4 °F), temperature source Temporal, resp. rate 18, height 1.803 m (5' 10.98\"), weight (!) 181 kg (399 lb 4.1 oz), SpO2 100%.  Intake/Output last 3 Shifts:  I/O last 3 completed shifts:  In: 1610 (8.9 mL/kg) [P.O.:1560; IV Piggyback:50]  Out: 3800 (21 mL/kg) [Urine:3800 (0.6 mL/kg/hr)]  Weight: 181.1 kg     Relevant Results  Results from last 7 days   Lab Units 04/20/25  2105 04/20/25  1525 04/20/25  1113 04/20/25  0726 04/20/25  0706 04/19/25  2043 04/19/25  1227 04/19/25  0927 04/18/25  1238 04/18/25  0744 04/17/25  0727 04/17/25  0640 04/16/25  1112 04/16/25  0700   POCT GLUCOSE mg/dL 117* 112* 335* 203*  --  359*   < >  --    < >  --    < >  --    < >  --    GLUCOSE mg/dL  --   --   --   --  206*  --   --  197*  --  296*  --  55*  --  155*    < > = values in this interval not displayed.         Assessment & Plan  Healthcare-associated pneumonia    IMPRESSION  TYPE 2 DIABETES MELLITUS  LONG TERM CURRENT INSULIN USE  High and unusual insulin regimen  He typically requires less insulin in the hospital          RECOMMENDATIONS  Insulin glargine 60 units BID  Lispro 35 units QAC plus scale (home dose is 50 units), HOLD if NPO or glucose under 100 mg/dl.       Ashwin Cornejo MD         [1] amLODIPine, 10 mg, oral, Daily  aspirin, 81 mg, oral, Daily  atorvastatin, 80 mg, oral, Daily  cefTRIAXone, 2 g, intravenous, q24h  DULoxetine, 60 mg, oral, Daily  enoxaparin, 60 mg, subcutaneous, q12h TAMIR  furosemide, 80 mg, intravenous, " BID  insulin glargine, 60 Units, subcutaneous, q12h  insulin lispro, 0-10 Units, subcutaneous, TID  insulin lispro, 35 Units, subcutaneous, TID AC  pantoprazole, 40 mg, oral, Daily before breakfast   Or  pantoprazole, 40 mg, intravenous, Daily before breakfast  perflutren protein A microsphere, 0.5 mL, intravenous, Once in imaging  [Held by provider] QUEtiapine, 200 mg, oral, Nightly  regadenoson, 0.4 mg, intravenous, Once in imaging  sulfur hexafluoride microsphr, 2 mL, intravenous, Once in imaging  [2]    [3] PRN medications: aminophylline, dextrose, dextrose, glucagon, glucagon, HYDROcodone-acetaminophen, HYDROmorphone, methocarbamol, ondansetron **OR** ondansetron, oxyCODONE

## 2025-04-21 NOTE — CARE PLAN
The patient's goals for the shift include check if he has gout     The clinical goals for the shift include Maintain Safety/Pain    Over the shift, the patient did not make progress toward the following goals. Barriers to progression include non-compliance with health. Recommendations to address these barriers include continue patient education.        Problem: Pain  Goal: Takes deep breaths with improved pain control throughout the shift  Outcome: Progressing  Goal: Turns in bed with improved pain control throughout the shift  Outcome: Progressing  Goal: Walks with improved pain control throughout the shift  Outcome: Progressing  Goal: Performs ADL's with improved pain control throughout shift  Outcome: Progressing  Goal: Participates in PT with improved pain control throughout the shift  Outcome: Progressing  Goal: Free from opioid side effects throughout the shift  Outcome: Progressing  Goal: Free from acute confusion related to pain meds throughout the shift  Outcome: Progressing     Problem: Skin  Goal: Decreased wound size/increased tissue granulation at next dressing change  Outcome: Progressing  Goal: Participates in plan/prevention/treatment measures  Outcome: Progressing  Goal: Prevent/manage excess moisture  Outcome: Progressing  Goal: Prevent/minimize sheer/friction injuries  Outcome: Progressing  Goal: Promote/optimize nutrition  Outcome: Progressing  Goal: Promote skin healing  Outcome: Progressing     Problem: Pain - Adult  Goal: Verbalizes/displays adequate comfort level or baseline comfort level  Outcome: Progressing     Problem: Safety - Adult  Goal: Free from fall injury  Outcome: Progressing     Problem: Discharge Planning  Goal: Discharge to home or other facility with appropriate resources  Outcome: Progressing     Problem: Chronic Conditions and Co-morbidities  Goal: Patient's chronic conditions and co-morbidity symptoms are monitored and maintained or improved  Outcome: Progressing      Problem: Nutrition  Goal: Nutrient intake appropriate for maintaining nutritional needs  Outcome: Progressing     Problem: Fall/Injury  Goal: Not fall by end of shift  Outcome: Progressing  Goal: Be free from injury by end of the shift  Outcome: Progressing  Goal: Verbalize understanding of personal risk factors for fall in the hospital  Outcome: Progressing  Goal: Verbalize understanding of risk factor reduction measures to prevent injury from fall in the home  Outcome: Progressing  Goal: Use assistive devices by end of the shift  Outcome: Progressing  Goal: Pace activities to prevent fatigue by end of the shift  Outcome: Progressing     Problem: Diabetes  Goal: Achieve decreasing blood glucose levels by end of shift  Outcome: Progressing  Goal: Increase stability of blood glucose readings by end of shift  Outcome: Progressing  Goal: Decrease in ketones present in urine by end of shift  Outcome: Progressing  Goal: Maintain electrolyte levels within acceptable range throughout shift  Outcome: Progressing  Goal: Maintain glucose levels >70mg/dl to <250mg/dl throughout shift  Outcome: Progressing  Goal: No changes in neurological exam by end of shift  Outcome: Progressing  Goal: Learn about and adhere to nutrition recommendations by end of shift  Outcome: Progressing  Goal: Vital signs within normal range for age by end of shift  Outcome: Progressing  Goal: Increase self care and/or family involovement by end of shift  Outcome: Progressing  Goal: Receive DSME education by end of shift  Outcome: Progressing

## 2025-04-22 LAB
ALBUMIN SERPL BCP-MCNC: 3.6 G/DL (ref 3.4–5)
ANION GAP SERPL CALC-SCNC: 9 MMOL/L (ref 10–20)
BUN SERPL-MCNC: 30 MG/DL (ref 6–23)
CALCIUM SERPL-MCNC: 8.8 MG/DL (ref 8.6–10.3)
CHLORIDE SERPL-SCNC: 93 MMOL/L (ref 98–107)
CO2 SERPL-SCNC: 38 MMOL/L (ref 21–32)
CREAT SERPL-MCNC: 1.27 MG/DL (ref 0.5–1.3)
EGFRCR SERPLBLD CKD-EPI 2021: 67 ML/MIN/1.73M*2
ERYTHROCYTE [DISTWIDTH] IN BLOOD BY AUTOMATED COUNT: 16.3 % (ref 11.5–14.5)
GLUCOSE BLD MANUAL STRIP-MCNC: 196 MG/DL (ref 74–99)
GLUCOSE BLD MANUAL STRIP-MCNC: 198 MG/DL (ref 74–99)
GLUCOSE BLD MANUAL STRIP-MCNC: 218 MG/DL (ref 74–99)
GLUCOSE BLD MANUAL STRIP-MCNC: 326 MG/DL (ref 74–99)
GLUCOSE SERPL-MCNC: 203 MG/DL (ref 74–99)
HCT VFR BLD AUTO: 31.3 % (ref 41–52)
HGB BLD-MCNC: 9.2 G/DL (ref 13.5–17.5)
MCH RBC QN AUTO: 23.6 PG (ref 26–34)
MCHC RBC AUTO-ENTMCNC: 29.4 G/DL (ref 32–36)
MCV RBC AUTO: 80 FL (ref 80–100)
NRBC BLD-RTO: 0 /100 WBCS (ref 0–0)
PHOSPHATE SERPL-MCNC: 3.2 MG/DL (ref 2.5–4.9)
PLATELET # BLD AUTO: 231 X10*3/UL (ref 150–450)
POTASSIUM SERPL-SCNC: 3.3 MMOL/L (ref 3.5–5.3)
RBC # BLD AUTO: 3.9 X10*6/UL (ref 4.5–5.9)
SODIUM SERPL-SCNC: 137 MMOL/L (ref 136–145)
WBC # BLD AUTO: 7.7 X10*3/UL (ref 4.4–11.3)

## 2025-04-22 PROCEDURE — 2500000004 HC RX 250 GENERAL PHARMACY W/ HCPCS (ALT 636 FOR OP/ED): Mod: JZ | Performed by: INTERNAL MEDICINE

## 2025-04-22 PROCEDURE — 82947 ASSAY GLUCOSE BLOOD QUANT: CPT

## 2025-04-22 PROCEDURE — 2500000002 HC RX 250 W HCPCS SELF ADMINISTERED DRUGS (ALT 637 FOR MEDICARE OP, ALT 636 FOR OP/ED): Performed by: INTERNAL MEDICINE

## 2025-04-22 PROCEDURE — 2500000001 HC RX 250 WO HCPCS SELF ADMINISTERED DRUGS (ALT 637 FOR MEDICARE OP): Performed by: INTERNAL MEDICINE

## 2025-04-22 PROCEDURE — 2500000001 HC RX 250 WO HCPCS SELF ADMINISTERED DRUGS (ALT 637 FOR MEDICARE OP)

## 2025-04-22 PROCEDURE — 99233 SBSQ HOSP IP/OBS HIGH 50: CPT | Performed by: INTERNAL MEDICINE

## 2025-04-22 PROCEDURE — 36415 COLL VENOUS BLD VENIPUNCTURE: CPT | Performed by: INTERNAL MEDICINE

## 2025-04-22 PROCEDURE — 84100 ASSAY OF PHOSPHORUS: CPT | Performed by: INTERNAL MEDICINE

## 2025-04-22 PROCEDURE — 85027 COMPLETE CBC AUTOMATED: CPT | Performed by: INTERNAL MEDICINE

## 2025-04-22 PROCEDURE — 99231 SBSQ HOSP IP/OBS SF/LOW 25: CPT | Performed by: INTERNAL MEDICINE

## 2025-04-22 PROCEDURE — 1100000001 HC PRIVATE ROOM DAILY

## 2025-04-22 PROCEDURE — 99232 SBSQ HOSP IP/OBS MODERATE 35: CPT | Performed by: INTERNAL MEDICINE

## 2025-04-22 RX ORDER — POTASSIUM CHLORIDE 20 MEQ/1
40 TABLET, EXTENDED RELEASE ORAL ONCE
Status: COMPLETED | OUTPATIENT
Start: 2025-04-22 | End: 2025-04-22

## 2025-04-22 RX ORDER — QUETIAPINE FUMARATE 100 MG/1
100 TABLET, FILM COATED ORAL NIGHTLY
Status: DISCONTINUED | OUTPATIENT
Start: 2025-04-22 | End: 2025-04-27 | Stop reason: HOSPADM

## 2025-04-22 RX ORDER — POTASSIUM CHLORIDE 1.5 G/1.58G
20 POWDER, FOR SOLUTION ORAL 2 TIMES DAILY
Status: DISCONTINUED | OUTPATIENT
Start: 2025-04-22 | End: 2025-04-22

## 2025-04-22 RX ORDER — POTASSIUM CHLORIDE 20 MEQ/1
40 TABLET, EXTENDED RELEASE ORAL
Status: DISPENSED | OUTPATIENT
Start: 2025-04-22 | End: 2025-04-22

## 2025-04-22 RX ADMIN — INSULIN GLARGINE 60 UNITS: 100 INJECTION, SOLUTION SUBCUTANEOUS at 08:51

## 2025-04-22 RX ADMIN — INSULIN LISPRO 4 UNITS: 100 INJECTION, SOLUTION INTRAVENOUS; SUBCUTANEOUS at 08:50

## 2025-04-22 RX ADMIN — ATORVASTATIN CALCIUM 80 MG: 80 TABLET, FILM COATED ORAL at 08:56

## 2025-04-22 RX ADMIN — POTASSIUM CHLORIDE 40 MEQ: 1500 TABLET, EXTENDED RELEASE ORAL at 14:20

## 2025-04-22 RX ADMIN — INSULIN LISPRO 35 UNITS: 100 INJECTION, SOLUTION INTRAVENOUS; SUBCUTANEOUS at 08:50

## 2025-04-22 RX ADMIN — INSULIN LISPRO 35 UNITS: 100 INJECTION, SOLUTION INTRAVENOUS; SUBCUTANEOUS at 17:15

## 2025-04-22 RX ADMIN — CEPHALEXIN 1000 MG: 500 CAPSULE ORAL at 08:56

## 2025-04-22 RX ADMIN — DULOXETINE HYDROCHLORIDE 60 MG: 30 CAPSULE, DELAYED RELEASE ORAL at 08:56

## 2025-04-22 RX ADMIN — ENOXAPARIN SODIUM 60 MG: 60 INJECTION SUBCUTANEOUS at 20:23

## 2025-04-22 RX ADMIN — FUROSEMIDE 80 MG: 10 INJECTION, SOLUTION INTRAMUSCULAR; INTRAVENOUS at 08:53

## 2025-04-22 RX ADMIN — POTASSIUM CHLORIDE 40 MEQ: 1500 TABLET, EXTENDED RELEASE ORAL at 17:13

## 2025-04-22 RX ADMIN — QUETIAPINE FUMARATE 100 MG: 100 TABLET ORAL at 20:23

## 2025-04-22 RX ADMIN — ENOXAPARIN SODIUM 60 MG: 60 INJECTION SUBCUTANEOUS at 08:52

## 2025-04-22 RX ADMIN — PANTOPRAZOLE SODIUM 40 MG: 40 TABLET, DELAYED RELEASE ORAL at 06:21

## 2025-04-22 RX ADMIN — ASPIRIN 81 MG: 81 TABLET, COATED ORAL at 08:57

## 2025-04-22 RX ADMIN — COLCHICINE 0.6 MG: 0.6 TABLET, FILM COATED ORAL at 08:56

## 2025-04-22 RX ADMIN — AMLODIPINE BESYLATE 10 MG: 10 TABLET ORAL at 08:56

## 2025-04-22 RX ADMIN — FUROSEMIDE 80 MG: 10 INJECTION, SOLUTION INTRAMUSCULAR; INTRAVENOUS at 17:15

## 2025-04-22 RX ADMIN — INSULIN LISPRO 35 UNITS: 100 INJECTION, SOLUTION INTRAVENOUS; SUBCUTANEOUS at 12:16

## 2025-04-22 RX ADMIN — INSULIN LISPRO 8 UNITS: 100 INJECTION, SOLUTION INTRAVENOUS; SUBCUTANEOUS at 12:17

## 2025-04-22 RX ADMIN — INSULIN LISPRO 2 UNITS: 100 INJECTION, SOLUTION INTRAVENOUS; SUBCUTANEOUS at 17:15

## 2025-04-22 RX ADMIN — INSULIN GLARGINE 60 UNITS: 100 INJECTION, SOLUTION SUBCUTANEOUS at 20:23

## 2025-04-22 RX ADMIN — OXYCODONE HYDROCHLORIDE 5 MG: 5 TABLET ORAL at 11:11

## 2025-04-22 ASSESSMENT — PAIN SCALES - GENERAL
PAINLEVEL_OUTOF10: 6
PAINLEVEL_OUTOF10: 0 - NO PAIN

## 2025-04-22 ASSESSMENT — PAIN - FUNCTIONAL ASSESSMENT
PAIN_FUNCTIONAL_ASSESSMENT: 0-10

## 2025-04-22 ASSESSMENT — PAIN DESCRIPTION - DESCRIPTORS: DESCRIPTORS: ACHING

## 2025-04-22 ASSESSMENT — PAIN DESCRIPTION - LOCATION: LOCATION: GENERALIZED

## 2025-04-22 NOTE — PROGRESS NOTES
04/22/25 1286   Discharge Planning   Assistance Needed Cardiology;Con't IV diuretics. Strict I/O's. Daily weights. Monitor Mag/K and supplement to >2/4. Close monitoring of renal function.   Expected Discharge Disposition Home Health   Does the patient need discharge transport arranged? Yes   RoundTrip coordination needed? Yes   Has discharge transport been arranged? No

## 2025-04-22 NOTE — CARE PLAN
The patient's goals for the shift include      The clinical goals for the shift include Remain safe, HDS, manage/control pain, maintain some skin integrity, and ambulate in room/halls more throughout shift.    Over the shift, the patient did not make progress toward the following goals. Barriers to progression include   Problem: Pain  Goal: Takes deep breaths with improved pain control throughout the shift  Outcome: Progressing  Goal: Turns in bed with improved pain control throughout the shift  Outcome: Progressing  Goal: Walks with improved pain control throughout the shift  Outcome: Progressing  Goal: Performs ADL's with improved pain control throughout shift  Outcome: Progressing  Goal: Participates in PT with improved pain control throughout the shift  Outcome: Progressing  Goal: Free from opioid side effects throughout the shift  Outcome: Progressing  Goal: Free from acute confusion related to pain meds throughout the shift  Outcome: Progressing     Problem: Skin  Goal: Decreased wound size/increased tissue granulation at next dressing change  Outcome: Progressing  Goal: Participates in plan/prevention/treatment measures  Outcome: Progressing  Goal: Prevent/manage excess moisture  Outcome: Progressing  Goal: Prevent/minimize sheer/friction injuries  Outcome: Progressing  Goal: Promote/optimize nutrition  Outcome: Progressing  Goal: Promote skin healing  Outcome: Progressing     Problem: Pain - Adult  Goal: Verbalizes/displays adequate comfort level or baseline comfort level  Outcome: Progressing     Problem: Safety - Adult  Goal: Free from fall injury  Outcome: Progressing     Problem: Discharge Planning  Goal: Discharge to home or other facility with appropriate resources  Outcome: Progressing     Problem: Chronic Conditions and Co-morbidities  Goal: Patient's chronic conditions and co-morbidity symptoms are monitored and maintained or improved  Outcome: Progressing     Problem: Nutrition  Goal: Nutrient  intake appropriate for maintaining nutritional needs  Outcome: Progressing     Problem: Fall/Injury  Goal: Not fall by end of shift  Outcome: Progressing  Goal: Be free from injury by end of the shift  Outcome: Progressing  Goal: Verbalize understanding of personal risk factors for fall in the hospital  Outcome: Progressing  Goal: Verbalize understanding of risk factor reduction measures to prevent injury from fall in the home  Outcome: Progressing  Goal: Use assistive devices by end of the shift  Outcome: Progressing  Goal: Pace activities to prevent fatigue by end of the shift  Outcome: Progressing     Problem: Diabetes  Goal: Achieve decreasing blood glucose levels by end of shift  Outcome: Progressing  Goal: Increase stability of blood glucose readings by end of shift  Outcome: Progressing  Goal: Decrease in ketones present in urine by end of shift  Outcome: Progressing  Goal: Maintain electrolyte levels within acceptable range throughout shift  Outcome: Progressing  Goal: Maintain glucose levels >70mg/dl to <250mg/dl throughout shift  Outcome: Progressing  Goal: No changes in neurological exam by end of shift  Outcome: Progressing  Goal: Learn about and adhere to nutrition recommendations by end of shift  Outcome: Progressing  Goal: Vital signs within normal range for age by end of shift  Outcome: Progressing  Goal: Increase self care and/or family involovement by end of shift  Outcome: Progressing  Goal: Receive DSME education by end of shift  Outcome: Progressing   . Recommendations to address these barriers include .

## 2025-04-22 NOTE — CONSULTS
Wound Care Consult     Visit Date: 4/22/2025      Patient Name: Jai Shrestha         MRN: [id number]           YOB: 1970     Reason for Consult: BLE            Pertinent Labs:   Albumin   Date Value Ref Range Status   04/22/2025 3.6 3.4 - 5.0 g/dL Final     ALBUMIN (MG/L) IN URINE   Date Value Ref Range Status   07/18/2021 <7.0 Not Established mg/L Final       Wound Assessment:  Wound 04/22/25 Vascular Ulcer Venous Tibial Anterior;Left (Active)   Present on Admission to Healthcare Facility [facility] 04/22/25 1119   Wound Image   04/22/25 1119   Site Assessment Pink;Gomez;Yellow 04/22/25 1119   Non-staged Wound Description Full thickness 04/22/25 1119   Shape Irregular 04/22/25 1119   Wound Length (cm) 3 cm 04/22/25 1119   Wound Width (cm) 4 cm 04/22/25 1119   Wound Surface Area (cm^2) 9.42 cm^2 04/22/25 1119   Wound Depth (cm) 0.3 cm 04/22/25 1119   Wound Volume (cm^3) 1.885 cm^3 04/22/25 1119   Dressing Other (Comment) 04/22/25 1119   Dressing Changed Changed 04/22/25 1119       Wound 04/22/25 Diabetic Ulcer Heel Posterior;Right (Active)   Wound Image   04/22/25 1127   Site Assessment Brown;Red 04/22/25 1127   Non-staged Wound Description Full thickness 04/22/25 1127   Shape lienar 04/22/25 1127   Wound Length (cm) 1.5 cm 04/22/25 1127   Wound Width (cm) 5 cm 04/22/25 1127   Wound Surface Area (cm^2) 5.89 cm^2 04/22/25 1127   Wound Depth (cm) 0.5 cm 04/22/25 1127   Wound Volume (cm^3) 1.963 cm^3 04/22/25 1127   Dressing Other (Comment) 04/22/25 1127   Dressing Changed Changed 04/22/25 1127       Wound 04/22/25 Vascular Ulcer Venous Tibial Anterior;Right (Active)   Present on Admission to Healthcare Facility [facility] 04/22/25 1131   Wound Image   04/22/25 1131   Site Assessment Pink;Yellow 04/22/25 1131   Non-staged Wound Description Full thickness 04/22/25 1131   Shape irregular 04/22/25 1131   Wound Length (cm) 3 cm 04/22/25 1131   Wound Width (cm) 1.5 cm 04/22/25 1131   Wound Surface Area (cm^2) 3.53 cm^2 04/22/25  1131   Wound Depth (cm) 0.4 cm 04/22/25 1131   Wound Volume (cm^3) 0.942 cm^3 04/22/25 1131   Dressing Other (Comment) 04/22/25 1131   Dressing Changed Changed 04/22/25 1131       Wound Team Summary Assessment: Notified Dr. Dumont of wound care recommendations. Additional supplies left at bedside.     BLE- vascular full thickness injury with pitting edema  Cleanse with bath wipes or soap and water. Rinse well and pat dry.   Apply critic-Aid clear ointment to periwound  Apply single layer of  xeroform dressing  Cover with dry dressing  Change every day and as needed.     Secure with Tubifast G (please apply from base of toes to 2 finger width below bend of knee. Apply AM/Remove PM)     Right heel-DM/fissure   Irrigate with normal saline or wound cleanser, Pat dry.  Paint with betadine iodine, allow to dry   Pad and protect with ABD, Kerlix and paper tape.   Change every day and as needed.      While in bed patient should only be on one fitted sheet, and one chux. Please, do not use brief while patient is resting in bed. Elevate heels off the bed surface at all times. Turn and reposition at least every 2 hours.      Wound Team Plan: Thank you for this consult. Assessment has been completed and orders have been placed. Wound care to be completed by nursing per orders. Please place new consult if there is a change in wound status.        La Nena Galdamez RN BSN,[facility]  [phone]   4/22/2025  1:42 PM

## 2025-04-22 NOTE — PROGRESS NOTES
Jai Shrestha is a 55 y.o. male on day 7 of admission presenting with Healthcare-associated pneumonia.      Subjective   Patient was seen and examined bedside this morning       Objective     Last Recorded Vitals  /70 (BP Location: Left arm, Patient Position: Lying)   Pulse 84   Temp 36.6 °C (97.8 °F) (Temporal)   Resp 20   Wt (!) 183 kg (402 lb 12.5 oz)   SpO2 99%   Intake/Output last 3 Shifts:    Intake/Output Summary (Last 24 hours) at 4/22/2025 1225  Last data filed at 4/22/2025 1143  Gross per 24 hour   Intake 600 ml   Output 1550 ml   Net -950 ml       Admission Weight  Weight: (!) 186 kg (411 lb) (04/14/25 1901)    Daily Weight  04/22/25 : (!) 183 kg (402 lb 12.5 oz)    Image Results  Electrocardiogram, 12-lead PRN ACS symptoms  Normal sinus rhythm  Nonspecific T wave abnormality  Abnormal ECG  When compared with ECG of 14-APR-2025 20:51,  ST no longer depressed in Anterior leads  Nonspecific T wave abnormality no longer evident in Anterior leads      Physical Exam  Constitutional: Obese gentleman, appears drowsy, somnolent, but respond to questions, cooperative not in acute distress  Eyes: PERRLA, clear sclera  ENMT: Moist mucosal membranes, no exudate  Head / Neck: Atraumatic, normocephalic, supple neck, JVP not visualized  Lungs: Patent airways, CTABL  Heart: RRR, S1S2, no murmurs appreciated, palpable pulses in all extremities  GI: Soft, NT, ND, bowel sounds present in all quadrants  MSK: Moves all extremities freely, no restriction  of ROM, no joint edema  Extremities: Chronic lymphedema with venous ulcers, bilateral lower extremities peripheral edema  Neurological: AAO x 3 to person, place and date, facial muscles symmetrical, sensation intact, strength 4/4, no acute focal neurological deficits appreciated  Psychological: Appropriate mood and behavior  Relevant Results                Scheduled medications  Scheduled Medications[1]  Continuous medications  Continuous Medications[2]  PRN  medications  PRN Medications[3]        Assessment & Plan  Healthcare-associated pneumonia    55 y.o. male with a past medical history of chronic back pain, peripheral neuropathy, diabetes mellitus type 2 with insulin resistance, morbid obesity BMI 57.3, CKD 3, diastolic CHF, hypertension who presented to [facility] ER complaining of shortness of breath and lower extremity edema.  He also complains of intermittent chest pain but none currently.  He says that his chest pain is midsternal and a burning sensation       Left upper lobe pneumonia  -s/p Vancomycin and Zosyn on admission  - Completed ceftriaxone 2 g IV piggyback daily  -Urine antigens and strep pneumo and Legionella negative  -completed antibiotics course    Diastolic CHF, decompensated  -Daily weight  -Cardiology consulted: Appreciate evaluation and recommendations, continue diuresis  -continue IV lasix BID      Drowsy in the a.m. on April 19, likely from Seroquel (took 400 mg at bedtime, reportedly his home dose)  -ECG negative for QTc prolongation  -Telemetry monitoring  -Seroquel reduced to 200 mg at bedtime, please monitor for drowsiness in the morning and hold indefinitely if drowsy will reduce to 100 mg      Elevated troponin flat trend  -Given aspirin in the ER  -Telemetry  -Cardiology: Appreciate evaluation recommendations, continue diuresis to achieve euvolemic state  -No acute coronary syndrome     Type 2 diabetes mellitus requiring insulin with insulin resistance  -At home he takes Lantus 80 units 3 times a day and lispro insulin 15 units 3 times a day  -Endocrine consulted: Appreciate evaluation management  -Diabetic carbohydrate controlled diet     Hypertension  -Amlodipine 5 mg orally daily     Gout  -Uric acid level check  -Start colchicine 0.6 mg p.o. daily     Hyperlipidemia  -Atorvastatin 80 mg orally daily     Bilateral lymphedema with venous stasis changes and chronic non healing ulcers  -Chronic cellulitis sees wound care  nurse at home for venous ulcers  -Diuresis with Lasix  -Pain control: Oxycodone IR discontinued, Percocet started, methocarbamol 500 mg twice daily added  - ID consulted: Appreciate recommendation, recommend transitioning to Keflex 1000 mg p.o. 3 times daily x 7 days at discharge.  Transition to oral currently after completing ceftriaxone for pneumonia treatment     Morbid obesity BMI 57.3  -Lifestyle modification     DVT prophylaxis  -Lovenox 60 mg subcutaneously every 12 hours  -SCDs     Disposition: Presented with shortness of breath, found to be heart failure and pneumonia, discharge pending clinical improvement, final recommendation by cardiology        ADOD pending clearance by cardiology      Ronan Dumont MD           [1] amLODIPine, 10 mg, oral, Daily  aspirin, 81 mg, oral, Daily  atorvastatin, 80 mg, oral, Daily  colchicine, 0.6 mg, oral, Daily  DULoxetine, 60 mg, oral, Daily  enoxaparin, 60 mg, subcutaneous, q12h TAMIR  furosemide, 80 mg, intravenous, BID  insulin glargine, 60 Units, subcutaneous, q12h  insulin lispro, 0-10 Units, subcutaneous, TID  insulin lispro, 35 Units, subcutaneous, TID AC  pantoprazole, 40 mg, oral, Daily before breakfast   Or  pantoprazole, 40 mg, intravenous, Daily before breakfast  perflutren protein A microsphere, 0.5 mL, intravenous, Once in imaging  potassium chloride CR, 40 mEq, oral, q1h  QUEtiapine, 100 mg, oral, Nightly  regadenoson, 0.4 mg, intravenous, Once in imaging  sulfur hexafluoride microsphr, 2 mL, intravenous, Once in imaging     [2]    [3] PRN medications: aminophylline, dextrose, dextrose, glucagon, glucagon, HYDROcodone-acetaminophen, HYDROmorphone, methocarbamol, ondansetron **OR** ondansetron, oxyCODONE

## 2025-04-22 NOTE — DISCHARGE INSTRUCTIONS
Wound care recommendations.     BLE- vascular full thickness injury with pitting edema  Cleanse with bath wipes or soap and water. Rinse well and pat dry.   Apply critic-Aid clear ointment to periwound  Apply single layer of  xeroform dressing  Cover with dry dressing  Change every day and as needed.     Secure with Tubifast G (please apply from base of toes to 2 finger width below bend of knee. Apply AM/Remove PM)     Right heel-DM/fissure   Irrigate with normal saline or wound cleanser, Pat dry.  Paint with betadine iodine, allow to dry   Pad and protect with ABD, Kerlix and paper tape.   Change every day and as needed.      While in bed patient should only be on one fitted sheet, and one chux. Please, do not use brief while patient is resting in bed. Elevate heels off the bed surface at all times. Turn and reposition at least every 2 hours.

## 2025-04-22 NOTE — PROGRESS NOTES
"Subjective Data:  Resting comfortably. No events.        Objective Data:  Last Recorded Vitals:  Vitals:    25 0024 25 0454 25 0600 25 0700   BP: 140/71 166/75  158/70   BP Location: Left arm Left arm  Left arm   Patient Position: Sitting Lying  Lying   Pulse: 79 86  84   Resp: 20 20  20   Temp: 36.6 °C (97.9 °F) 36.4 °C (97.5 °F)  36.6 °C (97.8 °F)   TempSrc: Temporal Temporal  Temporal   SpO2: 99% 97%  99%   Weight:  (!) 183 kg (402 lb 12.5 oz) (!) 183 kg (402 lb 12.5 oz)    Height:         Medical Gas Therapy: Supplemental oxygen  Medical Gas Delivery Method: Nasal cannula  Weight  Av kg (405 lb 3.9 oz)  Min: 181 kg (399 lb 4.1 oz)  Max: 186 kg (411 lb)    LABS:  CMP:  Results from last 7 days   Lab Units 25  0544 25  0652 25  0706 25  0927 25  0744 25  0640 25  0700   SODIUM mmol/L 137 135* 136 139 137 138 139   POTASSIUM mmol/L 3.3* 3.3* 3.4* 3.5 3.6 3.2* 3.9   CHLORIDE mmol/L 93* 90* 90* 93* 90* 94* 97*   CO2 mmol/L 38* 39* 39* 38* 38* 35* 34*   ANION GAP mmol/L 9* 9* 10 12 13 12 12   BUN mg/dL 30* 29* 30* 30* 31* 31* 32*   CREATININE mg/dL 1.27 1.35* 1.34* 1.50* 1.67* 1.54* 1.56*   EGFR mL/min/1.73m*2 67 62 63 55* 48* 53* 52*   ALBUMIN g/dL 3.6 3.5 3.8  --   --   --   --      CBC:  Results from last 7 days   Lab Units 25  0544 25  0652 25  0706 25  0744 25  0640 25  0700   WBC AUTO x10*3/uL 7.7 7.8 9.0 8.8 11.3 9.1   HEMOGLOBIN g/dL 9.2* 9.3* 9.6* 9.5* 8.7* 9.6*   HEMATOCRIT % 31.3* 31.4* 32.6* 32.1* 30.1* 32.6*   PLATELETS AUTO x10*3/uL 231 230 229 240 230 235   MCV fL 80 80 80 80 81 82     COAG:         ABO: No results found for: \"ABO\"  HEME/ENDO:  Results from last 7 days   Lab Units 25  0640   HEMOGLOBIN A1C % 7.2*      CARDIAC:         No lab exists for component: \"CK\", \"CKMBP\"     Results from last 7 days   Lab Units 25  0640   HEMOGLOBIN A1C % 7.2*        Last I/O:    Intake/Output " Summary (Last 24 hours) at 4/22/2025 1313  Last data filed at 4/22/2025 1241  Gross per 24 hour   Intake 360 ml   Output 2250 ml   Net -1890 ml     Net IO Since Admission: -17,520 mL [04/22/25 1313]            Inpatient Medications:  Scheduled Medications[1]  PRN Medications[2]  Continuous Medications[3]        Physical Exam:  Gen Well appearing middle aged male sitting up in NAD. Body mass index is 56.2 kg/m².   CV rrr. No m/r/g appreciated. No JVD. +3 bilateral leg edema.   Pulm Lungs clear with normal respiratory effort.  Neuro Alert and conversant. Grossly nonfocal.         Assessment:  Acute on chronic HFpEF  Volume++ with some improvement. Urine output acceptable as of late minus some inaccurate I/O's.    2. Elevated troponin  Non-MI troponin elevation / acute non-traumatic myocardial injury. Core measures do not apply. Noted in the setting of volume overload and PNA.    3. Hypertension   BP variable but acceptable under the present circumstances. Monitoring with diuresis.       Recommendations:  Con't IV diuretics. Strict I/O's. Daily weights. Monitor Mag/K and supplement to >2/4. Close monitoring of renal function. Plan for outpatient stress testing.          Robby Gutierrez MD          [1]   Scheduled medications   Medication Dose Route Frequency    amLODIPine  10 mg oral Daily    aspirin  81 mg oral Daily    atorvastatin  80 mg oral Daily    colchicine  0.6 mg oral Daily    DULoxetine  60 mg oral Daily    enoxaparin  60 mg subcutaneous q12h TAMIR    furosemide  80 mg intravenous BID    insulin glargine  60 Units subcutaneous q12h    insulin lispro  0-10 Units subcutaneous TID    insulin lispro  35 Units subcutaneous TID AC    pantoprazole  40 mg oral Daily before breakfast    Or    pantoprazole  40 mg intravenous Daily before breakfast    perflutren protein A microsphere  0.5 mL intravenous Once in imaging    potassium chloride CR  40 mEq oral q1h    QUEtiapine  100 mg oral Nightly    regadenoson  0.4 mg  intravenous Once in imaging    sulfur hexafluoride microsphr  2 mL intravenous Once in imaging   [2]   PRN medications   Medication    aminophylline    dextrose    dextrose    glucagon    glucagon    HYDROcodone-acetaminophen    HYDROmorphone    methocarbamol    ondansetron    Or    ondansetron    oxyCODONE   [3]   Continuous Medications   Medication Dose Last Rate

## 2025-04-22 NOTE — PROGRESS NOTES
"Physical Therapy                 Therapy Communication Note    Patient Name: Jai Shrestha  MRN: [id number]  Department: [facility] [address]  Room: [address] [place] [address]  Today's Date: 4/22/2025     Discipline: Physical Therapy          Missed Visit Reason: Missed Visit Reason: Patient refused (Pt states he is in too much pain to participate in therapy today. Pt states, \"you got me all jacked up from yesterday\". Education provided on importance of mobility, pt continued to decline. RN notified of refusal and 9/10 pain generalized.)    Missed Time: Attempt      "

## 2025-04-22 NOTE — NURSING NOTE
"Increased Lethargy noted since Seroquel reinitiated.  Patient stated it is too much and he \"can't function\". Will inform PCP, patient requesting lower dose.  "

## 2025-04-22 NOTE — PROGRESS NOTES
"Jai Shrestha is a 55 y.o. male on day 7 of admission presenting with Healthcare-associated pneumonia.    Subjective   Patient somnolent, but responsive     Scheduled medications  Scheduled Medications[1]  Continuous medications  Continuous Medications[2]  PRN medications  PRN Medications[3]    Objective   Physical Exam  Constitutional:       General: He is not in acute distress.        Last Recorded Vitals  Blood pressure 158/70, pulse 84, temperature 36.6 °C (97.8 °F), temperature source Temporal, resp. rate 20, height 1.803 m (5' 10.98\"), weight (!) 183 kg (402 lb 12.5 oz), SpO2 99%.  Intake/Output last 3 Shifts:  I/O last 3 completed shifts:  In: 1360 (7.4 mL/kg) [P.O.:1360]  Out: 2200 (12 mL/kg) [Urine:2200 (0.3 mL/kg/hr)]  Weight: 182.7 kg     Relevant Results  Results from last 7 days   Lab Units 04/22/25  0544 04/21/25  2055 04/21/25  1548 04/21/25  1120 04/21/25  0828 04/21/25  0652 04/20/25  2105 04/20/25  0726 04/20/25  0706 04/19/25  1227 04/19/25  0927 04/18/25  1238 04/18/25  0744   POCT GLUCOSE mg/dL  --  224* 158* 263* 381*  --  117*   < >  --    < >  --    < >  --    GLUCOSE mg/dL 203*  --   --   --   --  280*  --   --  206*  --  197*  --  296*    < > = values in this interval not displayed.       Assessment & Plan  Healthcare-associated pneumonia      IMPRESSION  TYPE 2 DIABETES MELLITUS  LONG TERM CURRENT INSULIN USE  High and unusual insulin regimen  He typically requires less insulin in the hospital           RECOMMENDATIONS  Insulin glargine 60 units BID  Lispro 35 units QAC plus scale (home dose is 50 units), HOLD if NPO or glucose under 100 mg/dl.       Ashwin Cornejo MD         [1] amLODIPine, 10 mg, oral, Daily  aspirin, 81 mg, oral, Daily  atorvastatin, 80 mg, oral, Daily  cephalexin, 1,000 mg, oral, TID  colchicine, 0.6 mg, oral, Daily  DULoxetine, 60 mg, oral, Daily  enoxaparin, 60 mg, subcutaneous, q12h TAMIR  furosemide, 80 mg, intravenous, BID  insulin glargine, 60 Units, " subcutaneous, q12h  insulin lispro, 0-10 Units, subcutaneous, TID  insulin lispro, 35 Units, subcutaneous, TID AC  pantoprazole, 40 mg, oral, Daily before breakfast   Or  pantoprazole, 40 mg, intravenous, Daily before breakfast  perflutren protein A microsphere, 0.5 mL, intravenous, Once in imaging  QUEtiapine, 200 mg, oral, Nightly  regadenoson, 0.4 mg, intravenous, Once in imaging  sulfur hexafluoride microsphr, 2 mL, intravenous, Once in imaging  [2]    [3] PRN medications: aminophylline, dextrose, dextrose, glucagon, glucagon, HYDROcodone-acetaminophen, HYDROmorphone, methocarbamol, ondansetron **OR** ondansetron, oxyCODONE

## 2025-04-23 LAB
ALBUMIN SERPL BCP-MCNC: 3.5 G/DL (ref 3.4–5)
ANION GAP SERPL CALC-SCNC: 8 MMOL/L (ref 10–20)
BUN SERPL-MCNC: 29 MG/DL (ref 6–23)
CALCIUM SERPL-MCNC: 8.9 MG/DL (ref 8.6–10.3)
CHLORIDE SERPL-SCNC: 97 MMOL/L (ref 98–107)
CO2 SERPL-SCNC: 38 MMOL/L (ref 21–32)
CREAT SERPL-MCNC: 1.22 MG/DL (ref 0.5–1.3)
EGFRCR SERPLBLD CKD-EPI 2021: 70 ML/MIN/1.73M*2
ERYTHROCYTE [DISTWIDTH] IN BLOOD BY AUTOMATED COUNT: 16.1 % (ref 11.5–14.5)
GLUCOSE BLD MANUAL STRIP-MCNC: 162 MG/DL (ref 74–99)
GLUCOSE BLD MANUAL STRIP-MCNC: 168 MG/DL (ref 74–99)
GLUCOSE BLD MANUAL STRIP-MCNC: 170 MG/DL (ref 74–99)
GLUCOSE BLD MANUAL STRIP-MCNC: 244 MG/DL (ref 74–99)
GLUCOSE SERPL-MCNC: 192 MG/DL (ref 74–99)
HCT VFR BLD AUTO: 32.2 % (ref 41–52)
HGB BLD-MCNC: 9.5 G/DL (ref 13.5–17.5)
MCH RBC QN AUTO: 22.8 PG (ref 26–34)
MCHC RBC AUTO-ENTMCNC: 29.5 G/DL (ref 32–36)
MCV RBC AUTO: 77 FL (ref 80–100)
NRBC BLD-RTO: 0 /100 WBCS (ref 0–0)
PHOSPHATE SERPL-MCNC: 2.8 MG/DL (ref 2.5–4.9)
PLATELET # BLD AUTO: 205 X10*3/UL (ref 150–450)
POTASSIUM SERPL-SCNC: 3.8 MMOL/L (ref 3.5–5.3)
RBC # BLD AUTO: 4.17 X10*6/UL (ref 4.5–5.9)
SODIUM SERPL-SCNC: 139 MMOL/L (ref 136–145)
WBC # BLD AUTO: 6.1 X10*3/UL (ref 4.4–11.3)

## 2025-04-23 PROCEDURE — 1100000001 HC PRIVATE ROOM DAILY

## 2025-04-23 PROCEDURE — 2500000002 HC RX 250 W HCPCS SELF ADMINISTERED DRUGS (ALT 637 FOR MEDICARE OP, ALT 636 FOR OP/ED): Performed by: INTERNAL MEDICINE

## 2025-04-23 PROCEDURE — 82947 ASSAY GLUCOSE BLOOD QUANT: CPT

## 2025-04-23 PROCEDURE — 85027 COMPLETE CBC AUTOMATED: CPT | Performed by: INTERNAL MEDICINE

## 2025-04-23 PROCEDURE — 99231 SBSQ HOSP IP/OBS SF/LOW 25: CPT | Performed by: INTERNAL MEDICINE

## 2025-04-23 PROCEDURE — 99233 SBSQ HOSP IP/OBS HIGH 50: CPT | Performed by: INTERNAL MEDICINE

## 2025-04-23 PROCEDURE — 2500000001 HC RX 250 WO HCPCS SELF ADMINISTERED DRUGS (ALT 637 FOR MEDICARE OP): Performed by: INTERNAL MEDICINE

## 2025-04-23 PROCEDURE — 2500000004 HC RX 250 GENERAL PHARMACY W/ HCPCS (ALT 636 FOR OP/ED): Mod: JZ | Performed by: INTERNAL MEDICINE

## 2025-04-23 PROCEDURE — 84100 ASSAY OF PHOSPHORUS: CPT | Performed by: INTERNAL MEDICINE

## 2025-04-23 PROCEDURE — 36415 COLL VENOUS BLD VENIPUNCTURE: CPT | Performed by: INTERNAL MEDICINE

## 2025-04-23 PROCEDURE — 99232 SBSQ HOSP IP/OBS MODERATE 35: CPT | Performed by: NURSE PRACTITIONER

## 2025-04-23 PROCEDURE — 2500000001 HC RX 250 WO HCPCS SELF ADMINISTERED DRUGS (ALT 637 FOR MEDICARE OP): Performed by: NURSE PRACTITIONER

## 2025-04-23 PROCEDURE — 97535 SELF CARE MNGMENT TRAINING: CPT | Mod: GO

## 2025-04-23 RX ORDER — HYDROXYZINE HYDROCHLORIDE 25 MG/1
25 TABLET, FILM COATED ORAL EVERY 8 HOURS PRN
Status: DISCONTINUED | OUTPATIENT
Start: 2025-04-23 | End: 2025-04-27 | Stop reason: HOSPADM

## 2025-04-23 RX ORDER — METOLAZONE 5 MG/1
5 TABLET ORAL ONCE
Status: COMPLETED | OUTPATIENT
Start: 2025-04-23 | End: 2025-04-23

## 2025-04-23 RX ADMIN — INSULIN LISPRO 35 UNITS: 100 INJECTION, SOLUTION INTRAVENOUS; SUBCUTANEOUS at 16:39

## 2025-04-23 RX ADMIN — DULOXETINE HYDROCHLORIDE 60 MG: 30 CAPSULE, DELAYED RELEASE ORAL at 08:29

## 2025-04-23 RX ADMIN — METOLAZONE 5 MG: 5 TABLET ORAL at 14:57

## 2025-04-23 RX ADMIN — METHOCARBAMOL 500 MG: 500 TABLET ORAL at 20:35

## 2025-04-23 RX ADMIN — INSULIN LISPRO 2 UNITS: 100 INJECTION, SOLUTION INTRAVENOUS; SUBCUTANEOUS at 08:30

## 2025-04-23 RX ADMIN — OXYCODONE HYDROCHLORIDE 5 MG: 5 TABLET ORAL at 08:35

## 2025-04-23 RX ADMIN — INSULIN LISPRO 4 UNITS: 100 INJECTION, SOLUTION INTRAVENOUS; SUBCUTANEOUS at 13:01

## 2025-04-23 RX ADMIN — AMLODIPINE BESYLATE 10 MG: 10 TABLET ORAL at 08:30

## 2025-04-23 RX ADMIN — HYDROMORPHONE HYDROCHLORIDE 0.6 MG: 1 INJECTION, SOLUTION INTRAMUSCULAR; INTRAVENOUS; SUBCUTANEOUS at 14:57

## 2025-04-23 RX ADMIN — ENOXAPARIN SODIUM 60 MG: 60 INJECTION SUBCUTANEOUS at 08:30

## 2025-04-23 RX ADMIN — FUROSEMIDE 80 MG: 10 INJECTION, SOLUTION INTRAMUSCULAR; INTRAVENOUS at 16:39

## 2025-04-23 RX ADMIN — INSULIN LISPRO 2 UNITS: 100 INJECTION, SOLUTION INTRAVENOUS; SUBCUTANEOUS at 16:39

## 2025-04-23 RX ADMIN — INSULIN GLARGINE 60 UNITS: 100 INJECTION, SOLUTION SUBCUTANEOUS at 08:30

## 2025-04-23 RX ADMIN — COLCHICINE 0.6 MG: 0.6 TABLET, FILM COATED ORAL at 08:30

## 2025-04-23 RX ADMIN — OXYCODONE HYDROCHLORIDE 5 MG: 5 TABLET ORAL at 00:39

## 2025-04-23 RX ADMIN — ASPIRIN 81 MG: 81 TABLET, COATED ORAL at 08:30

## 2025-04-23 RX ADMIN — PANTOPRAZOLE SODIUM 40 MG: 40 TABLET, DELAYED RELEASE ORAL at 06:04

## 2025-04-23 RX ADMIN — INSULIN LISPRO 35 UNITS: 100 INJECTION, SOLUTION INTRAVENOUS; SUBCUTANEOUS at 08:30

## 2025-04-23 RX ADMIN — OXYCODONE HYDROCHLORIDE 5 MG: 5 TABLET ORAL at 20:35

## 2025-04-23 RX ADMIN — INSULIN GLARGINE 60 UNITS: 100 INJECTION, SOLUTION SUBCUTANEOUS at 20:34

## 2025-04-23 RX ADMIN — ENOXAPARIN SODIUM 60 MG: 60 INJECTION SUBCUTANEOUS at 20:34

## 2025-04-23 RX ADMIN — FUROSEMIDE 80 MG: 10 INJECTION, SOLUTION INTRAMUSCULAR; INTRAVENOUS at 08:29

## 2025-04-23 RX ADMIN — HYDROXYZINE HYDROCHLORIDE 25 MG: 25 TABLET, FILM COATED ORAL at 13:00

## 2025-04-23 RX ADMIN — ATORVASTATIN CALCIUM 80 MG: 80 TABLET, FILM COATED ORAL at 08:29

## 2025-04-23 RX ADMIN — INSULIN LISPRO 35 UNITS: 100 INJECTION, SOLUTION INTRAVENOUS; SUBCUTANEOUS at 13:00

## 2025-04-23 ASSESSMENT — COGNITIVE AND FUNCTIONAL STATUS - GENERAL
DRESSING REGULAR LOWER BODY CLOTHING: TOTAL
HELP NEEDED FOR BATHING: A LITTLE
STANDING UP FROM CHAIR USING ARMS: A LITTLE
DRESSING REGULAR LOWER BODY CLOTHING: A LITTLE
MOBILITY SCORE: 20
DRESSING REGULAR UPPER BODY CLOTHING: A LITTLE
MOVING TO AND FROM BED TO CHAIR: A LITTLE
WALKING IN HOSPITAL ROOM: A LITTLE
TOILETING: A LITTLE
HELP NEEDED FOR BATHING: A LITTLE
DAILY ACTIVITIY SCORE: 15
PERSONAL GROOMING: A LITTLE
DAILY ACTIVITIY SCORE: 20
DRESSING REGULAR UPPER BODY CLOTHING: A LITTLE
CLIMB 3 TO 5 STEPS WITH RAILING: A LITTLE
TOILETING: TOTAL

## 2025-04-23 ASSESSMENT — ACTIVITIES OF DAILY LIVING (ADL)
BATHING_WHERE_ASSESSED: EDGE OF BED
HOME_MANAGEMENT_TIME_ENTRY: 24
BATHING_LEVEL_OF_ASSISTANCE: MINIMUM ASSISTANCE

## 2025-04-23 ASSESSMENT — PAIN - FUNCTIONAL ASSESSMENT
PAIN_FUNCTIONAL_ASSESSMENT: 0-10

## 2025-04-23 ASSESSMENT — PAIN SCALES - GENERAL
PAINLEVEL_OUTOF10: 9
PAINLEVEL_OUTOF10: 0 - NO PAIN
PAINLEVEL_OUTOF10: 8
PAINLEVEL_OUTOF10: 0 - NO PAIN
PAINLEVEL_OUTOF10: 7
PAINLEVEL_OUTOF10: 0 - NO PAIN
PAINLEVEL_OUTOF10: 3
PAINLEVEL_OUTOF10: 6
PAINLEVEL_OUTOF10: 0 - NO PAIN
PAINLEVEL_OUTOF10: 3

## 2025-04-23 ASSESSMENT — PAIN DESCRIPTION - DESCRIPTORS
DESCRIPTORS: ACHING
DESCRIPTORS: ACHING

## 2025-04-23 ASSESSMENT — PAIN DESCRIPTION - ORIENTATION: ORIENTATION: RIGHT;LEFT

## 2025-04-23 ASSESSMENT — PAIN DESCRIPTION - LOCATION: LOCATION: LEG

## 2025-04-23 NOTE — PROGRESS NOTES
"Jai Shrestha is a 55 y.o. male on day 8 of admission presenting with Healthcare-associated pneumonia.    Subjective   No new complaint     Scheduled medications  Scheduled Medications[1]  Continuous medications  Continuous Medications[2]  PRN medications  PRN Medications[3]    Objective   Physical Exam  Constitutional:       General: He is not in acute distress.     Appearance: He is obese.   Neurological:      Mental Status: He is alert.         Last Recorded Vitals  Blood pressure 131/75, pulse 83, temperature 36.3 °C (97.4 °F), temperature source Temporal, resp. rate 20, height 1.803 m (5' 10.98\"), weight (!) 183 kg (402 lb 12.5 oz), SpO2 98%.  Intake/Output last 3 Shifts:  I/O last 3 completed shifts:  In: 360 (2 mL/kg) [P.O.:360]  Out: 3250 (17.8 mL/kg) [Urine:3250 (0.5 mL/kg/hr)]  Weight: 182.7 kg     Relevant Results  Results from last 7 days   Lab Units 04/23/25  0744 04/23/25  0552 04/22/25  2009 04/22/25  1552 04/22/25  1132 04/22/25  0749 04/22/25  0544 04/21/25  0828 04/21/25  0652 04/20/25  0726 04/20/25  0706 04/19/25  1227 04/19/25  0927   POCT GLUCOSE mg/dL 168*  --  196* 198* 326* 218*  --    < >  --    < >  --    < >  --    GLUCOSE mg/dL  --  192*  --   --   --   --  203*  --  280*  --  206*  --  197*    < > = values in this interval not displayed.     Assessment & Plan  Healthcare-associated pneumonia    IMPRESSION  TYPE 2 DIABETES MELLITUS  LONG TERM CURRENT INSULIN USE  High and unusual insulin regimen  He typically requires less insulin in the hospital           RECOMMENDATIONS  Insulin glargine 60 units BID  Lispro 35 units QAC plus scale (home dose is 50 units), HOLD if NPO or glucose under 100 mg/dl.       Ashwin Cornejo MD         [1] amLODIPine, 10 mg, oral, Daily  aspirin, 81 mg, oral, Daily  atorvastatin, 80 mg, oral, Daily  colchicine, 0.6 mg, oral, Daily  DULoxetine, 60 mg, oral, Daily  enoxaparin, 60 mg, subcutaneous, q12h TAMIR  furosemide, 80 mg, intravenous, BID  insulin glargine, " 60 Units, subcutaneous, q12h  insulin lispro, 0-10 Units, subcutaneous, TID  insulin lispro, 35 Units, subcutaneous, TID AC  pantoprazole, 40 mg, oral, Daily before breakfast   Or  pantoprazole, 40 mg, intravenous, Daily before breakfast  perflutren protein A microsphere, 0.5 mL, intravenous, Once in imaging  QUEtiapine, 100 mg, oral, Nightly  regadenoson, 0.4 mg, intravenous, Once in imaging  sulfur hexafluoride microsphr, 2 mL, intravenous, Once in imaging  [2]    [3] PRN medications: aminophylline, dextrose, dextrose, glucagon, glucagon, HYDROcodone-acetaminophen, HYDROmorphone, methocarbamol, ondansetron **OR** ondansetron, oxyCODONE

## 2025-04-23 NOTE — NURSING NOTE
Patient alert and oriented x4, has been educated prior to stand up and use the call light, patient educated and is aware of high fall risk, patient has gotten up from chair several times without calling, yelling at nurses who respond to alarm that he can ambulate himself without help. Bed alarm and chair alarm remain on, call light in reach.

## 2025-04-23 NOTE — PROGRESS NOTES
Jai Shrestha is a 55 y.o. male on day 8 of admission presenting with Healthcare-associated pneumonia.      Subjective   Patient was seen and examined bedside this morning       Objective     Last Recorded Vitals  /75 (BP Location: Right arm, Patient Position: Sitting)   Pulse 83   Temp 36.3 °C (97.4 °F) (Temporal)   Resp 20   Wt (!) 183 kg (402 lb 12.5 oz)   SpO2 91%   Intake/Output last 3 Shifts:    Intake/Output Summary (Last 24 hours) at 4/23/2025 1231  Last data filed at 4/23/2025 0941  Gross per 24 hour   Intake 520 ml   Output 1700 ml   Net -1180 ml       Admission Weight  Weight: (!) 186 kg (411 lb) (04/14/25 1901)    Daily Weight  04/22/25 : (!) 183 kg (402 lb 12.5 oz)    Image Results  Electrocardiogram, 12-lead PRN ACS symptoms  Normal sinus rhythm  Nonspecific T wave abnormality  Abnormal ECG  When compared with ECG of 14-APR-2025 20:51,  ST no longer depressed in Anterior leads  Nonspecific T wave abnormality no longer evident in Anterior leads      Physical Exam  Constitutional: Obese gentleman, appears drowsy, somnolent, but respond to questions, cooperative not in acute distress  Eyes: PERRLA, clear sclera  ENMT: Moist mucosal membranes, no exudate  Head / Neck: Atraumatic, normocephalic, supple neck, JVP not visualized  Lungs: Patent airways, CTABL  Heart: RRR, S1S2, no murmurs appreciated, palpable pulses in all extremities  GI: Soft, NT, ND, bowel sounds present in all quadrants  MSK: Moves all extremities freely, no restriction  of ROM, no joint edema  Extremities: Chronic lymphedema with venous ulcers, bilateral lower extremities peripheral edema  Neurological: AAO x 3 to person, place and date, facial muscles symmetrical, sensation intact, strength 4/4, no acute focal neurological deficits appreciated  Psychological: Appropriate mood and behavior  Relevant Results                Scheduled medications  Scheduled Medications[1]  Continuous medications  Continuous Medications[2]  PRN  medications  PRN Medications[3]        Assessment & Plan  Healthcare-associated pneumonia    55 y.o. male with a past medical history of chronic back pain, peripheral neuropathy, diabetes mellitus type 2 with insulin resistance, morbid obesity BMI 57.3, CKD 3, diastolic CHF, hypertension who presented to [facility] ER complaining of shortness of breath and lower extremity edema.  He also complains of intermittent chest pain but none currently.  He says that his chest pain is midsternal and a burning sensation       Left upper lobe pneumonia  -s/p Vancomycin and Zosyn on admission  - Completed ceftriaxone 2 g IV piggyback daily  -Urine antigens and strep pneumo and Legionella negative  -completed antibiotics course    Diastolic CHF, decompensated  -Daily weight  -Cardiology consulted: Appreciate evaluation and recommendations, continue diuresis  -continue IV lasix BID      Drowsy in the a.m. on April 19, likely from Seroquel (took 400 mg at bedtime, reportedly his home dose)  -ECG negative for QTc prolongation  -Telemetry monitoring  -Seroquel reduced to 200 mg at bedtime, please monitor for drowsiness in the morning and hold indefinitely if drowsy will reduce to 100 mg      Elevated troponin flat trend  -Given aspirin in the ER  -Telemetry  -Cardiology: Appreciate evaluation recommendations, continue diuresis to achieve euvolemic state  -No acute coronary syndrome     Type 2 diabetes mellitus requiring insulin with insulin resistance  -At home he takes Lantus 80 units 3 times a day and lispro insulin 15 units 3 times a day  -Endocrine consulted: Appreciate evaluation management  -Diabetic carbohydrate controlled diet     Hypertension  -Amlodipine 5 mg orally daily     Gout  -Uric acid level check  -Start colchicine 0.6 mg p.o. daily     Hyperlipidemia  -Atorvastatin 80 mg orally daily     Bilateral lymphedema with venous stasis changes and chronic non healing ulcers  -Chronic cellulitis sees wound care  nurse at home for venous ulcers  -Diuresis with Lasix  -Pain control: Oxycodone IR discontinued, Percocet started, methocarbamol 500 mg twice daily added  - ID consulted: Appreciate recommendation, recommend transitioning to Keflex 1000 mg p.o. 3 times daily x 7 days at discharge.  Transition to oral currently after completing ceftriaxone for pneumonia treatment     Morbid obesity BMI 57.3  -Lifestyle modification     DVT prophylaxis  -Lovenox 60 mg subcutaneously every 12 hours  -SCDs     Disposition: Presented with shortness of breath, found to be heart failure and pneumonia, discharge pending clinical improvement, final recommendation by cardiology        ADOD pending clearance by cardiology      Ronan Dumont MD           [1] amLODIPine, 10 mg, oral, Daily  aspirin, 81 mg, oral, Daily  atorvastatin, 80 mg, oral, Daily  colchicine, 0.6 mg, oral, Daily  DULoxetine, 60 mg, oral, Daily  enoxaparin, 60 mg, subcutaneous, q12h TAMIR  furosemide, 80 mg, intravenous, BID  insulin glargine, 60 Units, subcutaneous, q12h  insulin lispro, 0-10 Units, subcutaneous, TID  insulin lispro, 35 Units, subcutaneous, TID AC  pantoprazole, 40 mg, oral, Daily before breakfast   Or  pantoprazole, 40 mg, intravenous, Daily before breakfast  perflutren protein A microsphere, 0.5 mL, intravenous, Once in imaging  QUEtiapine, 100 mg, oral, Nightly  regadenoson, 0.4 mg, intravenous, Once in imaging  sulfur hexafluoride microsphr, 2 mL, intravenous, Once in imaging     [2]    [3] PRN medications: aminophylline, dextrose, dextrose, glucagon, glucagon, HYDROcodone-acetaminophen, HYDROmorphone, hydrOXYzine HCL, methocarbamol, ondansetron **OR** ondansetron, oxyCODONE

## 2025-04-23 NOTE — PROGRESS NOTES
"Physical Therapy                 Therapy Communication Note    Patient Name: Jai Shrestha  MRN: [id number]  Department: [facility] [address]  Room: [address] [place] [address]  Today's Date: 4/23/2025     Discipline: Physical Therapy          Missed Visit Reason: Missed Visit Reason: Patient refused (Pt states he \"doesn't want to throw up his fucking breakfast up all over the floor\"  and declines to participate in PT at this time while using profanities with this PTA despite max education and optional interventions offered. RN notified.)    Missed Time: Attempt      "

## 2025-04-23 NOTE — PROGRESS NOTES
"Subjective Data:  -  Inacc is and os though   Still hypervolemic on exam       Objective Data:  Last Recorded Vitals:  Vitals:    25 2009 25 0349 25 0742 25 0941   BP: 139/70 113/57 131/75    BP Location: Left arm Left arm Right arm    Patient Position: Lying Lying Sitting    Pulse: 82 76 83    Resp: 20 18 20    Temp: 36.3 °C (97.3 °F) 36.4 °C (97.6 °F) 36.3 °C (97.4 °F)    TempSrc: Temporal Temporal Temporal    SpO2: 97% 98% 98% 91%   Weight:       Height:         Medical Gas Therapy: None (Room air)  Medical Gas Delivery Method: Nasal cannula  Weight  Av kg (405 lb 3.9 oz)  Min: 181 kg (399 lb 4.1 oz)  Max: 186 kg (411 lb)    LABS:  CMP:  Results from last 7 days   Lab Units 25  0552 25  0544 25  0652 25  0706 25  0927 25  0744 25  0640   SODIUM mmol/L 139 137 135* 136 139 137 138   POTASSIUM mmol/L 3.8 3.3* 3.3* 3.4* 3.5 3.6 3.2*   CHLORIDE mmol/L 97* 93* 90* 90* 93* 90* 94*   CO2 mmol/L 38* 38* 39* 39* 38* 38* 35*   ANION GAP mmol/L 8* 9* 9* 10 12 13 12   BUN mg/dL 29* 30* 29* 30* 30* 31* 31*   CREATININE mg/dL 1.22 1.27 1.35* 1.34* 1.50* 1.67* 1.54*   EGFR mL/min/1.73m*2 70 67 62 63 55* 48* 53*   ALBUMIN g/dL 3.5 3.6 3.5 3.8  --   --   --      CBC:  Results from last 7 days   Lab Units 25  0552 25  0544 25  0652 25  0706 25  0744 25  0640   WBC AUTO x10*3/uL 6.1 7.7 7.8 9.0 8.8 11.3   HEMOGLOBIN g/dL 9.5* 9.2* 9.3* 9.6* 9.5* 8.7*   HEMATOCRIT % 32.2* 31.3* 31.4* 32.6* 32.1* 30.1*   PLATELETS AUTO x10*3/uL 205 231 230 229 240 230   MCV fL 77* 80 80 80 80 81     COAG:         ABO: No results found for: \"ABO\"  HEME/ENDO:  Results from last 7 days   Lab Units 25  0640   HEMOGLOBIN A1C % 7.2*      CARDIAC:         No lab exists for component: \"CK\", \"CKMBP\"     Results from last 7 days   Lab Units 25  0640   HEMOGLOBIN A1C % 7.2*        Last I/O:    Intake/Output Summary (Last 24 hours) at " 4/23/2025 1243  Last data filed at 4/23/2025 0941  Gross per 24 hour   Intake 520 ml   Output 1000 ml   Net -480 ml     Net IO Since Admission: -18,000 mL [04/23/25 1243]            Inpatient Medications:  Scheduled Medications[1]  PRN Medications[2]  Continuous Medications[3]        Physical Exam:  Gen Well appearing middle aged male sitting up in NAD. Body mass index is 56.2 kg/m².   CV rrr. No m/r/g appreciated. No JVD. +3 bilateral leg edema.   Pulm Lungs clear with normal respiratory effort.  Neuro Alert and conversant. Grossly nonfocal.    4/23/25-> one dose of Metolazone to augment diuresis     Assessment:  Acute on chronic HFpEF  Volume++ with some improvement. Urine output acceptable as of late minus some inaccurate I/O's.    2. Elevated troponin  Non-MI troponin elevation / acute non-traumatic myocardial injury. Core measures do not apply. Noted in the setting of volume overload and PNA.    3. Hypertension   BP variable but acceptable under the present circumstances. Monitoring with diuresis.       Recommendations:  Con't IV diuretics. Strict I/O's. Daily weights. Monitor Mag/K and supplement to >2/4. Close monitoring of renal function. Plan for outpatient stress testing.          Marlene Quinn, APRN-CNP          [1]   Scheduled medications   Medication Dose Route Frequency    amLODIPine  10 mg oral Daily    aspirin  81 mg oral Daily    atorvastatin  80 mg oral Daily    colchicine  0.6 mg oral Daily    DULoxetine  60 mg oral Daily    enoxaparin  60 mg subcutaneous q12h TAMIR    furosemide  80 mg intravenous BID    insulin glargine  60 Units subcutaneous q12h    insulin lispro  0-10 Units subcutaneous TID    insulin lispro  35 Units subcutaneous TID AC    pantoprazole  40 mg oral Daily before breakfast    Or    pantoprazole  40 mg intravenous Daily before breakfast    perflutren protein A microsphere  0.5 mL intravenous Once in imaging    QUEtiapine  100 mg oral Nightly    regadenoson  0.4 mg intravenous  Once in imaging    sulfur hexafluoride microsphr  2 mL intravenous Once in imaging   [2]   PRN medications   Medication    aminophylline    dextrose    dextrose    glucagon    glucagon    HYDROcodone-acetaminophen    HYDROmorphone    hydrOXYzine HCL    methocarbamol    ondansetron    Or    ondansetron    oxyCODONE   [3]   Continuous Medications   Medication Dose Last Rate

## 2025-04-23 NOTE — DISCHARGE INSTR - APPOINTMENTS
Ja[facility] Sanz/[facility] (Home Health)   PT/OT/NURSE  [phone]   START OF CARE 24-48 HOURS    [facility] will also follow You at home.

## 2025-04-23 NOTE — PROGRESS NOTES
04/23/25 9006   Discharge Planning   Assistance Needed per IM chart review;ID consulted:, recommend transitioning to Keflex 1000 mg p.o. 3 times daily x 7 days at discharge.  Transition to oral currently after completing ceftriaxone for pneumonia treatment; Cardiology following ; continue IV lasix BID; close monitoring renal fuction   Type of Home Care Services Home OT;Home PT;Home nursing visits   Expected Discharge Disposition Home Health  ([facility])   Does the patient need discharge transport arranged? Yes  (patient will need transport home)   RoundTrip coordination needed? Yes   Has discharge transport been arranged? No

## 2025-04-23 NOTE — CARE PLAN
The patient's goals for the shift include      The clinical goals for the shift include safety    Over the shift, the patient did not make progress toward the following goals. Barriers to progression include   Problem: Pain  Goal: Takes deep breaths with improved pain control throughout the shift  Outcome: Progressing  Goal: Turns in bed with improved pain control throughout the shift  Outcome: Progressing  Goal: Walks with improved pain control throughout the shift  Outcome: Progressing  Goal: Performs ADL's with improved pain control throughout shift  Outcome: Progressing  Goal: Participates in PT with improved pain control throughout the shift  Outcome: Progressing  Goal: Free from opioid side effects throughout the shift  Outcome: Progressing  Goal: Free from acute confusion related to pain meds throughout the shift  Outcome: Progressing   . Recommendations to address these barriers include .

## 2025-04-23 NOTE — PROGRESS NOTES
Occupational Therapy    Occupational Therapy Treatment    Name: Jai Shrestha  MRN: [id number]  Department: [facility] A 6  Room: [facility]628-A  Date: 04/23/25  Time Calculation  Start Time: 1400  Stop Time: 1424  Time Calculation (min): 24 min    Assessment:  OT Assessment:  (Patient doing better with mobility tasks and Upper body tasks. Patient requires assist with toileting hygiene and LB ADLs, adaptive equipment recommended. Patient would benefit from LOW intensity therapy to maximize functional independence.)  Prognosis: Good  Barriers to Discharge Home: No anticipated barriers  Evaluation/Treatment Tolerance: Patient limited by fatigue  Medical Staff Made Aware: Yes  End of Session Communication: Bedside nurse  End of Session Patient Position: Bed, 3 rail up, Alarm on  Plan:  Treatment Interventions: ADL retraining, Functional transfer training, Endurance training, Patient/family training, Neuromuscular reeducation  OT Frequency: 3 times per week  OT Discharge Recommendations: Low intensity level of continued care  Equipment Recommended upon Discharge: Wheeled walker  OT Recommended Transfer Status: Stand by assist  OT - OK to Discharge: Yes (Per POC)    Subjective   Previous Visit Info:  OT Last Visit  OT Received On: 04/23/25  General:  General  Reason for Referral: To ED with SOB, increased LE swelling, and increased difficulty getting around. CT (+) for PNA. Additionally pt injured R ankle ~3 weeks ago and has had increased pain since.  Referred By: Sergio Collins DO  Past Medical History Relevant to Rehab: Medical History[1]    Prior to Session Communication: Bedside nurse  Patient Position Received:  (On toilet)  General Comment:  (Patient agreeable to OT session.)  Precautions:  Medical Precautions: Fall precautions    Pain Assessment:  Pain Assessment  Pain Assessment: 0-10  0-10 (Numeric) Pain Score: 0 - No pain    Objective   Cognition:  Overall Cognitive Status: Within Functional Limits  Orientation Level:  Oriented X4    Activities of Daily Living:    Grooming  Grooming Level of Assistance: Independent  Grooming Where Assessed: Chair  Grooming Comments:  (Patient able to comb hair.)    UE Bathing  UE Bathing Level of Assistance: Minimum assistance  UE Bathing Where Assessed: Standing sinkside  UE Bathing Comments:  (Assist to reach all areas.)    LE Bathing  LE Bathing Level of Assistance: Minimum assistance  LE Bathing Where Assessed: Edge of bed  LE Bathing Comments:  (Assist to bathe down to ankles)    UE Dressing  UE Dressing Level of Assistance: Close supervision  UE Dressing Where Assessed: Edge of bed  UE Dressing Comments:  (To don hospital gown.)    LE Dressing  LE Dressing: Yes  Sock Level of Assistance: Dependent  LE Dressing Where Assessed: Chair  LE Dressing Comments:  (Requires sock aide, not happy with device)    Toileting  Toileting Level of Assistance: Dependent  Where Assessed: Toilet  Toileting Comments:  (Standing near toilet, unable to reach back to wipe)    Bed Mobility/Transfers: Bed Mobility  Bed Mobility: No    Transfer 1  Transfer From 1: Toilet to  Transfer to 1: Chair with arms  Technique 1: Sit to stand  Transfer Device 1: Walker  Transfer Level of Assistance 1: Distant supervision    Sitting Balance:  Static Sitting Balance  Static Sitting-Balance Support: Feet supported  Static Sitting-Level of Assistance: Independent  Dynamic Sitting Balance  Dynamic Sitting-Balance Support: Feet supported  Dynamic Sitting-Level of Assistance: Close supervision  Dynamic Sitting-Balance: Reaching for objects  Standing Balance:  Static Standing Balance  Static Standing-Balance Support: Bilateral upper extremity supported  Static Standing-Level of Assistance: Close supervision  Dynamic Standing Balance  Dynamic Standing-Balance Support: Bilateral upper extremity supported  Dynamic Standing-Level of Assistance: Contact guard  Dynamic Standing-Balance: Reaching for objects    Outcome Measures:  [facility]  Activity  Putting on and taking off regular lower body clothing: Total  Bathing (including washing, rinsing, drying): A little  Putting on and taking off regular upper body clothing: A little  Toileting, which includes using toilet, bedpan or urinal: Total  Taking care of personal grooming such as brushing teeth: A little  Eating Meals: None  Daily Activity - Total Score: 15    Goals:  Encounter Problems       Encounter Problems (Active)       ADLs       Patient will perform UB and LB bathing  with independent level of assistance.  (Progressing)       Start:  04/18/25    Expected End:  05/02/25            Patient with complete upper body dressing with independent level of assistance donning and doffing all UE clothes with no adaptive equipment while edge of bed  (Progressing)       Start:  04/18/25    Expected End:  05/02/25            Patient with complete lower body dressing with independent level of assistance donning and doffing all LE clothes with adaptive equipment while edge of bed  (Not Progressing)       Start:  04/18/25    Expected End:  05/02/25            Patient will complete daily grooming tasks brushing teeth and washing face/hair with independent level of assistance while standing. (Met)       Start:  04/18/25    Expected End:  05/02/25    Resolved:  04/23/25         Patient will complete toileting including hygiene clothing management/hygiene with independent level of assistance and raised toilet seat. (Not met)       Start:  04/18/25    Expected End:  05/01/25    Resolved:  04/23/25    Updated to: Patient will complete toileting including hygiene clothing management/hygiene with moderate assist level of assistance and raised toilet seat.    Update reason: Goal Revision         Patient will complete toileting including hygiene clothing management/hygiene with moderate assist level of assistance and raised toilet seat. (Progressing)       Start:  04/23/25    Expected End:  05/01/25                    BALANCE       Pt will maintain dynamic standing balance during ADL task with independent level of assistance in order to demonstrate decreased risk of falling and improved postural control. (Progressing)       Start:  04/18/25    Expected End:  05/01/25               TRANSFERS       Patient will perform bed mobility independent level of assistance and bed rails in order to improve safety and independence with mobility (Progressing)       Start:  04/18/25    Expected End:  05/02/25            Patient will complete functional transfer to all surfaces with front wheeled walker with modified independent level of assistance. (Progressing)       Start:  04/18/25    Expected End:  05/02/25                      [1]   Past Medical History:  Diagnosis Date    Chronic back pain     CKD (chronic kidney disease)     Diabetes mellitus (Multi)     HF (heart failure), diastolic     Hypertension

## 2025-04-24 LAB
ALBUMIN SERPL BCP-MCNC: 3.6 G/DL (ref 3.4–5)
ANION GAP SERPL CALC-SCNC: 12 MMOL/L (ref 10–20)
BUN SERPL-MCNC: 31 MG/DL (ref 6–23)
CALCIUM SERPL-MCNC: 8.9 MG/DL (ref 8.6–10.3)
CARDIAC TROPONIN I PNL SERPL HS: 16 NG/L (ref 0–20)
CHLORIDE SERPL-SCNC: 94 MMOL/L (ref 98–107)
CO2 SERPL-SCNC: 35 MMOL/L (ref 21–32)
CREAT SERPL-MCNC: 1.27 MG/DL (ref 0.5–1.3)
EGFRCR SERPLBLD CKD-EPI 2021: 67 ML/MIN/1.73M*2
ERYTHROCYTE [DISTWIDTH] IN BLOOD BY AUTOMATED COUNT: 16.4 % (ref 11.5–14.5)
GLUCOSE BLD MANUAL STRIP-MCNC: 192 MG/DL (ref 74–99)
GLUCOSE BLD MANUAL STRIP-MCNC: 213 MG/DL (ref 74–99)
GLUCOSE BLD MANUAL STRIP-MCNC: 216 MG/DL (ref 74–99)
GLUCOSE BLD MANUAL STRIP-MCNC: 250 MG/DL (ref 74–99)
GLUCOSE SERPL-MCNC: 292 MG/DL (ref 74–99)
HCT VFR BLD AUTO: 34.7 % (ref 41–52)
HGB BLD-MCNC: 9.5 G/DL (ref 13.5–17.5)
MCH RBC QN AUTO: 22.9 PG (ref 26–34)
MCHC RBC AUTO-ENTMCNC: 27.4 G/DL (ref 32–36)
MCV RBC AUTO: 84 FL (ref 80–100)
NRBC BLD-RTO: 0 /100 WBCS (ref 0–0)
PHOSPHATE SERPL-MCNC: 3.1 MG/DL (ref 2.5–4.9)
PLATELET # BLD AUTO: 226 X10*3/UL (ref 150–450)
POTASSIUM SERPL-SCNC: 3.6 MMOL/L (ref 3.5–5.3)
RBC # BLD AUTO: 4.14 X10*6/UL (ref 4.5–5.9)
SODIUM SERPL-SCNC: 137 MMOL/L (ref 136–145)
WBC # BLD AUTO: 7 X10*3/UL (ref 4.4–11.3)

## 2025-04-24 PROCEDURE — 2500000004 HC RX 250 GENERAL PHARMACY W/ HCPCS (ALT 636 FOR OP/ED): Mod: JZ | Performed by: INTERNAL MEDICINE

## 2025-04-24 PROCEDURE — 36415 COLL VENOUS BLD VENIPUNCTURE: CPT | Performed by: INTERNAL MEDICINE

## 2025-04-24 PROCEDURE — 82947 ASSAY GLUCOSE BLOOD QUANT: CPT

## 2025-04-24 PROCEDURE — 99232 SBSQ HOSP IP/OBS MODERATE 35: CPT | Performed by: INTERNAL MEDICINE

## 2025-04-24 PROCEDURE — 85027 COMPLETE CBC AUTOMATED: CPT | Performed by: INTERNAL MEDICINE

## 2025-04-24 PROCEDURE — 99233 SBSQ HOSP IP/OBS HIGH 50: CPT | Performed by: INTERNAL MEDICINE

## 2025-04-24 PROCEDURE — 2500000002 HC RX 250 W HCPCS SELF ADMINISTERED DRUGS (ALT 637 FOR MEDICARE OP, ALT 636 FOR OP/ED): Performed by: INTERNAL MEDICINE

## 2025-04-24 PROCEDURE — 2500000001 HC RX 250 WO HCPCS SELF ADMINISTERED DRUGS (ALT 637 FOR MEDICARE OP): Performed by: INTERNAL MEDICINE

## 2025-04-24 PROCEDURE — 1100000001 HC PRIVATE ROOM DAILY

## 2025-04-24 PROCEDURE — 84484 ASSAY OF TROPONIN QUANT: CPT | Performed by: INTERNAL MEDICINE

## 2025-04-24 PROCEDURE — 99231 SBSQ HOSP IP/OBS SF/LOW 25: CPT | Performed by: INTERNAL MEDICINE

## 2025-04-24 PROCEDURE — 80069 RENAL FUNCTION PANEL: CPT | Performed by: INTERNAL MEDICINE

## 2025-04-24 RX ADMIN — HYDROMORPHONE HYDROCHLORIDE 0.6 MG: 1 INJECTION, SOLUTION INTRAMUSCULAR; INTRAVENOUS; SUBCUTANEOUS at 02:06

## 2025-04-24 RX ADMIN — INSULIN LISPRO 2 UNITS: 100 INJECTION, SOLUTION INTRAVENOUS; SUBCUTANEOUS at 17:18

## 2025-04-24 RX ADMIN — AMLODIPINE BESYLATE 10 MG: 10 TABLET ORAL at 08:48

## 2025-04-24 RX ADMIN — INSULIN LISPRO 35 UNITS: 100 INJECTION, SOLUTION INTRAVENOUS; SUBCUTANEOUS at 08:49

## 2025-04-24 RX ADMIN — ENOXAPARIN SODIUM 60 MG: 60 INJECTION SUBCUTANEOUS at 08:47

## 2025-04-24 RX ADMIN — ENOXAPARIN SODIUM 60 MG: 60 INJECTION SUBCUTANEOUS at 20:36

## 2025-04-24 RX ADMIN — ASPIRIN 81 MG: 81 TABLET, COATED ORAL at 08:47

## 2025-04-24 RX ADMIN — FUROSEMIDE 80 MG: 10 INJECTION, SOLUTION INTRAMUSCULAR; INTRAVENOUS at 08:50

## 2025-04-24 RX ADMIN — METHOCARBAMOL 500 MG: 500 TABLET ORAL at 08:47

## 2025-04-24 RX ADMIN — INSULIN LISPRO 4 UNITS: 100 INJECTION, SOLUTION INTRAVENOUS; SUBCUTANEOUS at 08:49

## 2025-04-24 RX ADMIN — DULOXETINE HYDROCHLORIDE 60 MG: 30 CAPSULE, DELAYED RELEASE ORAL at 08:47

## 2025-04-24 RX ADMIN — HYDROCODONE BITARTRATE AND ACETAMINOPHEN 1 TABLET: 5; 325 TABLET ORAL at 20:36

## 2025-04-24 RX ADMIN — ATORVASTATIN CALCIUM 80 MG: 80 TABLET, FILM COATED ORAL at 08:48

## 2025-04-24 RX ADMIN — HYDROXYZINE HYDROCHLORIDE 25 MG: 25 TABLET, FILM COATED ORAL at 02:06

## 2025-04-24 RX ADMIN — INSULIN LISPRO 4 UNITS: 100 INJECTION, SOLUTION INTRAVENOUS; SUBCUTANEOUS at 12:49

## 2025-04-24 RX ADMIN — INSULIN GLARGINE 60 UNITS: 100 INJECTION, SOLUTION SUBCUTANEOUS at 08:48

## 2025-04-24 RX ADMIN — OXYCODONE HYDROCHLORIDE 5 MG: 5 TABLET ORAL at 08:47

## 2025-04-24 RX ADMIN — INSULIN LISPRO 35 UNITS: 100 INJECTION, SOLUTION INTRAVENOUS; SUBCUTANEOUS at 12:49

## 2025-04-24 RX ADMIN — INSULIN GLARGINE 60 UNITS: 100 INJECTION, SOLUTION SUBCUTANEOUS at 20:35

## 2025-04-24 RX ADMIN — QUETIAPINE FUMARATE 100 MG: 100 TABLET ORAL at 20:36

## 2025-04-24 RX ADMIN — PANTOPRAZOLE SODIUM 40 MG: 40 TABLET, DELAYED RELEASE ORAL at 06:57

## 2025-04-24 RX ADMIN — HYDROMORPHONE HYDROCHLORIDE 0.6 MG: 1 INJECTION, SOLUTION INTRAMUSCULAR; INTRAVENOUS; SUBCUTANEOUS at 18:23

## 2025-04-24 RX ADMIN — FUROSEMIDE 80 MG: 10 INJECTION, SOLUTION INTRAMUSCULAR; INTRAVENOUS at 17:22

## 2025-04-24 RX ADMIN — INSULIN LISPRO 35 UNITS: 100 INJECTION, SOLUTION INTRAVENOUS; SUBCUTANEOUS at 17:18

## 2025-04-24 RX ADMIN — COLCHICINE 0.6 MG: 0.6 TABLET, FILM COATED ORAL at 08:48

## 2025-04-24 ASSESSMENT — COGNITIVE AND FUNCTIONAL STATUS - GENERAL
MOBILITY SCORE: 22
MOBILITY SCORE: 22
DAILY ACTIVITIY SCORE: 19
DAILY ACTIVITIY SCORE: 19
DRESSING REGULAR UPPER BODY CLOTHING: A LOT
PERSONAL GROOMING: A LOT
TOILETING: A LITTLE
STANDING UP FROM CHAIR USING ARMS: A LOT
WALKING IN HOSPITAL ROOM: A LITTLE
MOBILITY SCORE: 12
HELP NEEDED FOR BATHING: A LITTLE
CLIMB 3 TO 5 STEPS WITH RAILING: A LITTLE
TOILETING: A LOT
WALKING IN HOSPITAL ROOM: A LOT
PERSONAL GROOMING: A LITTLE
TOILETING: A LITTLE
PERSONAL GROOMING: A LITTLE
DRESSING REGULAR LOWER BODY CLOTHING: A LITTLE
DRESSING REGULAR LOWER BODY CLOTHING: A LOT
CLIMB 3 TO 5 STEPS WITH RAILING: A LOT
HELP NEEDED FOR BATHING: A LOT
HELP NEEDED FOR BATHING: A LITTLE
TURNING FROM BACK TO SIDE WHILE IN FLAT BAD: A LOT
DRESSING REGULAR UPPER BODY CLOTHING: A LITTLE
EATING MEALS: A LITTLE
DAILY ACTIVITIY SCORE: 13
DRESSING REGULAR UPPER BODY CLOTHING: A LITTLE
MOVING TO AND FROM BED TO CHAIR: A LOT
WALKING IN HOSPITAL ROOM: A LITTLE
MOVING FROM LYING ON BACK TO SITTING ON SIDE OF FLAT BED WITH BEDRAILS: A LOT
DRESSING REGULAR LOWER BODY CLOTHING: A LITTLE
CLIMB 3 TO 5 STEPS WITH RAILING: A LITTLE

## 2025-04-24 ASSESSMENT — PAIN SCALES - GENERAL
PAINLEVEL_OUTOF10: 7
PAINLEVEL_OUTOF10: 7
PAINLEVEL_OUTOF10: 6
PAINLEVEL_OUTOF10: 6
PAINLEVEL_OUTOF10: 0 - NO PAIN
PAINLEVEL_OUTOF10: 0 - NO PAIN
PAINLEVEL_OUTOF10: 7
PAINLEVEL_OUTOF10: 0 - NO PAIN
PAINLEVEL_OUTOF10: 3
PAINLEVEL_OUTOF10: 3
PAINLEVEL_OUTOF10: 0 - NO PAIN

## 2025-04-24 ASSESSMENT — PAIN DESCRIPTION - LOCATION
LOCATION: LEG
LOCATION: LEG
LOCATION: GENERALIZED

## 2025-04-24 ASSESSMENT — PAIN - FUNCTIONAL ASSESSMENT
PAIN_FUNCTIONAL_ASSESSMENT: 0-10

## 2025-04-24 ASSESSMENT — PAIN DESCRIPTION - ORIENTATION
ORIENTATION: RIGHT;LEFT
ORIENTATION: RIGHT;LEFT

## 2025-04-24 ASSESSMENT — PAIN DESCRIPTION - DESCRIPTORS
DESCRIPTORS: ACHING
DESCRIPTORS: ACHING

## 2025-04-24 NOTE — PROGRESS NOTES
Jai Shrestha is a 55 y.o. male on day 9 of admission presenting with Healthcare-associated pneumonia.    Plan: continues treatment for PNA/CHF, Cardiology following- still being diuresed. Will need outpatient stress testing and follow up with Cardiology outpatient.  Disposition: Home Alone/ [facility]  Barrier: diuresis, cardio clearance  ADOD:  2-3 days       04/24/25 0720   Discharge Planning   Support Systems Friends/neighbors;/  (patient's neighbor ; cooks, take garbage out and house keeping; patient has a mental health therapist @ [facility]; she schedules appointments and transportation)   Type of Residence Private residence   Home or Post Acute Services In home services   Type of Home Care Services Home OT;Home PT;Home nursing visits   Expected Discharge Disposition [facility]  ([facility])   Does the patient need discharge transport arranged? Yes   RoundTrip coordination needed? Yes   Has discharge transport been arranged? No   What day is the transport expected? 04/26/25   Intensity of Service   Intensity of Service 0-30 min       Jacqui Sarmiento RN

## 2025-04-24 NOTE — CARE PLAN
The patient's goals for the shift include safety    The clinical goals for the shift include safety    Over the shift, the patient did not make progress toward the following goals. Barriers to progression include . Recommendations to address these barriers include   Problem: Pain  Goal: Takes deep breaths with improved pain control throughout the shift  Outcome: Progressing  Goal: Turns in bed with improved pain control throughout the shift  Outcome: Progressing  Goal: Walks with improved pain control throughout the shift  Outcome: Progressing  Goal: Performs ADL's with improved pain control throughout shift  Outcome: Progressing  Goal: Participates in PT with improved pain control throughout the shift  Outcome: Progressing  Goal: Free from opioid side effects throughout the shift  Outcome: Progressing  Goal: Free from acute confusion related to pain meds throughout the shift  Outcome: Progressing   .

## 2025-04-24 NOTE — PROGRESS NOTES
"Jai Shrestha is a 55 y.o. male on day 9 of admission presenting with Healthcare-associated pneumonia.    Subjective   Patient asleep    Scheduled medications  Scheduled Medications[1]  Continuous medications  Continuous Medications[2]  PRN medications  PRN Medications[3]       Objective   Physical Exam  Constitutional:       General: He is not in acute distress.        Last Recorded Vitals  Blood pressure 135/74, pulse 86, temperature 36.6 °C (97.9 °F), temperature source Temporal, resp. rate 19, height 1.803 m (5' 10.98\"), weight (!) 177 kg (390 lb), SpO2 93%.  Intake/Output last 3 Shifts:  I/O last 3 completed shifts:  In: 1500 (8.5 mL/kg) [P.O.:1500]  Out: 1000 (5.7 mL/kg) [Urine:1000 (0.2 mL/kg/hr)]  Weight: 176.9 kg     Relevant Results  Results from last 7 days   Lab Units 04/23/25  1953 04/23/25  1518 04/23/25  1137 04/23/25  0744 04/23/25  0552 04/22/25 2009 04/22/25  0749 04/22/25  0544 04/21/25  0828 04/21/25  0652 04/20/25  0726 04/20/25  0706 04/19/25  1227 04/19/25  0927   POCT GLUCOSE mg/dL 162* 170* 244* 168*  --  196*   < >  --    < >  --    < >  --    < >  --    GLUCOSE mg/dL  --   --   --   --  192*  --   --  203*  --  280*  --  206*  --  197*    < > = values in this interval not displayed.       Assessment & Plan  Healthcare-associated pneumonia    IMPRESSION  TYPE 2 DIABETES MELLITUS  LONG TERM CURRENT INSULIN USE  High and unusual insulin regimen  He typically requires less insulin in the hospital           RECOMMENDATIONS  Insulin glargine 60 units BID  Lispro 35 units QAC plus scale (home dose is 50 units), HOLD if NPO or glucose under 100 mg/dl.       Ashwin Cornejo MD         [1] amLODIPine, 10 mg, oral, Daily  aspirin, 81 mg, oral, Daily  atorvastatin, 80 mg, oral, Daily  colchicine, 0.6 mg, oral, Daily  DULoxetine, 60 mg, oral, Daily  enoxaparin, 60 mg, subcutaneous, q12h TAMIR  furosemide, 80 mg, intravenous, BID  insulin glargine, 60 Units, subcutaneous, q12h  insulin lispro, 0-10 " Units, subcutaneous, TID  insulin lispro, 35 Units, subcutaneous, TID AC  pantoprazole, 40 mg, oral, Daily before breakfast   Or  pantoprazole, 40 mg, intravenous, Daily before breakfast  perflutren protein A microsphere, 0.5 mL, intravenous, Once in imaging  QUEtiapine, 100 mg, oral, Nightly  regadenoson, 0.4 mg, intravenous, Once in imaging  sulfur hexafluoride microsphr, 2 mL, intravenous, Once in imaging  [2]    [3] PRN medications: aminophylline, dextrose, dextrose, glucagon, glucagon, HYDROcodone-acetaminophen, HYDROmorphone, hydrOXYzine HCL, methocarbamol, ondansetron **OR** ondansetron, oxyCODONE

## 2025-04-24 NOTE — PROGRESS NOTES
Jai Shrestha is a 55 y.o. male on day 9 of admission presenting with Healthcare-associated pneumonia.      Subjective   Patient was seen and examined bedside this morning       Objective     Last Recorded Vitals  /71 (BP Location: Right arm, Patient Position: Sitting)   Pulse 81   Temp 35.9 °C (96.6 °F) (Temporal)   Resp 18   Wt (!) 177 kg (390 lb)   SpO2 95%   Intake/Output last 3 Shifts:    Intake/Output Summary (Last 24 hours) at 4/24/2025 1111  Last data filed at 4/23/2025 2315  Gross per 24 hour   Intake 980 ml   Output 500 ml   Net 480 ml       Admission Weight  Weight: (!) 186 kg (411 lb) (04/14/25 1901)    Daily Weight  04/23/25 : (!) 177 kg (390 lb)    Image Results  Electrocardiogram, 12-lead PRN ACS symptoms  Normal sinus rhythm  Nonspecific T wave abnormality  Abnormal ECG  When compared with ECG of 14-APR-2025 20:51,  ST no longer depressed in Anterior leads  Nonspecific T wave abnormality no longer evident in Anterior leads      Physical Exam  Constitutional: Obese gentleman, appears drowsy, somnolent, but respond to questions, cooperative not in acute distress  Eyes: PERRLA, clear sclera  ENMT: Moist mucosal membranes, no exudate  Head / Neck: Atraumatic, normocephalic, supple neck, JVP not visualized  Lungs: Patent airways, CTABL  Heart: RRR, S1S2, no murmurs appreciated, palpable pulses in all extremities  GI: Soft, NT, ND, bowel sounds present in all quadrants  MSK: Moves all extremities freely, no restriction  of ROM, no joint edema  Extremities: Chronic lymphedema with venous ulcers, bilateral lower extremities peripheral edema  Neurological: AAO x 3 to person, place and date, facial muscles symmetrical, sensation intact, strength 4/4, no acute focal neurological deficits appreciated  Psychological: Appropriate mood and behavior  Relevant Results                Scheduled medications  Scheduled Medications[1]  Continuous medications  Continuous Medications[2]  PRN medications  PRN  Medications[3]        Assessment & Plan  Healthcare-associated pneumonia    55 y.o. male with a past medical history of chronic back pain, peripheral neuropathy, diabetes mellitus type 2 with insulin resistance, morbid obesity BMI 57.3, CKD 3, diastolic CHF, hypertension who presented to [facility] ER complaining of shortness of breath and lower extremity edema.  He also complains of intermittent chest pain but none currently.  He says that his chest pain is midsternal and a burning sensation       Left upper lobe pneumonia  -s/p Vancomycin and Zosyn on admission  - Completed ceftriaxone 2 g IV piggyback daily  -Urine antigens and strep pneumo and Legionella negative  -completed antibiotics course    Diastolic CHF, decompensated  -Daily weight  -Cardiology consulted: Appreciate evaluation and recommendations, continue diuresis  -continue IV lasix BID      Drowsy in the a.m. on April 19, likely from Seroquel (took 400 mg at bedtime, reportedly his home dose)  -ECG negative for QTc prolongation  -Telemetry monitoring  -Seroquel reduced to 200 mg at bedtime, please monitor for drowsiness in the morning and hold indefinitely if drowsy will reduce to 100 mg      Elevated troponin flat trend  -Given aspirin in the ER  -Telemetry  -Cardiology: Appreciate evaluation recommendations, continue diuresis to achieve euvolemic state  -No acute coronary syndrome     Type 2 diabetes mellitus requiring insulin with insulin resistance  -At home he takes Lantus 80 units 3 times a day and lispro insulin 15 units 3 times a day  -Endocrine consulted: Appreciate evaluation management  -Diabetic carbohydrate controlled diet     Hypertension  -Amlodipine 5 mg orally daily     Gout  -Uric acid level check  -Start colchicine 0.6 mg p.o. daily     Hyperlipidemia  -Atorvastatin 80 mg orally daily     Bilateral lymphedema with venous stasis changes and chronic non healing ulcers  -Chronic cellulitis sees wound care nurse at home for  venous ulcers  -Diuresis with Lasix  -Pain control: Oxycodone IR discontinued, Percocet started, methocarbamol 500 mg twice daily added  - ID consulted: Appreciate recommendation, recommend transitioning to Keflex 1000 mg p.o. 3 times daily x 7 days at discharge.  Transition to oral currently after completing ceftriaxone for pneumonia treatment     Morbid obesity BMI 57.3  -Lifestyle modification     DVT prophylaxis  -Lovenox 60 mg subcutaneously every 12 hours  -SCDs     Disposition: Presented with shortness of breath, found to be heart failure and pneumonia, discharge pending clinical improvement, final recommendation by cardiology        ADOD pending clearance by cardiology      Ronan Dumont MD           [1] amLODIPine, 10 mg, oral, Daily  aspirin, 81 mg, oral, Daily  atorvastatin, 80 mg, oral, Daily  colchicine, 0.6 mg, oral, Daily  DULoxetine, 60 mg, oral, Daily  enoxaparin, 60 mg, subcutaneous, q12h TAMIR  furosemide, 80 mg, intravenous, BID  insulin glargine, 60 Units, subcutaneous, q12h  insulin lispro, 0-10 Units, subcutaneous, TID  insulin lispro, 35 Units, subcutaneous, TID AC  pantoprazole, 40 mg, oral, Daily before breakfast   Or  pantoprazole, 40 mg, intravenous, Daily before breakfast  perflutren protein A microsphere, 0.5 mL, intravenous, Once in imaging  QUEtiapine, 100 mg, oral, Nightly  regadenoson, 0.4 mg, intravenous, Once in imaging  sulfur hexafluoride microsphr, 2 mL, intravenous, Once in imaging     [2]    [3] PRN medications: aminophylline, dextrose, dextrose, glucagon, glucagon, HYDROcodone-acetaminophen, HYDROmorphone, hydrOXYzine HCL, methocarbamol, ondansetron **OR** ondansetron, oxyCODONE

## 2025-04-24 NOTE — CARE PLAN
Problem: Pain  Goal: Takes deep breaths with improved pain control throughout the shift  4/24/2025 0606 by Maryann Stuart RN  Outcome: Progressing  4/24/2025 0606 by Maryann Stuart RN  Outcome: Progressing  Goal: Turns in bed with improved pain control throughout the shift  4/24/2025 0606 by Maryann Stuart RN  Outcome: Progressing  4/24/2025 0606 by Maryann Stuart RN  Outcome: Progressing  Goal: Walks with improved pain control throughout the shift  4/24/2025 0606 by Maryann Stuart RN  Outcome: Progressing  4/24/2025 0606 by Maryann Stuart RN  Outcome: Progressing  Goal: Performs ADL's with improved pain control throughout shift  4/24/2025 0606 by Maryann Stuart RN  Outcome: Progressing  4/24/2025 0606 by Maryann Stuart RN  Outcome: Progressing  Goal: Participates in PT with improved pain control throughout the shift  4/24/2025 0606 by Maryann Stuart RN  Outcome: Progressing  4/24/2025 0606 by Maryann Stuart RN  Outcome: Progressing  Goal: Free from opioid side effects throughout the shift  4/24/2025 0606 by Maryann Stuart RN  Outcome: Progressing  4/24/2025 0606 by Maryann Stuart RN  Outcome: Progressing  Goal: Free from acute confusion related to pain meds throughout the shift  4/24/2025 0606 by Maryann Stuart RN  Outcome: Progressing  4/24/2025 0606 by Maryann Stuart RN  Outcome: Progressing   The patient's goals for the shift include safety    The clinical goals for the shift include safety

## 2025-04-24 NOTE — CARE PLAN
Problem: Pain  Goal: Takes deep breaths with improved pain control throughout the shift  Outcome: Progressing  Goal: Turns in bed with improved pain control throughout the shift  Outcome: Progressing  Goal: Walks with improved pain control throughout the shift  Outcome: Progressing  Goal: Performs ADL's with improved pain control throughout shift  Outcome: Progressing  Goal: Participates in PT with improved pain control throughout the shift  Outcome: Progressing  Goal: Free from opioid side effects throughout the shift  Outcome: Progressing  Goal: Free from acute confusion related to pain meds throughout the shift  Outcome: Progressing   The patient's goals for the shift include safety    The clinical goals for the shift include safety

## 2025-04-24 NOTE — PROGRESS NOTES
"Subjective Data:  No complaints. Reports robust urine output but poor I/O's recording by staff.        Objective Data:  Last Recorded Vitals:  Vitals:    25 1923 25 2345 25 0350 25 0814   BP: 152/73 142/79 135/74 139/71   BP Location: Right arm Right arm Right arm Right arm   Patient Position: Sitting Lying Lying Sitting   Pulse: 88 82 86 81   Resp:    Temp: 36.4 °C (97.5 °F) 36.5 °C (97.7 °F) 36.6 °C (97.9 °F) 35.9 °C (96.6 °F)   TempSrc: Temporal Temporal Temporal Temporal   SpO2: 92% 94% 93% 95%   Weight:       Height:         Medical Gas Therapy: None (Room air)  Medical Gas Delivery Method: Nasal cannula  Weight  Av kg (403 lb 5.4 oz)  Min: 177 kg (390 lb)  Max: 186 kg (411 lb)    LABS:  CMP:  Results from last 7 days   Lab Units 25  0541 25  0552 25  0544 25  0652 25  0706 25  0927 25  0744   SODIUM mmol/L 137 139 137 135* 136 139 137   POTASSIUM mmol/L 3.6 3.8 3.3* 3.3* 3.4* 3.5 3.6   CHLORIDE mmol/L 94* 97* 93* 90* 90* 93* 90*   CO2 mmol/L 35* 38* 38* 39* 39* 38* 38*   ANION GAP mmol/L 12 8* 9* 9* 10 12 13   BUN mg/dL 31* 29* 30* 29* 30* 30* 31*   CREATININE mg/dL 1.27 1.22 1.27 1.35* 1.34* 1.50* 1.67*   EGFR mL/min/1.73m*2 67 70 67 62 63 55* 48*   ALBUMIN g/dL 3.6 3.5 3.6 3.5 3.8  --   --      CBC:  Results from last 7 days   Lab Units 25  0541 25  0552 25  0544 25  0652 25  0706 25  0744   WBC AUTO x10*3/uL 7.0 6.1 7.7 7.8 9.0 8.8   HEMOGLOBIN g/dL 9.5* 9.5* 9.2* 9.3* 9.6* 9.5*   HEMATOCRIT % 34.7* 32.2* 31.3* 31.4* 32.6* 32.1*   PLATELETS AUTO x10*3/uL 226 205 231 230 229 240   MCV fL 84 77* 80 80 80 80     COAG:         ABO: No results found for: \"ABO\"  HEME/ENDO:         CARDIAC:         No lab exists for component: \"CK\", \"CKMBP\"              Last I/O:    Intake/Output Summary (Last 24 hours) at 2025 1055  Last data filed at 2025 2315  Gross per 24 hour   Intake 980 ml "   Output 500 ml   Net 480 ml     Net IO Since Admission: -17,820 mL [04/24/25 1055]            Inpatient Medications:  Scheduled Medications[1]  PRN Medications[2]  Continuous Medications[3]        Physical Exam:  Gen Well appearing middle aged male sitting up in NAD. Body mass index is 54.42 kg/m².   CV rrr. No m/r/g appreciated. No JVD. +3 bilateral leg edema.   Pulm Lungs clear with normal respiratory effort.  Neuro Alert and conversant. Grossly nonfocal.         Assessment:  Acute on chronic HFpEF  Volume++ with some continued improvement. Urine output acceptable as of late minus some ongoing inaccurate I/O's.    2. Elevated troponin  Non-MI troponin elevation / acute non-traumatic myocardial injury. Core measures do not apply. Noted in the setting of volume overload and PNA. Noted CAC on CT with diabetes mellitus type II--on statin as is appropriate. Last LDL was quite low (< 50).    3. Hypertension   BP variable but acceptable under the present circumstances. Monitoring with diuresis.       Recommendations:  Con't IV diuretics. Strict I/O's. Daily weights. Monitor Mag/K and supplement to >2/4. Close monitoring of renal function. Plan for outpatient stress testing.          Robby Gutierrez MD          [1]   Scheduled medications   Medication Dose Route Frequency    amLODIPine  10 mg oral Daily    aspirin  81 mg oral Daily    atorvastatin  80 mg oral Daily    colchicine  0.6 mg oral Daily    DULoxetine  60 mg oral Daily    enoxaparin  60 mg subcutaneous q12h TAMIR    furosemide  80 mg intravenous BID    insulin glargine  60 Units subcutaneous q12h    insulin lispro  0-10 Units subcutaneous TID    insulin lispro  35 Units subcutaneous TID AC    pantoprazole  40 mg oral Daily before breakfast    Or    pantoprazole  40 mg intravenous Daily before breakfast    perflutren protein A microsphere  0.5 mL intravenous Once in imaging    QUEtiapine  100 mg oral Nightly    regadenoson  0.4 mg intravenous Once in imaging     sulfur hexafluoride microsphr  2 mL intravenous Once in imaging   [2]   PRN medications   Medication    aminophylline    dextrose    dextrose    glucagon    glucagon    HYDROcodone-acetaminophen    HYDROmorphone    hydrOXYzine HCL    methocarbamol    ondansetron    Or    ondansetron    oxyCODONE   [3]   Continuous Medications   Medication Dose Last Rate

## 2025-04-25 LAB
ALBUMIN SERPL BCP-MCNC: 3.6 G/DL (ref 3.4–5)
ANION GAP SERPL CALC-SCNC: 11 MMOL/L (ref 10–20)
ATRIAL RATE: 82 BPM
BUN SERPL-MCNC: 33 MG/DL (ref 6–23)
CALCIUM SERPL-MCNC: 8.9 MG/DL (ref 8.6–10.3)
CHLORIDE SERPL-SCNC: 93 MMOL/L (ref 98–107)
CO2 SERPL-SCNC: 38 MMOL/L (ref 21–32)
CREAT SERPL-MCNC: 1.3 MG/DL (ref 0.5–1.3)
EGFRCR SERPLBLD CKD-EPI 2021: 65 ML/MIN/1.73M*2
ERYTHROCYTE [DISTWIDTH] IN BLOOD BY AUTOMATED COUNT: 16.1 % (ref 11.5–14.5)
GLUCOSE BLD MANUAL STRIP-MCNC: 136 MG/DL (ref 74–99)
GLUCOSE BLD MANUAL STRIP-MCNC: 182 MG/DL (ref 74–99)
GLUCOSE BLD MANUAL STRIP-MCNC: 239 MG/DL (ref 74–99)
GLUCOSE BLD MANUAL STRIP-MCNC: 286 MG/DL (ref 74–99)
GLUCOSE SERPL-MCNC: 312 MG/DL (ref 74–99)
HCT VFR BLD AUTO: 34.2 % (ref 41–52)
HGB BLD-MCNC: 10.2 G/DL (ref 13.5–17.5)
MCH RBC QN AUTO: 23.5 PG (ref 26–34)
MCHC RBC AUTO-ENTMCNC: 29.8 G/DL (ref 32–36)
MCV RBC AUTO: 79 FL (ref 80–100)
NRBC BLD-RTO: 0 /100 WBCS (ref 0–0)
P AXIS: 22 DEGREES
P OFFSET: 187 MS
P ONSET: 120 MS
PHOSPHATE SERPL-MCNC: 4 MG/DL (ref 2.5–4.9)
PLATELET # BLD AUTO: 242 X10*3/UL (ref 150–450)
POTASSIUM SERPL-SCNC: 3.9 MMOL/L (ref 3.5–5.3)
PR INTERVAL: 192 MS
Q ONSET: 216 MS
QRS COUNT: 14 BEATS
QRS DURATION: 108 MS
QT INTERVAL: 350 MS
QTC CALCULATION(BAZETT): 408 MS
QTC FREDERICIA: 388 MS
R AXIS: 31 DEGREES
RBC # BLD AUTO: 4.34 X10*6/UL (ref 4.5–5.9)
SODIUM SERPL-SCNC: 138 MMOL/L (ref 136–145)
T AXIS: 122 DEGREES
T OFFSET: 391 MS
VENTRICULAR RATE: 82 BPM
WBC # BLD AUTO: 6.7 X10*3/UL (ref 4.4–11.3)

## 2025-04-25 PROCEDURE — 2500000002 HC RX 250 W HCPCS SELF ADMINISTERED DRUGS (ALT 637 FOR MEDICARE OP, ALT 636 FOR OP/ED): Performed by: INTERNAL MEDICINE

## 2025-04-25 PROCEDURE — 82947 ASSAY GLUCOSE BLOOD QUANT: CPT

## 2025-04-25 PROCEDURE — 36415 COLL VENOUS BLD VENIPUNCTURE: CPT | Performed by: INTERNAL MEDICINE

## 2025-04-25 PROCEDURE — 2500000004 HC RX 250 GENERAL PHARMACY W/ HCPCS (ALT 636 FOR OP/ED): Mod: JZ | Performed by: INTERNAL MEDICINE

## 2025-04-25 PROCEDURE — 2500000001 HC RX 250 WO HCPCS SELF ADMINISTERED DRUGS (ALT 637 FOR MEDICARE OP): Performed by: INTERNAL MEDICINE

## 2025-04-25 PROCEDURE — 99232 SBSQ HOSP IP/OBS MODERATE 35: CPT | Performed by: INTERNAL MEDICINE

## 2025-04-25 PROCEDURE — 99231 SBSQ HOSP IP/OBS SF/LOW 25: CPT | Performed by: INTERNAL MEDICINE

## 2025-04-25 PROCEDURE — 1100000001 HC PRIVATE ROOM DAILY

## 2025-04-25 PROCEDURE — 99233 SBSQ HOSP IP/OBS HIGH 50: CPT | Performed by: INTERNAL MEDICINE

## 2025-04-25 PROCEDURE — 85027 COMPLETE CBC AUTOMATED: CPT | Performed by: INTERNAL MEDICINE

## 2025-04-25 PROCEDURE — 84100 ASSAY OF PHOSPHORUS: CPT | Performed by: INTERNAL MEDICINE

## 2025-04-25 RX ORDER — INSULIN LISPRO 100 [IU]/ML
40 INJECTION, SOLUTION INTRAVENOUS; SUBCUTANEOUS
Status: DISCONTINUED | OUTPATIENT
Start: 2025-04-25 | End: 2025-04-27 | Stop reason: HOSPADM

## 2025-04-25 RX ADMIN — FUROSEMIDE 80 MG: 10 INJECTION, SOLUTION INTRAMUSCULAR; INTRAVENOUS at 08:15

## 2025-04-25 RX ADMIN — HYDROMORPHONE HYDROCHLORIDE 0.6 MG: 1 INJECTION, SOLUTION INTRAMUSCULAR; INTRAVENOUS; SUBCUTANEOUS at 04:08

## 2025-04-25 RX ADMIN — ENOXAPARIN SODIUM 60 MG: 60 INJECTION SUBCUTANEOUS at 21:45

## 2025-04-25 RX ADMIN — INSULIN LISPRO 6 UNITS: 100 INJECTION, SOLUTION INTRAVENOUS; SUBCUTANEOUS at 08:12

## 2025-04-25 RX ADMIN — ATORVASTATIN CALCIUM 80 MG: 80 TABLET, FILM COATED ORAL at 08:15

## 2025-04-25 RX ADMIN — HYDROCODONE BITARTRATE AND ACETAMINOPHEN 1 TABLET: 5; 325 TABLET ORAL at 21:46

## 2025-04-25 RX ADMIN — INSULIN GLARGINE 60 UNITS: 100 INJECTION, SOLUTION SUBCUTANEOUS at 21:44

## 2025-04-25 RX ADMIN — COLCHICINE 0.6 MG: 0.6 TABLET, FILM COATED ORAL at 08:15

## 2025-04-25 RX ADMIN — INSULIN LISPRO 2 UNITS: 100 INJECTION, SOLUTION INTRAVENOUS; SUBCUTANEOUS at 17:35

## 2025-04-25 RX ADMIN — QUETIAPINE FUMARATE 100 MG: 100 TABLET ORAL at 21:45

## 2025-04-25 RX ADMIN — FUROSEMIDE 80 MG: 10 INJECTION, SOLUTION INTRAMUSCULAR; INTRAVENOUS at 17:25

## 2025-04-25 RX ADMIN — ASPIRIN 81 MG: 81 TABLET, COATED ORAL at 08:15

## 2025-04-25 RX ADMIN — METHOCARBAMOL 500 MG: 500 TABLET ORAL at 23:10

## 2025-04-25 RX ADMIN — OXYCODONE HYDROCHLORIDE 5 MG: 5 TABLET ORAL at 17:30

## 2025-04-25 RX ADMIN — OXYCODONE HYDROCHLORIDE 5 MG: 5 TABLET ORAL at 23:10

## 2025-04-25 RX ADMIN — INSULIN LISPRO 35 UNITS: 100 INJECTION, SOLUTION INTRAVENOUS; SUBCUTANEOUS at 12:57

## 2025-04-25 RX ADMIN — AMLODIPINE BESYLATE 10 MG: 10 TABLET ORAL at 08:15

## 2025-04-25 RX ADMIN — INSULIN LISPRO 4 UNITS: 100 INJECTION, SOLUTION INTRAVENOUS; SUBCUTANEOUS at 12:58

## 2025-04-25 RX ADMIN — DULOXETINE HYDROCHLORIDE 60 MG: 30 CAPSULE, DELAYED RELEASE ORAL at 08:15

## 2025-04-25 RX ADMIN — INSULIN LISPRO 40 UNITS: 100 INJECTION, SOLUTION INTRAVENOUS; SUBCUTANEOUS at 17:35

## 2025-04-25 RX ADMIN — INSULIN GLARGINE 60 UNITS: 100 INJECTION, SOLUTION SUBCUTANEOUS at 08:13

## 2025-04-25 RX ADMIN — ENOXAPARIN SODIUM 60 MG: 60 INJECTION SUBCUTANEOUS at 08:15

## 2025-04-25 RX ADMIN — OXYCODONE HYDROCHLORIDE 5 MG: 5 TABLET ORAL at 08:20

## 2025-04-25 RX ADMIN — INSULIN LISPRO 35 UNITS: 100 INJECTION, SOLUTION INTRAVENOUS; SUBCUTANEOUS at 08:11

## 2025-04-25 RX ADMIN — PANTOPRAZOLE SODIUM 40 MG: 40 TABLET, DELAYED RELEASE ORAL at 06:29

## 2025-04-25 RX ADMIN — HYDROXYZINE HYDROCHLORIDE 25 MG: 25 TABLET, FILM COATED ORAL at 23:10

## 2025-04-25 ASSESSMENT — COGNITIVE AND FUNCTIONAL STATUS - GENERAL
DRESSING REGULAR UPPER BODY CLOTHING: A LOT
HELP NEEDED FOR BATHING: A LOT
MOBILITY SCORE: 22
WALKING IN HOSPITAL ROOM: A LITTLE
CLIMB 3 TO 5 STEPS WITH RAILING: A LITTLE
WALKING IN HOSPITAL ROOM: A LITTLE
PERSONAL GROOMING: A LITTLE
DAILY ACTIVITIY SCORE: 16
CLIMB 3 TO 5 STEPS WITH RAILING: A LITTLE
MOBILITY SCORE: 22
DRESSING REGULAR LOWER BODY CLOTHING: A LOT
TOILETING: A LITTLE

## 2025-04-25 ASSESSMENT — PAIN SCALES - GENERAL
PAINLEVEL_OUTOF10: 8
PAINLEVEL_OUTOF10: 8
PAINLEVEL_OUTOF10: 9
PAINLEVEL_OUTOF10: 7
PAINLEVEL_OUTOF10: 8
PAINLEVEL_OUTOF10: 9
PAINLEVEL_OUTOF10: 7

## 2025-04-25 ASSESSMENT — PAIN DESCRIPTION - DESCRIPTORS
DESCRIPTORS: ACHING;DISCOMFORT
DESCRIPTORS: ACHING
DESCRIPTORS: ACHING;DISCOMFORT
DESCRIPTORS: ACHING
DESCRIPTORS: ACHING;DISCOMFORT

## 2025-04-25 ASSESSMENT — PAIN - FUNCTIONAL ASSESSMENT
PAIN_FUNCTIONAL_ASSESSMENT: 0-10

## 2025-04-25 ASSESSMENT — PAIN DESCRIPTION - LOCATION
LOCATION: GENERALIZED

## 2025-04-25 NOTE — PROGRESS NOTES
Occupational Therapy                 Therapy Communication Note    Patient Name: Jai Shrestha  MRN: [id number]  Department: [facility]   Room: [address] [place] [address]  Today's Date: 4/25/2025     Discipline: Occupational Therapy    OT Missed Visit: Yes     Missed Visit Reason: Patient refused    Missed Time: Attempt

## 2025-04-25 NOTE — PROGRESS NOTES
Jai Shrestha is a 55 y.o. male on day 10 of admission presenting with Healthcare-associated pneumonia.      Subjective   Patient was seen and examined bedside this morning       Objective     Last Recorded Vitals  /63 (BP Location: Left arm, Patient Position: Lying)   Pulse 87   Temp 36.8 °C (98.3 °F) (Temporal)   Resp 18   Wt (!) 174 kg (383 lb 1.6 oz)   SpO2 98%   Intake/Output last 3 Shifts:    Intake/Output Summary (Last 24 hours) at 4/25/2025 1245  Last data filed at 4/25/2025 0400  Gross per 24 hour   Intake 480 ml   Output 3450 ml   Net -2970 ml       Admission Weight  Weight: (!) 186 kg (411 lb) (04/14/25 1901)    Daily Weight  04/25/25 : (!) 174 kg (383 lb 1.6 oz)    Image Results  Electrocardiogram, 12-lead PRN ACS symptoms  Normal sinus rhythm  Nonspecific T wave abnormality  Abnormal ECG  When compared with ECG of 14-APR-2025 20:51,  ST no longer depressed in Anterior leads  Nonspecific T wave abnormality no longer evident in Anterior leads      Physical Exam  Constitutional: Obese gentleman, appears drowsy, somnolent, but respond to questions, cooperative not in acute distress  Eyes: PERRLA, clear sclera  ENMT: Moist mucosal membranes, no exudate  Head / Neck: Atraumatic, normocephalic, supple neck, JVP not visualized  Lungs: Patent airways, CTABL  Heart: RRR, S1S2, no murmurs appreciated, palpable pulses in all extremities  GI: Soft, NT, ND, bowel sounds present in all quadrants  MSK: Moves all extremities freely, no restriction  of ROM, no joint edema  Extremities: Chronic lymphedema with venous ulcers, bilateral lower extremities peripheral edema  Neurological: AAO x 3 to person, place and date, facial muscles symmetrical, sensation intact, strength 4/4, no acute focal neurological deficits appreciated  Psychological: Appropriate mood and behavior  Relevant Results                Scheduled medications  Scheduled Medications[1]  Continuous medications  Continuous Medications[2]  PRN  medications  PRN Medications[3]        Assessment & Plan  Healthcare-associated pneumonia    55 y.o. male with a past medical history of chronic back pain, peripheral neuropathy, diabetes mellitus type 2 with insulin resistance, morbid obesity BMI 57.3, CKD 3, diastolic CHF, hypertension who presented to [facility] ER complaining of shortness of breath and lower extremity edema.  He also complains of intermittent chest pain but none currently.  He says that his chest pain is midsternal and a burning sensation       Left upper lobe pneumonia  -s/p Vancomycin and Zosyn on admission  - Completed ceftriaxone 2 g IV piggyback daily  -Urine antigens and strep pneumo and Legionella negative  -completed antibiotics course    Diastolic CHF, decompensated  -Daily weight  -Cardiology consulted: Appreciate evaluation and recommendations, continue diuresis  -continue IV lasix BID      Drowsy in the a.m. on April 19, likely from Seroquel (took 400 mg at bedtime, reportedly his home dose)  -ECG negative for QTc prolongation  -Telemetry monitoring  -Seroquel reduced to 200 mg at bedtime, please monitor for drowsiness in the morning and hold indefinitely if drowsy will reduce to 100 mg      Elevated troponin flat trend  -Given aspirin in the ER  -Telemetry  -Cardiology: Appreciate evaluation recommendations, continue diuresis to achieve euvolemic state  -No acute coronary syndrome     Type 2 diabetes mellitus requiring insulin with insulin resistance  -At home he takes Lantus 80 units 3 times a day and lispro insulin 15 units 3 times a day  -Endocrine consulted: Appreciate evaluation management  -Diabetic carbohydrate controlled diet     Hypertension  -Amlodipine 5 mg orally daily     Gout  -Uric acid level check  -Start colchicine 0.6 mg p.o. daily     Hyperlipidemia  -Atorvastatin 80 mg orally daily     Bilateral lymphedema with venous stasis changes and chronic non healing ulcers  -Chronic cellulitis sees wound care  nurse at home for venous ulcers  -Diuresis with Lasix  -Pain control: Oxycodone IR discontinued, Percocet started, methocarbamol 500 mg twice daily added  - ID consulted: Appreciate recommendation, recommend transitioning to Keflex 1000 mg p.o. 3 times daily x 7 days at discharge.  Transition to oral currently after completing ceftriaxone for pneumonia treatment     Morbid obesity BMI 57.3  -Lifestyle modification     DVT prophylaxis  -Lovenox 60 mg subcutaneously every 12 hours  -SCDs     Disposition: Presented with shortness of breath, found to be heart failure and pneumonia, discharge pending clinical improvement, final recommendation by cardiology        ADOD pending clearance by cardiology      Ronan Dumont MD           [1] amLODIPine, 10 mg, oral, Daily  aspirin, 81 mg, oral, Daily  atorvastatin, 80 mg, oral, Daily  colchicine, 0.6 mg, oral, Daily  DULoxetine, 60 mg, oral, Daily  enoxaparin, 60 mg, subcutaneous, q12h TAMIR  furosemide, 80 mg, intravenous, BID  insulin glargine, 60 Units, subcutaneous, q12h  insulin lispro, 0-10 Units, subcutaneous, TID  insulin lispro, 35 Units, subcutaneous, TID AC  pantoprazole, 40 mg, oral, Daily before breakfast   Or  pantoprazole, 40 mg, intravenous, Daily before breakfast  perflutren protein A microsphere, 0.5 mL, intravenous, Once in imaging  QUEtiapine, 100 mg, oral, Nightly  regadenoson, 0.4 mg, intravenous, Once in imaging  sulfur hexafluoride microsphr, 2 mL, intravenous, Once in imaging     [2]    [3] PRN medications: aminophylline, dextrose, dextrose, glucagon, glucagon, HYDROcodone-acetaminophen, HYDROmorphone, hydrOXYzine HCL, methocarbamol, ondansetron **OR** ondansetron, oxyCODONE

## 2025-04-25 NOTE — CARE PLAN
The patient's goals for the shift include safety    The clinical goals for the shift include To be free from fall/injury with increased activity

## 2025-04-25 NOTE — PROGRESS NOTES
"Physical Therapy                 Therapy Communication Note    Patient Name: Jai Shrestha  MRN: [id number]  Department: [place] [id number]  Room: [address] [place] [address]  Today's Date: 4/25/2025     Discipline: Physical Therapy    PT Missed Visit: Yes     Missed Visit Reason: Missed Visit Reason: Patient refused (Attempted PT f/u tx, verbose,declining tx \"I'm fucking tired of everyone coming in here, I want to be left alone\" Discussed with supervising PT.)    Missed Time: Attempt    Comment:  "

## 2025-04-25 NOTE — CARE PLAN
The patient's goals for the shift include safety    The clinical goals for the shift include safety      Problem: Skin  Goal: Decreased wound size/increased tissue granulation at next dressing change  Flowsheets (Taken 4/24/2025 2203)  Decreased wound size/increased tissue granulation at next dressing change:   Promote sleep for wound healing   Protective dressings over bony prominences   Utilize specialty bed per algorithm  Goal: Participates in plan/prevention/treatment measures  Flowsheets (Taken 4/24/2025 2203)  Participates in plan/prevention/treatment measures:   Discuss with provider PT/OT consult   Elevate heels   Increase activity/out of bed for meals  Goal: Prevent/manage excess moisture  Flowsheets (Taken 4/21/2025 1050 by Anjali Bello RN)  Prevent/manage excess moisture:   Cleanse incontinence/protect with barrier cream   Monitor for/manage infection if present   Moisturize dry skin   Use wicking fabric (obtain order)     Problem: Pain - Adult  Goal: Verbalizes/displays adequate comfort level or baseline comfort level  Flowsheets (Taken 4/24/2025 2203)  Verbalizes/displays adequate comfort level or baseline comfort level:   Encourage patient to monitor pain and request assistance   Administer analgesics based on type and severity of pain and evaluate response   Consider cultural and social influences on pain and pain management   Assess pain using appropriate pain scale   Implement non-pharmacological measures as appropriate and evaluate response   Notify Licensed Independent Practitioner if interventions unsuccessful or patient reports new pain     Problem: Safety - Adult  Goal: Free from fall injury  Flowsheets (Taken 4/24/2025 2203)  Free from fall injury:   Instruct family/caregiver on patient safety   Based on caregiver fall risk screen, instruct family/caregiver to ask for assistance with transferring infant if caregiver noted to have fall risk factors     Problem: Discharge Planning  Goal:  Discharge to home or other facility with appropriate resources  Flowsheets (Taken 4/24/2025 2203)  Discharge to home or other facility with appropriate resources:   Identify barriers to discharge with patient and caregiver   Identify discharge learning needs (meds, wound care, etc)   Refer to discharge planning if patient needs post-hospital services based on physician order or complex needs related to functional status, cognitive ability or social support system   Arrange for needed discharge resources and transportation as appropriate   Arrange for interpreters to assist at discharge as needed     Problem: Chronic Conditions and Co-morbidities  Goal: Patient's chronic conditions and co-morbidity symptoms are monitored and maintained or improved  Flowsheets (Taken 4/24/2025 2203)  Care Plan - Patient's Chronic Conditions and Co-Morbidity Symptoms are Monitored and Maintained or Improved:   Monitor and assess patient's chronic conditions and comorbid symptoms for stability, deterioration, or improvement   Collaborate with multidisciplinary team to address chronic and comorbid conditions and prevent exacerbation or deterioration   Update acute care plan with appropriate goals if chronic or comorbid symptoms are exacerbated and prevent overall improvement and discharge

## 2025-04-25 NOTE — PROGRESS NOTES
"Nutrition Follow-up Note  Nutrition Assessment      Jai Shrestha is a 55 y.o. year old male patient with Healthcare-associated pneumonia [J18.9]    On day #12 of hospital admission.    RD originally consulted for diet ed. Following up with pt d/t LOS.    Per pt, doing well today, feeling good.  Pt did have questions pertaining to CHF diet. Does want want to end up back in the hospital. Pt does eat many frozen tv dinners, encouraged (as a small step to reduce sodium) to choose lower sodium tv dinners like healthy choice. Encouraged reducing portion sizes of higher sodium foods.   Pt states his neighbor will making some meals for him during the week and would like some recipes. Encouraged pt to look at [employer], many heart healthy recipe options.    Dietary Orders (From admission, onward)       Start     Ordered    04/15/25 1307  Adult diet Cardiac, Consistent Carb, 2-3 grams sodium; CCD 90 gm/meal; 1500 mL fluid; 70 gm fat; 2 - 3 grams Sodium  Diet effective now        Question Answer Comment   Diet type Cardiac    Diet type Consistent Carb    Diet type 2-3 grams sodium    Carb diet selection: CCD 90 gm/meal    Dietary fluid restriction / 24h: 1500 mL fluid    Fat restriction: 70 gm fat    Sodium restriction: 2 - 3 grams Sodium        04/15/25 1306                     History:  Energy Intake: Good > 75 %    Anthropometrics:  Height: 180.3 cm (5' 10.98\")  Weight: (!) 174 kg (383 lb 1.6 oz)  BMI (Calculated): 53.46    Weight Change: -0.75    Weight         4/22/2025  0454 4/22/2025  0600 4/23/2025  1419 4/24/2025  1233 4/25/2025  0700    Weight: 183 kg (402 lb 12.5 oz) 183 kg (402 lb 12.5 oz) 177 kg (390 lb) 175 kg (386 lb) 174 kg (383 lb 1.6 oz)          Weight History / % Weight Change: 195 kg 6/13/24  Significant Weight Loss: Yes (Most likely d/t decreasing edema)       IBW/kg (Dietitian Calculated): 78.2 kg  Percent of IBW: 223 %       Nutrition Focused Physical Findings:  Orbital Fat Pads: Well nourished " (slightly bulging fat pads)  Buccal Fat Pads: Well nourished (full, rounded cheeks)    Temporalis: Well nourished (well-defined muscle)  Pectoralis (Clavicular Region): Well nourished (clavicle not visible)    Edema: +2 mild  Edema Location: BLE       Skin: Positive (Vascular ulcers (tibial) and DM ulcer)       Time Spent (min): 35 minutes  Last Date of Nutrition Visit: 04/25/25  Nutrition Follow-Up Needed?: Dietitian to reassess per policy  Follow up Comment: F/u, now LOS- Jmack

## 2025-04-25 NOTE — PROGRESS NOTES
"Subjective Data:  No complaints. Reports ongoing robust urine output.      Objective Data:  Last Recorded Vitals:  Vitals:    25 1959 25 0408 25 0700 25 0815   BP: 145/74 148/64  143/63   BP Location: Right arm Left arm  Left arm   Patient Position: Lying Lying  Lying   Pulse: 89 78  87   Resp: 18 20  18   Temp: 36.9 °C (98.4 °F) 36.4 °C (97.5 °F)  36.8 °C (98.3 °F)   TempSrc: Oral Temporal  Temporal   SpO2: 95% 95%  98%   Weight:   (!) 174 kg (383 lb 1.6 oz)    Height:         Medical Gas Therapy: Supplemental oxygen  Medical Gas Delivery Method: Nasal cannula  Weight  Av kg (399 lb 9.3 oz)  Min: 174 kg (383 lb 1.6 oz)  Max: 186 kg (411 lb)    LABS:  CMP:  Results from last 7 days   Lab Units 25  0730 25  0541 25  0552 25  0544 25  0652 25  0706 25  0927   SODIUM mmol/L 138 137 139 137 135* 136 139   POTASSIUM mmol/L 3.9 3.6 3.8 3.3* 3.3* 3.4* 3.5   CHLORIDE mmol/L 93* 94* 97* 93* 90* 90* 93*   CO2 mmol/L 38* 35* 38* 38* 39* 39* 38*   ANION GAP mmol/L 11 12 8* 9* 9* 10 12   BUN mg/dL 33* 31* 29* 30* 29* 30* 30*   CREATININE mg/dL 1.30 1.27 1.22 1.27 1.35* 1.34* 1.50*   EGFR mL/min/1.73m*2 65 67 70 67 62 63 55*   ALBUMIN g/dL 3.6 3.6 3.5 3.6 3.5 3.8  --      CBC:  Results from last 7 days   Lab Units 25  0729 25  0541 25  0552 25  0544 25  0652 25  0706   WBC AUTO x10*3/uL 6.7 7.0 6.1 7.7 7.8 9.0   HEMOGLOBIN g/dL 10.2* 9.5* 9.5* 9.2* 9.3* 9.6*   HEMATOCRIT % 34.2* 34.7* 32.2* 31.3* 31.4* 32.6*   PLATELETS AUTO x10*3/uL 242 226 205 231 230 229   MCV fL 79* 84 77* 80 80 80     COAG:         ABO: No results found for: \"ABO\"  HEME/ENDO:         CARDIAC:   Results from last 7 days   Lab Units 25  0541   TROPHS ng/L 16                 Last I/O:    Intake/Output Summary (Last 24 hours) at 2025 1034  Last data filed at 2025 0400  Gross per 24 hour   Intake 960 ml   Output 3452 ml   Net -2492 ml "     Net IO Since Admission: -20,312 mL [04/25/25 1034]            Inpatient Medications:  Scheduled Medications[1]  PRN Medications[2]  Continuous Medications[3]        Physical Exam:  Gen Well appearing middle aged male sitting up in NAD. Body mass index is 53.46 kg/m².   CV rrr. No m/r/g appreciated. No JVD. +1-2 bilateral leg edema.   Pulm Lungs clear with normal respiratory effort.  Neuro Alert and conversant. Grossly nonfocal.         Assessment:  Acute on chronic HFpEF  Volume+ with some continued improvement. Urine output acceptable as of late minus some inaccurate I/O's here and there.    2. Elevated troponin  Non-MI troponin elevation / acute non-traumatic myocardial injury. Core measures do not apply. Noted in the setting of volume overload and PNA. Noted CAC on CT with diabetes mellitus type II--on statin as is appropriate. Last LDL was quite low (< 50).    3. Hypertension   BP variable (higher as of late) but acceptable under the present circumstances. Monitoring with diuresis. Potential added ARB.       Recommendations:  Con't IV diuretics. Strict I/O's. Daily weights. Monitor Mag/K and supplement to >2/4. Close monitoring of renal function. Potential transition to oral over the weekend. Plan for outpatient stress testing.          Robby Gutierrez MD          [1]   Scheduled medications   Medication Dose Route Frequency    amLODIPine  10 mg oral Daily    aspirin  81 mg oral Daily    atorvastatin  80 mg oral Daily    colchicine  0.6 mg oral Daily    DULoxetine  60 mg oral Daily    enoxaparin  60 mg subcutaneous q12h TAMIR    furosemide  80 mg intravenous BID    insulin glargine  60 Units subcutaneous q12h    insulin lispro  0-10 Units subcutaneous TID    insulin lispro  35 Units subcutaneous TID AC    pantoprazole  40 mg oral Daily before breakfast    Or    pantoprazole  40 mg intravenous Daily before breakfast    perflutren protein A microsphere  0.5 mL intravenous Once in imaging    QUEtiapine  100 mg  oral Nightly    regadenoson  0.4 mg intravenous Once in imaging    sulfur hexafluoride microsphr  2 mL intravenous Once in imaging   [2]   PRN medications   Medication    aminophylline    dextrose    dextrose    glucagon    glucagon    HYDROcodone-acetaminophen    HYDROmorphone    hydrOXYzine HCL    methocarbamol    ondansetron    Or    ondansetron    oxyCODONE   [3]   Continuous Medications   Medication Dose Last Rate

## 2025-04-25 NOTE — PROGRESS NOTES
"Jai Shrestha is a 55 y.o. male on day 10 of admission presenting with Healthcare-associated pneumonia.    Subjective   No new complaints    Scheduled medications  Scheduled Medications[1]  Continuous medications  Continuous Medications[2]  PRN medications  PRN Medications[3]    Objective   Physical Exam  Constitutional:       General: He is not in acute distress.  Neurological:      Mental Status: He is alert.         Last Recorded Vitals  Blood pressure 148/64, pulse 78, temperature 36.4 °C (97.5 °F), temperature source Temporal, resp. rate 20, height 1.803 m (5' 10.98\"), weight (!) 175 kg (386 lb), SpO2 95%.  Intake/Output last 3 Shifts:  I/O last 3 completed shifts:  In: 1520 (8.7 mL/kg) [P.O.:1520]  Out: 3452 (19.7 mL/kg) [Urine:3452 (0.5 mL/kg/hr)]  Weight: 175.1 kg     Relevant Results  Results from last 7 days   Lab Units 04/24/25 1958 04/24/25  1644 04/24/25  1135 04/24/25  0828 04/24/25  0541 04/23/25  1953 04/23/25  0744 04/23/25  0552 04/22/25  0749 04/22/25  0544 04/21/25  0828 04/21/25  0652 04/20/25  0726 04/20/25  0706   POCT GLUCOSE mg/dL 213* 192* 250* 216*  --  162*   < >  --    < >  --    < >  --    < >  --    GLUCOSE mg/dL  --   --   --   --  292*  --   --  192*  --  203*  --  280*  --  206*    < > = values in this interval not displayed.       Assessment & Plan  Healthcare-associated pneumonia    IMPRESSION  TYPE 2 DIABETES MELLITUS  LONG TERM CURRENT INSULIN USE  High and unusual insulin regimen  He typically requires less insulin in the hospital  Rising postprandial glucose        RECOMMENDATIONS  Insulin glargine 60 units BID  Lispro 40 units QAC plus scale (home dose is 50 units), HOLD if NPO or glucose under 100 mg/dl.       Ashwin Cornejo MD         [1] amLODIPine, 10 mg, oral, Daily  aspirin, 81 mg, oral, Daily  atorvastatin, 80 mg, oral, Daily  colchicine, 0.6 mg, oral, Daily  DULoxetine, 60 mg, oral, Daily  enoxaparin, 60 mg, subcutaneous, q12h TAMIR  furosemide, 80 mg, intravenous, " BID  insulin glargine, 60 Units, subcutaneous, q12h  insulin lispro, 0-10 Units, subcutaneous, TID  insulin lispro, 35 Units, subcutaneous, TID AC  pantoprazole, 40 mg, oral, Daily before breakfast   Or  pantoprazole, 40 mg, intravenous, Daily before breakfast  perflutren protein A microsphere, 0.5 mL, intravenous, Once in imaging  QUEtiapine, 100 mg, oral, Nightly  regadenoson, 0.4 mg, intravenous, Once in imaging  sulfur hexafluoride microsphr, 2 mL, intravenous, Once in imaging  [2]    [3] PRN medications: aminophylline, dextrose, dextrose, glucagon, glucagon, HYDROcodone-acetaminophen, HYDROmorphone, hydrOXYzine HCL, methocarbamol, ondansetron **OR** ondansetron, oxyCODONE

## 2025-04-26 LAB
ALBUMIN SERPL BCP-MCNC: 3.6 G/DL (ref 3.4–5)
ANION GAP SERPL CALC-SCNC: 12 MMOL/L (ref 10–20)
BUN SERPL-MCNC: 38 MG/DL (ref 6–23)
CALCIUM SERPL-MCNC: 9 MG/DL (ref 8.6–10.3)
CHLORIDE SERPL-SCNC: 93 MMOL/L (ref 98–107)
CO2 SERPL-SCNC: 36 MMOL/L (ref 21–32)
CREAT SERPL-MCNC: 1.51 MG/DL (ref 0.5–1.3)
EGFRCR SERPLBLD CKD-EPI 2021: 55 ML/MIN/1.73M*2
ERYTHROCYTE [DISTWIDTH] IN BLOOD BY AUTOMATED COUNT: 16.1 % (ref 11.5–14.5)
GLUCOSE BLD MANUAL STRIP-MCNC: 198 MG/DL (ref 74–99)
GLUCOSE BLD MANUAL STRIP-MCNC: 198 MG/DL (ref 74–99)
GLUCOSE BLD MANUAL STRIP-MCNC: 305 MG/DL (ref 74–99)
GLUCOSE BLD MANUAL STRIP-MCNC: 331 MG/DL (ref 74–99)
GLUCOSE SERPL-MCNC: 196 MG/DL (ref 74–99)
HCT VFR BLD AUTO: 34.8 % (ref 41–52)
HGB BLD-MCNC: 10.4 G/DL (ref 13.5–17.5)
MCH RBC QN AUTO: 23.6 PG (ref 26–34)
MCHC RBC AUTO-ENTMCNC: 29.9 G/DL (ref 32–36)
MCV RBC AUTO: 79 FL (ref 80–100)
NRBC BLD-RTO: 0 /100 WBCS (ref 0–0)
PHOSPHATE SERPL-MCNC: 4.2 MG/DL (ref 2.5–4.9)
PLATELET # BLD AUTO: 257 X10*3/UL (ref 150–450)
POTASSIUM SERPL-SCNC: 3.5 MMOL/L (ref 3.5–5.3)
RBC # BLD AUTO: 4.41 X10*6/UL (ref 4.5–5.9)
SODIUM SERPL-SCNC: 137 MMOL/L (ref 136–145)
WBC # BLD AUTO: 8 X10*3/UL (ref 4.4–11.3)

## 2025-04-26 PROCEDURE — 2500000004 HC RX 250 GENERAL PHARMACY W/ HCPCS (ALT 636 FOR OP/ED): Performed by: INTERNAL MEDICINE

## 2025-04-26 PROCEDURE — 2500000002 HC RX 250 W HCPCS SELF ADMINISTERED DRUGS (ALT 637 FOR MEDICARE OP, ALT 636 FOR OP/ED): Performed by: INTERNAL MEDICINE

## 2025-04-26 PROCEDURE — 85027 COMPLETE CBC AUTOMATED: CPT | Performed by: INTERNAL MEDICINE

## 2025-04-26 PROCEDURE — 82947 ASSAY GLUCOSE BLOOD QUANT: CPT

## 2025-04-26 PROCEDURE — 80069 RENAL FUNCTION PANEL: CPT | Performed by: INTERNAL MEDICINE

## 2025-04-26 PROCEDURE — 99232 SBSQ HOSP IP/OBS MODERATE 35: CPT | Performed by: INTERNAL MEDICINE

## 2025-04-26 PROCEDURE — 1100000001 HC PRIVATE ROOM DAILY

## 2025-04-26 PROCEDURE — 2500000001 HC RX 250 WO HCPCS SELF ADMINISTERED DRUGS (ALT 637 FOR MEDICARE OP): Performed by: HOSPITALIST

## 2025-04-26 PROCEDURE — 99232 SBSQ HOSP IP/OBS MODERATE 35: CPT | Performed by: NURSE PRACTITIONER

## 2025-04-26 PROCEDURE — 2500000004 HC RX 250 GENERAL PHARMACY W/ HCPCS (ALT 636 FOR OP/ED): Mod: JZ | Performed by: INTERNAL MEDICINE

## 2025-04-26 PROCEDURE — 2500000001 HC RX 250 WO HCPCS SELF ADMINISTERED DRUGS (ALT 637 FOR MEDICARE OP): Performed by: INTERNAL MEDICINE

## 2025-04-26 PROCEDURE — 99233 SBSQ HOSP IP/OBS HIGH 50: CPT | Performed by: INTERNAL MEDICINE

## 2025-04-26 PROCEDURE — 36415 COLL VENOUS BLD VENIPUNCTURE: CPT | Performed by: INTERNAL MEDICINE

## 2025-04-26 RX ORDER — HYDROXYZINE HYDROCHLORIDE 25 MG/1
25 TABLET, FILM COATED ORAL ONCE
Status: COMPLETED | OUTPATIENT
Start: 2025-04-26 | End: 2025-04-26

## 2025-04-26 RX ORDER — POTASSIUM CHLORIDE 20 MEQ/1
40 TABLET, EXTENDED RELEASE ORAL ONCE
Status: COMPLETED | OUTPATIENT
Start: 2025-04-26 | End: 2025-04-26

## 2025-04-26 RX ADMIN — INSULIN LISPRO 8 UNITS: 100 INJECTION, SOLUTION INTRAVENOUS; SUBCUTANEOUS at 12:49

## 2025-04-26 RX ADMIN — ENOXAPARIN SODIUM 60 MG: 60 INJECTION SUBCUTANEOUS at 20:55

## 2025-04-26 RX ADMIN — ASPIRIN 81 MG: 81 TABLET, COATED ORAL at 09:08

## 2025-04-26 RX ADMIN — INSULIN GLARGINE 60 UNITS: 100 INJECTION, SOLUTION SUBCUTANEOUS at 20:55

## 2025-04-26 RX ADMIN — INSULIN LISPRO 40 UNITS: 100 INJECTION, SOLUTION INTRAVENOUS; SUBCUTANEOUS at 12:47

## 2025-04-26 RX ADMIN — HYDROXYZINE HYDROCHLORIDE 25 MG: 25 TABLET, FILM COATED ORAL at 09:17

## 2025-04-26 RX ADMIN — INSULIN LISPRO 2 UNITS: 100 INJECTION, SOLUTION INTRAVENOUS; SUBCUTANEOUS at 17:17

## 2025-04-26 RX ADMIN — METHOCARBAMOL 500 MG: 500 TABLET ORAL at 19:59

## 2025-04-26 RX ADMIN — HYDROMORPHONE HYDROCHLORIDE 0.6 MG: 1 INJECTION, SOLUTION INTRAMUSCULAR; INTRAVENOUS; SUBCUTANEOUS at 03:36

## 2025-04-26 RX ADMIN — AMLODIPINE BESYLATE 10 MG: 10 TABLET ORAL at 09:08

## 2025-04-26 RX ADMIN — PANTOPRAZOLE SODIUM 40 MG: 40 TABLET, DELAYED RELEASE ORAL at 06:17

## 2025-04-26 RX ADMIN — FUROSEMIDE 80 MG: 10 INJECTION, SOLUTION INTRAMUSCULAR; INTRAVENOUS at 09:08

## 2025-04-26 RX ADMIN — FUROSEMIDE 80 MG: 10 INJECTION, SOLUTION INTRAMUSCULAR; INTRAVENOUS at 17:17

## 2025-04-26 RX ADMIN — INSULIN GLARGINE 60 UNITS: 100 INJECTION, SOLUTION SUBCUTANEOUS at 09:07

## 2025-04-26 RX ADMIN — ENOXAPARIN SODIUM 60 MG: 60 INJECTION SUBCUTANEOUS at 09:08

## 2025-04-26 RX ADMIN — DULOXETINE HYDROCHLORIDE 60 MG: 30 CAPSULE, DELAYED RELEASE ORAL at 09:08

## 2025-04-26 RX ADMIN — HYDROMORPHONE HYDROCHLORIDE 0.6 MG: 1 INJECTION, SOLUTION INTRAMUSCULAR; INTRAVENOUS; SUBCUTANEOUS at 09:06

## 2025-04-26 RX ADMIN — COLCHICINE 0.6 MG: 0.6 TABLET, FILM COATED ORAL at 09:08

## 2025-04-26 RX ADMIN — QUETIAPINE FUMARATE 100 MG: 100 TABLET ORAL at 20:55

## 2025-04-26 RX ADMIN — INSULIN LISPRO 40 UNITS: 100 INJECTION, SOLUTION INTRAVENOUS; SUBCUTANEOUS at 17:17

## 2025-04-26 RX ADMIN — ATORVASTATIN CALCIUM 80 MG: 80 TABLET, FILM COATED ORAL at 09:08

## 2025-04-26 RX ADMIN — INSULIN LISPRO 2 UNITS: 100 INJECTION, SOLUTION INTRAVENOUS; SUBCUTANEOUS at 09:07

## 2025-04-26 RX ADMIN — INSULIN LISPRO 40 UNITS: 100 INJECTION, SOLUTION INTRAVENOUS; SUBCUTANEOUS at 09:06

## 2025-04-26 RX ADMIN — HYDROMORPHONE HYDROCHLORIDE 0.6 MG: 1 INJECTION, SOLUTION INTRAMUSCULAR; INTRAVENOUS; SUBCUTANEOUS at 19:59

## 2025-04-26 RX ADMIN — HYDROXYZINE HYDROCHLORIDE 25 MG: 25 TABLET, FILM COATED ORAL at 19:59

## 2025-04-26 RX ADMIN — HYDROXYZINE HYDROCHLORIDE 25 MG: 25 TABLET, FILM COATED ORAL at 22:11

## 2025-04-26 RX ADMIN — POTASSIUM CHLORIDE 40 MEQ: 1500 TABLET, EXTENDED RELEASE ORAL at 09:46

## 2025-04-26 RX ADMIN — OXYCODONE HYDROCHLORIDE 5 MG: 5 TABLET ORAL at 14:31

## 2025-04-26 RX ADMIN — METHOCARBAMOL 500 MG: 500 TABLET ORAL at 09:08

## 2025-04-26 ASSESSMENT — COGNITIVE AND FUNCTIONAL STATUS - GENERAL
MOVING FROM LYING ON BACK TO SITTING ON SIDE OF FLAT BED WITH BEDRAILS: A LITTLE
DRESSING REGULAR UPPER BODY CLOTHING: A LITTLE
STANDING UP FROM CHAIR USING ARMS: A LITTLE
WALKING IN HOSPITAL ROOM: A LITTLE
CLIMB 3 TO 5 STEPS WITH RAILING: A LITTLE
TURNING FROM BACK TO SIDE WHILE IN FLAT BAD: A LITTLE
STANDING UP FROM CHAIR USING ARMS: A LITTLE
TOILETING: A LITTLE
DRESSING REGULAR LOWER BODY CLOTHING: A LITTLE
DRESSING REGULAR UPPER BODY CLOTHING: A LITTLE
DRESSING REGULAR LOWER BODY CLOTHING: A LITTLE
DAILY ACTIVITIY SCORE: 20
TURNING FROM BACK TO SIDE WHILE IN FLAT BAD: A LITTLE
MOVING FROM LYING ON BACK TO SITTING ON SIDE OF FLAT BED WITH BEDRAILS: A LITTLE
MOVING TO AND FROM BED TO CHAIR: A LITTLE
CLIMB 3 TO 5 STEPS WITH RAILING: A LOT
MOBILITY SCORE: 17
MOBILITY SCORE: 18
WALKING IN HOSPITAL ROOM: A LITTLE
HELP NEEDED FOR BATHING: A LITTLE
DAILY ACTIVITIY SCORE: 21
MOVING TO AND FROM BED TO CHAIR: A LITTLE
HELP NEEDED FOR BATHING: A LITTLE

## 2025-04-26 ASSESSMENT — PAIN - FUNCTIONAL ASSESSMENT
PAIN_FUNCTIONAL_ASSESSMENT: 0-10

## 2025-04-26 ASSESSMENT — PAIN SCALES - GENERAL
PAINLEVEL_OUTOF10: 2
PAINLEVEL_OUTOF10: 6
PAINLEVEL_OUTOF10: 9
PAINLEVEL_OUTOF10: 2
PAINLEVEL_OUTOF10: 8
PAINLEVEL_OUTOF10: 7
PAINLEVEL_OUTOF10: 4
PAINLEVEL_OUTOF10: 6
PAINLEVEL_OUTOF10: 8

## 2025-04-26 ASSESSMENT — PAIN DESCRIPTION - LOCATION
LOCATION: GENERALIZED
LOCATION: BACK
LOCATION: LEG
LOCATION: LEG

## 2025-04-26 ASSESSMENT — PAIN DESCRIPTION - DESCRIPTORS
DESCRIPTORS: ACHING
DESCRIPTORS: ACHING;BURNING
DESCRIPTORS: ACHING

## 2025-04-26 ASSESSMENT — PAIN DESCRIPTION - ORIENTATION
ORIENTATION: RIGHT;LEFT
ORIENTATION: RIGHT;LEFT

## 2025-04-26 NOTE — NURSING NOTE
Nurse offered to change daily dressing at 9:30am, 11am, and 2:30pm. Patient declined, asking nurse to leave the dressings alone. Aware they are ordered daily. Will let nurse know if he wants them changed later today

## 2025-04-26 NOTE — CARE PLAN
The patient's goals for the shift include safety    The clinical goals for the shift include safety      Problem: Skin  Goal: Decreased wound size/increased tissue granulation at next dressing change  Flowsheets (Taken 4/25/2025 2320)  Decreased wound size/increased tissue granulation at next dressing change:   Promote sleep for wound healing   Utilize specialty bed per algorithm   Protective dressings over bony prominences  Goal: Participates in plan/prevention/treatment measures  Flowsheets (Taken 4/24/2025 2203)  Participates in plan/prevention/treatment measures:   Discuss with provider PT/OT consult   Elevate heels   Increase activity/out of bed for meals  Goal: Prevent/manage excess moisture  Flowsheets (Taken 4/21/2025 1050 by Anjali Bello RN)  Prevent/manage excess moisture:   Cleanse incontinence/protect with barrier cream   Monitor for/manage infection if present   Moisturize dry skin   Use wicking fabric (obtain order)     Problem: Pain - Adult  Goal: Verbalizes/displays adequate comfort level or baseline comfort level  Flowsheets (Taken 4/24/2025 2203)  Verbalizes/displays adequate comfort level or baseline comfort level:   Encourage patient to monitor pain and request assistance   Administer analgesics based on type and severity of pain and evaluate response   Consider cultural and social influences on pain and pain management   Assess pain using appropriate pain scale   Implement non-pharmacological measures as appropriate and evaluate response   Notify Licensed Independent Practitioner if interventions unsuccessful or patient reports new pain     Problem: Safety - Adult  Goal: Free from fall injury  Flowsheets (Taken 4/24/2025 2203)  Free from fall injury:   Instruct family/caregiver on patient safety   Based on caregiver fall risk screen, instruct family/caregiver to ask for assistance with transferring infant if caregiver noted to have fall risk factors     Problem: Discharge Planning  Goal:  Discharge to home or other facility with appropriate resources  Flowsheets (Taken 4/24/2025 2203)  Discharge to home or other facility with appropriate resources:   Identify barriers to discharge with patient and caregiver   Identify discharge learning needs (meds, wound care, etc)   Refer to discharge planning if patient needs post-hospital services based on physician order or complex needs related to functional status, cognitive ability or social support system   Arrange for needed discharge resources and transportation as appropriate   Arrange for interpreters to assist at discharge as needed     Problem: Chronic Conditions and Co-morbidities  Goal: Patient's chronic conditions and co-morbidity symptoms are monitored and maintained or improved  Flowsheets (Taken 4/24/2025 2203)  Care Plan - Patient's Chronic Conditions and Co-Morbidity Symptoms are Monitored and Maintained or Improved:   Monitor and assess patient's chronic conditions and comorbid symptoms for stability, deterioration, or improvement   Collaborate with multidisciplinary team to address chronic and comorbid conditions and prevent exacerbation or deterioration   Update acute care plan with appropriate goals if chronic or comorbid symptoms are exacerbated and prevent overall improvement and discharge

## 2025-04-26 NOTE — PROGRESS NOTES
Jai Shrestha is a 55 y.o. male on day 11 of admission presenting with Healthcare-associated pneumonia.      Subjective   Patient was seen and examined bedside this morning       Objective     Last Recorded Vitals  /72 (BP Location: Right arm, Patient Position: Lying)   Pulse 80   Temp 36.9 °C (98.4 °F) (Temporal)   Resp 17   Wt (!) 174 kg (384 lb 7.7 oz)   SpO2 94%   Intake/Output last 3 Shifts:    Intake/Output Summary (Last 24 hours) at 4/26/2025 0920  Last data filed at 4/25/2025 2300  Gross per 24 hour   Intake 720 ml   Output --   Net 720 ml       Admission Weight  Weight: (!) 186 kg (411 lb) (04/14/25 1901)    Daily Weight  04/26/25 : (!) 174 kg (384 lb 7.7 oz)    Image Results  Electrocardiogram, 12-lead PRN ACS symptoms  Normal sinus rhythm  Nonspecific ST and T wave abnormality  Abnormal ECG  When compared with ECG of 14-APR-2025 20:51,  Nonspecific T wave abnormality no longer evident in Anterior leads  Confirmed by Robby Gutierrez ([id number]) on 4/25/2025 8:17:50 PM      Physical Exam  Constitutional: Obese gentleman, appears drowsy, somnolent, but respond to questions, cooperative not in acute distress  Eyes: PERRLA, clear sclera  ENMT: Moist mucosal membranes, no exudate  Head / Neck: Atraumatic, normocephalic, supple neck, JVP not visualized  Lungs: Patent airways, CTABL  Heart: RRR, S1S2, no murmurs appreciated, palpable pulses in all extremities  GI: Soft, NT, ND, bowel sounds present in all quadrants  MSK: Moves all extremities freely, no restriction  of ROM, no joint edema  Extremities: Chronic lymphedema with venous ulcers, bilateral lower extremities peripheral edema  Neurological: AAO x 3 to person, place and date, facial muscles symmetrical, sensation intact, strength 4/4, no acute focal neurological deficits appreciated  Psychological: Appropriate mood and behavior  Relevant Results                Scheduled medications  Scheduled Medications[1]  Continuous medications  Continuous  Medications[2]  PRN medications  PRN Medications[3]        Assessment & Plan  Healthcare-associated pneumonia    55 y.o. male with a past medical history of chronic back pain, peripheral neuropathy, diabetes mellitus type 2 with insulin resistance, morbid obesity BMI 57.3, CKD 3, diastolic CHF, hypertension who presented to [facility] ER complaining of shortness of breath and lower extremity edema.  He also complains of intermittent chest pain but none currently.  He says that his chest pain is midsternal and a burning sensation       Left upper lobe pneumonia  -s/p Vancomycin and Zosyn on admission  - Completed ceftriaxone 2 g IV piggyback daily  -Urine antigens and strep pneumo and Legionella negative  -completed antibiotics course    Diastolic CHF, decompensated  -Daily weight  -Cardiology consulted: Appreciate evaluation and recommendations, continue diuresis  -continue IV lasix BID   -monitor renal functions on diuresis     Drowsy in the a.m. on April 19, likely from Seroquel (took 400 mg at bedtime, reportedly his home dose)  -ECG negative for QTc prolongation  -Telemetry monitoring  -Seroquel reduced to 200 mg at bedtime, please monitor for drowsiness in the morning and hold indefinitely if drowsy will reduce to 100 mg      Elevated troponin flat trend  -Given aspirin in the ER  -Telemetry  -Cardiology: Appreciate evaluation recommendations, continue diuresis to achieve euvolemic state  -No acute coronary syndrome     Type 2 diabetes mellitus requiring insulin with insulin resistance  -At home he takes Lantus 80 units 3 times a day and lispro insulin 15 units 3 times a day  -Endocrine consulted: Appreciate evaluation management  -Diabetic carbohydrate controlled diet     Hypertension  -Amlodipine 5 mg orally daily     Gout  -Uric acid level check  -Start colchicine 0.6 mg p.o. daily     Hyperlipidemia  -Atorvastatin 80 mg orally daily     Bilateral lymphedema with venous stasis changes and chronic  non healing ulcers  -Chronic cellulitis sees wound care nurse at home for venous ulcers  -Diuresis with Lasix  -Pain control: Oxycodone IR discontinued, Percocet started, methocarbamol 500 mg twice daily added  - ID consulted: Appreciate recommendation, recommend transitioning to Keflex 1000 mg p.o. 3 times daily x 7 days at discharge.  Transition to oral currently after completing ceftriaxone for pneumonia treatment     Morbid obesity BMI 57.3  -Lifestyle modification     DVT prophylaxis  -Lovenox 60 mg subcutaneously every 12 hours  -SCDs     Disposition: Presented with shortness of breath, found to be heart failure and pneumonia, discharge pending clinical improvement, final recommendation by cardiology        ADOD pending clearance by cardiology      Ronan Dumont MD           [1] amLODIPine, 10 mg, oral, Daily  aspirin, 81 mg, oral, Daily  atorvastatin, 80 mg, oral, Daily  colchicine, 0.6 mg, oral, Daily  DULoxetine, 60 mg, oral, Daily  enoxaparin, 60 mg, subcutaneous, q12h TAMIR  furosemide, 80 mg, intravenous, BID  insulin glargine, 60 Units, subcutaneous, q12h  insulin lispro, 0-10 Units, subcutaneous, TID  insulin lispro, 40 Units, subcutaneous, TID AC  pantoprazole, 40 mg, oral, Daily before breakfast   Or  pantoprazole, 40 mg, intravenous, Daily before breakfast  perflutren protein A microsphere, 0.5 mL, intravenous, Once in imaging  potassium chloride CR, 40 mEq, oral, Once  QUEtiapine, 100 mg, oral, Nightly  regadenoson, 0.4 mg, intravenous, Once in imaging  sulfur hexafluoride microsphr, 2 mL, intravenous, Once in imaging     [2]    [3] PRN medications: aminophylline, dextrose, dextrose, glucagon, glucagon, HYDROcodone-acetaminophen, HYDROmorphone, hydrOXYzine HCL, methocarbamol, ondansetron **OR** ondansetron, oxyCODONE

## 2025-04-26 NOTE — PROGRESS NOTES
"Jai Shrestha is a 55 y.o. male on day 11 of admission presenting with Healthcare-associated pneumonia.    Subjective   Patient has no complaints.  He is eating meals well and states that he also has his snacks on hand.      I have reviewed histories, allergies and medications have been reviewed and there are no changes       Objective     Physical Exam  Vitals and nursing note reviewed.   Constitutional:       General: He is not in acute distress.     Appearance: Normal appearance. He is obese.   HENT:      Head: Normocephalic and atraumatic.      Nose: Nose normal.      Mouth/Throat:      Mouth: Mucous membranes are moist.   Eyes:      Extraocular Movements: Extraocular movements intact.   Pulmonary:      Effort: Pulmonary effort is normal.      Comments: Oxygen in situ via nasal cannula   Musculoskeletal:         General: Normal range of motion.   Neurological:      Mental Status: He is alert and oriented to person, place, and time.   Psychiatric:         Mood and Affect: Mood normal.         Last Recorded Vitals  Blood pressure 152/72, pulse 80, temperature 36.9 °C (98.4 °F), temperature source Temporal, resp. rate 17, height 1.803 m (5' 10.98\"), weight (!) 174 kg (384 lb 7.7 oz), SpO2 94%.  Intake/Output last 3 Shifts:  I/O last 3 completed shifts:  In: 1320 (7.6 mL/kg) [P.O.:1320]  Out: 1750 (10 mL/kg) [Urine:1750 (0.3 mL/kg/hr)]  Weight: 174.4 kg     Relevant Results  Results from last 7 days   Lab Units 04/26/25  0723 04/26/25  0509 04/25/25  1919 04/25/25  1653 04/25/25  1246 04/25/25  0807 04/25/25  0730 04/24/25  0828 04/24/25  0541 04/23/25  0744 04/23/25  0552 04/22/25  0749 04/22/25  0544   POCT GLUCOSE mg/dL 198*  --  136* 182* 239* 286*  --    < >  --    < >  --    < >  --    GLUCOSE mg/dL  --  196*  --   --   --   --  312*  --  292*  --  192*  --  203*    < > = values in this interval not displayed.     Scheduled medications  Scheduled Medications[1]  Continuous medications  Continuous " Medications[2]  PRN medications  PRN Medications[3]    Results for orders placed or performed during the hospital encounter of 04/14/25 (from the past 96 hours)   POCT GLUCOSE   Result Value Ref Range    POCT Glucose 198 (H) 74 - 99 mg/dL   POCT GLUCOSE   Result Value Ref Range    POCT Glucose 196 (H) 74 - 99 mg/dL   CBC   Result Value Ref Range    WBC 6.1 4.4 - 11.3 x10*3/uL    nRBC 0.0 0.0 - 0.0 /100 WBCs    RBC 4.17 (L) 4.50 - 5.90 x10*6/uL    Hemoglobin 9.5 (L) 13.5 - 17.5 g/dL    Hematocrit 32.2 (L) 41.0 - 52.0 %    MCV 77 (L) 80 - 100 fL    MCH 22.8 (L) 26.0 - 34.0 pg    MCHC 29.5 (L) 32.0 - 36.0 g/dL    RDW 16.1 (H) 11.5 - 14.5 %    Platelets 205 150 - 450 x10*3/uL   Renal Function Panel   Result Value Ref Range    Glucose 192 (H) 74 - 99 mg/dL    Sodium 139 136 - 145 mmol/L    Potassium 3.8 3.5 - 5.3 mmol/L    Chloride 97 (L) 98 - 107 mmol/L    Bicarbonate 38 (H) 21 - 32 mmol/L    Anion Gap 8 (L) 10 - 20 mmol/L    Urea Nitrogen 29 (H) 6 - 23 mg/dL    Creatinine 1.22 0.50 - 1.30 mg/dL    eGFR 70 >60 mL/min/1.73m*2    Calcium 8.9 8.6 - 10.3 mg/dL    Phosphorus 2.8 2.5 - 4.9 mg/dL    Albumin 3.5 3.4 - 5.0 g/dL   POCT GLUCOSE   Result Value Ref Range    POCT Glucose 168 (H) 74 - 99 mg/dL   POCT GLUCOSE   Result Value Ref Range    POCT Glucose 244 (H) 74 - 99 mg/dL   POCT GLUCOSE   Result Value Ref Range    POCT Glucose 170 (H) 74 - 99 mg/dL   POCT GLUCOSE   Result Value Ref Range    POCT Glucose 162 (H) 74 - 99 mg/dL   CBC   Result Value Ref Range    WBC 7.0 4.4 - 11.3 x10*3/uL    nRBC 0.0 0.0 - 0.0 /100 WBCs    RBC 4.14 (L) 4.50 - 5.90 x10*6/uL    Hemoglobin 9.5 (L) 13.5 - 17.5 g/dL    Hematocrit 34.7 (L) 41.0 - 52.0 %    MCV 84 80 - 100 fL    MCH 22.9 (L) 26.0 - 34.0 pg    MCHC 27.4 (L) 32.0 - 36.0 g/dL    RDW 16.4 (H) 11.5 - 14.5 %    Platelets 226 150 - 450 x10*3/uL   Renal Function Panel   Result Value Ref Range    Glucose 292 (H) 74 - 99 mg/dL    Sodium 137 136 - 145 mmol/L    Potassium 3.6 3.5 - 5.3  mmol/L    Chloride 94 (L) 98 - 107 mmol/L    Bicarbonate 35 (H) 21 - 32 mmol/L    Anion Gap 12 10 - 20 mmol/L    Urea Nitrogen 31 (H) 6 - 23 mg/dL    Creatinine 1.27 0.50 - 1.30 mg/dL    eGFR 67 >60 mL/min/1.73m*2    Calcium 8.9 8.6 - 10.3 mg/dL    Phosphorus 3.1 2.5 - 4.9 mg/dL    Albumin 3.6 3.4 - 5.0 g/dL   Troponin I, High Sensitivity   Result Value Ref Range    Troponin I, High Sensitivity 16 0 - 20 ng/L   POCT GLUCOSE   Result Value Ref Range    POCT Glucose 216 (H) 74 - 99 mg/dL   POCT GLUCOSE   Result Value Ref Range    POCT Glucose 250 (H) 74 - 99 mg/dL   POCT GLUCOSE   Result Value Ref Range    POCT Glucose 192 (H) 74 - 99 mg/dL   POCT GLUCOSE   Result Value Ref Range    POCT Glucose 213 (H) 74 - 99 mg/dL   CBC   Result Value Ref Range    WBC 6.7 4.4 - 11.3 x10*3/uL    nRBC 0.0 0.0 - 0.0 /100 WBCs    RBC 4.34 (L) 4.50 - 5.90 x10*6/uL    Hemoglobin 10.2 (L) 13.5 - 17.5 g/dL    Hematocrit 34.2 (L) 41.0 - 52.0 %    MCV 79 (L) 80 - 100 fL    MCH 23.5 (L) 26.0 - 34.0 pg    MCHC 29.8 (L) 32.0 - 36.0 g/dL    RDW 16.1 (H) 11.5 - 14.5 %    Platelets 242 150 - 450 x10*3/uL   Renal Function Panel   Result Value Ref Range    Glucose 312 (H) 74 - 99 mg/dL    Sodium 138 136 - 145 mmol/L    Potassium 3.9 3.5 - 5.3 mmol/L    Chloride 93 (L) 98 - 107 mmol/L    Bicarbonate 38 (H) 21 - 32 mmol/L    Anion Gap 11 10 - 20 mmol/L    Urea Nitrogen 33 (H) 6 - 23 mg/dL    Creatinine 1.30 0.50 - 1.30 mg/dL    eGFR 65 >60 mL/min/1.73m*2    Calcium 8.9 8.6 - 10.3 mg/dL    Phosphorus 4.0 2.5 - 4.9 mg/dL    Albumin 3.6 3.4 - 5.0 g/dL   POCT GLUCOSE   Result Value Ref Range    POCT Glucose 286 (H) 74 - 99 mg/dL   POCT GLUCOSE   Result Value Ref Range    POCT Glucose 239 (H) 74 - 99 mg/dL   POCT GLUCOSE   Result Value Ref Range    POCT Glucose 182 (H) 74 - 99 mg/dL   POCT GLUCOSE   Result Value Ref Range    POCT Glucose 136 (H) 74 - 99 mg/dL   CBC   Result Value Ref Range    WBC 8.0 4.4 - 11.3 x10*3/uL    nRBC 0.0 0.0 - 0.0 /100 WBCs     RBC 4.41 (L) 4.50 - 5.90 x10*6/uL    Hemoglobin 10.4 (L) 13.5 - 17.5 g/dL    Hematocrit 34.8 (L) 41.0 - 52.0 %    MCV 79 (L) 80 - 100 fL    MCH 23.6 (L) 26.0 - 34.0 pg    MCHC 29.9 (L) 32.0 - 36.0 g/dL    RDW 16.1 (H) 11.5 - 14.5 %    Platelets 257 150 - 450 x10*3/uL   Renal Function Panel   Result Value Ref Range    Glucose 196 (H) 74 - 99 mg/dL    Sodium 137 136 - 145 mmol/L    Potassium 3.5 3.5 - 5.3 mmol/L    Chloride 93 (L) 98 - 107 mmol/L    Bicarbonate 36 (H) 21 - 32 mmol/L    Anion Gap 12 10 - 20 mmol/L    Urea Nitrogen 38 (H) 6 - 23 mg/dL    Creatinine 1.51 (H) 0.50 - 1.30 mg/dL    eGFR 55 (L) >60 mL/min/1.73m*2    Calcium 9.0 8.6 - 10.3 mg/dL    Phosphorus 4.2 2.5 - 4.9 mg/dL    Albumin 3.6 3.4 - 5.0 g/dL   POCT GLUCOSE   Result Value Ref Range    POCT Glucose 198 (H) 74 - 99 mg/dL      Electrocardiogram, 12-lead PRN ACS symptoms  Result Date: 4/25/2025  Normal sinus rhythm Nonspecific ST and T wave abnormality Abnormal ECG When compared with ECG of 14-APR-2025 20:51, Nonspecific T wave abnormality no longer evident in Anterior leads Confirmed by Robby Gutierrez ([id number]) on 4/25/2025 8:17:50 PM    Electrocardiogram, 12-lead PRN ACS symptoms  Result Date: 4/16/2025  Normal sinus rhythm ST & T wave abnormality, consider inferolateral ischemia Abnormal ECG When compared with ECG of 14-APR-2025 19:01, (unconfirmed) Premature ventricular complexes are no longer Present Criteria for Inferior infarct are no longer Present QT has shortened See ED provider note for full interpretation and clinical correlation Confirmed by Nely Marsh ([id number]) on 4/16/2025 12:03:42 PM    Electrocardiogram, 12-lead PRN ACS symptoms  Result Date: 4/16/2025  Sinus rhythm with occasional Premature ventricular complexes Inferior infarct (cited on or before 29-OCT-2024) ST & T wave abnormality, consider lateral ischemia Abnormal ECG When compared with ECG of 29-OCT-2024 11:42, (unconfirmed) Premature ventricular complexes are now  Present QRS duration has decreased Non-specific change in ST segment in Inferior leads ST now depressed in Anterior leads Nonspecific T wave abnormality, worse in Lateral leads See ED provider note for full interpretation and clinical correlation Confirmed by Nely Marsh ([id number]) on 4/16/2025 12:03:26 PM    Transthoracic Echo (TTE) Complete  Result Date: 4/15/2025   [facility], [address] [place] [address]              Tel [phone] and Fax [phone] TRANSTHORACIC ECHOCARDIOGRAM REPORT  Patient Name:       JOSSE MARQUEZ        Raman Physician:    [id number] Alex Kennedy DO Study Date:         4/15/2025           Ordering Provider:    [id number] MAINOR AWAN MRN/PID:            [id number]            Fellow: Accession#:         [id number]        Nurse: Date of Birth/Age:  1970 / 55 years Sonographer:          Jose Luis Rollins RDCS Gender assigned at  M                   Additional Staff: Birth: Height:             180.00 cm           Admit Date: Weight:             186.00 kg           Admission Status:     Inpatient -                                                               Priority                                                               discharge BSA / BMI:          2.86 m2 / 57.41     Encounter#:           [id number]                     kg/m2 Blood Pressure:     154/96 mmHg         Department Location:  [facility]                                                               Invasive Study Type:    TRANSTHORACIC ECHO (TTE) COMPLETE Diagnosis/ICD: Heart failure, unspecified-I50.9 Indication:    HF CPT Code:      Echo Complete w Full [id number] Patient History: Diabetes:          Yes Pertinent History: Dyspnea and HTN. HF. Study Detail: The following Echo studies were performed: 2D,  M-Mode, Doppler and               color flow. Image quality for this study is suboptimal.               Technically challenging study due to poor acoustic windows, body               habitus and sitting up. Definity used as a contrast agent for               endocardial border definition. Total contrast used for this               procedure was 3 mL via IV push.  PHYSICIAN INTERPRETATION: Left Ventricle: Left ventricular ejection fraction is low normal, by visual estimate at 50-55%. There are no regional left ventricular wall motion abnormalities. The left ventricular cavity size was not assessed. There is mildly increased septal and normal posterior left ventricular wall thickness. Left ventricular diastolic filling is indeterminate. Left Atrium: The left atrium is mildly dilated. Right Ventricle: The right ventricle was not well visualized. Unable to determine right ventricular systolic function. Right Atrium: The right atrial size was not well visualized. Aortic Valve: The aortic valve was not well visualized. There is no evidence of aortic valve regurgitation. The peak instantaneous gradient of the aortic valve is 7 mmHg. Mitral Valve: The mitral valve was not well visualized. The peak instantaneous gradient of the mitral valve is 7 mmHg. There is trace to mild mitral valve regurgitation. Tricuspid Valve: The tricuspid valve was not well visualized. Tricuspid regurgitation was not assessed. The right ventricular systolic pressure is unable to be estimated. Pulmonic Valve: The pulmonic valve is not well visualized. The pulmonic valve regurgitation was not well visualized. Pericardium: Pericardial effusion was not well visualized. Aorta: The aortic root was not well visualized. In comparison to the previous echocardiogram(s): Although previous studies are available for review, a comparison with the current echocardiogram is not feasible due to its limited scope or image quality. Compared with study dated  2021,.  CONCLUSIONS:  1. Poorly visualized anatomical structures due to suboptimal image quality.  2. Left ventricular ejection fraction is low normal, by visual estimate at 50-55%.  3. Left ventricular diastolic filling is indeterminate.  4. Unable to determine right ventricular systolic function.  5. The left atrium is mildly dilated. RECOMMENDATIONS: Technically suboptimal and limited study, therefore accuracy of above interpretation could be substantially diminished. Clinical correlation is advised. Consider additional imaging modalities if clinically indicated. Repeat full study if clinically indicated.  QUANTITATIVE DATA SUMMARY:  2D MEASUREMENTS:         Normal Ranges: IVSd:            1.29 cm (0.6-1.1cm) LVPWd:           0.80 cm (0.6-1.1cm) LVIDd:           5.67 cm (3.9-5.9cm) LVIDs:           3.58 cm LV Mass Index:   83 g/m2 LV % FS          36.8 %  LEFT ATRIUM:                  Normal Ranges: LA Vol A4C:        101.3 ml   (22+/-6mL/m2) LA Vol A2C:        68.9 ml LA Vol BP:         87.5 ml LA Vol Index A4C:  35.5ml/m2 LA Vol Index A2C:  24.1 ml/m2 LA Vol Index BP:   30.6 ml/m2 LA Area A4C:       24.9 cm2 LA Area A2C:       21.5 cm2 LA Major Axis A4C: 5.2 cm LA Major Axis A2C: 5.7 cm LA Volume Index:   30.6 ml/m2 LA Vol A4C:        90.3 ml LA Vol A2C:        70.3 ml LA Vol Index BSA:  28.1 ml/m2  LV SYSTOLIC FUNCTION:                      Normal Ranges: EF-A4C View:    49 % (>=55%) EF-Visual:      53 % LV EF Reported: 53 %  LV DIASTOLIC FUNCTION:             Normal Ranges: MV e'                  0.074 m/s   (>8.0) MV lateral e'          0.08 m/s MV medial e'           0.07 m/s PulmV Sys Juliocesar:         40.96 cm/s PulmV Minaya Juliocesar:        49.67 cm/s PulmV S/D Juliocesar:         0.82 PulmV A Revs Juliocesar:      32.34 cm/s PulmV A Revs Dur:      117.98 msec  MITRAL VALVE:          Normal Ranges: MV Vmax:      1.35 m/s (<=1.3m/s) MV peak P.3 mmHg (<5mmHg) MV mean P.8 mmHg (<2mmHg)  AORTIC VALVE:           Normal  Ranges: AoV Vmax:      1.34 m/s (<=1.7m/s) AoV Peak P.2 mmHg (<20mmHg) LVOT Max Juliocesar:  1.01 m/s (<=1.1m/s) LVOT VTI:      16.91 cm LVOT Diameter: 2.63 cm  (1.8-2.4cm) AoV Area,Vmax: 4.08 cm2 (2.5-4.5cm2)  RIGHT VENTRICLE: TAPSE: 19.0 mm RV s'  0.16 m/s  PULMONIC VALVE:          Normal Ranges: PV Accel Time:  152 msec (>120ms) PV Max Juliocesar:     1.0 m/s  (0.6-0.9m/s) PV Max P.2 mmHg  PULMONARY VEINS: PulmV A Revs Dur: 117.98 msec PulmV A Revs Juliocesar: 32.34 cm/s PulmV Minaya Juliocesar:   49.67 cm/s PulmV S/D Juliocesar:    0.82 PulmV Sys Juliocesar:    40.96 cm/s  [id number] Alex Kennedy DO Electronically signed on 4/15/2025 at 4:03:12 PM  ** Final **     Vascular US lower extremity venous duplex bilateral  Result Date: 2025  Interpreted By:  Justin Reyes, STUDY: Duplex venous ultrasound of the  bilateral lower extremity dated 2025.   INDICATION: Bilateral edema and redness.   COMPARISON: None.   ACCESSION NUMBER(S): [id number]   ORDERING CLINICIAN: DEEDEE INMAN   TECHNIQUE: Multiplanar grayscale, color, and spectral Doppler ultrasound evaluation of the  bilateral lower extremity deep venous system   was performed.   FINDINGS: Examination is significantly limited due to body habitus, patient ability to position, and pain level. The common femoral veins the greater saphenous veins in the deep femoral veins could not be assessed. The calf veins are not well visualized due to edema and body habitus.     BILATERAL:   There is normal compressibility of the  femoral (including proximal, mid, and distal aspects), and popliteal veins. There is a normal, spontaneous and phasic venous waveforms the visualized  lower extremity.       Significantly limited examination with inability to scan/assess multiple sections of the deep venous tree of the bilateral lower extremity. Given this, no acute deep venous thrombosis is evident in the visualized  bilateral lower extremity. A repeat examination with the assessment of the entirety  of the vascular tree is recommended when the patient's clinical condition allows.   Signed by: Justin Reyes 4/14/2025 10:03 PM Dictation workstation:   [id number]    CT angio chest for pulmonary embolism  Result Date: 4/14/2025  STUDY: CT Angiogram of the Chest; 4/14/2025 at 8:43 p.m. INDICATION: Chest pain.  Shortness of breath.  Lower extremity edema. COMPARISON: One-view CXR 10/29/2024.  CT CAP 7/24/2024. ACCESSION NUMBER(S): [id number] ORDERING CLINICIAN: DEEDEE INMAN TECHNIQUE:  CTA of the chest was performed with intravenous contrast. Images are reviewed and processed at a workstation according to the CT angiogram protocol with 3-D and/or MIP post processing imaging generated.  Omnipaque 350 90 mL was administered intravenously.  Automated mA/kV exposure control was utilized and patient examination was performed in strict accordance with principles of ALARA. FINDINGS: Images are limited secondary to patient body habitus.  Normal heart size.  No pericardial effusion.  LAD coronary artery calcification present. No aneurysm or dissection of thoracic aorta.  Scattered calcification of the thoracic aorta.  No evidence of pulmonary embolism.  No mediastinal hematoma or fluid collection.  The esophagus is nondilated. The heart is normal in size without pericardial effusion.  Thoracic lymph nodes are not enlarged. There is no pleural effusion, pleural thickening, or pneumothorax. The airways are patent. There is prominent asymmetric elevation of the right hemidiaphragm again demonstrated.  Associated vascular crowding and chronic atelectasis increased from baseline examination in the right lower lobe and right middle lobe.  Right lung is otherwise unchanged.  There is patchy airspace disease in the left upper lobe consistent with pneumonia.  No other consolidation left lung.  No pleural effusion or pneumothorax.  The central airways are patent. Upper abdomen demonstrates no acute pathology.  Splenomegaly  unchanged maximum dimension approximately 15 cm.  Generalized fatty replacement of the pancreas.  Cholelithiasis with multiple small dependently layering stones in the gallbladder.  No adrenal lesions. There are no acute fractures.  No suspicious bony lesions. Generalized thoracic spondylosis.  No acute findings in the chest wall soft tissues.    No pulmonary embolism is identified. Left upper lobe pneumonia. Chronic asymmetric elevation of right hemidiaphragm with overlying atelectasis. Coronary artery calcification. Cholelithiasis. Splenomegaly. Signed by Nabor Bautista,     XR chest 1 view  Result Date: 4/14/2025  Interpreted By:  Justin Reyes, STUDY: Chest dated  4/14/2025.   INDICATION: Signs/Symptoms:dyspnea   COMPARISON: Chest dated 10/29/2024.   ACCESSION NUMBER(S): [id number]   ORDERING CLINICIAN: DEEDEE INMAN   TECHNIQUE: One view of the chest.   FINDINGS: The lungs are clear.  No pneumothorax or effusion is evident. 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       No acute cardiopulmonary process is evident.   MACRO: None   Signed by: Justin Reyes 4/14/2025 8:47 PM Dictation workstation:   [id number]          Assessment & Plan  Healthcare-associated pneumonia      IMPRESSION  TYPE 2 DIABETES MELLITUS  LONG TERM CURRENT INSULIN USE  A1C of 7.2%        RECOMMENDATIONS  To continue Insulin glargine 60 units subcutaneous twice daily   To continue Insulin Lispro 40 units subcutaneous TID AC  To continue insulin correction scale TID AC   Diabetic diet as tolerated   Accu-Cheks ACHS    Hypoglycemic protocol   Will continue to follow    Jordan Almodovar MD         [1] amLODIPine, 10 mg, oral, Daily  aspirin, 81 mg, oral, Daily  atorvastatin, 80 mg, oral, Daily  colchicine, 0.6 mg, oral, Daily  DULoxetine, 60 mg, oral, Daily  enoxaparin, 60 mg, subcutaneous, q12h  TAMIR  furosemide, 80 mg, intravenous, BID  insulin glargine, 60 Units, subcutaneous, q12h  insulin lispro, 0-10 Units, subcutaneous, TID  insulin lispro, 40 Units, subcutaneous, TID AC  pantoprazole, 40 mg, oral, Daily before breakfast   Or  pantoprazole, 40 mg, intravenous, Daily before breakfast  perflutren protein A microsphere, 0.5 mL, intravenous, Once in imaging  QUEtiapine, 100 mg, oral, Nightly  regadenoson, 0.4 mg, intravenous, Once in imaging  sulfur hexafluoride microsphr, 2 mL, intravenous, Once in imaging  [2]    [3] PRN medications: aminophylline, dextrose, dextrose, glucagon, glucagon, HYDROcodone-acetaminophen, HYDROmorphone, hydrOXYzine HCL, methocarbamol, ondansetron **OR** ondansetron, oxyCODONE

## 2025-04-26 NOTE — PROGRESS NOTES
"Subjective Data:  Volume status improving  -20L(somewhat inaccurate) since admission 186kg to 174kg    Objective Data:  Last Recorded Vitals:  Vitals:    25 0815 25 1920 25 0340 25 0720   BP: 143/63 150/73 131/64 152/72   BP Location: Left arm Left arm Left arm Right arm   Patient Position: Lying Lying Lying Lying   Pulse: 87 84 75 80   Resp: 18 18 18 17   Temp: 36.8 °C (98.3 °F) 36.2 °C (97.2 °F) 37.1 °C (98.7 °F) 36.9 °C (98.4 °F)   TempSrc: Temporal Temporal Temporal Temporal   SpO2: 98% 95% 94% 94%   Weight:   (!) 174 kg (384 lb 7.7 oz)    Height:         Medical Gas Therapy: Supplemental oxygen  Medical Gas Delivery Method: Nasal cannula  Weight  Av kg (398 lb 3.4 oz)  Min: 174 kg (383 lb 1.6 oz)  Max: 186 kg (411 lb)    LABS:  CMP:  Results from last 7 days   Lab Units 25  0509 25  0730 25  0541 25  0552 25  0544 25  0652 25  0706   SODIUM mmol/L 137 138 137 139 137 135* 136   POTASSIUM mmol/L 3.5 3.9 3.6 3.8 3.3* 3.3* 3.4*   CHLORIDE mmol/L 93* 93* 94* 97* 93* 90* 90*   CO2 mmol/L 36* 38* 35* 38* 38* 39* 39*   ANION GAP mmol/L 12 11 12 8* 9* 9* 10   BUN mg/dL 38* 33* 31* 29* 30* 29* 30*   CREATININE mg/dL 1.51* 1.30 1.27 1.22 1.27 1.35* 1.34*   EGFR mL/min/1.73m*2 55* 65 67 70 67 62 63   ALBUMIN g/dL 3.6 3.6 3.6 3.5 3.6 3.5 3.8     CBC:  Results from last 7 days   Lab Units 25  0509 25  0729 25  0541 25  0552 25  0544 25  0652 25  0706   WBC AUTO x10*3/uL 8.0 6.7 7.0 6.1 7.7 7.8 9.0   HEMOGLOBIN g/dL 10.4* 10.2* 9.5* 9.5* 9.2* 9.3* 9.6*   HEMATOCRIT % 34.8* 34.2* 34.7* 32.2* 31.3* 31.4* 32.6*   PLATELETS AUTO x10*3/uL 257 242 226 205 231 230 229   MCV fL 79* 79* 84 77* 80 80 80     COAG:         ABO: No results found for: \"ABO\"  HEME/ENDO:         CARDIAC:   Results from last 7 days   Lab Units 25  0541   TROPHS ng/L 16                 Last I/O:    Intake/Output Summary (Last 24 hours) at " 4/26/2025 1020  Last data filed at 4/26/2025 0950  Gross per 24 hour   Intake 920 ml   Output --   Net 920 ml     Net IO Since Admission: -19,272 mL [04/26/25 1020]            Inpatient Medications:  Scheduled Medications[1]  PRN Medications[2]  Continuous Medications[3]        Physical Exam:  Gen Well appearing middle aged male sitting up in NAD. Body mass index is 53.65 kg/m².   CV rrr. No m/r/g appreciated. No JVD. +1-2 bilateral leg edema.   Pulm Lungs clear with normal respiratory effort.  Neuro Alert and conversant. Grossly nonfocal.         Assessment:  Acute on chronic HFpEF  Volume+ with some continued improvement. Urine output acceptable as of late minus some inaccurate I/O's here and there.    2. Elevated troponin  Non-MI troponin elevation / acute non-traumatic myocardial injury. Core measures do not apply. Noted in the setting of volume overload and PNA. Noted CAC on CT with diabetes mellitus type II--on statin as is appropriate. Last LDL was quite low (< 50).    3. Hypertension   BP variable (higher as of late) but acceptable under the present circumstances. Monitoring with diuresis. Potential added ARB.       Recommendations:  Con't IV diuretics. Strict I/O's. Daily weights. Monitor Mag/K and supplement to >2/4. Close monitoring of renal function. Potential   -Likely transition to oral tomorrow   -can likely discharge on his home Torsemide 60mg BID  -Plan for outpatient stress testing.          Marlene Quinn, APRN-CNP          [1]   Scheduled medications   Medication Dose Route Frequency    amLODIPine  10 mg oral Daily    aspirin  81 mg oral Daily    atorvastatin  80 mg oral Daily    colchicine  0.6 mg oral Daily    DULoxetine  60 mg oral Daily    enoxaparin  60 mg subcutaneous q12h TAMIR    furosemide  80 mg intravenous BID    insulin glargine  60 Units subcutaneous q12h    insulin lispro  0-10 Units subcutaneous TID    insulin lispro  40 Units subcutaneous TID AC    pantoprazole  40 mg oral Daily  before breakfast    Or    pantoprazole  40 mg intravenous Daily before breakfast    perflutren protein A microsphere  0.5 mL intravenous Once in imaging    QUEtiapine  100 mg oral Nightly    regadenoson  0.4 mg intravenous Once in imaging    sulfur hexafluoride microsphr  2 mL intravenous Once in imaging   [2]   PRN medications   Medication    aminophylline    dextrose    dextrose    glucagon    glucagon    HYDROcodone-acetaminophen    HYDROmorphone    hydrOXYzine HCL    methocarbamol    ondansetron    Or    ondansetron    oxyCODONE   [3]   Continuous Medications   Medication Dose Last Rate

## 2025-04-26 NOTE — CARE PLAN
Problem: Pain  Goal: Takes deep breaths with improved pain control throughout the shift  Outcome: Progressing  Goal: Turns in bed with improved pain control throughout the shift  Outcome: Progressing  Goal: Walks with improved pain control throughout the shift  Outcome: Progressing  Goal: Performs ADL's with improved pain control throughout shift  Outcome: Progressing  Goal: Participates in PT with improved pain control throughout the shift  Outcome: Progressing  Goal: Free from opioid side effects throughout the shift  Outcome: Progressing  Goal: Free from acute confusion related to pain meds throughout the shift  Outcome: Progressing     Problem: Skin  Goal: Decreased wound size/increased tissue granulation at next dressing change  Outcome: Progressing  Flowsheets (Taken 4/26/2025 1605)  Decreased wound size/increased tissue granulation at next dressing change: Promote sleep for wound healing  Goal: Participates in plan/prevention/treatment measures  Outcome: Progressing  Flowsheets (Taken 4/26/2025 1605)  Participates in plan/prevention/treatment measures:   Elevate heels   Increase activity/out of bed for meals  Goal: Prevent/manage excess moisture  Outcome: Progressing  Flowsheets (Taken 4/26/2025 1605)  Prevent/manage excess moisture: Cleanse incontinence/protect with barrier cream  Goal: Prevent/minimize sheer/friction injuries  Outcome: Progressing  Flowsheets (Taken 4/26/2025 1605)  Prevent/minimize sheer/friction injuries: Increase activity/out of bed for meals  Goal: Promote/optimize nutrition  Outcome: Progressing  Flowsheets (Taken 4/26/2025 1605)  Promote/optimize nutrition: Consume > 50% meals/supplements  Goal: Promote skin healing  Outcome: Progressing  Flowsheets (Taken 4/26/2025 1605)  Promote skin healing: Assess skin/pad under line(s)/device(s)     Problem: Pain - Adult  Goal: Verbalizes/displays adequate comfort level or baseline comfort level  Outcome: Progressing     Problem: Safety -  Adult  Goal: Free from fall injury  Outcome: Progressing     Problem: Discharge Planning  Goal: Discharge to home or other facility with appropriate resources  Outcome: Progressing     Problem: Chronic Conditions and Co-morbidities  Goal: Patient's chronic conditions and co-morbidity symptoms are monitored and maintained or improved  Outcome: Progressing     Problem: Nutrition  Goal: Nutrient intake appropriate for maintaining nutritional needs  Outcome: Progressing     Problem: Fall/Injury  Goal: Not fall by end of shift  Outcome: Progressing  Goal: Be free from injury by end of the shift  Outcome: Progressing  Goal: Verbalize understanding of personal risk factors for fall in the hospital  Outcome: Progressing  Goal: Verbalize understanding of risk factor reduction measures to prevent injury from fall in the home  Outcome: Progressing  Goal: Use assistive devices by end of the shift  Outcome: Progressing  Goal: Pace activities to prevent fatigue by end of the shift  Outcome: Progressing     Problem: Diabetes  Goal: Achieve decreasing blood glucose levels by end of shift  Outcome: Not Progressing  Goal: Increase stability of blood glucose readings by end of shift  Outcome: Not Progressing  Goal: Maintain glucose levels >70mg/dl to <250mg/dl throughout shift  Outcome: Not Progressing  Goal: No changes in neurological exam by end of shift  Outcome: Progressing  Goal: Vital signs within normal range for age by end of shift  Outcome: Progressing   The patient's goals for the shift include safety    The clinical goals for the shift include patient will remain safe throughout this shift, pain will be managed to level patient can be in chair three times a week      Problem: Diabetes  Goal: Achieve decreasing blood glucose levels by end of shift  Outcome: Not Progressing  Goal: Increase stability of blood glucose readings by end of shift  Outcome: Not Progressing  Goal: Maintain glucose levels >70mg/dl to <250mg/dl  throughout shift  Outcome: Not Progressing

## 2025-04-27 VITALS
TEMPERATURE: 97.7 F | WEIGHT: 315 LBS | HEART RATE: 95 BPM | BODY MASS INDEX: 44.1 KG/M2 | HEIGHT: 71 IN | RESPIRATION RATE: 18 BRPM | DIASTOLIC BLOOD PRESSURE: 68 MMHG | SYSTOLIC BLOOD PRESSURE: 144 MMHG | OXYGEN SATURATION: 94 %

## 2025-04-27 LAB
ALBUMIN SERPL BCP-MCNC: 3.5 G/DL (ref 3.4–5)
ANION GAP SERPL CALC-SCNC: 12 MMOL/L (ref 10–20)
BUN SERPL-MCNC: 38 MG/DL (ref 6–23)
CALCIUM SERPL-MCNC: 8.8 MG/DL (ref 8.6–10.3)
CHLORIDE SERPL-SCNC: 95 MMOL/L (ref 98–107)
CO2 SERPL-SCNC: 35 MMOL/L (ref 21–32)
CREAT SERPL-MCNC: 1.36 MG/DL (ref 0.5–1.3)
EGFRCR SERPLBLD CKD-EPI 2021: 62 ML/MIN/1.73M*2
ERYTHROCYTE [DISTWIDTH] IN BLOOD BY AUTOMATED COUNT: 16 % (ref 11.5–14.5)
GLUCOSE BLD MANUAL STRIP-MCNC: 205 MG/DL (ref 74–99)
GLUCOSE BLD MANUAL STRIP-MCNC: 211 MG/DL (ref 74–99)
GLUCOSE SERPL-MCNC: 234 MG/DL (ref 74–99)
HCT VFR BLD AUTO: 32.3 % (ref 41–52)
HGB BLD-MCNC: 9.6 G/DL (ref 13.5–17.5)
MAGNESIUM SERPL-MCNC: 2.06 MG/DL (ref 1.6–2.4)
MCH RBC QN AUTO: 23.3 PG (ref 26–34)
MCHC RBC AUTO-ENTMCNC: 29.7 G/DL (ref 32–36)
MCV RBC AUTO: 78 FL (ref 80–100)
NRBC BLD-RTO: 0 /100 WBCS (ref 0–0)
PHOSPHATE SERPL-MCNC: 4 MG/DL (ref 2.5–4.9)
PLATELET # BLD AUTO: 213 X10*3/UL (ref 150–450)
POTASSIUM SERPL-SCNC: 3.6 MMOL/L (ref 3.5–5.3)
RBC # BLD AUTO: 4.12 X10*6/UL (ref 4.5–5.9)
SODIUM SERPL-SCNC: 138 MMOL/L (ref 136–145)
WBC # BLD AUTO: 5.8 X10*3/UL (ref 4.4–11.3)

## 2025-04-27 PROCEDURE — 85027 COMPLETE CBC AUTOMATED: CPT | Performed by: INTERNAL MEDICINE

## 2025-04-27 PROCEDURE — 99232 SBSQ HOSP IP/OBS MODERATE 35: CPT | Performed by: NURSE PRACTITIONER

## 2025-04-27 PROCEDURE — 2500000004 HC RX 250 GENERAL PHARMACY W/ HCPCS (ALT 636 FOR OP/ED): Mod: JZ | Performed by: INTERNAL MEDICINE

## 2025-04-27 PROCEDURE — 2500000004 HC RX 250 GENERAL PHARMACY W/ HCPCS (ALT 636 FOR OP/ED): Performed by: INTERNAL MEDICINE

## 2025-04-27 PROCEDURE — 2500000001 HC RX 250 WO HCPCS SELF ADMINISTERED DRUGS (ALT 637 FOR MEDICARE OP): Performed by: INTERNAL MEDICINE

## 2025-04-27 PROCEDURE — 99232 SBSQ HOSP IP/OBS MODERATE 35: CPT | Performed by: INTERNAL MEDICINE

## 2025-04-27 PROCEDURE — 94761 N-INVAS EAR/PLS OXIMETRY MLT: CPT

## 2025-04-27 PROCEDURE — 83735 ASSAY OF MAGNESIUM: CPT | Performed by: INTERNAL MEDICINE

## 2025-04-27 PROCEDURE — 36415 COLL VENOUS BLD VENIPUNCTURE: CPT | Performed by: INTERNAL MEDICINE

## 2025-04-27 PROCEDURE — 82947 ASSAY GLUCOSE BLOOD QUANT: CPT

## 2025-04-27 PROCEDURE — 2500000002 HC RX 250 W HCPCS SELF ADMINISTERED DRUGS (ALT 637 FOR MEDICARE OP, ALT 636 FOR OP/ED): Performed by: INTERNAL MEDICINE

## 2025-04-27 PROCEDURE — 99239 HOSP IP/OBS DSCHRG MGMT >30: CPT | Performed by: INTERNAL MEDICINE

## 2025-04-27 PROCEDURE — 80069 RENAL FUNCTION PANEL: CPT | Performed by: INTERNAL MEDICINE

## 2025-04-27 RX ORDER — TORSEMIDE 20 MG/1
60 TABLET ORAL
Status: DISCONTINUED | OUTPATIENT
Start: 2025-04-27 | End: 2025-04-27 | Stop reason: HOSPADM

## 2025-04-27 RX ORDER — INSULIN GLARGINE 100 [IU]/ML
60 INJECTION, SOLUTION SUBCUTANEOUS 2 TIMES DAILY
Qty: 36 ML | Refills: 0 | Status: SHIPPED | OUTPATIENT
Start: 2025-04-27

## 2025-04-27 RX ORDER — CEPHALEXIN 500 MG/1
1000 CAPSULE ORAL 3 TIMES DAILY
Qty: 42 CAPSULE | Refills: 0 | Status: SHIPPED | OUTPATIENT
Start: 2025-04-27 | End: 2025-04-27

## 2025-04-27 RX ORDER — COLCHICINE 0.6 MG/1
0.6 TABLET ORAL DAILY
Qty: 30 TABLET | Refills: 0 | Status: SHIPPED | OUTPATIENT
Start: 2025-04-28 | End: 2025-04-27

## 2025-04-27 RX ORDER — CEPHALEXIN 500 MG/1
1000 CAPSULE ORAL 3 TIMES DAILY
Qty: 42 CAPSULE | Refills: 0 | Status: SHIPPED | OUTPATIENT
Start: 2025-04-27

## 2025-04-27 RX ORDER — COLCHICINE 0.6 MG/1
0.6 TABLET ORAL DAILY
Qty: 30 TABLET | Refills: 0 | Status: SHIPPED | OUTPATIENT
Start: 2025-04-28

## 2025-04-27 RX ORDER — QUETIAPINE FUMARATE 100 MG/1
100 TABLET, FILM COATED ORAL NIGHTLY
Qty: 30 TABLET | Refills: 0 | Status: SHIPPED | OUTPATIENT
Start: 2025-04-27 | End: 2025-05-27

## 2025-04-27 RX ADMIN — COLCHICINE 0.6 MG: 0.6 TABLET, FILM COATED ORAL at 08:18

## 2025-04-27 RX ADMIN — FUROSEMIDE 80 MG: 10 INJECTION, SOLUTION INTRAMUSCULAR; INTRAVENOUS at 08:16

## 2025-04-27 RX ADMIN — INSULIN LISPRO 4 UNITS: 100 INJECTION, SOLUTION INTRAVENOUS; SUBCUTANEOUS at 08:09

## 2025-04-27 RX ADMIN — INSULIN GLARGINE 60 UNITS: 100 INJECTION, SOLUTION SUBCUTANEOUS at 08:09

## 2025-04-27 RX ADMIN — INSULIN LISPRO 40 UNITS: 100 INJECTION, SOLUTION INTRAVENOUS; SUBCUTANEOUS at 12:13

## 2025-04-27 RX ADMIN — ASPIRIN 81 MG: 81 TABLET, COATED ORAL at 08:18

## 2025-04-27 RX ADMIN — HYDROMORPHONE HYDROCHLORIDE 0.6 MG: 1 INJECTION, SOLUTION INTRAMUSCULAR; INTRAVENOUS; SUBCUTANEOUS at 03:34

## 2025-04-27 RX ADMIN — PANTOPRAZOLE SODIUM 40 MG: 40 INJECTION, POWDER, FOR SOLUTION INTRAVENOUS at 06:23

## 2025-04-27 RX ADMIN — ATORVASTATIN CALCIUM 80 MG: 80 TABLET, FILM COATED ORAL at 08:19

## 2025-04-27 RX ADMIN — AMLODIPINE BESYLATE 10 MG: 10 TABLET ORAL at 08:18

## 2025-04-27 RX ADMIN — INSULIN LISPRO 40 UNITS: 100 INJECTION, SOLUTION INTRAVENOUS; SUBCUTANEOUS at 08:10

## 2025-04-27 RX ADMIN — HYDROCODONE BITARTRATE AND ACETAMINOPHEN 1 TABLET: 5; 325 TABLET ORAL at 01:30

## 2025-04-27 RX ADMIN — INSULIN LISPRO 4 UNITS: 100 INJECTION, SOLUTION INTRAVENOUS; SUBCUTANEOUS at 12:13

## 2025-04-27 RX ADMIN — HYDROMORPHONE HYDROCHLORIDE 0.6 MG: 1 INJECTION, SOLUTION INTRAMUSCULAR; INTRAVENOUS; SUBCUTANEOUS at 12:09

## 2025-04-27 RX ADMIN — ENOXAPARIN SODIUM 60 MG: 60 INJECTION SUBCUTANEOUS at 08:19

## 2025-04-27 RX ADMIN — DULOXETINE HYDROCHLORIDE 60 MG: 30 CAPSULE, DELAYED RELEASE ORAL at 08:18

## 2025-04-27 ASSESSMENT — PAIN SCALES - GENERAL
PAINLEVEL_OUTOF10: 7
PAINLEVEL_OUTOF10: 0 - NO PAIN
PAINLEVEL_OUTOF10: 5 - MODERATE PAIN
PAINLEVEL_OUTOF10: 0 - NO PAIN
PAINLEVEL_OUTOF10: 8
PAINLEVEL_OUTOF10: 7

## 2025-04-27 ASSESSMENT — PAIN - FUNCTIONAL ASSESSMENT
PAIN_FUNCTIONAL_ASSESSMENT: CPOT (CRITICAL CARE PAIN OBSERVATION TOOL)
PAIN_FUNCTIONAL_ASSESSMENT: 0-10

## 2025-04-27 ASSESSMENT — COGNITIVE AND FUNCTIONAL STATUS - GENERAL
CLIMB 3 TO 5 STEPS WITH RAILING: A LITTLE
HELP NEEDED FOR BATHING: A LITTLE
WALKING IN HOSPITAL ROOM: A LITTLE
DRESSING REGULAR LOWER BODY CLOTHING: A LITTLE
TURNING FROM BACK TO SIDE WHILE IN FLAT BAD: A LITTLE
DRESSING REGULAR UPPER BODY CLOTHING: A LITTLE
STANDING UP FROM CHAIR USING ARMS: A LITTLE
DAILY ACTIVITIY SCORE: 21
MOVING TO AND FROM BED TO CHAIR: A LITTLE
MOVING FROM LYING ON BACK TO SITTING ON SIDE OF FLAT BED WITH BEDRAILS: A LITTLE
MOBILITY SCORE: 18

## 2025-04-27 ASSESSMENT — PAIN DESCRIPTION - LOCATION
LOCATION: BACK
LOCATION: LEG

## 2025-04-27 ASSESSMENT — PAIN DESCRIPTION - ORIENTATION
ORIENTATION: RIGHT;LEFT
ORIENTATION: MID;LOWER

## 2025-04-27 NOTE — CARE PLAN
The patient's goals for the shift include safety    The clinical goals for the shift include remain safe    Over the shift, the patient did make progress toward the following goals.     Problem: Fall/Injury  Goal: Not fall by end of shift  Outcome: Progressing  Goal: Be free from injury by end of the shift  Outcome: Progressing  Goal: Verbalize understanding of personal risk factors for fall in the hospital  Outcome: Progressing  Goal: Verbalize understanding of risk factor reduction measures to prevent injury from fall in the home  Outcome: Progressing  Goal: Use assistive devices by end of the shift  Outcome: Progressing  Goal: Pace activities to prevent fatigue by end of the shift  Outcome: Progressing

## 2025-04-27 NOTE — PROGRESS NOTES
"Jai Shrestha is a 55 y.o. male on day 12 of admission presenting with Healthcare-associated pneumonia.    Subjective   Patient is for discharge today.  He has insulin at home.  He follows with PCP at [facility].  RN has brought in two pudding cups and 2 packs of enrique crackers.       I have reviewed histories, allergies and medications have been reviewed and there are no changes       Objective     Physical Exam  Vitals and nursing note reviewed.   Constitutional:       General: He is not in acute distress.     Appearance: Normal appearance. He is obese.   HENT:      Head: Normocephalic and atraumatic.      Nose: Nose normal.      Mouth/Throat:      Mouth: Mucous membranes are moist.   Eyes:      Extraocular Movements: Extraocular movements intact.   Pulmonary:      Effort: Pulmonary effort is normal.   Musculoskeletal:         General: Normal range of motion.   Neurological:      Mental Status: He is alert and oriented to person, place, and time.   Psychiatric:         Mood and Affect: Mood normal.         Last Recorded Vitals  Blood pressure 123/56, pulse 69, temperature 36.6 °C (97.8 °F), temperature source Temporal, resp. rate 18, height 1.803 m (5' 10.98\"), weight (!) 174 kg (383 lb), SpO2 95%.  Intake/Output last 3 Shifts:  I/O last 3 completed shifts:  In: 1180 (6.8 mL/kg) [P.O.:1180]  Out: - (0 mL/kg)   Weight: 173.7 kg     Relevant Results  Results from last 7 days   Lab Units 04/27/25  0722 04/27/25  0646 04/26/25  2044 04/26/25  1611 04/26/25  1203 04/26/25  0723 04/26/25  0509 04/25/25  0807 04/25/25  0730 04/24/25  0828 04/24/25  0541 04/23/25  0744 04/23/25  0552   POCT GLUCOSE mg/dL 211*  --  331* 198* 305* 198*  --    < >  --    < >  --    < >  --    GLUCOSE mg/dL  --  234*  --   --   --   --  196*  --  312*  --  292*  --  192*    < > = values in this interval not displayed.     Scheduled medications  amLODIPine, 10 mg, oral, Daily  aspirin, 81 mg, oral, Daily  atorvastatin, 80 mg, oral, " Daily  colchicine, 0.6 mg, oral, Daily  DULoxetine, 60 mg, oral, Daily  enoxaparin, 60 mg, subcutaneous, q12h TAMIR  insulin glargine, 60 Units, subcutaneous, q12h  insulin lispro, 0-10 Units, subcutaneous, TID  insulin lispro, 40 Units, subcutaneous, TID AC  pantoprazole, 40 mg, oral, Daily before breakfast   Or  pantoprazole, 40 mg, intravenous, Daily before breakfast  perflutren protein A microsphere, 0.5 mL, intravenous, Once in imaging  QUEtiapine, 100 mg, oral, Nightly  regadenoson, 0.4 mg, intravenous, Once in imaging  sulfur hexafluoride microsphr, 2 mL, intravenous, Once in imaging  torsemide, 60 mg, oral, BID      Continuous medications     PRN medications  PRN Medications[1]    Results for orders placed or performed during the hospital encounter of 04/14/25 (from the past 24 hours)   POCT GLUCOSE   Result Value Ref Range    POCT Glucose 305 (H) 74 - 99 mg/dL   POCT GLUCOSE   Result Value Ref Range    POCT Glucose 198 (H) 74 - 99 mg/dL   POCT GLUCOSE   Result Value Ref Range    POCT Glucose 331 (H) 74 - 99 mg/dL   CBC   Result Value Ref Range    WBC 5.8 4.4 - 11.3 x10*3/uL    nRBC 0.0 0.0 - 0.0 /100 WBCs    RBC 4.12 (L) 4.50 - 5.90 x10*6/uL    Hemoglobin 9.6 (L) 13.5 - 17.5 g/dL    Hematocrit 32.3 (L) 41.0 - 52.0 %    MCV 78 (L) 80 - 100 fL    MCH 23.3 (L) 26.0 - 34.0 pg    MCHC 29.7 (L) 32.0 - 36.0 g/dL    RDW 16.0 (H) 11.5 - 14.5 %    Platelets 213 150 - 450 x10*3/uL   Renal Function Panel   Result Value Ref Range    Glucose 234 (H) 74 - 99 mg/dL    Sodium 138 136 - 145 mmol/L    Potassium 3.6 3.5 - 5.3 mmol/L    Chloride 95 (L) 98 - 107 mmol/L    Bicarbonate 35 (H) 21 - 32 mmol/L    Anion Gap 12 10 - 20 mmol/L    Urea Nitrogen 38 (H) 6 - 23 mg/dL    Creatinine 1.36 (H) 0.50 - 1.30 mg/dL    eGFR 62 >60 mL/min/1.73m*2    Calcium 8.8 8.6 - 10.3 mg/dL    Phosphorus 4.0 2.5 - 4.9 mg/dL    Albumin 3.5 3.4 - 5.0 g/dL   Magnesium   Result Value Ref Range    Magnesium 2.06 1.60 - 2.40 mg/dL   POCT GLUCOSE   Result  Value Ref Range    POCT Glucose 211 (H) 74 - 99 mg/dL      Electrocardiogram, 12-lead PRN ACS symptoms  Result Date: 4/25/2025  Normal sinus rhythm Nonspecific ST and T wave abnormality Abnormal ECG When compared with ECG of 14-APR-2025 20:51, Nonspecific T wave abnormality no longer evident in Anterior leads Confirmed by Robby Gutierrez ([id number]) on 4/25/2025 8:17:50 PM    Electrocardiogram, 12-lead PRN ACS symptoms  Result Date: 4/16/2025  Normal sinus rhythm ST & T wave abnormality, consider inferolateral ischemia Abnormal ECG When compared with ECG of 14-APR-2025 19:01, (unconfirmed) Premature ventricular complexes are no longer Present Criteria for Inferior infarct are no longer Present QT has shortened See ED provider note for full interpretation and clinical correlation Confirmed by Nely Marsh ([id number]) on 4/16/2025 12:03:42 PM    Electrocardiogram, 12-lead PRN ACS symptoms  Result Date: 4/16/2025  Sinus rhythm with occasional Premature ventricular complexes Inferior infarct (cited on or before 29-OCT-2024) ST & T wave abnormality, consider lateral ischemia Abnormal ECG When compared with ECG of 29-OCT-2024 11:42, (unconfirmed) Premature ventricular complexes are now Present QRS duration has decreased Non-specific change in ST segment in Inferior leads ST now depressed in Anterior leads Nonspecific T wave abnormality, worse in Lateral leads See ED provider note for full interpretation and clinical correlation Confirmed by Nely Marsh ([id number]) on 4/16/2025 12:03:26 PM    Transthoracic Echo (TTE) Complete  Result Date: 4/15/2025   [facility]– [place], [address] [place] [address]              Tel [phone] and Fax [phone] TRANSTHORACIC ECHOCARDIOGRAM REPORT  Patient Name:       JOSSE Camargo Physician:    [id number] Alex Kennedy DO Study Date:         4/15/2025           Ordering Provider:    [id number] HAYWARD                                                                LV MRN/PID:            [id number]            Fellow: Accession#:         [id number]        Nurse: Date of Birth/Age:  1970 / 55 years Sonographer:          Jose Luis Rollins RDCS Gender assigned at  M                   Additional Staff: Birth: Height:             180.00 cm           Admit Date: Weight:             186.00 kg           Admission Status:     Inpatient -                                                               Priority                                                               discharge BSA / BMI:          2.86 m2 / 57.41     Encounter#:           [id number]                     kg/m2 Blood Pressure:     154/96 mmHg         Department Location:  [facility] Non                                                               Invasive Study Type:    TRANSTHORACIC ECHO (TTE) COMPLETE Diagnosis/ICD: Heart failure, unspecified-I50.9 Indication:    HF CPT Code:      Echo Complete w Full [id number] Patient History: Diabetes:          Yes Pertinent History: Dyspnea and HTN. HF. Study Detail: The following Echo studies were performed: 2D, M-Mode, Doppler and               color flow. Image quality for this study is suboptimal.               Technically challenging study due to poor acoustic windows, body               habitus and sitting up. Definity used as a contrast agent for               endocardial border definition. Total contrast used for this               procedure was 3 mL via IV push.  PHYSICIAN INTERPRETATION: Left Ventricle: Left ventricular ejection fraction is low normal, by visual estimate at 50-55%. There are no regional left ventricular wall motion abnormalities. The left ventricular cavity size was not assessed. There is mildly increased septal and normal posterior left ventricular wall thickness. Left ventricular diastolic filling is indeterminate. Left Atrium:  The left atrium is mildly dilated. Right Ventricle: The right ventricle was not well visualized. Unable to determine right ventricular systolic function. Right Atrium: The right atrial size was not well visualized. Aortic Valve: The aortic valve was not well visualized. There is no evidence of aortic valve regurgitation. The peak instantaneous gradient of the aortic valve is 7 mmHg. Mitral Valve: The mitral valve was not well visualized. The peak instantaneous gradient of the mitral valve is 7 mmHg. There is trace to mild mitral valve regurgitation. Tricuspid Valve: The tricuspid valve was not well visualized. Tricuspid regurgitation was not assessed. The right ventricular systolic pressure is unable to be estimated. Pulmonic Valve: The pulmonic valve is not well visualized. The pulmonic valve regurgitation was not well visualized. Pericardium: Pericardial effusion was not well visualized. Aorta: The aortic root was not well visualized. In comparison to the previous echocardiogram(s): Although previous studies are available for review, a comparison with the current echocardiogram is not feasible due to its limited scope or image quality. Compared with study dated 2/6/2021,.  CONCLUSIONS:  1. Poorly visualized anatomical structures due to suboptimal image quality.  2. Left ventricular ejection fraction is low normal, by visual estimate at 50-55%.  3. Left ventricular diastolic filling is indeterminate.  4. Unable to determine right ventricular systolic function.  5. The left atrium is mildly dilated. RECOMMENDATIONS: Technically suboptimal and limited study, therefore accuracy of above interpretation could be substantially diminished. Clinical correlation is advised. Consider additional imaging modalities if clinically indicated. Repeat full study if clinically indicated.  QUANTITATIVE DATA SUMMARY:  2D MEASUREMENTS:         Normal Ranges: IVSd:            1.29 cm (0.6-1.1cm) LVPWd:           0.80 cm (0.6-1.1cm)  LVIDd:           5.67 cm (3.9-5.9cm) LVIDs:           3.58 cm LV Mass Index:   83 g/m2 LV % FS          36.8 %  LEFT ATRIUM:                  Normal Ranges: LA Vol A4C:        101.3 ml   (22+/-6mL/m2) LA Vol A2C:        68.9 ml LA Vol BP:         87.5 ml LA Vol Index A4C:  35.5ml/m2 LA Vol Index A2C:  24.1 ml/m2 LA Vol Index BP:   30.6 ml/m2 LA Area A4C:       24.9 cm2 LA Area A2C:       21.5 cm2 LA Major Axis A4C: 5.2 cm LA Major Axis A2C: 5.7 cm LA Volume Index:   30.6 ml/m2 LA Vol A4C:        90.3 ml LA Vol A2C:        70.3 ml LA Vol Index BSA:  28.1 ml/m2  LV SYSTOLIC FUNCTION:                      Normal Ranges: EF-A4C View:    49 % (>=55%) EF-Visual:      53 % LV EF Reported: 53 %  LV DIASTOLIC FUNCTION:             Normal Ranges: MV e'                  0.074 m/s   (>8.0) MV lateral e'          0.08 m/s MV medial e'           0.07 m/s PulmV Sys Juliocesar:         40.96 cm/s PulmV Minaya Juliocesar:        49.67 cm/s PulmV S/D Juliocesar:         0.82 PulmV A Revs Juliocesar:      32.34 cm/s PulmV A Revs Dur:      117.98 msec  MITRAL VALVE:          Normal Ranges: MV Vmax:      1.35 m/s (<=1.3m/s) MV peak P.3 mmHg (<5mmHg) MV mean P.8 mmHg (<2mmHg)  AORTIC VALVE:           Normal Ranges: AoV Vmax:      1.34 m/s (<=1.7m/s) AoV Peak P.2 mmHg (<20mmHg) LVOT Max Juliocesar:  1.01 m/s (<=1.1m/s) LVOT VTI:      16.91 cm LVOT Diameter: 2.63 cm  (1.8-2.4cm) AoV Area,Vmax: 4.08 cm2 (2.5-4.5cm2)  RIGHT VENTRICLE: TAPSE: 19.0 mm RV s'  0.16 m/s  PULMONIC VALVE:          Normal Ranges: PV Accel Time:  152 msec (>120ms) PV Max Juliocesar:     1.0 m/s  (0.6-0.9m/s) PV Max P.2 mmHg  PULMONARY VEINS: PulmV A Revs Dur: 117.98 msec PulmV A Revs Juliocesar: 32.34 cm/s PulmV Minaya Juliocesar:   49.67 cm/s PulmV S/D Juliocesar:    0.82 PulmV Sys Juliocesar:    40.96 cm/s  [id number] Alex Kennedy DO Electronically signed on 4/15/2025 at 4:03:12 PM  ** Final **     Vascular US lower extremity venous duplex bilateral  Result Date: 2025  Interpreted By:  Justin Reyes, STUDY:  Duplex venous ultrasound of the  bilateral lower extremity dated 4/14/2025.   INDICATION: Bilateral edema and redness.   COMPARISON: None.   ACCESSION NUMBER(S): [id number]   ORDERING CLINICIAN: DEEDEE INMAN   TECHNIQUE: Multiplanar grayscale, color, and spectral Doppler ultrasound evaluation of the  bilateral lower extremity deep venous system   was performed.   FINDINGS: Examination is significantly limited due to body habitus, patient ability to position, and pain level. The common femoral veins the greater saphenous veins in the deep femoral veins could not be assessed. The calf veins are not well visualized due to edema and body habitus.     BILATERAL:   There is normal compressibility of the  femoral (including proximal, mid, and distal aspects), and popliteal veins. There is a normal, spontaneous and phasic venous waveforms the visualized  lower extremity.       Significantly limited examination with inability to scan/assess multiple sections of the deep venous tree of the bilateral lower extremity. Given this, no acute deep venous thrombosis is evident in the visualized  bilateral lower extremity. A repeat examination with the assessment of the entirety of the vascular tree is recommended when the patient's clinical condition allows.   Signed by: Justin Reyes 4/14/2025 10:03 PM Dictation workstation:   [id number]    CT angio chest for pulmonary embolism  Result Date: 4/14/2025  STUDY: CT Angiogram of the Chest; 4/14/2025 at 8:43 p.m. INDICATION: Chest pain.  Shortness of breath.  Lower extremity edema. COMPARISON: One-view CXR 10/29/2024.  CT CAP 7/24/2024. ACCESSION NUMBER(S): [id number] ORDERING CLINICIAN: DEEDEE INMAN TECHNIQUE:  CTA of the chest was performed with intravenous contrast. Images are reviewed and processed at a workstation according to the CT angiogram protocol with 3-D and/or MIP post processing imaging generated.  Omnipaque 350 90 mL was administered intravenously.   Automated mA/kV exposure control was utilized and patient examination was performed in strict accordance with principles of ALARA. FINDINGS: Images are limited secondary to patient body habitus.  Normal heart size.  No pericardial effusion.  LAD coronary artery calcification present. No aneurysm or dissection of thoracic aorta.  Scattered calcification of the thoracic aorta.  No evidence of pulmonary embolism.  No mediastinal hematoma or fluid collection.  The esophagus is nondilated. The heart is normal in size without pericardial effusion.  Thoracic lymph nodes are not enlarged. There is no pleural effusion, pleural thickening, or pneumothorax. The airways are patent. There is prominent asymmetric elevation of the right hemidiaphragm again demonstrated.  Associated vascular crowding and chronic atelectasis increased from baseline examination in the right lower lobe and right middle lobe.  Right lung is otherwise unchanged.  There is patchy airspace disease in the left upper lobe consistent with pneumonia.  No other consolidation left lung.  No pleural effusion or pneumothorax.  The central airways are patent. Upper abdomen demonstrates no acute pathology.  Splenomegaly unchanged maximum dimension approximately 15 cm.  Generalized fatty replacement of the pancreas.  Cholelithiasis with multiple small dependently layering stones in the gallbladder.  No adrenal lesions. There are no acute fractures.  No suspicious bony lesions. Generalized thoracic spondylosis.  No acute findings in the chest wall soft tissues.    No pulmonary embolism is identified. Left upper lobe pneumonia. Chronic asymmetric elevation of right hemidiaphragm with overlying atelectasis. Coronary artery calcification. Cholelithiasis. Splenomegaly. Signed by Nabor Bautista DO    XR chest 1 view  Result Date: 4/14/2025  Interpreted By:  Justin Reyes, STUDY: Chest dated  4/14/2025.   INDICATION: Signs/Symptoms:dyspnea   COMPARISON: Chest dated  10/29/2024.   ACCESSION NUMBER(S): [id number]   ORDERING CLINICIAN: DEEDEE INMAN   TECHNIQUE: One view of the chest.   FINDINGS: The lungs are clear.  No pneumothorax or effusion is evident. 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       No acute cardiopulmonary process is evident.   MACRO: None   Signed by: Justin Reyes 4/14/2025 8:47 PM Dictation workstation:   [id number]             Assessment & Plan  Healthcare-associated pneumonia        IMPRESSION  TYPE 2 DIABETES MELLITUS  LONG TERM CURRENT INSULIN USE  A1C of 7.2%        RECOMMENDATIONS  To continue Insulin glargine 60 units subcutaneous twice daily   To continue Insulin Lispro 40 units subcutaneous TID AC  To continue insulin correction scale TID AC   Diabetic diet as tolerated   Accu-Cheks ACHS    Hypoglycemic protocol   Will continue to follow       Jordan Almodovar MD         [1] PRN medications: aminophylline, dextrose, dextrose, glucagon, glucagon, HYDROcodone-acetaminophen, HYDROmorphone, hydrOXYzine HCL, methocarbamol, ondansetron **OR** ondansetron, oxyCODONE

## 2025-04-27 NOTE — DISCHARGE SUMMARY
Discharge Diagnosis  Healthcare-associated pneumonia           Issues Requiring Follow-Up  PCP follow up    Discharge Meds     Medication List      START taking these medications     cephalexin 500 mg capsule; Commonly known as: Keflex; Take 2 capsules   (1,000 mg) by mouth 3 times a day for 7 days.   colchicine 0.6 mg tablet; Take 1 tablet (0.6 mg) by mouth once daily.;   Start taking on: April 28, 2025     CHANGE how you take these medications     HYDROcodone-acetaminophen 7.5-325 mg tablet; Commonly known as: Norco;   Take 1 tablet by mouth every 6 hours if needed for severe pain (7 - 10)   for up to 28 days. Do not fill before April 8, 2025.; What changed:   Another medication with the same name was removed. Continue taking this   medication, and follow the directions you see here.   insulin glargine 100 unit/mL (3 mL) pen; Commonly known as: Lantus;   Inject 60 Units under the skin 2 times a day. Take as directed per insulin   instructions.; What changed: how much to take   QUEtiapine 100 mg tablet; Commonly known as: SEROquel; Take 1 tablet   (100 mg) by mouth once daily at bedtime.; What changed: medication   strength, how much to take     CONTINUE taking these medications     amLODIPine 5 mg tablet; Commonly known as: Norvasc   aspirin 81 mg EC tablet   atorvastatin 80 mg tablet; Commonly known as: Lipitor   cholecalciferol 1.25 mg (50,000 units) capsule; Commonly known as:   Vitamin D-3   cyclobenzaprine 10 mg tablet; Commonly known as: Flexeril; Take 1 tablet   (10 mg) by mouth 2 times a day as needed for muscle spasms for up to 5   days.   DULoxetine 60 mg DR capsule; Commonly known as: Cymbalta; Take 1 capsule   (60 mg) by mouth once daily. Do not crush or chew.   ferrous sulfate 325 mg (65 mg elemental) tablet   insulin aspart 100 unit/mL injection; Commonly known as: NovoLOG U-100   Insulin aspart; Inject 40 Units under the skin 3 times a day before meals.   naloxone 4 mg/0.1 mL nasal spray; Commonly  "known as: Narcan; Administer   1 spray (4 mg) into affected nostril(s) if needed for opioid reversal. May   repeat every 2-3 minutes if needed, alternating nostrils, until medical   assistance becomes available.   omeprazole 40 mg DR capsule; Commonly known as: PriLOSEC   pen needle, diabetic 32 gauge x 5/32\" needle; Commonly known as: BD   Ultra-Fine Noelle Pen Needle; 1 Pen needle 2 times a day.   torsemide 20 mg tablet; Commonly known as: Demadex       Test Results Pending At Discharge  Pending Labs       No current pending labs.            Hospital Course     55 y.o. male with a past medical history of chronic back pain, peripheral neuropathy, diabetes mellitus type 2 with insulin resistance, morbid obesity BMI 57.3, CKD 3, diastolic CHF, hypertension who presented to [facility] ER complaining of shortness of breath and lower extremity edema.  He also complains of intermittent chest pain but none currently.  He says that his chest pain is midsternal and a burning sensation       Left upper lobe pneumonia  -s/p Vancomycin and Zosyn on admission  - Completed ceftriaxone 2 g IV piggyback daily  -Urine antigens and strep pneumo and Legionella negative  -completed antibiotics course     Diastolic CHF, decompensated  -Daily weight  -Cardiology consulted: Appreciate evaluation and recommendations, continue diuresis  -continue IV lasix BID   -monitor renal functions on diuresis     Drowsy in the a.m. on April 19, likely from Seroquel (took 400 mg at bedtime, reportedly his home dose)  -ECG negative for QTc prolongation  -Telemetry monitoring  -Seroquel reduced to 200 mg at bedtime, please monitor for drowsiness in the morning and hold indefinitely if drowsy will reduce to 100 mg      Elevated troponin flat trend  -Given aspirin in the ER  -Telemetry  -Cardiology: Appreciate evaluation recommendations, continue diuresis to achieve euvolemic state  -No acute coronary syndrome     Type 2 diabetes mellitus requiring " insulin with insulin resistance  -At home he takes Lantus 80 units 3 times a day and lispro insulin 15 units 3 times a day  -Endocrine consulted: Appreciate evaluation management  -Diabetic carbohydrate controlled diet     Hypertension  -Amlodipine 5 mg orally daily     Gout  -Uric acid level check  -Start colchicine 0.6 mg p.o. daily     Hyperlipidemia  -Atorvastatin 80 mg orally daily     Bilateral lymphedema with venous stasis changes and chronic non healing ulcers  -Chronic cellulitis sees wound care nurse at home for venous ulcers  -Diuresis with Lasix  -Pain control: Oxycodone IR discontinued, Percocet started, methocarbamol 500 mg twice daily added  - ID consulted: Appreciate recommendation, recommend transitioning to Keflex 1000 mg p.o. 3 times daily x 7 days at discharge.  Transition to oral currently after completing ceftriaxone for pneumonia treatment     Morbid obesity BMI 57.3  -Lifestyle modification    Patient is doing much better today.  He has been cleared for discharge from cardiology standpoint.  He will be discharged on oral torsemide 60 mg twice daily.  Advised to follow with his PCP and cardiology as outpatient.      Discharge time spent more than 30 minutes.    Pertinent Physical Exam At Time of Discharge  Physical Exam  Constitutional: Obese gentleman, appears drowsy, somnolent, but respond to questions, cooperative not in acute distress  Eyes: PERRLA, clear sclera  ENMT: Moist mucosal membranes, no exudate  Head / Neck: Atraumatic, normocephalic, supple neck, JVP not visualized  Lungs: Patent airways, CTABL  Heart: RRR, S1S2, no murmurs appreciated, palpable pulses in all extremities  GI: Soft, NT, ND, bowel sounds present in all quadrants  MSK: Moves all extremities freely, no restriction  of ROM, no joint edema  Extremities: Chronic lymphedema with venous ulcers, bilateral lower extremities peripheral edema  Neurological: AAO x 3 to person, place and date, facial muscles symmetrical,  sensation intact, strength 4/4, no acute focal neurological deficits appreciated  Psychological: Appropriate mood and behavior  Outpatient Follow-Up  Future Appointments   Date Time Provider Department Center   4/29/2025 10:45 AM Lucius Reyes MD [id number] Timmy Dumont MD

## 2025-04-27 NOTE — CARE PLAN
The patient's goals for the shift include maintaining safety    The clinical goals for the shift include patient will remain safe throughout this shift, pain will be managed to level patient can be in chair three times a week

## 2025-04-27 NOTE — PROGRESS NOTES
04/27/25 1204   Discharge Planning   Expected Discharge Disposition Home Health  ([facility])   Does the patient need discharge transport arranged? Yes   RoundTrip coordination needed? Yes   Has discharge transport been arranged? No  (Family will transport home)          Patient has active discharge order. He is going home with [facility] and [facility] program. [facility] sent to [facility].

## 2025-04-27 NOTE — PROGRESS NOTES
"Subjective Data:  Volume status improving  -20L(somewhat inaccurate) since admission 186kg to 174kg  -no intake and output recorded overnight  Bp 110s to 130s    Objective Data:  Last Recorded Vitals:  Vitals:    25 1945 25 03425   BP: 140/72 119/57  123/56   BP Location: Right arm Right arm  Right arm   Patient Position: Sitting Lying  Lying   Pulse: 84 74  69   Resp: 18 18  18   Temp: 36.7 °C (98.1 °F) 36.5 °C (97.7 °F)  36.6 °C (97.8 °F)   TempSrc: Temporal Temporal  Temporal   SpO2: 96% 91%  96%   Weight:   (!) 174 kg (383 lb)    Height:         Medical Gas Therapy: Supplemental oxygen  Medical Gas Delivery Method: Nasal cannula  Weight  Av kg (396 lb 15.1 oz)  Min: 174 kg (383 lb)  Max: 186 kg (411 lb)    LABS:  CMP:  Results from last 7 days   Lab Units 25  0646 25  0509 25  0730 25  0541 25  0552 25  0544 25  0652   SODIUM mmol/L 138 137 138 137 139 137 135*   POTASSIUM mmol/L 3.6 3.5 3.9 3.6 3.8 3.3* 3.3*   CHLORIDE mmol/L 95* 93* 93* 94* 97* 93* 90*   CO2 mmol/L 35* 36* 38* 35* 38* 38* 39*   ANION GAP mmol/L 12 12 11 12 8* 9* 9*   BUN mg/dL 38* 38* 33* 31* 29* 30* 29*   CREATININE mg/dL 1.36* 1.51* 1.30 1.27 1.22 1.27 1.35*   EGFR mL/min/1.73m*2 62 55* 65 67 70 67 62   MAGNESIUM mg/dL 2.06  --   --   --   --   --   --    ALBUMIN g/dL 3.5 3.6 3.6 3.6 3.5 3.6 3.5     CBC:  Results from last 7 days   Lab Units 25  0646 25  0509 25  0729 25  0541 25  0552 25  0544 25  0652   WBC AUTO x10*3/uL 5.8 8.0 6.7 7.0 6.1 7.7 7.8   HEMOGLOBIN g/dL 9.6* 10.4* 10.2* 9.5* 9.5* 9.2* 9.3*   HEMATOCRIT % 32.3* 34.8* 34.2* 34.7* 32.2* 31.3* 31.4*   PLATELETS AUTO x10*3/uL 213 257 242 226 205 231 230   MCV fL 78* 79* 79* 84 77* 80 80     COAG:         ABO: No results found for: \"ABO\"  HEME/ENDO:         CARDIAC:   Results from last 7 days   Lab Units 25  0541   TROPHS ng/L 16                 Last " I/O:    Intake/Output Summary (Last 24 hours) at 4/27/2025 1027  Last data filed at 4/26/2025 1800  Gross per 24 hour   Intake 500 ml   Output --   Net 500 ml     Net IO Since Admission: -18,772 mL [04/27/25 1027]            Inpatient Medications:  Scheduled Medications[1]  PRN Medications[2]  Continuous Medications[3]        Physical Exam:  Gen Well appearing middle aged male sitting up in NAD. Body mass index is 53.44 kg/m².   CV rrr. No m/r/g appreciated. No JVD. +1-2 bilateral leg edema.   Pulm Lungs clear with normal respiratory effort.  Neuro Alert and conversant. Grossly nonfocal.         Assessment:  Acute on chronic HFpEF  Volume+ with some continued improvement. Urine output acceptable as of late minus some inaccurate I/O's here and there.    2. Elevated troponin  Non-MI troponin elevation / acute non-traumatic myocardial injury. Core measures do not apply. Noted in the setting of volume overload and PNA. Noted CAC on CT with diabetes mellitus type II--on statin as is appropriate. Last LDL was quite low (< 50).    3. Hypertension   BP variable (higher as of late) but acceptable under the present circumstances. Monitoring with diuresis. Potential added ARB.       Recommendations:  Con't IV diuretics. Strict I/O's. Daily weights. Monitor Mag/K and supplement to >2/4. Close monitoring of renal function. Potential   -Likely to oral today  -can likely discharge on his home Torsemide 60mg BID  -Plan for outpatient stress testing.  -no cardiac barriers to d/c          JAYASHREE Smith-CNP          [1]   Scheduled medications   Medication Dose Route Frequency    amLODIPine  10 mg oral Daily    aspirin  81 mg oral Daily    atorvastatin  80 mg oral Daily    colchicine  0.6 mg oral Daily    DULoxetine  60 mg oral Daily    enoxaparin  60 mg subcutaneous q12h TAMIR    furosemide  80 mg intravenous BID    insulin glargine  60 Units subcutaneous q12h    insulin lispro  0-10 Units subcutaneous TID    insulin lispro   40 Units subcutaneous TID AC    pantoprazole  40 mg oral Daily before breakfast    Or    pantoprazole  40 mg intravenous Daily before breakfast    perflutren protein A microsphere  0.5 mL intravenous Once in imaging    QUEtiapine  100 mg oral Nightly    regadenoson  0.4 mg intravenous Once in imaging    sulfur hexafluoride microsphr  2 mL intravenous Once in imaging   [2]   PRN medications   Medication    aminophylline    dextrose    dextrose    glucagon    glucagon    HYDROcodone-acetaminophen    HYDROmorphone    hydrOXYzine HCL    methocarbamol    ondansetron    Or    ondansetron    oxyCODONE   [3]   Continuous Medications   Medication Dose Last Rate

## 2025-04-27 NOTE — SIGNIFICANT EVENT
4/27 pt home 02 eval completed. Spo2 96% rm air resting,spo2 95% rm air ambulation and after.nurse aware pt doesn't qualify for home o2/Jai Fady encouraged to cont w incentive at home.pt w no sob on exertion.

## 2025-04-29 ENCOUNTER — APPOINTMENT (OUTPATIENT)
Dept: PAIN MEDICINE | Facility: CLINIC | Age: 55
End: 2025-04-29
Payer: MEDICARE

## 2025-04-29 DIAGNOSIS — M75.82 TENDINITIS OF LEFT ROTATOR CUFF: ICD-10-CM

## 2025-04-29 DIAGNOSIS — M51.369 LUMBAR DISC NARROWING: ICD-10-CM

## 2025-04-29 DIAGNOSIS — M54.16 LEFT LUMBAR RADICULOPATHY: ICD-10-CM

## 2025-04-29 PROCEDURE — 99214 OFFICE O/P EST MOD 30 MIN: CPT | Performed by: PHYSICAL MEDICINE & REHABILITATION

## 2025-04-29 PROCEDURE — 1036F TOBACCO NON-USER: CPT | Performed by: PHYSICAL MEDICINE & REHABILITATION

## 2025-04-29 PROCEDURE — 3051F HG A1C>EQUAL 7.0%<8.0%: CPT | Performed by: PHYSICAL MEDICINE & REHABILITATION

## 2025-04-29 PROCEDURE — 3048F LDL-C <100 MG/DL: CPT | Performed by: PHYSICAL MEDICINE & REHABILITATION

## 2025-04-29 PROCEDURE — G2211 COMPLEX E/M VISIT ADD ON: HCPCS | Performed by: PHYSICAL MEDICINE & REHABILITATION

## 2025-04-29 RX ORDER — HYDROCODONE BITARTRATE AND ACETAMINOPHEN 7.5; 325 MG/1; MG/1
1 TABLET ORAL EVERY 6 HOURS PRN
Qty: 100 TABLET | Refills: 0 | Status: SHIPPED | OUTPATIENT
Start: 2025-05-08 | End: 2025-06-05

## 2025-04-29 RX ORDER — HYDROCODONE BITARTRATE AND ACETAMINOPHEN 7.5; 325 MG/1; MG/1
1 TABLET ORAL EVERY 6 HOURS PRN
Qty: 100 TABLET | Refills: 0 | Status: SHIPPED | OUTPATIENT
Start: 2025-06-05 | End: 2025-07-03

## 2025-04-29 NOTE — PROGRESS NOTES
Chief complaint  Back and lower limbs   PN from Diabetes  Now having r achilles rupture     Leland WARD LPN,   was present during the entire history and physical examination  Also [employer] observer, present after obtaining verbal permission from Jai Shrestha       History  Jai Shrestha is back for pain management office visit  Had the R Achilles rupture but I could not find documenation in the chart  will check on that . On pe I did not see much. He has cellulitis over the ankles and ulceration on the lateral legs with bandages.    Continue with pain in the back back pain and lower limbs  Had ENIO in the past did not help  His R ankle is helping but difficult to tell swelling bc of the edema and chronic cellulitis    Could not cut back less than 100 tab for the 28 days        Pain level without medication is 8/10 , with the medication pain level 2 to 3/10.     Pain disability index (PDI) improvement   across different functional categories, with the pain control with the meds. Forms filled by patient are scanned in the chart    The pain meds are helping control the pain and improving Activities of Daily living and quality of life and quality of sleep.    opioids treatment agreement Jan 2025    Pill count today, using count tray, and in front of patient, Nurse and myself :  35    pills , last fill was on 4/8  for 100 tabs,  the meds pill count today is correct was in the hospital and used the meds from hospital. Has few pill left will adjust to the nxt fill date for may 8    Oarrs pulled and reviewed, no concerns  last urine toxicology testing was compliant this was done on : Feb 2025  Xray updated spine and leg   ORT (opioid risk tool) score is  1 for depression but that is controlled   Pain pathology and pain generators spine   Modalities tried injection, surgery, physical therapy, TENS unit, nonsteroidal anti-inflammatory medication       Review of Systems :  Denied any fever or chills. No  "weight loss and no night sweats. No cough or sputum production. No diarrhea   The constipation has been responding to fibers and over the counter medications.     No bladder and bowel incontinence and no other changes in bladder and bowel. No skin changes.  Reports tiredness and fatigability only if the pain is not controlled.     I further Asked about symptoms or changes affecting the vision, hearing, breathing, digestive system, urinary symptoms, skin, other musculoskeletal condition, neurological conditions these are negative except as detailed in the history and physical examination above    Denied opioids diversion and abuse and denies alcoholism. Denies overuse of the pain medications.  No reported euphoria sensation or getting a \"high\" on the pain medications.    The control of the pain with the pain medications is helping the control of the symptoms and allowing the function and activities of daily living, enjoyment of life, improving the quality of life and sleep with less interruption by the pain. The goal is symptomatic control of the nonmalignant chronic pain and not to repair the permanent damage in the tissues inducing the chronic pain conditions. We are aiming to shift the focus from the nonmalignant chronic pain to other aspects of life by symptomatically treating this chronic pain. If this pain is not treated it will lead to major morbidity and it is also associated with increased risks of mortality. The patient understands those very clearly and also understand high risks of morbidity and mortality if not strictly adherent to the treatment recommendations and reporting any associated side effects. Also patient understand the full responsibility associated with these medications to avoid abuse or overuse or any use of these medications for anything besides treating the patient's own chronic pain and nothing else under any circumstances.        Physical examination  Awake, alert and oriented for time " place and persons   Pupils are equal and reactive to light and accommodation    Swelling in the R leg and lef   Ulceration on the lateral legs   Chronic swellin the leg  Walking with a limp    Diagnosis  Problem List Items Addressed This Visit       Lumbar disc narrowing    Relevant Medications    HYDROcodone-acetaminophen (Norco) 7.5-325 mg tablet (Start on 5/8/2025)    HYDROcodone-acetaminophen (Norco) 7.5-325 mg tablet (Start on 6/5/2025)    Left lumbar radiculopathy    Relevant Medications    HYDROcodone-acetaminophen (Norco) 7.5-325 mg tablet (Start on 5/8/2025)    HYDROcodone-acetaminophen (Norco) 7.5-325 mg tablet (Start on 6/5/2025)    Rotator cuff tendonitis    Relevant Medications    HYDROcodone-acetaminophen (Norco) 7.5-325 mg tablet (Start on 5/8/2025)    HYDROcodone-acetaminophen (Norco) 7.5-325 mg tablet (Start on 6/5/2025)        Plan  Reviewed the pain generators.  Went over the types of pain with neuropathic and nociceptive and different pathologies and therapeutic modalities. Discussed the mechanism of action of interventions from acupuncture, physical therapy , regular exercises, injections, botox, spinal cord stimulation, and role of surgery     Went over pathology of the intervertebral disc displacement and the anatomical relation to the Nerve roots and relation to the radicular symptoms. Went over treatment modalities with conservative treatment including acupuncture   and epidural steroid injection with fluoroscopy guidance and last resort of surgery    Based on the above findings and the clinical response to the opioids medications and improvement of the activities of daily living, sleep, and work performance. We made this complex decision to continue the opioids therapy in light of the evidence of the patient's responsibility in using the pain medications as prescribed for the nonmalignant chronic pain condition. We discussed about the use of the pain medications to treat the symptoms of  chronic nonmalignant pain and we are not trying the repair the permanent damage in the tissues, rather we are trying to control the symptoms induced by the permanent damage to the tissues inducing the chronic pain condition and resulting disability. I explained the difference and discussed it with the patient and stressed the importance of knowing the difference especially because of the potential side effects and the potential addicting effect and habit forming nature of the dangerous drugs we are using to treat the symptoms of the chronic pain.      We discussed that we are prescribing the medications on good alirio and legitimate medical reason within the scope of professional medical practice.     We reviewed the side effects and precautions of opioids prescriptions as discussed in the opioids treatment agreement.    realizes the interaction between the therapeutic classes including the respiratory depression and potential death     Random drug testing   we will submit     Need AFO to walk will need to be heavy duties to support his 393 lbs  Continue with HEP  Need advanced imaging of the ankle to check on the Achilles      Discussed about NSAIDS and I explained about the opioids sparing effect to allow keeping the opioids dose at minimal effective dose.   I went over the potential side effects of the NSAIDS on the gastrointestinal, renal and cardiovascular systems.      I detailed the side effects from the acetaminophen in the medication and made aware of those. I also explained about the cumulative effects on the organs and mainly the liver.     Given the opioids therapy , we discussed about the risk for accidental over dose on the pain medications, either for patient or other household. I went over the mechanism of action and mode of use of the Naloxone according to the  recommendations. I will provide a prescription for a kit.     Follow-up 8 weeks or earlier if needed     The level of clinical  decision making in this office visit,  is high, given the high risks of complications with the morbidity and mortality due to the fact that acute and chronic pain may pose a threat to life and bodily function, if under treated, poorly treated, or with failure to maintain adequate treatment and timely medical follow up. Additionally over treatment has its own set of complications including overdosing on the pain medications and also the habit forming potentials with the use of the medications used to treat chronic painful conditions including therapeutic classes classified as dangerous medications. Given the serious and fluctuating nature of pain level and instensity with extensive consideration for whenever pain changes, there is always the risk of prolonged functional impairment requiring close patient monitoring with regular assessments and reassessments and high level medical decision making at every office visit. The amount and complexity of data reviewed is high given the patient clinical presentation, labs,  data, radiology reports, and other tests as discussed during office visits. Pertinent data whether positive or negative were taken in consideration in the process of making this high level medical decision.

## 2025-05-16 ENCOUNTER — APPOINTMENT (OUTPATIENT)
Dept: RADIOLOGY | Facility: HOSPITAL | Age: 55
End: 2025-05-16
Payer: MEDICARE

## 2025-05-16 ENCOUNTER — APPOINTMENT (OUTPATIENT)
Dept: CARDIOLOGY | Facility: HOSPITAL | Age: 55
End: 2025-05-16
Payer: MEDICARE

## 2025-05-16 ENCOUNTER — HOSPITAL ENCOUNTER (INPATIENT)
Facility: HOSPITAL | Age: 55
End: 2025-05-16
Attending: EMERGENCY MEDICINE | Admitting: INTERNAL MEDICINE
Payer: MEDICARE

## 2025-05-16 DIAGNOSIS — J18.9 PNEUMONIA DUE TO INFECTIOUS ORGANISM, UNSPECIFIED LATERALITY, UNSPECIFIED PART OF LUNG: Primary | ICD-10-CM

## 2025-05-16 DIAGNOSIS — I48.91 ATRIAL FIBRILLATION, UNSPECIFIED TYPE (MULTI): ICD-10-CM

## 2025-05-16 DIAGNOSIS — R09.02 HYPOXIA: ICD-10-CM

## 2025-05-16 DIAGNOSIS — I46.9 CARDIAC ARREST: ICD-10-CM

## 2025-05-16 LAB
ALBUMIN SERPL BCP-MCNC: 4.2 G/DL (ref 3.4–5)
ALP SERPL-CCNC: 112 U/L (ref 33–120)
ALT SERPL W P-5'-P-CCNC: 13 U/L (ref 10–52)
ANION GAP BLDV CALCULATED.4IONS-SCNC: 3 MMOL/L (ref 10–25)
ANION GAP SERPL CALC-SCNC: 18 MMOL/L (ref 10–20)
AST SERPL W P-5'-P-CCNC: 22 U/L (ref 9–39)
BASE EXCESS BLDV CALC-SCNC: 13.9 MMOL/L (ref -2–3)
BASOPHILS # BLD AUTO: 0.03 X10*3/UL (ref 0–0.1)
BASOPHILS NFR BLD AUTO: 0.3 %
BILIRUB SERPL-MCNC: 0.6 MG/DL (ref 0–1.2)
BNP SERPL-MCNC: 58 PG/ML (ref 0–99)
BODY TEMPERATURE: 37 DEGREES CELSIUS
BUN SERPL-MCNC: 34 MG/DL (ref 6–23)
CA-I BLDV-SCNC: 1.16 MMOL/L (ref 1.1–1.33)
CALCIUM SERPL-MCNC: 9.3 MG/DL (ref 8.6–10.3)
CARDIAC TROPONIN I PNL SERPL HS: 20 NG/L (ref 0–20)
CARDIAC TROPONIN I PNL SERPL HS: 20 NG/L (ref 0–20)
CHLORIDE BLDV-SCNC: 99 MMOL/L (ref 98–107)
CHLORIDE SERPL-SCNC: 97 MMOL/L (ref 98–107)
CO2 SERPL-SCNC: 27 MMOL/L (ref 21–32)
CREAT SERPL-MCNC: 1.4 MG/DL (ref 0.5–1.3)
EGFRCR SERPLBLD CKD-EPI 2021: 60 ML/MIN/1.73M*2
EOSINOPHIL # BLD AUTO: 0.24 X10*3/UL (ref 0–0.7)
EOSINOPHIL NFR BLD AUTO: 2.7 %
ERYTHROCYTE [DISTWIDTH] IN BLOOD BY AUTOMATED COUNT: 17.2 % (ref 11.5–14.5)
GLUCOSE BLD MANUAL STRIP-MCNC: 313 MG/DL (ref 74–99)
GLUCOSE BLDV-MCNC: 207 MG/DL (ref 74–99)
GLUCOSE SERPL-MCNC: 183 MG/DL (ref 74–99)
HCO3 BLDV-SCNC: 40.1 MMOL/L (ref 22–26)
HCT VFR BLD AUTO: 32.6 % (ref 41–52)
HCT VFR BLD EST: 30 % (ref 41–52)
HGB BLD-MCNC: 9.6 G/DL (ref 13.5–17.5)
HGB BLDV-MCNC: 10.1 G/DL (ref 13.5–17.5)
IMM GRANULOCYTES # BLD AUTO: 0.07 X10*3/UL (ref 0–0.7)
IMM GRANULOCYTES NFR BLD AUTO: 0.8 % (ref 0–0.9)
INHALED O2 CONCENTRATION: 36 %
LACTATE BLDV-SCNC: 1.4 MMOL/L (ref 0.4–2)
LYMPHOCYTES # BLD AUTO: 0.99 X10*3/UL (ref 1.2–4.8)
LYMPHOCYTES NFR BLD AUTO: 11.1 %
MAGNESIUM SERPL-MCNC: 2.07 MG/DL (ref 1.6–2.4)
MCH RBC QN AUTO: 23.9 PG (ref 26–34)
MCHC RBC AUTO-ENTMCNC: 29.4 G/DL (ref 32–36)
MCV RBC AUTO: 81 FL (ref 80–100)
MONOCYTES # BLD AUTO: 0.67 X10*3/UL (ref 0.1–1)
MONOCYTES NFR BLD AUTO: 7.5 %
NEUTROPHILS # BLD AUTO: 6.89 X10*3/UL (ref 1.2–7.7)
NEUTROPHILS NFR BLD AUTO: 77.6 %
NRBC BLD-RTO: 0 /100 WBCS (ref 0–0)
OXYHGB MFR BLDV: 74.7 % (ref 45–75)
PCO2 BLDV: 59 MM HG (ref 41–51)
PH BLDV: 7.44 PH (ref 7.33–7.43)
PLATELET # BLD AUTO: 241 X10*3/UL (ref 150–450)
PO2 BLDV: 47 MM HG (ref 35–45)
POTASSIUM BLDV-SCNC: 4.6 MMOL/L (ref 3.5–5.3)
POTASSIUM SERPL-SCNC: 4.3 MMOL/L (ref 3.5–5.3)
PROT SERPL-MCNC: 7.6 G/DL (ref 6.4–8.2)
RBC # BLD AUTO: 4.01 X10*6/UL (ref 4.5–5.9)
SAO2 % BLDV: 77 % (ref 45–75)
SODIUM BLDV-SCNC: 137 MMOL/L (ref 136–145)
SODIUM SERPL-SCNC: 138 MMOL/L (ref 136–145)
WBC # BLD AUTO: 8.9 X10*3/UL (ref 4.4–11.3)

## 2025-05-16 PROCEDURE — 99285 EMERGENCY DEPT VISIT HI MDM: CPT | Mod: 25 | Performed by: EMERGENCY MEDICINE

## 2025-05-16 PROCEDURE — 99223 1ST HOSP IP/OBS HIGH 75: CPT | Performed by: INTERNAL MEDICINE

## 2025-05-16 PROCEDURE — 83735 ASSAY OF MAGNESIUM: CPT

## 2025-05-16 PROCEDURE — 93005 ELECTROCARDIOGRAM TRACING: CPT

## 2025-05-16 PROCEDURE — 85025 COMPLETE CBC W/AUTO DIFF WBC: CPT

## 2025-05-16 PROCEDURE — 2500000001 HC RX 250 WO HCPCS SELF ADMINISTERED DRUGS (ALT 637 FOR MEDICARE OP): Performed by: INTERNAL MEDICINE

## 2025-05-16 PROCEDURE — 94640 AIRWAY INHALATION TREATMENT: CPT

## 2025-05-16 PROCEDURE — 2500000004 HC RX 250 GENERAL PHARMACY W/ HCPCS (ALT 636 FOR OP/ED): Performed by: INTERNAL MEDICINE

## 2025-05-16 PROCEDURE — 71045 X-RAY EXAM CHEST 1 VIEW: CPT

## 2025-05-16 PROCEDURE — 2550000001 HC RX 255 CONTRASTS: Mod: JZ | Performed by: EMERGENCY MEDICINE

## 2025-05-16 PROCEDURE — 82947 ASSAY GLUCOSE BLOOD QUANT: CPT

## 2025-05-16 PROCEDURE — 84132 ASSAY OF SERUM POTASSIUM: CPT

## 2025-05-16 PROCEDURE — 84484 ASSAY OF TROPONIN QUANT: CPT

## 2025-05-16 PROCEDURE — 2500000004 HC RX 250 GENERAL PHARMACY W/ HCPCS (ALT 636 FOR OP/ED): Mod: JZ

## 2025-05-16 PROCEDURE — 71275 CT ANGIOGRAPHY CHEST: CPT | Performed by: RADIOLOGY

## 2025-05-16 PROCEDURE — 1200000002 HC GENERAL ROOM WITH TELEMETRY DAILY

## 2025-05-16 PROCEDURE — 2500000002 HC RX 250 W HCPCS SELF ADMINISTERED DRUGS (ALT 637 FOR MEDICARE OP, ALT 636 FOR OP/ED): Performed by: INTERNAL MEDICINE

## 2025-05-16 PROCEDURE — 9420000001 HC RT PATIENT EDUCATION 5 MIN

## 2025-05-16 PROCEDURE — 2500000005 HC RX 250 GENERAL PHARMACY W/O HCPCS: Performed by: INTERNAL MEDICINE

## 2025-05-16 PROCEDURE — 71275 CT ANGIOGRAPHY CHEST: CPT

## 2025-05-16 PROCEDURE — 36415 COLL VENOUS BLD VENIPUNCTURE: CPT

## 2025-05-16 PROCEDURE — 83880 ASSAY OF NATRIURETIC PEPTIDE: CPT

## 2025-05-16 PROCEDURE — 71045 X-RAY EXAM CHEST 1 VIEW: CPT | Performed by: RADIOLOGY

## 2025-05-16 PROCEDURE — 96367 TX/PROPH/DG ADDL SEQ IV INF: CPT

## 2025-05-16 PROCEDURE — 96366 THER/PROPH/DIAG IV INF ADDON: CPT

## 2025-05-16 PROCEDURE — 96365 THER/PROPH/DIAG IV INF INIT: CPT

## 2025-05-16 RX ORDER — OXYCODONE HYDROCHLORIDE 5 MG/1
10 TABLET ORAL EVERY 4 HOURS PRN
Status: DISCONTINUED | OUTPATIENT
Start: 2025-05-16 | End: 2025-05-21

## 2025-05-16 RX ORDER — NALOXONE HYDROCHLORIDE 4 MG/.1ML
4 SPRAY NASAL AS NEEDED
Status: DISCONTINUED | OUTPATIENT
Start: 2025-05-16 | End: 2025-06-05 | Stop reason: HOSPADM

## 2025-05-16 RX ORDER — IPRATROPIUM BROMIDE AND ALBUTEROL SULFATE 2.5; .5 MG/3ML; MG/3ML
3 SOLUTION RESPIRATORY (INHALATION)
Status: DISCONTINUED | OUTPATIENT
Start: 2025-05-16 | End: 2025-05-17

## 2025-05-16 RX ORDER — ASPIRIN 81 MG/1
81 TABLET ORAL DAILY
Status: DISCONTINUED | OUTPATIENT
Start: 2025-05-16 | End: 2025-05-23

## 2025-05-16 RX ORDER — ACETAMINOPHEN 325 MG/1
650 TABLET ORAL EVERY 4 HOURS PRN
Status: DISCONTINUED | OUTPATIENT
Start: 2025-05-16 | End: 2025-05-29

## 2025-05-16 RX ORDER — DEXTROSE 50 % IN WATER (D50W) INTRAVENOUS SYRINGE
25
Status: DISCONTINUED | OUTPATIENT
Start: 2025-05-16 | End: 2025-06-05 | Stop reason: HOSPADM

## 2025-05-16 RX ORDER — DEXTROSE 50 % IN WATER (D50W) INTRAVENOUS SYRINGE
12.5
Status: DISCONTINUED | OUTPATIENT
Start: 2025-05-16 | End: 2025-06-05 | Stop reason: HOSPADM

## 2025-05-16 RX ORDER — ATORVASTATIN CALCIUM 80 MG/1
80 TABLET, FILM COATED ORAL NIGHTLY
Status: DISCONTINUED | OUTPATIENT
Start: 2025-05-16 | End: 2025-06-05 | Stop reason: HOSPADM

## 2025-05-16 RX ORDER — VANCOMYCIN HYDROCHLORIDE 1 G/20ML
INJECTION, POWDER, LYOPHILIZED, FOR SOLUTION INTRAVENOUS DAILY PRN
Status: DISCONTINUED | OUTPATIENT
Start: 2025-05-16 | End: 2025-05-17

## 2025-05-16 RX ORDER — ONDANSETRON 4 MG/1
4 TABLET, FILM COATED ORAL EVERY 8 HOURS PRN
Status: DISCONTINUED | OUTPATIENT
Start: 2025-05-16 | End: 2025-06-05 | Stop reason: HOSPADM

## 2025-05-16 RX ORDER — QUETIAPINE FUMARATE 100 MG/1
100 TABLET, FILM COATED ORAL NIGHTLY
Status: DISCONTINUED | OUTPATIENT
Start: 2025-05-16 | End: 2025-06-05 | Stop reason: HOSPADM

## 2025-05-16 RX ORDER — LISINOPRIL 5 MG/1
5 TABLET ORAL
COMMUNITY
Start: 2025-02-11

## 2025-05-16 RX ORDER — COLCHICINE 0.6 MG/1
0.6 TABLET ORAL DAILY
Status: DISCONTINUED | OUTPATIENT
Start: 2025-05-16 | End: 2025-06-05 | Stop reason: HOSPADM

## 2025-05-16 RX ORDER — PANTOPRAZOLE SODIUM 40 MG/1
40 TABLET, DELAYED RELEASE ORAL
Status: DISCONTINUED | OUTPATIENT
Start: 2025-05-17 | End: 2025-06-05 | Stop reason: HOSPADM

## 2025-05-16 RX ORDER — DULOXETIN HYDROCHLORIDE 30 MG/1
60 CAPSULE, DELAYED RELEASE ORAL DAILY
Status: DISCONTINUED | OUTPATIENT
Start: 2025-05-16 | End: 2025-06-05 | Stop reason: HOSPADM

## 2025-05-16 RX ORDER — FORMOTEROL FUMARATE 20 UG/2ML
20 SOLUTION RESPIRATORY (INHALATION)
Status: DISCONTINUED | OUTPATIENT
Start: 2025-05-16 | End: 2025-05-17

## 2025-05-16 RX ORDER — PANTOPRAZOLE SODIUM 40 MG/10ML
40 INJECTION, POWDER, LYOPHILIZED, FOR SOLUTION INTRAVENOUS
Status: DISCONTINUED | OUTPATIENT
Start: 2025-05-17 | End: 2025-05-29

## 2025-05-16 RX ORDER — FUROSEMIDE 10 MG/ML
40 INJECTION INTRAMUSCULAR; INTRAVENOUS EVERY 12 HOURS
Status: DISCONTINUED | OUTPATIENT
Start: 2025-05-17 | End: 2025-05-19

## 2025-05-16 RX ORDER — ONDANSETRON HYDROCHLORIDE 2 MG/ML
4 INJECTION, SOLUTION INTRAVENOUS EVERY 8 HOURS PRN
Status: DISCONTINUED | OUTPATIENT
Start: 2025-05-16 | End: 2025-06-05 | Stop reason: HOSPADM

## 2025-05-16 RX ORDER — ALBUTEROL SULFATE 0.83 MG/ML
2.5 SOLUTION RESPIRATORY (INHALATION) EVERY 2 HOUR PRN
Status: DISCONTINUED | OUTPATIENT
Start: 2025-05-16 | End: 2025-05-17

## 2025-05-16 RX ORDER — HEPARIN SODIUM 5000 [USP'U]/ML
7500 INJECTION, SOLUTION INTRAVENOUS; SUBCUTANEOUS EVERY 8 HOURS SCHEDULED
Status: DISCONTINUED | OUTPATIENT
Start: 2025-05-16 | End: 2025-05-25

## 2025-05-16 RX ORDER — ACETAMINOPHEN 160 MG/5ML
650 SOLUTION ORAL EVERY 4 HOURS PRN
Status: DISCONTINUED | OUTPATIENT
Start: 2025-05-16 | End: 2025-05-29

## 2025-05-16 RX ORDER — CEFTRIAXONE 1 G/50ML
1 INJECTION, SOLUTION INTRAVENOUS ONCE
Status: COMPLETED | OUTPATIENT
Start: 2025-05-16 | End: 2025-05-16

## 2025-05-16 RX ORDER — INSULIN GLARGINE 100 [IU]/ML
60 INJECTION, SOLUTION SUBCUTANEOUS 2 TIMES DAILY
Status: DISCONTINUED | OUTPATIENT
Start: 2025-05-16 | End: 2025-05-23

## 2025-05-16 RX ORDER — FERROUS SULFATE 325(65) MG
1 TABLET ORAL
Status: DISCONTINUED | OUTPATIENT
Start: 2025-05-16 | End: 2025-06-05 | Stop reason: HOSPADM

## 2025-05-16 RX ORDER — POLYETHYLENE GLYCOL 3350 17 G/17G
17 POWDER, FOR SOLUTION ORAL DAILY
Status: DISCONTINUED | OUTPATIENT
Start: 2025-05-16 | End: 2025-06-05 | Stop reason: HOSPADM

## 2025-05-16 RX ORDER — ASPIRIN 325 MG
1.25 TABLET, DELAYED RELEASE (ENTERIC COATED) ORAL
Status: DISCONTINUED | OUTPATIENT
Start: 2025-05-18 | End: 2025-05-16 | Stop reason: ALTCHOICE

## 2025-05-16 RX ORDER — ACETAMINOPHEN 650 MG/1
650 SUPPOSITORY RECTAL EVERY 4 HOURS PRN
Status: DISCONTINUED | OUTPATIENT
Start: 2025-05-16 | End: 2025-05-29

## 2025-05-16 RX ORDER — CYCLOBENZAPRINE HCL 5 MG
5 TABLET ORAL 3 TIMES DAILY PRN
Status: DISCONTINUED | OUTPATIENT
Start: 2025-05-16 | End: 2025-05-25

## 2025-05-16 RX ORDER — INSULIN LISPRO 100 [IU]/ML
40 INJECTION, SOLUTION INTRAVENOUS; SUBCUTANEOUS
Status: DISCONTINUED | OUTPATIENT
Start: 2025-05-17 | End: 2025-05-27

## 2025-05-16 RX ORDER — ERGOCALCIFEROL 1.25 MG/1
1.25 CAPSULE ORAL WEEKLY
Status: DISCONTINUED | OUTPATIENT
Start: 2025-05-19 | End: 2025-06-05 | Stop reason: HOSPADM

## 2025-05-16 RX ORDER — IPRATROPIUM BROMIDE AND ALBUTEROL SULFATE 2.5; .5 MG/3ML; MG/3ML
3 SOLUTION RESPIRATORY (INHALATION)
Status: DISCONTINUED | OUTPATIENT
Start: 2025-05-16 | End: 2025-05-16

## 2025-05-16 RX ORDER — AMLODIPINE BESYLATE 5 MG/1
5 TABLET ORAL DAILY
Status: DISCONTINUED | OUTPATIENT
Start: 2025-05-17 | End: 2025-06-05 | Stop reason: HOSPADM

## 2025-05-16 RX ADMIN — QUETIAPINE FUMARATE 100 MG: 100 TABLET ORAL at 23:22

## 2025-05-16 RX ADMIN — FERROUS SULFATE TAB 325 MG (65 MG ELEMENTAL FE) 1 TABLET: 325 (65 FE) TAB at 19:55

## 2025-05-16 RX ADMIN — PIPERACILLIN SODIUM AND TAZOBACTAM SODIUM 3.38 G: 3; .375 INJECTION, SOLUTION INTRAVENOUS at 20:23

## 2025-05-16 RX ADMIN — HEPARIN SODIUM 7500 UNITS: 5000 INJECTION, SOLUTION INTRAVENOUS; SUBCUTANEOUS at 23:26

## 2025-05-16 RX ADMIN — ASPIRIN 81 MG: 81 TABLET, COATED ORAL at 19:55

## 2025-05-16 RX ADMIN — IPRATROPIUM BROMIDE AND ALBUTEROL SULFATE 3 ML: 2.5; .5 SOLUTION RESPIRATORY (INHALATION) at 19:07

## 2025-05-16 RX ADMIN — AZITHROMYCIN 500 MG: 500 INJECTION, POWDER, LYOPHILIZED, FOR SOLUTION INTRAVENOUS at 15:30

## 2025-05-16 RX ADMIN — CEFTRIAXONE 1 G: 1 INJECTION, SOLUTION INTRAVENOUS at 13:56

## 2025-05-16 RX ADMIN — IOHEXOL 90 ML: 350 INJECTION, SOLUTION INTRAVENOUS at 15:24

## 2025-05-16 RX ADMIN — ACETAMINOPHEN 650 MG: 325 TABLET ORAL at 19:54

## 2025-05-16 RX ADMIN — COLCHICINE 0.6 MG: 0.6 TABLET, FILM COATED ORAL at 19:55

## 2025-05-16 RX ADMIN — FORMOTEROL FUMARATE DIHYDRATE 20 MCG: 20 SOLUTION RESPIRATORY (INHALATION) at 19:07

## 2025-05-16 RX ADMIN — INSULIN GLARGINE 60 UNITS: 100 INJECTION, SOLUTION SUBCUTANEOUS at 23:30

## 2025-05-16 RX ADMIN — ATORVASTATIN CALCIUM 80 MG: 80 TABLET, FILM COATED ORAL at 23:22

## 2025-05-16 RX ADMIN — OXYCODONE HYDROCHLORIDE 10 MG: 5 TABLET ORAL at 19:56

## 2025-05-16 RX ADMIN — Medication 6 L/MIN: at 20:00

## 2025-05-16 RX ADMIN — CYCLOBENZAPRINE HYDROCHLORIDE 5 MG: 5 TABLET, FILM COATED ORAL at 19:56

## 2025-05-16 SDOH — SOCIAL STABILITY: SOCIAL INSECURITY: WITHIN THE LAST YEAR, HAVE YOU BEEN HUMILIATED OR EMOTIONALLY ABUSED IN OTHER WAYS BY YOUR PARTNER OR EX-PARTNER?: NO

## 2025-05-16 SDOH — ECONOMIC STABILITY: INCOME INSECURITY: IN THE PAST 12 MONTHS HAS THE ELECTRIC, GAS, OIL, OR WATER COMPANY THREATENED TO SHUT OFF SERVICES IN YOUR HOME?: NO

## 2025-05-16 SDOH — ECONOMIC STABILITY: FOOD INSECURITY: WITHIN THE PAST 12 MONTHS, YOU WORRIED THAT YOUR FOOD WOULD RUN OUT BEFORE YOU GOT THE MONEY TO BUY MORE.: NEVER TRUE

## 2025-05-16 SDOH — SOCIAL STABILITY: SOCIAL INSECURITY: WITHIN THE LAST YEAR, HAVE YOU BEEN AFRAID OF YOUR PARTNER OR EX-PARTNER?: NO

## 2025-05-16 SDOH — SOCIAL STABILITY: SOCIAL INSECURITY: DO YOU FEEL UNSAFE GOING BACK TO THE PLACE WHERE YOU ARE LIVING?: NO

## 2025-05-16 SDOH — SOCIAL STABILITY: SOCIAL INSECURITY: ARE YOU OR HAVE YOU BEEN THREATENED OR ABUSED PHYSICALLY, EMOTIONALLY, OR SEXUALLY BY ANYONE?: NO

## 2025-05-16 SDOH — SOCIAL STABILITY: SOCIAL INSECURITY: DOES ANYONE TRY TO KEEP YOU FROM HAVING/CONTACTING OTHER FRIENDS OR DOING THINGS OUTSIDE YOUR HOME?: NO

## 2025-05-16 SDOH — SOCIAL STABILITY: SOCIAL INSECURITY: HAVE YOU HAD ANY THOUGHTS OF HARMING ANYONE ELSE?: NO

## 2025-05-16 SDOH — SOCIAL STABILITY: SOCIAL INSECURITY: HAVE YOU HAD THOUGHTS OF HARMING ANYONE ELSE?: NO

## 2025-05-16 SDOH — SOCIAL STABILITY: SOCIAL INSECURITY
WITHIN THE LAST YEAR, HAVE YOU BEEN RAPED OR FORCED TO HAVE ANY KIND OF SEXUAL ACTIVITY BY YOUR PARTNER OR EX-PARTNER?: NO

## 2025-05-16 SDOH — SOCIAL STABILITY: SOCIAL INSECURITY
WITHIN THE LAST YEAR, HAVE YOU BEEN KICKED, HIT, SLAPPED, OR OTHERWISE PHYSICALLY HURT BY YOUR PARTNER OR EX-PARTNER?: NO

## 2025-05-16 SDOH — SOCIAL STABILITY: SOCIAL INSECURITY: HAS ANYONE EVER THREATENED TO HURT YOUR FAMILY OR YOUR PETS?: NO

## 2025-05-16 SDOH — ECONOMIC STABILITY: FOOD INSECURITY: WITHIN THE PAST 12 MONTHS, THE FOOD YOU BOUGHT JUST DIDN'T LAST AND YOU DIDN'T HAVE MONEY TO GET MORE.: NEVER TRUE

## 2025-05-16 SDOH — SOCIAL STABILITY: SOCIAL INSECURITY: ARE THERE ANY APPARENT SIGNS OF INJURIES/BEHAVIORS THAT COULD BE RELATED TO ABUSE/NEGLECT?: NO

## 2025-05-16 SDOH — SOCIAL STABILITY: SOCIAL INSECURITY: DO YOU FEEL ANYONE HAS EXPLOITED OR TAKEN ADVANTAGE OF YOU FINANCIALLY OR OF YOUR PERSONAL PROPERTY?: NO

## 2025-05-16 SDOH — SOCIAL STABILITY: SOCIAL INSECURITY: ABUSE: ADULT

## 2025-05-16 SDOH — SOCIAL STABILITY: SOCIAL INSECURITY: WERE YOU ABLE TO COMPLETE ALL THE BEHAVIORAL HEALTH SCREENINGS?: YES

## 2025-05-16 ASSESSMENT — ACTIVITIES OF DAILY LIVING (ADL)
HEARING - LEFT EAR: FUNCTIONAL
HEARING - RIGHT EAR: FUNCTIONAL
ASSISTIVE_DEVICE: WALKER
JUDGMENT_ADEQUATE_SAFELY_COMPLETE_DAILY_ACTIVITIES: YES
PATIENT'S MEMORY ADEQUATE TO SAFELY COMPLETE DAILY ACTIVITIES?: YES
DRESSING YOURSELF: INDEPENDENT
LACK_OF_TRANSPORTATION: NO
FEEDING YOURSELF: INDEPENDENT
TOILETING: INDEPENDENT
ADEQUATE_TO_COMPLETE_ADL: YES
WALKS IN HOME: NEEDS ASSISTANCE
GROOMING: INDEPENDENT
LACK_OF_TRANSPORTATION: NO
BATHING: NEEDS ASSISTANCE

## 2025-05-16 ASSESSMENT — COGNITIVE AND FUNCTIONAL STATUS - GENERAL
HELP NEEDED FOR BATHING: A LITTLE
DRESSING REGULAR LOWER BODY CLOTHING: A LITTLE
MOBILITY SCORE: 19
MOVING TO AND FROM BED TO CHAIR: A LITTLE
CLIMB 3 TO 5 STEPS WITH RAILING: A LOT
DRESSING REGULAR UPPER BODY CLOTHING: A LITTLE
DAILY ACTIVITIY SCORE: 20
STANDING UP FROM CHAIR USING ARMS: A LITTLE
TOILETING: A LITTLE
WALKING IN HOSPITAL ROOM: A LITTLE
PATIENT BASELINE BEDBOUND: NO

## 2025-05-16 ASSESSMENT — PAIN - FUNCTIONAL ASSESSMENT
PAIN_FUNCTIONAL_ASSESSMENT: 0-10
PAIN_FUNCTIONAL_ASSESSMENT: 0-10

## 2025-05-16 ASSESSMENT — PATIENT HEALTH QUESTIONNAIRE - PHQ9
SUM OF ALL RESPONSES TO PHQ9 QUESTIONS 1 & 2: 0
1. LITTLE INTEREST OR PLEASURE IN DOING THINGS: NOT AT ALL
2. FEELING DOWN, DEPRESSED OR HOPELESS: NOT AT ALL

## 2025-05-16 ASSESSMENT — PAIN DESCRIPTION - PAIN TYPE: TYPE: ACUTE PAIN

## 2025-05-16 ASSESSMENT — LIFESTYLE VARIABLES
AUDIT-C TOTAL SCORE: 0
HOW OFTEN DO YOU HAVE 6 OR MORE DRINKS ON ONE OCCASION: NEVER
HOW OFTEN DO YOU HAVE A DRINK CONTAINING ALCOHOL: NEVER
AUDIT-C TOTAL SCORE: 0
SKIP TO QUESTIONS 9-10: 1
HOW MANY STANDARD DRINKS CONTAINING ALCOHOL DO YOU HAVE ON A TYPICAL DAY: PATIENT DOES NOT DRINK
SUBSTANCE_ABUSE_PAST_12_MONTHS: NO
PRESCIPTION_ABUSE_PAST_12_MONTHS: NO

## 2025-05-16 ASSESSMENT — PAIN DESCRIPTION - DESCRIPTORS
DESCRIPTORS: ACHING;STABBING;SHOOTING;SHARP
DESCRIPTORS: ACHING

## 2025-05-16 ASSESSMENT — PAIN SCALES - GENERAL
PAINLEVEL_OUTOF10: 5 - MODERATE PAIN
PAINLEVEL_OUTOF10: 10 - WORST POSSIBLE PAIN
PAINLEVEL_OUTOF10: 3

## 2025-05-16 ASSESSMENT — PAIN DESCRIPTION - LOCATION
LOCATION: BACK
LOCATION: BACK

## 2025-05-16 NOTE — ED NOTES
"NURSE NOTE;        PATIENT BECOMING WEEPY, APOLOGIZES FOR BEING CRANKY, STATES HE NEEDS ' SLAMMED WITH DILAUDID' TO SORT HIMSELF OUT AND DEAL WITH HIS FULL BODY PAIN.     PATIENT WAS SEEN BY THE RESIDENT. HE IS VERY VOCAL ABOUT BEING HERE AND USES FOUL LANGUAGE AS A GENERAL MANNER OF SPEAKING. HE HAS NOT FALLEN AT HOME, HOWEVER CAN BE CONSIDERED A FALL RISK HERE AS HE IS HAVING DIFFICULTY MAINTAINING HIS OXYGEN SATURATION WITH MOBILITY EVEN ON 4 LITERS NC.   HE AGREES TO HAVE A LARGE BEDSIDE COMMODE AVAILABLE IN THE ROOM FOR HIM TO URINATE IN  - PATIENT CANNOT USE A URINAL.    PATIENT IS USING VERBALLY ABUSIVE LANGUAGE. WHEN I ASKED THAT HE TRY AN REFRAIN FROM THE EXCESSIVE VULGARITY HE STATES, \"AYSYLVIA MONTGOMERY'. I REINTRODUCED MYSELF TO REFRESH MY NAME WITH HIM.      Vanita De La Paz RN  05/16/25 1139       Vanita De La Paz RN  05/16/25 1141       Vanita De La Paz RN  05/16/25 1141       Vanita De La Paz RN  05/16/25 1142    "

## 2025-05-16 NOTE — PROGRESS NOTES
Pharmacy Medication History   Patient    Source of Information:     Additional concerns with the patient's PTA list.   N/A  Notified Provider via [facility] : No    The following updates were made to the Prior to Admission medication list:     Medications ADDED:   Lisinopril 5 mg tablet  Medications CHANGED:  Pt take 200 mg at bedtime ( per his provider its okay)  Medications REMOVED:   N/A  Medications NOT TAKING:   Cephalexin 500 mg capsule   Cyclobenzaprine 10 mg tablet   Naloxone 4mg/0.1 ml nasal spray     Allergy reviewed : Yes    Meds 2 Beds : Yes    Outpatient pharmacy confirmed and updated in chart : Yes    Pharmacy name: [place], [place]. ( [address]    The list below reflectives the updated PTA list. Please review each medication in order reconciliation for additional clarification and justification.    Prior to Admission Medications   Prescriptions Last Dose   DULoxetine (Cymbalta) 60 mg DR capsule 5/15/2025 Morning   Sig: Take 1 capsule (60 mg) by mouth once daily. Do not crush or chew.   Patient taking differently: Take 2 capsules (120 mg) by mouth once daily in the morning. Do not crush or chew.   HYDROcodone-acetaminophen (Norco) 7.5-325 mg tablet    Sig: Take 1 tablet by mouth every 6 hours if needed for severe pain (7 - 10) for up to 28 days. Do not fill before May 8, 2025.   HYDROcodone-acetaminophen (Norco) 7.5-325 mg tablet 5/15/2025   Sig: Take 1 tablet by mouth every 6 hours if needed for severe pain (7 - 10) for up to 28 days. Do not fill before June 5, 2025.   QUEtiapine (SEROquel) 100 mg tablet 5/15/2025 Evening   Sig: Take 1 tablet (100 mg) by mouth once daily at bedtime.   amLODIPine (Norvasc) 5 mg tablet 5/15/2025 Morning   Sig: Take 1 tablet (5 mg) by mouth once daily. Decreased dose   aspirin 81 mg EC tablet 5/15/2025 Morning   Sig: Take 1 tablet (81 mg) by mouth once daily.   atorvastatin (Lipitor) 80 mg tablet 5/15/2025 Bedtime   Sig: Take 1 tablet (80 mg) by mouth once daily at  "bedtime.             cholecalciferol (Vitamin D-3) 1,250 mcg (50,000 unit) capsule Past Week   Sig: Take 1 capsule (50,000 Units) by mouth 1 (one) time per week. Every monday   colchicine 0.6 mg tablet    Sig: Take 1 tablet (0.6 mg) by mouth once daily. Do not fill before 2025.             ferrous sulfate, 325 mg ferrous sulfate, tablet Past Week Morning   Sig: Take 1 tablet by mouth once a day on Monday, Wednesday, and Friday.   insulin aspart (NovoLOG U-100 Insulin aspart) 100 unit/mL injection 5/15/2025   Sig: Inject 40 Units under the skin 3 times a day before meals.   insulin glargine (Lantus) 100 unit/mL (3 mL) pen 5/15/2025   Sig: Inject 60 Units under the skin 2 times a day. Take as directed per insulin instructions.   lisinopril 5 mg tablet 5/15/2025   Sig: Take 1 tablet (5 mg) by mouth once daily.             omeprazole (PriLOSEC) 40 mg DR capsule 5/15/2025   Sig: Take 1 capsule (40 mg) by mouth 2 times a day.   pen needle, diabetic (BD Ultra-Fine Noelle Pen Needle) 32 gauge x \" needle    Si Pen needle 2 times a day.   torsemide (Demadex) 20 mg tablet 5/15/2025   Sig: Take 3 tablets (60 mg) by mouth 2 times a day.      Facility-Administered Medications: None       The list below reflectives the updated allergy list. Please review each documented allergy for additional clarification and justification.    Allergies   Allergen Reactions    Metformin Unknown          25 at 6:52 PM - Keli Tena    "

## 2025-05-16 NOTE — ED NOTES
NONCOMPLIANCE:   It is extremely difficult to manage this patient when he continues to react negatively to everything a physician says to him.

## 2025-05-16 NOTE — H&P
"History Of Present Illness  Jai Shrestha is a 55 y.o. male with a past medical history of chronic back pain, peripheral neuropathy, diabetes mellitus type 2 with insulin resistance, morbid obesity BMI 53.44, CKD 3, diastolic CHF, hypertension who presented to [facility] ER complaining of shortness of breath x 5 days.  Patient stated her symptoms were gradual onset, worsening with wheezing, states \"I felt like I could not get any air in\".  Stated that his ADLs were severely limited, by exertional dyspnea, and today visiting nurse who comes once a week, checked his pulse ox, and recorded 77% on room air, as result it was recommended that he come to the hospital for further evaluation and management.  Patient admits to sick contact with neighbor who has been sick lately, unfortunately he is a schroeder that helps him with ADLs at home and believe he contracted current illness from neighbor.  On review of systems patient admitted to subjective fever (was not able to take her temperature), chills, nausea, chest pain, exertional shortness of breath, and denied vomiting, headache, abdominal pain, change in urinary habit.  Most notably during review of system patient complained of his chronic pain, requesting to be on Dilaudid, states \"they always put me on Dilaudid with him admitted\".  Lengthy discussion ensured on guidelines recommended not to treat chronic pain with IV opiate, but at no avail as patient reacted in hostile manner (but no foul language), and stated \"you could continue talking, I am done listening here.\"  In the ED patient was on nasal cannula, with acceptable O2 saturation, no BiPAP required, and a CT angio chest for pulmonary embolism showed multifocal consolidative opacities bilaterally, with significant progression compared to prior exam.  Patient was admitted for pneumonia, and started on ceftriaxone and azithromycin in the ED.     Past Medical History  Medical History[1]    Surgical " History  Surgical History[2]     Social History  He reports that he has quit smoking. His smoking use included cigarettes. He has never used smokeless tobacco. He reports current alcohol use. He reports that he does not currently use drugs.    Family History  Family History[3]     Allergies  Metformin    Review of Systems  A 10 point review of systems was conducted, with patient admitting to symptoms as described in HPI above, and deny having any other symptoms at this time.  Physical Exam   Constitutional: Obese gentleman, appears drowsy, somnolent, but respond to questions, cooperative not in acute distress  Eyes: PERRLA, clear sclera  ENMT: Moist mucosal membranes, no exudate  Head / Neck: Atraumatic, normocephalic, supple neck, JVP not visualized  Lungs: Patent airways, CTABL  Heart: RRR, S1S2, no murmurs appreciated, palpable pulses in all extremities  GI: Soft, NT, ND, bowel sounds present in all quadrants  MSK: Moves all extremities freely, no restriction  of ROM, no joint edema  Extremities: Chronic lymphedema with venous ulcers, bilateral lower extremities peripheral edema  : No Guo catheter inserted  Breast: Deferred  Neurological: AAO x 3 to person, place and date, facial muscles symmetrical, sensation intact, strength 4/4, no acute focal neurological deficits appreciated  Psychological: Appropriate mood and behavior  Last Recorded Vitals  Blood pressure 135/64, pulse 96, temperature 36.7 °C (98.1 °F), temperature source Oral, resp. rate (!) 22, weight (!) 174 kg (383 lb), SpO2 98%.    Relevant Results        Scheduled medications  Scheduled Medications[4]  Continuous medications  Continuous Medications[5]  PRN medications  PRN Medications[6]       Assessment & Plan  Pneumonia due to infectious organism, unspecified laterality, unspecified part of lung       55 y.o. male with a past medical history of chronic back pain, peripheral neuropathy, diabetes mellitus type 2 with insulin resistance, morbid  obesity BMI 53.44, CKD 3, diastolic CHF, hypertension who presented to [facility] ED with complain of shortness of breath x 5 days.      Pneumonia: Likely hospital-acquired given recent discharge from hospital  - CT shows worsening consolidations  - Vancomycin pharmacy to dose  - Zosyn 3.375 g IV piggyback every 6 hours  - Urine antigens and strep pneumo and Legionella  -Procalcitonin  -Bronchodilators   -ID consult: Evaluation impression pending    Type 2 diabetes mellitus requiring insulin with insulin resistance  - Diabetic carbohydrate controlled diet  -Lantus 60 u twice daily  -Prandial insulin with lispro 40 u 3 times daily before meals  -Lispro sliding scale  -Hypoglycemia protocol in place     Diastolic CHF, possible decompensation given level of hypoxia for which pneumonia cannot be accounted for alone  Lasix 40 mg IV every 12 hours  Daily weight  Cardiology consult if not improving     Hypertension  Amlodipine 5 mg orally daily     Hyperlipidemia  Atorvastatin 80 mg orally daily     Bilateral lymphedema with venous stasis changes   Diuresis with Lasix    Chronic pain  -Visibly upset over not having Dilaudid despite explanation that chronic pain should not be treated with IV opiate per state guidelines  -On Percocet 7.5-300 mg tablet, not available under this dosage in the formulary, will substitute with oxycodone 10 mg p.o. every 4 hours as needed severe pain  -Flexeril 5 mg 3 times daily as needed for spasm     Morbid obesity BMI 53.44  Lifestyle modification     DVT prophylaxis  Heparin 7500 units subcu 3 times daily  SCDs     Disposition: Presented with shortness of breath, found with possible healthcare associate pneumonia, discharge pending clinical improvement     Anticipated length of stay greater than 3 midnights      I spent 65 minutes in the professional and overall care of this patient.      Sergio Collins DO         [1]   Past Medical History:  Diagnosis Date    Chronic back pain      Chronic pain disorder 12 15  2015    CKD (chronic kidney disease)     Depression 02 27 1986    Diabetes mellitus (Multi)     Extremity pain 12 15 2015    HF (heart failure), diastolic     Hypertension     Joint pain     Low back pain     Neuropathy in diabetes (Multi)     Peripheral neuropathy 12 15 2015    Spinal stenosis    [2] No past surgical history on file.  [3]   Family History  Problem Relation Name Age of Onset    Arthritis Mother Cesar buck     COPD Mother Cesar buck     Coronary artery disease Mother Cesar buck     Depression Mother Cesar buck     Diabetes Mother Cesar buck     Hypertension Mother Cesar buck     Stroke Mother Cesar buck    [4] [START ON 5/17/2025] amLODIPine, 5 mg, oral, Daily  aspirin, 81 mg, oral, Daily  atorvastatin, 80 mg, oral, Nightly  colchicine, 0.6 mg, oral, Daily  DULoxetine, 60 mg, oral, Daily  [START ON 5/19/2025] ergocalciferol, 1.25 mg, oral, Weekly  ferrous sulfate, 1 tablet, oral, Every Mon/Wed/Fri  formoterol, 20 mcg, nebulization, q12h  heparin (porcine), 7,500 Units, subcutaneous, q8h TAMIR  insulin glargine, 60 Units, subcutaneous, BID  [START ON 5/17/2025] insulin lispro, 40 Units, subcutaneous, TID AC  ipratropium-albuteroL, 3 mL, nebulization, 4x daily  oxygen, , inhalation, Continuous - Inhalation  [START ON 5/17/2025] pantoprazole, 40 mg, oral, Daily before breakfast   Or  [START ON 5/17/2025] pantoprazole, 40 mg, intravenous, Daily before breakfast  piperacillin-tazobactam, 3.375 g, intravenous, q6h  polyethylene glycol, 17 g, oral, Daily  QUEtiapine, 100 mg, oral, Nightly  [5]    [6] PRN medications: acetaminophen **OR** acetaminophen **OR** acetaminophen, albuterol, cyclobenzaprine, dextrose, dextrose, glucagon, glucagon, naloxone, ondansetron **OR** ondansetron, oxyCODONE

## 2025-05-16 NOTE — ED NOTES
"NONCOMPLIANCE;   Increasingly difficult to take care of this gentleman who I find devoid of all of his monitoring and oxygen as he claims, \"I was pissed off after talking to the \"F'ing\" doctor that was in here and I feel like there is too much being put on me.\"    I have reached out to the provider as patient is being unreasonable, noncompliant and not willing to help himself.  He is A&OX4.     Vanita De La Paz RN  05/16/25 8637    "

## 2025-05-16 NOTE — ED PROVIDER NOTES
"CC: No chief complaint on file.     HPI:   Patient is a 55-year-old male with past medical history of peripheral neuropathy, type 2 diabetes, morbid obesity, diastolic heart failure, hypertension, CKD stage III, chronic back pain presenting due to concerns of shortness of breath in the past 5 days with associated hypoxia.  Patient has had 5 days of congestion, mild cough without productive sputum and notes that he has had 2 weeks of increased \"water weight\".  He has history of diabetes and has a nursing aide come every week to help address his lower extremity wounds.  Patient reports that when the nurse was there earlier today, she noted hypoxia to 77% with ambulation.  Patient does not wear baseline oxygen, has been using his albuterol inhaler 3-4 times a day.  He has not missed any doses of his torsemide.  He is feeling that he is volume overloaded and denies any shortness of breath or chest pain at rest.  Patient has been treated recently for infection of his lower extremities for cellulitis and finished antibiotic course.  Patient has been frustrated relation with transportation to doctors appointments or limited transportation issues.  He is rollator dependent requires assistance to get driven to his many appointments.  Patient denies any history of DVTs or PEs, did endorse having a stroke many years ago and cannot do fine motor skills with his left hand and is left-hand dominant.  Patient was placed on 4 L oxygen by EMS    Limitations to History: none  Additional History Obtained from: EMS    PMHx/PSHx:  Per HPI.   - has a past medical history of Chronic back pain, Chronic pain disorder (12 15  2015), CKD (chronic kidney disease), Depression (02 27 1986), Diabetes mellitus (Multi), Extremity pain (12 15 2015), HF (heart failure), diastolic, Hypertension, Joint pain, Low back pain, Neuropathy in diabetes (Multi), Peripheral neuropathy (12 15 2015), and Spinal stenosis.  - has no past surgical history on " file.    Social History:  - Tobacco:  reports that he has quit smoking. His smoking use included cigarettes. He has never used smokeless tobacco.   - Alcohol:  reports current alcohol use.   - Drugs:  reports that he does not currently use drugs.     Medications: Reviewed in EMR.     Allergies:  Metformin    ???????????????????????????????????????????????????????????????  Triage Vitals:  T 36.7 °C (98.1 °F)  HR 96  BP    RR (!) 22  O2 (!) 93 %      Physical Exam  Constitutional:       Appearance: Normal appearance. He is obese.   HENT:      Head: Normocephalic and atraumatic.      Nose: No congestion or rhinorrhea.      Mouth/Throat:      Pharynx: Oropharynx is clear.   Eyes:      General: No scleral icterus.     Extraocular Movements: Extraocular movements intact.      Conjunctiva/sclera: Conjunctivae normal.      Pupils: Pupils are equal, round, and reactive to light.   Neck:      Vascular: No carotid bruit.   Cardiovascular:      Rate and Rhythm: Normal rate and regular rhythm.      Pulses: Normal pulses.      Heart sounds: Normal heart sounds. No murmur heard.     No friction rub. No gallop.   Pulmonary:      Effort: No respiratory distress.      Breath sounds: Rhonchi (in the L side of the chest) present. No wheezing or rales.   Abdominal:      General: Abdomen is flat. There is no distension.      Palpations: Abdomen is soft.      Tenderness: There is no abdominal tenderness. There is no guarding.   Musculoskeletal:         General: Swelling (of bilateral LE from knee down with 3+ piting edema and lower venous stasis changes with mild weeping) present. No tenderness. Normal range of motion.      Cervical back: Normal range of motion and neck supple.      Comments: Bilateral heels cracked with R worse than L   Skin:     General: Skin is warm and dry.      Capillary Refill: Capillary refill takes less than 2 seconds.   Neurological:      General: No focal deficit present.      Mental Status: He is alert and  oriented to person, place, and time.       ???????????????????????????????????????????????????????????????  EKG (per my interpretation): Sinus rhythm with a rate of 95.  No ME QRS punctation.  No obvious ST elevation or depression noted.  No AZUCENA.  QTc 472    ED Course  ED Course as of 05/16/25 1625   Fri May 16, 2025   1336 IMPRESSION:  Increase patchy left mid to lower lung infiltrates.    Will treat with CTX and azithromycin [RR]      ED Course User Index  [RR] Macy Contreras MD         Diagnoses as of 05/16/25 1625   Pneumonia due to infectious organism, unspecified laterality, unspecified part of lung   Hypoxia       Medical Decision Making:  Patient is a 55-year-old male with past medical history of peripheral neuropathy, type 2 diabetes, morbid obesity, diastolic heart failure, hypertension, CKD stage III, chronic back pain presenting due to concerns of shortness of breath in the past 5 days with associated hypoxia.  Broad workup was initiated to evaluate for pneumonia, PE, ACS, acute heart failure exacerbation, other acute COPD exacerbation.    Patient was uptitrated from 4 to 6 L due to desaturations down to 88%.  Does not have any notable wheezing on auscultation however did have increased patchy opacities of the left middle and lower lung.  Was started on azithromycin and ceftriaxone for CAP coverage.  In the setting of patient having increasing oxygen requirement, will rule out PE with a CT PE.  This was negative.  Patient's wounds of his lower extremities do not look acutely infected but likely have some chronic venous stasis changes secondary to immobility and obesity.  Patient's BNP was stable, does not have any signs of acute acidosis.  Was admitted to the hospital for ongoing treatment of pneumonia.  Care was overseen by attending physician agrees with the plan and disposition    External records reviewed: recent inpatient, clinic, and prior ED notes  Diagnostic imaging independently  reviewed/interpreted by me (as reflected in MDM) includes: CXR, CT PE  Social Determinants Affecting Care: Transportation difficulties  Discussion of management with other providers: attending  Prescription Drug Consideration: per inpatient team  Escalation of Care: admission    Impression:   Hypoxia  Pneumonia    Disposition: Admitted      Procedures ? SmartLinks last updated 5/16/2025 11:25 AM        Macy Contreras MD  Resident  05/16/25 2889       Macy Contreras MD  Resident  05/16/25 9339

## 2025-05-16 NOTE — ED NOTES
Here from home c/o low O2-sat of 77% with ambulation.  No recent URI. Has COPD.     Vanita De La Paz RN  05/16/25 1500

## 2025-05-17 LAB
ANION GAP SERPL CALC-SCNC: 11 MMOL/L (ref 10–20)
BUN SERPL-MCNC: 30 MG/DL (ref 6–23)
CALCIUM SERPL-MCNC: 8.3 MG/DL (ref 8.6–10.3)
CHLORIDE SERPL-SCNC: 97 MMOL/L (ref 98–107)
CO2 SERPL-SCNC: 33 MMOL/L (ref 21–32)
CREAT SERPL-MCNC: 1.38 MG/DL (ref 0.5–1.3)
EGFRCR SERPLBLD CKD-EPI 2021: 61 ML/MIN/1.73M*2
ERYTHROCYTE [DISTWIDTH] IN BLOOD BY AUTOMATED COUNT: 17 % (ref 11.5–14.5)
FLUAV RNA RESP QL NAA+PROBE: NOT DETECTED
FLUBV RNA RESP QL NAA+PROBE: NOT DETECTED
GLUCOSE BLD MANUAL STRIP-MCNC: 142 MG/DL (ref 74–99)
GLUCOSE BLD MANUAL STRIP-MCNC: 151 MG/DL (ref 74–99)
GLUCOSE BLD MANUAL STRIP-MCNC: 192 MG/DL (ref 74–99)
GLUCOSE BLD MANUAL STRIP-MCNC: 86 MG/DL (ref 74–99)
GLUCOSE SERPL-MCNC: 205 MG/DL (ref 74–99)
HCT VFR BLD AUTO: 30.9 % (ref 41–52)
HGB BLD-MCNC: 8.8 G/DL (ref 13.5–17.5)
MCH RBC QN AUTO: 23.6 PG (ref 26–34)
MCHC RBC AUTO-ENTMCNC: 28.5 G/DL (ref 32–36)
MCV RBC AUTO: 83 FL (ref 80–100)
MRSA DNA SPEC QL NAA+PROBE: NOT DETECTED
NRBC BLD-RTO: 0 /100 WBCS (ref 0–0)
PLATELET # BLD AUTO: 212 X10*3/UL (ref 150–450)
POTASSIUM SERPL-SCNC: 3.8 MMOL/L (ref 3.5–5.3)
PROCALCITONIN SERPL-MCNC: 0.23 NG/ML
RBC # BLD AUTO: 3.73 X10*6/UL (ref 4.5–5.9)
SARS-COV-2 RNA RESP QL NAA+PROBE: NOT DETECTED
SODIUM SERPL-SCNC: 137 MMOL/L (ref 136–145)
WBC # BLD AUTO: 8.1 X10*3/UL (ref 4.4–11.3)

## 2025-05-17 PROCEDURE — 2500000005 HC RX 250 GENERAL PHARMACY W/O HCPCS: Performed by: INTERNAL MEDICINE

## 2025-05-17 PROCEDURE — 94640 AIRWAY INHALATION TREATMENT: CPT

## 2025-05-17 PROCEDURE — 87899 AGENT NOS ASSAY W/OPTIC: CPT | Mod: AHULAB | Performed by: INTERNAL MEDICINE

## 2025-05-17 PROCEDURE — 1200000002 HC GENERAL ROOM WITH TELEMETRY DAILY

## 2025-05-17 PROCEDURE — 85027 COMPLETE CBC AUTOMATED: CPT | Performed by: INTERNAL MEDICINE

## 2025-05-17 PROCEDURE — 87449 NOS EACH ORGANISM AG IA: CPT | Mod: AHULAB | Performed by: INTERNAL MEDICINE

## 2025-05-17 PROCEDURE — 2500000002 HC RX 250 W HCPCS SELF ADMINISTERED DRUGS (ALT 637 FOR MEDICARE OP, ALT 636 FOR OP/ED): Performed by: INTERNAL MEDICINE

## 2025-05-17 PROCEDURE — 82947 ASSAY GLUCOSE BLOOD QUANT: CPT

## 2025-05-17 PROCEDURE — 87641 MR-STAPH DNA AMP PROBE: CPT | Performed by: INTERNAL MEDICINE

## 2025-05-17 PROCEDURE — 80048 BASIC METABOLIC PNL TOTAL CA: CPT | Performed by: INTERNAL MEDICINE

## 2025-05-17 PROCEDURE — 2500000004 HC RX 250 GENERAL PHARMACY W/ HCPCS (ALT 636 FOR OP/ED): Performed by: INTERNAL MEDICINE

## 2025-05-17 PROCEDURE — 2500000001 HC RX 250 WO HCPCS SELF ADMINISTERED DRUGS (ALT 637 FOR MEDICARE OP): Performed by: INTERNAL MEDICINE

## 2025-05-17 PROCEDURE — 87636 SARSCOV2 & INF A&B AMP PRB: CPT | Performed by: INTERNAL MEDICINE

## 2025-05-17 PROCEDURE — 9420000001 HC RT PATIENT EDUCATION 5 MIN

## 2025-05-17 PROCEDURE — 36415 COLL VENOUS BLD VENIPUNCTURE: CPT | Performed by: INTERNAL MEDICINE

## 2025-05-17 PROCEDURE — 2500000004 HC RX 250 GENERAL PHARMACY W/ HCPCS (ALT 636 FOR OP/ED): Performed by: PHARMACIST

## 2025-05-17 PROCEDURE — 2500000005 HC RX 250 GENERAL PHARMACY W/O HCPCS: Performed by: PHARMACIST

## 2025-05-17 PROCEDURE — 84145 PROCALCITONIN (PCT): CPT | Mod: AHULAB | Performed by: INTERNAL MEDICINE

## 2025-05-17 PROCEDURE — 99232 SBSQ HOSP IP/OBS MODERATE 35: CPT | Performed by: INTERNAL MEDICINE

## 2025-05-17 RX ORDER — IPRATROPIUM BROMIDE AND ALBUTEROL SULFATE 2.5; .5 MG/3ML; MG/3ML
3 SOLUTION RESPIRATORY (INHALATION) EVERY 2 HOUR PRN
Status: DISCONTINUED | OUTPATIENT
Start: 2025-05-17 | End: 2025-06-05 | Stop reason: HOSPADM

## 2025-05-17 RX ORDER — INSULIN LISPRO 100 [IU]/ML
0-15 INJECTION, SOLUTION INTRAVENOUS; SUBCUTANEOUS
Status: DISCONTINUED | OUTPATIENT
Start: 2025-05-17 | End: 2025-05-23

## 2025-05-17 RX ORDER — IPRATROPIUM BROMIDE AND ALBUTEROL SULFATE 2.5; .5 MG/3ML; MG/3ML
3 SOLUTION RESPIRATORY (INHALATION)
Status: DISCONTINUED | OUTPATIENT
Start: 2025-05-17 | End: 2025-06-05 | Stop reason: HOSPADM

## 2025-05-17 RX ADMIN — DULOXETINE 60 MG: 30 CAPSULE, DELAYED RELEASE ORAL at 09:14

## 2025-05-17 RX ADMIN — INSULIN LISPRO 40 UNITS: 100 INJECTION, SOLUTION INTRAVENOUS; SUBCUTANEOUS at 13:15

## 2025-05-17 RX ADMIN — FUROSEMIDE 40 MG: 10 INJECTION, SOLUTION INTRAMUSCULAR; INTRAVENOUS at 09:14

## 2025-05-17 RX ADMIN — INSULIN LISPRO 40 UNITS: 100 INJECTION, SOLUTION INTRAVENOUS; SUBCUTANEOUS at 09:10

## 2025-05-17 RX ADMIN — IPRATROPIUM BROMIDE AND ALBUTEROL SULFATE 3 ML: 2.5; .5 SOLUTION RESPIRATORY (INHALATION) at 07:23

## 2025-05-17 RX ADMIN — FUROSEMIDE 40 MG: 10 INJECTION, SOLUTION INTRAMUSCULAR; INTRAVENOUS at 22:02

## 2025-05-17 RX ADMIN — VANCOMYCIN HYDROCHLORIDE 2000 MG: 5 INJECTION, POWDER, LYOPHILIZED, FOR SOLUTION INTRAVENOUS at 00:27

## 2025-05-17 RX ADMIN — HEPARIN SODIUM 7500 UNITS: 5000 INJECTION, SOLUTION INTRAVENOUS; SUBCUTANEOUS at 22:02

## 2025-05-17 RX ADMIN — PANTOPRAZOLE SODIUM 40 MG: 40 TABLET, DELAYED RELEASE ORAL at 06:47

## 2025-05-17 RX ADMIN — COLCHICINE 0.6 MG: 0.6 TABLET, FILM COATED ORAL at 09:15

## 2025-05-17 RX ADMIN — INSULIN LISPRO 3 UNITS: 100 INJECTION, SOLUTION INTRAVENOUS; SUBCUTANEOUS at 13:15

## 2025-05-17 RX ADMIN — ATORVASTATIN CALCIUM 80 MG: 80 TABLET, FILM COATED ORAL at 22:02

## 2025-05-17 RX ADMIN — HEPARIN SODIUM 7500 UNITS: 5000 INJECTION, SOLUTION INTRAVENOUS; SUBCUTANEOUS at 14:02

## 2025-05-17 RX ADMIN — Medication 5 L/MIN: at 20:24

## 2025-05-17 RX ADMIN — OXYCODONE HYDROCHLORIDE 10 MG: 5 TABLET ORAL at 22:02

## 2025-05-17 RX ADMIN — QUETIAPINE FUMARATE 100 MG: 100 TABLET ORAL at 22:02

## 2025-05-17 RX ADMIN — INSULIN GLARGINE 60 UNITS: 100 INJECTION, SOLUTION SUBCUTANEOUS at 22:03

## 2025-05-17 RX ADMIN — INSULIN GLARGINE 60 UNITS: 100 INJECTION, SOLUTION SUBCUTANEOUS at 09:10

## 2025-05-17 RX ADMIN — OXYCODONE HYDROCHLORIDE 10 MG: 5 TABLET ORAL at 08:09

## 2025-05-17 RX ADMIN — ASPIRIN 81 MG: 81 TABLET, COATED ORAL at 09:15

## 2025-05-17 RX ADMIN — OXYCODONE HYDROCHLORIDE 10 MG: 5 TABLET ORAL at 14:51

## 2025-05-17 RX ADMIN — OXYCODONE HYDROCHLORIDE 10 MG: 5 TABLET ORAL at 03:05

## 2025-05-17 RX ADMIN — PIPERACILLIN SODIUM AND TAZOBACTAM SODIUM 3.38 G: 3; .375 INJECTION, SOLUTION INTRAVENOUS at 09:16

## 2025-05-17 RX ADMIN — HEPARIN SODIUM 7500 UNITS: 5000 INJECTION, SOLUTION INTRAVENOUS; SUBCUTANEOUS at 06:47

## 2025-05-17 RX ADMIN — PIPERACILLIN SODIUM AND TAZOBACTAM SODIUM 3.38 G: 3; .375 INJECTION, SOLUTION INTRAVENOUS at 14:45

## 2025-05-17 RX ADMIN — AMLODIPINE BESYLATE 5 MG: 5 TABLET ORAL at 09:15

## 2025-05-17 RX ADMIN — PIPERACILLIN SODIUM AND TAZOBACTAM SODIUM 3.38 G: 3; .375 INJECTION, SOLUTION INTRAVENOUS at 22:02

## 2025-05-17 RX ADMIN — IPRATROPIUM BROMIDE AND ALBUTEROL SULFATE 3 ML: 2.5; .5 SOLUTION RESPIRATORY (INHALATION) at 20:24

## 2025-05-17 RX ADMIN — PIPERACILLIN SODIUM AND TAZOBACTAM SODIUM 3.38 G: 3; .375 INJECTION, SOLUTION INTRAVENOUS at 04:17

## 2025-05-17 ASSESSMENT — COGNITIVE AND FUNCTIONAL STATUS - GENERAL
HELP NEEDED FOR BATHING: A LITTLE
EATING MEALS: A LITTLE
DRESSING REGULAR LOWER BODY CLOTHING: A LITTLE
DAILY ACTIVITIY SCORE: 20
MOBILITY SCORE: 18
TOILETING: A LITTLE
STANDING UP FROM CHAIR USING ARMS: A LITTLE
DRESSING REGULAR UPPER BODY CLOTHING: A LITTLE
MOVING TO AND FROM BED TO CHAIR: A LITTLE
MOVING TO AND FROM BED TO CHAIR: A LITTLE
MOBILITY SCORE: 18
DRESSING REGULAR LOWER BODY CLOTHING: A LITTLE
WALKING IN HOSPITAL ROOM: A LOT
CLIMB 3 TO 5 STEPS WITH RAILING: A LOT
DAILY ACTIVITIY SCORE: 18
DRESSING REGULAR UPPER BODY CLOTHING: A LITTLE
PERSONAL GROOMING: A LITTLE
WALKING IN HOSPITAL ROOM: A LOT
TOILETING: A LITTLE
HELP NEEDED FOR BATHING: A LITTLE
STANDING UP FROM CHAIR USING ARMS: A LITTLE
CLIMB 3 TO 5 STEPS WITH RAILING: A LOT

## 2025-05-17 ASSESSMENT — PAIN SCALES - GENERAL
PAINLEVEL_OUTOF10: 8

## 2025-05-17 ASSESSMENT — PAIN - FUNCTIONAL ASSESSMENT
PAIN_FUNCTIONAL_ASSESSMENT: 0-10
PAIN_FUNCTIONAL_ASSESSMENT: 0-10

## 2025-05-17 ASSESSMENT — PAIN DESCRIPTION - DESCRIPTORS
DESCRIPTORS: ACHING
DESCRIPTORS: ACHING

## 2025-05-17 NOTE — CONSULTS
INFECTIOUS DISEASE INPATIENT INITIAL CONSULTATION    Referred By: Sergio Collins    Reason For Consult: Recurrent pneumonia    HPI:  This is a 55 y.o. male with PMH of morbid obesity, DM II, HFpEF, HTN who presented with 5 days of shortness of breath.    Difficulty breathing, wheezing. Felt fevered at home. Shortness of breath with exertion. Visiting RN at home noted hypoxia 77% on RA and so patient came in for evaluation. He mentions his neighbor is sick, one of his close friends. Friend is actually admitted here with a bad stroke per patient.    Here is afebrile. WBC is normal. Is on 6L NC. Given IV Vanc/Zosyn, IV CTX/Azithro. MRSA nares PCR negative. BNP is normal.      Allergies:  Metformin     Vitals (Last 24 Hours):  Heart Rate:  [84-96]   Temp:  [36.2 °C (97.2 °F)-36.7 °C (98.1 °F)]   Resp:  [16-22]   BP: ()/(62-67)   Weight:  [174 kg (383 lb)]   SpO2:  [88 %-100 %]      PHYSICAL EXAM:  Gen - NAD, obese, on nasal cannula  Heart - RRR, no murmurs  Lungs - crackles lower lobes, no wheezing  Abd - soft, no ttp, BS present  Ext - bilateral LE edema with some rubor in both legs, not concerning for cellulitis  Skin - no rash    MEDS:  Current Medications[1]     LABS:  Lab Results   Component Value Date    WBC 8.1 05/17/2025    HGB 8.8 (L) 05/17/2025    HCT 30.9 (L) 05/17/2025    MCV 83 05/17/2025     05/17/2025      Results from last 72 hours   Lab Units 05/16/25  1330   SODIUM mmol/L 138   POTASSIUM mmol/L 4.3   CHLORIDE mmol/L 97*   CO2 mmol/L 27   BUN mg/dL 34*   CREATININE mg/dL 1.40*   GLUCOSE mg/dL 183*   CALCIUM mg/dL 9.3   ANION GAP mmol/L 18   EGFR mL/min/1.73m*2 60*     Results from last 72 hours   Lab Units 05/16/25  1330   ALK PHOS U/L 112   BILIRUBIN TOTAL mg/dL 0.6   PROTEIN TOTAL g/dL 7.6   ALT U/L 13   AST U/L 22   ALBUMIN g/dL 4.2     Estimated Creatinine Clearance: 98.1 mL/min (A) (by C-G formula based on SCr of 1.4 mg/dL (H)).      IMAGING:  CT/PE 5/16  Impression:     1.  Is  significant progression of bilateral pneumonia as detailed  above.  2. No convincing CT evidence for aortic dissection or acute pulmonary  embolus.       ASSESSMENT/PLAN:    Bilateral PNA - I personally reviewed CT chest images and there are bilateral patchy infiltrates, most in the LLL. Possibly viral PNA given appearance.  Morbid Obesity BMI 53  HFpEF - does not appear to be pulmonary edema on imaging and BNP is normal    IV Zosyn. Stopped IV Vanc since MRSA nares PCR negative. Checking for COVID.    Monitoring for adverse effects of abx such as rash/itching/diarrhea.    Will follow peripherally over the weekend. Please call me with questions. Thanks!    Alex Blandon MD  [facility]  Office #[phone]           [1]   Current Facility-Administered Medications:     acetaminophen (Tylenol) tablet 650 mg, 650 mg, oral, q4h PRN, 650 mg at 05/16/25 1954 **OR** acetaminophen (Tylenol) oral liquid 650 mg, 650 mg, nasogastric tube, q4h PRN **OR** acetaminophen (Tylenol) suppository 650 mg, 650 mg, rectal, q4h PRN, Sergio Goudiaby, DO    albuterol 2.5 mg /3 mL (0.083 %) nebulizer solution 2.5 mg, 2.5 mg, nebulization, q2h PRN, Sergio Goudiaby, DO    amLODIPine (Norvasc) tablet 5 mg, 5 mg, oral, Daily, Sergio Goudiaby, DO    aspirin EC tablet 81 mg, 81 mg, oral, Daily, Sergio Goudiaby, DO, 81 mg at 05/16/25 1955    atorvastatin (Lipitor) tablet 80 mg, 80 mg, oral, Nightly, Sergio Goudiaby, DO, 80 mg at 05/16/25 2322    colchicine tablet 0.6 mg, 0.6 mg, oral, Daily, Sergio Goudiaby, DO, 0.6 mg at 05/16/25 1955    cyclobenzaprine (Flexeril) tablet 5 mg, 5 mg, oral, TID PRN, Sergio Goudiaby, DO, 5 mg at 05/16/25 1956    dextrose 50 % injection 12.5 g, 12.5 g, intravenous, q15 min PRN, Sergio Goudiaby, DO    dextrose 50 % injection 25 g, 25 g, intravenous, q15 min PRN, Sergio Collins DO    DULoxetine (Cymbalta) DR capsule 60 mg, 60 mg, oral, Daily, Sergio Collins DO    [START ON  5/19/2025] ergocalciferol (Vitamin D-2) capsule 1.25 mg, 1.25 mg, oral, Weekly, Xavier De La Paz, PharmD    ferrous sulfate 325 mg (65 mg elemental) tablet 1 tablet, 1 tablet, oral, Every Mon/Wed/Fri, [facility], DO, 1 tablet at 05/16/25 1955    formoterol (Perforomist) 20 mcg/2 mL nebulizer solution 20 mcg, 20 mcg, nebulization, q12h, [facility], 20 mcg at 05/16/25 1907    furosemide (Lasix) injection 40 mg, 40 mg, intravenous, q12h, [facility] Goudiaby, DO    glucagon (Glucagen) injection 1 mg, 1 mg, intramuscular, q15 min PRN, [facility] Goudiaby, DO    glucagon (Glucagen) injection 1 mg, 1 mg, intramuscular, q15 min PRN, [facility] Goudi[facility], DO    heparin (porcine) injection 7,500 Units, 7,500 Units, subcutaneous, q8h TAMIR, [facility], 7,500 Units at 05/17/25 0647    insulin glargine (Lantus) injection 60 Units, 60 Units, subcutaneous, BID, [facility] G[facility], DO, 60 Units at 05/16/25 2330    insulin lispro injection 40 Units, 40 Units, subcutaneous, TID AC, [facility] Goud[facility], DO    ipratropium-albuteroL (Duo-Neb) 0.5-2.5 mg/3 mL nebulizer solution 3 mL, 3 mL, nebulization, 4x daily, [facility] Goudi[facility], DO, 3 mL at 05/17/25 0723    naloxone (Narcan) nasal spray 4 mg, 4 mg, nasal, PRN, [facility] Goudiaby, DO    ondansetron (Zofran) tablet 4 mg, 4 mg, oral, q8h PRN **OR** ondansetron (Zofran) injection 4 mg, 4 mg, intravenous, q8h PRN, [facility] Goud[facility], DO    oxyCODONE (Roxicodone) immediate release tablet 10 mg, 10 mg, oral, q4h PRN, Sergio Collins, , 10 mg at 05/17/25 0305    oxygen (O2) therapy, , inhalation, Continuous PRN - O2/gases, Sergio Collins DO    pantoprazole (ProtoNix) EC tablet 40 mg, 40 mg, oral, Daily before breakfast, 40 mg at 05/17/25 0647 **OR** pantoprazole (Protonix) injection 40 mg, 40 mg, intravenous, Daily before breakfast, Sergio Collins DO    piperacillin-tazobactam (Zosyn) 3.375 g in dextrose (iso) IV 50 mL, 3.375 g, intravenous, q6h, Sergio Collins,  , Stopped at 05/17/25 0610    polyethylene glycol (Glycolax, Miralax) packet 17 g, 17 g, oral, Daily, Sergio Collins DO    QUEtiapine (SEROquel) tablet 100 mg, 100 mg, oral, Nightly, Sergio Collins DO, 100 mg at 05/16/25 5281

## 2025-05-17 NOTE — PROGRESS NOTES
Jai Shrestha is a 55 y.o. male on day 1 of admission presenting with Pneumonia due to infectious organism, unspecified laterality, unspecified part of lung.      Subjective   Patient was seen and examined bedside this morning, stated that he had an uneventful night, admits to his chronic pain in the lower extremities including wound on the right heel.       Objective     Last Recorded Vitals  /72 (BP Location: Left arm, Patient Position: Sitting)   Pulse 88   Temp 36.5 °C (97.7 °F) (Temporal)   Resp 18   Wt (!) 174 kg (383 lb)   SpO2 92%   Intake/Output last 3 Shifts:    Intake/Output Summary (Last 24 hours) at 5/17/2025 1732  Last data filed at 5/17/2025 1445  Gross per 24 hour   Intake 100 ml   Output --   Net 100 ml       Admission Weight  Weight: (!) 174 kg (383 lb) (05/16/25 1112)    Daily Weight  05/16/25 : (!) 174 kg (383 lb)    Image Results  CT angio chest for pulmonary embolism  Narrative: Interpreted By:  Schoenberger, Joseph,   STUDY:  CT ANGIO CHEST FOR PULMONARY EMBOLISM;  5/16/2025 3:26 pm      INDICATION:  Signs/Symptoms:eval for PE.          COMPARISON:  04/14/2025      ACCESSION NUMBER(S):  [id number]      ORDERING CLINICIAN:  DEBBIE STONER      TECHNIQUE:  Helical data acquisition of the chest was obtained with the  intravenous injection of 90 cc Omnipaque 350.. Images were  reformatted in coronal and sagittal planes. Axial and coronal MIP  images were created and reviewed.      FINDINGS:  POTENTIAL LIMITATIONS OF THE STUDY: Suboptimal opacification of the  systemic arterial and pulmonary arterial structures.      HEART AND VESSELS:  No filling defect in the main or lobar or proximal subsegmental  pulmonary arteries.      Main pulmonary artery and its branches are normal in caliber.      Thoracic aorta is normal in course and caliber with mild  atherosclerotic calcifications in lower malleolar opacification  without dissection.      There are moderate coronary atherosclerotic  calcifications.The study  is not optimized for evaluation of coronary arteries.      The cardiac chambers are not enlarged.      No evidence of pericardial effusion.      MEDIASTINUM AND DOTTIE, LOWER NECK AND AXILLA:  The visualized thyroid gland is within normal limits.      No evidence of thoracic lymphadenopathy by CT criteria.      Esophagus appears within normal limits as seen.      LUNGS AND AIRWAYS:  The trachea and central airways are patent. No endobronchial lesion.      Multifocal consolidative opacities are noted in both lungs. There is  significant progression compared to prior exam. No aditi lobar  consolidation. No pleural effusion or pneumothorax. Asymmetric  elevation of the right hemidiaphragm with compressive atelectasis and  adjacent portions the right lower lobe is again noted unchanged.      UPPER ABDOMEN:  Dependent layering calcified gallstones. No wall thickening or  pericholecystic inflammatory findings. No change from prior.      CHEST WALL AND OSSEOUS STRUCTURES:  No acute finding      Impression: 1.  Is significant progression of bilateral pneumonia as detailed  above.  2. No convincing CT evidence for aortic dissection or acute pulmonary  embolus.          MACRO:  None      Signed by: Joseph Schoenberger 5/16/2025 3:44 PM  Dictation workstation:   [id number]  XR chest 1 view  Narrative: Interpreted By:  Osiel Newman,   STUDY:  XR CHEST 1 VIEW;  5/16/2025 11:41 am      INDICATION:  Signs/Symptoms:SOB with new oxygen requirement.      COMPARISON:  04/14/2025      ACCESSION NUMBER(S):  [id number]      ORDERING CLINICIAN:  DEBBIE STONER      FINDINGS:  Unchanged elevation of right hemidiaphragm. Increase patchy left mid  to lower lung opacities. Right lung grossly clear. No visualized  pleural effusion. Cardiomediastinal silhouette unchanged. No  congestive failure.      Impression: Increase patchy left mid to lower lung infiltrates.      MACRO:  None      Signed by: Osiel Newman  5/16/2025 11:43 AM  Dictation workstation:   [id number]      Physical Exam  Constitutional: Obese gentleman, appears drowsy, somnolent, but respond to questions, cooperative not in acute distress  Eyes: PERRLA, clear sclera  ENMT: Moist mucosal membranes, no exudate  Head / Neck: Atraumatic, normocephalic, supple neck, JVP not visualized  Lungs: Patent airways, CTABL  Heart: RRR, S1S2, no murmurs appreciated, palpable pulses in all extremities  GI: Soft, NT, ND, bowel sounds present in all quadrants  MSK: Moves all extremities freely, no restriction  of ROM, no joint edema  Extremities: Chronic lymphedema with venous ulcers, bilateral lower extremities peripheral edema  : No Guo catheter inserted  Breast: Deferred  Neurological: AAO x 3 to person, place and date, facial muscles symmetrical, sensation intact, strength 4/4, no acute focal neurological deficits appreciated  Psychological: Appropriate mood and behavior  Relevant Results             Scheduled medications  Scheduled Medications[1]  Continuous medications  Continuous Medications[2]  PRN medications  PRN Medications[3]       Assessment & Plan  Pneumonia due to infectious organism, unspecified laterality, unspecified part of lung    55 y.o. male with a past medical history of chronic back pain, peripheral neuropathy, diabetes mellitus type 2 with insulin resistance, morbid obesity BMI 53.44, CKD 3, diastolic CHF, hypertension who presented to [facility] ED with complain of shortness of breath x 5 days.        Pneumonia: Likely hospital-acquired given recent discharge from hospital  - CT shows worsening consolidations  - Vancomycin pharmacy to dose  - Zosyn 3.375 g IV piggyback every 6 hours  - Urine antigens and strep pneumo and Legionella  -Procalcitonin  -Bronchodilators   -ID consult: IV Zosyn. Stopped IV Vanc since MRSA nares PCR negative. Checking for COVID.      Type 2 diabetes mellitus requiring insulin with insulin resistance  -  Diabetic carbohydrate controlled diet  -Lantus 60 u twice daily  -Prandial insulin with lispro 40 u 3 times daily before meals  -Lispro sliding scale  -Hypoglycemia protocol in place     Diastolic CHF, possible decompensation given level of hypoxia for which pneumonia cannot be accounted for alone  Lasix 40 mg IV every 12 hours  Daily weight  Cardiology consult if not improving     Hypertension  Amlodipine 5 mg orally daily     Hyperlipidemia  Atorvastatin 80 mg orally daily     Bilateral lymphedema with venous stasis changes   Diuresis with Lasix     Chronic pain  -Visibly upset over not having Dilaudid despite explanation that chronic pain should not be treated with IV opiate per state guidelines  -On Percocet 7.5-300 mg tablet, not available under this dosage in the formulary, will substitute with oxycodone 10 mg p.o. every 4 hours as needed severe pain  -Flexeril 5 mg 3 times daily as needed for spasm     Morbid obesity BMI 53.44  Lifestyle modification     DVT prophylaxis  Heparin 7500 units subcu 3 times daily  SCDs     Disposition: Presented with shortness of breath, found with possible healthcare associate pneumonia, discharge pending clinical improvement.          Sergio Collins DO           [1] amLODIPine, 5 mg, oral, Daily  aspirin, 81 mg, oral, Daily  atorvastatin, 80 mg, oral, Nightly  colchicine, 0.6 mg, oral, Daily  DULoxetine, 60 mg, oral, Daily  [START ON 5/19/2025] ergocalciferol, 1.25 mg, oral, Weekly  ferrous sulfate, 1 tablet, oral, Every Mon/Wed/Fri  furosemide, 40 mg, intravenous, q12h  heparin (porcine), 7,500 Units, subcutaneous, q8h TAMIR  insulin glargine, 60 Units, subcutaneous, BID  insulin lispro, 0-15 Units, subcutaneous, TID AC  insulin lispro, 40 Units, subcutaneous, TID AC  ipratropium-albuteroL, 3 mL, nebulization, TID  pantoprazole, 40 mg, oral, Daily before breakfast   Or  pantoprazole, 40 mg, intravenous, Daily before breakfast  piperacillin-tazobactam, 3.375 g, intravenous,  q6h  polyethylene glycol, 17 g, oral, Daily  QUEtiapine, 100 mg, oral, Nightly  [2]    [3] PRN medications: acetaminophen **OR** acetaminophen **OR** acetaminophen, cyclobenzaprine, dextrose, dextrose, glucagon, glucagon, ipratropium-albuteroL, naloxone, ondansetron **OR** ondansetron, oxyCODONE, oxygen

## 2025-05-17 NOTE — PROGRESS NOTES
Vancomycin Dosing by Pharmacy- Cessation of Therapy    Consult to pharmacy for vancomycin dosing has been discontinued by the prescriber, pharmacy will sign off at this time.    Please call pharmacy if there are further questions or re-enter a consult if vancomycin is resumed.     Flaquita Castañeda, NickD

## 2025-05-17 NOTE — CARE PLAN
The patient's goals for the shift include      The clinical goals for the shift include rest      Problem: Pain - Adult  Goal: Verbalizes/displays adequate comfort level or baseline comfort level  Flowsheets (Taken 5/16/2025 2156)  Verbalizes/displays adequate comfort level or baseline comfort level:   Encourage patient to monitor pain and request assistance   Assess pain using appropriate pain scale   Administer analgesics based on type and severity of pain and evaluate response   Implement non-pharmacological measures as appropriate and evaluate response   Consider cultural and social influences on pain and pain management   Notify Licensed Independent Practitioner if interventions unsuccessful or patient reports new pain     Problem: Safety - Adult  Goal: Free from fall injury  Flowsheets (Taken 5/16/2025 2156)  Free from fall injury:   Instruct family/caregiver on patient safety   Based on caregiver fall risk screen, instruct family/caregiver to ask for assistance with transferring infant if caregiver noted to have fall risk factors     Problem: Discharge Planning  Goal: Discharge to home or other facility with appropriate resources  Flowsheets (Taken 5/16/2025 2156)  Discharge to home or other facility with appropriate resources:   Identify barriers to discharge with patient and caregiver   Arrange for needed discharge resources and transportation as appropriate   Identify discharge learning needs (meds, wound care, etc)   Arrange for interpreters to assist at discharge as needed   Refer to discharge planning if patient needs post-hospital services based on physician order or complex needs related to functional status, cognitive ability or social support system     Problem: Chronic Conditions and Co-morbidities  Goal: Patient's chronic conditions and co-morbidity symptoms are monitored and maintained or improved  Flowsheets (Taken 5/16/2025 2156)  Care Plan - Patient's Chronic Conditions and Co-Morbidity  Symptoms are Monitored and Maintained or Improved:   Monitor and assess patient's chronic conditions and comorbid symptoms for stability, deterioration, or improvement   Collaborate with multidisciplinary team to address chronic and comorbid conditions and prevent exacerbation or deterioration   Update acute care plan with appropriate goals if chronic or comorbid symptoms are exacerbated and prevent overall improvement and discharge

## 2025-05-17 NOTE — CARE PLAN
Problem: Diabetes  Goal: Achieve decreasing blood glucose levels by end of shift  Outcome: Progressing  Goal: Increase stability of blood glucose readings by end of shift  Outcome: Progressing  Goal: Decrease in ketones present in urine by end of shift  Outcome: Progressing  Goal: Maintain electrolyte levels within acceptable range throughout shift  Outcome: Progressing  Goal: Maintain glucose levels >70mg/dl to <250mg/dl throughout shift  Outcome: Progressing  Goal: No changes in neurological exam by end of shift  Outcome: Progressing  Goal: Learn about and adhere to nutrition recommendations by end of shift  Outcome: Progressing  Goal: Vital signs within normal range for age by end of shift  Outcome: Progressing  Goal: Increase self care and/or family involovement by end of shift  Outcome: Progressing  Goal: Receive DSME education by end of shift  Outcome: Progressing     Problem: Pain  Goal: Takes deep breaths with improved pain control throughout the shift  Outcome: Progressing  Goal: Turns in bed with improved pain control throughout the shift  Outcome: Progressing  Goal: Walks with improved pain control throughout the shift  Outcome: Progressing  Goal: Performs ADL's with improved pain control throughout shift  Outcome: Progressing  Goal: Participates in PT with improved pain control throughout the shift  Outcome: Progressing  Goal: Free from opioid side effects throughout the shift  Outcome: Progressing  Goal: Free from acute confusion related to pain meds throughout the shift  Outcome: Progressing     Problem: Fall/Injury  Goal: Not fall by end of shift  Outcome: Progressing  Goal: Be free from injury by end of the shift  Outcome: Progressing  Goal: Verbalize understanding of personal risk factors for fall in the hospital  Outcome: Progressing  Goal: Verbalize understanding of risk factor reduction measures to prevent injury from fall in the home  Outcome: Progressing  Goal: Use assistive devices by end of  the shift  Outcome: Progressing  Goal: Pace activities to prevent fatigue by end of the shift  Outcome: Progressing     Problem: Pain - Adult  Goal: Verbalizes/displays adequate comfort level or baseline comfort level  Outcome: Progressing     Problem: Safety - Adult  Goal: Free from fall injury  Outcome: Progressing     Problem: Discharge Planning  Goal: Discharge to home or other facility with appropriate resources  Outcome: Progressing     Problem: Chronic Conditions and Co-morbidities  Goal: Patient's chronic conditions and co-morbidity symptoms are monitored and maintained or improved  Outcome: Progressing     Problem: Nutrition  Goal: Nutrient intake appropriate for maintaining nutritional needs  Outcome: Progressing

## 2025-05-17 NOTE — PROGRESS NOTES
Vancomycin Dosing by Pharmacy- INITIAL    Jai Shrestha is a 55 y.o. year old male who Pharmacy has been consulted for vancomycin dosing for pneumonia. Based on the patient's indication and renal status this patient will be dosed based on a goal AUC of 400-600.     Renal function is currently stable.    Visit Vitals  BP 99/67   Pulse 92   Temp 36.7 °C (98 °F) (Temporal)   Resp (!) 22        Lab Results   Component Value Date    CREATININE 1.40 (H) 2025    CREATININE 1.36 (H) 2025    CREATININE 1.51 (H) 2025    CREATININE 1.30 2025        Patient weight is as follows:   Vitals:    25 1112   Weight: (!) 174 kg (383 lb)       Cultures:  No results found for the encounter in last 14 days.        No intake/output data recorded.  I/O during current shift:  No intake/output data recorded.    Temp (24hrs), Av.7 °C (98.1 °F), Min:36.7 °C (98 °F), Max:36.7 °C (98.1 °F)         Assessment/Plan     Patient has already been given a loading dose of 2000 mg.  Will initiate vancomycin maintenance, 1500 mg every 12 hours.    MRSA pending will need to follow up    This dosing regimen is predicted by [employer] to result in the following pharmacokinetic parameters:  Loading dose: N/A  Regimen: 1500 mg IV every 12 hours.  Start time: 22:08 on 2025  Exposure target: AUC24 (range)400-600 mg/L.hr   [id number]: 523 mg/L.hr  AUC24,ss: 545 mg/L.hr  Probability of AUC24 > 400: 72 %  Ctrough,ss: 13.9 mg/L  Probability of Ctrough,ss > 20: 34 %      Follow-up level will be ordered on  at 0500 unless clinically indicated sooner.  Will continue to monitor renal function daily while on vancomycin and order serum creatinine at least every 48 hours if not already ordered.  Follow for continued vancomycin needs, clinical response, and signs/symptoms of toxicity.       Xavier De La Paz, PharmD

## 2025-05-18 VITALS
OXYGEN SATURATION: 96 % | HEART RATE: 86 BPM | SYSTOLIC BLOOD PRESSURE: 148 MMHG | DIASTOLIC BLOOD PRESSURE: 76 MMHG | RESPIRATION RATE: 19 BRPM | BODY MASS INDEX: 53.44 KG/M2 | WEIGHT: 315 LBS | TEMPERATURE: 96.5 F

## 2025-05-18 LAB
ANION GAP SERPL CALC-SCNC: 16 MMOL/L (ref 10–20)
BUN SERPL-MCNC: 26 MG/DL (ref 6–23)
CALCIUM SERPL-MCNC: 8.6 MG/DL (ref 8.6–10.3)
CHLORIDE SERPL-SCNC: 99 MMOL/L (ref 98–107)
CO2 SERPL-SCNC: 28 MMOL/L (ref 21–32)
CREAT SERPL-MCNC: 1.45 MG/DL (ref 0.5–1.3)
EGFRCR SERPLBLD CKD-EPI 2021: 57 ML/MIN/1.73M*2
ERYTHROCYTE [DISTWIDTH] IN BLOOD BY AUTOMATED COUNT: 16.8 % (ref 11.5–14.5)
GLUCOSE BLD MANUAL STRIP-MCNC: 159 MG/DL (ref 74–99)
GLUCOSE BLD MANUAL STRIP-MCNC: 160 MG/DL (ref 74–99)
GLUCOSE BLD MANUAL STRIP-MCNC: 188 MG/DL (ref 74–99)
GLUCOSE BLD MANUAL STRIP-MCNC: 55 MG/DL (ref 74–99)
GLUCOSE BLD MANUAL STRIP-MCNC: 70 MG/DL (ref 74–99)
GLUCOSE SERPL-MCNC: 170 MG/DL (ref 74–99)
HCT VFR BLD AUTO: 31.4 % (ref 41–52)
HGB BLD-MCNC: 9.1 G/DL (ref 13.5–17.5)
LEGIONELLA AG UR QL: NEGATIVE
MCH RBC QN AUTO: 23.7 PG (ref 26–34)
MCHC RBC AUTO-ENTMCNC: 29 G/DL (ref 32–36)
MCV RBC AUTO: 82 FL (ref 80–100)
NRBC BLD-RTO: 0 /100 WBCS (ref 0–0)
PLATELET # BLD AUTO: 218 X10*3/UL (ref 150–450)
POTASSIUM SERPL-SCNC: 4 MMOL/L (ref 3.5–5.3)
RBC # BLD AUTO: 3.84 X10*6/UL (ref 4.5–5.9)
S PNEUM AG UR QL: NEGATIVE
SODIUM SERPL-SCNC: 139 MMOL/L (ref 136–145)
WBC # BLD AUTO: 7 X10*3/UL (ref 4.4–11.3)

## 2025-05-18 PROCEDURE — 94640 AIRWAY INHALATION TREATMENT: CPT

## 2025-05-18 PROCEDURE — 80048 BASIC METABOLIC PNL TOTAL CA: CPT | Performed by: INTERNAL MEDICINE

## 2025-05-18 PROCEDURE — 2500000001 HC RX 250 WO HCPCS SELF ADMINISTERED DRUGS (ALT 637 FOR MEDICARE OP): Performed by: INTERNAL MEDICINE

## 2025-05-18 PROCEDURE — 2500000005 HC RX 250 GENERAL PHARMACY W/O HCPCS: Performed by: INTERNAL MEDICINE

## 2025-05-18 PROCEDURE — 85027 COMPLETE CBC AUTOMATED: CPT | Performed by: INTERNAL MEDICINE

## 2025-05-18 PROCEDURE — 99233 SBSQ HOSP IP/OBS HIGH 50: CPT | Performed by: INTERNAL MEDICINE

## 2025-05-18 PROCEDURE — 36415 COLL VENOUS BLD VENIPUNCTURE: CPT | Performed by: INTERNAL MEDICINE

## 2025-05-18 PROCEDURE — 2500000002 HC RX 250 W HCPCS SELF ADMINISTERED DRUGS (ALT 637 FOR MEDICARE OP, ALT 636 FOR OP/ED): Performed by: INTERNAL MEDICINE

## 2025-05-18 PROCEDURE — 2500000004 HC RX 250 GENERAL PHARMACY W/ HCPCS (ALT 636 FOR OP/ED): Mod: JZ | Performed by: INTERNAL MEDICINE

## 2025-05-18 PROCEDURE — 82947 ASSAY GLUCOSE BLOOD QUANT: CPT

## 2025-05-18 PROCEDURE — 9420000001 HC RT PATIENT EDUCATION 5 MIN

## 2025-05-18 PROCEDURE — 1200000002 HC GENERAL ROOM WITH TELEMETRY DAILY

## 2025-05-18 RX ADMIN — HEPARIN SODIUM 7500 UNITS: 5000 INJECTION, SOLUTION INTRAVENOUS; SUBCUTANEOUS at 21:02

## 2025-05-18 RX ADMIN — OXYCODONE HYDROCHLORIDE 10 MG: 5 TABLET ORAL at 06:41

## 2025-05-18 RX ADMIN — INSULIN LISPRO 40 UNITS: 100 INJECTION, SOLUTION INTRAVENOUS; SUBCUTANEOUS at 17:01

## 2025-05-18 RX ADMIN — IPRATROPIUM BROMIDE AND ALBUTEROL SULFATE 3 ML: 2.5; .5 SOLUTION RESPIRATORY (INHALATION) at 12:11

## 2025-05-18 RX ADMIN — PIPERACILLIN SODIUM AND TAZOBACTAM SODIUM 3.38 G: 3; .375 INJECTION, SOLUTION INTRAVENOUS at 16:56

## 2025-05-18 RX ADMIN — IPRATROPIUM BROMIDE AND ALBUTEROL SULFATE 3 ML: 2.5; .5 SOLUTION RESPIRATORY (INHALATION) at 07:22

## 2025-05-18 RX ADMIN — INSULIN GLARGINE 60 UNITS: 100 INJECTION, SOLUTION SUBCUTANEOUS at 08:39

## 2025-05-18 RX ADMIN — Medication 5 L/MIN: at 07:22

## 2025-05-18 RX ADMIN — HEPARIN SODIUM 7500 UNITS: 5000 INJECTION, SOLUTION INTRAVENOUS; SUBCUTANEOUS at 14:06

## 2025-05-18 RX ADMIN — OXYCODONE HYDROCHLORIDE 10 MG: 5 TABLET ORAL at 23:36

## 2025-05-18 RX ADMIN — QUETIAPINE FUMARATE 100 MG: 100 TABLET ORAL at 21:03

## 2025-05-18 RX ADMIN — INSULIN LISPRO 40 UNITS: 100 INJECTION, SOLUTION INTRAVENOUS; SUBCUTANEOUS at 08:15

## 2025-05-18 RX ADMIN — IPRATROPIUM BROMIDE AND ALBUTEROL SULFATE 3 ML: 2.5; .5 SOLUTION RESPIRATORY (INHALATION) at 19:01

## 2025-05-18 RX ADMIN — INSULIN LISPRO 3 UNITS: 100 INJECTION, SOLUTION INTRAVENOUS; SUBCUTANEOUS at 17:01

## 2025-05-18 RX ADMIN — HEPARIN SODIUM 7500 UNITS: 5000 INJECTION, SOLUTION INTRAVENOUS; SUBCUTANEOUS at 06:40

## 2025-05-18 RX ADMIN — AMLODIPINE BESYLATE 5 MG: 5 TABLET ORAL at 09:09

## 2025-05-18 RX ADMIN — PIPERACILLIN SODIUM AND TAZOBACTAM SODIUM 3.38 G: 3; .375 INJECTION, SOLUTION INTRAVENOUS at 06:41

## 2025-05-18 RX ADMIN — ATORVASTATIN CALCIUM 80 MG: 80 TABLET, FILM COATED ORAL at 21:03

## 2025-05-18 RX ADMIN — Medication 6 L/MIN: at 07:37

## 2025-05-18 RX ADMIN — PANTOPRAZOLE SODIUM 40 MG: 40 TABLET, DELAYED RELEASE ORAL at 06:41

## 2025-05-18 RX ADMIN — INSULIN LISPRO 3 UNITS: 100 INJECTION, SOLUTION INTRAVENOUS; SUBCUTANEOUS at 08:15

## 2025-05-18 RX ADMIN — ASPIRIN 81 MG: 81 TABLET, COATED ORAL at 09:09

## 2025-05-18 RX ADMIN — DULOXETINE 60 MG: 30 CAPSULE, DELAYED RELEASE ORAL at 09:09

## 2025-05-18 RX ADMIN — FUROSEMIDE 40 MG: 10 INJECTION, SOLUTION INTRAMUSCULAR; INTRAVENOUS at 09:09

## 2025-05-18 RX ADMIN — FUROSEMIDE 40 MG: 10 INJECTION, SOLUTION INTRAMUSCULAR; INTRAVENOUS at 21:02

## 2025-05-18 RX ADMIN — CYCLOBENZAPRINE HYDROCHLORIDE 5 MG: 5 TABLET, FILM COATED ORAL at 23:36

## 2025-05-18 RX ADMIN — INSULIN GLARGINE 60 UNITS: 100 INJECTION, SOLUTION SUBCUTANEOUS at 21:03

## 2025-05-18 RX ADMIN — OXYCODONE HYDROCHLORIDE 10 MG: 5 TABLET ORAL at 17:05

## 2025-05-18 RX ADMIN — COLCHICINE 0.6 MG: 0.6 TABLET, FILM COATED ORAL at 09:09

## 2025-05-18 RX ADMIN — PIPERACILLIN SODIUM AND TAZOBACTAM SODIUM 3.38 G: 3; .375 INJECTION, SOLUTION INTRAVENOUS at 09:17

## 2025-05-18 RX ADMIN — Medication 5 L/MIN: at 12:11

## 2025-05-18 ASSESSMENT — PAIN DESCRIPTION - DESCRIPTORS
DESCRIPTORS: ACHING
DESCRIPTORS: ACHING

## 2025-05-18 ASSESSMENT — PAIN DESCRIPTION - LOCATION: LOCATION: BACK

## 2025-05-18 ASSESSMENT — PAIN - FUNCTIONAL ASSESSMENT
PAIN_FUNCTIONAL_ASSESSMENT: 0-10

## 2025-05-18 ASSESSMENT — COGNITIVE AND FUNCTIONAL STATUS - GENERAL
MOBILITY SCORE: 18
HELP NEEDED FOR BATHING: A LITTLE
CLIMB 3 TO 5 STEPS WITH RAILING: A LOT
DRESSING REGULAR UPPER BODY CLOTHING: A LITTLE
TOILETING: A LITTLE
DAILY ACTIVITIY SCORE: 18
EATING MEALS: A LITTLE
STANDING UP FROM CHAIR USING ARMS: A LITTLE
WALKING IN HOSPITAL ROOM: A LOT
MOVING TO AND FROM BED TO CHAIR: A LITTLE
PERSONAL GROOMING: A LITTLE
DRESSING REGULAR LOWER BODY CLOTHING: A LITTLE

## 2025-05-18 ASSESSMENT — PAIN SCALES - GENERAL
PAINLEVEL_OUTOF10: 5 - MODERATE PAIN
PAINLEVEL_OUTOF10: 5 - MODERATE PAIN
PAINLEVEL_OUTOF10: 8

## 2025-05-18 ASSESSMENT — PAIN DESCRIPTION - ORIENTATION: ORIENTATION: POSTERIOR

## 2025-05-18 NOTE — CARE PLAN
The patient's goals for the shift include  comfort and safety    The clinical goals for the shift include maintain spo2 WDL      Problem: Safety - Adult  Goal: Free from fall injury  Outcome: Progressing     Problem: Chronic Conditions and Co-morbidities  Goal: Patient's chronic conditions and co-morbidity symptoms are monitored and maintained or improved  Outcome: Progressing

## 2025-05-18 NOTE — CARE PLAN
Problem: Diabetes  Goal: Achieve decreasing blood glucose levels by end of shift  Outcome: Progressing  Goal: Increase stability of blood glucose readings by end of shift  Outcome: Progressing  Goal: Decrease in ketones present in urine by end of shift  Outcome: Progressing  Goal: Maintain electrolyte levels within acceptable range throughout shift  Outcome: Progressing  Goal: Maintain glucose levels >70mg/dl to <250mg/dl throughout shift  Outcome: Progressing  Goal: No changes in neurological exam by end of shift  Outcome: Progressing  Goal: Learn about and adhere to nutrition recommendations by end of shift  Outcome: Progressing  Goal: Vital signs within normal range for age by end of shift  Outcome: Progressing  Goal: Increase self care and/or family involovement by end of shift  Outcome: Progressing  Goal: Receive DSME education by end of shift  Outcome: Progressing     Problem: Pain  Goal: Takes deep breaths with improved pain control throughout the shift  Outcome: Progressing  Goal: Turns in bed with improved pain control throughout the shift  Outcome: Progressing  Goal: Walks with improved pain control throughout the shift  Outcome: Progressing  Goal: Performs ADL's with improved pain control throughout shift  Outcome: Progressing  Goal: Participates in PT with improved pain control throughout the shift  Outcome: Progressing  Goal: Free from opioid side effects throughout the shift  Outcome: Progressing  Goal: Free from acute confusion related to pain meds throughout the shift  Outcome: Progressing     Problem: Fall/Injury  Goal: Not fall by end of shift  Outcome: Progressing  Goal: Be free from injury by end of the shift  Outcome: Progressing  Goal: Verbalize understanding of personal risk factors for fall in the hospital  Outcome: Progressing  Goal: Verbalize understanding of risk factor reduction measures to prevent injury from fall in the home  Outcome: Progressing  Goal: Use assistive devices by end of  the shift  Outcome: Progressing  Goal: Pace activities to prevent fatigue by end of the shift  Outcome: Progressing     Problem: Pain - Adult  Goal: Verbalizes/displays adequate comfort level or baseline comfort level  Outcome: Progressing     Problem: Safety - Adult  Goal: Free from fall injury  Outcome: Progressing     Problem: Discharge Planning  Goal: Discharge to home or other facility with appropriate resources  Outcome: Progressing     Problem: Chronic Conditions and Co-morbidities  Goal: Patient's chronic conditions and co-morbidity symptoms are monitored and maintained or improved  Outcome: Progressing     Problem: Nutrition  Goal: Nutrient intake appropriate for maintaining nutritional needs  Outcome: Progressing     Problem: Skin  Goal: Decreased wound size/increased tissue granulation at next dressing change  Outcome: Progressing  Goal: Participates in plan/prevention/treatment measures  5/18/2025 1113 by Elida Mcleod RN  Outcome: Progressing  5/18/2025 1112 by Elida Mcleod RN  Flowsheets (Taken 5/18/2025 1112)  Participates in plan/prevention/treatment measures: Increase activity/out of bed for meals  Goal: Prevent/manage excess moisture  Outcome: Progressing  Goal: Prevent/minimize sheer/friction injuries  Outcome: Progressing  Goal: Promote/optimize nutrition  Outcome: Progressing  Goal: Promote skin healing  Outcome: Progressing

## 2025-05-18 NOTE — PROGRESS NOTES
Jai Shrestha is a 55 y.o. male on day 2 of admission presenting with Pneumonia due to infectious organism, unspecified laterality, unspecified part of lung.      Subjective   Patient was seen and examined bedside this morning, stated that she is night was uneventful, continue to tolerate pain in his extremities and looking forward to wound care assessment tomorrow.       Objective     Last Recorded Vitals  /87 (BP Location: Right arm, Patient Position: Lying)   Pulse 85   Temp 36.5 °C (97.7 °F) (Temporal)   Resp 19   Wt (!) 174 kg (383 lb)   SpO2 93%   Intake/Output last 3 Shifts:    Intake/Output Summary (Last 24 hours) at 5/18/2025 1717  Last data filed at 5/18/2025 1147  Gross per 24 hour   Intake 240 ml   Output --   Net 240 ml       Admission Weight  Weight: (!) 174 kg (383 lb) (05/16/25 1112)    Daily Weight  05/16/25 : (!) 174 kg (383 lb)    Image Results  CT angio chest for pulmonary embolism  Narrative: Interpreted By:  Schoenberger, Joseph,   STUDY:  CT ANGIO CHEST FOR PULMONARY EMBOLISM;  5/16/2025 3:26 pm      INDICATION:  Signs/Symptoms:eval for PE.          COMPARISON:  04/14/2025      ACCESSION NUMBER(S):  [id number]      ORDERING CLINICIAN:  DEBBIE STONER      TECHNIQUE:  Helical data acquisition of the chest was obtained with the  intravenous injection of 90 cc Omnipaque 350.. Images were  reformatted in coronal and sagittal planes. Axial and coronal MIP  images were created and reviewed.      FINDINGS:  POTENTIAL LIMITATIONS OF THE STUDY: Suboptimal opacification of the  systemic arterial and pulmonary arterial structures.      HEART AND VESSELS:  No filling defect in the main or lobar or proximal subsegmental  pulmonary arteries.      Main pulmonary artery and its branches are normal in caliber.      Thoracic aorta is normal in course and caliber with mild  atherosclerotic calcifications in lower malleolar opacification  without dissection.      There are moderate coronary  atherosclerotic calcifications.The study  is not optimized for evaluation of coronary arteries.      The cardiac chambers are not enlarged.      No evidence of pericardial effusion.      MEDIASTINUM AND DOTTIE, LOWER NECK AND AXILLA:  The visualized thyroid gland is within normal limits.      No evidence of thoracic lymphadenopathy by CT criteria.      Esophagus appears within normal limits as seen.      LUNGS AND AIRWAYS:  The trachea and central airways are patent. No endobronchial lesion.      Multifocal consolidative opacities are noted in both lungs. There is  significant progression compared to prior exam. No aditi lobar  consolidation. No pleural effusion or pneumothorax. Asymmetric  elevation of the right hemidiaphragm with compressive atelectasis and  adjacent portions the right lower lobe is again noted unchanged.      UPPER ABDOMEN:  Dependent layering calcified gallstones. No wall thickening or  pericholecystic inflammatory findings. No change from prior.      CHEST WALL AND OSSEOUS STRUCTURES:  No acute finding      Impression: 1.  Is significant progression of bilateral pneumonia as detailed  above.  2. No convincing CT evidence for aortic dissection or acute pulmonary  embolus.          MACRO:  None      Signed by: Joseph Schoenberger 5/16/2025 3:44 PM  Dictation workstation:   [id number]  XR chest 1 view  Narrative: Interpreted By:  Osiel Newman,   STUDY:  XR CHEST 1 VIEW;  5/16/2025 11:41 am      INDICATION:  Signs/Symptoms:SOB with new oxygen requirement.      COMPARISON:  04/14/2025      ACCESSION NUMBER(S):  [id number]      ORDERING CLINICIAN:  DEBBIE STONER      FINDINGS:  Unchanged elevation of right hemidiaphragm. Increase patchy left mid  to lower lung opacities. Right lung grossly clear. No visualized  pleural effusion. Cardiomediastinal silhouette unchanged. No  congestive failure.      Impression: Increase patchy left mid to lower lung infiltrates.      MACRO:  None      Signed by:  Osiel Newman 5/16/2025 11:43 AM  Dictation workstation:   [id number]      Physical Exam  Constitutional: Obese gentleman, appears drowsy, somnolent, but respond to questions, cooperative not in acute distress  Eyes: PERRLA, clear sclera  ENMT: Moist mucosal membranes, no exudate  Head / Neck: Atraumatic, normocephalic, supple neck, JVP not visualized  Lungs: Patent airways, CTABL  Heart: RRR, S1S2, no murmurs appreciated, palpable pulses in all extremities  GI: Soft, NT, ND, bowel sounds present in all quadrants  MSK: Moves all extremities freely, no restriction  of ROM, no joint edema  Extremities: Chronic lymphedema with venous ulcers, bilateral lower extremities peripheral edema  : No Guo catheter inserted  Breast: Deferred  Neurological: AAO x 3 to person, place and date, facial muscles symmetrical, sensation intact, strength 4/4, no acute focal neurological deficits appreciated  Psychological: Appropriate mood and behavior  Relevant Results                  Scheduled medications  Scheduled Medications[1]  Continuous medications  Continuous Medications[2]  PRN medications  PRN Medications[3]      Assessment & Plan  Pneumonia due to infectious organism, unspecified laterality, unspecified part of lung    55 y.o. male with a past medical history of chronic back pain, peripheral neuropathy, diabetes mellitus type 2 with insulin resistance, morbid obesity BMI 53.44, CKD 3, diastolic CHF, hypertension who presented to [facility] ED with complain of shortness of breath x 5 days.        Pneumonia: Likely hospital-acquired given recent discharge from hospital  - CT shows worsening consolidations  - Vancomycin pharmacy to dose  - Zosyn 3.375 g IV piggyback every 6 hours  - Urine antigens and strep pneumo and Legionella  -Procalcitonin  -Bronchodilators   -ID consult: IV Zosyn. Stopped IV Vanc since MRSA nares PCR negative. Checking for COVID.      Type 2 diabetes mellitus requiring insulin with insulin  resistance  - Diabetic carbohydrate controlled diet  -Lantus 60 u twice daily  -Prandial insulin with lispro 40 u 3 times daily before meals  -Lispro sliding scale  -Hypoglycemia protocol in place  - Wound nurse consulted for chronic ulcer     Diastolic CHF, possible decompensation given level of hypoxia for which pneumonia cannot be accounted for alone  Lasix 40 mg IV every 12 hours  Daily weight  Cardiology consult if not improving     Hypertension  Amlodipine 5 mg orally daily     Hyperlipidemia  Atorvastatin 80 mg orally daily     Bilateral lymphedema with venous stasis changes   Diuresis with Lasix     Chronic pain  -Visibly upset over not having Dilaudid despite explanation that chronic pain should not be treated with IV opiate per state guidelines  -On Percocet 7.5-300 mg tablet, not available under this dosage in the formulary, will substitute with oxycodone 10 mg p.o. every 4 hours as needed severe pain  -Flexeril 5 mg 3 times daily as needed for spasm     Morbid obesity BMI 53.44  Lifestyle modification     DVT prophylaxis  Heparin 7500 units subcu 3 times daily  SCDs     Disposition: Presented with shortness of breath, found with possible healthcare associate pneumonia, discharge pending clinical improvement.            Sergio Collins DO           [1] amLODIPine, 5 mg, oral, Daily  aspirin, 81 mg, oral, Daily  atorvastatin, 80 mg, oral, Nightly  colchicine, 0.6 mg, oral, Daily  DULoxetine, 60 mg, oral, Daily  [START ON 5/19/2025] ergocalciferol, 1.25 mg, oral, Weekly  ferrous sulfate, 1 tablet, oral, Every Mon/Wed/Fri  furosemide, 40 mg, intravenous, q12h  heparin (porcine), 7,500 Units, subcutaneous, q8h TAMIR  insulin glargine, 60 Units, subcutaneous, BID  insulin lispro, 0-15 Units, subcutaneous, TID AC  insulin lispro, 40 Units, subcutaneous, TID AC  ipratropium-albuteroL, 3 mL, nebulization, TID  pantoprazole, 40 mg, oral, Daily before breakfast   Or  pantoprazole, 40 mg, intravenous, Daily before  breakfast  piperacillin-tazobactam, 3.375 g, intravenous, q6h  polyethylene glycol, 17 g, oral, Daily  QUEtiapine, 100 mg, oral, Nightly  [2]    [3] PRN medications: acetaminophen **OR** acetaminophen **OR** acetaminophen, cyclobenzaprine, dextrose, dextrose, glucagon, glucagon, ipratropium-albuteroL, naloxone, ondansetron **OR** ondansetron, oxyCODONE, oxygen

## 2025-05-19 LAB
ANION GAP SERPL CALC-SCNC: 10 MMOL/L (ref 10–20)
ATRIAL RATE: 95 BPM
BUN SERPL-MCNC: 23 MG/DL (ref 6–23)
CALCIUM SERPL-MCNC: 8.3 MG/DL (ref 8.6–10.3)
CHLORIDE SERPL-SCNC: 98 MMOL/L (ref 98–107)
CO2 SERPL-SCNC: 35 MMOL/L (ref 21–32)
CREAT SERPL-MCNC: 1.4 MG/DL (ref 0.5–1.3)
EGFRCR SERPLBLD CKD-EPI 2021: 60 ML/MIN/1.73M*2
ERYTHROCYTE [DISTWIDTH] IN BLOOD BY AUTOMATED COUNT: 16.5 % (ref 11.5–14.5)
GLUCOSE BLD MANUAL STRIP-MCNC: 118 MG/DL (ref 74–99)
GLUCOSE BLD MANUAL STRIP-MCNC: 123 MG/DL (ref 74–99)
GLUCOSE BLD MANUAL STRIP-MCNC: 137 MG/DL (ref 74–99)
GLUCOSE BLD MANUAL STRIP-MCNC: 153 MG/DL (ref 74–99)
GLUCOSE BLD MANUAL STRIP-MCNC: 345 MG/DL (ref 74–99)
GLUCOSE BLD MANUAL STRIP-MCNC: 52 MG/DL (ref 74–99)
GLUCOSE BLD MANUAL STRIP-MCNC: 56 MG/DL (ref 74–99)
GLUCOSE SERPL-MCNC: 130 MG/DL (ref 74–99)
HCT VFR BLD AUTO: 34.3 % (ref 41–52)
HGB BLD-MCNC: 9.8 G/DL (ref 13.5–17.5)
MCH RBC QN AUTO: 23.4 PG (ref 26–34)
MCHC RBC AUTO-ENTMCNC: 28.6 G/DL (ref 32–36)
MCV RBC AUTO: 82 FL (ref 80–100)
NRBC BLD-RTO: 0 /100 WBCS (ref 0–0)
P AXIS: -14 DEGREES
P OFFSET: 191 MS
P ONSET: 136 MS
PLATELET # BLD AUTO: 233 X10*3/UL (ref 150–450)
POTASSIUM SERPL-SCNC: 3.9 MMOL/L (ref 3.5–5.3)
PR INTERVAL: 170 MS
Q ONSET: 221 MS
QRS COUNT: 16 BEATS
QRS DURATION: 98 MS
QT INTERVAL: 376 MS
QTC CALCULATION(BAZETT): 472 MS
QTC FREDERICIA: 438 MS
R AXIS: -3 DEGREES
RBC # BLD AUTO: 4.19 X10*6/UL (ref 4.5–5.9)
SODIUM SERPL-SCNC: 139 MMOL/L (ref 136–145)
T AXIS: 175 DEGREES
T OFFSET: 409 MS
VENTRICULAR RATE: 95 BPM
WBC # BLD AUTO: 8.3 X10*3/UL (ref 4.4–11.3)

## 2025-05-19 PROCEDURE — 2500000001 HC RX 250 WO HCPCS SELF ADMINISTERED DRUGS (ALT 637 FOR MEDICARE OP): Performed by: HOSPITALIST

## 2025-05-19 PROCEDURE — 2500000001 HC RX 250 WO HCPCS SELF ADMINISTERED DRUGS (ALT 637 FOR MEDICARE OP): Performed by: PHARMACIST

## 2025-05-19 PROCEDURE — 85027 COMPLETE CBC AUTOMATED: CPT | Performed by: INTERNAL MEDICINE

## 2025-05-19 PROCEDURE — 2500000005 HC RX 250 GENERAL PHARMACY W/O HCPCS: Performed by: INTERNAL MEDICINE

## 2025-05-19 PROCEDURE — 36415 COLL VENOUS BLD VENIPUNCTURE: CPT | Performed by: INTERNAL MEDICINE

## 2025-05-19 PROCEDURE — 80048 BASIC METABOLIC PNL TOTAL CA: CPT | Performed by: INTERNAL MEDICINE

## 2025-05-19 PROCEDURE — 1200000002 HC GENERAL ROOM WITH TELEMETRY DAILY

## 2025-05-19 PROCEDURE — 2500000004 HC RX 250 GENERAL PHARMACY W/ HCPCS (ALT 636 FOR OP/ED): Performed by: INTERNAL MEDICINE

## 2025-05-19 PROCEDURE — 2500000001 HC RX 250 WO HCPCS SELF ADMINISTERED DRUGS (ALT 637 FOR MEDICARE OP): Performed by: INTERNAL MEDICINE

## 2025-05-19 PROCEDURE — 82947 ASSAY GLUCOSE BLOOD QUANT: CPT

## 2025-05-19 PROCEDURE — 99232 SBSQ HOSP IP/OBS MODERATE 35: CPT | Performed by: INTERNAL MEDICINE

## 2025-05-19 PROCEDURE — 94640 AIRWAY INHALATION TREATMENT: CPT

## 2025-05-19 PROCEDURE — 2500000002 HC RX 250 W HCPCS SELF ADMINISTERED DRUGS (ALT 637 FOR MEDICARE OP, ALT 636 FOR OP/ED): Performed by: INTERNAL MEDICINE

## 2025-05-19 RX ORDER — FUROSEMIDE 20 MG/1
40 TABLET ORAL DAILY
Status: DISCONTINUED | OUTPATIENT
Start: 2025-05-19 | End: 2025-05-24

## 2025-05-19 RX ORDER — ACETAMINOPHEN 325 MG/1
650 TABLET ORAL EVERY 4 HOURS PRN
Status: DISCONTINUED | OUTPATIENT
Start: 2025-05-19 | End: 2025-06-05 | Stop reason: HOSPADM

## 2025-05-19 RX ADMIN — ATORVASTATIN CALCIUM 80 MG: 80 TABLET, FILM COATED ORAL at 20:57

## 2025-05-19 RX ADMIN — IPRATROPIUM BROMIDE AND ALBUTEROL SULFATE 3 ML: 2.5; .5 SOLUTION RESPIRATORY (INHALATION) at 07:23

## 2025-05-19 RX ADMIN — DEXTROSE MONOHYDRATE 12.5 G: 25 INJECTION, SOLUTION INTRAVENOUS at 21:09

## 2025-05-19 RX ADMIN — ERGOCALCIFEROL 1.25 MG: 1.25 CAPSULE ORAL at 08:41

## 2025-05-19 RX ADMIN — CYCLOBENZAPRINE HYDROCHLORIDE 5 MG: 5 TABLET, FILM COATED ORAL at 08:51

## 2025-05-19 RX ADMIN — AMLODIPINE BESYLATE 5 MG: 5 TABLET ORAL at 08:41

## 2025-05-19 RX ADMIN — OXYCODONE HYDROCHLORIDE 10 MG: 5 TABLET ORAL at 13:43

## 2025-05-19 RX ADMIN — INSULIN LISPRO 40 UNITS: 100 INJECTION, SOLUTION INTRAVENOUS; SUBCUTANEOUS at 11:58

## 2025-05-19 RX ADMIN — INSULIN LISPRO 40 UNITS: 100 INJECTION, SOLUTION INTRAVENOUS; SUBCUTANEOUS at 17:35

## 2025-05-19 RX ADMIN — HEPARIN SODIUM 7500 UNITS: 5000 INJECTION, SOLUTION INTRAVENOUS; SUBCUTANEOUS at 22:41

## 2025-05-19 RX ADMIN — PIPERACILLIN SODIUM AND TAZOBACTAM SODIUM 3.38 G: 3; .375 INJECTION, SOLUTION INTRAVENOUS at 12:06

## 2025-05-19 RX ADMIN — IPRATROPIUM BROMIDE AND ALBUTEROL SULFATE 3 ML: 2.5; .5 SOLUTION RESPIRATORY (INHALATION) at 19:19

## 2025-05-19 RX ADMIN — INSULIN GLARGINE 60 UNITS: 100 INJECTION, SOLUTION SUBCUTANEOUS at 08:51

## 2025-05-19 RX ADMIN — PIPERACILLIN SODIUM AND TAZOBACTAM SODIUM 3.38 G: 3; .375 INJECTION, SOLUTION INTRAVENOUS at 06:56

## 2025-05-19 RX ADMIN — PIPERACILLIN SODIUM AND TAZOBACTAM SODIUM 3.38 G: 3; .375 INJECTION, SOLUTION INTRAVENOUS at 17:52

## 2025-05-19 RX ADMIN — HEPARIN SODIUM 7500 UNITS: 5000 INJECTION, SOLUTION INTRAVENOUS; SUBCUTANEOUS at 06:56

## 2025-05-19 RX ADMIN — INSULIN LISPRO 12 UNITS: 100 INJECTION, SOLUTION INTRAVENOUS; SUBCUTANEOUS at 11:58

## 2025-05-19 RX ADMIN — ASPIRIN 81 MG: 81 TABLET, COATED ORAL at 08:41

## 2025-05-19 RX ADMIN — OXYCODONE HYDROCHLORIDE 10 MG: 5 TABLET ORAL at 17:52

## 2025-05-19 RX ADMIN — COLCHICINE 0.6 MG: 0.6 TABLET, FILM COATED ORAL at 08:41

## 2025-05-19 RX ADMIN — INSULIN GLARGINE 60 UNITS: 100 INJECTION, SOLUTION SUBCUTANEOUS at 22:41

## 2025-05-19 RX ADMIN — FUROSEMIDE 40 MG: 40 TABLET ORAL at 09:54

## 2025-05-19 RX ADMIN — OXYCODONE HYDROCHLORIDE 10 MG: 5 TABLET ORAL at 08:40

## 2025-05-19 RX ADMIN — DULOXETINE 60 MG: 30 CAPSULE, DELAYED RELEASE ORAL at 08:42

## 2025-05-19 RX ADMIN — IPRATROPIUM BROMIDE AND ALBUTEROL SULFATE 3 ML: 2.5; .5 SOLUTION RESPIRATORY (INHALATION) at 13:41

## 2025-05-19 RX ADMIN — HEPARIN SODIUM 7500 UNITS: 5000 INJECTION, SOLUTION INTRAVENOUS; SUBCUTANEOUS at 13:42

## 2025-05-19 RX ADMIN — PIPERACILLIN SODIUM AND TAZOBACTAM SODIUM 3.38 G: 3; .375 INJECTION, SOLUTION INTRAVENOUS at 01:00

## 2025-05-19 RX ADMIN — ACETAMINOPHEN 650 MG: 325 TABLET, FILM COATED ORAL at 20:56

## 2025-05-19 RX ADMIN — PANTOPRAZOLE SODIUM 40 MG: 40 TABLET, DELAYED RELEASE ORAL at 06:56

## 2025-05-19 RX ADMIN — FERROUS SULFATE TAB 325 MG (65 MG ELEMENTAL FE) 1 TABLET: 325 (65 FE) TAB at 17:35

## 2025-05-19 RX ADMIN — QUETIAPINE FUMARATE 100 MG: 100 TABLET ORAL at 20:57

## 2025-05-19 RX ADMIN — INSULIN LISPRO 40 UNITS: 100 INJECTION, SOLUTION INTRAVENOUS; SUBCUTANEOUS at 08:51

## 2025-05-19 ASSESSMENT — COGNITIVE AND FUNCTIONAL STATUS - GENERAL
PERSONAL GROOMING: A LITTLE
CLIMB 3 TO 5 STEPS WITH RAILING: A LOT
DRESSING REGULAR UPPER BODY CLOTHING: A LITTLE
TOILETING: A LITTLE
MOBILITY SCORE: 18
STANDING UP FROM CHAIR USING ARMS: A LITTLE
HELP NEEDED FOR BATHING: A LITTLE
WALKING IN HOSPITAL ROOM: A LOT
DRESSING REGULAR LOWER BODY CLOTHING: A LITTLE
DAILY ACTIVITIY SCORE: 19
MOVING TO AND FROM BED TO CHAIR: A LITTLE

## 2025-05-19 ASSESSMENT — PAIN SCALES - GENERAL
PAINLEVEL_OUTOF10: 8
PAINLEVEL_OUTOF10: 8
PAINLEVEL_OUTOF10: 6

## 2025-05-19 ASSESSMENT — PAIN - FUNCTIONAL ASSESSMENT
PAIN_FUNCTIONAL_ASSESSMENT: 0-10
PAIN_FUNCTIONAL_ASSESSMENT: 0-10

## 2025-05-19 ASSESSMENT — PAIN DESCRIPTION - DESCRIPTORS: DESCRIPTORS: ACHING

## 2025-05-19 NOTE — PROGRESS NOTES
INFECTIOUS DISEASE DAILY PROGRESS NOTE    SUBJECTIVE:    No new events. Feeling a bit better. Still with chest congestion. No fever. WBC is normal.    OBJECTIVE:  VITALS (Last 24 Hours)  /58 (BP Location: Left arm, Patient Position: Lying)   Pulse 81   Temp 36.5 °C (97.7 °F) (Temporal)   Resp 19   Wt (!) 174 kg (383 lb)   SpO2 97%   BMI 53.44 kg/m²     PHYSICAL EXAM:  Gen - NAD, obese, on nasal cannula 4L  Heart - RRR  Lungs - crackles lower lobes, no wheezing  Ext - bilateral LE edema as before  Skin - no rashes    ABX: IV Zosyn    LABS:  Lab Results   Component Value Date    WBC 8.3 05/19/2025    HGB 9.8 (L) 05/19/2025    HCT 34.3 (L) 05/19/2025    MCV 82 05/19/2025     05/19/2025     Lab Results   Component Value Date    GLUCOSE 130 (H) 05/19/2025    CALCIUM 8.3 (L) 05/19/2025     05/19/2025    K 3.9 05/19/2025    CO2 35 (H) 05/19/2025    CL 98 05/19/2025    BUN 23 05/19/2025    CREATININE 1.40 (H) 05/19/2025     Results from last 72 hours   Lab Units 05/16/25  1330   ALK PHOS U/L 112   BILIRUBIN TOTAL mg/dL 0.6   PROTEIN TOTAL g/dL 7.6   ALT U/L 13   AST U/L 22   ALBUMIN g/dL 4.2     Estimated Creatinine Clearance: 98.1 mL/min (A) (by C-G formula based on SCr of 1.4 mg/dL (H)).    ASSESSMENT/PLAN:     Bilateral PNA - I personally reviewed CT chest images and there are bilateral patchy infiltrates, most in the LLL. Possibly viral PNA given appearance. COVID/Flu negative. S. PNA/Legionella urine Ags negative. MRSA nares PCR negative. PCT is 0.23.  Morbid Obesity BMI 53  HFpEF - does not appear to be pulmonary edema on imaging and BNP is normal     IV Zosyn here is fine. When ready for home can complete abx with PO Levofloxacin 750mg/day through 5/25/25 for 10D total abx course.     Monitoring for adverse effects of abx such as rash/itching/diarrhea - none.    Will sign off. Please call back with questions. Thanks!    Alex Blandon MD  [facility]  #[phone]

## 2025-05-19 NOTE — CARE PLAN
The patient's goals for the shift include      The clinical goals for the shift include Pt. will verbalize improved pain by end of shift    Over the shift, the patient did not make progress toward the following goals. Barriers to progression include. Recommendations to address these barriers include.    Problem: Pain  Goal: Takes deep breaths with improved pain control throughout the shift  Outcome: Progressing  Goal: Turns in bed with improved pain control throughout the shift  Outcome: Progressing  Goal: Walks with improved pain control throughout the shift  Outcome: Progressing  Goal: Performs ADL's with improved pain control throughout shift  Outcome: Progressing  Goal: Participates in PT with improved pain control throughout the shift  Outcome: Progressing  Goal: Free from opioid side effects throughout the shift  Outcome: Progressing  Goal: Free from acute confusion related to pain meds throughout the shift  Outcome: Progressing     Problem: Fall/Injury  Goal: Not fall by end of shift  Outcome: Progressing  Goal: Be free from injury by end of the shift  Outcome: Progressing  Goal: Verbalize understanding of personal risk factors for fall in the hospital  Outcome: Progressing  Goal: Verbalize understanding of risk factor reduction measures to prevent injury from fall in the home  Outcome: Progressing  Goal: Use assistive devices by end of the shift  Outcome: Progressing  Goal: Pace activities to prevent fatigue by end of the shift  Outcome: Progressing

## 2025-05-19 NOTE — PROGRESS NOTES
05/19/25 1424   Discharge Planning   Living Arrangements Alone   Support Systems Friends/neighbors   Assistance Needed met with patient at bedside, explained role of TCC, patient states he has [facility] provided aides 2x weekly, they do not provide transport to appts, but he has a [facility] who sets up his transport for appts, neighbor also assists with things such as taking out his trash etc. he will dc home on oral abx, currenlty on NC, does not have home o2, has not qualified in the past. may need home o2 eval prior to dc? will need transport home.   Type of Residence Private residence   Home or Post Acute Services In home services   Type of Home Care Services Home nursing visits;Home health aide   Expected Discharge Disposition Home Health   Does the patient need discharge transport arranged? Yes   RoundTrip coordination needed? Yes   Patient Choice   Patient / Family choosing to utilize agency / facility established prior to hospitalization Yes     Confirmed with [facility] that patient is active with them for SN.

## 2025-05-20 LAB
ANION GAP SERPL CALC-SCNC: 9 MMOL/L (ref 10–20)
BUN SERPL-MCNC: 21 MG/DL (ref 6–23)
CALCIUM SERPL-MCNC: 8.4 MG/DL (ref 8.6–10.3)
CHLORIDE SERPL-SCNC: 100 MMOL/L (ref 98–107)
CO2 SERPL-SCNC: 33 MMOL/L (ref 21–32)
CREAT SERPL-MCNC: 1.47 MG/DL (ref 0.5–1.3)
EGFRCR SERPLBLD CKD-EPI 2021: 56 ML/MIN/1.73M*2
ERYTHROCYTE [DISTWIDTH] IN BLOOD BY AUTOMATED COUNT: 16.3 % (ref 11.5–14.5)
GLUCOSE BLD MANUAL STRIP-MCNC: 106 MG/DL (ref 74–99)
GLUCOSE BLD MANUAL STRIP-MCNC: 122 MG/DL (ref 74–99)
GLUCOSE BLD MANUAL STRIP-MCNC: 192 MG/DL (ref 74–99)
GLUCOSE BLD MANUAL STRIP-MCNC: 208 MG/DL (ref 74–99)
GLUCOSE SERPL-MCNC: 106 MG/DL (ref 74–99)
HCT VFR BLD AUTO: 30.4 % (ref 41–52)
HGB BLD-MCNC: 8.9 G/DL (ref 13.5–17.5)
MCH RBC QN AUTO: 23.6 PG (ref 26–34)
MCHC RBC AUTO-ENTMCNC: 29.3 G/DL (ref 32–36)
MCV RBC AUTO: 81 FL (ref 80–100)
NRBC BLD-RTO: 0 /100 WBCS (ref 0–0)
PLATELET # BLD AUTO: 228 X10*3/UL (ref 150–450)
POTASSIUM SERPL-SCNC: 4 MMOL/L (ref 3.5–5.3)
RBC # BLD AUTO: 3.77 X10*6/UL (ref 4.5–5.9)
SODIUM SERPL-SCNC: 138 MMOL/L (ref 136–145)
WBC # BLD AUTO: 7.8 X10*3/UL (ref 4.4–11.3)

## 2025-05-20 PROCEDURE — 2500000004 HC RX 250 GENERAL PHARMACY W/ HCPCS (ALT 636 FOR OP/ED): Mod: JZ | Performed by: INTERNAL MEDICINE

## 2025-05-20 PROCEDURE — 82947 ASSAY GLUCOSE BLOOD QUANT: CPT

## 2025-05-20 PROCEDURE — 2500000002 HC RX 250 W HCPCS SELF ADMINISTERED DRUGS (ALT 637 FOR MEDICARE OP, ALT 636 FOR OP/ED): Performed by: INTERNAL MEDICINE

## 2025-05-20 PROCEDURE — 94640 AIRWAY INHALATION TREATMENT: CPT

## 2025-05-20 PROCEDURE — 2500000001 HC RX 250 WO HCPCS SELF ADMINISTERED DRUGS (ALT 637 FOR MEDICARE OP): Performed by: INTERNAL MEDICINE

## 2025-05-20 PROCEDURE — 94667 MNPJ CHEST WALL 1ST: CPT

## 2025-05-20 PROCEDURE — 80048 BASIC METABOLIC PNL TOTAL CA: CPT | Performed by: INTERNAL MEDICINE

## 2025-05-20 PROCEDURE — 94761 N-INVAS EAR/PLS OXIMETRY MLT: CPT

## 2025-05-20 PROCEDURE — 9420000001 HC RT PATIENT EDUCATION 5 MIN

## 2025-05-20 PROCEDURE — 1100000001 HC PRIVATE ROOM DAILY

## 2025-05-20 PROCEDURE — 99239 HOSP IP/OBS DSCHRG MGMT >30: CPT | Performed by: INTERNAL MEDICINE

## 2025-05-20 PROCEDURE — 85027 COMPLETE CBC AUTOMATED: CPT | Performed by: INTERNAL MEDICINE

## 2025-05-20 PROCEDURE — 2500000005 HC RX 250 GENERAL PHARMACY W/O HCPCS: Performed by: INTERNAL MEDICINE

## 2025-05-20 PROCEDURE — 36415 COLL VENOUS BLD VENIPUNCTURE: CPT | Performed by: INTERNAL MEDICINE

## 2025-05-20 RX ORDER — HYDROXYZINE HYDROCHLORIDE 25 MG/1
25 TABLET, FILM COATED ORAL ONCE
Status: COMPLETED | OUTPATIENT
Start: 2025-05-20 | End: 2025-05-20

## 2025-05-20 RX ORDER — LEVOFLOXACIN 750 MG/1
750 TABLET, FILM COATED ORAL DAILY
Qty: 5 TABLET | Refills: 0 | Status: SHIPPED | OUTPATIENT
Start: 2025-05-20 | End: 2025-06-05 | Stop reason: HOSPADM

## 2025-05-20 RX ADMIN — IPRATROPIUM BROMIDE AND ALBUTEROL SULFATE 3 ML: 2.5; .5 SOLUTION RESPIRATORY (INHALATION) at 12:09

## 2025-05-20 RX ADMIN — ATORVASTATIN CALCIUM 80 MG: 80 TABLET, FILM COATED ORAL at 20:19

## 2025-05-20 RX ADMIN — INSULIN GLARGINE 60 UNITS: 100 INJECTION, SOLUTION SUBCUTANEOUS at 09:01

## 2025-05-20 RX ADMIN — PIPERACILLIN SODIUM AND TAZOBACTAM SODIUM 3.38 G: 3; .375 INJECTION, SOLUTION INTRAVENOUS at 05:58

## 2025-05-20 RX ADMIN — OXYCODONE HYDROCHLORIDE 10 MG: 5 TABLET ORAL at 00:15

## 2025-05-20 RX ADMIN — IPRATROPIUM BROMIDE AND ALBUTEROL SULFATE 3 ML: 2.5; .5 SOLUTION RESPIRATORY (INHALATION) at 07:25

## 2025-05-20 RX ADMIN — Medication 4 L/MIN: at 17:41

## 2025-05-20 RX ADMIN — INSULIN LISPRO 40 UNITS: 100 INJECTION, SOLUTION INTRAVENOUS; SUBCUTANEOUS at 16:09

## 2025-05-20 RX ADMIN — FUROSEMIDE 40 MG: 40 TABLET ORAL at 09:00

## 2025-05-20 RX ADMIN — PIPERACILLIN SODIUM AND TAZOBACTAM SODIUM 3.38 G: 3; .375 INJECTION, SOLUTION INTRAVENOUS at 00:15

## 2025-05-20 RX ADMIN — OXYCODONE HYDROCHLORIDE 10 MG: 5 TABLET ORAL at 06:56

## 2025-05-20 RX ADMIN — COLCHICINE 0.6 MG: 0.6 TABLET, FILM COATED ORAL at 09:00

## 2025-05-20 RX ADMIN — HEPARIN SODIUM 7500 UNITS: 5000 INJECTION, SOLUTION INTRAVENOUS; SUBCUTANEOUS at 14:05

## 2025-05-20 RX ADMIN — INSULIN LISPRO 40 UNITS: 100 INJECTION, SOLUTION INTRAVENOUS; SUBCUTANEOUS at 13:19

## 2025-05-20 RX ADMIN — HYDROXYZINE HYDROCHLORIDE 25 MG: 25 TABLET, FILM COATED ORAL at 20:19

## 2025-05-20 RX ADMIN — CYCLOBENZAPRINE HYDROCHLORIDE 5 MG: 5 TABLET, FILM COATED ORAL at 20:22

## 2025-05-20 RX ADMIN — Medication 4 L/MIN: at 07:25

## 2025-05-20 RX ADMIN — OXYCODONE HYDROCHLORIDE 10 MG: 5 TABLET ORAL at 16:55

## 2025-05-20 RX ADMIN — AMLODIPINE BESYLATE 5 MG: 5 TABLET ORAL at 09:00

## 2025-05-20 RX ADMIN — ASPIRIN 81 MG: 81 TABLET, COATED ORAL at 09:00

## 2025-05-20 RX ADMIN — IPRATROPIUM BROMIDE AND ALBUTEROL SULFATE 3 ML: 2.5; .5 SOLUTION RESPIRATORY (INHALATION) at 17:41

## 2025-05-20 RX ADMIN — HEPARIN SODIUM 7500 UNITS: 5000 INJECTION, SOLUTION INTRAVENOUS; SUBCUTANEOUS at 05:58

## 2025-05-20 RX ADMIN — INSULIN LISPRO 6 UNITS: 100 INJECTION, SOLUTION INTRAVENOUS; SUBCUTANEOUS at 16:10

## 2025-05-20 RX ADMIN — PIPERACILLIN SODIUM AND TAZOBACTAM SODIUM 3.38 G: 3; .375 INJECTION, SOLUTION INTRAVENOUS at 17:56

## 2025-05-20 RX ADMIN — QUETIAPINE FUMARATE 100 MG: 100 TABLET ORAL at 20:19

## 2025-05-20 RX ADMIN — DULOXETINE 60 MG: 30 CAPSULE, DELAYED RELEASE ORAL at 09:00

## 2025-05-20 RX ADMIN — CYCLOBENZAPRINE HYDROCHLORIDE 5 MG: 5 TABLET, FILM COATED ORAL at 16:55

## 2025-05-20 RX ADMIN — PANTOPRAZOLE SODIUM 40 MG: 40 TABLET, DELAYED RELEASE ORAL at 06:57

## 2025-05-20 RX ADMIN — HEPARIN SODIUM 7500 UNITS: 5000 INJECTION, SOLUTION INTRAVENOUS; SUBCUTANEOUS at 21:52

## 2025-05-20 RX ADMIN — PIPERACILLIN SODIUM AND TAZOBACTAM SODIUM 3.38 G: 3; .375 INJECTION, SOLUTION INTRAVENOUS at 13:05

## 2025-05-20 RX ADMIN — Medication 3 L/MIN: at 12:09

## 2025-05-20 RX ADMIN — INSULIN LISPRO 3 UNITS: 100 INJECTION, SOLUTION INTRAVENOUS; SUBCUTANEOUS at 12:47

## 2025-05-20 RX ADMIN — OXYCODONE HYDROCHLORIDE 10 MG: 5 TABLET ORAL at 20:22

## 2025-05-20 RX ADMIN — INSULIN GLARGINE 60 UNITS: 100 INJECTION, SOLUTION SUBCUTANEOUS at 20:32

## 2025-05-20 ASSESSMENT — COGNITIVE AND FUNCTIONAL STATUS - GENERAL
HELP NEEDED FOR BATHING: A LITTLE
MOBILITY SCORE: 18
DRESSING REGULAR LOWER BODY CLOTHING: A LITTLE
WALKING IN HOSPITAL ROOM: A LOT
DRESSING REGULAR UPPER BODY CLOTHING: A LITTLE
DRESSING REGULAR UPPER BODY CLOTHING: A LITTLE
MOBILITY SCORE: 18
PERSONAL GROOMING: A LITTLE
MOVING TO AND FROM BED TO CHAIR: A LITTLE
CLIMB 3 TO 5 STEPS WITH RAILING: A LOT
TOILETING: A LITTLE
DRESSING REGULAR LOWER BODY CLOTHING: A LITTLE
STANDING UP FROM CHAIR USING ARMS: A LITTLE
CLIMB 3 TO 5 STEPS WITH RAILING: A LOT
TOILETING: A LITTLE
DAILY ACTIVITIY SCORE: 19
WALKING IN HOSPITAL ROOM: A LOT
DAILY ACTIVITIY SCORE: 19
MOVING TO AND FROM BED TO CHAIR: A LITTLE
HELP NEEDED FOR BATHING: A LITTLE
STANDING UP FROM CHAIR USING ARMS: A LITTLE
PERSONAL GROOMING: A LITTLE

## 2025-05-20 ASSESSMENT — PAIN - FUNCTIONAL ASSESSMENT
PAIN_FUNCTIONAL_ASSESSMENT: 0-10

## 2025-05-20 ASSESSMENT — PAIN SCALES - GENERAL
PAINLEVEL_OUTOF10: 5 - MODERATE PAIN
PAINLEVEL_OUTOF10: 8
PAINLEVEL_OUTOF10: 8

## 2025-05-20 ASSESSMENT — PAIN DESCRIPTION - DESCRIPTORS: DESCRIPTORS: ACHING;BURNING

## 2025-05-20 ASSESSMENT — PAIN DESCRIPTION - LOCATION: LOCATION: LEG

## 2025-05-20 NOTE — PROGRESS NOTES
05/20/25 1444   Discharge Planning   Assistance Needed filed [employer] appeal, all files uploaded oh [id number]  emr sánchez jvmllo     Jai Shrestha is a 55 y.o. male on day 4 of admission presenting with Pneumonia due to infectious organism, unspecified laterality, unspecified part of lung.    Jasmin Montes De Oca RN

## 2025-05-20 NOTE — DISCHARGE INSTR - OTHER ORDERS
Call [facility] upon discharge for home O2 delivery: [phone]  Patient to wear 3liters nasal cannula continuously

## 2025-05-20 NOTE — PROGRESS NOTES
05/20/25 1344   Discharge Planning   Assistance Needed Met with patient at bedside, dc orders have been placed, C orders and AVS sent to [facility], patient refusing dc stating he is not medically ready for discharge, provider at bedside and discussed med readiness with patient. patient calling [employer] to file official dc appeal, Jasmin SANDY Supervisor notifed.  notified.

## 2025-05-20 NOTE — DISCHARGE SUMMARY
"Discharge Diagnosis  Pneumonia due to infectious organism, unspecified laterality, unspecified part of lung           Issues Requiring Follow-Up  PCP follow up    Discharge Meds     Medication List      START taking these medications     levoFLOXacin 750 mg tablet; Commonly known as: Levaquin; Take 1 tablet   (750 mg) by mouth once daily for 5 days.     CHANGE how you take these medications     HYDROcodone-acetaminophen 7.5-325 mg tablet; Commonly known as: Norco;   Take 1 tablet by mouth every 6 hours if needed for severe pain (7 - 10)   for up to 28 days. Do not fill before June 5, 2025.; Start taking on: June 5, 2025; What changed: Another medication with the same name was removed.   Continue taking this medication, and follow the directions you see here.     CONTINUE taking these medications     amLODIPine 5 mg tablet; Commonly known as: Norvasc   aspirin 81 mg EC tablet   atorvastatin 80 mg tablet; Commonly known as: Lipitor   cholecalciferol 1.25 mg (50,000 units) capsule; Commonly known as:   Vitamin D-3   colchicine 0.6 mg tablet; Take 1 tablet (0.6 mg) by mouth once daily. Do   not fill before April 28, 2025.   cyclobenzaprine 10 mg tablet; Commonly known as: Flexeril; Take 1 tablet   (10 mg) by mouth 2 times a day as needed for muscle spasms for up to 5   days.   DULoxetine 60 mg DR capsule; Commonly known as: Cymbalta; Take 1 capsule   (60 mg) by mouth once daily. Do not crush or chew.   ferrous sulfate 325 mg (65 mg elemental) tablet   insulin aspart 100 unit/mL injection; Commonly known as: NovoLOG U-100   Insulin aspart; Inject 40 Units under the skin 3 times a day before meals.   insulin glargine 100 unit/mL (3 mL) pen; Commonly known as: Lantus;   Inject 60 Units under the skin 2 times a day. Take as directed per insulin   instructions.   lisinopril 5 mg tablet   omeprazole 40 mg DR capsule; Commonly known as: PriLOSEC   pen needle, diabetic 32 gauge x 5/32\" needle; Commonly known as: BD "   Ultra-Fine Noelle Pen Needle; 1 Pen needle 2 times a day.   QUEtiapine 100 mg tablet; Commonly known as: SEROquel; Take 1 tablet   (100 mg) by mouth once daily at bedtime.   torsemide 20 mg tablet; Commonly known as: Demadex     STOP taking these medications     cephalexin 500 mg capsule; Commonly known as: Keflex     ASK your doctor about these medications     naloxone 4 mg/0.1 mL nasal spray; Commonly known as: Narcan; Administer   1 spray (4 mg) into affected nostril(s) if needed for opioid reversal. May   repeat every 2-3 minutes if needed, alternating nostrils, until medical   assistance becomes available.       Test Results Pending At Discharge  Pending Labs       No current pending labs.            Hospital Course     55 y.o. male with a past medical history of chronic back pain, peripheral neuropathy, diabetes mellitus type 2 with insulin resistance, morbid obesity BMI 53.44, CKD 3, diastolic CHF, hypertension who presented to [facility] ED with complain of shortness of breath x 5 days.        Pneumonia: Likely hospital-acquired given recent discharge from hospital  - CT shows worsening consolidations  - Vancomycin pharmacy to dose  - Zosyn 3.375 g IV piggyback every 6 hours  - Urine antigens and strep pneumo and Legionella negative  -Procalcitonin 0.23  -Bronchodilators   -ID consult: IV Zosyn. Stopped IV Vanc since MRSA nares PCR negative. Checking for COVID. When ready for home can complete abx with PO Levofloxacin 750mg/day through 5/25/25 for 10D total abx course.      Type 2 diabetes mellitus requiring insulin with insulin resistance  - Diabetic carbohydrate controlled diet  -Lantus 60 u twice daily  -Prandial insulin with lispro 40 u 3 times daily before meals  -Lispro sliding scale  -Hypoglycemia protocol in place  - Wound nurse consulted for chronic ulcer     Diastolic CHF, possible decompensation given level of hypoxia for which pneumonia cannot be accounted for alone  Lasix 40 mg IV  every 12 hours  Daily weight     Hypertension  Amlodipine 5 mg orally daily     Hyperlipidemia  Atorvastatin 80 mg orally daily     Bilateral lymphedema with venous stasis changes   Diuresis with Lasix     Chronic pain  -Visibly upset over not having Dilaudid despite explanation that chronic pain should not be treated with IV opiate per state guidelines  -On Percocet 7.5-300 mg tablet, not available under this dosage in the formulary, will substitute with oxycodone 10 mg p.o. every 4 hours as needed severe pain  -Flexeril 5 mg 3 times daily as needed for spasm     Morbid obesity BMI 53.44  Lifestyle modification    Patient is doing much better today.  He will be discharged today on oral antibiotic, to complete the course.  Advised him to follow with his PCP as outpatient in 1 to 2 weeks.      Discharge time spent more than 30 minutes.    Pertinent Physical Exam At Time of Discharge  Physical Exam  Constitutional: Obese gentleman, appears drowsy, somnolent, but respond to questions, cooperative not in acute distress  Head / Neck: Atraumatic, normocephalic, supple neck, JVP not visualized  Lungs: Patent airways, CTABL  Heart: RRR, S1S2, no murmurs appreciated, palpable pulses in all extremities  GI: Soft, NT, ND, bowel sounds present in all quadrants  MSK: Moves all extremities freely, no restriction  of ROM, no joint edema  Extremities: Chronic lymphedema with venous ulcers, bilateral lower extremities peripheral edema  Neurological: AAO x 3 to person, place and date, facial muscles symmetrical, sensation intact, strength 4/4, no acute focal neurological deficits appreciated  Psychological: Appropriate mood and behavior  Outpatient Follow-Up  Future Appointments   Date Time Provider Department Center   6/23/2025  1:45 PM Lucius Reyes MD [id number]         Ronan Dumont MD

## 2025-05-20 NOTE — CARE PLAN
The patient's goals for the shift include      The clinical goals for the shift include pt will discharge without complications      Problem: Discharge Planning  Goal: Discharge to home or other facility with appropriate resources  Outcome: Progressing

## 2025-05-20 NOTE — PROGRESS NOTES
"Jai Shrestha is a 55 y.o. male on day 3 of admission presenting with Pneumonia due to infectious organism, unspecified laterality, unspecified part of lung.      Subjective   Patient was seen and examined bedside this morning, appeared having an nonchalant attitude during encounter, as he made me repeated multiple times explanation as if he does not understand, was vague about many things including not knowing the name of his cardiologist and PCP.  Stating \"I do not know you guys name I just got your facies\".  When I returned to inform about discharge patient complained about premature discharge, stating \"you are discharging me knowing that I am not better and still have head congestion, coughing up stuff, and not breathing well.\"  Explained to him that she could be assessed regarding O2 need at home, and can be discharged with home O2 support.  Explained to him that his decompensated heart failure is what caused his frequent admission, and currently not in heart failure but only here for pneumonia and had completed expected duration of regimen and now could be on oral medication, nonetheless patient started complaining about a whole lot of other symptoms not previously mentioned.       Objective     Last Recorded Vitals  /81 (BP Location: Left arm, Patient Position: Sitting)   Pulse 92   Temp 37.1 °C (98.7 °F) (Temporal)   Resp 18   Wt (!) 174 kg (383 lb)   SpO2 92%   Intake/Output last 3 Shifts:    Intake/Output Summary (Last 24 hours) at 5/19/2025 2020  Last data filed at 5/19/2025 1700  Gross per 24 hour   Intake 1750 ml   Output --   Net 1750 ml       Admission Weight  Weight: (!) 174 kg (383 lb) (05/16/25 1112)    Daily Weight  05/16/25 : (!) 174 kg (383 lb)    Image Results  ECG 12 lead  Normal sinus rhythm  Possible Inferior infarct , age undetermined  Abnormal ECG  When compared with ECG of 19-APR-2025 15:00,  Borderline criteria for Inferior infarct are now Present  Nonspecific T wave " abnormality, worse in Inferior leads  QT has lengthened  See ED provider note for full interpretation and clinical correlation  Confirmed by Nella Booth ([id number]) on 5/19/2025 10:27:27 AM      Physical Exam  Constitutional: Obese gentleman, appears drowsy, somnolent, but respond to questions, cooperative not in acute distress  Eyes: PERRLA, clear sclera  ENMT: Moist mucosal membranes, no exudate  Head / Neck: Atraumatic, normocephalic, supple neck, JVP not visualized  Lungs: Patent airways, CTABL  Heart: RRR, S1S2, no murmurs appreciated, palpable pulses in all extremities  GI: Soft, NT, ND, bowel sounds present in all quadrants  MSK: Moves all extremities freely, no restriction  of ROM, no joint edema  Extremities: Chronic lymphedema with venous ulcers, bilateral lower extremities peripheral edema  : No Guo catheter inserted  Breast: Deferred  Neurological: AAO x 3 to person, place and date, facial muscles symmetrical, sensation intact, strength 4/4, no acute focal neurological deficits appreciated  Psychological: Appropriate mood and behavior  Relevant Results             Scheduled medications  Scheduled Medications[1]  Continuous medications  Continuous Medications[2]  PRN medications  PRN Medications[3]       Assessment & Plan  Pneumonia due to infectious organism, unspecified laterality, unspecified part of lung    55 y.o. male with a past medical history of chronic back pain, peripheral neuropathy, diabetes mellitus type 2 with insulin resistance, morbid obesity BMI 53.44, CKD 3, diastolic CHF, hypertension who [facility] with complain of shortness of breath x 5 days.        Pneumonia: Likely hospital-acquired given recent discharge from hospital  - CT shows worsening consolidations  - Vancomycin pharmacy to dose  - Zosyn 3.375 g IV piggyback every 6 hours  - Urine antigens and strep pneumo and Legionella negative  -Procalcitonin 0.23  -Bronchodilators   -ID consult: IV Zosyn.  Stopped IV Vanc since MRSA nares PCR negative. Checking for COVID. When ready for home can complete abx with PO Levofloxacin 750mg/day through 5/25/25 for 10D total abx course.      Type 2 diabetes mellitus requiring insulin with insulin resistance  - Diabetic carbohydrate controlled diet  -Lantus 60 u twice daily  -Prandial insulin with lispro 40 u 3 times daily before meals  -Lispro sliding scale  -Hypoglycemia protocol in place  - Wound nurse consulted for chronic ulcer     Diastolic CHF, possible decompensation given level of hypoxia for which pneumonia cannot be accounted for alone  Lasix 40 mg IV every 12 hours  Daily weight  Cardiology consult if not improving     Hypertension  Amlodipine 5 mg orally daily     Hyperlipidemia  Atorvastatin 80 mg orally daily     Bilateral lymphedema with venous stasis changes   Diuresis with Lasix     Chronic pain  -Visibly upset over not having Dilaudid despite explanation that chronic pain should not be treated with IV opiate per state guidelines  -On Percocet 7.5-300 mg tablet, not available under this dosage in the formulary, will substitute with oxycodone 10 mg p.o. every 4 hours as needed severe pain  -Flexeril 5 mg 3 times daily as needed for spasm     Morbid obesity BMI 53.44  Lifestyle modification     DVT prophylaxis  Heparin 7500 units subcu 3 times daily  SCDs     Disposition: Presented with shortness of breath, found with possible healthcare associate pneumonia.  Ready for discharge, but now with multiple other complaints, despite not being in heart failure.  Will need [facility] round, to reiterate discharge.       Sergio Collins DO           [1] amLODIPine, 5 mg, oral, Daily  aspirin, 81 mg, oral, Daily  atorvastatin, 80 mg, oral, Nightly  colchicine, 0.6 mg, oral, Daily  DULoxetine, 60 mg, oral, Daily  ergocalciferol, 1.25 mg, oral, Weekly  ferrous sulfate, 1 tablet, oral, Every Mon/Wed/Fri  furosemide, 40 mg, oral, Daily  heparin (porcine), 7,500 Units,  subcutaneous, q8h TAMIR  insulin glargine, 60 Units, subcutaneous, BID  insulin lispro, 0-15 Units, subcutaneous, TID AC  insulin lispro, 40 Units, subcutaneous, TID AC  ipratropium-albuteroL, 3 mL, nebulization, TID  pantoprazole, 40 mg, oral, Daily before breakfast   Or  pantoprazole, 40 mg, intravenous, Daily before breakfast  piperacillin-tazobactam, 3.375 g, intravenous, q6h  polyethylene glycol, 17 g, oral, Daily  QUEtiapine, 100 mg, oral, Nightly  [2]    [3] PRN medications: acetaminophen **OR** acetaminophen **OR** acetaminophen, cyclobenzaprine, dextrose, dextrose, glucagon, glucagon, ipratropium-albuteroL, naloxone, ondansetron **OR** ondansetron, oxyCODONE, oxygen

## 2025-05-20 NOTE — SIGNIFICANT EVENT
05/20/25 1208   Home Oxygen Eval / Desaturation Screen   Home Oxygen Evaluation/Desaturation Study Yes   Medical Gas Therapy None (Room air)   SpO2 (!) 79 %   Patient Activity During Study At rest   Patient Qualify for Home Oxygen Yes   DME Notified of Home Oxygen Needs See Comment  (Pt. stating he's not ready to go home)   Recommended L/min at Rest 3 L/min   Recommended L/min with Activity   (Pt. states that he is dizzy, so pt. not ambulated at this time)   $ Pulse Oximetry Charge Multiple

## 2025-05-21 ENCOUNTER — APPOINTMENT (OUTPATIENT)
Dept: RADIOLOGY | Facility: HOSPITAL | Age: 55
End: 2025-05-21
Payer: MEDICARE

## 2025-05-21 LAB
GLUCOSE BLD MANUAL STRIP-MCNC: 131 MG/DL (ref 74–99)
GLUCOSE BLD MANUAL STRIP-MCNC: 145 MG/DL (ref 74–99)
GLUCOSE BLD MANUAL STRIP-MCNC: 175 MG/DL (ref 74–99)
GLUCOSE BLD MANUAL STRIP-MCNC: 199 MG/DL (ref 74–99)

## 2025-05-21 PROCEDURE — 71046 X-RAY EXAM CHEST 2 VIEWS: CPT | Performed by: STUDENT IN AN ORGANIZED HEALTH CARE EDUCATION/TRAINING PROGRAM

## 2025-05-21 PROCEDURE — 1100000001 HC PRIVATE ROOM DAILY

## 2025-05-21 PROCEDURE — 99233 SBSQ HOSP IP/OBS HIGH 50: CPT | Performed by: INTERNAL MEDICINE

## 2025-05-21 PROCEDURE — 2500000004 HC RX 250 GENERAL PHARMACY W/ HCPCS (ALT 636 FOR OP/ED): Mod: JZ | Performed by: INTERNAL MEDICINE

## 2025-05-21 PROCEDURE — 2500000001 HC RX 250 WO HCPCS SELF ADMINISTERED DRUGS (ALT 637 FOR MEDICARE OP): Performed by: INTERNAL MEDICINE

## 2025-05-21 PROCEDURE — 2500000005 HC RX 250 GENERAL PHARMACY W/O HCPCS: Performed by: INTERNAL MEDICINE

## 2025-05-21 PROCEDURE — 94668 MNPJ CHEST WALL SBSQ: CPT

## 2025-05-21 PROCEDURE — 71046 X-RAY EXAM CHEST 2 VIEWS: CPT

## 2025-05-21 PROCEDURE — 94640 AIRWAY INHALATION TREATMENT: CPT

## 2025-05-21 PROCEDURE — 82947 ASSAY GLUCOSE BLOOD QUANT: CPT

## 2025-05-21 PROCEDURE — 2500000002 HC RX 250 W HCPCS SELF ADMINISTERED DRUGS (ALT 637 FOR MEDICARE OP, ALT 636 FOR OP/ED): Performed by: INTERNAL MEDICINE

## 2025-05-21 RX ORDER — FUROSEMIDE 10 MG/ML
40 INJECTION INTRAMUSCULAR; INTRAVENOUS ONCE
Status: COMPLETED | OUTPATIENT
Start: 2025-05-21 | End: 2025-05-21

## 2025-05-21 RX ORDER — OXYCODONE HYDROCHLORIDE 5 MG/1
10 TABLET ORAL EVERY 6 HOURS PRN
Status: DISCONTINUED | OUTPATIENT
Start: 2025-05-21 | End: 2025-05-23

## 2025-05-21 RX ADMIN — ASPIRIN 81 MG: 81 TABLET, COATED ORAL at 08:10

## 2025-05-21 RX ADMIN — COLCHICINE 0.6 MG: 0.6 TABLET, FILM COATED ORAL at 08:10

## 2025-05-21 RX ADMIN — ONDANSETRON HYDROCHLORIDE 4 MG: 4 TABLET, FILM COATED ORAL at 08:19

## 2025-05-21 RX ADMIN — CYCLOBENZAPRINE HYDROCHLORIDE 5 MG: 5 TABLET, FILM COATED ORAL at 08:19

## 2025-05-21 RX ADMIN — CYCLOBENZAPRINE HYDROCHLORIDE 5 MG: 5 TABLET, FILM COATED ORAL at 14:07

## 2025-05-21 RX ADMIN — IPRATROPIUM BROMIDE AND ALBUTEROL SULFATE 3 ML: 2.5; .5 SOLUTION RESPIRATORY (INHALATION) at 11:00

## 2025-05-21 RX ADMIN — INSULIN LISPRO 3 UNITS: 100 INJECTION, SOLUTION INTRAVENOUS; SUBCUTANEOUS at 09:35

## 2025-05-21 RX ADMIN — PIPERACILLIN SODIUM AND TAZOBACTAM SODIUM 3.38 G: 3; .375 INJECTION, SOLUTION INTRAVENOUS at 17:35

## 2025-05-21 RX ADMIN — PIPERACILLIN SODIUM AND TAZOBACTAM SODIUM 3.38 G: 3; .375 INJECTION, SOLUTION INTRAVENOUS at 12:14

## 2025-05-21 RX ADMIN — CYCLOBENZAPRINE HYDROCHLORIDE 5 MG: 5 TABLET, FILM COATED ORAL at 21:49

## 2025-05-21 RX ADMIN — HEPARIN SODIUM 7500 UNITS: 5000 INJECTION, SOLUTION INTRAVENOUS; SUBCUTANEOUS at 16:36

## 2025-05-21 RX ADMIN — IPRATROPIUM BROMIDE AND ALBUTEROL SULFATE 3 ML: 2.5; .5 SOLUTION RESPIRATORY (INHALATION) at 20:37

## 2025-05-21 RX ADMIN — OXYCODONE HYDROCHLORIDE 10 MG: 5 TABLET ORAL at 08:10

## 2025-05-21 RX ADMIN — QUETIAPINE FUMARATE 100 MG: 100 TABLET ORAL at 21:48

## 2025-05-21 RX ADMIN — INSULIN LISPRO 40 UNITS: 100 INJECTION, SOLUTION INTRAVENOUS; SUBCUTANEOUS at 09:35

## 2025-05-21 RX ADMIN — PIPERACILLIN SODIUM AND TAZOBACTAM SODIUM 3.38 G: 3; .375 INJECTION, SOLUTION INTRAVENOUS at 00:40

## 2025-05-21 RX ADMIN — INSULIN LISPRO 3 UNITS: 100 INJECTION, SOLUTION INTRAVENOUS; SUBCUTANEOUS at 12:14

## 2025-05-21 RX ADMIN — INSULIN LISPRO 40 UNITS: 100 INJECTION, SOLUTION INTRAVENOUS; SUBCUTANEOUS at 12:15

## 2025-05-21 RX ADMIN — IPRATROPIUM BROMIDE AND ALBUTEROL SULFATE 3 ML: 2.5; .5 SOLUTION RESPIRATORY (INHALATION) at 14:58

## 2025-05-21 RX ADMIN — ATORVASTATIN CALCIUM 80 MG: 80 TABLET, FILM COATED ORAL at 21:51

## 2025-05-21 RX ADMIN — PIPERACILLIN SODIUM AND TAZOBACTAM SODIUM 3.38 G: 3; .375 INJECTION, SOLUTION INTRAVENOUS at 23:54

## 2025-05-21 RX ADMIN — FERROUS SULFATE TAB 325 MG (65 MG ELEMENTAL FE) 1 TABLET: 325 (65 FE) TAB at 17:44

## 2025-05-21 RX ADMIN — PANTOPRAZOLE SODIUM 40 MG: 40 INJECTION, POWDER, FOR SOLUTION INTRAVENOUS at 06:19

## 2025-05-21 RX ADMIN — HEPARIN SODIUM 7500 UNITS: 5000 INJECTION, SOLUTION INTRAVENOUS; SUBCUTANEOUS at 08:10

## 2025-05-21 RX ADMIN — FUROSEMIDE 40 MG: 40 TABLET ORAL at 08:19

## 2025-05-21 RX ADMIN — INSULIN GLARGINE 60 UNITS: 100 INJECTION, SOLUTION SUBCUTANEOUS at 09:35

## 2025-05-21 RX ADMIN — OXYCODONE HYDROCHLORIDE 10 MG: 5 TABLET ORAL at 02:29

## 2025-05-21 RX ADMIN — Medication 4 L/MIN: at 07:11

## 2025-05-21 RX ADMIN — PIPERACILLIN SODIUM AND TAZOBACTAM SODIUM 3.38 G: 3; .375 INJECTION, SOLUTION INTRAVENOUS at 06:19

## 2025-05-21 RX ADMIN — IPRATROPIUM BROMIDE AND ALBUTEROL SULFATE 3 ML: 2.5; .5 SOLUTION RESPIRATORY (INHALATION) at 07:11

## 2025-05-21 RX ADMIN — HEPARIN SODIUM 7500 UNITS: 5000 INJECTION, SOLUTION INTRAVENOUS; SUBCUTANEOUS at 21:48

## 2025-05-21 RX ADMIN — INSULIN LISPRO 40 UNITS: 100 INJECTION, SOLUTION INTRAVENOUS; SUBCUTANEOUS at 17:35

## 2025-05-21 RX ADMIN — FUROSEMIDE 40 MG: 10 INJECTION, SOLUTION INTRAMUSCULAR; INTRAVENOUS at 16:36

## 2025-05-21 RX ADMIN — OXYCODONE HYDROCHLORIDE 10 MG: 5 TABLET ORAL at 14:07

## 2025-05-21 RX ADMIN — OXYCODONE HYDROCHLORIDE 10 MG: 5 TABLET ORAL at 21:48

## 2025-05-21 RX ADMIN — DULOXETINE 60 MG: 30 CAPSULE, DELAYED RELEASE ORAL at 08:10

## 2025-05-21 RX ADMIN — INSULIN GLARGINE 60 UNITS: 100 INJECTION, SOLUTION SUBCUTANEOUS at 21:49

## 2025-05-21 RX ADMIN — AMLODIPINE BESYLATE 5 MG: 5 TABLET ORAL at 08:10

## 2025-05-21 ASSESSMENT — COGNITIVE AND FUNCTIONAL STATUS - GENERAL
TOILETING: A LITTLE
CLIMB 3 TO 5 STEPS WITH RAILING: A LOT
MOVING TO AND FROM BED TO CHAIR: A LITTLE
WALKING IN HOSPITAL ROOM: A LOT
DRESSING REGULAR UPPER BODY CLOTHING: A LITTLE
DRESSING REGULAR LOWER BODY CLOTHING: A LITTLE
DAILY ACTIVITIY SCORE: 19
MOBILITY SCORE: 18
HELP NEEDED FOR BATHING: A LITTLE
STANDING UP FROM CHAIR USING ARMS: A LITTLE
PERSONAL GROOMING: A LITTLE

## 2025-05-21 ASSESSMENT — PAIN - FUNCTIONAL ASSESSMENT
PAIN_FUNCTIONAL_ASSESSMENT: 0-10

## 2025-05-21 ASSESSMENT — PAIN SCALES - GENERAL
PAINLEVEL_OUTOF10: 8
PAINLEVEL_OUTOF10: 7
PAINLEVEL_OUTOF10: 2
PAINLEVEL_OUTOF10: 8

## 2025-05-21 ASSESSMENT — PAIN DESCRIPTION - DESCRIPTORS: DESCRIPTORS: ACHING;BURNING

## 2025-05-21 NOTE — DISCHARGE INSTRUCTIONS
Recommended wound care:  *make appointment with your [facility] podiatrist for nail care and diabetic heel ulcer/callous care.  *make appointment with  outpatient wound care clinic for right tibial ulceration and compression therapy evaluation.    [facility]   [phone]   [address], [address]   [place] [address]     Or     [facility]   [phone]   [address]   [place] [address]       Left tibial: ulceration of unknown etiology  Every other day:  Cleanse with Vashe wound cleanser, generously apply vashe to gauze and allow to soak in wound bed for 3-5 minutes.  Gently pat dry.   Apply no-sting barrier film to periwound skin.  Apply Hydrofera blue classic to the wound bed (moisten it with normal saline or tap water, cut to shape of wound bed- do not apply hard).    Cover with Mepilex foam border dressing.     Right heel: diabetic ulceration  Every other day:  Cleanse with Vashe wound cleanser, generously apply vashe to gauze and allow to soak in wound bed for 3-5 minutes.  Gently pat dry.   Apply no-sting barrier film to periwound skin.  Cover with Mepilex heel foam border dressing.

## 2025-05-21 NOTE — PROGRESS NOTES
05/21/25 1603   Discharge Planning   Expected Discharge Disposition Home H        Patient was delivered [facility] decision letter and was notified of termination of services.  Bedside nurse made aware.

## 2025-05-21 NOTE — DOCUMENTATION CLARIFICATION NOTE
"    PATIENT:               JOSSE MARQUEZ  ACCT #:                  [id number]  MRN:                       [id number]  :                       1970  ADMIT DATE:       2025 11:03 AM  DISCH DATE:  RESPONDING PROVIDER #:        [id number]          PROVIDER RESPONSE TEXT:    Acute Hypoxemic Respiratory Failure    CDI QUERY TEXT:    Clarification    Instruction:    Based on your assessment of the patient and the clinical information, please provide the requested documentation by clicking on the appropriate radio button and enter any additional information if prompted.    Question: Is there a diagnosis indicative of the clinical information    When answering this query, please exercise your independent professional judgment. The fact that a question is being asked, does not imply that any particular answer is desired or expected.    The patient's clinical indicators include:  Clinical Information: 54 y/o male admitted since , being treated for pneumonia.    Clinical Indicators:     ED note, Dr. Contreras: \"Patient reports that when the nurse was there earlier today, she noted hypoxia to 77% with ambulation.  Patient does not wear baseline oxygen, has been using his albuterol inhaler 3-4 times a day\", \"Patient was uptitrated from 4 to 6 L due to desaturations down to 88%\", \"Hypoxia\"     VBG 13:30:   pH 7.44, pCO2 59, pO2 47, SO2 77, HCO3 40.1     Nurse note, NAKIA De La Paz RN: \"he is having difficulty maintaining his oxygen saturation with mobility even on 4 liters NC\"     19:19 RRT note, TYREL Padilla: \"spo2 81% on 2L NC increased to 4L spo2 increased to 92%\"     12:08-  79% RA, 93% on 02     DC Summary, Dr. Dumont: \"possible decompensation given level of hypoxia for which pneumonia cannot be accounted for alone\"    Treatment: 2-6L 02, RRT evaluation & treatment, Home 02 evaluation, discharging with 3LO2    Risk Factors: PNA, CHF, morbid obesity  Options provided:  -- Acute Hypoxemic Respiratory " Failure  -- Acute Hypercapnic Respiratory Failure  -- Acute Hypoxemic and Hypercapnic Respiratory Failure  -- No acute respiratory failure  -- Other - I will add my own diagnosis  -- Refer to Clinical Documentation Reviewer    Query created by: Michelle Ferrara on 5/20/2025 1:20 PM      Electronically signed by:  CHELSEA SAAVEDRA MD 5/21/2025 1:13 PM

## 2025-05-21 NOTE — CARE PLAN
The patient's goals for the shift include      The clinical goals for the shift include Pt. will verbalize improved pain by end of shift    Over the shift, the patient did not make progress toward the following goals. Barriers to progression include. Recommendations to address these barriers include.    Problem: Pain  Goal: Takes deep breaths with improved pain control throughout the shift  5/21/2025 1020 by Lata Alexandre RN  Outcome: Progressing  5/21/2025 1018 by Lata Alexandre RN  Outcome: Progressing  Goal: Turns in bed with improved pain control throughout the shift  5/21/2025 1020 by Lata Alexandre RN  Outcome: Progressing  5/21/2025 1018 by Lata Alexandre RN  Outcome: Progressing  Goal: Walks with improved pain control throughout the shift  5/21/2025 1020 by Lata Alexandre RN  Outcome: Progressing  5/21/2025 1018 by Lata Alexandre RN  Outcome: Progressing  Goal: Performs ADL's with improved pain control throughout shift  5/21/2025 1020 by Lata Alexandre RN  Outcome: Progressing  5/21/2025 1018 by Lata Alexandre RN  Outcome: Progressing  Goal: Participates in PT with improved pain control throughout the shift  5/21/2025 1020 by Lata Alexandre RN  Outcome: Progressing  5/21/2025 1018 by Lata Alexandre RN  Outcome: Progressing  Goal: Free from opioid side effects throughout the shift  5/21/2025 1020 by Lata Alexandre RN  Outcome: Progressing  5/21/2025 1018 by Lata Alexandre RN  Outcome: Progressing  Goal: Free from acute confusion related to pain meds throughout the shift  5/21/2025 1020 by Lata Alexandre RN  Outcome: Progressing  5/21/2025 1018 by Lata Alexandre RN  Outcome: Progressing     Problem: Skin  Goal: Decreased wound size/increased tissue granulation at next dressing change  5/21/2025 1020 by Lata Alexandre RN  Outcome: Progressing  Flowsheets (Taken 5/21/2025 1020)  Decreased wound size/increased tissue granulation at next dressing change: Promote sleep  for wound healing  5/21/2025 1018 by Lata Alexandre RN  Outcome: Progressing  Goal: Participates in plan/prevention/treatment measures  5/21/2025 1020 by Lata Alexandre RN  Outcome: Progressing  Flowsheets (Taken 5/21/2025 1020)  Participates in plan/prevention/treatment measures:   Increase activity/out of bed for meals   Elevate heels  5/21/2025 1018 by Lata Alexandre RN  Outcome: Progressing  Goal: Prevent/manage excess moisture  5/21/2025 1020 by Lata Alexandre RN  Outcome: Progressing  Flowsheets (Taken 5/21/2025 1020)  Prevent/manage excess moisture:   Monitor for/manage infection if present   Moisturize dry skin  5/21/2025 1018 by Lata Alexandre RN  Outcome: Progressing  Goal: Prevent/minimize sheer/friction injuries  5/21/2025 1020 by Lata Alexandre RN  Outcome: Progressing  Flowsheets (Taken 5/21/2025 1020)  Prevent/minimize sheer/friction injuries:   Increase activity/out of bed for meals   HOB 30 degrees or less  5/21/2025 1018 by Lata Alexandre RN  Outcome: Progressing  Goal: Promote/optimize nutrition  5/21/2025 1020 by Lata Alexandre RN  Outcome: Progressing  Flowsheets (Taken 5/21/2025 1020)  Promote/optimize nutrition:   Monitor/record intake including meals   Offer water/supplements/favorite foods  5/21/2025 1018 by Lata Alexandre RN  Outcome: Progressing  Goal: Promote skin healing  5/21/2025 1020 by Lata Alexandre RN  Outcome: Progressing  Flowsheets (Taken 5/21/2025 1020)  Promote skin healing: Assess skin/pad under line(s)/device(s)  5/21/2025 1018 by Lata Alexandre RN  Outcome: Progressing

## 2025-05-21 NOTE — PROGRESS NOTES
Jai Shrestha is a 55 y.o. male on day 5 of admission presenting with Pneumonia due to infectious organism, unspecified laterality, unspecified part of lung.      Subjective   Patient was seen and examined bedside this morning       Objective     Last Recorded Vitals  /64 (BP Location: Right arm, Patient Position: Lying)   Pulse 105   Temp 36.6 °C (97.9 °F) (Temporal)   Resp 17   Wt (!) 174 kg (383 lb)   SpO2 100%   Intake/Output last 3 Shifts:  No intake or output data in the 24 hours ending 05/21/25 1219      Admission Weight  Weight: (!) 174 kg (383 lb) (05/16/25 1112)    Daily Weight  05/16/25 : (!) 174 kg (383 lb)    Image Results  ECG 12 lead  Normal sinus rhythm  Possible Inferior infarct , age undetermined  Abnormal ECG  When compared with ECG of 19-APR-2025 15:00,  Borderline criteria for Inferior infarct are now Present  Nonspecific T wave abnormality, worse in Inferior leads  QT has lengthened  See ED provider note for full interpretation and clinical correlation  Confirmed by Nella Booth ([id number]) on 5/19/2025 10:27:27 AM      Physical Exam  Constitutional: Obese gentleman, appears drowsy, somnolent, but respond to questions, cooperative not in acute distress  Eyes: PERRLA, clear sclera  ENMT: Moist mucosal membranes, no exudate  Head / Neck: Atraumatic, normocephalic, supple neck, JVP not visualized  Lungs: Patent airways, CTABL  Heart: RRR, S1S2, no murmurs appreciated, palpable pulses in all extremities  GI: Soft, NT, ND, bowel sounds present in all quadrants  MSK: Moves all extremities freely, no restriction  of ROM, no joint edema  Extremities: Chronic lymphedema with venous ulcers, bilateral lower extremities peripheral edema  : No Guo catheter inserted  Breast: Deferred  Neurological: AAO x 3 to person, place and date, facial muscles symmetrical, sensation intact, strength 4/4, no acute focal neurological deficits appreciated  Psychological: Appropriate mood and behavior  Relevant  Results             Scheduled medications  Scheduled Medications[1]  Continuous medications  Continuous Medications[2]  PRN medications  PRN Medications[3]       Assessment & Plan  Pneumonia due to infectious organism, unspecified laterality, unspecified part of lung    55 y.o. male with a past medical history of chronic back pain, peripheral neuropathy, diabetes mellitus type 2 with insulin resistance, morbid obesity BMI 53.44, CKD 3, diastolic CHF, hypertension who presented to [facility] ED with complain of shortness of breath x 5 days.        Pneumonia: Likely hospital-acquired given recent discharge from hospital  - CT shows worsening consolidations  - Vancomycin pharmacy to dose  - Zosyn 3.375 g IV piggyback every 6 hours  - Urine antigens and strep pneumo and Legionella negative  -Procalcitonin 0.23  -Bronchodilators   -ID consult: IV Zosyn. Stopped IV Vanc since MRSA nares PCR negative. Checking for COVID. When ready for home can complete abx with PO Levofloxacin 750mg/day through 5/25/25 for 10D total abx course.      Type 2 diabetes mellitus requiring insulin with insulin resistance  - Diabetic carbohydrate controlled diet  -Lantus 60 u twice daily  -Prandial insulin with lispro 40 u 3 times daily before meals  -Lispro sliding scale  -Hypoglycemia protocol in place  - Wound nurse consulted for chronic ulcer     Diastolic CHF, possible decompensation given level of hypoxia for which pneumonia cannot be accounted for alone  Lasix   Daily weight     Hypertension  Amlodipine 5 mg orally daily     Hyperlipidemia  Atorvastatin 80 mg orally daily     Bilateral lymphedema with venous stasis changes   Diuresis with Lasix     Chronic pain  -Visibly upset over not having Dilaudid despite explanation that chronic pain should not be treated with IV opiate per state guidelines  -On Percocet 7.5-300 mg tablet, not available under this dosage in the formulary, will substitute with oxycodone 10 mg p.o. every 4  hours as needed severe pain  -Flexeril 5 mg 3 times daily as needed for spasm     Morbid obesity BMI 53.44  Lifestyle modification     DVT prophylaxis  Heparin 7500 units subcu 3 times daily  SCDs     Disposition: Presented with shortness of breath, found with possible healthcare associate pneumonia.  Ready for discharge, but now with multiple other complaints, despite not being in heart failure.    Patient was discharged yesterday, but he appealed the discharge.  We are awaiting appeal result.       Ronan Dumont MD           [1] amLODIPine, 5 mg, oral, Daily  aspirin, 81 mg, oral, Daily  atorvastatin, 80 mg, oral, Nightly  colchicine, 0.6 mg, oral, Daily  DULoxetine, 60 mg, oral, Daily  ergocalciferol, 1.25 mg, oral, Weekly  ferrous sulfate, 1 tablet, oral, Every Mon/Wed/Fri  furosemide, 40 mg, oral, Daily  heparin (porcine), 7,500 Units, subcutaneous, q8h TAMIR  insulin glargine, 60 Units, subcutaneous, BID  insulin lispro, 0-15 Units, subcutaneous, TID AC  insulin lispro, 40 Units, subcutaneous, TID AC  ipratropium-albuteroL, 3 mL, nebulization, TID  pantoprazole, 40 mg, oral, Daily before breakfast   Or  pantoprazole, 40 mg, intravenous, Daily before breakfast  piperacillin-tazobactam, 3.375 g, intravenous, q6h  polyethylene glycol, 17 g, oral, Daily  QUEtiapine, 100 mg, oral, Nightly     [2]    [3] PRN medications: acetaminophen **OR** acetaminophen **OR** acetaminophen, acetaminophen, cyclobenzaprine, dextrose, dextrose, glucagon, glucagon, ipratropium-albuteroL, naloxone, ondansetron **OR** ondansetron, oxyCODONE, oxygen

## 2025-05-21 NOTE — DOCUMENTATION CLARIFICATION NOTE
"    PATIENT:               JOSSE MARQUEZ  ACCT #:                  [id number]  MRN:                       [id number]  :                       1970  ADMIT DATE:       2025 11:03 AM  DISCH DATE:  RESPONDING PROVIDER #:        [id number]          PROVIDER RESPONSE TEXT:    Bacterial PNA, unable to further specify    CDI QUERY TEXT:    Clarification    Instruction:    Based on your assessment of the patient and the clinical information, please provide the requested documentation by clicking on the appropriate radio button and enter any additional information if prompted.    Question: Please further specify the type of pneumonia being treated    When answering this query, please exercise your independent professional judgment. The fact that a question is being asked, does not imply that any particular answer is desired or expected.    The patient's clinical indicators include:  Clinical Information: 56 y/o male admitted since , being treated for pneumonia.    Clinical Indicators:     CT chest: \"significant progression of bilateral pneumonia\"     ID note, Dr. Blandon: \"Possibly viral PNA given appearance\"     DC Summary, Dr. Saavedra: \"Pneumonia: Likely hospital-acquired given recent discharge from hospital\"    Treatment: IV Ceftriaxone & IV Azithromycin x1 on , IV Vanco x1 on , IV Zosyn - , discharging with Levaquin x5 days    Risk Factors: recent hospitalization, hypoxia, DM  Options provided:  -- Gram Negative PNA  -- Viral PNA  -- Bacterial PNA, unable to further specify  -- Other - I will add my own diagnosis  -- Refer to Clinical Documentation Reviewer    Query created by: Michelle Ferrara on 2025 12:54 PM      Electronically signed by:  CHELSEA SAAVEDRA MD 2025 1:13 PM          "

## 2025-05-21 NOTE — CONSULTS
"Wound Care Consult     Visit Date: 5/21/2025      Patient Name: Jai Shrestha         MRN: [id number]             Reason for Consult: patient presents with a vascular ulceration of unknown origin to the left tibial region.  Right heel with cracks and deep fissures in region of callous.          Wound History: patient states he has had these wounds for a \"long time.\"  He states that he sees a podiatrist at [facility] every 3 months for nail care and diabetic foot exams.  States that he has worn compression at home in the past but has difficultly applying the stockings so had not worn them in a long time.       Assessment:  Wound 04/22/25 Vascular Ulcer Venous Tibial Anterior;Left (Active)   Date First Assessed/Time First Assessed: 04/22/25 1118   Present on Original Admission: Yes  Hand Hygiene Completed: Yes  Primary Wound Type: Vascular Ulcer  Secondary Wound Type - Vascular Ulcer: Venous  Location: Tibial  Wound Location Orientation: ...      Assessments 5/21/2025 10:23 AM   Wound Image     Present on Admission to Healthcare Facility Yes   Site Assessment Red;Sloughing   Non-staged Wound Description Full thickness   Shape irregular   Wound Length (cm) 3 cm   Wound Width (cm) 2 cm   Wound Surface Area (cm^2) 4.71 cm^2   Wound Depth (cm) 0.3 cm   Wound Volume (cm^3) 0.942 cm^3   Wound Healing % 50   State of Healing Non-healing   Margins Well-defined edges;Poorly defined   Drainage Description Yellow   Drainage Amount Small   Dressing Other (Comment);Foam   Dressing Changed Changed   Dressing Status Clean;Dry       Active Orders   Date Order Priority Status Authorizing Provider   05/17/25 0725 Inpatient Consult to Wound and Ostomy Nurse Routine Active Sergio Collins DO     - Reason for Consult?:    Wound       Inactive Orders   Date Order Priority Status Authorizing Provider   04/22/25 1347 Wound Care 2 Wounds Associated Routine Discontinued Ronan Dumont MD     - Wound Complexity:    Simple       Wound " 04/22/25 Diabetic Ulcer Heel Posterior;Right (Active)   Date First Assessed/Time First Assessed: 04/22/25 1127   Present on Original Admission: Yes  Hand Hygiene Completed: Yes  Primary Wound Type: (c) Diabetic Ulcer  Location: Heel  Wound Location Orientation: Posterior;Right      Assessments 5/21/2025 10:25 AM   Wound Image     Present on Admission to Healthcare Facility Yes   Site Assessment Dry;Other (Comment)   Shape irregular over heel   Wound Length (cm) 8 cm   Wound Width (cm) 6 cm   Wound Surface Area (cm^2) 37.7 cm^2   Wound Depth (cm) 2 cm   Wound Volume (cm^3) 50.265 cm^3   Wound Healing % -2461   State of Healing Non-healing   Margins Poorly defined   Treatments Cleansed;Site care   Drainage Description None   Drainage Amount None   Dressing Foam   Dressing Changed Changed   Dressing Status Clean;Dry       Active Orders   Date Order Priority Status Authorizing Provider   05/17/25 0725 Inpatient Consult to Wound and Ostomy Nurse Routine Active Sergio Goudiaby, DO     - Reason for Consult?:    Wound       Inactive Orders   Date Order Priority Status Authorizing Provider   04/22/25 1347 Wound Care Diabetic Ulcer (fissure) Posterior;Right Heel Routine Discontinued Ronan Dumont MD     - Wound Complexity:    Simple     Wound assessment: assessment complete and recommendations below.  2 sets of dressings at the bedside.    Left tibial: ulceration of unknown etiology  Every other day: Bedside RN/LPN to complete wound care.  Cleanse with Vashe wound cleanser, generously apply vashe to gauze and allow to soak in wound bed for 3-5 minutes.  Gently pat dry.   Apply no-sting barrier film to periwound skin.  Apply Hydrofera blue classic to the wound bed (moisten it with normal saline or tap water, cut to shape of wound bed- do not apply hard).    Cover with Mepilex foam border dressing.    Right heel: diabetic ulceration  Every other day: Bedside RN/LPN to complete wound care.  Cleanse with Vashe wound  cleanser, generously apply vashe to gauze and allow to soak in wound bed for 3-5 minutes.  Gently pat dry.   Apply no-sting barrier film to periwound skin.  Cover with Mepilex heel foam border dressing.    While in bed patient should only be on one fitted sheet, and one chux. Please, do not use brief while patient is resting in bed. Elevate heels off the bed surface at all times. Turn and reposition at least every 2 hours.    Wound Plan: Thank you for this consult. Assessment has been completed and orders have been placed. Wound care to be completed by nursing per orders. Please contact [facility] nurse with any questions and place new consult if there is a change in wound status.     Louise Sanchez RN, BSN, CWOCN  Phone: [phone]  5/21/2025  10:53 AM

## 2025-05-22 LAB
ANION GAP BLDA CALCULATED.4IONS-SCNC: 9 MMO/L (ref 10–25)
ANION GAP BLDA CALCULATED.4IONS-SCNC: 9 MMO/L (ref 10–25)
ANION GAP SERPL CALC-SCNC: 15 MMOL/L (ref 10–20)
BASE EXCESS BLDA CALC-SCNC: 5.8 MMOL/L (ref -2–3)
BASE EXCESS BLDA CALC-SCNC: 6.6 MMOL/L (ref -2–3)
BODY TEMPERATURE: 37 DEGREES CELSIUS
BODY TEMPERATURE: 37 DEGREES CELSIUS
BUN SERPL-MCNC: 33 MG/DL (ref 6–23)
CA-I BLDA-SCNC: 1.17 MMOL/L (ref 1.1–1.33)
CA-I BLDA-SCNC: 1.21 MMOL/L (ref 1.1–1.33)
CALCIUM SERPL-MCNC: 8.7 MG/DL (ref 8.6–10.3)
CHLORIDE BLDA-SCNC: 96 MMOL/L (ref 98–107)
CHLORIDE BLDA-SCNC: 96 MMOL/L (ref 98–107)
CHLORIDE SERPL-SCNC: 99 MMOL/L (ref 98–107)
CO2 SERPL-SCNC: 29 MMOL/L (ref 21–32)
CREAT SERPL-MCNC: 1.77 MG/DL (ref 0.5–1.3)
EGFRCR SERPLBLD CKD-EPI 2021: 45 ML/MIN/1.73M*2
ERYTHROCYTE [DISTWIDTH] IN BLOOD BY AUTOMATED COUNT: 16.4 % (ref 11.5–14.5)
GLUCOSE BLD MANUAL STRIP-MCNC: 150 MG/DL (ref 74–99)
GLUCOSE BLD MANUAL STRIP-MCNC: 211 MG/DL (ref 74–99)
GLUCOSE BLD MANUAL STRIP-MCNC: 281 MG/DL (ref 74–99)
GLUCOSE BLD MANUAL STRIP-MCNC: 80 MG/DL (ref 74–99)
GLUCOSE BLDA-MCNC: 167 MG/DL (ref 74–99)
GLUCOSE BLDA-MCNC: 237 MG/DL (ref 74–99)
GLUCOSE SERPL-MCNC: 69 MG/DL (ref 74–99)
HCO3 BLDA-SCNC: 33.5 MMOL/L (ref 22–26)
HCO3 BLDA-SCNC: 34.2 MMOL/L (ref 22–26)
HCT VFR BLD AUTO: 33.9 % (ref 41–52)
HCT VFR BLD EST: 28 % (ref 41–52)
HCT VFR BLD EST: 28 % (ref 41–52)
HGB BLD-MCNC: 9.6 G/DL (ref 13.5–17.5)
HGB BLDA-MCNC: 9.2 G/DL (ref 13.5–17.5)
HGB BLDA-MCNC: 9.2 G/DL (ref 13.5–17.5)
INHALED O2 CONCENTRATION: 50 %
INHALED O2 CONCENTRATION: 50 %
LACTATE BLDA-SCNC: 0.4 MMOL/L (ref 0.4–2)
LACTATE BLDA-SCNC: 0.5 MMOL/L (ref 0.4–2)
MCH RBC QN AUTO: 23.6 PG (ref 26–34)
MCHC RBC AUTO-ENTMCNC: 28.3 G/DL (ref 32–36)
MCV RBC AUTO: 83 FL (ref 80–100)
NRBC BLD-RTO: 0.6 /100 WBCS (ref 0–0)
OXYHGB MFR BLDA: 91.2 % (ref 94–98)
OXYHGB MFR BLDA: 94.9 % (ref 94–98)
PCO2 BLDA: 68 MM HG (ref 38–42)
PCO2 BLDA: 68 MM HG (ref 38–42)
PH BLDA: 7.3 PH (ref 7.38–7.42)
PH BLDA: 7.31 PH (ref 7.38–7.42)
PLATELET # BLD AUTO: 257 X10*3/UL (ref 150–450)
PO2 BLDA: 68 MM HG (ref 85–95)
PO2 BLDA: 82 MM HG (ref 85–95)
POTASSIUM BLDA-SCNC: 4.5 MMOL/L (ref 3.5–5.3)
POTASSIUM BLDA-SCNC: 4.7 MMOL/L (ref 3.5–5.3)
POTASSIUM SERPL-SCNC: 4.9 MMOL/L (ref 3.5–5.3)
RBC # BLD AUTO: 4.07 X10*6/UL (ref 4.5–5.9)
SAO2 % BLDA: 93 % (ref 94–100)
SAO2 % BLDA: 97 % (ref 94–100)
SODIUM BLDA-SCNC: 134 MMOL/L (ref 136–145)
SODIUM BLDA-SCNC: 134 MMOL/L (ref 136–145)
SODIUM SERPL-SCNC: 138 MMOL/L (ref 136–145)
WBC # BLD AUTO: 12.4 X10*3/UL (ref 4.4–11.3)

## 2025-05-22 PROCEDURE — 84132 ASSAY OF SERUM POTASSIUM: CPT | Performed by: INTERNAL MEDICINE

## 2025-05-22 PROCEDURE — 94640 AIRWAY INHALATION TREATMENT: CPT

## 2025-05-22 PROCEDURE — 36600 WITHDRAWAL OF ARTERIAL BLOOD: CPT

## 2025-05-22 PROCEDURE — 2500000004 HC RX 250 GENERAL PHARMACY W/ HCPCS (ALT 636 FOR OP/ED): Mod: JZ | Performed by: INTERNAL MEDICINE

## 2025-05-22 PROCEDURE — 36415 COLL VENOUS BLD VENIPUNCTURE: CPT | Performed by: INTERNAL MEDICINE

## 2025-05-22 PROCEDURE — 2500000001 HC RX 250 WO HCPCS SELF ADMINISTERED DRUGS (ALT 637 FOR MEDICARE OP): Performed by: INTERNAL MEDICINE

## 2025-05-22 PROCEDURE — 99233 SBSQ HOSP IP/OBS HIGH 50: CPT | Performed by: INTERNAL MEDICINE

## 2025-05-22 PROCEDURE — 85027 COMPLETE CBC AUTOMATED: CPT | Performed by: INTERNAL MEDICINE

## 2025-05-22 PROCEDURE — 5A0935A ASSISTANCE WITH RESPIRATORY VENTILATION, LESS THAN 24 CONSECUTIVE HOURS, HIGH NASAL FLOW/VELOCITY: ICD-10-PCS | Performed by: INTERNAL MEDICINE

## 2025-05-22 PROCEDURE — 2500000005 HC RX 250 GENERAL PHARMACY W/O HCPCS: Performed by: INTERNAL MEDICINE

## 2025-05-22 PROCEDURE — 94660 CPAP INITIATION&MGMT: CPT

## 2025-05-22 PROCEDURE — 80048 BASIC METABOLIC PNL TOTAL CA: CPT | Performed by: INTERNAL MEDICINE

## 2025-05-22 PROCEDURE — 5A09357 ASSISTANCE WITH RESPIRATORY VENTILATION, LESS THAN 24 CONSECUTIVE HOURS, CONTINUOUS POSITIVE AIRWAY PRESSURE: ICD-10-PCS | Performed by: INTERNAL MEDICINE

## 2025-05-22 PROCEDURE — 2500000004 HC RX 250 GENERAL PHARMACY W/ HCPCS (ALT 636 FOR OP/ED): Performed by: INTERNAL MEDICINE

## 2025-05-22 PROCEDURE — 82947 ASSAY GLUCOSE BLOOD QUANT: CPT

## 2025-05-22 PROCEDURE — 1100000001 HC PRIVATE ROOM DAILY

## 2025-05-22 PROCEDURE — 2500000002 HC RX 250 W HCPCS SELF ADMINISTERED DRUGS (ALT 637 FOR MEDICARE OP, ALT 636 FOR OP/ED): Performed by: INTERNAL MEDICINE

## 2025-05-22 RX ORDER — FUROSEMIDE 10 MG/ML
40 INJECTION INTRAMUSCULAR; INTRAVENOUS ONCE
Status: COMPLETED | OUTPATIENT
Start: 2025-05-22 | End: 2025-05-22

## 2025-05-22 RX ORDER — HYDROXYZINE HYDROCHLORIDE 25 MG/1
25 TABLET, FILM COATED ORAL ONCE
Status: COMPLETED | OUTPATIENT
Start: 2025-05-22 | End: 2025-05-22

## 2025-05-22 RX ADMIN — INSULIN LISPRO 40 UNITS: 100 INJECTION, SOLUTION INTRAVENOUS; SUBCUTANEOUS at 18:23

## 2025-05-22 RX ADMIN — ASPIRIN 81 MG: 81 TABLET, COATED ORAL at 10:11

## 2025-05-22 RX ADMIN — COLCHICINE 0.6 MG: 0.6 TABLET, FILM COATED ORAL at 10:11

## 2025-05-22 RX ADMIN — PIPERACILLIN SODIUM AND TAZOBACTAM SODIUM 3.38 G: 3; .375 INJECTION, SOLUTION INTRAVENOUS at 13:15

## 2025-05-22 RX ADMIN — PIPERACILLIN SODIUM AND TAZOBACTAM SODIUM 3.38 G: 3; .375 INJECTION, SOLUTION INTRAVENOUS at 18:31

## 2025-05-22 RX ADMIN — HEPARIN SODIUM 7500 UNITS: 5000 INJECTION, SOLUTION INTRAVENOUS; SUBCUTANEOUS at 13:15

## 2025-05-22 RX ADMIN — OXYCODONE HYDROCHLORIDE 10 MG: 5 TABLET ORAL at 16:36

## 2025-05-22 RX ADMIN — FUROSEMIDE 40 MG: 40 TABLET ORAL at 10:11

## 2025-05-22 RX ADMIN — HEPARIN SODIUM 7500 UNITS: 5000 INJECTION, SOLUTION INTRAVENOUS; SUBCUTANEOUS at 06:38

## 2025-05-22 RX ADMIN — IPRATROPIUM BROMIDE AND ALBUTEROL SULFATE 3 ML: 2.5; .5 SOLUTION RESPIRATORY (INHALATION) at 19:51

## 2025-05-22 RX ADMIN — PANTOPRAZOLE SODIUM 40 MG: 40 TABLET, DELAYED RELEASE ORAL at 06:38

## 2025-05-22 RX ADMIN — Medication 50 PERCENT: at 23:19

## 2025-05-22 RX ADMIN — Medication 50 PERCENT: at 13:36

## 2025-05-22 RX ADMIN — OXYCODONE HYDROCHLORIDE 10 MG: 5 TABLET ORAL at 06:45

## 2025-05-22 RX ADMIN — ATORVASTATIN CALCIUM 80 MG: 80 TABLET, FILM COATED ORAL at 21:42

## 2025-05-22 RX ADMIN — IPRATROPIUM BROMIDE AND ALBUTEROL SULFATE 3 ML: 2.5; .5 SOLUTION RESPIRATORY (INHALATION) at 07:09

## 2025-05-22 RX ADMIN — PIPERACILLIN SODIUM AND TAZOBACTAM SODIUM 3.38 G: 3; .375 INJECTION, SOLUTION INTRAVENOUS at 06:38

## 2025-05-22 RX ADMIN — Medication 9 L/MIN: at 16:26

## 2025-05-22 RX ADMIN — Medication 50 PERCENT: at 19:51

## 2025-05-22 RX ADMIN — QUETIAPINE FUMARATE 100 MG: 100 TABLET ORAL at 21:43

## 2025-05-22 RX ADMIN — INSULIN GLARGINE 60 UNITS: 100 INJECTION, SOLUTION SUBCUTANEOUS at 10:11

## 2025-05-22 RX ADMIN — HYDROXYZINE HYDROCHLORIDE 25 MG: 25 TABLET, FILM COATED ORAL at 10:11

## 2025-05-22 RX ADMIN — FUROSEMIDE 40 MG: 10 INJECTION, SOLUTION INTRAMUSCULAR; INTRAVENOUS at 10:45

## 2025-05-22 RX ADMIN — Medication 50 PERCENT: at 22:04

## 2025-05-22 RX ADMIN — AMLODIPINE BESYLATE 5 MG: 5 TABLET ORAL at 10:11

## 2025-05-22 RX ADMIN — IPRATROPIUM BROMIDE AND ALBUTEROL SULFATE 3 ML: 2.5; .5 SOLUTION RESPIRATORY (INHALATION) at 12:08

## 2025-05-22 RX ADMIN — INSULIN GLARGINE 60 UNITS: 100 INJECTION, SOLUTION SUBCUTANEOUS at 21:43

## 2025-05-22 RX ADMIN — HEPARIN SODIUM 7500 UNITS: 5000 INJECTION, SOLUTION INTRAVENOUS; SUBCUTANEOUS at 21:42

## 2025-05-22 RX ADMIN — DULOXETINE 60 MG: 30 CAPSULE, DELAYED RELEASE ORAL at 10:11

## 2025-05-22 ASSESSMENT — PAIN DESCRIPTION - LOCATION: LOCATION: OTHER (COMMENT)

## 2025-05-22 ASSESSMENT — COGNITIVE AND FUNCTIONAL STATUS - GENERAL
TOILETING: A LITTLE
DRESSING REGULAR LOWER BODY CLOTHING: A LITTLE
WALKING IN HOSPITAL ROOM: A LOT
DRESSING REGULAR UPPER BODY CLOTHING: A LITTLE
MOBILITY SCORE: 18
HELP NEEDED FOR BATHING: A LITTLE
PERSONAL GROOMING: A LITTLE
CLIMB 3 TO 5 STEPS WITH RAILING: A LOT
STANDING UP FROM CHAIR USING ARMS: A LITTLE
DAILY ACTIVITIY SCORE: 19
MOVING TO AND FROM BED TO CHAIR: A LITTLE

## 2025-05-22 ASSESSMENT — PAIN - FUNCTIONAL ASSESSMENT
PAIN_FUNCTIONAL_ASSESSMENT: 0-10
PAIN_FUNCTIONAL_ASSESSMENT: 0-10

## 2025-05-22 ASSESSMENT — PAIN SCALES - GENERAL
PAINLEVEL_OUTOF10: 6
PAINLEVEL_OUTOF10: 7
PAINLEVEL_OUTOF10: 9

## 2025-05-22 ASSESSMENT — PAIN DESCRIPTION - ORIENTATION: ORIENTATION: OTHER (COMMENT)

## 2025-05-22 ASSESSMENT — PAIN DESCRIPTION - DESCRIPTORS: DESCRIPTORS: ACHING;BURNING

## 2025-05-22 NOTE — PROGRESS NOTES
Jia Shrestha is a 55 y.o. male on day 6 of admission presenting with Pneumonia due to infectious organism, unspecified laterality, unspecified part of lung.      Subjective   Patient was seen and examined bedside this morning       Objective     Last Recorded Vitals  /88 (BP Location: Right arm, Patient Position: Lying)   Pulse 107   Temp 36.3 °C (97.4 °F) (Temporal)   Resp 18   Wt (!) 174 kg (383 lb)   SpO2 93%   Intake/Output last 3 Shifts:  No intake or output data in the 24 hours ending 05/22/25 1236      Admission Weight  Weight: (!) 174 kg (383 lb) (05/16/25 1112)    Daily Weight  05/16/25 : (!) 174 kg (383 lb)    Image Results  XR chest 2 views  Narrative: Interpreted By:  Baltazar Sultana,   STUDY:  XR CHEST 2 VIEWS;  5/21/2025 4:33 pm      INDICATION:  Signs/Symptoms:SOB.          COMPARISON:  05/16/2025      ACCESSION NUMBER(S):  [id number]      ORDERING CLINICIAN:  CHELSEA SAAVEDRA      FINDINGS:  PA and lateral radiographs of the chest were provided.              CARDIOMEDIASTINAL SILHOUETTE:  Cardiomediastinal silhouette is normal in size and configuration.      LUNGS:  New diffuse consolidation throughout the left lung concerning for  pneumonia. No large pleural effusion or pneumothorax. The right lung  is clear.      ABDOMEN:  No remarkable upper abdominal findings.      BONES:  No acute osseous changes.      Impression: 1.  New large left lung consolidation concerning for pneumonia.              MACRO:  None      Signed by: Baltazar Sultana 5/21/2025 4:38 PM  Dictation workstation:   [id number]      Physical Exam  Constitutional: Obese gentleman, appears drowsy, somnolent, but respond to questions, cooperative not in acute distress  Eyes: PERRLA, clear sclera  ENMT: Moist mucosal membranes, no exudate  Head / Neck: Atraumatic, normocephalic, supple neck, JVP not visualized  Lungs: Patent airways, CTABL  Heart: RRR, S1S2, no murmurs appreciated, palpable pulses in all  extremities  GI: Soft, NT, ND, bowel sounds present in all quadrants  MSK: Moves all extremities freely, no restriction  of ROM, no joint edema  Extremities: Chronic lymphedema with venous ulcers, bilateral lower extremities peripheral edema  : No Guo catheter inserted  Breast: Deferred  Neurological: AAO x 3 to person, place and date, facial muscles symmetrical, sensation intact, strength 4/4, no acute focal neurological deficits appreciated  Psychological: Appropriate mood and behavior  Relevant Results             Scheduled medications  Scheduled Medications[1]  Continuous medications  Continuous Medications[2]  PRN medications  PRN Medications[3]       Assessment & Plan  Pneumonia due to infectious organism, unspecified laterality, unspecified part of lung    55 y.o. male with a past medical history of chronic back pain, peripheral neuropathy, diabetes mellitus type 2 with insulin resistance, morbid obesity BMI 53.44, CKD 3, diastolic CHF, hypertension who presented to [facility] ED with complain of shortness of breath x 5 days.        Acute hypoxic resp failure  -multi etiology including RAMÍREZ, OHS, Pneumonia, CHF  -will limit fluid intake  -lasix 40 mg IV once  -ABG noted  -will put him on Bipap for couple hours   -monitor    Pneumonia: Likely hospital-acquired given recent discharge from hospital  - CT shows worsening consolidations  - Vancomycin pharmacy to dose  - Zosyn 3.375 g IV piggyback every 6 hours  - Urine antigens and strep pneumo and Legionella negative  -Procalcitonin 0.23  -Bronchodilators   -ID consult: IV Zosyn. Stopped IV Vanc since MRSA nares PCR negative. Checking for COVID. When ready for home can complete abx with PO Levofloxacin 750mg/day through 5/25/25 for 10D total abx course.      Type 2 diabetes mellitus requiring insulin with insulin resistance  - Diabetic carbohydrate controlled diet  -Lantus 60 u twice daily  -Prandial insulin with lispro 40 u 3 times daily before  meals  -Lispro sliding scale  -Hypoglycemia protocol in place  - Wound nurse consulted for chronic ulcer     Diastolic CHF, possible decompensation given level of hypoxia for which pneumonia cannot be accounted for alone  Lasix   Daily weight     Hypertension  Amlodipine 5 mg orally daily     Hyperlipidemia  Atorvastatin 80 mg orally daily     Bilateral lymphedema with venous stasis changes   Diuresis with Lasix     Chronic pain  -Visibly upset over not having Dilaudid despite explanation that chronic pain should not be treated with IV opiate per state guidelines  -On Percocet 7.5-300 mg tablet, not available under this dosage in the formulary, will substitute with oxycodone 10 mg p.o. every 4 hours as needed severe pain  -Flexeril 5 mg 3 times daily as needed for spasm     Morbid obesity BMI 53.44  Lifestyle modification     DVT prophylaxis  Heparin 7500 units subcu 3 times daily  SCDs     Disposition: Presented with shortness of breath, found with possible healthcare associate pneumonia.  Ready for discharge, but now with multiple other complaints, despite not being in heart failure.    Patient now has acute hypoxic respiratory failure, using accessory muscles.  His oxygen requirement is going up.  Will put him on BiPAP today.       Ronan Dumont MD           [1] amLODIPine, 5 mg, oral, Daily  aspirin, 81 mg, oral, Daily  atorvastatin, 80 mg, oral, Nightly  colchicine, 0.6 mg, oral, Daily  DULoxetine, 60 mg, oral, Daily  ergocalciferol, 1.25 mg, oral, Weekly  ferrous sulfate, 1 tablet, oral, Every Mon/Wed/Fri  furosemide, 40 mg, oral, Daily  heparin (porcine), 7,500 Units, subcutaneous, q8h TAMIR  insulin glargine, 60 Units, subcutaneous, BID  insulin lispro, 0-15 Units, subcutaneous, TID AC  insulin lispro, 40 Units, subcutaneous, TID AC  ipratropium-albuteroL, 3 mL, nebulization, TID  pantoprazole, 40 mg, oral, Daily before breakfast   Or  pantoprazole, 40 mg, intravenous, Daily before  breakfast  piperacillin-tazobactam, 3.375 g, intravenous, q6h  polyethylene glycol, 17 g, oral, Daily  QUEtiapine, 100 mg, oral, Nightly     [2]    [3] PRN medications: acetaminophen **OR** acetaminophen **OR** acetaminophen, acetaminophen, cyclobenzaprine, dextrose, dextrose, glucagon, glucagon, ipratropium-albuteroL, naloxone, ondansetron **OR** ondansetron, oxyCODONE, oxygen

## 2025-05-22 NOTE — SIGNIFICANT EVENT
Order received per Dr. Dumont to initiate BiPAP for acute hypoxic respiratory failure. Pt placed on BiPAP 10/5 50% at this time. Tolerating fairly well. Continuous pulse oximetry placed on Pt.

## 2025-05-22 NOTE — CARE PLAN
The patient's goals for the shift include      The clinical goals for the shift include pt will not have increased oxygen demand this shift      Problem: Diabetes  Goal: Maintain glucose levels >70mg/dl to <250mg/dl throughout shift  Outcome: Progressing     Problem: Pain  Goal: Takes deep breaths with improved pain control throughout the shift  Outcome: Progressing  Goal: Turns in bed with improved pain control throughout the shift  Outcome: Progressing  Goal: Walks with improved pain control throughout the shift  Outcome: Progressing  Goal: Free from opioid side effects throughout the shift  Outcome: Progressing     Problem: Fall/Injury  Goal: Not fall by end of shift  Outcome: Progressing  Goal: Be free from injury by end of the shift  Outcome: Progressing  Goal: Verbalize understanding of personal risk factors for fall in the hospital  Outcome: Progressing  Goal: Pace activities to prevent fatigue by end of the shift  Outcome: Progressing     Problem: Pain - Adult  Goal: Verbalizes/displays adequate comfort level or baseline comfort level  Outcome: Progressing     Problem: Safety - Adult  Goal: Free from fall injury  Outcome: Progressing     Problem: Discharge Planning  Goal: Discharge to home or other facility with appropriate resources  Outcome: Progressing     Problem: Chronic Conditions and Co-morbidities  Goal: Patient's chronic conditions and co-morbidity symptoms are monitored and maintained or improved  Outcome: Progressing     Problem: Nutrition  Goal: Nutrient intake appropriate for maintaining nutritional needs  Outcome: Progressing     Problem: Skin  Goal: Decreased wound size/increased tissue granulation at next dressing change  Outcome: Progressing  Goal: Participates in plan/prevention/treatment measures  Outcome: Progressing  Flowsheets (Taken 5/22/2025 0332)  Participates in plan/prevention/treatment measures: Increase activity/out of bed for meals  Note: Pt up in chair for meals  Goal:  Prevent/manage excess moisture  Outcome: Progressing  Goal: Promote/optimize nutrition  Outcome: Progressing  Goal: Promote skin healing  Outcome: Progressing

## 2025-05-22 NOTE — PROGRESS NOTES
05/22/25 1059   Discharge Planning   Assistance Needed Patient now in fluid overload, currently requiring 8L HC NC, now NOT med ready for discharge.   Expected Discharge Disposition Home Health   Does the patient need discharge transport arranged? Yes   RoundTrip coordination needed? Yes     2425- patient placed on bipap. Not med ready for discharge.

## 2025-05-22 NOTE — SIGNIFICANT EVENT
Pt's SpO2 88% on 6L NC and increased WOB at rest. Placed Pt on 8L HFNC and SpO2 improved to 93% and WOB decreased. ARIELLE Jacques made aware.

## 2025-05-23 ENCOUNTER — APPOINTMENT (OUTPATIENT)
Dept: RADIOLOGY | Facility: HOSPITAL | Age: 55
End: 2025-05-23
Payer: MEDICARE

## 2025-05-23 LAB
ALBUMIN SERPL BCP-MCNC: 3.2 G/DL (ref 3.4–5)
ALP SERPL-CCNC: 120 U/L (ref 33–120)
ALT SERPL W P-5'-P-CCNC: 10 U/L (ref 10–52)
ANION GAP BLDA CALCULATED.4IONS-SCNC: 6 MMO/L (ref 10–25)
ANION GAP BLDA CALCULATED.4IONS-SCNC: 7 MMO/L (ref 10–25)
ANION GAP SERPL CALC-SCNC: 10 MMOL/L (ref 10–20)
ANION GAP SERPL CALC-SCNC: 9 MMOL/L (ref 10–20)
APTT PPP: 32 SECONDS (ref 26–36)
AST SERPL W P-5'-P-CCNC: 18 U/L (ref 9–39)
BASE EXCESS BLDA CALC-SCNC: 4.6 MMOL/L (ref -2–3)
BASE EXCESS BLDA CALC-SCNC: 7.3 MMOL/L (ref -2–3)
BASOPHILS # BLD AUTO: 0.03 X10*3/UL (ref 0–0.1)
BASOPHILS NFR BLD AUTO: 0.3 %
BILIRUB SERPL-MCNC: 0.4 MG/DL (ref 0–1.2)
BODY TEMPERATURE: 37 DEGREES CELSIUS
BODY TEMPERATURE: 37 DEGREES CELSIUS
BUN SERPL-MCNC: 37 MG/DL (ref 6–23)
BUN SERPL-MCNC: 40 MG/DL (ref 6–23)
CA-I BLDA-SCNC: 1.1 MMOL/L (ref 1.1–1.33)
CA-I BLDA-SCNC: 1.17 MMOL/L (ref 1.1–1.33)
CALCIUM SERPL-MCNC: 8.3 MG/DL (ref 8.6–10.3)
CALCIUM SERPL-MCNC: 8.5 MG/DL (ref 8.6–10.3)
CHLORIDE BLDA-SCNC: 100 MMOL/L (ref 98–107)
CHLORIDE BLDA-SCNC: 96 MMOL/L (ref 98–107)
CHLORIDE SERPL-SCNC: 98 MMOL/L (ref 98–107)
CHLORIDE SERPL-SCNC: 99 MMOL/L (ref 98–107)
CO2 SERPL-SCNC: 33 MMOL/L (ref 21–32)
CO2 SERPL-SCNC: 34 MMOL/L (ref 21–32)
CREAT SERPL-MCNC: 1.74 MG/DL (ref 0.5–1.3)
CREAT SERPL-MCNC: 1.75 MG/DL (ref 0.5–1.3)
EGFRCR SERPLBLD CKD-EPI 2021: 46 ML/MIN/1.73M*2
EGFRCR SERPLBLD CKD-EPI 2021: 46 ML/MIN/1.73M*2
EOSINOPHIL # BLD AUTO: 0.11 X10*3/UL (ref 0–0.7)
EOSINOPHIL NFR BLD AUTO: 1 %
ERYTHROCYTE [DISTWIDTH] IN BLOOD BY AUTOMATED COUNT: 16.3 % (ref 11.5–14.5)
ERYTHROCYTE [DISTWIDTH] IN BLOOD BY AUTOMATED COUNT: 16.3 % (ref 11.5–14.5)
GLUCOSE BLD MANUAL STRIP-MCNC: 155 MG/DL (ref 74–99)
GLUCOSE BLD MANUAL STRIP-MCNC: 167 MG/DL (ref 74–99)
GLUCOSE BLD MANUAL STRIP-MCNC: 180 MG/DL (ref 74–99)
GLUCOSE BLD MANUAL STRIP-MCNC: 194 MG/DL (ref 74–99)
GLUCOSE BLD MANUAL STRIP-MCNC: 218 MG/DL (ref 74–99)
GLUCOSE BLD MANUAL STRIP-MCNC: 48 MG/DL (ref 74–99)
GLUCOSE BLDA-MCNC: 232 MG/DL (ref 74–99)
GLUCOSE BLDA-MCNC: 243 MG/DL (ref 74–99)
GLUCOSE SERPL-MCNC: 178 MG/DL (ref 74–99)
GLUCOSE SERPL-MCNC: 56 MG/DL (ref 74–99)
HCO3 BLDA-SCNC: 31.7 MMOL/L (ref 22–26)
HCO3 BLDA-SCNC: 35.7 MMOL/L (ref 22–26)
HCT VFR BLD AUTO: 27.5 % (ref 41–52)
HCT VFR BLD AUTO: 30.2 % (ref 41–52)
HCT VFR BLD EST: 24 % (ref 41–52)
HCT VFR BLD EST: 28 % (ref 41–52)
HGB BLD-MCNC: 7.9 G/DL (ref 13.5–17.5)
HGB BLD-MCNC: 8.4 G/DL (ref 13.5–17.5)
HGB BLDA-MCNC: 8.1 G/DL (ref 13.5–17.5)
HGB BLDA-MCNC: 9.3 G/DL (ref 13.5–17.5)
IMM GRANULOCYTES # BLD AUTO: 0.24 X10*3/UL (ref 0–0.7)
IMM GRANULOCYTES NFR BLD AUTO: 2.2 % (ref 0–0.9)
INHALED O2 CONCENTRATION: 100 %
INHALED O2 CONCENTRATION: 50 %
INR PPP: 1.3 (ref 0.9–1.1)
LACTATE BLDA-SCNC: 0.4 MMOL/L (ref 0.4–2)
LACTATE BLDA-SCNC: 0.9 MMOL/L (ref 0.4–2)
LYMPHOCYTES # BLD AUTO: 0.6 X10*3/UL (ref 1.2–4.8)
LYMPHOCYTES NFR BLD AUTO: 5.4 %
MAGNESIUM SERPL-MCNC: 2.17 MG/DL (ref 1.6–2.4)
MCH RBC QN AUTO: 22.7 PG (ref 26–34)
MCH RBC QN AUTO: 23.3 PG (ref 26–34)
MCHC RBC AUTO-ENTMCNC: 27.8 G/DL (ref 32–36)
MCHC RBC AUTO-ENTMCNC: 28.7 G/DL (ref 32–36)
MCV RBC AUTO: 81 FL (ref 80–100)
MCV RBC AUTO: 82 FL (ref 80–100)
MONOCYTES # BLD AUTO: 1.1 X10*3/UL (ref 0.1–1)
MONOCYTES NFR BLD AUTO: 10 %
NEUTROPHILS # BLD AUTO: 8.93 X10*3/UL (ref 1.2–7.7)
NEUTROPHILS NFR BLD AUTO: 81.1 %
NRBC BLD-RTO: 0.4 /100 WBCS (ref 0–0)
NRBC BLD-RTO: 0.5 /100 WBCS (ref 0–0)
OXYHGB MFR BLDA: 94.6 % (ref 94–98)
OXYHGB MFR BLDA: 96.7 % (ref 94–98)
PCO2 BLDA: 63 MM HG (ref 38–42)
PCO2 BLDA: 76 MM HG (ref 38–42)
PH BLDA: 7.28 PH (ref 7.38–7.42)
PH BLDA: 7.31 PH (ref 7.38–7.42)
PHOSPHATE SERPL-MCNC: 3.2 MG/DL (ref 2.5–4.9)
PLATELET # BLD AUTO: 232 X10*3/UL (ref 150–450)
PLATELET # BLD AUTO: 247 X10*3/UL (ref 150–450)
PO2 BLDA: 171 MM HG (ref 85–95)
PO2 BLDA: 80 MM HG (ref 85–95)
POTASSIUM BLDA-SCNC: 3.6 MMOL/L (ref 3.5–5.3)
POTASSIUM BLDA-SCNC: 4.4 MMOL/L (ref 3.5–5.3)
POTASSIUM SERPL-SCNC: 4.2 MMOL/L (ref 3.5–5.3)
POTASSIUM SERPL-SCNC: 4.3 MMOL/L (ref 3.5–5.3)
PROT SERPL-MCNC: 6.3 G/DL (ref 6.4–8.2)
PROTHROMBIN TIME: 14.3 SECONDS (ref 9.8–12.4)
RBC # BLD AUTO: 3.39 X10*6/UL (ref 4.5–5.9)
RBC # BLD AUTO: 3.7 X10*6/UL (ref 4.5–5.9)
SAO2 % BLDA: 96 % (ref 94–100)
SAO2 % BLDA: 98 % (ref 94–100)
SODIUM BLDA-SCNC: 134 MMOL/L (ref 136–145)
SODIUM BLDA-SCNC: 134 MMOL/L (ref 136–145)
SODIUM SERPL-SCNC: 137 MMOL/L (ref 136–145)
SODIUM SERPL-SCNC: 138 MMOL/L (ref 136–145)
WBC # BLD AUTO: 11 X10*3/UL (ref 4.4–11.3)
WBC # BLD AUTO: 11 X10*3/UL (ref 4.4–11.3)

## 2025-05-23 PROCEDURE — 84132 ASSAY OF SERUM POTASSIUM: CPT | Performed by: NURSE PRACTITIONER

## 2025-05-23 PROCEDURE — 37799 UNLISTED PX VASCULAR SURGERY: CPT | Performed by: NURSE PRACTITIONER

## 2025-05-23 PROCEDURE — 84132 ASSAY OF SERUM POTASSIUM: CPT | Performed by: INTERNAL MEDICINE

## 2025-05-23 PROCEDURE — 87081 CULTURE SCREEN ONLY: CPT | Mod: AHULAB | Performed by: NURSE PRACTITIONER

## 2025-05-23 PROCEDURE — 85610 PROTHROMBIN TIME: CPT | Performed by: NURSE PRACTITIONER

## 2025-05-23 PROCEDURE — 85027 COMPLETE CBC AUTOMATED: CPT | Performed by: INTERNAL MEDICINE

## 2025-05-23 PROCEDURE — 82947 ASSAY GLUCOSE BLOOD QUANT: CPT

## 2025-05-23 PROCEDURE — 94660 CPAP INITIATION&MGMT: CPT

## 2025-05-23 PROCEDURE — 2500000004 HC RX 250 GENERAL PHARMACY W/ HCPCS (ALT 636 FOR OP/ED): Mod: JZ | Performed by: NURSE PRACTITIONER

## 2025-05-23 PROCEDURE — 94640 AIRWAY INHALATION TREATMENT: CPT

## 2025-05-23 PROCEDURE — 83735 ASSAY OF MAGNESIUM: CPT | Performed by: NURSE PRACTITIONER

## 2025-05-23 PROCEDURE — 92950 HEART/LUNG RESUSCITATION CPR: CPT | Performed by: ANESTHESIOLOGY

## 2025-05-23 PROCEDURE — 2500000004 HC RX 250 GENERAL PHARMACY W/ HCPCS (ALT 636 FOR OP/ED): Performed by: INTERNAL MEDICINE

## 2025-05-23 PROCEDURE — 5A1955Z RESPIRATORY VENTILATION, GREATER THAN 96 CONSECUTIVE HOURS: ICD-10-PCS | Performed by: INTERNAL MEDICINE

## 2025-05-23 PROCEDURE — 31500 INSERT EMERGENCY AIRWAY: CPT | Performed by: ANESTHESIOLOGY

## 2025-05-23 PROCEDURE — 2500000002 HC RX 250 W HCPCS SELF ADMINISTERED DRUGS (ALT 637 FOR MEDICARE OP, ALT 636 FOR OP/ED): Performed by: INTERNAL MEDICINE

## 2025-05-23 PROCEDURE — 36600 WITHDRAWAL OF ARTERIAL BLOOD: CPT

## 2025-05-23 PROCEDURE — 70450 CT HEAD/BRAIN W/O DYE: CPT | Performed by: RADIOLOGY

## 2025-05-23 PROCEDURE — 2500000001 HC RX 250 WO HCPCS SELF ADMINISTERED DRUGS (ALT 637 FOR MEDICARE OP): Performed by: INTERNAL MEDICINE

## 2025-05-23 PROCEDURE — 2500000005 HC RX 250 GENERAL PHARMACY W/O HCPCS: Performed by: INTERNAL MEDICINE

## 2025-05-23 PROCEDURE — 94799 UNLISTED PULMONARY SVC/PX: CPT

## 2025-05-23 PROCEDURE — 2500000004 HC RX 250 GENERAL PHARMACY W/ HCPCS (ALT 636 FOR OP/ED): Mod: JZ | Performed by: ANESTHESIOLOGY

## 2025-05-23 PROCEDURE — 2020000001 HC ICU ROOM DAILY

## 2025-05-23 PROCEDURE — 80048 BASIC METABOLIC PNL TOTAL CA: CPT | Performed by: INTERNAL MEDICINE

## 2025-05-23 PROCEDURE — 99233 SBSQ HOSP IP/OBS HIGH 50: CPT | Performed by: INTERNAL MEDICINE

## 2025-05-23 PROCEDURE — 71250 CT THORAX DX C-: CPT

## 2025-05-23 PROCEDURE — 36556 INSERT NON-TUNNEL CV CATH: CPT | Performed by: NURSE PRACTITIONER

## 2025-05-23 PROCEDURE — 2500000004 HC RX 250 GENERAL PHARMACY W/ HCPCS (ALT 636 FOR OP/ED): Mod: JZ

## 2025-05-23 PROCEDURE — 71250 CT THORAX DX C-: CPT | Performed by: RADIOLOGY

## 2025-05-23 PROCEDURE — 85025 COMPLETE CBC W/AUTO DIFF WBC: CPT | Performed by: NURSE PRACTITIONER

## 2025-05-23 PROCEDURE — 99291 CRITICAL CARE FIRST HOUR: CPT | Performed by: ANESTHESIOLOGY

## 2025-05-23 PROCEDURE — 2500000004 HC RX 250 GENERAL PHARMACY W/ HCPCS (ALT 636 FOR OP/ED): Mod: JZ | Performed by: INTERNAL MEDICINE

## 2025-05-23 PROCEDURE — 71045 X-RAY EXAM CHEST 1 VIEW: CPT | Performed by: RADIOLOGY

## 2025-05-23 PROCEDURE — 5A12012 PERFORMANCE OF CARDIAC OUTPUT, SINGLE, MANUAL: ICD-10-PCS | Performed by: INTERNAL MEDICINE

## 2025-05-23 PROCEDURE — 70450 CT HEAD/BRAIN W/O DYE: CPT

## 2025-05-23 PROCEDURE — 2500000002 HC RX 250 W HCPCS SELF ADMINISTERED DRUGS (ALT 637 FOR MEDICARE OP, ALT 636 FOR OP/ED): Performed by: NURSE PRACTITIONER

## 2025-05-23 PROCEDURE — 0BH17EZ INSERTION OF ENDOTRACHEAL AIRWAY INTO TRACHEA, VIA NATURAL OR ARTIFICIAL OPENING: ICD-10-PCS | Performed by: INTERNAL MEDICINE

## 2025-05-23 PROCEDURE — 2500000005 HC RX 250 GENERAL PHARMACY W/O HCPCS: Performed by: NURSE PRACTITIONER

## 2025-05-23 PROCEDURE — 71045 X-RAY EXAM CHEST 1 VIEW: CPT

## 2025-05-23 PROCEDURE — 84100 ASSAY OF PHOSPHORUS: CPT | Performed by: NURSE PRACTITIONER

## 2025-05-23 PROCEDURE — 36415 COLL VENOUS BLD VENIPUNCTURE: CPT | Performed by: INTERNAL MEDICINE

## 2025-05-23 PROCEDURE — 94002 VENT MGMT INPAT INIT DAY: CPT

## 2025-05-23 PROCEDURE — 2500000004 HC RX 250 GENERAL PHARMACY W/ HCPCS (ALT 636 FOR OP/ED): Performed by: NURSE PRACTITIONER

## 2025-05-23 PROCEDURE — 05HM33Z INSERTION OF INFUSION DEVICE INTO RIGHT INTERNAL JUGULAR VEIN, PERCUTANEOUS APPROACH: ICD-10-PCS | Performed by: INTERNAL MEDICINE

## 2025-05-23 RX ORDER — NICARDIPINE HYDROCHLORIDE 0.2 MG/ML
0-15 INJECTION INTRAVENOUS CONTINUOUS
Status: DISCONTINUED | OUTPATIENT
Start: 2025-05-23 | End: 2025-05-24

## 2025-05-23 RX ORDER — PROPOFOL 10 MG/ML
0-30 INJECTION, EMULSION INTRAVENOUS CONTINUOUS
Status: DISCONTINUED | OUTPATIENT
Start: 2025-05-23 | End: 2025-05-27

## 2025-05-23 RX ORDER — INSULIN GLARGINE 100 [IU]/ML
30 INJECTION, SOLUTION SUBCUTANEOUS ONCE
Status: COMPLETED | OUTPATIENT
Start: 2025-05-23 | End: 2025-05-23

## 2025-05-23 RX ORDER — INSULIN LISPRO 100 [IU]/ML
0-15 INJECTION, SOLUTION INTRAVENOUS; SUBCUTANEOUS EVERY 4 HOURS
Status: DISCONTINUED | OUTPATIENT
Start: 2025-05-23 | End: 2025-06-05 | Stop reason: HOSPADM

## 2025-05-23 RX ORDER — AMLODIPINE BESYLATE 5 MG/1
5 TABLET ORAL DAILY
Status: CANCELLED | OUTPATIENT
Start: 2025-05-24

## 2025-05-23 RX ORDER — PROPOFOL 10 MG/ML
INJECTION, EMULSION INTRAVENOUS
Status: DISCONTINUED
Start: 2025-05-23 | End: 2025-05-23 | Stop reason: WASHOUT

## 2025-05-23 RX ORDER — EPINEPHRINE 0.1 MG/ML
INJECTION INTRACARDIAC; INTRAVENOUS CODE/TRAUMA/SEDATION MEDICATION
Status: COMPLETED | OUTPATIENT
Start: 2025-05-23 | End: 2025-05-23

## 2025-05-23 RX ORDER — LISINOPRIL 5 MG/1
5 TABLET ORAL DAILY
Status: DISCONTINUED | OUTPATIENT
Start: 2025-05-23 | End: 2025-06-05 | Stop reason: HOSPADM

## 2025-05-23 RX ORDER — FUROSEMIDE 10 MG/ML
40 INJECTION INTRAMUSCULAR; INTRAVENOUS ONCE
Status: COMPLETED | OUTPATIENT
Start: 2025-05-23 | End: 2025-05-23

## 2025-05-23 RX ORDER — NAPROXEN SODIUM 220 MG/1
81 TABLET, FILM COATED ORAL DAILY
Status: DISCONTINUED | OUTPATIENT
Start: 2025-05-24 | End: 2025-06-05 | Stop reason: HOSPADM

## 2025-05-23 RX ORDER — PROPOFOL 10 MG/ML
INJECTION, EMULSION INTRAVENOUS
Status: COMPLETED
Start: 2025-05-23 | End: 2025-05-23

## 2025-05-23 RX ORDER — INSULIN GLARGINE 100 [IU]/ML
30 INJECTION, SOLUTION SUBCUTANEOUS 2 TIMES DAILY
Status: DISCONTINUED | OUTPATIENT
Start: 2025-05-23 | End: 2025-06-05 | Stop reason: HOSPADM

## 2025-05-23 RX ADMIN — EPINEPHRINE 1 MG: 0.1 INJECTION INTRACARDIAC; INTRAVENOUS at 14:45

## 2025-05-23 RX ADMIN — OXYCODONE HYDROCHLORIDE 10 MG: 5 TABLET ORAL at 03:07

## 2025-05-23 RX ADMIN — IPRATROPIUM BROMIDE AND ALBUTEROL SULFATE 3 ML: 2.5; .5 SOLUTION RESPIRATORY (INHALATION) at 18:59

## 2025-05-23 RX ADMIN — PIPERACILLIN SODIUM AND TAZOBACTAM SODIUM 3.38 G: 3; .375 INJECTION, SOLUTION INTRAVENOUS at 00:05

## 2025-05-23 RX ADMIN — PROPOFOL 15 MCG/KG/MIN: 10 INJECTION, EMULSION INTRAVENOUS at 15:01

## 2025-05-23 RX ADMIN — Medication 50 PERCENT: at 10:10

## 2025-05-23 RX ADMIN — Medication 50 PERCENT: at 07:25

## 2025-05-23 RX ADMIN — PIPERACILLIN SODIUM AND TAZOBACTAM SODIUM 3.38 G: 3; .375 INJECTION, SOLUTION INTRAVENOUS at 19:14

## 2025-05-23 RX ADMIN — COLCHICINE 0.6 MG: 0.6 TABLET, FILM COATED ORAL at 08:22

## 2025-05-23 RX ADMIN — PIPERACILLIN SODIUM AND TAZOBACTAM SODIUM 3.38 G: 3; .375 INJECTION, SOLUTION INTRAVENOUS at 06:06

## 2025-05-23 RX ADMIN — DULOXETINE 60 MG: 30 CAPSULE, DELAYED RELEASE ORAL at 08:22

## 2025-05-23 RX ADMIN — PROPOFOL 50 MCG/KG/MIN: 10 INJECTION, EMULSION INTRAVENOUS at 20:40

## 2025-05-23 RX ADMIN — HEPARIN SODIUM 7500 UNITS: 5000 INJECTION, SOLUTION INTRAVENOUS; SUBCUTANEOUS at 21:40

## 2025-05-23 RX ADMIN — IPRATROPIUM BROMIDE AND ALBUTEROL SULFATE 3 ML: 2.5; .5 SOLUTION RESPIRATORY (INHALATION) at 07:25

## 2025-05-23 RX ADMIN — ASPIRIN 81 MG: 81 TABLET, COATED ORAL at 08:22

## 2025-05-23 RX ADMIN — PROPOFOL 50 MCG/KG/MIN: 10 INJECTION, EMULSION INTRAVENOUS at 22:25

## 2025-05-23 RX ADMIN — HEPARIN SODIUM 7500 UNITS: 5000 INJECTION, SOLUTION INTRAVENOUS; SUBCUTANEOUS at 06:06

## 2025-05-23 RX ADMIN — AMLODIPINE BESYLATE 5 MG: 5 TABLET ORAL at 08:22

## 2025-05-23 RX ADMIN — PIPERACILLIN SODIUM AND TAZOBACTAM SODIUM 3.38 G: 3; .375 INJECTION, SOLUTION INTRAVENOUS at 12:52

## 2025-05-23 RX ADMIN — Medication 80 PERCENT: at 22:35

## 2025-05-23 RX ADMIN — Medication 9 L/MIN: at 12:24

## 2025-05-23 RX ADMIN — INSULIN LISPRO 40 UNITS: 100 INJECTION, SOLUTION INTRAVENOUS; SUBCUTANEOUS at 13:04

## 2025-05-23 RX ADMIN — INSULIN LISPRO 3 UNITS: 100 INJECTION, SOLUTION INTRAVENOUS; SUBCUTANEOUS at 13:06

## 2025-05-23 RX ADMIN — PROPOFOL 50 MCG/KG/MIN: 10 INJECTION, EMULSION INTRAVENOUS at 19:14

## 2025-05-23 RX ADMIN — OXYCODONE HYDROCHLORIDE 10 MG: 5 TABLET ORAL at 10:00

## 2025-05-23 RX ADMIN — Medication 50 PERCENT: at 13:31

## 2025-05-23 RX ADMIN — Medication 100 PERCENT: at 15:05

## 2025-05-23 RX ADMIN — INSULIN LISPRO 3 UNITS: 100 INJECTION, SOLUTION INTRAVENOUS; SUBCUTANEOUS at 20:35

## 2025-05-23 RX ADMIN — FUROSEMIDE 40 MG: 10 INJECTION, SOLUTION INTRAMUSCULAR; INTRAVENOUS at 10:42

## 2025-05-23 RX ADMIN — PANTOPRAZOLE SODIUM 40 MG: 40 TABLET, DELAYED RELEASE ORAL at 06:06

## 2025-05-23 RX ADMIN — IPRATROPIUM BROMIDE AND ALBUTEROL SULFATE 3 ML: 2.5; .5 SOLUTION RESPIRATORY (INHALATION) at 13:31

## 2025-05-23 RX ADMIN — CYCLOBENZAPRINE HYDROCHLORIDE 5 MG: 5 TABLET, FILM COATED ORAL at 08:22

## 2025-05-23 RX ADMIN — IPRATROPIUM BROMIDE AND ALBUTEROL SULFATE 3 ML: 2.5; .5 SOLUTION RESPIRATORY (INHALATION) at 02:49

## 2025-05-23 RX ADMIN — DEXTROSE MONOHYDRATE 25 G: 25 INJECTION, SOLUTION INTRAVENOUS at 08:18

## 2025-05-23 RX ADMIN — PROPOFOL 50 MCG/KG/MIN: 10 INJECTION, EMULSION INTRAVENOUS at 17:17

## 2025-05-23 RX ADMIN — INSULIN GLARGINE 30 UNITS: 100 INJECTION, SOLUTION SUBCUTANEOUS at 10:41

## 2025-05-23 RX ADMIN — FUROSEMIDE 40 MG: 40 TABLET ORAL at 08:22

## 2025-05-23 RX ADMIN — Medication 80 PERCENT: at 23:10

## 2025-05-23 RX ADMIN — INSULIN GLARGINE 30 UNITS: 100 INJECTION, SOLUTION SUBCUTANEOUS at 20:35

## 2025-05-23 ASSESSMENT — COGNITIVE AND FUNCTIONAL STATUS - GENERAL
MOBILITY SCORE: 6
PERSONAL GROOMING: TOTAL
TURNING FROM BACK TO SIDE WHILE IN FLAT BAD: TOTAL
DRESSING REGULAR LOWER BODY CLOTHING: TOTAL
DRESSING REGULAR UPPER BODY CLOTHING: TOTAL
DAILY ACTIVITIY SCORE: 6
EATING MEALS: TOTAL
STANDING UP FROM CHAIR USING ARMS: TOTAL
WALKING IN HOSPITAL ROOM: TOTAL
TOILETING: TOTAL
MOVING TO AND FROM BED TO CHAIR: TOTAL
HELP NEEDED FOR BATHING: TOTAL
CLIMB 3 TO 5 STEPS WITH RAILING: TOTAL
MOVING FROM LYING ON BACK TO SITTING ON SIDE OF FLAT BED WITH BEDRAILS: TOTAL

## 2025-05-23 ASSESSMENT — PAIN SCALES - GENERAL
PAINLEVEL_OUTOF10: 8
PAINLEVEL_OUTOF10: 0 - NO PAIN

## 2025-05-23 ASSESSMENT — PAIN - FUNCTIONAL ASSESSMENT
PAIN_FUNCTIONAL_ASSESSMENT: 0-10
PAIN_FUNCTIONAL_ASSESSMENT: CPOT (CRITICAL CARE PAIN OBSERVATION TOOL)
PAIN_FUNCTIONAL_ASSESSMENT: 0-10

## 2025-05-23 ASSESSMENT — PAIN DESCRIPTION - DESCRIPTORS: DESCRIPTORS: ACHING

## 2025-05-23 ASSESSMENT — PAIN DESCRIPTION - LOCATION: LOCATION: BACK

## 2025-05-23 NOTE — PROCEDURES
Central Line    Date/Time: 5/23/2025 5:06 PM    Performed by: LIZ Bates  Authorized by: LIZ Bates    Consent:     Consent obtained:  Emergent situation    Consent given by:  Patient  Universal protocol:     Patient identity confirmed:  Arm band and hospital-assigned identification number  Pre-procedure details:     Indication(s): central venous access      Hand hygiene: Hand hygiene performed prior to insertion      Sterile barrier technique: All elements of maximal sterile technique followed      Skin preparation:  Chlorhexidine  Sedation:     Sedation type:  Deep (intubated)  Anesthesia:     Anesthesia method:  Local infiltration    Local anesthetic:  Lidocaine 1% w/o epi  Procedure details:     Location:  R internal jugular    Patient position:  Supine    Procedural supplies:  Triple lumen    Landmarks identified: yes      Ultrasound guidance: yes      Ultrasound guidance timing: prior to insertion and real time      Sterile ultrasound techniques: Sterile gel and sterile probe covers were used      Number of attempts:  1    Successful placement: yes    Post-procedure details:     Post-procedure:  Dressing applied and line sutured    Assessment:  Blood return through all ports, no pneumothorax on x-ray and placement verified by x-ray    Procedure completion:  Tolerated well, no immediate complications  Comments:      Procedure was performed separate from initial face to face encounter.    35 minutes    LIZ Bates

## 2025-05-23 NOTE — SIGNIFICANT EVENT
CODE BLUE Note    Code Blue activated at 1443 for asystole.  Per RN, patient removed his BiPAP and tried to get OOB without assistance, had increasing difficulty breathing, then arrested.    ACLS performed with ROSC after 5 minutes.  No shocks delivered.  Intubated during these events.    Transferred to the ICU.      Amarjit Tomlin MD

## 2025-05-23 NOTE — PROGRESS NOTES
05/23/25 0937   Discharge Planning   Assistance Needed Met with patient at bedside, patient states he was on bipap most of yesterday and all night, currently on NC and eating breakfast, not med ready for discharge, still plans to dc home with HHC when medically stable for dc, HHC updated.   Expected Discharge Disposition Home Health   Does the patient need discharge transport arranged? Yes   RoundTrip coordination needed? Yes

## 2025-05-23 NOTE — PROGRESS NOTES
Jai Shrestha is a 55 y.o. male on day 7 of admission presenting with Pneumonia due to infectious organism, unspecified laterality, unspecified part of lung.      Subjective   Patient was seen and examined bedside this morning       Objective     Last Recorded Vitals  /66 (BP Location: Left arm, Patient Position: Lying)   Pulse 90   Temp 36 °C (96.8 °F) (Temporal)   Resp 18   Wt (!) 174 kg (383 lb)   SpO2 94%   Intake/Output last 3 Shifts:    Intake/Output Summary (Last 24 hours) at 5/23/2025 1306  Last data filed at 5/22/2025 1424  Gross per 24 hour   Intake --   Output 900 ml   Net -900 ml         Admission Weight  Weight: (!) 174 kg (383 lb) (05/16/25 1112)    Daily Weight  05/16/25 : (!) 174 kg (383 lb)    Image Results  XR chest 2 views  Narrative: Interpreted By:  Baltazar Sultana,   STUDY:  XR CHEST 2 VIEWS;  5/21/2025 4:33 pm      INDICATION:  Signs/Symptoms:SOB.          COMPARISON:  05/16/2025      ACCESSION NUMBER(S):  [id number]      ORDERING CLINICIAN:  CHELSEA SAAVEDRA      FINDINGS:  PA and lateral radiographs of the chest were provided.              CARDIOMEDIASTINAL SILHOUETTE:  Cardiomediastinal silhouette is normal in size and configuration.      LUNGS:  New diffuse consolidation throughout the left lung concerning for  pneumonia. No large pleural effusion or pneumothorax. The right lung  is clear.      ABDOMEN:  No remarkable upper abdominal findings.      BONES:  No acute osseous changes.      Impression: 1.  New large left lung consolidation concerning for pneumonia.              MACRO:  None      Signed by: Baltazar Sultana 5/21/2025 4:38 PM  Dictation workstation:   [id number]      Physical Exam  Constitutional: Obese gentleman, appears drowsy, somnolent, but respond to questions, cooperative not in acute distress  Eyes: PERRLA, clear sclera  ENMT: Moist mucosal membranes, no exudate  Head / Neck: Atraumatic, normocephalic, supple neck, JVP not visualized  Lungs: Patent  airways, CTABL  Heart: RRR, S1S2, no murmurs appreciated, palpable pulses in all extremities  GI: Soft, NT, ND, bowel sounds present in all quadrants  MSK: Moves all extremities freely, no restriction  of ROM, no joint edema  Extremities: Chronic lymphedema with venous ulcers, bilateral lower extremities peripheral edema  : No Guo catheter inserted  Breast: Deferred  Neurological: AAO x 3 to person, place and date, facial muscles symmetrical, sensation intact, strength 4/4, no acute focal neurological deficits appreciated  Psychological: Appropriate mood and behavior  Relevant Results             Scheduled medications  Scheduled Medications[1]  Continuous medications  Continuous Medications[2]  PRN medications  PRN Medications[3]       Assessment & Plan  Pneumonia due to infectious organism, unspecified laterality, unspecified part of lung    55 y.o. male with a past medical history of chronic back pain, peripheral neuropathy, diabetes mellitus type 2 with insulin resistance, morbid obesity BMI 53.44, CKD 3, diastolic CHF, hypertension who presented to [facility] ED with complain of shortness of breath x 5 days.        Acute hypoxic resp failure  -multi etiology including RAMÍREZ, OHS, Pneumonia, CHF  -will limit fluid intake  -lasix 40 mg IV once repeat  -ABG noted  -BIPAP with higher pressures  -monitor    Pneumonia: Likely hospital-acquired given recent discharge from hospital  - CT shows worsening consolidations  - Vancomycin pharmacy to dose  - Zosyn 3.375 g IV piggyback every 6 hours  - Urine antigens and strep pneumo and Legionella negative  -Procalcitonin 0.23  -Bronchodilators   -ID consult: IV Zosyn. Stopped IV Vanc since MRSA nares PCR negative. Checking for COVID. When ready for home can complete abx with PO Levofloxacin 750mg/day through 5/25/25 for 10D total abx course.      Type 2 diabetes mellitus requiring insulin with insulin resistance  - Diabetic carbohydrate controlled diet  -Lantus 60  u twice daily  -Prandial insulin with lispro 40 u 3 times daily before meals  -Lispro sliding scale  -Hypoglycemia protocol in place  - Wound nurse consulted for chronic ulcer     Diastolic CHF, possible decompensation given level of hypoxia for which pneumonia cannot be accounted for alone  Lasix   Daily weight     Hypertension  Amlodipine 5 mg orally daily     Hyperlipidemia  Atorvastatin 80 mg orally daily     Bilateral lymphedema with venous stasis changes   Diuresis with Lasix     Chronic pain  -Visibly upset over not having Dilaudid despite explanation that chronic pain should not be treated with IV opiate per state guidelines  -On Percocet 7.5-300 mg tablet, not available under this dosage in the formulary, will substitute with oxycodone 10 mg p.o. every 4 hours as needed severe pain  -Flexeril 5 mg 3 times daily as needed for spasm     Morbid obesity BMI 53.44  Lifestyle modification     DVT prophylaxis  Heparin 7500 units subcu 3 times daily  SCDs     Disposition: Presented with shortness of breath, found with possible healthcare associate pneumonia.  Ready for discharge, but now with multiple other complaints, despite not being in heart failure.    Patient now has acute hypoxic respiratory failure, using accessory muscles.  His oxygen requirement is going up.  Will put him on BiPAP with higher pressures.       Ronan Dumont MD           [1] amLODIPine, 5 mg, oral, Daily  aspirin, 81 mg, oral, Daily  atorvastatin, 80 mg, oral, Nightly  colchicine, 0.6 mg, oral, Daily  DULoxetine, 60 mg, oral, Daily  ergocalciferol, 1.25 mg, oral, Weekly  ferrous sulfate, 1 tablet, oral, Every Mon/Wed/Fri  furosemide, 40 mg, oral, Daily  heparin (porcine), 7,500 Units, subcutaneous, q8h TAMIR  insulin glargine, 60 Units, subcutaneous, BID  insulin lispro, 0-15 Units, subcutaneous, TID AC  insulin lispro, 40 Units, subcutaneous, TID AC  ipratropium-albuteroL, 3 mL, nebulization, TID  pantoprazole, 40 mg, oral, Daily  before breakfast   Or  pantoprazole, 40 mg, intravenous, Daily before breakfast  piperacillin-tazobactam, 3.375 g, intravenous, q6h  polyethylene glycol, 17 g, oral, Daily  QUEtiapine, 100 mg, oral, Nightly     [2]    [3] PRN medications: acetaminophen **OR** acetaminophen **OR** acetaminophen, acetaminophen, cyclobenzaprine, dextrose, dextrose, glucagon, glucagon, ipratropium-albuteroL, naloxone, ondansetron **OR** ondansetron, oxyCODONE, oxygen, oxygen

## 2025-05-23 NOTE — CARE PLAN
The patient's goals for the shift include      The clinical goals for the shift include pt will not have increased O2 demand this shift; pt will be compliant with BiPAP      Problem: Diabetes  Goal: Maintain glucose levels >70mg/dl to <250mg/dl throughout shift  Outcome: Progressing  Goal: No changes in neurological exam by end of shift  Outcome: Progressing  Goal: Increase self care and/or family involovement by end of shift  Outcome: Progressing     Problem: Pain  Goal: Takes deep breaths with improved pain control throughout the shift  Outcome: Progressing  Goal: Turns in bed with improved pain control throughout the shift  Outcome: Progressing  Goal: Free from opioid side effects throughout the shift  Outcome: Progressing     Problem: Fall/Injury  Goal: Not fall by end of shift  Outcome: Progressing  Goal: Be free from injury by end of the shift  Outcome: Progressing  Goal: Verbalize understanding of personal risk factors for fall in the hospital  Outcome: Progressing  Goal: Use assistive devices by end of the shift  Outcome: Progressing     Problem: Pain - Adult  Goal: Verbalizes/displays adequate comfort level or baseline comfort level  Outcome: Progressing     Problem: Safety - Adult  Goal: Free from fall injury  Outcome: Progressing     Problem: Discharge Planning  Goal: Discharge to home or other facility with appropriate resources  Outcome: Progressing     Problem: Chronic Conditions and Co-morbidities  Goal: Patient's chronic conditions and co-morbidity symptoms are monitored and maintained or improved  Outcome: Progressing     Problem: Nutrition  Goal: Nutrient intake appropriate for maintaining nutritional needs  Outcome: Progressing     Problem: Skin  Goal: Decreased wound size/increased tissue granulation at next dressing change  Outcome: Progressing  Goal: Participates in plan/prevention/treatment measures  Outcome: Progressing  Goal: Prevent/manage excess moisture  Outcome: Progressing  Flowsheets  (Taken 5/23/2025 5697)  Prevent/manage excess moisture: Cleanse incontinence/protect with barrier cream  Note: Monitor for incontinence  Goal: Promote/optimize nutrition  Outcome: Progressing  Goal: Promote skin healing  Outcome: Progressing

## 2025-05-23 NOTE — H&P
Critical Care Medicine:  History & Physical    History of Present Illness     55 y.o. male with a PMH of super obese, chronic back pain on opioids (Norco 7.5/325 3-4x per day), peripheral neuropathy, HTN, HLD, HFpEF, obesity-hypoventilation syndrome, T2DM on insulin, CKD, GERD transferred to the ICU following cardiac arrest with ROSC.  Presented to the ED on 5/16 with c/o SOB x 5 days.  CT imaging demonstrated multifocal opacities bilaterally.  Admitted to the hospitalist service for acute on chronic diastolic CHF and pneumonia.  Of note, he had recently been admitted here for the same issues from 4/14 to 4/27.  ID consulted and recommended continuing IV antibiotics (Zosyn) and transitioning to PO antibiotics on discharge.    Treated accordingly with antibiotics as well as diuretics.  Discharged on 5/20 but patient appealed this and remained in the hospital.      Code Blue activated this afternoon.  Details unclear at this time but it sounds like patient removed his BiPAP and tried to get OOB without assistance, had increasing difficulty breathing, and then went into PEA, then asystole.  ACLS performed with ROSC.  Patient intubated during these events as well.  Approx 5 minutes downtime.  Transferred to the ICU.      ROS:  Unable to obtain a complete review of systems because patient is intubated and sedated.    Objective   Previous History     Medical History  As above.    Surgical History  Surgical History[1]  Allergies  RX Allergies[2]    Home Medications  Current Outpatient Medications   Medication Instructions    amLODIPine (Norvasc) 5 mg tablet 1 tablet, Daily    aspirin 81 mg EC tablet 1 tablet, Daily    atorvastatin (Lipitor) 80 mg tablet 1 tablet, Nightly    cephalexin (KEFLEX) 1,000 mg, oral, 3 times daily    cholecalciferol (VITAMIN D-3) 50,000 Units, Once Weekly    colchicine 0.6 mg, oral, Daily    cyclobenzaprine (FLEXERIL) 10 mg, oral, 2 times daily PRN    DULoxetine (CYMBALTA) 60 mg, oral, Daily, Do  "not crush or chew.    ferrous sulfate 325 mg, Every Mon/Wed/Fri    HYDROcodone-acetaminophen (Norco) 7.5-325 mg tablet 1 tablet, oral, Every 6 hours PRN    [START ON 6/5/2025] HYDROcodone-acetaminophen (Norco) 7.5-325 mg tablet 1 tablet, oral, Every 6 hours PRN    insulin aspart (NOVOLOG U-100 INSULIN ASPART) 40 Units, subcutaneous, 3 times daily before meals    insulin glargine (LANTUS) 60 Units, subcutaneous, 2 times daily, Take as directed per insulin instructions.    levoFLOXacin (LEVAQUIN) 750 mg, oral, Daily    lisinopril 5 mg, Daily RT    naloxone (NARCAN) 4 mg, nasal, As needed, May repeat every 2-3 minutes if needed, alternating nostrils, until medical assistance becomes available.    omeprazole (PriLOSEC) 40 mg DR capsule 1 capsule, 2 times daily    pen needle, diabetic (BD Ultra-Fine Noelle Pen Needle) 32 gauge x 5/32\" needle 1 Pen needle, miscellaneous, 2 times daily    QUEtiapine (SEROQUEL) 100 mg, oral, Nightly    torsemide (DEMADEX) 60 mg, 2 times daily     Social History  History - Tobacco Use - Plain Text[3]    reports current alcohol use.    reports that he does not currently use drugs.     Family History  family history includes Arthritis in his mother; COPD in his mother; Coronary artery disease in his mother; Depression in his mother; Diabetes in his mother; Hypertension in his mother; Stroke in his mother.    Objective     Vitals:    05/16/25 1112   Weight: (!) 174 kg (383 lb)   Body mass index is 53.44 kg/m².        5/23/2025     1:49 PM 5/23/2025     3:05 PM 5/23/2025     3:06 PM 5/23/2025     3:07 PM 5/23/2025     3:08 PM 5/23/2025     3:10 PM 5/23/2025     3:32 PM   Vitals   Systolic 173     122    Diastolic 71     53    BP Location Left arm     Left arm    Heart Rate 93  109 109 106 106 93   Temp 36.7 °C (98.1 °F)     35.9 °C (96.6 °F)    Resp 16 20 23 24 33 20 20        Vent settings:  Vent Mode: Assist control/Volume control plus  FiO2 (%):  [50 %-100 %] 100 %  S RR:  [] 220  S VT:  " [550 mL] 550 mL  PEEP/CPAP (cm H2O):  [8 cm H20] 8 cm H20  MAP (cm H2O):  [14] 14  No intake or output data in the 24 hours ending 05/23/25 1549    Physical Exam  Neuro: Post-arrest.  Eyes: Pupils equally round, ~3 mm bilaterall.  Fixed.  Clear sclerae.  CV: Normal S1, S2.  RRR.  No m/r/g.  Resp: Intubated.  Equal breath sounds bilaterally.  Scattered ronchi on the right.  GI: Obese, hypoactive BS, abd soft, NT, ND.  Ext: 3+ pitting BLE edema.  Skin: Skin changes of lower extremities c/w bilateral lymphedema and chronic venous stasis.  No open ulcers appreciated.    Medications     Scheduled Medications:   Scheduled Medications[4]   Continuous Medications:   Continuous Medications[5]   PRN Medications:     Labs     Results from last 72 hours   Lab Units 05/23/25  0633 05/22/25  0532   GLUCOSE mg/dL 56* 69*   SODIUM mmol/L 137 138   POTASSIUM mmol/L 4.3 4.9   CHLORIDE mmol/L 98 99   CO2 mmol/L 34* 29   BUN mg/dL 37* 33*   CREATININE mg/dL 1.75* 1.77*   EGFR mL/min/1.73m*2 46* 45*   CALCIUM mg/dL 8.5* 8.7                 Results from last 72 hours   Lab Units 05/23/25  0633 05/22/25  0532   WBC AUTO x10*3/uL 11.0 12.4*   NRBC AUTO /100 WBCs 0.5* 0.6*   RBC AUTO x10*6/uL 3.70* 4.07*   HEMOGLOBIN g/dL 8.4* 9.6*   HEMATOCRIT % 30.2* 33.9*   MCV fL 82 83   MCH pg 22.7* 23.6*   MCHC g/dL 27.8* 28.3*   RDW % 16.3* 16.4*   PLATELETS AUTO x10*3/uL 247 257     Results from last 72 hours   Lab Units 05/23/25  1546 05/23/25  1222 05/22/25  1612   POCT PH, ARTERIAL pH 7.31* 7.28* 7.30*   POCT PCO2, ARTERIAL mm Hg 63* 76* 68*   POCT PO2, ARTERIAL mm Hg 171* 80* 82*   POCT HCO3 CALCULATED, ARTERIAL mmol/L 31.7* 35.7* 33.5*   POCT LACTATE, ARTERIAL mmol/L 0.9 0.4 0.5   POCT BASE EXCESS, ARTERIAL mmol/L 4.6* 7.3* 5.8*   FIO2 % 100 50 50     Lab Results   Component Value Date    BLOODCULT No growth at 4 days -  FINAL REPORT 04/14/2025    BLOODCULT No growth at 4 days -  FINAL REPORT 04/14/2025     No results found for:  "\"URINECULTURE\"    Imaging and Diagnostic Studies     Recent imaging and diagnostic studies reviewed.        Assessment / Plan     55 y.o. male with a PMH of super obese, back pain on chronic opioids (Norco 7.5/325 3-4x per day), peripheral neuropathy, HTN, HLD, HFpEF, obesity-hypoventilation syndrome, T2DM on insulin, CKD, GERD transferred to the ICU following cardiac arrest - initially PEA then asystole - following a respiratory event.  ROSC achieved after ~5 min.  Patient intubated.    #Cardiac arrest (PEA then asystole) with ROSC  #Acute hypoxic ischemia encephalopathy  #Back pain on chronic opioids  #Acute on chronic diastolic CHF  #Obesity Hypoventilation Syndrome  #T2DM on long-term insulin  #Bilateral pneumonia    - Intubated and mechanically ventilated.  - CT head.  - DC opioids.  Not a candidate for chronic opioid therapy at this time.  - Gentle diuresis.  - Insulin for glucose control.  - Seen by ID this admission.  Continue course of Zosyn.      Amarjit Tomlin MD    This patient is critically ill/injured due to acute impairment in one or more vital organ systems, such that there is a probably of imminent or life-threatening deterioration of the patient's condition.  I spent 61 minutes in the direct delivery of medical care that involves high complexity decision making to treat single or multiple vital organ system failure and/or prevent further life-threatening deterioration of the patient's condition.  This time does not include separately billable procedures.         [1] No past surgical history on file.  [2]   Allergies  Allergen Reactions    Metformin Unknown   [3]   Social History  Tobacco Use   Smoking Status Former    Types: Cigarettes   Smokeless Tobacco Never   [4] [Transfer Hold] amLODIPine, 5 mg, oral, Daily  [Transfer Hold] aspirin, 81 mg, oral, Daily  [Transfer Hold] atorvastatin, 80 mg, oral, Nightly  [Transfer Hold] colchicine, 0.6 mg, oral, Daily  [Transfer Hold] DULoxetine, 60 mg, oral, " Daily  [Transfer Hold] ergocalciferol, 1.25 mg, oral, Weekly  [Transfer Hold] ferrous sulfate, 1 tablet, oral, Every Mon/Wed/Fri  furosemide, 40 mg, oral, Daily  [Transfer Hold] heparin (porcine), 7,500 Units, subcutaneous, q8h TAMIR  [Transfer Hold] insulin glargine, 60 Units, subcutaneous, BID  [Transfer Hold] insulin lispro, 0-15 Units, subcutaneous, TID AC  [Transfer Hold] insulin lispro, 40 Units, subcutaneous, TID AC  [Transfer Hold] ipratropium-albuteroL, 3 mL, nebulization, TID  [Transfer Hold] pantoprazole, 40 mg, oral, Daily before breakfast   Or  [Transfer Hold] pantoprazole, 40 mg, intravenous, Daily before breakfast  piperacillin-tazobactam, 3.375 g, intravenous, q6h  [Transfer Hold] polyethylene glycol, 17 g, oral, Daily  [Transfer Hold] QUEtiapine, 100 mg, oral, Nightly  [5] propofol, 0-50 mcg/kg/min, Last Rate: 15 mcg/kg/min (05/23/25 7184)

## 2025-05-23 NOTE — POST-PROCEDURE NOTE
The area was prepped with chlorhexidine and draped in the usual sterile fashion. Anesthesia was obtained with Lidocaine 1%.  Artery was visualized utilizing ultrasound guidance and the artery was cannulated with a 20 G arterial catheter under ultrasound. Catheter was exchanged over a guidewire and bright red pulsatile blood exited catheter.  Appropriate wave form was noted and the patient tolerated the procedure well.    This procedure was performed in addition to and exclusive to any other face to face care time/initial management.    Korey Lala, CNP

## 2025-05-23 NOTE — CARE PLAN
The patient's goals for the shift include      The clinical goals for the shift include Pt. will verbalize improved pain by end of shift    Over the shift, the patient did not make progress toward the following goals. Barriers to progression include. Recommendations to address these barriers include.    Problem: Pain  Goal: Takes deep breaths with improved pain control throughout the shift  Outcome: Progressing  Goal: Turns in bed with improved pain control throughout the shift  Outcome: Progressing  Goal: Walks with improved pain control throughout the shift  Outcome: Progressing  Goal: Performs ADL's with improved pain control throughout shift  Outcome: Progressing  Goal: Participates in PT with improved pain control throughout the shift  Outcome: Progressing  Goal: Free from opioid side effects throughout the shift  Outcome: Progressing  Goal: Free from acute confusion related to pain meds throughout the shift  Outcome: Progressing     Problem: Fall/Injury  Goal: Not fall by end of shift  Outcome: Progressing  Goal: Be free from injury by end of the shift  Outcome: Progressing  Goal: Verbalize understanding of personal risk factors for fall in the hospital  Outcome: Progressing  Goal: Verbalize understanding of risk factor reduction measures to prevent injury from fall in the home  Outcome: Progressing  Goal: Use assistive devices by end of the shift  Outcome: Progressing  Goal: Pace activities to prevent fatigue by end of the shift  Outcome: Progressing     Problem: Skin  Goal: Decreased wound size/increased tissue granulation at next dressing change  Outcome: Progressing  Flowsheets (Taken 5/23/2025 1340)  Decreased wound size/increased tissue granulation at next dressing change: Promote sleep for wound healing  Goal: Participates in plan/prevention/treatment measures  Outcome: Progressing  Flowsheets (Taken 5/23/2025 1340)  Participates in plan/prevention/treatment measures:   Elevate heels   Increase  activity/out of bed for meals  Goal: Prevent/manage excess moisture  Outcome: Progressing  Flowsheets (Taken 5/23/2025 1340)  Prevent/manage excess moisture:   Cleanse incontinence/protect with barrier cream   Moisturize dry skin   Monitor for/manage infection if present  Goal: Prevent/minimize sheer/friction injuries  Outcome: Progressing  Flowsheets (Taken 5/23/2025 1340)  Prevent/minimize sheer/friction injuries:   Increase activity/out of bed for meals   HOB 30 degrees or less   Turn/reposition every 2 hours/use positioning/transfer devices  Goal: Promote/optimize nutrition  Outcome: Progressing  Flowsheets (Taken 5/23/2025 1340)  Promote/optimize nutrition:   Offer water/supplements/favorite foods   Consume > 50% meals/supplements   Monitor/record intake including meals  Goal: Promote skin healing  Outcome: Progressing  Flowsheets (Taken 5/23/2025 1340)  Promote skin healing: Assess skin/pad under line(s)/device(s)

## 2025-05-23 NOTE — PROCEDURES
INTUBATION    Pre-procedure diagnosis: Cardiac arrest  Post-procedure diagnosis: Same  Indication(s):  Acute respiratory failure  Performed by:  Me  Assistant(s):  RT  EBL:   None    Consent obtained: Emergent  Patient identified: Yes  Timeout performed: Emergent    Induction agent(s):  None.    Details:  Chest compressions were being performed at time of my arrival.  Also, BMV being performed by RT with FiO2 100%.  Poor chest excursion noted.  I inserted a 10 cm oral airway and performed a jaw thrust.  Ventilation improved.  I then performed DL with Mac 4, grade IIb view.  Intubated with #7.5 ETT @ 24 cm at lips.  +EtCO2 color change.  Breath sounds equal bilaterally.    No complications.  Other details: None.      Amarjit Tomlin MD

## 2025-05-24 ENCOUNTER — APPOINTMENT (OUTPATIENT)
Dept: CARDIOLOGY | Facility: HOSPITAL | Age: 55
End: 2025-05-24
Payer: MEDICARE

## 2025-05-24 LAB
ALBUMIN SERPL BCP-MCNC: 3.2 G/DL (ref 3.4–5)
ALP SERPL-CCNC: 118 U/L (ref 33–120)
ALT SERPL W P-5'-P-CCNC: 12 U/L (ref 10–52)
ANION GAP BLDA CALCULATED.4IONS-SCNC: 5 MMO/L (ref 10–25)
ANION GAP SERPL CALC-SCNC: 15 MMOL/L (ref 10–20)
AST SERPL W P-5'-P-CCNC: 18 U/L (ref 9–39)
BASE EXCESS BLDA CALC-SCNC: 8.2 MMOL/L (ref -2–3)
BASOPHILS # BLD AUTO: 0.04 X10*3/UL (ref 0–0.1)
BASOPHILS NFR BLD AUTO: 0.5 %
BILIRUB SERPL-MCNC: 0.5 MG/DL (ref 0–1.2)
BODY TEMPERATURE: 37 DEGREES CELSIUS
BUN SERPL-MCNC: 40 MG/DL (ref 6–23)
CA-I BLDA-SCNC: 1.18 MMOL/L (ref 1.1–1.33)
CALCIUM SERPL-MCNC: 8.2 MG/DL (ref 8.6–10.3)
CHLORIDE BLDA-SCNC: 101 MMOL/L (ref 98–107)
CHLORIDE SERPL-SCNC: 100 MMOL/L (ref 98–107)
CO2 SERPL-SCNC: 28 MMOL/L (ref 21–32)
CREAT SERPL-MCNC: 1.63 MG/DL (ref 0.5–1.3)
EGFRCR SERPLBLD CKD-EPI 2021: 50 ML/MIN/1.73M*2
EJECTION FRACTION: 55 %
EOSINOPHIL # BLD AUTO: 0.2 X10*3/UL (ref 0–0.7)
EOSINOPHIL NFR BLD AUTO: 2.3 %
ERYTHROCYTE [DISTWIDTH] IN BLOOD BY AUTOMATED COUNT: 16.2 % (ref 11.5–14.5)
GLUCOSE BLD MANUAL STRIP-MCNC: 152 MG/DL (ref 74–99)
GLUCOSE BLD MANUAL STRIP-MCNC: 153 MG/DL (ref 74–99)
GLUCOSE BLD MANUAL STRIP-MCNC: 165 MG/DL (ref 74–99)
GLUCOSE BLD MANUAL STRIP-MCNC: 167 MG/DL (ref 74–99)
GLUCOSE BLD MANUAL STRIP-MCNC: 175 MG/DL (ref 74–99)
GLUCOSE BLD MANUAL STRIP-MCNC: 178 MG/DL (ref 74–99)
GLUCOSE BLDA-MCNC: 151 MG/DL (ref 74–99)
GLUCOSE SERPL-MCNC: 153 MG/DL (ref 74–99)
HCO3 BLDA-SCNC: 34.1 MMOL/L (ref 22–26)
HCT VFR BLD AUTO: 27.4 % (ref 41–52)
HCT VFR BLD EST: 25 % (ref 41–52)
HGB BLD-MCNC: 7.8 G/DL (ref 13.5–17.5)
HGB BLDA-MCNC: 8.4 G/DL (ref 13.5–17.5)
IMM GRANULOCYTES # BLD AUTO: 0.13 X10*3/UL (ref 0–0.7)
IMM GRANULOCYTES NFR BLD AUTO: 1.5 % (ref 0–0.9)
INHALED O2 CONCENTRATION: 80 %
LACTATE BLDA-SCNC: 0.6 MMOL/L (ref 0.4–2)
LYMPHOCYTES # BLD AUTO: 0.82 X10*3/UL (ref 1.2–4.8)
LYMPHOCYTES NFR BLD AUTO: 9.5 %
MAGNESIUM SERPL-MCNC: 2.12 MG/DL (ref 1.6–2.4)
MCH RBC QN AUTO: 23.1 PG (ref 26–34)
MCHC RBC AUTO-ENTMCNC: 28.5 G/DL (ref 32–36)
MCV RBC AUTO: 81 FL (ref 80–100)
MITRAL VALVE E/A RATIO: 1.38
MONOCYTES # BLD AUTO: 0.82 X10*3/UL (ref 0.1–1)
MONOCYTES NFR BLD AUTO: 9.5 %
NEUTROPHILS # BLD AUTO: 6.65 X10*3/UL (ref 1.2–7.7)
NEUTROPHILS NFR BLD AUTO: 76.7 %
NRBC BLD-RTO: 0.3 /100 WBCS (ref 0–0)
OXYHGB MFR BLDA: 95 % (ref 94–98)
PCO2 BLDA: 55 MM HG (ref 38–42)
PH BLDA: 7.4 PH (ref 7.38–7.42)
PHOSPHATE SERPL-MCNC: 2.9 MG/DL (ref 2.5–4.9)
PLATELET # BLD AUTO: 218 X10*3/UL (ref 150–450)
PO2 BLDA: 84 MM HG (ref 85–95)
POTASSIUM BLDA-SCNC: 3.7 MMOL/L (ref 3.5–5.3)
POTASSIUM SERPL-SCNC: 3.9 MMOL/L (ref 3.5–5.3)
PROT SERPL-MCNC: 5.7 G/DL (ref 6.4–8.2)
RBC # BLD AUTO: 3.38 X10*6/UL (ref 4.5–5.9)
SAO2 % BLDA: 96 % (ref 94–100)
SODIUM BLDA-SCNC: 136 MMOL/L (ref 136–145)
SODIUM SERPL-SCNC: 139 MMOL/L (ref 136–145)
WBC # BLD AUTO: 8.7 X10*3/UL (ref 4.4–11.3)

## 2025-05-24 PROCEDURE — 93005 ELECTROCARDIOGRAM TRACING: CPT

## 2025-05-24 PROCEDURE — 94640 AIRWAY INHALATION TREATMENT: CPT

## 2025-05-24 PROCEDURE — 2500000004 HC RX 250 GENERAL PHARMACY W/ HCPCS (ALT 636 FOR OP/ED): Performed by: NURSE PRACTITIONER

## 2025-05-24 PROCEDURE — 2500000004 HC RX 250 GENERAL PHARMACY W/ HCPCS (ALT 636 FOR OP/ED): Mod: JZ | Performed by: NURSE PRACTITIONER

## 2025-05-24 PROCEDURE — 37799 UNLISTED PX VASCULAR SURGERY: CPT | Performed by: NURSE PRACTITIONER

## 2025-05-24 PROCEDURE — 93308 TTE F-UP OR LMTD: CPT | Performed by: STUDENT IN AN ORGANIZED HEALTH CARE EDUCATION/TRAINING PROGRAM

## 2025-05-24 PROCEDURE — 99291 CRITICAL CARE FIRST HOUR: CPT | Performed by: ANESTHESIOLOGY

## 2025-05-24 PROCEDURE — 2500000001 HC RX 250 WO HCPCS SELF ADMINISTERED DRUGS (ALT 637 FOR MEDICARE OP): Performed by: NURSE PRACTITIONER

## 2025-05-24 PROCEDURE — 94681 O2 UPTK CO2 OUTP % O2 XTRC: CPT

## 2025-05-24 PROCEDURE — 2500000004 HC RX 250 GENERAL PHARMACY W/ HCPCS (ALT 636 FOR OP/ED): Mod: JZ | Performed by: ANESTHESIOLOGY

## 2025-05-24 PROCEDURE — 2500000002 HC RX 250 W HCPCS SELF ADMINISTERED DRUGS (ALT 637 FOR MEDICARE OP, ALT 636 FOR OP/ED): Performed by: NURSE PRACTITIONER

## 2025-05-24 PROCEDURE — 84100 ASSAY OF PHOSPHORUS: CPT | Performed by: NURSE PRACTITIONER

## 2025-05-24 PROCEDURE — 85025 COMPLETE CBC W/AUTO DIFF WBC: CPT | Performed by: NURSE PRACTITIONER

## 2025-05-24 PROCEDURE — 2020000001 HC ICU ROOM DAILY

## 2025-05-24 PROCEDURE — 82947 ASSAY GLUCOSE BLOOD QUANT: CPT

## 2025-05-24 PROCEDURE — 84132 ASSAY OF SERUM POTASSIUM: CPT | Performed by: NURSE PRACTITIONER

## 2025-05-24 PROCEDURE — 83735 ASSAY OF MAGNESIUM: CPT | Performed by: NURSE PRACTITIONER

## 2025-05-24 PROCEDURE — 94003 VENT MGMT INPAT SUBQ DAY: CPT

## 2025-05-24 PROCEDURE — 93010 ELECTROCARDIOGRAM REPORT: CPT | Performed by: INTERNAL MEDICINE

## 2025-05-24 PROCEDURE — 2500000005 HC RX 250 GENERAL PHARMACY W/O HCPCS: Performed by: ANESTHESIOLOGY

## 2025-05-24 PROCEDURE — 93308 TTE F-UP OR LMTD: CPT

## 2025-05-24 RX ORDER — DEXMEDETOMIDINE HYDROCHLORIDE 4 UG/ML
0-1.5 INJECTION, SOLUTION INTRAVENOUS CONTINUOUS
Status: DISCONTINUED | OUTPATIENT
Start: 2025-05-24 | End: 2025-05-29

## 2025-05-24 RX ORDER — FUROSEMIDE 10 MG/ML
40 INJECTION INTRAMUSCULAR; INTRAVENOUS 2 TIMES DAILY
Status: DISCONTINUED | OUTPATIENT
Start: 2025-05-24 | End: 2025-06-05 | Stop reason: HOSPADM

## 2025-05-24 RX ADMIN — POLYETHYLENE GLYCOL 3350 17 G: 17 POWDER, FOR SOLUTION ORAL at 08:45

## 2025-05-24 RX ADMIN — HEPARIN SODIUM 7500 UNITS: 5000 INJECTION, SOLUTION INTRAVENOUS; SUBCUTANEOUS at 13:51

## 2025-05-24 RX ADMIN — INSULIN LISPRO 3 UNITS: 100 INJECTION, SOLUTION INTRAVENOUS; SUBCUTANEOUS at 04:37

## 2025-05-24 RX ADMIN — Medication 40 PERCENT: at 23:43

## 2025-05-24 RX ADMIN — IPRATROPIUM BROMIDE AND ALBUTEROL SULFATE 3 ML: 2.5; .5 SOLUTION RESPIRATORY (INHALATION) at 15:26

## 2025-05-24 RX ADMIN — PROPOFOL 50 MCG/KG/MIN: 10 INJECTION, EMULSION INTRAVENOUS at 01:00

## 2025-05-24 RX ADMIN — IPRATROPIUM BROMIDE AND ALBUTEROL SULFATE 3 ML: 2.5; .5 SOLUTION RESPIRATORY (INHALATION) at 11:10

## 2025-05-24 RX ADMIN — INSULIN LISPRO 3 UNITS: 100 INJECTION, SOLUTION INTRAVENOUS; SUBCUTANEOUS at 00:05

## 2025-05-24 RX ADMIN — INSULIN GLARGINE 30 UNITS: 100 INJECTION, SOLUTION SUBCUTANEOUS at 21:36

## 2025-05-24 RX ADMIN — PROPOFOL 50 MCG/KG/MIN: 10 INJECTION, EMULSION INTRAVENOUS at 13:51

## 2025-05-24 RX ADMIN — INSULIN LISPRO 3 UNITS: 100 INJECTION, SOLUTION INTRAVENOUS; SUBCUTANEOUS at 08:46

## 2025-05-24 RX ADMIN — HEPARIN SODIUM 7500 UNITS: 5000 INJECTION, SOLUTION INTRAVENOUS; SUBCUTANEOUS at 05:30

## 2025-05-24 RX ADMIN — PROPOFOL 40 MCG/KG/MIN: 10 INJECTION, EMULSION INTRAVENOUS at 18:19

## 2025-05-24 RX ADMIN — INSULIN LISPRO 3 UNITS: 100 INJECTION, SOLUTION INTRAVENOUS; SUBCUTANEOUS at 21:35

## 2025-05-24 RX ADMIN — PIPERACILLIN SODIUM AND TAZOBACTAM SODIUM 3.38 G: 3; .375 INJECTION, SOLUTION INTRAVENOUS at 23:17

## 2025-05-24 RX ADMIN — HEPARIN SODIUM 7500 UNITS: 5000 INJECTION, SOLUTION INTRAVENOUS; SUBCUTANEOUS at 21:35

## 2025-05-24 RX ADMIN — PROPOFOL 40 MCG/KG/MIN: 10 INJECTION, EMULSION INTRAVENOUS at 20:39

## 2025-05-24 RX ADMIN — PIPERACILLIN SODIUM AND TAZOBACTAM SODIUM 3.38 G: 3; .375 INJECTION, SOLUTION INTRAVENOUS at 00:11

## 2025-05-24 RX ADMIN — Medication 40 PERCENT: at 19:20

## 2025-05-24 RX ADMIN — INSULIN LISPRO 3 UNITS: 100 INJECTION, SOLUTION INTRAVENOUS; SUBCUTANEOUS at 12:00

## 2025-05-24 RX ADMIN — DEXMEDETOMIDINE HYDROCHLORIDE 0.3 MCG/KG/HR: 4 INJECTION, SOLUTION INTRAVENOUS at 15:00

## 2025-05-24 RX ADMIN — Medication 50 PERCENT: at 11:10

## 2025-05-24 RX ADMIN — PIPERACILLIN SODIUM AND TAZOBACTAM SODIUM 3.38 G: 3; .375 INJECTION, SOLUTION INTRAVENOUS at 05:30

## 2025-05-24 RX ADMIN — PROPOFOL 40 MCG/KG/MIN: 10 INJECTION, EMULSION INTRAVENOUS at 23:17

## 2025-05-24 RX ADMIN — PANTOPRAZOLE SODIUM 40 MG: 40 INJECTION, POWDER, FOR SOLUTION INTRAVENOUS at 06:52

## 2025-05-24 RX ADMIN — PIPERACILLIN SODIUM AND TAZOBACTAM SODIUM 3.38 G: 3; .375 INJECTION, SOLUTION INTRAVENOUS at 12:00

## 2025-05-24 RX ADMIN — ATORVASTATIN CALCIUM 80 MG: 80 TABLET, FILM COATED ORAL at 21:35

## 2025-05-24 RX ADMIN — FUROSEMIDE 40 MG: 10 INJECTION, SOLUTION INTRAMUSCULAR; INTRAVENOUS at 21:35

## 2025-05-24 RX ADMIN — PROPOFOL 50 MCG/KG/MIN: 10 INJECTION, EMULSION INTRAVENOUS at 08:44

## 2025-05-24 RX ADMIN — PROPOFOL 50 MCG/KG/MIN: 10 INJECTION, EMULSION INTRAVENOUS at 04:37

## 2025-05-24 RX ADMIN — INSULIN LISPRO 3 UNITS: 100 INJECTION, SOLUTION INTRAVENOUS; SUBCUTANEOUS at 23:17

## 2025-05-24 RX ADMIN — FUROSEMIDE 40 MG: 10 INJECTION, SOLUTION INTRAMUSCULAR; INTRAVENOUS at 08:44

## 2025-05-24 RX ADMIN — PIPERACILLIN SODIUM AND TAZOBACTAM SODIUM 3.38 G: 3; .375 INJECTION, SOLUTION INTRAVENOUS at 18:19

## 2025-05-24 RX ADMIN — INSULIN GLARGINE 30 UNITS: 100 INJECTION, SOLUTION SUBCUTANEOUS at 11:12

## 2025-05-24 RX ADMIN — PROPOFOL 50 MCG/KG/MIN: 10 INJECTION, EMULSION INTRAVENOUS at 06:18

## 2025-05-24 RX ADMIN — ASPIRIN 81 MG CHEWABLE TABLET 81 MG: 81 TABLET CHEWABLE at 09:04

## 2025-05-24 RX ADMIN — IPRATROPIUM BROMIDE AND ALBUTEROL SULFATE 3 ML: 2.5; .5 SOLUTION RESPIRATORY (INHALATION) at 07:49

## 2025-05-24 RX ADMIN — COLCHICINE 0.6 MG: 0.6 TABLET, FILM COATED ORAL at 08:44

## 2025-05-24 RX ADMIN — PROPOFOL 50 MCG/KG/MIN: 10 INJECTION, EMULSION INTRAVENOUS at 02:57

## 2025-05-24 RX ADMIN — INSULIN LISPRO 3 UNITS: 100 INJECTION, SOLUTION INTRAVENOUS; SUBCUTANEOUS at 16:25

## 2025-05-24 RX ADMIN — PROPOFOL 45 MCG/KG/MIN: 10 INJECTION, EMULSION INTRAVENOUS at 11:07

## 2025-05-24 RX ADMIN — IPRATROPIUM BROMIDE AND ALBUTEROL SULFATE 3 ML: 2.5; .5 SOLUTION RESPIRATORY (INHALATION) at 19:20

## 2025-05-24 RX ADMIN — PROPOFOL 45 MCG/KG/MIN: 10 INJECTION, EMULSION INTRAVENOUS at 16:23

## 2025-05-24 RX ADMIN — DEXMEDETOMIDINE HYDROCHLORIDE 0.2 MCG/KG/HR: 4 INJECTION, SOLUTION INTRAVENOUS at 09:35

## 2025-05-24 ASSESSMENT — COGNITIVE AND FUNCTIONAL STATUS - GENERAL
HELP NEEDED FOR BATHING: TOTAL
CLIMB 3 TO 5 STEPS WITH RAILING: TOTAL
DRESSING REGULAR UPPER BODY CLOTHING: TOTAL
MOVING TO AND FROM BED TO CHAIR: TOTAL
EATING MEALS: TOTAL
MOVING FROM LYING ON BACK TO SITTING ON SIDE OF FLAT BED WITH BEDRAILS: TOTAL
DAILY ACTIVITIY SCORE: 6
TOILETING: TOTAL
WALKING IN HOSPITAL ROOM: TOTAL
DRESSING REGULAR LOWER BODY CLOTHING: TOTAL
TURNING FROM BACK TO SIDE WHILE IN FLAT BAD: TOTAL
STANDING UP FROM CHAIR USING ARMS: TOTAL
MOBILITY SCORE: 6
PERSONAL GROOMING: TOTAL

## 2025-05-24 ASSESSMENT — PAIN - FUNCTIONAL ASSESSMENT
PAIN_FUNCTIONAL_ASSESSMENT: CPOT (CRITICAL CARE PAIN OBSERVATION TOOL)

## 2025-05-24 ASSESSMENT — PAIN SCALES - GENERAL: PAINLEVEL_OUTOF10: 0 - NO PAIN

## 2025-05-24 NOTE — CARE PLAN
The patient's goals for the shift include      The clinical goals for the shift include pt will remain HDS during this shift    Over the shift, the patient did not make progress toward the following goals. Barriers to progression include . Recommendations to address these barriers include .

## 2025-05-24 NOTE — PROGRESS NOTES
Critical Care Medicine Progress Note    Admitted on:     5/16/2025  Length of Stay: 8 day(s)     Interval History     CT head and chest done overnight.  Radiology report pending.  Fluffy airspace opacities and pleural effusions bilaterally c/w CHF (my interpretation).    Remains sedated on propofol gtt.  Withdrew to pain in upper extremities per RN.  Maintaining adequate MAPs without intervention.  Rhythm appears to be sinus with PACs.    Vent 20/500/8/70%    UOP for night shift was ~1.8 L.  Serum Cr 1.74 -> 1.63    Objective   Objective     Vitals:    05/24/25 0500   Weight: (!) 195 kg (429 lb 14.4 oz)   Body mass index is 59.99 kg/m².        5/24/2025     1:00 AM 5/24/2025     2:00 AM 5/24/2025     3:00 AM 5/24/2025     3:06 AM 5/24/2025     4:00 AM 5/24/2025     5:00 AM 5/24/2025     6:00 AM   Vitals   BP Location     Left arm     Heart Rate 75 77 74  75 77 79   Temp     36 °C (96.8 °F)     Resp    22      Weight (lb)      429.9    BMI      59.99 kg/m2    BSA (m2)      3.13 m2         Vent settings:  Vent Mode: Assist control/Volume control plus  FiO2 (%):  [50 %-100 %] 70 %  S RR:  [] 20  S VT:  [550 mL] 550 mL  PEEP/CPAP (cm H2O):  [8 cm H20] 8 cm H20  MAP (cm H2O):  [14-32] 32    Intake/Output Summary (Last 24 hours) at 5/24/2025 0637  Last data filed at 5/24/2025 0600  Gross per 24 hour   Intake 150 ml   Output 1860 ml   Net -1710 ml       Physical Exam  Neuro: Sedated on propofol.  Eyes: PERRL.  CV: Normal S1, S2.  RRR.  No m/r/g.  Resp: Intubated.  Diminished but clear on auscultation.  GI:         Obese, hypoactive BS, abd soft, NT, ND.  Ext:        3+ pitting BLE edema.  Skin:      Skin changes of lower extremities c/w bilateral lymphedema and chronic venous stasis.  No open ulcers appreciated.    Medications     Scheduled Medications:   Scheduled Medications[1]   Continuous Medications:   Continuous Medications[2]   PRN Medications:     Labs     Results from last 72 hours   Lab Units 05/24/25  3011  "05/23/25 1819 05/23/25  0633   GLUCOSE mg/dL 153* 178* 56*   SODIUM mmol/L 139 138 137   POTASSIUM mmol/L 3.9 4.2 4.3   CHLORIDE mmol/L 100 99 98   CO2 mmol/L 28 33* 34*   BUN mg/dL 40* 40* 37*   CREATININE mg/dL 1.63* 1.74* 1.75*   EGFR mL/min/1.73m*2 50* 46* 46*   CALCIUM mg/dL 8.2* 8.3* 8.5*   ALBUMIN g/dL 3.2* 3.2*  --    MAGNESIUM mg/dL 2.12 2.17  --    PHOSPHORUS mg/dL 2.9 3.2  --      Results from last 72 hours   Lab Units 05/24/25 0313 05/23/25 1819   ALK PHOS U/L 118 120   ALT U/L 12 10   AST U/L 18 18   BILIRUBIN TOTAL mg/dL 0.5 0.4   PROTEIN TOTAL g/dL 5.7* 6.3*             Results from last 72 hours   Lab Units 05/24/25 0313 05/23/25 1819 05/23/25  0633   WBC AUTO x10*3/uL 8.7 11.0 11.0   NRBC AUTO /100 WBCs 0.3* 0.4* 0.5*   RBC AUTO x10*6/uL 3.38* 3.39* 3.70*   HEMOGLOBIN g/dL 7.8* 7.9* 8.4*   HEMATOCRIT % 27.4* 27.5* 30.2*   MCV fL 81 81 82   MCH pg 23.1* 23.3* 22.7*   MCHC g/dL 28.5* 28.7* 27.8*   RDW % 16.2* 16.3* 16.3*   PLATELETS AUTO x10*3/uL 218 232 247     Results from last 72 hours   Lab Units 05/24/25  0308 05/23/25  1546 05/23/25  1222   POCT PH, ARTERIAL pH 7.40 7.31* 7.28*   POCT PCO2, ARTERIAL mm Hg 55* 63* 76*   POCT PO2, ARTERIAL mm Hg 84* 171* 80*   POCT HCO3 CALCULATED, ARTERIAL mmol/L 34.1* 31.7* 35.7*   POCT LACTATE, ARTERIAL mmol/L 0.6 0.9 0.4   POCT BASE EXCESS, ARTERIAL mmol/L 8.2* 4.6* 7.3*   FIO2 % 80 100 50     Lab Results   Component Value Date    BLOODCULT No growth at 4 days -  FINAL REPORT 04/14/2025    BLOODCULT No growth at 4 days -  FINAL REPORT 04/14/2025     No results found for: \"URINECULTURE\"    Imaging and Diagnostic Studies     Recent imaging and diagnostic studies reviewed.       Assessment / Plan     55 y.o. male with a PMH of super obese, back pain on chronic opioids (Norco 7.5/325 3-4x per day), peripheral neuropathy, HTN, HLD, HFpEF, obesity-hypoventilation syndrome, T2DM on insulin, CKD, GERD transferred to the ICU following cardiac arrest - initially " PEA then asystole - following a respiratory event.  ROSC achieved after ~5 min.  Patient intubated.     #Cardiac arrest (PEA then asystole) with ROSC  #Acute hypoxic ischemia encephalopathy  #Back pain on chronic opioids  #Acute on chronic diastolic CHF  #Hypertension, Hyperlipidemia  #Acute hypoxemic respiratory failure  #Obesity Hypoventilation Syndrome  #T2DM on long-term insulin  #Bilateral pneumonia     - Remains intubated with ongoing mechanical vent support.  - f/u CT imaging.  Will consult neuro if there are acute findings or neuro status does not improve.  - Holding home psychotropic meds (Seroquel, duloxetine).  Also takes Norco at home although he is not a candidate for chronic opioid therapy at this time.  PRN opioids ok.    - Obtain limited echo.  - Holding home antihypertensives for now.  Continue ASA, resume home statin.  - Gentle diuresis.  - Continue current insulin regimen of Lantus 30 units (half his home dose) and sliding scale lispro.  - Start trophic tube feeds.  Consult dietician.  - Seen by ID this admission.  Continue course of Zosyn (started 5/16).  - On Protonix and subQ heparin for GI and DVT prophylaxis, respectively.      Amarjit Tomlin MD    This patient is critically ill/injured due to acute impairment in one or more vital organ systems, such that there is a probably of imminent or life-threatening deterioration of the patient's condition.  I spent 48 minutes in the direct delivery of medical care that involves high complexity decision making to treat single or multiple vital organ system failure and/or prevent further life-threatening deterioration of the patient's condition.  This time does not include separately billable procedures.       [1] [Held by provider] amLODIPine, 5 mg, oral, Daily  aspirin, 81 mg, oral, Daily  atorvastatin, 80 mg, oral, Nightly  colchicine, 0.6 mg, oral, Daily  [Held by provider] DULoxetine, 60 mg, oral, Daily  [Held by provider] ergocalciferol, 1.25 mg,  oral, Weekly  [Held by provider] ferrous sulfate, 1 tablet, oral, Every Mon/Wed/Fri  furosemide, 40 mg, intravenous, BID  heparin (porcine), 7,500 Units, subcutaneous, q8h TAMIR  insulin glargine, 30 Units, subcutaneous, BID  insulin lispro, 0-15 Units, subcutaneous, q4h  [Held by provider] insulin lispro, 40 Units, subcutaneous, TID AC  ipratropium-albuteroL, 3 mL, nebulization, TID  [Held by provider] lisinopril, 5 mg, oral, Daily  pantoprazole, 40 mg, oral, Daily before breakfast   Or  pantoprazole, 40 mg, intravenous, Daily before breakfast  piperacillin-tazobactam, 3.375 g, intravenous, q6h  polyethylene glycol, 17 g, oral, Daily  [Held by provider] QUEtiapine, 100 mg, oral, Nightly  [2] niCARdipine, 0-15 mg/hr  propofol, 0-50 mcg/kg/min, Last Rate: 50 mcg/kg/min (05/24/25 0618)

## 2025-05-24 NOTE — CARE PLAN
Problem: Diabetes  Goal: Achieve decreasing blood glucose levels by end of shift  Outcome: Progressing  Goal: Increase stability of blood glucose readings by end of shift  Outcome: Progressing     Problem: Fall/Injury  Goal: Not fall by end of shift  Outcome: Progressing  Goal: Be free from injury by end of the shift  Outcome: Progressing     Problem: Pain - Adult  Goal: Verbalizes/displays adequate comfort level or baseline comfort level  Outcome: Progressing     Problem: Safety - Adult  Goal: Free from fall injury  Outcome: Progressing     Problem: Discharge Planning  Goal: Discharge to home or other facility with appropriate resources  Outcome: Progressing     Problem: Chronic Conditions and Co-morbidities  Goal: Patient's chronic conditions and co-morbidity symptoms are monitored and maintained or improved  Outcome: Progressing     Problem: Nutrition  Goal: Nutrient intake appropriate for maintaining nutritional needs  Outcome: Progressing     Problem: Skin  Goal: Prevent/minimize sheer/friction injuries  Outcome: Progressing  Flowsheets (Taken 5/24/2025 0550)  Prevent/minimize sheer/friction injuries:   HOB 30 degrees or less   Turn/reposition every 2 hours/use positioning/transfer devices   Use pull sheet  Goal: Promote skin healing  Flowsheets (Taken 5/24/2025 1750)  Promote skin healing:   Turn/reposition every 2 hours/use positioning/transfer devices   Ensure correct size (line/device) and apply per  instructions   Rotate device position/do not position patient on device     Problem: Safety - Medical Restraint  Goal: Remains free of injury from restraints (Restraint for Interference with Medical Device)  Outcome: Progressing  Flowsheets (Taken 5/24/2025 3250)  Remains free of injury from restraints (restraint for interference with medical device): Every 2 hours: Monitor safety, psychosocial status, comfort, nutrition and hydration  Goal: Free from restraint(s) (Restraint for Interference with  Medical Device)  Outcome: Progressing  Flowsheets (Taken 5/24/2025 5900)  Free from restraint(s) (restraint for interference with medical device):   ONCE/SHIFT or MINIMUM Every 12 hours: Assess and document the continuing need for restraints   Every 24 hours: Continued use of restraint requires Licensed Independent Practitioner to perform face to face examination and written order   Identify and implement measures to help patient regain control

## 2025-05-24 NOTE — CARE PLAN
The patient's goals for the shift include      The clinical goals for the shift include Patient will remain HDS thoughout the shift    Over the shift, the patient did not make progress toward the following goals. Barriers to progression include . Recommendations to address these barriers include .

## 2025-05-25 LAB
ALBUMIN SERPL BCP-MCNC: 2.1 G/DL (ref 3.4–5)
ALBUMIN SERPL BCP-MCNC: 3.2 G/DL (ref 3.4–5)
ANION GAP BLDA CALCULATED.4IONS-SCNC: 4 MMO/L (ref 10–25)
ANION GAP BLDA CALCULATED.4IONS-SCNC: 6 MMO/L (ref 10–25)
ANION GAP SERPL CALC-SCNC: 11 MMOL/L (ref 10–20)
ANION GAP SERPL CALC-SCNC: 7 MMOL/L (ref 10–20)
BASE EXCESS BLDA CALC-SCNC: 12.8 MMOL/L (ref -2–3)
BASE EXCESS BLDA CALC-SCNC: 9.8 MMOL/L (ref -2–3)
BASOPHILS # BLD AUTO: 0.03 X10*3/UL (ref 0–0.1)
BASOPHILS NFR BLD AUTO: 0.3 %
BODY TEMPERATURE: 37 DEGREES CELSIUS
BODY TEMPERATURE: 37 DEGREES CELSIUS
BUN SERPL-MCNC: 24 MG/DL (ref 6–23)
BUN SERPL-MCNC: 31 MG/DL (ref 6–23)
CA-I BLDA-SCNC: 1.07 MMOL/L (ref 1.1–1.33)
CA-I BLDA-SCNC: 1.11 MMOL/L (ref 1.1–1.33)
CALCIUM SERPL-MCNC: 5.5 MG/DL (ref 8.6–10.3)
CALCIUM SERPL-MCNC: 8.6 MG/DL (ref 8.6–10.3)
CHLORIDE BLDA-SCNC: 103 MMOL/L (ref 98–107)
CHLORIDE BLDA-SCNC: 103 MMOL/L (ref 98–107)
CHLORIDE SERPL-SCNC: 100 MMOL/L (ref 98–107)
CHLORIDE SERPL-SCNC: 116 MMOL/L (ref 98–107)
CO2 SERPL-SCNC: 25 MMOL/L (ref 21–32)
CO2 SERPL-SCNC: 34 MMOL/L (ref 21–32)
CREAT SERPL-MCNC: 0.87 MG/DL (ref 0.5–1.3)
CREAT SERPL-MCNC: 1.42 MG/DL (ref 0.5–1.3)
EGFRCR SERPLBLD CKD-EPI 2021: 59 ML/MIN/1.73M*2
EGFRCR SERPLBLD CKD-EPI 2021: >90 ML/MIN/1.73M*2
EOSINOPHIL # BLD AUTO: 0.26 X10*3/UL (ref 0–0.7)
EOSINOPHIL NFR BLD AUTO: 2.6 %
ERYTHROCYTE [DISTWIDTH] IN BLOOD BY AUTOMATED COUNT: 16.4 % (ref 11.5–14.5)
GLUCOSE BLD MANUAL STRIP-MCNC: 185 MG/DL (ref 74–99)
GLUCOSE BLD MANUAL STRIP-MCNC: 196 MG/DL (ref 74–99)
GLUCOSE BLD MANUAL STRIP-MCNC: 209 MG/DL (ref 74–99)
GLUCOSE BLD MANUAL STRIP-MCNC: 215 MG/DL (ref 74–99)
GLUCOSE BLDA-MCNC: 179 MG/DL (ref 74–99)
GLUCOSE BLDA-MCNC: 233 MG/DL (ref 74–99)
GLUCOSE SERPL-MCNC: 133 MG/DL (ref 74–99)
GLUCOSE SERPL-MCNC: 228 MG/DL (ref 74–99)
HCO3 BLDA-SCNC: 34.8 MMOL/L (ref 22–26)
HCO3 BLDA-SCNC: 37.4 MMOL/L (ref 22–26)
HCT VFR BLD AUTO: 28.2 % (ref 41–52)
HCT VFR BLD EST: 28 % (ref 41–52)
HCT VFR BLD EST: 28 % (ref 41–52)
HGB BLD-MCNC: 8.1 G/DL (ref 13.5–17.5)
HGB BLDA-MCNC: 9.2 G/DL (ref 13.5–17.5)
HGB BLDA-MCNC: 9.3 G/DL (ref 13.5–17.5)
IMM GRANULOCYTES # BLD AUTO: 0.13 X10*3/UL (ref 0–0.7)
IMM GRANULOCYTES NFR BLD AUTO: 1.3 % (ref 0–0.9)
INHALED O2 CONCENTRATION: 40 %
INHALED O2 CONCENTRATION: 40 %
LACTATE BLDA-SCNC: 0.8 MMOL/L (ref 0.4–2)
LACTATE BLDA-SCNC: 0.9 MMOL/L (ref 0.4–2)
LYMPHOCYTES # BLD AUTO: 0.7 X10*3/UL (ref 1.2–4.8)
LYMPHOCYTES NFR BLD AUTO: 6.9 %
MAGNESIUM SERPL-MCNC: 1.34 MG/DL (ref 1.6–2.4)
MAGNESIUM SERPL-MCNC: 2.62 MG/DL (ref 1.6–2.4)
MCH RBC QN AUTO: 23.1 PG (ref 26–34)
MCHC RBC AUTO-ENTMCNC: 28.7 G/DL (ref 32–36)
MCV RBC AUTO: 81 FL (ref 80–100)
MONOCYTES # BLD AUTO: 0.82 X10*3/UL (ref 0.1–1)
MONOCYTES NFR BLD AUTO: 8.1 %
NEUTROPHILS # BLD AUTO: 8.24 X10*3/UL (ref 1.2–7.7)
NEUTROPHILS NFR BLD AUTO: 80.8 %
NRBC BLD-RTO: 0.5 /100 WBCS (ref 0–0)
OXYHGB MFR BLDA: 93.2 % (ref 94–98)
OXYHGB MFR BLDA: 94.7 % (ref 94–98)
PCO2 BLDA: 48 MM HG (ref 38–42)
PCO2 BLDA: 49 MM HG (ref 38–42)
PEEP CMH2O: 8 CM H2O
PH BLDA: 7.46 PH (ref 7.38–7.42)
PH BLDA: 7.5 PH (ref 7.38–7.42)
PHOSPHATE SERPL-MCNC: 1.9 MG/DL (ref 2.5–4.9)
PHOSPHATE SERPL-MCNC: 3.8 MG/DL (ref 2.5–4.9)
PLATELET # BLD AUTO: 213 X10*3/UL (ref 150–450)
PO2 BLDA: 70 MM HG (ref 85–95)
PO2 BLDA: 85 MM HG (ref 85–95)
POTASSIUM BLDA-SCNC: 3.5 MMOL/L (ref 3.5–5.3)
POTASSIUM BLDA-SCNC: 4.2 MMOL/L (ref 3.5–5.3)
POTASSIUM SERPL-SCNC: 2.8 MMOL/L (ref 3.5–5.3)
POTASSIUM SERPL-SCNC: 4.3 MMOL/L (ref 3.5–5.3)
PRESSURE SUPPORT: 12 CM H2O
RBC # BLD AUTO: 3.5 X10*6/UL (ref 4.5–5.9)
SAO2 % BLDA: 95 % (ref 94–100)
SAO2 % BLDA: 96 % (ref 94–100)
SODIUM BLDA-SCNC: 140 MMOL/L (ref 136–145)
SODIUM BLDA-SCNC: 140 MMOL/L (ref 136–145)
SODIUM SERPL-SCNC: 141 MMOL/L (ref 136–145)
SODIUM SERPL-SCNC: 145 MMOL/L (ref 136–145)
STAPHYLOCOCCUS SPEC CULT: NORMAL
WBC # BLD AUTO: 10.2 X10*3/UL (ref 4.4–11.3)

## 2025-05-25 PROCEDURE — 2500000004 HC RX 250 GENERAL PHARMACY W/ HCPCS (ALT 636 FOR OP/ED): Mod: JZ | Performed by: NURSE PRACTITIONER

## 2025-05-25 PROCEDURE — 2500000002 HC RX 250 W HCPCS SELF ADMINISTERED DRUGS (ALT 637 FOR MEDICARE OP, ALT 636 FOR OP/ED): Performed by: NURSE PRACTITIONER

## 2025-05-25 PROCEDURE — 2500000001 HC RX 250 WO HCPCS SELF ADMINISTERED DRUGS (ALT 637 FOR MEDICARE OP): Performed by: ANESTHESIOLOGY

## 2025-05-25 PROCEDURE — 2500000004 HC RX 250 GENERAL PHARMACY W/ HCPCS (ALT 636 FOR OP/ED): Mod: JZ | Performed by: ANESTHESIOLOGY

## 2025-05-25 PROCEDURE — 82947 ASSAY GLUCOSE BLOOD QUANT: CPT

## 2025-05-25 PROCEDURE — 99291 CRITICAL CARE FIRST HOUR: CPT | Performed by: ANESTHESIOLOGY

## 2025-05-25 PROCEDURE — 94640 AIRWAY INHALATION TREATMENT: CPT

## 2025-05-25 PROCEDURE — 2500000001 HC RX 250 WO HCPCS SELF ADMINISTERED DRUGS (ALT 637 FOR MEDICARE OP): Performed by: NURSE PRACTITIONER

## 2025-05-25 PROCEDURE — 85025 COMPLETE CBC W/AUTO DIFF WBC: CPT | Performed by: NURSE PRACTITIONER

## 2025-05-25 PROCEDURE — 84132 ASSAY OF SERUM POTASSIUM: CPT | Performed by: ANESTHESIOLOGY

## 2025-05-25 PROCEDURE — 2500000005 HC RX 250 GENERAL PHARMACY W/O HCPCS: Performed by: ANESTHESIOLOGY

## 2025-05-25 PROCEDURE — 2020000001 HC ICU ROOM DAILY

## 2025-05-25 PROCEDURE — 2500000004 HC RX 250 GENERAL PHARMACY W/ HCPCS (ALT 636 FOR OP/ED): Performed by: NURSE PRACTITIONER

## 2025-05-25 PROCEDURE — 94681 O2 UPTK CO2 OUTP % O2 XTRC: CPT

## 2025-05-25 PROCEDURE — 87205 SMEAR GRAM STAIN: CPT | Mod: AHULAB | Performed by: NURSE PRACTITIONER

## 2025-05-25 PROCEDURE — 94003 VENT MGMT INPAT SUBQ DAY: CPT

## 2025-05-25 PROCEDURE — 84132 ASSAY OF SERUM POTASSIUM: CPT | Performed by: NURSE PRACTITIONER

## 2025-05-25 PROCEDURE — 2500000001 HC RX 250 WO HCPCS SELF ADMINISTERED DRUGS (ALT 637 FOR MEDICARE OP): Performed by: INTERNAL MEDICINE

## 2025-05-25 PROCEDURE — 83735 ASSAY OF MAGNESIUM: CPT | Performed by: NURSE PRACTITIONER

## 2025-05-25 PROCEDURE — 37799 UNLISTED PX VASCULAR SURGERY: CPT | Performed by: ANESTHESIOLOGY

## 2025-05-25 PROCEDURE — 2500000005 HC RX 250 GENERAL PHARMACY W/O HCPCS: Performed by: NURSE PRACTITIONER

## 2025-05-25 PROCEDURE — 83735 ASSAY OF MAGNESIUM: CPT | Performed by: ANESTHESIOLOGY

## 2025-05-25 PROCEDURE — 37799 UNLISTED PX VASCULAR SURGERY: CPT | Performed by: NURSE PRACTITIONER

## 2025-05-25 RX ORDER — MAGNESIUM SULFATE HEPTAHYDRATE 40 MG/ML
2 INJECTION, SOLUTION INTRAVENOUS ONCE
Status: COMPLETED | OUTPATIENT
Start: 2025-05-25 | End: 2025-05-25

## 2025-05-25 RX ORDER — CALCIUM GLUCONATE 20 MG/ML
2 INJECTION, SOLUTION INTRAVENOUS ONCE
Status: COMPLETED | OUTPATIENT
Start: 2025-05-25 | End: 2025-05-25

## 2025-05-25 RX ORDER — SENNOSIDES 8.8 MG/5ML
5 LIQUID ORAL 2 TIMES DAILY
Status: DISCONTINUED | OUTPATIENT
Start: 2025-05-25 | End: 2025-06-05 | Stop reason: HOSPADM

## 2025-05-25 RX ORDER — DOCUSATE SODIUM 50 MG/5ML
100 LIQUID ORAL 2 TIMES DAILY
Status: DISCONTINUED | OUTPATIENT
Start: 2025-05-25 | End: 2025-06-05 | Stop reason: HOSPADM

## 2025-05-25 RX ORDER — HYDRALAZINE HYDROCHLORIDE 20 MG/ML
10 INJECTION INTRAMUSCULAR; INTRAVENOUS EVERY 6 HOURS PRN
Status: DISCONTINUED | OUTPATIENT
Start: 2025-05-25 | End: 2025-05-29

## 2025-05-25 RX ORDER — ENOXAPARIN SODIUM 100 MG/ML
60 INJECTION SUBCUTANEOUS EVERY 12 HOURS SCHEDULED
Status: DISCONTINUED | OUTPATIENT
Start: 2025-05-25 | End: 2025-05-31

## 2025-05-25 RX ORDER — DOCUSATE SODIUM 50 MG/5ML
100 LIQUID ORAL 2 TIMES DAILY
Status: DISCONTINUED | OUTPATIENT
Start: 2025-05-25 | End: 2025-05-25

## 2025-05-25 RX ORDER — MAGNESIUM SULFATE HEPTAHYDRATE 40 MG/ML
4 INJECTION, SOLUTION INTRAVENOUS ONCE
Status: COMPLETED | OUTPATIENT
Start: 2025-05-25 | End: 2025-05-25

## 2025-05-25 RX ORDER — POTASSIUM CHLORIDE 1.5 G/1.58G
40 POWDER, FOR SOLUTION ORAL ONCE
Status: COMPLETED | OUTPATIENT
Start: 2025-05-25 | End: 2025-05-25

## 2025-05-25 RX ADMIN — PROPOFOL 40 MCG/KG/MIN: 10 INJECTION, EMULSION INTRAVENOUS at 09:03

## 2025-05-25 RX ADMIN — PROPOFOL 35 MCG/KG/MIN: 10 INJECTION, EMULSION INTRAVENOUS at 11:04

## 2025-05-25 RX ADMIN — IPRATROPIUM BROMIDE AND ALBUTEROL SULFATE 3 ML: 2.5; .5 SOLUTION RESPIRATORY (INHALATION) at 11:26

## 2025-05-25 RX ADMIN — INSULIN LISPRO 3 UNITS: 100 INJECTION, SOLUTION INTRAVENOUS; SUBCUTANEOUS at 08:05

## 2025-05-25 RX ADMIN — DEXMEDETOMIDINE HYDROCHLORIDE 0.5 MCG/KG/HR: 4 INJECTION, SOLUTION INTRAVENOUS at 11:03

## 2025-05-25 RX ADMIN — PANTOPRAZOLE SODIUM 40 MG: 40 INJECTION, POWDER, FOR SOLUTION INTRAVENOUS at 07:35

## 2025-05-25 RX ADMIN — COLCHICINE 0.6 MG: 0.6 TABLET, FILM COATED ORAL at 08:05

## 2025-05-25 RX ADMIN — MAGNESIUM SULFATE HEPTAHYDRATE 4 G: 40 INJECTION, SOLUTION INTRAVENOUS at 07:13

## 2025-05-25 RX ADMIN — DEXMEDETOMIDINE HYDROCHLORIDE 0.5 MCG/KG/HR: 4 INJECTION, SOLUTION INTRAVENOUS at 21:37

## 2025-05-25 RX ADMIN — CALCIUM GLUCONATE 2 G: 20 INJECTION, SOLUTION INTRAVENOUS at 07:35

## 2025-05-25 RX ADMIN — DEXMEDETOMIDINE HYDROCHLORIDE 0.5 MCG/KG/HR: 4 INJECTION, SOLUTION INTRAVENOUS at 15:52

## 2025-05-25 RX ADMIN — ATORVASTATIN CALCIUM 80 MG: 80 TABLET, FILM COATED ORAL at 21:37

## 2025-05-25 RX ADMIN — FUROSEMIDE 40 MG: 10 INJECTION, SOLUTION INTRAMUSCULAR; INTRAVENOUS at 08:06

## 2025-05-25 RX ADMIN — Medication 40 PERCENT: at 03:08

## 2025-05-25 RX ADMIN — SENNOSIDES 5 ML: 8.8 LIQUID ORAL at 23:00

## 2025-05-25 RX ADMIN — INSULIN GLARGINE 30 UNITS: 100 INJECTION, SOLUTION SUBCUTANEOUS at 21:37

## 2025-05-25 RX ADMIN — IPRATROPIUM BROMIDE AND ALBUTEROL SULFATE 3 ML: 2.5; .5 SOLUTION RESPIRATORY (INHALATION) at 07:12

## 2025-05-25 RX ADMIN — Medication 40 PERCENT: at 19:12

## 2025-05-25 RX ADMIN — DEXMEDETOMIDINE HYDROCHLORIDE 0.3 MCG/KG/HR: 4 INJECTION, SOLUTION INTRAVENOUS at 05:06

## 2025-05-25 RX ADMIN — PROPOFOL 40 MCG/KG/MIN: 10 INJECTION, EMULSION INTRAVENOUS at 05:06

## 2025-05-25 RX ADMIN — PIPERACILLIN SODIUM AND TAZOBACTAM SODIUM 3.38 G: 3; .375 INJECTION, SOLUTION INTRAVENOUS at 12:21

## 2025-05-25 RX ADMIN — DOCUSATE SODIUM LIQUID 100 MG: 100 LIQUID ORAL at 10:01

## 2025-05-25 RX ADMIN — INSULIN LISPRO 6 UNITS: 100 INJECTION, SOLUTION INTRAVENOUS; SUBCUTANEOUS at 17:43

## 2025-05-25 RX ADMIN — DOCUSATE SODIUM LIQUID 100 MG: 100 LIQUID ORAL at 21:38

## 2025-05-25 RX ADMIN — PROPOFOL 20 MCG/KG/MIN: 10 INJECTION, EMULSION INTRAVENOUS at 15:51

## 2025-05-25 RX ADMIN — PROPOFOL 40 MCG/KG/MIN: 10 INJECTION, EMULSION INTRAVENOUS at 03:39

## 2025-05-25 RX ADMIN — HEPARIN SODIUM 7500 UNITS: 5000 INJECTION, SOLUTION INTRAVENOUS; SUBCUTANEOUS at 05:27

## 2025-05-25 RX ADMIN — INSULIN LISPRO 3 UNITS: 100 INJECTION, SOLUTION INTRAVENOUS; SUBCUTANEOUS at 03:39

## 2025-05-25 RX ADMIN — Medication 40 PERCENT: at 07:12

## 2025-05-25 RX ADMIN — IPRATROPIUM BROMIDE AND ALBUTEROL SULFATE 3 ML: 2.5; .5 SOLUTION RESPIRATORY (INHALATION) at 19:12

## 2025-05-25 RX ADMIN — POTASSIUM PHOSPHATE, MONOBASIC AND POTASSIUM PHOSPHATE, DIBASIC 30 MMOL: 224; 236 INJECTION, SOLUTION, CONCENTRATE INTRAVENOUS at 05:52

## 2025-05-25 RX ADMIN — FUROSEMIDE 40 MG: 10 INJECTION, SOLUTION INTRAMUSCULAR; INTRAVENOUS at 21:37

## 2025-05-25 RX ADMIN — Medication 40 PERCENT: at 11:26

## 2025-05-25 RX ADMIN — PROPOFOL 40 MCG/KG/MIN: 10 INJECTION, EMULSION INTRAVENOUS at 00:57

## 2025-05-25 RX ADMIN — ASPIRIN 81 MG CHEWABLE TABLET 81 MG: 81 TABLET CHEWABLE at 08:05

## 2025-05-25 RX ADMIN — POLYETHYLENE GLYCOL 3350 17 G: 17 POWDER, FOR SOLUTION ORAL at 08:49

## 2025-05-25 RX ADMIN — Medication 40 PERCENT: at 23:42

## 2025-05-25 RX ADMIN — Medication 40 PERCENT: at 15:14

## 2025-05-25 RX ADMIN — MAGNESIUM SULFATE HEPTAHYDRATE 2 G: 40 INJECTION, SOLUTION INTRAVENOUS at 05:27

## 2025-05-25 RX ADMIN — IPRATROPIUM BROMIDE AND ALBUTEROL SULFATE 3 ML: 2.5; .5 SOLUTION RESPIRATORY (INHALATION) at 15:14

## 2025-05-25 RX ADMIN — PIPERACILLIN SODIUM AND TAZOBACTAM SODIUM 3.38 G: 3; .375 INJECTION, SOLUTION INTRAVENOUS at 17:31

## 2025-05-25 RX ADMIN — AMLODIPINE BESYLATE 5 MG: 5 TABLET ORAL at 08:05

## 2025-05-25 RX ADMIN — INSULIN LISPRO 6 UNITS: 100 INJECTION, SOLUTION INTRAVENOUS; SUBCUTANEOUS at 12:21

## 2025-05-25 RX ADMIN — POTASSIUM CHLORIDE 40 MEQ: 1.5 POWDER, FOR SOLUTION ORAL at 05:27

## 2025-05-25 RX ADMIN — INSULIN LISPRO 6 UNITS: 100 INJECTION, SOLUTION INTRAVENOUS; SUBCUTANEOUS at 20:23

## 2025-05-25 RX ADMIN — PIPERACILLIN SODIUM AND TAZOBACTAM SODIUM 3.38 G: 3; .375 INJECTION, SOLUTION INTRAVENOUS at 05:27

## 2025-05-25 RX ADMIN — ENOXAPARIN SODIUM 60 MG: 60 INJECTION SUBCUTANEOUS at 21:37

## 2025-05-25 RX ADMIN — PROPOFOL 20 MCG/KG/MIN: 10 INJECTION, EMULSION INTRAVENOUS at 20:17

## 2025-05-25 RX ADMIN — INSULIN GLARGINE 30 UNITS: 100 INJECTION, SOLUTION SUBCUTANEOUS at 08:27

## 2025-05-25 ASSESSMENT — PAIN - FUNCTIONAL ASSESSMENT
PAIN_FUNCTIONAL_ASSESSMENT: CPOT (CRITICAL CARE PAIN OBSERVATION TOOL)

## 2025-05-25 ASSESSMENT — COGNITIVE AND FUNCTIONAL STATUS - GENERAL
EATING MEALS: TOTAL
WALKING IN HOSPITAL ROOM: TOTAL
MOVING FROM LYING ON BACK TO SITTING ON SIDE OF FLAT BED WITH BEDRAILS: TOTAL
HELP NEEDED FOR BATHING: TOTAL
DRESSING REGULAR UPPER BODY CLOTHING: TOTAL
MOBILITY SCORE: 6
DAILY ACTIVITIY SCORE: 6
MOVING TO AND FROM BED TO CHAIR: TOTAL
DRESSING REGULAR LOWER BODY CLOTHING: TOTAL
TOILETING: TOTAL
TURNING FROM BACK TO SIDE WHILE IN FLAT BAD: TOTAL
STANDING UP FROM CHAIR USING ARMS: TOTAL
CLIMB 3 TO 5 STEPS WITH RAILING: TOTAL
PERSONAL GROOMING: TOTAL

## 2025-05-25 ASSESSMENT — PAIN SCALES - GENERAL: PAINLEVEL_OUTOF10: 0 - NO PAIN

## 2025-05-25 NOTE — PROGRESS NOTES
Critical Care Medicine Progress Note    Admitted on:     5/16/2025  Length of Stay: 9 day(s)     Interval History     Precedex started yesterday due to patient-ventilator dyssynchrony.  Became hypertensive.  Resumed home amlodipine 5 mg.  Limited echo done yesterday showed EF 55% without RWMAs and grade II diastolic dysfunction.  Unable to adequately visualize RV.    No acute events overnight.  SBT done this AM.  Became tachypneic with labored respirations, so he was returned to full vent support.    Remains sedated on propofol and Precedex.  Vent 20/550/8/40% (FiO2 was 70% yesterday AM).    24 hr UOP ~3.5 L after Lasix 40 mg x 2 given yesterday.  24 hr fluid balance ~2.5 L negative.  Serum Cr 1.63 -> 0.87    AM labs notable for electrolyte abnormalities (K 2.8, Phos 1.9, Mg 1.34, Ca 5.5).    Tolerating trophic tube feeds.  Glucose controlled on current insulin regimen.    Objective   Objective     Vitals:    05/25/25 0607   Weight: (!) 191 kg (421 lb 4.8 oz)   Body mass index is 58.79 kg/m².        5/25/2025     5:43 AM 5/25/2025     5:57 AM 5/25/2025     6:00 AM 5/25/2025     6:07 AM 5/25/2025     7:00 AM 5/25/2025     7:12 AM 5/25/2025     8:00 AM   Vitals   Heart Rate   76  68  68   Resp 26 26    20    Weight (lb)    421.3      BMI    58.79 kg/m2      BSA (m2)    3.09 m2           Vent settings:  Vent Mode: Assist control/Volume control plus  FiO2 (%):  [40 %-50 %] 40 %  S RR:  [20] 20  S VT:  [550 mL] 550 mL  PEEP/CPAP (cm H2O):  [8 cm H20] 8 cm H20  CT SUP:  [8 cm H20] 8 cm H20  MAP (cm H2O):  [11-14] 14    Intake/Output Summary (Last 24 hours) at 5/25/2025 0846  Last data filed at 5/25/2025 0620  Gross per 24 hour   Intake 974.47 ml   Output 3135 ml   Net -2160.53 ml       Physical Exam  Neuro: Sedated on propofol.  Eyes: PERRL.  CV: Normal S1, S2.  RRR.  No m/r/g.  Resp: Intubated.  Diminished but clear on auscultation.  GI: Obese, hypoactive BS, abd soft, NT, ND.  Ext: 3+ pitting BLE edema.  Skin: Skin  "changes of lower extremities c/w bilateral lymphedema and chronic venous stasis.  No open ulcers appreciated.    Medications     Scheduled Medications:   Scheduled Medications[1]   Continuous Medications:   Continuous Medications[2]   PRN Medications:     Labs     Results from last 72 hours   Lab Units 05/25/25  0350 05/24/25 0313 05/23/25  1819   GLUCOSE mg/dL 133* 153* 178*   SODIUM mmol/L 145 139 138   POTASSIUM mmol/L 2.8* 3.9 4.2   CHLORIDE mmol/L 116* 100 99   CO2 mmol/L 25 28 33*   BUN mg/dL 24* 40* 40*   CREATININE mg/dL 0.87 1.63* 1.74*   EGFR mL/min/1.73m*2 >90 50* 46*   CALCIUM mg/dL 5.5* 8.2* 8.3*   ALBUMIN g/dL 2.1* 3.2* 3.2*   MAGNESIUM mg/dL 1.34* 2.12 2.17   PHOSPHORUS mg/dL 1.9* 2.9 3.2     Results from last 72 hours   Lab Units 05/24/25  0313 05/23/25  1819   ALK PHOS U/L 118 120   ALT U/L 12 10   AST U/L 18 18   BILIRUBIN TOTAL mg/dL 0.5 0.4   PROTEIN TOTAL g/dL 5.7* 6.3*             Results from last 72 hours   Lab Units 05/25/25  0350 05/24/25 0313 05/23/25  1819   WBC AUTO x10*3/uL 10.2 8.7 11.0   NRBC AUTO /100 WBCs 0.5* 0.3* 0.4*   RBC AUTO x10*6/uL 3.50* 3.38* 3.39*   HEMOGLOBIN g/dL 8.1* 7.8* 7.9*   HEMATOCRIT % 28.2* 27.4* 27.5*   MCV fL 81 81 81   MCH pg 23.1* 23.1* 23.3*   MCHC g/dL 28.7* 28.5* 28.7*   RDW % 16.4* 16.2* 16.3*   PLATELETS AUTO x10*3/uL 213 218 232     Results from last 72 hours   Lab Units 05/25/25  0349 05/24/25  0308 05/23/25  1546   POCT PH, ARTERIAL pH 7.46* 7.40 7.31*   POCT PCO2, ARTERIAL mm Hg 49* 55* 63*   POCT PO2, ARTERIAL mm Hg 70* 84* 171*   POCT HCO3 CALCULATED, ARTERIAL mmol/L 34.8* 34.1* 31.7*   POCT LACTATE, ARTERIAL mmol/L 0.9 0.6 0.9   POCT BASE EXCESS, ARTERIAL mmol/L 9.8* 8.2* 4.6*   FIO2 % 40 80 100     Lab Results   Component Value Date    BLOODCULT No growth at 4 days -  FINAL REPORT 04/14/2025    BLOODCULT No growth at 4 days -  FINAL REPORT 04/14/2025     No results found for: \"URINECULTURE\"    Imaging and Diagnostic Studies     Recent imaging " and diagnostic studies reviewed.       Assessment / Plan     55 y.o. male with a PMH of super obese, back pain on chronic opioids (Norco 7.5/325 3-4x per day), peripheral neuropathy, HTN, HLD, HFpEF, obesity-hypoventilation syndrome, T2DM on insulin, CKD, GERD admitted to the stepdown unit on 5/16 for acute on chronic diastolic CHF and pneumonia.  Transferred to the ICU on 5/23 following cardiac arrest - initially PEA then asystole - due to a respiratory event.  Patient intubated.  ROSC achieved after ~5 min.     #Cardiac arrest (PEA then asystole) with ROSC  #Acute hypoxic ischemia encephalopathy  #Back pain on chronic opioids  #Acute on chronic diastolic CHF  #Hypertension, Hyperlipidemia  #Acute hypoxemic respiratory failure  #Obesity Hypoventilation Syndrome  #T2DM on long-term insulin  #Bilateral pneumonia     - Remains intubated with ongoing mechanical vent support.  - CT head done 5/23 negative for acute process.  Consider repeat CT imaging and neuro consult pending SATs and clinical course.  - Holding home psychotropic meds (Seroquel, duloxetine).  Also holding home Norco since he is not a candidate for chronic opioid therapy at this time.  PRN opioids ok.  - Continue home ASA, atorvastatin, and amlodipine.  Holding home lisinopril.  - Correct electrolytes and trend accordingly.  - Continue gentle diuresis with Lasix 40 mg BID as ordered.  - Advance tube feeds (Glucerna).  Dietician consulted.  - Continue current insulin regimen of Lantus 30 units (half his home dose) and sliding scale lispro.  - Seen by ID this admission.  Continue Zosyn (started 5/16) for 10-day course of antibiotics, as recommended.  Stop date set accordingly.  - On Protonix and subQ heparin for GI and DVT prophylaxis, respectively.  Change subQ heparin to Lovenox in the setting of normalized renal function.      Amarjit Tomlin MD    This patient is critically ill/injured due to acute impairment in one or more vital organ systems, such  that there is a probably of imminent or life-threatening deterioration of the patient's condition.  I spent 48 minutes in the direct delivery of medical care that involves high complexity decision making to treat single or multiple vital organ system failure and/or prevent further life-threatening deterioration of the patient's condition.  This time does not include separately billable procedures.         [1] amLODIPine, 5 mg, oral, Daily  aspirin, 81 mg, oral, Daily  atorvastatin, 80 mg, oral, Nightly  colchicine, 0.6 mg, oral, Daily  [Held by provider] DULoxetine, 60 mg, oral, Daily  [Held by provider] ergocalciferol, 1.25 mg, oral, Weekly  [Held by provider] ferrous sulfate, 1 tablet, oral, Every Mon/Wed/Fri  furosemide, 40 mg, intravenous, BID  heparin (porcine), 7,500 Units, subcutaneous, q8h TAMIR  insulin glargine, 30 Units, subcutaneous, BID  insulin lispro, 0-15 Units, subcutaneous, q4h  [Held by provider] insulin lispro, 40 Units, subcutaneous, TID AC  ipratropium-albuteroL, 3 mL, nebulization, TID  [Held by provider] lisinopril, 5 mg, oral, Daily  magnesium sulfate, 4 g, intravenous, Once  pantoprazole, 40 mg, oral, Daily before breakfast   Or  pantoprazole, 40 mg, intravenous, Daily before breakfast  piperacillin-tazobactam, 3.375 g, intravenous, q6h  polyethylene glycol, 17 g, oral, Daily  potassium phosphate, 30 mmol, intravenous, Once  [Held by provider] QUEtiapine, 100 mg, oral, Nightly  [2] dexmedeTOMIDine, 0-1.5 mcg/kg/hr, Last Rate: 0.3 mcg/kg/hr (05/25/25 0620)  propofol, 0-50 mcg/kg/min, Last Rate: 40 mcg/kg/min (05/25/25 0620)

## 2025-05-25 NOTE — CARE PLAN
Problem: Diabetes  Goal: Achieve decreasing blood glucose levels by end of shift  Outcome: Progressing  Goal: Increase stability of blood glucose readings by end of shift  Outcome: Progressing  Goal: Maintain electrolyte levels within acceptable range throughout shift  Outcome: Progressing     Problem: Fall/Injury  Goal: Not fall by end of shift  Outcome: Progressing  Goal: Be free from injury by end of the shift  Outcome: Progressing     Problem: Pain - Adult  Goal: Verbalizes/displays adequate comfort level or baseline comfort level  Outcome: Progressing     Problem: Safety - Adult  Goal: Free from fall injury  Outcome: Progressing     Problem: Discharge Planning  Goal: Discharge to home or other facility with appropriate resources  Outcome: Progressing     Problem: Chronic Conditions and Co-morbidities  Goal: Patient's chronic conditions and co-morbidity symptoms are monitored and maintained or improved  Outcome: Progressing     Problem: Nutrition  Goal: Nutrient intake appropriate for maintaining nutritional needs  Outcome: Progressing     Problem: Skin  Goal: Prevent/minimize sheer/friction injuries  Outcome: Progressing  Flowsheets (Taken 5/25/2025 0457)  Prevent/minimize sheer/friction injuries:   HOB 30 degrees or less   Turn/reposition every 2 hours/use positioning/transfer devices  Goal: Promote skin healing  Outcome: Progressing  Flowsheets (Taken 5/25/2025 0457)  Promote skin healing:   Turn/reposition every 2 hours/use positioning/transfer devices   Protective dressings over bony prominences   Rotate device position/do not position patient on device     Problem: Safety - Medical Restraint  Goal: Remains free of injury from restraints (Restraint for Interference with Medical Device)  Outcome: Progressing  Flowsheets (Taken 5/25/2025 0457)  Remains free of injury from restraints (restraint for interference with medical device): Every 2 hours: Monitor safety, psychosocial status, comfort, nutrition and  hydration  Goal: Free from restraint(s) (Restraint for Interference with Medical Device)  Outcome: Progressing  Flowsheets (Taken 5/25/2025 9487)  Free from restraint(s) (restraint for interference with medical device): ONCE/SHIFT or MINIMUM Every 12 hours: Assess and document the continuing need for restraints

## 2025-05-26 VITALS
TEMPERATURE: 98.3 F | RESPIRATION RATE: 23 BRPM | OXYGEN SATURATION: 96 % | BODY MASS INDEX: 58.79 KG/M2 | HEART RATE: 60 BPM | SYSTOLIC BLOOD PRESSURE: 124 MMHG | DIASTOLIC BLOOD PRESSURE: 51 MMHG | WEIGHT: 315 LBS

## 2025-05-26 LAB
ALBUMIN SERPL BCP-MCNC: 3.2 G/DL (ref 3.4–5)
ANION GAP BLDA CALCULATED.4IONS-SCNC: 4 MMO/L (ref 10–25)
ANION GAP SERPL CALC-SCNC: 17 MMOL/L (ref 10–20)
BASE EXCESS BLDA CALC-SCNC: 13.2 MMOL/L (ref -2–3)
BASOPHILS # BLD AUTO: 0.04 X10*3/UL (ref 0–0.1)
BASOPHILS NFR BLD AUTO: 0.4 %
BODY TEMPERATURE: 37 DEGREES CELSIUS
BUN SERPL-MCNC: 33 MG/DL (ref 6–23)
CA-I BLDA-SCNC: 1.12 MMOL/L (ref 1.1–1.33)
CALCIUM SERPL-MCNC: 8.7 MG/DL (ref 8.6–10.3)
CHLORIDE BLDA-SCNC: 103 MMOL/L (ref 98–107)
CHLORIDE SERPL-SCNC: 101 MMOL/L (ref 98–107)
CO2 SERPL-SCNC: 29 MMOL/L (ref 21–32)
CREAT SERPL-MCNC: 1.41 MG/DL (ref 0.5–1.3)
EGFRCR SERPLBLD CKD-EPI 2021: 59 ML/MIN/1.73M*2
EOSINOPHIL # BLD AUTO: 0.45 X10*3/UL (ref 0–0.7)
EOSINOPHIL NFR BLD AUTO: 4.4 %
ERYTHROCYTE [DISTWIDTH] IN BLOOD BY AUTOMATED COUNT: 16.6 % (ref 11.5–14.5)
GLUCOSE BLD MANUAL STRIP-MCNC: 162 MG/DL (ref 74–99)
GLUCOSE BLD MANUAL STRIP-MCNC: 193 MG/DL (ref 74–99)
GLUCOSE BLD MANUAL STRIP-MCNC: 202 MG/DL (ref 74–99)
GLUCOSE BLD MANUAL STRIP-MCNC: 207 MG/DL (ref 74–99)
GLUCOSE BLD MANUAL STRIP-MCNC: 215 MG/DL (ref 74–99)
GLUCOSE BLD MANUAL STRIP-MCNC: 229 MG/DL (ref 74–99)
GLUCOSE BLDA-MCNC: 256 MG/DL (ref 74–99)
GLUCOSE SERPL-MCNC: 246 MG/DL (ref 74–99)
HCO3 BLDA-SCNC: 38.1 MMOL/L (ref 22–26)
HCT VFR BLD AUTO: 31.2 % (ref 41–52)
HCT VFR BLD EST: 28 % (ref 41–52)
HGB BLD-MCNC: 8.8 G/DL (ref 13.5–17.5)
HGB BLDA-MCNC: 9.3 G/DL (ref 13.5–17.5)
IMM GRANULOCYTES # BLD AUTO: 0.12 X10*3/UL (ref 0–0.7)
IMM GRANULOCYTES NFR BLD AUTO: 1.2 % (ref 0–0.9)
INHALED O2 CONCENTRATION: 40 %
LACTATE BLDA-SCNC: 0.8 MMOL/L (ref 0.4–2)
LYMPHOCYTES # BLD AUTO: 1 X10*3/UL (ref 1.2–4.8)
LYMPHOCYTES NFR BLD AUTO: 9.8 %
MAGNESIUM SERPL-MCNC: 2.26 MG/DL (ref 1.6–2.4)
MCH RBC QN AUTO: 22.7 PG (ref 26–34)
MCHC RBC AUTO-ENTMCNC: 28.2 G/DL (ref 32–36)
MCV RBC AUTO: 80 FL (ref 80–100)
MONOCYTES # BLD AUTO: 0.82 X10*3/UL (ref 0.1–1)
MONOCYTES NFR BLD AUTO: 8.1 %
NEUTROPHILS # BLD AUTO: 7.74 X10*3/UL (ref 1.2–7.7)
NEUTROPHILS NFR BLD AUTO: 76.1 %
NRBC BLD-RTO: 0 /100 WBCS (ref 0–0)
OXYHGB MFR BLDA: 94.8 % (ref 94–98)
PCO2 BLDA: 50 MM HG (ref 38–42)
PH BLDA: 7.49 PH (ref 7.38–7.42)
PHOSPHATE SERPL-MCNC: 4.1 MG/DL (ref 2.5–4.9)
PLATELET # BLD AUTO: 225 X10*3/UL (ref 150–450)
PO2 BLDA: 83 MM HG (ref 85–95)
POTASSIUM BLDA-SCNC: 4.3 MMOL/L (ref 3.5–5.3)
POTASSIUM SERPL-SCNC: 4.2 MMOL/L (ref 3.5–5.3)
RBC # BLD AUTO: 3.88 X10*6/UL (ref 4.5–5.9)
SAO2 % BLDA: 96 % (ref 94–100)
SODIUM BLDA-SCNC: 141 MMOL/L (ref 136–145)
SODIUM SERPL-SCNC: 143 MMOL/L (ref 136–145)
SPECIMEN DRAWN FROM PATIENT: ABNORMAL
WBC # BLD AUTO: 10.2 X10*3/UL (ref 4.4–11.3)

## 2025-05-26 PROCEDURE — 3E0G76Z INTRODUCTION OF NUTRITIONAL SUBSTANCE INTO UPPER GI, VIA NATURAL OR ARTIFICIAL OPENING: ICD-10-PCS | Performed by: INTERNAL MEDICINE

## 2025-05-26 PROCEDURE — 2500000004 HC RX 250 GENERAL PHARMACY W/ HCPCS (ALT 636 FOR OP/ED): Mod: JZ | Performed by: NURSE PRACTITIONER

## 2025-05-26 PROCEDURE — 82947 ASSAY GLUCOSE BLOOD QUANT: CPT

## 2025-05-26 PROCEDURE — 2500000004 HC RX 250 GENERAL PHARMACY W/ HCPCS (ALT 636 FOR OP/ED): Mod: JZ | Performed by: ANESTHESIOLOGY

## 2025-05-26 PROCEDURE — 2500000002 HC RX 250 W HCPCS SELF ADMINISTERED DRUGS (ALT 637 FOR MEDICARE OP, ALT 636 FOR OP/ED): Performed by: NURSE PRACTITIONER

## 2025-05-26 PROCEDURE — 84132 ASSAY OF SERUM POTASSIUM: CPT | Performed by: ANESTHESIOLOGY

## 2025-05-26 PROCEDURE — 85025 COMPLETE CBC W/AUTO DIFF WBC: CPT | Performed by: ANESTHESIOLOGY

## 2025-05-26 PROCEDURE — 2500000001 HC RX 250 WO HCPCS SELF ADMINISTERED DRUGS (ALT 637 FOR MEDICARE OP): Performed by: NURSE PRACTITIONER

## 2025-05-26 PROCEDURE — 99291 CRITICAL CARE FIRST HOUR: CPT | Performed by: SURGERY

## 2025-05-26 PROCEDURE — 2500000001 HC RX 250 WO HCPCS SELF ADMINISTERED DRUGS (ALT 637 FOR MEDICARE OP): Performed by: SURGERY

## 2025-05-26 PROCEDURE — 2020000001 HC ICU ROOM DAILY

## 2025-05-26 PROCEDURE — 2500000001 HC RX 250 WO HCPCS SELF ADMINISTERED DRUGS (ALT 637 FOR MEDICARE OP): Performed by: ANESTHESIOLOGY

## 2025-05-26 PROCEDURE — 83735 ASSAY OF MAGNESIUM: CPT | Performed by: ANESTHESIOLOGY

## 2025-05-26 PROCEDURE — 94003 VENT MGMT INPAT SUBQ DAY: CPT

## 2025-05-26 PROCEDURE — 2500000005 HC RX 250 GENERAL PHARMACY W/O HCPCS: Performed by: ANESTHESIOLOGY

## 2025-05-26 PROCEDURE — 2500000004 HC RX 250 GENERAL PHARMACY W/ HCPCS (ALT 636 FOR OP/ED): Performed by: INTERNAL MEDICINE

## 2025-05-26 PROCEDURE — 2500000005 HC RX 250 GENERAL PHARMACY W/O HCPCS

## 2025-05-26 PROCEDURE — 2500000001 HC RX 250 WO HCPCS SELF ADMINISTERED DRUGS (ALT 637 FOR MEDICARE OP): Performed by: INTERNAL MEDICINE

## 2025-05-26 PROCEDURE — 37799 UNLISTED PX VASCULAR SURGERY: CPT | Performed by: ANESTHESIOLOGY

## 2025-05-26 PROCEDURE — 2500000002 HC RX 250 W HCPCS SELF ADMINISTERED DRUGS (ALT 637 FOR MEDICARE OP, ALT 636 FOR OP/ED): Performed by: SURGERY

## 2025-05-26 PROCEDURE — 94681 O2 UPTK CO2 OUTP % O2 XTRC: CPT

## 2025-05-26 PROCEDURE — 2500000004 HC RX 250 GENERAL PHARMACY W/ HCPCS (ALT 636 FOR OP/ED): Performed by: NURSE PRACTITIONER

## 2025-05-26 PROCEDURE — 94640 AIRWAY INHALATION TREATMENT: CPT

## 2025-05-26 RX ORDER — LACTULOSE 10 G/15ML
20 SOLUTION ORAL EVERY 8 HOURS PRN
Status: DISCONTINUED | OUTPATIENT
Start: 2025-05-26 | End: 2025-06-05 | Stop reason: HOSPADM

## 2025-05-26 RX ADMIN — PROPOFOL 20 MCG/KG/MIN: 10 INJECTION, EMULSION INTRAVENOUS at 12:30

## 2025-05-26 RX ADMIN — LACTULOSE 20 G: 10 SOLUTION ORAL at 21:27

## 2025-05-26 RX ADMIN — FUROSEMIDE 40 MG: 10 INJECTION, SOLUTION INTRAMUSCULAR; INTRAVENOUS at 08:33

## 2025-05-26 RX ADMIN — DOCUSATE SODIUM LIQUID 100 MG: 100 LIQUID ORAL at 21:28

## 2025-05-26 RX ADMIN — SENNOSIDES 5 ML: 8.8 LIQUID ORAL at 21:28

## 2025-05-26 RX ADMIN — LISINOPRIL 5 MG: 5 TABLET ORAL at 10:00

## 2025-05-26 RX ADMIN — INSULIN LISPRO 6 UNITS: 100 INJECTION, SOLUTION INTRAVENOUS; SUBCUTANEOUS at 00:32

## 2025-05-26 RX ADMIN — IPRATROPIUM BROMIDE AND ALBUTEROL SULFATE 3 ML: 2.5; .5 SOLUTION RESPIRATORY (INHALATION) at 08:07

## 2025-05-26 RX ADMIN — FUROSEMIDE 40 MG: 10 INJECTION, SOLUTION INTRAMUSCULAR; INTRAVENOUS at 21:28

## 2025-05-26 RX ADMIN — DEXMEDETOMIDINE HYDROCHLORIDE 1.5 MCG/KG/HR: 4 INJECTION, SOLUTION INTRAVENOUS at 10:03

## 2025-05-26 RX ADMIN — PROPOFOL 20 MCG/KG/MIN: 10 INJECTION, EMULSION INTRAVENOUS at 06:06

## 2025-05-26 RX ADMIN — Medication 50 PERCENT: at 12:49

## 2025-05-26 RX ADMIN — CARBOXYMETHYLCELLULOSE SODIUM 1 DROP: 0.5 SOLUTION/ DROPS OPHTHALMIC at 08:33

## 2025-05-26 RX ADMIN — DULOXETINE 60 MG: 30 CAPSULE, DELAYED RELEASE ORAL at 10:00

## 2025-05-26 RX ADMIN — INSULIN LISPRO 6 UNITS: 100 INJECTION, SOLUTION INTRAVENOUS; SUBCUTANEOUS at 04:34

## 2025-05-26 RX ADMIN — INSULIN LISPRO 6 UNITS: 100 INJECTION, SOLUTION INTRAVENOUS; SUBCUTANEOUS at 12:11

## 2025-05-26 RX ADMIN — INSULIN LISPRO 3 UNITS: 100 INJECTION, SOLUTION INTRAVENOUS; SUBCUTANEOUS at 17:00

## 2025-05-26 RX ADMIN — Medication 40 PERCENT: at 08:07

## 2025-05-26 RX ADMIN — PIPERACILLIN SODIUM AND TAZOBACTAM SODIUM 3.38 G: 3; .375 INJECTION, SOLUTION INTRAVENOUS at 17:21

## 2025-05-26 RX ADMIN — HYDRALAZINE HYDROCHLORIDE 10 MG: 20 INJECTION INTRAMUSCULAR; INTRAVENOUS at 09:10

## 2025-05-26 RX ADMIN — PIPERACILLIN SODIUM AND TAZOBACTAM SODIUM 3.38 G: 3; .375 INJECTION, SOLUTION INTRAVENOUS at 06:40

## 2025-05-26 RX ADMIN — Medication 50 PERCENT: at 20:00

## 2025-05-26 RX ADMIN — DOCUSATE SODIUM LIQUID 100 MG: 100 LIQUID ORAL at 08:33

## 2025-05-26 RX ADMIN — SENNOSIDES 5 ML: 8.8 LIQUID ORAL at 08:33

## 2025-05-26 RX ADMIN — POLYETHYLENE GLYCOL 3350 17 G: 17 POWDER, FOR SOLUTION ORAL at 08:33

## 2025-05-26 RX ADMIN — DEXMEDETOMIDINE HYDROCHLORIDE 1 MCG/KG/HR: 4 INJECTION, SOLUTION INTRAVENOUS at 11:38

## 2025-05-26 RX ADMIN — INSULIN LISPRO 6 UNITS: 100 INJECTION, SOLUTION INTRAVENOUS; SUBCUTANEOUS at 07:35

## 2025-05-26 RX ADMIN — AMLODIPINE BESYLATE 5 MG: 5 TABLET ORAL at 08:33

## 2025-05-26 RX ADMIN — PROPOFOL 30 MCG/KG/MIN: 10 INJECTION, EMULSION INTRAVENOUS at 02:44

## 2025-05-26 RX ADMIN — COLCHICINE 0.6 MG: 0.6 TABLET, FILM COATED ORAL at 08:33

## 2025-05-26 RX ADMIN — ASPIRIN 81 MG CHEWABLE TABLET 81 MG: 81 TABLET CHEWABLE at 08:33

## 2025-05-26 RX ADMIN — PROPOFOL 40 MCG/KG/MIN: 10 INJECTION, EMULSION INTRAVENOUS at 22:32

## 2025-05-26 RX ADMIN — INSULIN LISPRO 3 UNITS: 100 INJECTION, SOLUTION INTRAVENOUS; SUBCUTANEOUS at 21:27

## 2025-05-26 RX ADMIN — DEXMEDETOMIDINE HYDROCHLORIDE 0.8 MCG/KG/HR: 4 INJECTION, SOLUTION INTRAVENOUS at 13:48

## 2025-05-26 RX ADMIN — Medication 50 PERCENT: at 19:38

## 2025-05-26 RX ADMIN — DEXMEDETOMIDINE HYDROCHLORIDE 0.6 MCG/KG/HR: 4 INJECTION, SOLUTION INTRAVENOUS at 01:30

## 2025-05-26 RX ADMIN — PIPERACILLIN SODIUM AND TAZOBACTAM SODIUM 3.38 G: 3; .375 INJECTION, SOLUTION INTRAVENOUS at 11:39

## 2025-05-26 RX ADMIN — IPRATROPIUM BROMIDE AND ALBUTEROL SULFATE 3 ML: 2.5; .5 SOLUTION RESPIRATORY (INHALATION) at 12:49

## 2025-05-26 RX ADMIN — ATORVASTATIN CALCIUM 80 MG: 80 TABLET, FILM COATED ORAL at 21:28

## 2025-05-26 RX ADMIN — HYDRALAZINE HYDROCHLORIDE 10 MG: 20 INJECTION INTRAMUSCULAR; INTRAVENOUS at 19:16

## 2025-05-26 RX ADMIN — INSULIN GLARGINE 30 UNITS: 100 INJECTION, SOLUTION SUBCUTANEOUS at 21:27

## 2025-05-26 RX ADMIN — FERROUS SULFATE TAB 325 MG (65 MG ELEMENTAL FE) 1 TABLET: 325 (65 FE) TAB at 19:00

## 2025-05-26 RX ADMIN — QUETIAPINE FUMARATE 100 MG: 100 TABLET ORAL at 21:29

## 2025-05-26 RX ADMIN — DEXMEDETOMIDINE HYDROCHLORIDE 0.5 MCG/KG/HR: 4 INJECTION, SOLUTION INTRAVENOUS at 21:29

## 2025-05-26 RX ADMIN — PROPOFOL 25 MCG/KG/MIN: 10 INJECTION, EMULSION INTRAVENOUS at 19:37

## 2025-05-26 RX ADMIN — Medication 50 PERCENT: at 23:04

## 2025-05-26 RX ADMIN — PANTOPRAZOLE SODIUM 40 MG: 40 INJECTION, POWDER, FOR SOLUTION INTRAVENOUS at 06:40

## 2025-05-26 RX ADMIN — PROPOFOL 50 MCG/KG/MIN: 10 INJECTION, EMULSION INTRAVENOUS at 20:14

## 2025-05-26 RX ADMIN — ENOXAPARIN SODIUM 60 MG: 60 INJECTION SUBCUTANEOUS at 21:28

## 2025-05-26 RX ADMIN — ENOXAPARIN SODIUM 60 MG: 60 INJECTION SUBCUTANEOUS at 08:33

## 2025-05-26 RX ADMIN — DEXMEDETOMIDINE HYDROCHLORIDE 0.5 MCG/KG/HR: 4 INJECTION, SOLUTION INTRAVENOUS at 17:21

## 2025-05-26 RX ADMIN — PROPOFOL 30 MCG/KG/MIN: 10 INJECTION, EMULSION INTRAVENOUS at 00:51

## 2025-05-26 RX ADMIN — INSULIN GLARGINE 30 UNITS: 100 INJECTION, SOLUTION SUBCUTANEOUS at 08:33

## 2025-05-26 RX ADMIN — Medication 40 PERCENT: at 03:42

## 2025-05-26 RX ADMIN — DEXMEDETOMIDINE HYDROCHLORIDE 0.6 MCG/KG/HR: 4 INJECTION, SOLUTION INTRAVENOUS at 06:06

## 2025-05-26 RX ADMIN — PIPERACILLIN SODIUM AND TAZOBACTAM SODIUM 3.38 G: 3; .375 INJECTION, SOLUTION INTRAVENOUS at 00:14

## 2025-05-26 RX ADMIN — IPRATROPIUM BROMIDE AND ALBUTEROL SULFATE 3 ML: 2.5; .5 SOLUTION RESPIRATORY (INHALATION) at 19:38

## 2025-05-26 ASSESSMENT — COGNITIVE AND FUNCTIONAL STATUS - GENERAL
HELP NEEDED FOR BATHING: TOTAL
PERSONAL GROOMING: TOTAL
MOBILITY SCORE: 6
STANDING UP FROM CHAIR USING ARMS: TOTAL
MOVING FROM LYING ON BACK TO SITTING ON SIDE OF FLAT BED WITH BEDRAILS: TOTAL
EATING MEALS: TOTAL
DRESSING REGULAR LOWER BODY CLOTHING: TOTAL
WALKING IN HOSPITAL ROOM: TOTAL
DRESSING REGULAR UPPER BODY CLOTHING: TOTAL
TOILETING: TOTAL
CLIMB 3 TO 5 STEPS WITH RAILING: TOTAL
TURNING FROM BACK TO SIDE WHILE IN FLAT BAD: TOTAL
MOVING TO AND FROM BED TO CHAIR: TOTAL
DAILY ACTIVITIY SCORE: 6

## 2025-05-26 ASSESSMENT — PAIN - FUNCTIONAL ASSESSMENT
PAIN_FUNCTIONAL_ASSESSMENT: CPOT (CRITICAL CARE PAIN OBSERVATION TOOL)

## 2025-05-26 NOTE — PROGRESS NOTES
Music Therapy Note    Therapy Session  Referral Type: New referral this admission  Visit Type: New visit  Session Start Time: 1416  Conflict of Service: Asleep       Narrative  Assessment Detail: At the time of assessment pt was asleep with no family present at bedside.  Follow-up: MT will follow up with pt as able.    Education Documentation  No documentation found.

## 2025-05-26 NOTE — PROGRESS NOTES
Jai Shrestha is a 55 y.o. male on day 10 of admission presenting with Pneumonia due to infectious organism, unspecified laterality, unspecified part of lung.    Subjective   HD # 10 for this 56 YO male who was admitted through the [facility] ED with a five day exacerbation of his chronic respiratory failure as evidenced by complaints of SOB. It should be noted that he had recently been admitted for treatment of a pneumonia which although was not visible on plane film CXR was noted on C scan of the chest. At that time he had a normal WBC and was afebrile. After discharge he continued to complain of SOB and reached out to local hospital Telephone encounter specialists for similar complaints including pulse oximetry readings of 78 to 88% confirmed from a visiting nurse while he was in his home.  He was seen and evaluated in the ED of [facility] and admitted to a RNF for a diagnosis of pneumonia. Noted was chronic metabolic alkalosis presumably compensatory in response to chronic hypercarbia. He was placed on BIPAP and after a period of ventilation removed his BIPAP mask and had a repratory arrest . He has MMP including: HFpEF, chronic back pain, peripheral neuropathy, diabetes mellitus type 2 with insulin resistance, morbid obesity BMI 53.44, CKD 3, diastolic CHF and  hypertension .     PMH   Chronic back pain    Chronic pain disorder 12 15 2015  CKD (chronic kidney disease)    Depression 02 27 1986  Diabetes mellitus (Multi)    Extremity pain 12 15 2015  HF (heart failure), diastolic    Hypertension    Joint pain    Low back pain    Neuropathy in diabetes (Multi)    Peripheral neuropathy 12 15 2015  Spinal stenosis    PSH   None recorded     SH  reportley a reformed smoker positvie aocohol use.            Objective     Physical Exam    Last ReDeeply sedated with propofol and precedex overnight  . AC/VC ventilation  respiratory rate mid 20's  Minute ventilation  11 to 13 LPM with Corresponding alkalotic Ph ( respiratory ) Sedation   non reactive pupils , non responsive to noxious stimuli , both propofol and Precedex held awakens complains of SOB but simultaneouslly motions to remove ETT. Symmetric chest expansion , hypertensive and tachycardica.  Plan resedate markedly reduce minuteventilation to allow re normalization fo acid base vaues to reporduce his baseline renally compensated chronic respiratory acidosis . Target Pa CO2 60 to 65 torr.  Anticipate gradual renal response to compensate . corded Vitals  Blood pressure 124/51, pulse 101, temperature 36.6 °C (97.9 °F), temperature source Temporal, resp. rate (!) 35, weight (!) 192 kg (423 lb 11.6 oz), SpO2 93%.  Intake/Output last 3 Shifts:  I/O last 3 completed shifts:  In: 2337.4 (12.2 mL/kg) [I.V.:1067.4 (5.6 mL/kg); NG/GT:720; IV Piggyback:550]  Out: 5415 (28.2 mL/kg) [Urine:5415 (0.8 mL/kg/hr)]  Weight: 192.2 kg   This patient has a central line   Reason for the central line remaining today? Poor access    This patient has a urinary catheter   Reason for the urinary catheter remaining today? critically ill patient who need accurate urinary output measurements    This patient is intubated   Reason for patient to remain intubated today? they have inadequate gas-exchange without positive pressure        This critically ill patient continues   to be at-risk for clinically significant deterioration / failure due to the above mentioned dysfunctional, unstable organ systems.  I have personally identified and managed all complex critical care issues to prevent aforementioned clinical deterioration.  Critical care time is spent at bedside and/or the immediate area and has included, but is not limited to, the review of diagnostic tests, labs, radiographs, serial assessments of hemodynamics, respiratory status, ventilatory management, and family updates.  Time spent in procedures and teaching are reported separately.     CRITICAL CARE TIME:  55 minutes    LABS:  CMP:  Results from last 7 days   Lab  Units 05/26/25  0520 05/25/25  1357 05/25/25  0350 05/24/25  0313 05/23/25  1819 05/23/25  0633 05/22/25  0532 05/20/25  0534   SODIUM mmol/L 143 141 145 139 138 137 138 138   POTASSIUM mmol/L 4.2 4.3 2.8* 3.9 4.2 4.3 4.9 4.0   CHLORIDE mmol/L 101 100 116* 100 99 98 99 100   CO2 mmol/L 29 34* 25 28 33* 34* 29 33*   ANION GAP mmol/L 17 11 7* 15 10 9* 15 9*   BUN mg/dL 33* 31* 24* 40* 40* 37* 33* 21   CREATININE mg/dL 1.41* 1.42* 0.87 1.63* 1.74* 1.75* 1.77* 1.47*   EGFR mL/min/1.73m*2 59* 59* >90 50* 46* 46* 45* 56*   MAGNESIUM mg/dL 2.26 2.62* 1.34* 2.12 2.17  --   --   --    ALBUMIN g/dL 3.2* 3.2* 2.1* 3.2* 3.2*  --   --   --    ALT U/L  --   --   --  12 10  --   --   --    AST U/L  --   --   --  18 18  --   --   --    BILIRUBIN TOTAL mg/dL  --   --   --  0.5 0.4  --   --   --      CBC:  Results from last 7 days   Lab Units 05/26/25  0520 05/25/25  0350 05/24/25 0313 05/23/25 1819 05/23/25  0633 05/22/25  0532 05/20/25  0534   WBC AUTO x10*3/uL 10.2 10.2 8.7 11.0 11.0 12.4* 7.8   HEMOGLOBIN g/dL 8.8* 8.1* 7.8* 7.9* 8.4* 9.6* 8.9*   HEMATOCRIT % 31.2* 28.2* 27.4* 27.5* 30.2* 33.9* 30.4*   PLATELETS AUTO x10*3/uL 225 213 218 232 247 257 228   MCV fL 80 81 81 81 82 83 81     COAG:   Results from last 7 days   Lab Units 05/23/25  1819   INR  1.3*            Norbert Coker MD

## 2025-05-27 ENCOUNTER — APPOINTMENT (OUTPATIENT)
Dept: RADIOLOGY | Facility: HOSPITAL | Age: 55
End: 2025-05-27
Payer: MEDICARE

## 2025-05-27 PROBLEM — J18.9 PNEUMONIA DUE TO INFECTIOUS ORGANISM, UNSPECIFIED LATERALITY, UNSPECIFIED PART OF LUNG: Status: RESOLVED | Noted: 2025-05-16 | Resolved: 2025-05-27

## 2025-05-27 LAB
ALBUMIN SERPL BCP-MCNC: 3.1 G/DL (ref 3.4–5)
ANION GAP SERPL CALC-SCNC: 12 MMOL/L (ref 10–20)
BASOPHILS # BLD AUTO: 0.03 X10*3/UL (ref 0–0.1)
BASOPHILS NFR BLD AUTO: 0.3 %
BUN SERPL-MCNC: 38 MG/DL (ref 6–23)
CALCIUM SERPL-MCNC: 8.3 MG/DL (ref 8.6–10.3)
CHLORIDE SERPL-SCNC: 104 MMOL/L (ref 98–107)
CO2 SERPL-SCNC: 35 MMOL/L (ref 21–32)
CREAT SERPL-MCNC: 1.42 MG/DL (ref 0.5–1.3)
EGFRCR SERPLBLD CKD-EPI 2021: 59 ML/MIN/1.73M*2
EOSINOPHIL # BLD AUTO: 0.28 X10*3/UL (ref 0–0.7)
EOSINOPHIL NFR BLD AUTO: 2.5 %
ERYTHROCYTE [DISTWIDTH] IN BLOOD BY AUTOMATED COUNT: 16.8 % (ref 11.5–14.5)
GLUCOSE BLD MANUAL STRIP-MCNC: 183 MG/DL (ref 74–99)
GLUCOSE BLD MANUAL STRIP-MCNC: 189 MG/DL (ref 74–99)
GLUCOSE BLD MANUAL STRIP-MCNC: 191 MG/DL (ref 74–99)
GLUCOSE BLD MANUAL STRIP-MCNC: 192 MG/DL (ref 74–99)
GLUCOSE BLD MANUAL STRIP-MCNC: 207 MG/DL (ref 74–99)
GLUCOSE BLD MANUAL STRIP-MCNC: 208 MG/DL (ref 74–99)
GLUCOSE SERPL-MCNC: 213 MG/DL (ref 74–99)
HCT VFR BLD AUTO: 30.5 % (ref 41–52)
HGB BLD-MCNC: 8.7 G/DL (ref 13.5–17.5)
IMM GRANULOCYTES # BLD AUTO: 0.11 X10*3/UL (ref 0–0.7)
IMM GRANULOCYTES NFR BLD AUTO: 1 % (ref 0–0.9)
LYMPHOCYTES # BLD AUTO: 0.93 X10*3/UL (ref 1.2–4.8)
LYMPHOCYTES NFR BLD AUTO: 8.3 %
MAGNESIUM SERPL-MCNC: 2.24 MG/DL (ref 1.6–2.4)
MCH RBC QN AUTO: 22.9 PG (ref 26–34)
MCHC RBC AUTO-ENTMCNC: 28.5 G/DL (ref 32–36)
MCV RBC AUTO: 80 FL (ref 80–100)
MONOCYTES # BLD AUTO: 0.72 X10*3/UL (ref 0.1–1)
MONOCYTES NFR BLD AUTO: 6.4 %
NEUTROPHILS # BLD AUTO: 9.12 X10*3/UL (ref 1.2–7.7)
NEUTROPHILS NFR BLD AUTO: 81.5 %
NRBC BLD-RTO: 0 /100 WBCS (ref 0–0)
PHOSPHATE SERPL-MCNC: 3.8 MG/DL (ref 2.5–4.9)
PLATELET # BLD AUTO: 243 X10*3/UL (ref 150–450)
POTASSIUM SERPL-SCNC: 4.1 MMOL/L (ref 3.5–5.3)
RBC # BLD AUTO: 3.8 X10*6/UL (ref 4.5–5.9)
SODIUM SERPL-SCNC: 147 MMOL/L (ref 136–145)
WBC # BLD AUTO: 11.2 X10*3/UL (ref 4.4–11.3)

## 2025-05-27 PROCEDURE — 80069 RENAL FUNCTION PANEL: CPT | Performed by: ANESTHESIOLOGY

## 2025-05-27 PROCEDURE — 2500000004 HC RX 250 GENERAL PHARMACY W/ HCPCS (ALT 636 FOR OP/ED): Performed by: NURSE PRACTITIONER

## 2025-05-27 PROCEDURE — 37799 UNLISTED PX VASCULAR SURGERY: CPT | Performed by: ANESTHESIOLOGY

## 2025-05-27 PROCEDURE — 94640 AIRWAY INHALATION TREATMENT: CPT

## 2025-05-27 PROCEDURE — 2500000005 HC RX 250 GENERAL PHARMACY W/O HCPCS

## 2025-05-27 PROCEDURE — 0B21XEZ CHANGE ENDOTRACHEAL AIRWAY IN TRACHEA, EXTERNAL APPROACH: ICD-10-PCS | Performed by: NURSE PRACTITIONER

## 2025-05-27 PROCEDURE — 2500000004 HC RX 250 GENERAL PHARMACY W/ HCPCS (ALT 636 FOR OP/ED): Performed by: INTERNAL MEDICINE

## 2025-05-27 PROCEDURE — 2500000004 HC RX 250 GENERAL PHARMACY W/ HCPCS (ALT 636 FOR OP/ED): Mod: JZ

## 2025-05-27 PROCEDURE — 85025 COMPLETE CBC W/AUTO DIFF WBC: CPT | Performed by: ANESTHESIOLOGY

## 2025-05-27 PROCEDURE — 71045 X-RAY EXAM CHEST 1 VIEW: CPT | Performed by: RADIOLOGY

## 2025-05-27 PROCEDURE — 83735 ASSAY OF MAGNESIUM: CPT | Performed by: ANESTHESIOLOGY

## 2025-05-27 PROCEDURE — 80069 RENAL FUNCTION PANEL: CPT

## 2025-05-27 PROCEDURE — 2500000002 HC RX 250 W HCPCS SELF ADMINISTERED DRUGS (ALT 637 FOR MEDICARE OP, ALT 636 FOR OP/ED): Performed by: SURGERY

## 2025-05-27 PROCEDURE — 71045 X-RAY EXAM CHEST 1 VIEW: CPT

## 2025-05-27 PROCEDURE — 99291 CRITICAL CARE FIRST HOUR: CPT | Performed by: SURGERY

## 2025-05-27 PROCEDURE — 2500000004 HC RX 250 GENERAL PHARMACY W/ HCPCS (ALT 636 FOR OP/ED): Mod: JZ | Performed by: ANESTHESIOLOGY

## 2025-05-27 PROCEDURE — 2500000002 HC RX 250 W HCPCS SELF ADMINISTERED DRUGS (ALT 637 FOR MEDICARE OP, ALT 636 FOR OP/ED): Performed by: NURSE PRACTITIONER

## 2025-05-27 PROCEDURE — 2500000001 HC RX 250 WO HCPCS SELF ADMINISTERED DRUGS (ALT 637 FOR MEDICARE OP): Performed by: INTERNAL MEDICINE

## 2025-05-27 PROCEDURE — 2500000001 HC RX 250 WO HCPCS SELF ADMINISTERED DRUGS (ALT 637 FOR MEDICARE OP): Performed by: ANESTHESIOLOGY

## 2025-05-27 PROCEDURE — 94003 VENT MGMT INPAT SUBQ DAY: CPT

## 2025-05-27 PROCEDURE — 2500000004 HC RX 250 GENERAL PHARMACY W/ HCPCS (ALT 636 FOR OP/ED): Mod: JZ | Performed by: NURSE PRACTITIONER

## 2025-05-27 PROCEDURE — 2500000001 HC RX 250 WO HCPCS SELF ADMINISTERED DRUGS (ALT 637 FOR MEDICARE OP): Performed by: SURGERY

## 2025-05-27 PROCEDURE — 2500000001 HC RX 250 WO HCPCS SELF ADMINISTERED DRUGS (ALT 637 FOR MEDICARE OP): Performed by: NURSE PRACTITIONER

## 2025-05-27 PROCEDURE — 94681 O2 UPTK CO2 OUTP % O2 XTRC: CPT

## 2025-05-27 PROCEDURE — 37799 UNLISTED PX VASCULAR SURGERY: CPT

## 2025-05-27 PROCEDURE — 2020000001 HC ICU ROOM DAILY

## 2025-05-27 PROCEDURE — 82947 ASSAY GLUCOSE BLOOD QUANT: CPT

## 2025-05-27 PROCEDURE — 2500000005 HC RX 250 GENERAL PHARMACY W/O HCPCS: Performed by: ANESTHESIOLOGY

## 2025-05-27 RX ORDER — PROPOFOL 10 MG/ML
0-50 INJECTION, EMULSION INTRAVENOUS CONTINUOUS
Status: DISCONTINUED | OUTPATIENT
Start: 2025-05-27 | End: 2025-05-29

## 2025-05-27 RX ORDER — PROPOFOL 10 MG/ML
INJECTION, EMULSION INTRAVENOUS
Status: DISPENSED
Start: 2025-05-27 | End: 2025-05-28

## 2025-05-27 RX ORDER — PROPOFOL 10 MG/ML
INJECTION, EMULSION INTRAVENOUS
Status: COMPLETED
Start: 2025-05-27 | End: 2025-05-27

## 2025-05-27 RX ORDER — FENTANYL CITRATE 50 UG/ML
INJECTION, SOLUTION INTRAMUSCULAR; INTRAVENOUS
Status: DISPENSED
Start: 2025-05-27 | End: 2025-05-28

## 2025-05-27 RX ADMIN — IPRATROPIUM BROMIDE AND ALBUTEROL SULFATE 3 ML: 2.5; .5 SOLUTION RESPIRATORY (INHALATION) at 19:08

## 2025-05-27 RX ADMIN — Medication 50 PERCENT: at 00:00

## 2025-05-27 RX ADMIN — DEXMEDETOMIDINE HYDROCHLORIDE 0.5 MCG/KG/HR: 4 INJECTION, SOLUTION INTRAVENOUS at 10:43

## 2025-05-27 RX ADMIN — AMLODIPINE BESYLATE 5 MG: 5 TABLET ORAL at 08:30

## 2025-05-27 RX ADMIN — QUETIAPINE FUMARATE 100 MG: 100 TABLET ORAL at 20:25

## 2025-05-27 RX ADMIN — DEXMEDETOMIDINE HYDROCHLORIDE 0.5 MCG/KG/HR: 4 INJECTION, SOLUTION INTRAVENOUS at 05:56

## 2025-05-27 RX ADMIN — DEXMEDETOMIDINE HYDROCHLORIDE 0.5 MCG/KG/HR: 4 INJECTION, SOLUTION INTRAVENOUS at 01:36

## 2025-05-27 RX ADMIN — PROPOFOL 25 MCG/KG/MIN: 10 INJECTION, EMULSION INTRAVENOUS at 03:21

## 2025-05-27 RX ADMIN — PROPOFOL 20 MCG/KG/MIN: 10 INJECTION, EMULSION INTRAVENOUS at 18:44

## 2025-05-27 RX ADMIN — INSULIN LISPRO 3 UNITS: 100 INJECTION, SOLUTION INTRAVENOUS; SUBCUTANEOUS at 15:45

## 2025-05-27 RX ADMIN — PROPOFOL 25 MCG/KG/MIN: 10 INJECTION, EMULSION INTRAVENOUS at 00:45

## 2025-05-27 RX ADMIN — DEXMEDETOMIDINE HYDROCHLORIDE 0.5 MCG/KG/HR: 4 INJECTION, SOLUTION INTRAVENOUS at 18:45

## 2025-05-27 RX ADMIN — PROPOFOL 25 MCG/KG/MIN: 10 INJECTION, EMULSION INTRAVENOUS at 06:58

## 2025-05-27 RX ADMIN — PROPOFOL 25 MCG/KG/MIN: 10 INJECTION, EMULSION INTRAVENOUS at 19:32

## 2025-05-27 RX ADMIN — DOCUSATE SODIUM LIQUID 100 MG: 100 LIQUID ORAL at 20:24

## 2025-05-27 RX ADMIN — DEXMEDETOMIDINE HYDROCHLORIDE 0.5 MCG/KG/HR: 4 INJECTION, SOLUTION INTRAVENOUS at 22:15

## 2025-05-27 RX ADMIN — Medication 40 PERCENT: at 19:08

## 2025-05-27 RX ADMIN — LACTULOSE 20 G: 10 SOLUTION ORAL at 15:00

## 2025-05-27 RX ADMIN — INSULIN LISPRO 3 UNITS: 100 INJECTION, SOLUTION INTRAVENOUS; SUBCUTANEOUS at 11:55

## 2025-05-27 RX ADMIN — INSULIN LISPRO 6 UNITS: 100 INJECTION, SOLUTION INTRAVENOUS; SUBCUTANEOUS at 04:39

## 2025-05-27 RX ADMIN — CARBOXYMETHYLCELLULOSE SODIUM 1 DROP: 0.5 SOLUTION/ DROPS OPHTHALMIC at 08:30

## 2025-05-27 RX ADMIN — DEXMEDETOMIDINE HYDROCHLORIDE 0.5 MCG/KG/HR: 4 INJECTION, SOLUTION INTRAVENOUS at 14:29

## 2025-05-27 RX ADMIN — FUROSEMIDE 40 MG: 10 INJECTION, SOLUTION INTRAMUSCULAR; INTRAVENOUS at 20:24

## 2025-05-27 RX ADMIN — Medication 40 PERCENT: at 22:56

## 2025-05-27 RX ADMIN — IPRATROPIUM BROMIDE AND ALBUTEROL SULFATE 3 ML: 2.5; .5 SOLUTION RESPIRATORY (INHALATION) at 12:02

## 2025-05-27 RX ADMIN — LISINOPRIL 5 MG: 5 TABLET ORAL at 08:30

## 2025-05-27 RX ADMIN — ENOXAPARIN SODIUM 60 MG: 60 INJECTION SUBCUTANEOUS at 20:24

## 2025-05-27 RX ADMIN — INSULIN LISPRO 6 UNITS: 100 INJECTION, SOLUTION INTRAVENOUS; SUBCUTANEOUS at 20:26

## 2025-05-27 RX ADMIN — SENNOSIDES 5 ML: 8.8 LIQUID ORAL at 08:30

## 2025-05-27 RX ADMIN — INSULIN GLARGINE 30 UNITS: 100 INJECTION, SOLUTION SUBCUTANEOUS at 08:30

## 2025-05-27 RX ADMIN — PANTOPRAZOLE SODIUM 40 MG: 40 INJECTION, POWDER, FOR SOLUTION INTRAVENOUS at 06:31

## 2025-05-27 RX ADMIN — PROPOFOL 40 MCG/KG/MIN: 10 INJECTION, EMULSION INTRAVENOUS at 15:33

## 2025-05-27 RX ADMIN — Medication 50 PERCENT: at 03:06

## 2025-05-27 RX ADMIN — DOCUSATE SODIUM LIQUID 100 MG: 100 LIQUID ORAL at 08:30

## 2025-05-27 RX ADMIN — IPRATROPIUM BROMIDE AND ALBUTEROL SULFATE 3 ML: 2.5; .5 SOLUTION RESPIRATORY (INHALATION) at 07:38

## 2025-05-27 RX ADMIN — PROPOFOL 30 MCG/KG/MIN: 10 INJECTION, EMULSION INTRAVENOUS at 15:46

## 2025-05-27 RX ADMIN — PROPOFOL 1000 MG: 10 INJECTION, EMULSION INTRAVENOUS at 14:00

## 2025-05-27 RX ADMIN — POLYETHYLENE GLYCOL 3350 17 G: 17 POWDER, FOR SOLUTION ORAL at 08:30

## 2025-05-27 RX ADMIN — INSULIN LISPRO 3 UNITS: 100 INJECTION, SOLUTION INTRAVENOUS; SUBCUTANEOUS at 08:30

## 2025-05-27 RX ADMIN — PROPOFOL 15 MCG/KG/MIN: 10 INJECTION, EMULSION INTRAVENOUS at 23:44

## 2025-05-27 RX ADMIN — INSULIN GLARGINE 30 UNITS: 100 INJECTION, SOLUTION SUBCUTANEOUS at 20:24

## 2025-05-27 RX ADMIN — DULOXETINE 60 MG: 30 CAPSULE, DELAYED RELEASE ORAL at 08:30

## 2025-05-27 RX ADMIN — HYDRALAZINE HYDROCHLORIDE 10 MG: 20 INJECTION INTRAMUSCULAR; INTRAVENOUS at 11:10

## 2025-05-27 RX ADMIN — INSULIN LISPRO 3 UNITS: 100 INJECTION, SOLUTION INTRAVENOUS; SUBCUTANEOUS at 01:27

## 2025-05-27 RX ADMIN — ENOXAPARIN SODIUM 60 MG: 60 INJECTION SUBCUTANEOUS at 08:30

## 2025-05-27 RX ADMIN — COLCHICINE 0.6 MG: 0.6 TABLET, FILM COATED ORAL at 08:30

## 2025-05-27 RX ADMIN — DEXMEDETOMIDINE HYDROCHLORIDE 0.5 MCG/KG/HR: 4 INJECTION, SOLUTION INTRAVENOUS at 17:31

## 2025-05-27 RX ADMIN — ASPIRIN 81 MG CHEWABLE TABLET 81 MG: 81 TABLET CHEWABLE at 08:30

## 2025-05-27 RX ADMIN — FUROSEMIDE 40 MG: 10 INJECTION, SOLUTION INTRAMUSCULAR; INTRAVENOUS at 08:44

## 2025-05-27 RX ADMIN — SENNOSIDES 5 ML: 8.8 LIQUID ORAL at 20:24

## 2025-05-27 RX ADMIN — ATORVASTATIN CALCIUM 80 MG: 80 TABLET, FILM COATED ORAL at 20:25

## 2025-05-27 ASSESSMENT — COGNITIVE AND FUNCTIONAL STATUS - GENERAL
MOVING TO AND FROM BED TO CHAIR: TOTAL
MOBILITY SCORE: 6
TURNING FROM BACK TO SIDE WHILE IN FLAT BAD: TOTAL
EATING MEALS: TOTAL
STANDING UP FROM CHAIR USING ARMS: TOTAL
CLIMB 3 TO 5 STEPS WITH RAILING: TOTAL
HELP NEEDED FOR BATHING: TOTAL
TOILETING: TOTAL
WALKING IN HOSPITAL ROOM: TOTAL
DRESSING REGULAR UPPER BODY CLOTHING: TOTAL
MOVING FROM LYING ON BACK TO SITTING ON SIDE OF FLAT BED WITH BEDRAILS: TOTAL
PERSONAL GROOMING: TOTAL
DRESSING REGULAR LOWER BODY CLOTHING: TOTAL
DAILY ACTIVITIY SCORE: 6

## 2025-05-27 ASSESSMENT — PAIN - FUNCTIONAL ASSESSMENT
PAIN_FUNCTIONAL_ASSESSMENT: CPOT (CRITICAL CARE PAIN OBSERVATION TOOL)

## 2025-05-27 ASSESSMENT — PAIN SCALES - GENERAL: PAINLEVEL_OUTOF10: 0 - NO PAIN

## 2025-05-27 NOTE — POST-PROCEDURE NOTE
Called emergently to bedside as patient had bit through ETT.    Following deep sedation and adequate ventilation, patient removed from vent, a tube exchanger placed into tube and old tube removed over exchanger.  A size 7.5 ETT placed over exchanger and inserted to 26cm lipline.  Placement confirmed via color metric CO2 detector and chest xray.  Patient tolerated well.  Dr. Coker at bedside.     Korey Lala, CNP

## 2025-05-27 NOTE — SIGNIFICANT EVENT
Pt noted to have a very large air leak thru mouth, upon examination found that patient had bit a large hole thru the tube.  Placed patient back on SBT settings in case of possible extubation.  Called dr burgos and PRINCE Lala NP to bedside.  Pt reintubated by way of tube exchanger without complicatons.  Tube secured with a new ash, patient placed on vent at dr cooper verbal order setting of SIMV 14  PS10 peep 5

## 2025-05-27 NOTE — PROGRESS NOTES
Jai Shrestha is a 55 y.o. male on day 11 of admission presenting with Pneumonia due to infectious organism, unspecified laterality, unspecified part of lung.    Subjective   HD # 11 for this 54 YO male who was admitted through the [facility] ED with a five day exacerbation of his chronic respiratory failure as evidenced by complaints of SOB. It should be noted that he had recently been admitted for treatment of a pneumonia which although was not visible on plane film CXR  but was noted on a CAT scan of his chest. At that time of thiat suspected diagnosis ,  he had a normal WBC and was afebrile. After discharge he continued to complain of SOB and reached out to local hospital Telephone encounter specialists for similar complaints including pulse oximetry readings as reported by visitin nurses  of 78 to 88% . The same visiting nurse while  in his home sugested evaluation at a hopsital which he refused.  Days later he was seen and evaluated in the ED of [facility] and admitted to a RNF for a diagnosis of pneumonia. Upon admission again was noted chronic metabolic alkalosis presumably compensatory in response to chronic hypercarbia. He was placed on BIPAP and after a period of ventilation removed his BIPAP mask and had a respiratory arrest.  He has MMP including: HFpEF, chronic back pain, peripheral neuropathy, diabetes mellitus type 2 with insulin resistance, morbid obesity BMI 53.44, CKD 3, diastolic CHF and  hypertension .      PMH   Chronic back pain           Chronic pain [id number] 15 2015  CKD (chronic kidney disease)     [id number] 27 1986  Diabetes mellitus (Multi)            Extremity pain12 15 2015  HF (heart failure), diastolic         Hypertension      Joint pain            Low back pain    Neuropathy in diabetes (Multi)              Peripheral [id number] 15 2015  Spinal stenosis    PSH   None recorded       reportley a reformed smoker positive alcohol  use.           Objective     Physical Exam      He remains  Deeply sedated with propofol and precedex overnight  . Again noted increased RR but on AC/VC ventilation  respiratory rate mid 20's  Minute ventilation again near  11 to 13 LPM .  While on sedation  non responsive to noxious stimuli , both propofol and Precedex  reduced Symmetric chest expansion , hypertensive and tachycardica.  Plan resedate markedly reduce minuteventilation to allow re normalization fo acid base vaues to reporduce his baseline renally compensated chronic respiratory acidosis . Target Pa CO2 60 to 65 torr.  Anticipate gradual renal response to compensate  . Will return to synchronized mode avoid Assist or control modes to prevent hyperventilation and loss of metabolic compensation .   Last Recorded Vitals  Blood pressure 173/62, pulse 77, temperature 37.4 °C (99.3 °F), temperature source Temporal, resp. rate 23, weight (!) 188 kg (413 lb 5.8 oz), SpO2 97%.  Intake/Output last 3 Shifts:  I/O last 3 completed shifts:  In: 2077.8 (11.1 mL/kg) [I.V.:1267.8 (6.8 mL/kg); NG/GT:760; IV Piggyback:50]  Out: 5220 (27.8 mL/kg) [Urine:5220 (0.8 mL/kg/hr)]  Weight: 187.5 kg       This critically ill patient continues   to be at-risk for clinically significant deterioration / failure due to the above mentioned dysfunctional, unstable organ systems.  I have personally identified and managed all complex critical care issues to prevent aforementioned clinical deterioration.  Critical care time is spent at bedside and/or the immediate area and has included, but is not limited to, the review of diagnostic tests, labs, radiographs, serial assessments of hemodynamics, respiratory status, ventilatory management, and family updates.  Time spent in procedures and teaching are reported separately.     CRITICAL CARE TIME:  45 minutes    LABS:  CMP:  Results from last 7 days   Lab Units 05/27/25  0451 05/26/25  0520 05/25/25  1357 05/25/25  0350 05/24/25  0313 05/23/25  1819 05/23/25  0633 05/22/25  0532   SODIUM mmol/L 147*  143 141 145 139 138 137 138   POTASSIUM mmol/L 4.1 4.2 4.3 2.8* 3.9 4.2 4.3 4.9   CHLORIDE mmol/L 104 101 100 116* 100 99 98 99   CO2 mmol/L 35* 29 34* 25 28 33* 34* 29   ANION GAP mmol/L 12 17 11 7* 15 10 9* 15   BUN mg/dL 38* 33* 31* 24* 40* 40* 37* 33*   CREATININE mg/dL 1.42* 1.41* 1.42* 0.87 1.63* 1.74* 1.75* 1.77*   EGFR mL/min/1.73m*2 59* 59* 59* >90 50* 46* 46* 45*   MAGNESIUM mg/dL 2.24 2.26 2.62* 1.34* 2.12 2.17  --   --    ALBUMIN g/dL 3.1* 3.2* 3.2* 2.1* 3.2* 3.2*  --   --    ALT U/L  --   --   --   --  12 10  --   --    AST U/L  --   --   --   --  18 18  --   --    BILIRUBIN TOTAL mg/dL  --   --   --   --  0.5 0.4  --   --      CBC:  Results from last 7 days   Lab Units 05/27/25  0451 05/26/25  0520 05/25/25  0350 05/24/25  0313 05/23/25  1819 05/23/25  0633 05/22/25  0532   WBC AUTO x10*3/uL 11.2 10.2 10.2 8.7 11.0 11.0 12.4*   HEMOGLOBIN g/dL 8.7* 8.8* 8.1* 7.8* 7.9* 8.4* 9.6*   HEMATOCRIT % 30.5* 31.2* 28.2* 27.4* 27.5* 30.2* 33.9*   PLATELETS AUTO x10*3/uL 243 225 213 218 232 247 257   MCV fL 80 80 81 81 81 82 83     COAG:   Results from last 7 days   Lab Units 05/23/25  1819   INR  1.3*              Assessment & Plan  Pneumonia due to infectious organism, unspecified laterality, unspecified part of lung (Resolved: 5/27/2025)    COPD  Chronic CO2 retention  Morbid Obesity   nal and overall care of this patient.      Norbert Coker MD

## 2025-05-27 NOTE — CONSULTS
Nutrition Initial Assessment Note  -late entry-    Reason for Assessment: Enteral assessment/recommendation (TF)    Pt admitted for:  Hypoxia [R09.02]  Pneumonia due to infectious organism, unspecified laterality, unspecified part of lung [J18.9]    Pt known to nutrition services.  Chart reviewed and pt visited.  TF at 50ml/hr.  Per notes prior to advancing to goal pt was tolerating trophic feeds.  Current TF regimen meets > 75% of pts nutritional needs.    Medical History[1]    Results for orders placed or performed during the hospital encounter of 05/16/25 (from the past 24 hours)   POCT GLUCOSE   Result Value Ref Range    POCT Glucose 229 (H) 74 - 99 mg/dL   POCT GLUCOSE   Result Value Ref Range    POCT Glucose 162 (H) 74 - 99 mg/dL   POCT GLUCOSE   Result Value Ref Range    POCT Glucose 193 (H) 74 - 99 mg/dL   POCT GLUCOSE   Result Value Ref Range    POCT Glucose 191 (H) 74 - 99 mg/dL   POCT GLUCOSE   Result Value Ref Range    POCT Glucose 208 (H) 74 - 99 mg/dL   Renal function panel   Result Value Ref Range    Glucose 213 (H) 74 - 99 mg/dL    Sodium 147 (H) 136 - 145 mmol/L    Potassium 4.1 3.5 - 5.3 mmol/L    Chloride 104 98 - 107 mmol/L    Bicarbonate 35 (H) 21 - 32 mmol/L    Anion Gap 12 10 - 20 mmol/L    Urea Nitrogen 38 (H) 6 - 23 mg/dL    Creatinine 1.42 (H) 0.50 - 1.30 mg/dL    eGFR 59 (L) >60 mL/min/1.73m*2    Calcium 8.3 (L) 8.6 - 10.3 mg/dL    Phosphorus 3.8 2.5 - 4.9 mg/dL    Albumin 3.1 (L) 3.4 - 5.0 g/dL   Magnesium   Result Value Ref Range    Magnesium 2.24 1.60 - 2.40 mg/dL   CBC and Auto Differential   Result Value Ref Range    WBC 11.2 4.4 - 11.3 x10*3/uL    nRBC 0.0 0.0 - 0.0 /100 WBCs    RBC 3.80 (L) 4.50 - 5.90 x10*6/uL    Hemoglobin 8.7 (L) 13.5 - 17.5 g/dL    Hematocrit 30.5 (L) 41.0 - 52.0 %    MCV 80 80 - 100 fL    MCH 22.9 (L) 26.0 - 34.0 pg    MCHC 28.5 (L) 32.0 - 36.0 g/dL    RDW 16.8 (H) 11.5 - 14.5 %    Platelets 243 150 - 450 x10*3/uL    Neutrophils % 81.5 40.0 - 80.0 %    Immature  "Granulocytes %, Automated 1.0 (H) 0.0 - 0.9 %    Lymphocytes % 8.3 13.0 - 44.0 %    Monocytes % 6.4 2.0 - 10.0 %    Eosinophils % 2.5 0.0 - 6.0 %    Basophils % 0.3 0.0 - 2.0 %    Neutrophils Absolute 9.12 (H) 1.20 - 7.70 x10*3/uL    Immature Granulocytes Absolute, Automated 0.11 0.00 - 0.70 x10*3/uL    Lymphocytes Absolute 0.93 (L) 1.20 - 4.80 x10*3/uL    Monocytes Absolute 0.72 0.10 - 1.00 x10*3/uL    Eosinophils Absolute 0.28 0.00 - 0.70 x10*3/uL    Basophils Absolute 0.03 0.00 - 0.10 x10*3/uL   POCT GLUCOSE   Result Value Ref Range    POCT Glucose 192 (H) 74 - 99 mg/dL     Scheduled medications  Scheduled Medications[2]  Continuous medications  Continuous Medications[3]  PRN medications  PRN Medications[4]  Dietary Orders (From admission, onward)       Start     Ordered    05/27/25 0919  May Participate in Room Service  ( ROOM SERVICE MAY PARTICIPATE)  Once        Question:  .  Answer:  Yes    05/27/25 0918    05/26/25 1353  Enteral feeding with NPO OG (orogastic tube); 20; 150; Water; Tap water; Every 6 hours  Diet effective now        Comments: Advance by 10 mL/hr Q8h to goal 50 mL/hr.   Question Answer Comment   Tube feeding formula: Glucerna 1.5    Feeding route: OG (orogastic tube)    Tube feeding continuous rate (mL/hr): 20    Tube feeding flush (mL): 150    Flush type: Water    Water type: Tap water    Flush frequency: Every 6 hours        05/26/25 1352                    History:  Energy Intake: Good > 75 %  Food and Nutrient History: per flowsheets prior to intubation/sedation    Anthropometrics:  Height: 180.3 cm (5' 10.98\")  Weight: (!) 188 kg (413 lb 5.8 oz)  BMI (Calculated): 57.68    Wt Readings from Last 4 Encounters:   05/27/25 (!) 188 kg (413 lb 5.8 oz)   04/27/25 (!) 174 kg (383 lb)   07/24/24 (!) 171 kg (378 lb)   06/13/24 (!) 195 kg (430 lb)         Significant Weight Loss: No         IBW/kg (Dietitian Calculated): 78.2 kg        Energy Needs:  Height: 180.3 cm (5' 10.98\")  Minute " Ventilation (L/min): 12.2 L/min  Temp: 37.4 °C (99.3 °F)    Total Energy Estimated Needs in 24 hours (kCal): 2110 kCal  Energy Estimated Needs per kg Body Weight in 24 hours (kCal/kg): 2690 kCal/kg  Method for Estimating Needs: 11-14    Total Protein Estimated Needs in 24 Hours (g): 80 g  Protein Estimated Needs per kg Body Weight in 24 Hours (g/kg): 195 g/kg  Method for Estimating 24 Hour Protein Needs: 1.0-2.5 IBW    Method for Estimating 24 Hour Fluid Needs: 1ml/kcal or per MD    Nutrition Focused Physical Findings:  Orbital Fat Pads: Well nourished (slightly bulging fat pads)  Buccal Fat Pads: Well nourished (full, rounded cheeks)    Temporalis: Well nourished (well-defined muscle)    Edema:  (non pitting)  Edema Location: generalized    Skin: Positive  Positive Skin Findings: Impaired wound healing (venous ulcer, R heel diabetic ulcer)  Respiratory : Positive     Nutrition Diagnosis   Patient has Nutrition Diagnosis: Yes  Nutrition Diagnosis 1: Inadequate oral intake  Diagnosis Status (1): New  Related to (1): acute illness  As Evidenced by (1): pt NPO requiring enteral feeds to meet nutritional needs       Nutrition Interventions/Recommendations   Nutrition Prescription: Nutrition prescription for enteral nutrition  Individualized Nutrition Prescription Provided for : Glucerna 1.5 goal rate 50ml/hr. Flushes per MD rec or 150ml x 4.    Food and/or Nutrient Delivery Interventions  Enteral Intake: Management of volume of enteral nutrition, Management of flushing of feeding tube  Goal: Glucerna 1.5 at 50ml/hr continuous proviedes: 1800kcal, 99g protein & 911ml free h2o. Pt at time of assessment on Propofol at 20.9ml/hr which provides an additional 551.8kcal per day. total h2o: 1511ml    Collaboration and Referral of Nutrition Care: Collaboration by nutrition professional with other providers  Coordination of Care with Providers: Nursing, Provider    Education Documentation  No documentation found.      Nutrition  Monitoring and Evaluation   Food and Nutrient Related History  Enteral and Parenteral Nutrition Intake Determination: Enteral nutrition intake - Tolerate TF at goal rate, Enteral nutrition intake - To meet > 75% estimated energy needs, Enteral nutrition formula/solution, Enteral nutrition intake - Other  Criteria: Monitor TF tolerance; Contact nutrition services for intolerances    Anthropometrics: Body Composition/Growth/Weight History  Body Weight: Body weight - Maintain stable weight, Body weight - Weight reduction from fluids, as needed    Biochemical Data, Medical Tests and Procedures  Electrolyte and Renal Panel: Other (Comment)  Criteria: as clinically indicated    Gastrointestinal Profile: Other (Comment)  Criteria: as clinically indicated    Glucose/Endocrine Profile: Other (Comment)  Criteria: as clinically indicated    Nutritional Anemia Profile: Other (Comment)  Criteria: as clinically indicated    Vitamin Profile: Other (Comment)  Criteria: as clinically indicated    Nutrition Focused Physical Findings  Digestive System Finding: Other (Comment)  Criteria: Stool output, Urine volume, Overall appearance    Skin Finding: Impaired wound healing - Improved wound healing    Edema Finding:  (Other)  Criteria: generalized non pitting    Time Spent (min): 60 minutes  Last Date of Nutrition Visit: 05/26/25  Nutrition Follow-Up Needed?: Dietitian to reassess per policy  Follow up Comment: JUANA EN       [1]   Past Medical History:  Diagnosis Date    Chronic back pain     Chronic pain disorder 12 15  2015    CKD (chronic kidney disease)     Depression 02 27 1986    Diabetes mellitus (Multi)     Extremity pain 12 15 2015    HF (heart failure), diastolic     Hypertension     Joint pain     Low back pain     Neuropathy in diabetes (Multi)     Peripheral neuropathy 12 15 2015    Spinal stenosis    [2] amLODIPine, 5 mg, oral, Daily  aspirin, 81 mg, oral, Daily  atorvastatin, 80 mg, oral, Nightly  colchicine, 0.6 mg, oral,  Daily  docusate sodium, 100 mg, oral, BID  DULoxetine, 60 mg, oral, Daily  enoxaparin, 60 mg, subcutaneous, q12h TAMIR  ergocalciferol, 1.25 mg, oral, Weekly  ferrous sulfate, 1 tablet, oral, Every Mon/Wed/Fri  furosemide, 40 mg, intravenous, BID  insulin glargine, 30 Units, subcutaneous, BID  insulin lispro, 0-15 Units, subcutaneous, q4h  ipratropium-albuteroL, 3 mL, nebulization, TID  lisinopril, 5 mg, oral, Daily  lubricating eye drops, 1 drop, Both Eyes, Daily  pantoprazole, 40 mg, oral, Daily before breakfast   Or  pantoprazole, 40 mg, intravenous, Daily before breakfast  polyethylene glycol, 17 g, oral, Daily  QUEtiapine, 100 mg, oral, Nightly  senna, 5 mL, oral, BID     [3] dexmedeTOMIDine, 0-1.5 mcg/kg/hr, Last Rate: 0.5 mcg/kg/hr (05/27/25 0556)  propofol, 0-30 mcg/kg/min, Last Rate: 25 mcg/kg/min (05/27/25 0658)     [4] PRN medications: acetaminophen **OR** acetaminophen **OR** acetaminophen, acetaminophen, alteplase, dextrose, dextrose, glucagon, glucagon, hydrALAZINE, ipratropium-albuteroL, lactulose, naloxone, ondansetron **OR** ondansetron, oxygen

## 2025-05-27 NOTE — CARE PLAN
Problem: Safety - Medical Restraint  Goal: Remains free of injury from restraints (Restraint for Interference with Medical Device)  5/27/2025 0324 by Jenna Mckeon RN  Outcome: Progressing  Flowsheets (Taken 5/25/2025 0457 by Abeba Wells RN)  Remains free of injury from restraints (restraint for interference with medical device): Every 2 hours: Monitor safety, psychosocial status, comfort, nutrition and hydration  5/27/2025 0319 by Jenna Mckeon RN  Flowsheets (Taken 5/25/2025 0457 by Abeba Wells RN)  Remains free of injury from restraints (restraint for interference with medical device): Every 2 hours: Monitor safety, psychosocial status, comfort, nutrition and hydration  5/27/2025 0317 by Jenna Mckeon RN  Flowsheets (Taken 5/25/2025 0457 by Abeba Wells RN)  Remains free of injury from restraints (restraint for interference with medical device): Every 2 hours: Monitor safety, psychosocial status, comfort, nutrition and hydration     Problem: Safety - Medical Restraint  Goal: Free from restraint(s) (Restraint for Interference with Medical Device)  5/27/2025 0324 by Jenna Mckeon RN  Flowsheets (Taken 5/27/2025 0324)  Free from restraint(s) (restraint for interference with medical device): ONCE/SHIFT or MINIMUM Every 12 hours: Assess and document the continuing need for restraints  5/27/2025 0319 by Jenna Mckeon RN  Flowsheets (Taken 5/25/2025 0457 by Abeba Wells RN)  Free from restraint(s) (restraint for interference with medical device): ONCE/SHIFT or MINIMUM Every 12 hours: Assess and document the continuing need for restraints   The patient's goals for the shift include      The clinical goals for the shift include Pt will remain HDS thoughout the shift

## 2025-05-27 NOTE — CARE PLAN
The patient's goals for the shift include      The clinical goals for the shift include Pt will remain HDS thoughout the shift      Problem: Skin  Goal: Prevent/manage excess moisture  5/27/2025 0319 by Jenna Mckeon RN  Outcome: Progressing  Flowsheets (Taken 5/27/2025 0317)  Prevent/manage excess moisture: Cleanse incontinence/protect with barrier cream  5/27/2025 0317 by Jenna Mckeon RN  Outcome: Progressing  Flowsheets (Taken 5/27/2025 0317)  Prevent/manage excess moisture: Cleanse incontinence/protect with barrier cream     Problem: Skin  Goal: Prevent/minimize sheer/friction injuries  5/27/2025 0319 by Jenna Mckeon RN  Flowsheets (Taken 5/25/2025 0457 by Abeba Wells RN)  Prevent/minimize sheer/friction injuries:   HOB 30 degrees or less   Turn/reposition every 2 hours/use positioning/transfer devices  5/27/2025 0317 by Jenna Mckeon RN  Flowsheets (Taken 5/25/2025 0457 by Abeba Wells RN)  Prevent/minimize sheer/friction injuries:   HOB 30 degrees or less   Turn/reposition every 2 hours/use positioning/transfer devices     Problem: Safety - Adult  Goal: Free from fall injury  Outcome: Progressing     Problem: Chronic Conditions and Co-morbidities  Goal: Patient's chronic conditions and co-morbidity symptoms are monitored and maintained or improved  Outcome: Progressing

## 2025-05-27 NOTE — PROGRESS NOTES
Jai Shrestha is a 55 y.o. male on day 11 of admission presenting with Pneumonia due to infectious organism, unspecified laterality, unspecified part of lung.    Plan: continues treatment for PNA, respiratory failure- remains intubated and on precedex. Will need therapy follow up when patient is more stable to determine DC planning.  Disposition: home vs SNF  Barrier: intubated, therapy evaluations, rr failure  ADOD: 4-6 days       05/27/25 1342   Discharge Planning   Living Arrangements Alone   Home or Post Acute Services In home services   Type of Home Care Services Home nursing visits;Home health aide   Expected Discharge Disposition Home Health   Does the patient need discharge transport arranged? Yes   RoundTrip coordination needed? Yes   Has discharge transport been arranged? No   What day is the transport expected? 05/30/25   Intensity of Service   Intensity of Service 0-30 min       Jacqui Sarmiento RN

## 2025-05-28 LAB
ALBUMIN SERPL BCP-MCNC: 2.7 G/DL (ref 3.4–5)
ALBUMIN SERPL BCP-MCNC: 3.2 G/DL (ref 3.4–5)
ANION GAP SERPL CALC-SCNC: 14 MMOL/L (ref 10–20)
ANION GAP SERPL CALC-SCNC: 8 MMOL/L (ref 10–20)
BACTERIA SPEC RESP CULT: NORMAL
BASOPHILS # BLD AUTO: 0.02 X10*3/UL (ref 0–0.1)
BASOPHILS NFR BLD AUTO: 0.2 %
BUN SERPL-MCNC: 32 MG/DL (ref 6–23)
BUN SERPL-MCNC: 34 MG/DL (ref 6–23)
CALCIUM SERPL-MCNC: 7.1 MG/DL (ref 8.6–10.3)
CALCIUM SERPL-MCNC: 8.5 MG/DL (ref 8.6–10.3)
CHLORIDE SERPL-SCNC: 106 MMOL/L (ref 98–107)
CHLORIDE SERPL-SCNC: 112 MMOL/L (ref 98–107)
CO2 SERPL-SCNC: 32 MMOL/L (ref 21–32)
CO2 SERPL-SCNC: 32 MMOL/L (ref 21–32)
CREAT SERPL-MCNC: 1.12 MG/DL (ref 0.5–1.3)
CREAT SERPL-MCNC: 1.3 MG/DL (ref 0.5–1.3)
EGFRCR SERPLBLD CKD-EPI 2021: 65 ML/MIN/1.73M*2
EGFRCR SERPLBLD CKD-EPI 2021: 78 ML/MIN/1.73M*2
EOSINOPHIL # BLD AUTO: 0.36 X10*3/UL (ref 0–0.7)
EOSINOPHIL NFR BLD AUTO: 4 %
ERYTHROCYTE [DISTWIDTH] IN BLOOD BY AUTOMATED COUNT: 16.7 % (ref 11.5–14.5)
GLUCOSE BLD MANUAL STRIP-MCNC: 148 MG/DL (ref 74–99)
GLUCOSE BLD MANUAL STRIP-MCNC: 171 MG/DL (ref 74–99)
GLUCOSE BLD MANUAL STRIP-MCNC: 186 MG/DL (ref 74–99)
GLUCOSE BLD MANUAL STRIP-MCNC: 214 MG/DL (ref 74–99)
GLUCOSE BLD MANUAL STRIP-MCNC: 217 MG/DL (ref 74–99)
GLUCOSE BLD MANUAL STRIP-MCNC: 237 MG/DL (ref 74–99)
GLUCOSE BLD MANUAL STRIP-MCNC: 248 MG/DL (ref 74–99)
GLUCOSE SERPL-MCNC: 180 MG/DL (ref 74–99)
GLUCOSE SERPL-MCNC: 240 MG/DL (ref 74–99)
GRAM STN SPEC: NORMAL
GRAM STN SPEC: NORMAL
HCT VFR BLD AUTO: 31.2 % (ref 41–52)
HGB BLD-MCNC: 8.8 G/DL (ref 13.5–17.5)
IMM GRANULOCYTES # BLD AUTO: 0.06 X10*3/UL (ref 0–0.7)
IMM GRANULOCYTES NFR BLD AUTO: 0.7 % (ref 0–0.9)
LYMPHOCYTES # BLD AUTO: 0.92 X10*3/UL (ref 1.2–4.8)
LYMPHOCYTES NFR BLD AUTO: 10.2 %
MAGNESIUM SERPL-MCNC: 2.34 MG/DL (ref 1.6–2.4)
MCH RBC QN AUTO: 23.2 PG (ref 26–34)
MCHC RBC AUTO-ENTMCNC: 28.2 G/DL (ref 32–36)
MCV RBC AUTO: 82 FL (ref 80–100)
MONOCYTES # BLD AUTO: 0.74 X10*3/UL (ref 0.1–1)
MONOCYTES NFR BLD AUTO: 8.2 %
NEUTROPHILS # BLD AUTO: 6.91 X10*3/UL (ref 1.2–7.7)
NEUTROPHILS NFR BLD AUTO: 76.7 %
NRBC BLD-RTO: 0 /100 WBCS (ref 0–0)
PHOSPHATE SERPL-MCNC: 2.7 MG/DL (ref 2.5–4.9)
PHOSPHATE SERPL-MCNC: 2.8 MG/DL (ref 2.5–4.9)
PLATELET # BLD AUTO: 216 X10*3/UL (ref 150–450)
POTASSIUM SERPL-SCNC: 3.3 MMOL/L (ref 3.5–5.3)
POTASSIUM SERPL-SCNC: 4.6 MMOL/L (ref 3.5–5.3)
RBC # BLD AUTO: 3.79 X10*6/UL (ref 4.5–5.9)
SODIUM SERPL-SCNC: 147 MMOL/L (ref 136–145)
SODIUM SERPL-SCNC: 149 MMOL/L (ref 136–145)
WBC # BLD AUTO: 9 X10*3/UL (ref 4.4–11.3)

## 2025-05-28 PROCEDURE — 2500000001 HC RX 250 WO HCPCS SELF ADMINISTERED DRUGS (ALT 637 FOR MEDICARE OP): Performed by: ANESTHESIOLOGY

## 2025-05-28 PROCEDURE — 2500000001 HC RX 250 WO HCPCS SELF ADMINISTERED DRUGS (ALT 637 FOR MEDICARE OP): Performed by: NURSE PRACTITIONER

## 2025-05-28 PROCEDURE — 84100 ASSAY OF PHOSPHORUS: CPT

## 2025-05-28 PROCEDURE — 94640 AIRWAY INHALATION TREATMENT: CPT

## 2025-05-28 PROCEDURE — 2500000002 HC RX 250 W HCPCS SELF ADMINISTERED DRUGS (ALT 637 FOR MEDICARE OP, ALT 636 FOR OP/ED): Performed by: NURSE PRACTITIONER

## 2025-05-28 PROCEDURE — 2500000004 HC RX 250 GENERAL PHARMACY W/ HCPCS (ALT 636 FOR OP/ED): Performed by: INTERNAL MEDICINE

## 2025-05-28 PROCEDURE — 2500000004 HC RX 250 GENERAL PHARMACY W/ HCPCS (ALT 636 FOR OP/ED): Performed by: NURSE PRACTITIONER

## 2025-05-28 PROCEDURE — 9420000001 HC RT PATIENT EDUCATION 5 MIN

## 2025-05-28 PROCEDURE — 2500000005 HC RX 250 GENERAL PHARMACY W/O HCPCS: Performed by: ANESTHESIOLOGY

## 2025-05-28 PROCEDURE — 2500000002 HC RX 250 W HCPCS SELF ADMINISTERED DRUGS (ALT 637 FOR MEDICARE OP, ALT 636 FOR OP/ED): Performed by: SURGERY

## 2025-05-28 PROCEDURE — 2020000001 HC ICU ROOM DAILY

## 2025-05-28 PROCEDURE — 82947 ASSAY GLUCOSE BLOOD QUANT: CPT

## 2025-05-28 PROCEDURE — 2500000001 HC RX 250 WO HCPCS SELF ADMINISTERED DRUGS (ALT 637 FOR MEDICARE OP): Performed by: SURGERY

## 2025-05-28 PROCEDURE — 2500000001 HC RX 250 WO HCPCS SELF ADMINISTERED DRUGS (ALT 637 FOR MEDICARE OP): Performed by: INTERNAL MEDICINE

## 2025-05-28 PROCEDURE — 37799 UNLISTED PX VASCULAR SURGERY: CPT | Performed by: SURGERY

## 2025-05-28 PROCEDURE — 2500000004 HC RX 250 GENERAL PHARMACY W/ HCPCS (ALT 636 FOR OP/ED): Mod: JZ | Performed by: NURSE PRACTITIONER

## 2025-05-28 PROCEDURE — 94681 O2 UPTK CO2 OUTP % O2 XTRC: CPT

## 2025-05-28 PROCEDURE — 2500000004 HC RX 250 GENERAL PHARMACY W/ HCPCS (ALT 636 FOR OP/ED): Mod: JZ

## 2025-05-28 PROCEDURE — 2500000005 HC RX 250 GENERAL PHARMACY W/O HCPCS: Performed by: SURGERY

## 2025-05-28 PROCEDURE — 85025 COMPLETE CBC W/AUTO DIFF WBC: CPT | Performed by: SURGERY

## 2025-05-28 PROCEDURE — 83735 ASSAY OF MAGNESIUM: CPT | Performed by: ANESTHESIOLOGY

## 2025-05-28 PROCEDURE — 2500000005 HC RX 250 GENERAL PHARMACY W/O HCPCS

## 2025-05-28 PROCEDURE — 2500000004 HC RX 250 GENERAL PHARMACY W/ HCPCS (ALT 636 FOR OP/ED): Mod: JZ | Performed by: INTERNAL MEDICINE

## 2025-05-28 PROCEDURE — 94003 VENT MGMT INPAT SUBQ DAY: CPT

## 2025-05-28 PROCEDURE — 2500000004 HC RX 250 GENERAL PHARMACY W/ HCPCS (ALT 636 FOR OP/ED): Mod: JZ | Performed by: ANESTHESIOLOGY

## 2025-05-28 PROCEDURE — 99291 CRITICAL CARE FIRST HOUR: CPT | Performed by: SURGERY

## 2025-05-28 RX ORDER — POTASSIUM CHLORIDE 14.9 MG/ML
20 INJECTION INTRAVENOUS
Status: COMPLETED | OUTPATIENT
Start: 2025-05-28 | End: 2025-05-28

## 2025-05-28 RX ORDER — FENTANYL CITRATE 50 UG/ML
50 INJECTION, SOLUTION INTRAMUSCULAR; INTRAVENOUS EVERY 2 HOUR PRN
Status: DISCONTINUED | OUTPATIENT
Start: 2025-05-28 | End: 2025-05-29

## 2025-05-28 RX ORDER — POTASSIUM CHLORIDE 1.5 G/1.58G
40 POWDER, FOR SOLUTION ORAL 2 TIMES DAILY
Status: DISCONTINUED | OUTPATIENT
Start: 2025-05-28 | End: 2025-05-29

## 2025-05-28 RX ADMIN — PROPOFOL 20 MCG/KG/MIN: 10 INJECTION, EMULSION INTRAVENOUS at 04:59

## 2025-05-28 RX ADMIN — PANTOPRAZOLE SODIUM 40 MG: 40 INJECTION, POWDER, FOR SOLUTION INTRAVENOUS at 06:24

## 2025-05-28 RX ADMIN — DEXMEDETOMIDINE HYDROCHLORIDE 0.5 MCG/KG/HR: 4 INJECTION, SOLUTION INTRAVENOUS at 03:34

## 2025-05-28 RX ADMIN — POTASSIUM CHLORIDE 20 MEQ: 14.9 INJECTION, SOLUTION INTRAVENOUS at 05:33

## 2025-05-28 RX ADMIN — IPRATROPIUM BROMIDE AND ALBUTEROL SULFATE 3 ML: 2.5; .5 SOLUTION RESPIRATORY (INHALATION) at 19:58

## 2025-05-28 RX ADMIN — DEXMEDETOMIDINE HYDROCHLORIDE 0.5 MCG/KG/HR: 4 INJECTION, SOLUTION INTRAVENOUS at 11:46

## 2025-05-28 RX ADMIN — INSULIN GLARGINE 30 UNITS: 100 INJECTION, SOLUTION SUBCUTANEOUS at 21:25

## 2025-05-28 RX ADMIN — DEXMEDETOMIDINE HYDROCHLORIDE 0.5 MCG/KG/HR: 4 INJECTION, SOLUTION INTRAVENOUS at 07:31

## 2025-05-28 RX ADMIN — FUROSEMIDE 40 MG: 10 INJECTION, SOLUTION INTRAMUSCULAR; INTRAVENOUS at 09:06

## 2025-05-28 RX ADMIN — ACETAMINOPHEN 650 MG: 160 SOLUTION ORAL at 01:38

## 2025-05-28 RX ADMIN — IPRATROPIUM BROMIDE AND ALBUTEROL SULFATE 3 ML: 2.5; .5 SOLUTION RESPIRATORY (INHALATION) at 07:05

## 2025-05-28 RX ADMIN — INSULIN LISPRO 3 UNITS: 100 INJECTION, SOLUTION INTRAVENOUS; SUBCUTANEOUS at 00:35

## 2025-05-28 RX ADMIN — FENTANYL CITRATE 50 MCG: 0.05 INJECTION, SOLUTION INTRAMUSCULAR; INTRAVENOUS at 09:35

## 2025-05-28 RX ADMIN — INSULIN LISPRO 6 UNITS: 100 INJECTION, SOLUTION INTRAVENOUS; SUBCUTANEOUS at 11:35

## 2025-05-28 RX ADMIN — FENTANYL CITRATE 50 MCG: 0.05 INJECTION, SOLUTION INTRAMUSCULAR; INTRAVENOUS at 21:20

## 2025-05-28 RX ADMIN — ATORVASTATIN CALCIUM 80 MG: 80 TABLET, FILM COATED ORAL at 21:18

## 2025-05-28 RX ADMIN — CARBOXYMETHYLCELLULOSE SODIUM 1 DROP: 0.5 SOLUTION/ DROPS OPHTHALMIC at 09:06

## 2025-05-28 RX ADMIN — POTASSIUM CHLORIDE 20 MEQ: 14.9 INJECTION, SOLUTION INTRAVENOUS at 02:53

## 2025-05-28 RX ADMIN — INSULIN GLARGINE 30 UNITS: 100 INJECTION, SOLUTION SUBCUTANEOUS at 09:06

## 2025-05-28 RX ADMIN — FENTANYL CITRATE 50 MCG: 0.05 INJECTION, SOLUTION INTRAMUSCULAR; INTRAVENOUS at 23:36

## 2025-05-28 RX ADMIN — Medication 4 L/MIN: at 19:58

## 2025-05-28 RX ADMIN — ACETAMINOPHEN 650 MG: 160 SOLUTION ORAL at 11:34

## 2025-05-28 RX ADMIN — POTASSIUM CHLORIDE 40 MEQ: 1.5 POWDER, FOR SOLUTION ORAL at 09:05

## 2025-05-28 RX ADMIN — Medication 40 PERCENT: at 02:51

## 2025-05-28 RX ADMIN — INSULIN LISPRO 6 UNITS: 100 INJECTION, SOLUTION INTRAVENOUS; SUBCUTANEOUS at 05:32

## 2025-05-28 RX ADMIN — ASPIRIN 81 MG CHEWABLE TABLET 81 MG: 81 TABLET CHEWABLE at 09:06

## 2025-05-28 RX ADMIN — FUROSEMIDE 40 MG: 10 INJECTION, SOLUTION INTRAMUSCULAR; INTRAVENOUS at 21:20

## 2025-05-28 RX ADMIN — Medication 40 PERCENT: at 11:18

## 2025-05-28 RX ADMIN — INSULIN LISPRO 6 UNITS: 100 INJECTION, SOLUTION INTRAVENOUS; SUBCUTANEOUS at 23:35

## 2025-05-28 RX ADMIN — COLCHICINE 0.6 MG: 0.6 TABLET, FILM COATED ORAL at 09:06

## 2025-05-28 RX ADMIN — SENNOSIDES 5 ML: 8.8 LIQUID ORAL at 21:19

## 2025-05-28 RX ADMIN — DOCUSATE SODIUM LIQUID 100 MG: 100 LIQUID ORAL at 21:25

## 2025-05-28 RX ADMIN — SENNOSIDES 5 ML: 8.8 LIQUID ORAL at 09:05

## 2025-05-28 RX ADMIN — ENOXAPARIN SODIUM 60 MG: 60 INJECTION SUBCUTANEOUS at 21:18

## 2025-05-28 RX ADMIN — QUETIAPINE FUMARATE 100 MG: 100 TABLET ORAL at 21:18

## 2025-05-28 RX ADMIN — FERROUS SULFATE TAB 325 MG (65 MG ELEMENTAL FE) 1 TABLET: 325 (65 FE) TAB at 18:39

## 2025-05-28 RX ADMIN — Medication 6 L/MIN: at 12:04

## 2025-05-28 RX ADMIN — LISINOPRIL 5 MG: 5 TABLET ORAL at 09:07

## 2025-05-28 RX ADMIN — Medication 40 PERCENT: at 07:05

## 2025-05-28 RX ADMIN — IPRATROPIUM BROMIDE AND ALBUTEROL SULFATE 3 ML: 2.5; .5 SOLUTION RESPIRATORY (INHALATION) at 11:18

## 2025-05-28 RX ADMIN — INSULIN LISPRO 6 UNITS: 100 INJECTION, SOLUTION INTRAVENOUS; SUBCUTANEOUS at 08:05

## 2025-05-28 RX ADMIN — ENOXAPARIN SODIUM 60 MG: 60 INJECTION SUBCUTANEOUS at 09:13

## 2025-05-28 RX ADMIN — DOCUSATE SODIUM LIQUID 100 MG: 100 LIQUID ORAL at 09:05

## 2025-05-28 RX ADMIN — POLYETHYLENE GLYCOL 3350 17 G: 17 POWDER, FOR SOLUTION ORAL at 09:06

## 2025-05-28 RX ADMIN — POTASSIUM CHLORIDE 40 MEQ: 1.5 POWDER, FOR SOLUTION ORAL at 21:18

## 2025-05-28 RX ADMIN — AMLODIPINE BESYLATE 5 MG: 5 TABLET ORAL at 09:06

## 2025-05-28 RX ADMIN — PROPOFOL 15 MCG/KG/MIN: 10 INJECTION, EMULSION INTRAVENOUS at 09:31

## 2025-05-28 RX ADMIN — INSULIN LISPRO 3 UNITS: 100 INJECTION, SOLUTION INTRAVENOUS; SUBCUTANEOUS at 16:01

## 2025-05-28 ASSESSMENT — PAIN - FUNCTIONAL ASSESSMENT
PAIN_FUNCTIONAL_ASSESSMENT: CPOT (CRITICAL CARE PAIN OBSERVATION TOOL)
PAIN_FUNCTIONAL_ASSESSMENT: CPOT (CRITICAL CARE PAIN OBSERVATION TOOL)
PAIN_FUNCTIONAL_ASSESSMENT: 0-10
PAIN_FUNCTIONAL_ASSESSMENT: CPOT (CRITICAL CARE PAIN OBSERVATION TOOL)
PAIN_FUNCTIONAL_ASSESSMENT: CPOT (CRITICAL CARE PAIN OBSERVATION TOOL)
PAIN_FUNCTIONAL_ASSESSMENT: 0-10
PAIN_FUNCTIONAL_ASSESSMENT: CPOT (CRITICAL CARE PAIN OBSERVATION TOOL)
PAIN_FUNCTIONAL_ASSESSMENT: 0-10
PAIN_FUNCTIONAL_ASSESSMENT: CPOT (CRITICAL CARE PAIN OBSERVATION TOOL)
PAIN_FUNCTIONAL_ASSESSMENT: 0-10
PAIN_FUNCTIONAL_ASSESSMENT: CPOT (CRITICAL CARE PAIN OBSERVATION TOOL)

## 2025-05-28 ASSESSMENT — PAIN SCALES - GENERAL
PAINLEVEL_OUTOF10: 10 - WORST POSSIBLE PAIN
PAINLEVEL_OUTOF10: 7
PAINLEVEL_OUTOF10: 0 - NO PAIN
PAINLEVEL_OUTOF10: 10 - WORST POSSIBLE PAIN

## 2025-05-28 NOTE — PROGRESS NOTES
Jai Shrestha is a 55 y.o. male on day 12 of admission presenting with Pneumonia due to infectious organism, unspecified laterality, unspecified part of lung.    Subjective   HD # 12 for this 54 YO male who was admitted through the [facility] ED with a five day exacerbation of his chronic respiratory failure as evidenced by complaints of SOB. It should be noted that he had recently been admitted for treatment of a pneumonia which although was not visible on plane film CXR  but was noted on a CAT scan of his chest. At that time of thiat suspected diagnosis ,  he had a normal WBC and was afebrile. After discharge he continued to complain of SOB and reached out to local hospital Telephone encounter specialists for similar complaints including pulse oximetry readings as reported by visitin nurses  of 78 to 88% . The same visiting nurse while  in his home sugested evaluation at a hopsital which he refused.  Days later he was seen and evaluated in the ED of [facility] and admitted to a RNF for a diagnosis of pneumonia. Upon admission again was noted chronic metabolic alkalosis presumably compensatory in response to chronic hypercarbia. He was placed on BIPAP and after a period of ventilation removed his BIPAP mask and had a respiratory arrest.  He has MMP including: HFpEF, chronic back pain, peripheral neuropathy, diabetes mellitus type 2 with insulin resistance, morbid obesity BMI 53.44, CKD 3, diastolic CHF and  hypertension        Objective     Physical Exam     He has remained sedated minimally ventilated Acid Base balance has been restored. Afebrile Vitals unremarkable symmetric chest expansnio ABD obese bland follows multi step command sustained > 5 second head lift, Weaning paramaters values noted. Successfully extubated to NC. Good cough.    Increase free water ( Tap ) in tube feeding flushes   Last Recorded Vitals  Blood pressure 173/62, pulse 88, temperature 37 °C (98.6 °F), temperature source Temporal, resp. rate 18, height  "1.803 m (5' 10.98\"), weight (!) 190 kg (418 lb 14 oz), SpO2 92%.  Intake/Output last 3 Shifts:  I/O last 3 completed shifts:  In: 1608.5 (8.5 mL/kg) [I.V.:58.5 (0.3 mL/kg); NG/GT:1550]  Out: 4750 (25 mL/kg) [Urine:4750 (0.7 mL/kg/hr)]  Weight: 190 kg         This patient has a central line   Reason for the central line remaining today? Hemodynamic monitoring    This patient has a urinary catheter   Reason for the urinary catheter remaining today? incontinent patients with an open sacral wound or perineal wounds            This critically ill patient continues to be at-risk for clinically significant deterioration / failure due to the above mentioned dysfunctional, unstable organ systems.  I have personally identified and managed all complex critical care issues to prevent aforementioned clinical deterioration.  Critical care time is spent at bedside and/or the immediate area and has included, but is not limited to, the review of diagnostic tests, labs, radiographs, serial assessments of hemodynamics, respiratory status, ventilatory management, and family updates.  Time spent in procedures and teaching are reported separately.     CRITICAL CARE TIME:  45 minutes    LABS:  CMP:  Results from last 7 days   Lab Units 05/28/25  1124 05/27/25  2353 05/27/25  0451 05/26/25  0520 05/25/25  1357 05/25/25  0350 05/24/25  0313 05/23/25  1819 05/23/25  0633 05/22/25  0532   SODIUM mmol/L 147* 149* 147* 143 141 145 139 138 137 138   POTASSIUM mmol/L 4.6 3.3* 4.1 4.2 4.3 2.8* 3.9 4.2 4.3 4.9   CHLORIDE mmol/L 106 112* 104 101 100 116* 100 99 98 99   CO2 mmol/L 32 32 35* 29 34* 25 28 33* 34* 29   ANION GAP mmol/L 14 8* 12 17 11 7* 15 10 9* 15   BUN mg/dL 34* 32* 38* 33* 31* 24* 40* 40* 37* 33*   CREATININE mg/dL 1.30 1.12 1.42* 1.41* 1.42* 0.87 1.63* 1.74* 1.75* 1.77*   EGFR mL/min/1.73m*2 65 78 59* 59* 59* >90 50* 46* 46* 45*   MAGNESIUM mg/dL 2.34  --  2.24 2.26 2.62* 1.34* 2.12 2.17  --   --    ALBUMIN g/dL 3.2* 2.7* 3.1* 3.2* " 3.2* 2.1* 3.2* 3.2*  --   --    ALT U/L  --   --   --   --   --   --  12 10  --   --    AST U/L  --   --   --   --   --   --  18 18  --   --    BILIRUBIN TOTAL mg/dL  --   --   --   --   --   --  0.5 0.4  --   --      CBC:  Results from last 7 days   Lab Units 05/28/25  1124 05/27/25  0451 05/26/25  0520 05/25/25  0350 05/24/25  0313 05/23/25  1819 05/23/25  0633 05/22/25  0532   WBC AUTO x10*3/uL 9.0 11.2 10.2 10.2 8.7 11.0 11.0 12.4*   HEMOGLOBIN g/dL 8.8* 8.7* 8.8* 8.1* 7.8* 7.9* 8.4* 9.6*   HEMATOCRIT % 31.2* 30.5* 31.2* 28.2* 27.4* 27.5* 30.2* 33.9*   PLATELETS AUTO x10*3/uL 216 243 225 213 218 232 247 257   MCV fL 82 80 80 81 81 81 82 83     COAG:   Results from last 7 days   Lab Units 05/23/25  1819   INR  1.3*       Norbert Coker MD

## 2025-05-28 NOTE — PROGRESS NOTES
05/28/25 1121   Discharge Planning   Home or Post Acute Services In home services   Type of Home Care Services Home nursing visits;Home health aide   Expected Discharge Disposition Home Health     Updated [facility] on patient's progress via careport.  Plan was for patient to return home upon discharge with [facility].  Patient may need pt/ot evals after he is extubated. [facility] will continue to follow for discharge planning needs.

## 2025-05-28 NOTE — CARE PLAN
The patient's goals for the shift include      The clinical goals for the shift include pt will remain HDS throughout shift      Problem: Safety - Medical Restraint  Goal: Remains free of injury from restraints (Restraint for Interference with Medical Device)  Flowsheets (Taken 5/25/2025 0457 by Abeba Wells RN)  Remains free of injury from restraints (restraint for interference with medical device): Every 2 hours: Monitor safety, psychosocial status, comfort, nutrition and hydration  Goal: Free from restraint(s) (Restraint for Interference with Medical Device)  Flowsheets (Taken 5/28/2025 0153)  Free from restraint(s) (restraint for interference with medical device): ONCE/SHIFT or MINIMUM Every 12 hours: Assess and document the continuing need for restraints

## 2025-05-29 LAB
BASOPHILS # BLD AUTO: 0.03 X10*3/UL (ref 0–0.1)
BASOPHILS NFR BLD AUTO: 0.4 %
EOSINOPHIL # BLD AUTO: 0.35 X10*3/UL (ref 0–0.7)
EOSINOPHIL NFR BLD AUTO: 5.1 %
ERYTHROCYTE [DISTWIDTH] IN BLOOD BY AUTOMATED COUNT: 16.1 % (ref 11.5–14.5)
GLUCOSE BLD MANUAL STRIP-MCNC: 172 MG/DL (ref 74–99)
GLUCOSE BLD MANUAL STRIP-MCNC: 187 MG/DL (ref 74–99)
GLUCOSE BLD MANUAL STRIP-MCNC: 247 MG/DL (ref 74–99)
GLUCOSE BLD MANUAL STRIP-MCNC: 253 MG/DL (ref 74–99)
GLUCOSE BLD MANUAL STRIP-MCNC: 296 MG/DL (ref 74–99)
HCT VFR BLD AUTO: 31.7 % (ref 41–52)
HGB BLD-MCNC: 8.8 G/DL (ref 13.5–17.5)
IMM GRANULOCYTES # BLD AUTO: 0.03 X10*3/UL (ref 0–0.7)
IMM GRANULOCYTES NFR BLD AUTO: 0.4 % (ref 0–0.9)
LYMPHOCYTES # BLD AUTO: 0.86 X10*3/UL (ref 1.2–4.8)
LYMPHOCYTES NFR BLD AUTO: 12.5 %
MCH RBC QN AUTO: 22.9 PG (ref 26–34)
MCHC RBC AUTO-ENTMCNC: 27.8 G/DL (ref 32–36)
MCV RBC AUTO: 82 FL (ref 80–100)
MONOCYTES # BLD AUTO: 0.56 X10*3/UL (ref 0.1–1)
MONOCYTES NFR BLD AUTO: 8.1 %
NEUTROPHILS # BLD AUTO: 5.06 X10*3/UL (ref 1.2–7.7)
NEUTROPHILS NFR BLD AUTO: 73.5 %
NRBC BLD-RTO: 0 /100 WBCS (ref 0–0)
PLATELET # BLD AUTO: 213 X10*3/UL (ref 150–450)
RBC # BLD AUTO: 3.85 X10*6/UL (ref 4.5–5.9)
WBC # BLD AUTO: 6.9 X10*3/UL (ref 4.4–11.3)

## 2025-05-29 PROCEDURE — 37799 UNLISTED PX VASCULAR SURGERY: CPT | Performed by: SURGERY

## 2025-05-29 PROCEDURE — 1200000002 HC GENERAL ROOM WITH TELEMETRY DAILY

## 2025-05-29 PROCEDURE — 2500000005 HC RX 250 GENERAL PHARMACY W/O HCPCS

## 2025-05-29 PROCEDURE — 82947 ASSAY GLUCOSE BLOOD QUANT: CPT

## 2025-05-29 PROCEDURE — 2500000004 HC RX 250 GENERAL PHARMACY W/ HCPCS (ALT 636 FOR OP/ED): Performed by: INTERNAL MEDICINE

## 2025-05-29 PROCEDURE — 2500000002 HC RX 250 W HCPCS SELF ADMINISTERED DRUGS (ALT 637 FOR MEDICARE OP, ALT 636 FOR OP/ED): Performed by: NURSE PRACTITIONER

## 2025-05-29 PROCEDURE — 2500000001 HC RX 250 WO HCPCS SELF ADMINISTERED DRUGS (ALT 637 FOR MEDICARE OP)

## 2025-05-29 PROCEDURE — 2500000002 HC RX 250 W HCPCS SELF ADMINISTERED DRUGS (ALT 637 FOR MEDICARE OP, ALT 636 FOR OP/ED)

## 2025-05-29 PROCEDURE — 2500000004 HC RX 250 GENERAL PHARMACY W/ HCPCS (ALT 636 FOR OP/ED): Mod: JZ | Performed by: ANESTHESIOLOGY

## 2025-05-29 PROCEDURE — 2500000004 HC RX 250 GENERAL PHARMACY W/ HCPCS (ALT 636 FOR OP/ED): Mod: JZ

## 2025-05-29 PROCEDURE — 94640 AIRWAY INHALATION TREATMENT: CPT

## 2025-05-29 PROCEDURE — 2500000004 HC RX 250 GENERAL PHARMACY W/ HCPCS (ALT 636 FOR OP/ED): Performed by: NURSE PRACTITIONER

## 2025-05-29 PROCEDURE — 9420000001 HC RT PATIENT EDUCATION 5 MIN

## 2025-05-29 PROCEDURE — 2500000001 HC RX 250 WO HCPCS SELF ADMINISTERED DRUGS (ALT 637 FOR MEDICARE OP): Performed by: ANESTHESIOLOGY

## 2025-05-29 PROCEDURE — 2500000001 HC RX 250 WO HCPCS SELF ADMINISTERED DRUGS (ALT 637 FOR MEDICARE OP): Performed by: NURSE PRACTITIONER

## 2025-05-29 PROCEDURE — 2500000001 HC RX 250 WO HCPCS SELF ADMINISTERED DRUGS (ALT 637 FOR MEDICARE OP): Performed by: SURGERY

## 2025-05-29 PROCEDURE — 99233 SBSQ HOSP IP/OBS HIGH 50: CPT | Performed by: INTERNAL MEDICINE

## 2025-05-29 PROCEDURE — 2500000001 HC RX 250 WO HCPCS SELF ADMINISTERED DRUGS (ALT 637 FOR MEDICARE OP): Performed by: INTERNAL MEDICINE

## 2025-05-29 PROCEDURE — 85025 COMPLETE CBC W/AUTO DIFF WBC: CPT | Performed by: SURGERY

## 2025-05-29 PROCEDURE — 2500000005 HC RX 250 GENERAL PHARMACY W/O HCPCS: Performed by: SURGERY

## 2025-05-29 RX ORDER — ACETAMINOPHEN 160 MG/5ML
650 SOLUTION ORAL EVERY 4 HOURS PRN
Status: DISCONTINUED | OUTPATIENT
Start: 2025-05-29 | End: 2025-05-29

## 2025-05-29 RX ORDER — ACETAMINOPHEN 650 MG/1
650 SUPPOSITORY RECTAL EVERY 4 HOURS PRN
Status: DISCONTINUED | OUTPATIENT
Start: 2025-05-29 | End: 2025-05-29

## 2025-05-29 RX ORDER — ACETAMINOPHEN 325 MG/1
650 TABLET ORAL EVERY 4 HOURS PRN
Status: DISCONTINUED | OUTPATIENT
Start: 2025-05-29 | End: 2025-05-29

## 2025-05-29 RX ORDER — HALOPERIDOL LACTATE 5 MG/ML
5 INJECTION, SOLUTION INTRAMUSCULAR EVERY 6 HOURS PRN
Status: DISCONTINUED | OUTPATIENT
Start: 2025-05-29 | End: 2025-06-05 | Stop reason: HOSPADM

## 2025-05-29 RX ADMIN — PANTOPRAZOLE SODIUM 40 MG: 40 INJECTION, POWDER, FOR SOLUTION INTRAVENOUS at 06:08

## 2025-05-29 RX ADMIN — DEXMEDETOMIDINE HYDROCHLORIDE 0.5 MCG/KG/HR: 4 INJECTION, SOLUTION INTRAVENOUS at 06:13

## 2025-05-29 RX ADMIN — INSULIN LISPRO 3 UNITS: 100 INJECTION, SOLUTION INTRAVENOUS; SUBCUTANEOUS at 07:59

## 2025-05-29 RX ADMIN — QUETIAPINE FUMARATE 100 MG: 100 TABLET ORAL at 21:15

## 2025-05-29 RX ADMIN — Medication 4 L/MIN: at 19:41

## 2025-05-29 RX ADMIN — POLYETHYLENE GLYCOL 3350 17 G: 17 POWDER, FOR SOLUTION ORAL at 08:21

## 2025-05-29 RX ADMIN — ENOXAPARIN SODIUM 60 MG: 60 INJECTION SUBCUTANEOUS at 08:21

## 2025-05-29 RX ADMIN — DOCUSATE SODIUM LIQUID 100 MG: 100 LIQUID ORAL at 21:14

## 2025-05-29 RX ADMIN — Medication 3 L/MIN: at 12:15

## 2025-05-29 RX ADMIN — INSULIN LISPRO 9 UNITS: 100 INJECTION, SOLUTION INTRAVENOUS; SUBCUTANEOUS at 18:49

## 2025-05-29 RX ADMIN — ENOXAPARIN SODIUM 60 MG: 60 INJECTION SUBCUTANEOUS at 21:14

## 2025-05-29 RX ADMIN — INSULIN LISPRO 6 UNITS: 100 INJECTION, SOLUTION INTRAVENOUS; SUBCUTANEOUS at 21:33

## 2025-05-29 RX ADMIN — CARBOXYMETHYLCELLULOSE SODIUM 1 DROP: 0.5 SOLUTION/ DROPS OPHTHALMIC at 08:21

## 2025-05-29 RX ADMIN — IPRATROPIUM BROMIDE AND ALBUTEROL SULFATE 3 ML: 2.5; .5 SOLUTION RESPIRATORY (INHALATION) at 19:41

## 2025-05-29 RX ADMIN — LISINOPRIL 5 MG: 5 TABLET ORAL at 08:21

## 2025-05-29 RX ADMIN — SENNOSIDES 5 ML: 8.8 LIQUID ORAL at 21:14

## 2025-05-29 RX ADMIN — DOCUSATE SODIUM LIQUID 100 MG: 100 LIQUID ORAL at 08:21

## 2025-05-29 RX ADMIN — INSULIN GLARGINE 30 UNITS: 100 INJECTION, SOLUTION SUBCUTANEOUS at 21:15

## 2025-05-29 RX ADMIN — FUROSEMIDE 40 MG: 10 INJECTION, SOLUTION INTRAMUSCULAR; INTRAVENOUS at 21:15

## 2025-05-29 RX ADMIN — IPRATROPIUM BROMIDE AND ALBUTEROL SULFATE 3 ML: 2.5; .5 SOLUTION RESPIRATORY (INHALATION) at 12:15

## 2025-05-29 RX ADMIN — INSULIN LISPRO 9 UNITS: 100 INJECTION, SOLUTION INTRAVENOUS; SUBCUTANEOUS at 11:29

## 2025-05-29 RX ADMIN — FUROSEMIDE 40 MG: 10 INJECTION, SOLUTION INTRAMUSCULAR; INTRAVENOUS at 08:21

## 2025-05-29 RX ADMIN — SENNOSIDES 5 ML: 8.8 LIQUID ORAL at 09:11

## 2025-05-29 RX ADMIN — INSULIN GLARGINE 30 UNITS: 100 INJECTION, SOLUTION SUBCUTANEOUS at 08:20

## 2025-05-29 RX ADMIN — DEXMEDETOMIDINE HYDROCHLORIDE 0.2 MCG/KG/HR: 4 INJECTION, SOLUTION INTRAVENOUS at 01:01

## 2025-05-29 RX ADMIN — AMLODIPINE BESYLATE 5 MG: 5 TABLET ORAL at 08:22

## 2025-05-29 RX ADMIN — ATORVASTATIN CALCIUM 80 MG: 80 TABLET, FILM COATED ORAL at 21:15

## 2025-05-29 RX ADMIN — DULOXETINE 60 MG: 30 CAPSULE, DELAYED RELEASE ORAL at 08:21

## 2025-05-29 RX ADMIN — HALOPERIDOL LACTATE 5 MG: 5 INJECTION, SOLUTION INTRAMUSCULAR at 16:02

## 2025-05-29 RX ADMIN — HALOPERIDOL LACTATE 5 MG: 5 INJECTION, SOLUTION INTRAMUSCULAR at 22:20

## 2025-05-29 RX ADMIN — ASPIRIN 81 MG CHEWABLE TABLET 81 MG: 81 TABLET CHEWABLE at 08:21

## 2025-05-29 RX ADMIN — INSULIN LISPRO 3 UNITS: 100 INJECTION, SOLUTION INTRAVENOUS; SUBCUTANEOUS at 03:57

## 2025-05-29 RX ADMIN — COLCHICINE 0.6 MG: 0.6 TABLET, FILM COATED ORAL at 08:21

## 2025-05-29 RX ADMIN — IPRATROPIUM BROMIDE AND ALBUTEROL SULFATE 3 ML: 2.5; .5 SOLUTION RESPIRATORY (INHALATION) at 07:26

## 2025-05-29 ASSESSMENT — COGNITIVE AND FUNCTIONAL STATUS - GENERAL
WALKING IN HOSPITAL ROOM: TOTAL
DRESSING REGULAR UPPER BODY CLOTHING: A LOT
MOVING FROM LYING ON BACK TO SITTING ON SIDE OF FLAT BED WITH BEDRAILS: A LITTLE
TOILETING: TOTAL
PERSONAL GROOMING: A LOT
HELP NEEDED FOR BATHING: TOTAL
MOBILITY SCORE: 11
EATING MEALS: A LOT
STANDING UP FROM CHAIR USING ARMS: TOTAL
DAILY ACTIVITIY SCORE: 9
MOVING TO AND FROM BED TO CHAIR: A LOT
DRESSING REGULAR LOWER BODY CLOTHING: TOTAL
TURNING FROM BACK TO SIDE WHILE IN FLAT BAD: A LITTLE
CLIMB 3 TO 5 STEPS WITH RAILING: TOTAL

## 2025-05-29 ASSESSMENT — PAIN SCALES - GENERAL
PAINLEVEL_OUTOF10: 0 - NO PAIN
PAINLEVEL_OUTOF10: 7

## 2025-05-29 ASSESSMENT — PAIN - FUNCTIONAL ASSESSMENT
PAIN_FUNCTIONAL_ASSESSMENT: 0-10
PAIN_FUNCTIONAL_ASSESSMENT: 0-10

## 2025-05-29 NOTE — PROGRESS NOTES
Jai Shrestha is a 55 y.o. male on day 13 of admission presenting with Pneumonia due to infectious organism, unspecified laterality, unspecified part of lung.    Subjective   Patient was admitted under hospitalist service Dr. Renner from April 14, 2025 to April 27, 2025 and CVA admitted to hospitalist service Judy on May 16, 2025 and transferred to ICU on May 20, 2025.  Patient transferred out of ICU to general medical service under my care today May 29, 2025 however reviewing chart .patient should have been transferred to hospitalist service.    Patient transferred from ICU to general medicine service under my care, he has been in hospital for last 13 days when he was admitted to intensive care unit after cardiopulmonary arrest and was intubated and subsequently has been extubated.  Patient is morbidly obese, weighs over 400 pounds.  Currently is fully awake and alert and breathing better.    54 YO male who was [place] [facility] [place] with a five day exacerbation of his chronic respiratory failure as evidenced by complaints of SOB. It should be noted that he had recently been admitted for treatment of a pneumonia which although was not visible on plane film CXR but was noted on a CAT scan of his chest. At that time of thiat suspected diagnosis , he had a normal WBC and was afebrile. After discharge he continued to complain of SOB and reached out to local hospital Telephone encounter specialists for similar complaints including pulse oximetry readings as reported by visitin nurses of 78 to 88% . The same visiting nurse while in his home sugested evaluation at a hopsital which he refused. Days later he was seen and [place] [facility] [place] to a RNF for a diagnosis of pneumonia. Upon admission again was noted chronic metabolic alkalosis presumably compensatory in response to chronic hypercarbia. He was placed on BIPAP and after a period of ventilation removed his BIPAP mask and had a respiratory  "arrest. He has MMP including: HFpEF, chronic back pain, peripheral neuropathy, diabetes mellitus type 2 with insulin resistance, morbid obesity BMI 53.44, CKD 3, diastolic CHF and hypertension        Objective     Physical Exam  GENERAL:  Alert,  moderate distress, morbidly obese  SKIN:  Skin color, texture, turgor normal. No rashes or lesions.  OROPHARYNX:  Lips, mucosa, and tongue are normal.Teeth and gums, normal. Oropharynx normal.  NECK:  No jugulovenous distention, No carotid bruits, Carotid pulse normal contour, Supple  LUNGS: Generalized decreased air entry.  CARDIAC: Rate and rhythm is regular, normal S1 and S2; no rubs, murmurs, or gallops  ABDOMEN:  Abdomen soft, non-tender, BS normal, No masses or organomegaly  EXTREMETIES:  Extremities normal, no deformities, 3+ edema, no clubbing or skin discoloration. Good capillary refill., No ulcers  NEURO:  Alert, oriented X 3, no gross deficits.  Last Recorded Vitals  Blood pressure 108/72, pulse 85, temperature 37.1 °C (98.8 °F), temperature source Temporal, resp. rate 18, height 1.803 m (5' 10.98\"), weight (!) 182 kg (400 lb 2.2 oz), SpO2 94%.  Intake/Output last 3 Shifts:  I/O last 3 completed shifts:  In: 1974.2 (10.9 mL/kg) [I.V.:452.2 (2.5 mL/kg); NG/GT:1322; IV Piggyback:200]  Out: 5705 (31.4 mL/kg) [Urine:5705 (0.9 mL/kg/hr)]  Weight: 181.5 kg     Relevant Results   Scheduled medications  amLODIPine, 5 mg, oral, Daily  aspirin, 81 mg, oral, Daily  atorvastatin, 80 mg, oral, Nightly  colchicine, 0.6 mg, oral, Daily  docusate sodium, 100 mg, oral, BID  DULoxetine, 60 mg, oral, Daily  enoxaparin, 60 mg, subcutaneous, q12h TAMIR  ergocalciferol, 1.25 mg, oral, Weekly  ferrous sulfate, 1 tablet, oral, Every Mon/Wed/Fri  furosemide, 40 mg, intravenous, BID  insulin glargine, 30 Units, subcutaneous, BID  insulin lispro, 0-15 Units, subcutaneous, q4h  ipratropium-albuteroL, 3 mL, nebulization, TID  lisinopril, 5 mg, oral, Daily  lubricating eye drops, 1 drop, Both " Eyes, Daily  pantoprazole, 40 mg, oral, Daily before breakfast  polyethylene glycol, 17 g, oral, Daily  QUEtiapine, 100 mg, oral, Nightly  senna, 5 mL, oral, BID    Continuous medications  Continuous Medications[1]  PRN medications  PRN Medications[2]     Results for orders placed or performed during the hospital encounter of 05/16/25 (from the past 96 hours)   POCT GLUCOSE   Result Value Ref Range    POCT Glucose 215 (H) 74 - 99 mg/dL   Blood Gas Arterial Full Panel   Result Value Ref Range    POCT pH, Arterial 7.50 (H) 7.38 - 7.42 pH    POCT pCO2, Arterial 48 (H) 38 - 42 mm Hg    POCT pO2, Arterial 85 85 - 95 mm Hg    POCT SO2, Arterial 96 94 - 100 %    POCT Oxy Hemoglobin, Arterial 94.7 94.0 - 98.0 %    POCT Hematocrit Calculated, Arterial 28.0 (L) 41.0 - 52.0 %    POCT Sodium, Arterial 140 136 - 145 mmol/L    POCT Potassium, Arterial 4.2 3.5 - 5.3 mmol/L    POCT Chloride, Arterial 103 98 - 107 mmol/L    POCT Ionized Calcium, Arterial 1.11 1.10 - 1.33 mmol/L    POCT Glucose, Arterial 233 (H) 74 - 99 mg/dL    POCT Lactate, Arterial 0.8 0.4 - 2.0 mmol/L    POCT Base Excess, Arterial 12.8 (H) -2.0 - 3.0 mmol/L    POCT HCO3 Calculated, Arterial 37.4 (H) 22.0 - 26.0 mmol/L    POCT Hemoglobin, Arterial 9.2 (L) 13.5 - 17.5 g/dL    POCT Anion Gap, Arterial 4 (L) 10 - 25 mmo/L    Patient Temperature 37.0 degrees Celsius    FiO2 40 %    Peep CHM2O 8.0 cm H2O    Pressure Support 12 cm H2O   Respiratory Culture/Smear    Specimen: SPUTUM; Fluid   Result Value Ref Range    Respiratory Culture/Smear Normal throat jesús     Gram Stain       Gram stain indicates specimen consists of lower respiratory tract secretions.    Gram Stain No predominant organism    POCT GLUCOSE   Result Value Ref Range    POCT Glucose 215 (H) 74 - 99 mg/dL   POCT GLUCOSE   Result Value Ref Range    POCT Glucose 207 (H) 74 - 99 mg/dL   Blood Gas Arterial Full Panel   Result Value Ref Range    POCT pH, Arterial 7.49 (H) 7.38 - 7.42 pH    POCT pCO2,  Arterial 50 (H) 38 - 42 mm Hg    POCT pO2, Arterial 83 (L) 85 - 95 mm Hg    POCT SO2, Arterial 96 94 - 100 %    POCT Oxy Hemoglobin, Arterial 94.8 94.0 - 98.0 %    POCT Hematocrit Calculated, Arterial 28.0 (L) 41.0 - 52.0 %    POCT Sodium, Arterial 141 136 - 145 mmol/L    POCT Potassium, Arterial 4.3 3.5 - 5.3 mmol/L    POCT Chloride, Arterial 103 98 - 107 mmol/L    POCT Ionized Calcium, Arterial 1.12 1.10 - 1.33 mmol/L    POCT Glucose, Arterial 256 (H) 74 - 99 mg/dL    POCT Lactate, Arterial 0.8 0.4 - 2.0 mmol/L    POCT Base Excess, Arterial 13.2 (H) -2.0 - 3.0 mmol/L    POCT HCO3 Calculated, Arterial 38.1 (H) 22.0 - 26.0 mmol/L    POCT Hemoglobin, Arterial 9.3 (L) 13.5 - 17.5 g/dL    POCT Anion Gap, Arterial 4 (L) 10 - 25 mmo/L    Patient Temperature 37.0 degrees Celsius    FiO2 40 %    Site of Arterial Puncture Arterial Line    Renal function panel   Result Value Ref Range    Glucose 246 (H) 74 - 99 mg/dL    Sodium 143 136 - 145 mmol/L    Potassium 4.2 3.5 - 5.3 mmol/L    Chloride 101 98 - 107 mmol/L    Bicarbonate 29 21 - 32 mmol/L    Anion Gap 17 10 - 20 mmol/L    Urea Nitrogen 33 (H) 6 - 23 mg/dL    Creatinine 1.41 (H) 0.50 - 1.30 mg/dL    eGFR 59 (L) >60 mL/min/1.73m*2    Calcium 8.7 8.6 - 10.3 mg/dL    Phosphorus 4.1 2.5 - 4.9 mg/dL    Albumin 3.2 (L) 3.4 - 5.0 g/dL   Magnesium   Result Value Ref Range    Magnesium 2.26 1.60 - 2.40 mg/dL   CBC and Auto Differential   Result Value Ref Range    WBC 10.2 4.4 - 11.3 x10*3/uL    nRBC 0.0 0.0 - 0.0 /100 WBCs    RBC 3.88 (L) 4.50 - 5.90 x10*6/uL    Hemoglobin 8.8 (L) 13.5 - 17.5 g/dL    Hematocrit 31.2 (L) 41.0 - 52.0 %    MCV 80 80 - 100 fL    MCH 22.7 (L) 26.0 - 34.0 pg    MCHC 28.2 (L) 32.0 - 36.0 g/dL    RDW 16.6 (H) 11.5 - 14.5 %    Platelets 225 150 - 450 x10*3/uL    Neutrophils % 76.1 40.0 - 80.0 %    Immature Granulocytes %, Automated 1.2 (H) 0.0 - 0.9 %    Lymphocytes % 9.8 13.0 - 44.0 %    Monocytes % 8.1 2.0 - 10.0 %    Eosinophils % 4.4 0.0 - 6.0 %     Basophils % 0.4 0.0 - 2.0 %    Neutrophils Absolute 7.74 (H) 1.20 - 7.70 x10*3/uL    Immature Granulocytes Absolute, Automated 0.12 0.00 - 0.70 x10*3/uL    Lymphocytes Absolute 1.00 (L) 1.20 - 4.80 x10*3/uL    Monocytes Absolute 0.82 0.10 - 1.00 x10*3/uL    Eosinophils Absolute 0.45 0.00 - 0.70 x10*3/uL    Basophils Absolute 0.04 0.00 - 0.10 x10*3/uL   POCT GLUCOSE   Result Value Ref Range    POCT Glucose 202 (H) 74 - 99 mg/dL   POCT GLUCOSE   Result Value Ref Range    POCT Glucose 229 (H) 74 - 99 mg/dL   POCT GLUCOSE   Result Value Ref Range    POCT Glucose 162 (H) 74 - 99 mg/dL   POCT GLUCOSE   Result Value Ref Range    POCT Glucose 193 (H) 74 - 99 mg/dL   POCT GLUCOSE   Result Value Ref Range    POCT Glucose 191 (H) 74 - 99 mg/dL   POCT GLUCOSE   Result Value Ref Range    POCT Glucose 208 (H) 74 - 99 mg/dL   Renal function panel   Result Value Ref Range    Glucose 213 (H) 74 - 99 mg/dL    Sodium 147 (H) 136 - 145 mmol/L    Potassium 4.1 3.5 - 5.3 mmol/L    Chloride 104 98 - 107 mmol/L    Bicarbonate 35 (H) 21 - 32 mmol/L    Anion Gap 12 10 - 20 mmol/L    Urea Nitrogen 38 (H) 6 - 23 mg/dL    Creatinine 1.42 (H) 0.50 - 1.30 mg/dL    eGFR 59 (L) >60 mL/min/1.73m*2    Calcium 8.3 (L) 8.6 - 10.3 mg/dL    Phosphorus 3.8 2.5 - 4.9 mg/dL    Albumin 3.1 (L) 3.4 - 5.0 g/dL   Magnesium   Result Value Ref Range    Magnesium 2.24 1.60 - 2.40 mg/dL   CBC and Auto Differential   Result Value Ref Range    WBC 11.2 4.4 - 11.3 x10*3/uL    nRBC 0.0 0.0 - 0.0 /100 WBCs    RBC 3.80 (L) 4.50 - 5.90 x10*6/uL    Hemoglobin 8.7 (L) 13.5 - 17.5 g/dL    Hematocrit 30.5 (L) 41.0 - 52.0 %    MCV 80 80 - 100 fL    MCH 22.9 (L) 26.0 - 34.0 pg    MCHC 28.5 (L) 32.0 - 36.0 g/dL    RDW 16.8 (H) 11.5 - 14.5 %    Platelets 243 150 - 450 x10*3/uL    Neutrophils % 81.5 40.0 - 80.0 %    Immature Granulocytes %, Automated 1.0 (H) 0.0 - 0.9 %    Lymphocytes % 8.3 13.0 - 44.0 %    Monocytes % 6.4 2.0 - 10.0 %    Eosinophils % 2.5 0.0 - 6.0 %    Basophils  % 0.3 0.0 - 2.0 %    Neutrophils Absolute 9.12 (H) 1.20 - 7.70 x10*3/uL    Immature Granulocytes Absolute, Automated 0.11 0.00 - 0.70 x10*3/uL    Lymphocytes Absolute 0.93 (L) 1.20 - 4.80 x10*3/uL    Monocytes Absolute 0.72 0.10 - 1.00 x10*3/uL    Eosinophils Absolute 0.28 0.00 - 0.70 x10*3/uL    Basophils Absolute 0.03 0.00 - 0.10 x10*3/uL   POCT GLUCOSE   Result Value Ref Range    POCT Glucose 192 (H) 74 - 99 mg/dL   POCT GLUCOSE   Result Value Ref Range    POCT Glucose 189 (H) 74 - 99 mg/dL   POCT GLUCOSE   Result Value Ref Range    POCT Glucose 183 (H) 74 - 99 mg/dL   POCT GLUCOSE   Result Value Ref Range    POCT Glucose 207 (H) 74 - 99 mg/dL   Renal Function Panel   Result Value Ref Range    Glucose 180 (H) 74 - 99 mg/dL    Sodium 149 (H) 136 - 145 mmol/L    Potassium 3.3 (L) 3.5 - 5.3 mmol/L    Chloride 112 (H) 98 - 107 mmol/L    Bicarbonate 32 21 - 32 mmol/L    Anion Gap 8 (L) 10 - 20 mmol/L    Urea Nitrogen 32 (H) 6 - 23 mg/dL    Creatinine 1.12 0.50 - 1.30 mg/dL    eGFR 78 >60 mL/min/1.73m*2    Calcium 7.1 (L) 8.6 - 10.3 mg/dL    Phosphorus 2.7 2.5 - 4.9 mg/dL    Albumin 2.7 (L) 3.4 - 5.0 g/dL   POCT GLUCOSE   Result Value Ref Range    POCT Glucose 186 (H) 74 - 99 mg/dL   POCT GLUCOSE   Result Value Ref Range    POCT Glucose 237 (H) 74 - 99 mg/dL   POCT GLUCOSE   Result Value Ref Range    POCT Glucose 248 (H) 74 - 99 mg/dL   POCT GLUCOSE   Result Value Ref Range    POCT Glucose 217 (H) 74 - 99 mg/dL   Magnesium   Result Value Ref Range    Magnesium 2.34 1.60 - 2.40 mg/dL   CBC and Auto Differential   Result Value Ref Range    WBC 9.0 4.4 - 11.3 x10*3/uL    nRBC 0.0 0.0 - 0.0 /100 WBCs    RBC 3.79 (L) 4.50 - 5.90 x10*6/uL    Hemoglobin 8.8 (L) 13.5 - 17.5 g/dL    Hematocrit 31.2 (L) 41.0 - 52.0 %    MCV 82 80 - 100 fL    MCH 23.2 (L) 26.0 - 34.0 pg    MCHC 28.2 (L) 32.0 - 36.0 g/dL    RDW 16.7 (H) 11.5 - 14.5 %    Platelets 216 150 - 450 x10*3/uL    Neutrophils % 76.7 40.0 - 80.0 %    Immature Granulocytes  %, Automated 0.7 0.0 - 0.9 %    Lymphocytes % 10.2 13.0 - 44.0 %    Monocytes % 8.2 2.0 - 10.0 %    Eosinophils % 4.0 0.0 - 6.0 %    Basophils % 0.2 0.0 - 2.0 %    Neutrophils Absolute 6.91 1.20 - 7.70 x10*3/uL    Immature Granulocytes Absolute, Automated 0.06 0.00 - 0.70 x10*3/uL    Lymphocytes Absolute 0.92 (L) 1.20 - 4.80 x10*3/uL    Monocytes Absolute 0.74 0.10 - 1.00 x10*3/uL    Eosinophils Absolute 0.36 0.00 - 0.70 x10*3/uL    Basophils Absolute 0.02 0.00 - 0.10 x10*3/uL   Renal function panel   Result Value Ref Range    Glucose 240 (H) 74 - 99 mg/dL    Sodium 147 (H) 136 - 145 mmol/L    Potassium 4.6 3.5 - 5.3 mmol/L    Chloride 106 98 - 107 mmol/L    Bicarbonate 32 21 - 32 mmol/L    Anion Gap 14 10 - 20 mmol/L    Urea Nitrogen 34 (H) 6 - 23 mg/dL    Creatinine 1.30 0.50 - 1.30 mg/dL    eGFR 65 >60 mL/min/1.73m*2    Calcium 8.5 (L) 8.6 - 10.3 mg/dL    Phosphorus 2.8 2.5 - 4.9 mg/dL    Albumin 3.2 (L) 3.4 - 5.0 g/dL   POCT GLUCOSE   Result Value Ref Range    POCT Glucose 171 (H) 74 - 99 mg/dL   POCT GLUCOSE   Result Value Ref Range    POCT Glucose 148 (H) 74 - 99 mg/dL   POCT GLUCOSE   Result Value Ref Range    POCT Glucose 214 (H) 74 - 99 mg/dL   POCT GLUCOSE   Result Value Ref Range    POCT Glucose 187 (H) 74 - 99 mg/dL   CBC and Auto Differential   Result Value Ref Range    WBC 6.9 4.4 - 11.3 x10*3/uL    nRBC 0.0 0.0 - 0.0 /100 WBCs    RBC 3.85 (L) 4.50 - 5.90 x10*6/uL    Hemoglobin 8.8 (L) 13.5 - 17.5 g/dL    Hematocrit 31.7 (L) 41.0 - 52.0 %    MCV 82 80 - 100 fL    MCH 22.9 (L) 26.0 - 34.0 pg    MCHC 27.8 (L) 32.0 - 36.0 g/dL    RDW 16.1 (H) 11.5 - 14.5 %    Platelets 213 150 - 450 x10*3/uL    Neutrophils % 73.5 40.0 - 80.0 %    Immature Granulocytes %, Automated 0.4 0.0 - 0.9 %    Lymphocytes % 12.5 13.0 - 44.0 %    Monocytes % 8.1 2.0 - 10.0 %    Eosinophils % 5.1 0.0 - 6.0 %    Basophils % 0.4 0.0 - 2.0 %    Neutrophils Absolute 5.06 1.20 - 7.70 x10*3/uL    Immature Granulocytes Absolute, Automated  0.03 0.00 - 0.70 x10*3/uL    Lymphocytes Absolute 0.86 (L) 1.20 - 4.80 x10*3/uL    Monocytes Absolute 0.56 0.10 - 1.00 x10*3/uL    Eosinophils Absolute 0.35 0.00 - 0.70 x10*3/uL    Basophils Absolute 0.03 0.00 - 0.10 x10*3/uL   POCT GLUCOSE   Result Value Ref Range    POCT Glucose 172 (H) 74 - 99 mg/dL   POCT GLUCOSE   Result Value Ref Range    POCT Glucose 296 (H) 74 - 99 mg/dL    XR chest 1 view  Result Date: 5/27/2025  Interpreted By:  Wiley Damon, STUDY: XR CHEST 1 VIEW;  5/27/2025 1:30 pm   INDICATION: Signs/Symptoms:ETT exchange.   COMPARISON: Most recent prior chest x-ray is from 8:43 a.m. today.   ACCESSION NUMBER(S): [id number]   ORDERING CLINICIAN: HAJA ZHONG   TECHNIQUE: Single AP portable view of the chest was obtained.   FINDINGS: MEDIASTINUM/ LUNGS/ DOTTIE: Suboptimal level of inspiration. Underlying elevation of the right diaphragm. Stable NG tube and right IJ vein catheter. There is a new ET tube in place with distal tip 5.7 cm above the sari. Stable cardiomegaly. Progression of pulmonary vascular congestion and perihilar pulmonary edema. Right pleural effusion, slightly progressed. No pneumothorax. No tracheal deviation. No abnormal hilar fullness or gross mass on either side.   BONES: No lytic or blastic destructive bone lesion.   UPPER ABDOMEN: Grossly intact.       Ongoing CHF with mild interval worsening.   Tubes and lines in place as described.   MACRO: None   Signed by: Wiley Damon 5/27/2025 1:36 PM Dictation workstation:   [id number]    XR chest 1 view  Result Date: 5/27/2025  Interpreted By:  Alexia Bauer, STUDY: XR CHEST 1 VIEW;  5/27/2025 8:53 am   INDICATION: Signs/Symptoms:Intubated chronic CO2 retention afebrile, WBC normal previous reports of pneumonia.     COMPARISON: 05/23/2025   ACCESSION NUMBER(S): [id number]   ORDERING CLINICIAN: YANY LOUIE   FINDINGS: ET tube, NG tube and right jugular central line remain in place. Tip of ET tube currently 6.3 cm above the  sari. Additional presumed overlying monitoring leads. Patient is rotated. Low lung volumes. Increased right lower chest opacity with blunting of the costophrenic angle. Improved left chest pleuroparenchymal opacity. Cardiomediastinal silhouette remains enlarged.       Multiple support devices in place as above. Improved left chest opacity and increased right lower chest infiltrate and/or pleural effusion since 05/23/2025.   MACRO: None.   Signed by: Alexia Bauer 5/27/2025 8:59 AM Dictation workstation:   [id number]    CT chest wo IV contrast  Result Date: 5/25/2025  Interpreted By:  Valeria Torres, STUDY: CT CHEST WO IV CONTRAST;  5/23/2025 11:10 pm   INDICATION: 54 y/o   M with  Signs/Symptoms:s/p Code blue, pneumonia and respiratory failure     COMPARISON: 05/16/2025   ACCESSION NUMBER(S): [id number]   ORDERING CLINICIAN: JEZ RYAN   TECHNIQUE: Helical unenhanced axial images are obtained from the thoracic inlet through the upper abdomen. Reformats were obtained in the coronal and sagittal plane.   FINDINGS: Once again, note is made of marked elevation of the right hemidiaphragm. There is now marked progressive worsening of the airspace consolidation within both lungs with complete opacification of the right lower lobe and with marked worsening of the airspace consolidation within the left lower lobe. Additionally, the multifocal areas of airspace consolidation seen within the right upper lobe, left upper lobe, and right middle lobe have also worsened.   The cardiac size is stable with no pericardial abnormality. There is extensive coronary artery calcification seen. The endotracheal tube is satisfactorily positioned with the nasogastric tube terminating within the gastric body.   No mediastinal or hilar adenopathy or mass is evident.   On images taken through upper abdomen, fatty infiltration of the pancreas is seen. Hepatosplenomegaly is observed. Cholelithiasis is demonstrated.       Considerable  worsening of the bilateral bronchopneumonia when compared with the study done 1 week earlier with the right lower lobe completely opacified and with marked worsening of the airspace consolidation within the left lower lobe, both upper lobes, and right middle lobe.   Satisfactory positioning of the endotracheal and nasogastric tubes.   Hepatosplenomegaly.   Marked elevation of right hemidiaphragm.   Cholelithiasis.   MACRO: None   Signed by: Valeria Torres 5/25/2025 8:55 PM Dictation workstation:   [id number]    Transthoracic Echo (TTE) Limited  Result Date: 5/24/2025   Formerly named [facility], [address] [place] [address]              Tel [phone] and Fax [phone] TRANSTHORACIC ECHOCARDIOGRAM REPORT  Patient Name:       JOSSE Camargo Physician:    [id number] Thien Bonilla MD Study Date:         5/24/2025           Ordering Provider:    [id number] RANDY NIX MRN/PID:            [id number]            Fellow: Accession#:         [id number]        Nurse: Date of Birth/Age:  1970 / 55 years Sonographer:          Jose Luis Rollins                                                               [facility] Gender assigned at  M                   Additional Staff: Birth: Height:             180.00 cm           Admit Date: Weight:             195.00 kg           Admission Status:     Inpatient -                                                               Routine BSA / BMI:          2.92 m2 / 60.19     Encounter#:           [id number]                     kg/m2 Blood Pressure:     124/51 mmHg         Department Location:  [facility] Non                                                               Invasive Study Type:    TRANSTHORACIC ECHO (TTE) LIMITED Diagnosis/ICD: Cardiac arrest, cause unspecified-I46.9 Indication:    Cardiac Arrest CPT Code:      Echo Limited-65061 Patient  History: BMI:               Obese >30 Pertinent History: Cardiac arrest. Study Detail: The following Echo studies were performed: 2D, Doppler, M-Mode and               color flow. Technically challenging study due to poor acoustic               windows and body habitus. The patient is intubated. Definity used               as a contrast agent for endocardial border definition. Total               contrast used for this procedure was 4 mL via IV push. Unable to               obtain subcostal and suprasternal notch view.  PHYSICIAN INTERPRETATION: Left Ventricle: Left ventricular ejection fraction is normal, by visual estimate at 55%. There are no regional left ventricular wall motion abnormalities. The left ventricular cavity size was not assessed. Spectral Doppler shows a Grade II (pseudonormal pattern) of left ventricular diastolic filling with an elevated left atrial pressure. Left Atrium: The left atrial size was not assessed. Right Ventricle: The right ventricle was not well visualized. Unable to determine right ventricular systolic function. RV was poorly visualized due to sub-optimal acoustic windows. Right Atrium: The right atrial size was not assessed. Aortic Valve: The aortic valve was not assessed. Aortic valve regurgitation was not assessed. Mitral Valve: The mitral valve is normal in structure. There is mild mitral valve regurgitation. Tricuspid Valve: The tricuspid valve was not well visualized. Tricuspid regurgitation was not assessed. Pulmonic Valve: The pulmonic valve is not well visualized. The pulmonic valve regurgitation was not well visualized. Pericardium: There is no pericardial effusion noted. Aorta: The aortic root was not assessed. Systemic Veins: The inferior vena cava was not assessed. In comparison to the previous echocardiogram(s): Although previous studies are available for review, a comparison with the current echocardiogram is not feasible due to its limited scope or image quality.  Compared with study dated 4/15/2025,.  CONCLUSIONS:  1. Left ventricular ejection fraction is normal, by visual estimate at 55%.  2. Spectral Doppler shows a Grade II (pseudonormal pattern) of left ventricular diastolic filling with an elevated left atrial pressure.  3. Unable to determine right ventricular systolic function.  4. RV was poorly visualized due to sub-optimal acoustic windows. QUANTITATIVE DATA SUMMARY:  LV SYSTOLIC FUNCTION:                      Normal Ranges: EF-Visual:      55 % LV EF Reported: 55 %  LV DIASTOLIC FUNCTION:          Normal Ranges: MV Peak E:             1.31 m/s (0.7-1.2 m/s) MV Peak A:             0.95 m/s (0.42-0.7 m/s) E/A Ratio:             1.38     (1.0-2.2)  MITRAL VALVE:          Normal Ranges: MV DT:        167 msec (150-240msec)  [id number] Thien Bonilla MD Electronically signed on 5/24/2025 at 4:35:42 PM  ** Final **     CT head wo IV contrast  Result Date: 5/24/2025  Interpreted By:  José Miguel Live, STUDY: CT HEAD WO IV CONTRAST; ;  5/23/2025 11:10 pm   INDICATION: Signs/Symptoms:Cardiac arrest with ROSC.     COMPARISON: CT head from 07/24/2024.   ACCESSION NUMBER(S): [id number]   ORDERING CLINICIAN: HAJA ZHONG   TECHNIQUE: Routine unenhanced CT of the head was performed.   FINDINGS: There is no acute intracranial hemorrhage or mass effect. There is no abnormal extra-axial fluid collection.   There are old infarcts located within the right frontal lobe and right occipital lobe denoted by areas of hypoattenuation. No acute loss of gray-white differentiation. Specifically, deep gray nuclei are well visualized separately from the adjacent white matter.   Ventricles, sulci, and basilar cisterns are overall normal in size, shape, and configuration.   Visualized paranasal sinuses and mastoid air cells are essentially clear. The calvarium is unremarkable in appearance.   Partially visualized tubes within the oral cavity.       No acute intracranial abnormality or mass effect.  Old infarcts located within the right frontal and right occipital lobes.   This study was interpreted at [facility].     MACRO: None   Signed by: José Miguel Live 5/24/2025 9:17 AM Dictation workstation:   [id number]    XR chest 1 view  Result Date: 5/23/2025  Interpreted By:  Dileep Newton, STUDY: XR CHEST 1 VIEW   INDICATION: Signs/Symptoms:ett tube and og placemnt.   COMPARISON: May 21   ACCESSION NUMBER(S): [id number]   ORDERING CLINICIAN: HAJA ZHONG   FINDINGS: Interval endotracheal tube placement with the tip and approximate 8 cm proximal to the sari near the thoracic inlet.   A nasogastric tube is seen but due to body habitus the exact location of the tip is not definitively confirmed.         Interval endotracheal tube placement with the tip and approximate 8 cm proximal to the sari near the thoracic inlet.   A nasogastric tube is seen but due to body habitus the exact location of the tip is not definitively confirmed.   Signed by: Dileep Newton 5/23/2025 6:10 PM Dictation workstation:   [id number]    XR chest 2 views  Result Date: 5/21/2025  Interpreted By:  Baltazar Sultana, STUDY: XR CHEST 2 VIEWS;  5/21/2025 4:33 pm   INDICATION: Signs/Symptoms:SOB.     COMPARISON: 05/16/2025   ACCESSION NUMBER(S): [id number]   ORDERING CLINICIAN: CHELSEA SAAVEDRA   FINDINGS: PA and lateral radiographs of the chest were provided.       CARDIOMEDIASTINAL SILHOUETTE: Cardiomediastinal silhouette is normal in size and configuration.   LUNGS: New diffuse consolidation throughout the left lung concerning for pneumonia. No large pleural effusion or pneumothorax. The right lung is clear.   ABDOMEN: No remarkable upper abdominal findings.   BONES: No acute osseous changes.       1.  New large left lung consolidation concerning for pneumonia.       MACRO: None   Signed by: Baltazar Sultana 5/21/2025 4:38 PM Dictation workstation:   [id number]    ECG 12 lead  Result Date:  5/19/2025  Normal sinus rhythm Possible Inferior infarct , age undetermined Abnormal ECG When compared with ECG of 19-APR-2025 15:00, Borderline criteria for Inferior infarct are now Present Nonspecific T wave abnormality, worse in Inferior leads QT has lengthened See ED provider note for full interpretation and clinical correlation Confirmed by Nella Booth ([id number]) on 5/19/2025 10:27:27 AM    CT angio chest for pulmonary embolism  Result Date: 5/16/2025  Interpreted By:  Schoenberger, Joseph, STUDY: CT ANGIO CHEST FOR PULMONARY EMBOLISM;  5/16/2025 3:26 pm   INDICATION: Signs/Symptoms:eval for PE.     COMPARISON: 04/14/2025   ACCESSION NUMBER(S): [id number]   ORDERING CLINICIAN: DEBBIE STONER   TECHNIQUE: Helical data acquisition of the chest was obtained with the intravenous injection of 90 cc Omnipaque 350.. Images were reformatted in coronal and sagittal planes. Axial and coronal MIP images were created and reviewed.   FINDINGS: POTENTIAL LIMITATIONS OF THE STUDY: Suboptimal opacification of the systemic arterial and pulmonary arterial structures.   HEART AND VESSELS: No filling defect in the main or lobar or proximal subsegmental pulmonary arteries.   Main pulmonary artery and its branches are normal in caliber.   Thoracic aorta is normal in course and caliber with mild atherosclerotic calcifications in lower malleolar opacification without dissection.   There are moderate coronary atherosclerotic calcifications.The study is not optimized for evaluation of coronary arteries.   The cardiac chambers are not enlarged.   No evidence of pericardial effusion.   MEDIASTINUM AND DOTTIE, LOWER NECK AND AXILLA: The visualized thyroid gland is within normal limits.   No evidence of thoracic lymphadenopathy by CT criteria.   Esophagus appears within normal limits as seen.   LUNGS AND AIRWAYS: The trachea and central airways are patent. No endobronchial lesion.   Multifocal consolidative opacities are noted in both  lungs. There is significant progression compared to prior exam. No aditi lobar consolidation. No pleural effusion or pneumothorax. Asymmetric elevation of the right hemidiaphragm with compressive atelectasis and adjacent portions the right lower lobe is again noted unchanged.   UPPER ABDOMEN: Dependent layering calcified gallstones. No wall thickening or pericholecystic inflammatory findings. No change from prior.   CHEST WALL AND OSSEOUS STRUCTURES: No acute finding       1.  Is significant progression of bilateral pneumonia as detailed above. 2. No convincing CT evidence for aortic dissection or acute pulmonary embolus.     MACRO: None   Signed by: Joseph Schoenberger 5/16/2025 3:44 PM Dictation workstation:   [id number]    XR chest 1 view  Result Date: 5/16/2025  Interpreted By:  Osiel Newman, STUDY: XR CHEST 1 VIEW;  5/16/2025 11:41 am   INDICATION: Signs/Symptoms:SOB with new oxygen requirement.   COMPARISON: 04/14/2025   ACCESSION NUMBER(S): [id number]   ORDERING CLINICIAN: DEBBIE STONER   FINDINGS: Unchanged elevation of right hemidiaphragm. Increase patchy left mid to lower lung opacities. Right lung grossly clear. No visualized pleural effusion. Cardiomediastinal silhouette unchanged. No congestive failure.       Increase patchy left mid to lower lung infiltrates.   MACRO: None   Signed by: Osiel Newman 5/16/2025 11:43 AM Dictation workstation:   [id number]                       Assessment & Plan    55 y.o. male with a PMH of super obese, back pain on chronic opioids (Norco 7.5/325 3-4x per day), peripheral neuropathy, HTN, HLD, HFpEF, obesity-hypoventilation syndrome, T2DM on insulin, CKD, GERD admitted to the stepdown unit on 5/16 for acute on chronic diastolic CHF and pneumonia.  Transferred to the ICU on 5/23 following cardiac arrest - initially PEA then asystole - due to a respiratory event.  Patient intubated.  ROSC achieved after ~5 min.     #Cardiac arrest (PEA then asystole) with  ROSC  #Acute hypoxic ischemia encephalopathy  #Back pain on chronic opioids  #Acute on chronic diastolic CHF  #Hypertension, Hyperlipidemia  #Acute hypoxemic respiratory failure  #Obesity Hypoventilation Syndrome  #T2DM on long-term insulin  #Bilateral pneumonia     - Patient Etubated-patient is stable now and transferred to medical service.  - Continue home ASA, atorvastatin, and amlodipine.  Holding home lisinopril.  - Correct electrolytes and trend accordingly.  - Continue gentle diuresis with Lasix 40 mg BID as ordered.  - Advance tube feeds (Glucerna).  Dietician consulted.  - Continue current insulin regimen of Lantus 30 units (half his home dose) and sliding scale lispro.  - Seen by ID this admission.  Continue Zosyn (started 5/16) for 10-day course of antibiotics, as recommended.  Stop date set accordingly.  - On Protonix and subQ heparin for GI and DVT prophylaxis, respectively.  Change subQ heparin to Lovenox in the setting of normalized renal function.     Salas Dodson MD         [1]    [2] PRN medications: acetaminophen, dextrose, dextrose, glucagon, glucagon, haloperidol lactate, ipratropium-albuteroL, lactulose, naloxone, ondansetron **OR** ondansetron, oxygen

## 2025-05-29 NOTE — PROGRESS NOTES
Jai Srhestha is a 55 y.o. male on day 13 of admission presenting with Pneumonia due to infectious organism, unspecified laterality, unspecified part of lung.    Plan: continues treatment for PNA, respiratory failure- able to be extubated and currently on 4L. Will need therapy follow up to see patient's ability to ambulate prior to DC. Original plan was to return home alone with [facility].  Disposition: home vs SNF  Barrier: therapy evaluations, rr failure, wean o2  ADOD: 4-6 days       05/29/25 0652   Discharge Planning   Living Arrangements Alone   Support Systems Friends/neighbors   Type of Home Care Services Home nursing visits;Home health aide   Expected Discharge Disposition Home Health  ([facility])   Does the patient need discharge transport arranged? Yes   RoundTrip coordination needed? Yes   Has discharge transport been arranged? No   What day is the transport expected? 05/31/25   Patient Choice   Patient / Family choosing to utilize agency / facility established prior to hospitalization Yes   Intensity of Service   Intensity of Service >30 min       Jacqui Sarmiento RN

## 2025-05-30 ENCOUNTER — APPOINTMENT (OUTPATIENT)
Dept: RADIOLOGY | Facility: HOSPITAL | Age: 55
End: 2025-05-30
Payer: MEDICARE

## 2025-05-30 ENCOUNTER — APPOINTMENT (OUTPATIENT)
Dept: CARDIOLOGY | Facility: HOSPITAL | Age: 55
End: 2025-05-30
Payer: MEDICARE

## 2025-05-30 LAB
ANION GAP BLDA CALCULATED.4IONS-SCNC: 5 MMO/L (ref 10–25)
ANION GAP BLDA CALCULATED.4IONS-SCNC: 6 MMO/L (ref 10–25)
APPARATUS: ABNORMAL
ATRIAL RATE: 77 BPM
BASE EXCESS BLDA CALC-SCNC: 11.5 MMOL/L (ref -2–3)
BASE EXCESS BLDA CALC-SCNC: 12.4 MMOL/L (ref -2–3)
BASOPHILS # BLD AUTO: 0.05 X10*3/UL (ref 0–0.1)
BASOPHILS NFR BLD AUTO: 0.6 %
BODY TEMPERATURE: 37 DEGREES CELSIUS
BODY TEMPERATURE: 37 DEGREES CELSIUS
CA-I BLDA-SCNC: 1.23 MMOL/L (ref 1.1–1.33)
CA-I BLDA-SCNC: 1.25 MMOL/L (ref 1.1–1.33)
CHLORIDE BLDA-SCNC: 104 MMOL/L (ref 98–107)
CHLORIDE BLDA-SCNC: 106 MMOL/L (ref 98–107)
EOSINOPHIL # BLD AUTO: 0.26 X10*3/UL (ref 0–0.7)
EOSINOPHIL NFR BLD AUTO: 3.3 %
ERYTHROCYTE [DISTWIDTH] IN BLOOD BY AUTOMATED COUNT: 16.1 % (ref 11.5–14.5)
GLUCOSE BLD MANUAL STRIP-MCNC: 213 MG/DL (ref 74–99)
GLUCOSE BLD MANUAL STRIP-MCNC: 214 MG/DL (ref 74–99)
GLUCOSE BLD MANUAL STRIP-MCNC: 223 MG/DL (ref 74–99)
GLUCOSE BLD MANUAL STRIP-MCNC: 228 MG/DL (ref 74–99)
GLUCOSE BLD MANUAL STRIP-MCNC: 303 MG/DL (ref 74–99)
GLUCOSE BLD MANUAL STRIP-MCNC: 316 MG/DL (ref 74–99)
GLUCOSE BLDA-MCNC: 238 MG/DL (ref 74–99)
GLUCOSE BLDA-MCNC: 304 MG/DL (ref 74–99)
HCO3 BLDA-SCNC: 37 MMOL/L (ref 22–26)
HCO3 BLDA-SCNC: 37.8 MMOL/L (ref 22–26)
HCT VFR BLD AUTO: 36.5 % (ref 41–52)
HCT VFR BLD EST: 32 % (ref 41–52)
HCT VFR BLD EST: 34 % (ref 41–52)
HGB BLD-MCNC: 10 G/DL (ref 13.5–17.5)
HGB BLDA-MCNC: 10.8 G/DL (ref 13.5–17.5)
HGB BLDA-MCNC: 11.2 G/DL (ref 13.5–17.5)
IMM GRANULOCYTES # BLD AUTO: 0.06 X10*3/UL (ref 0–0.7)
IMM GRANULOCYTES NFR BLD AUTO: 0.8 % (ref 0–0.9)
INHALED O2 CONCENTRATION: 32 %
INHALED O2 CONCENTRATION: 40 %
LACTATE BLDA-SCNC: 0.9 MMOL/L (ref 0.4–2)
LACTATE BLDA-SCNC: 1.2 MMOL/L (ref 0.4–2)
LYMPHOCYTES # BLD AUTO: 0.93 X10*3/UL (ref 1.2–4.8)
LYMPHOCYTES NFR BLD AUTO: 11.7 %
MCH RBC QN AUTO: 22.3 PG (ref 26–34)
MCHC RBC AUTO-ENTMCNC: 27.4 G/DL (ref 32–36)
MCV RBC AUTO: 82 FL (ref 80–100)
MONOCYTES # BLD AUTO: 0.55 X10*3/UL (ref 0.1–1)
MONOCYTES NFR BLD AUTO: 6.9 %
NEUTROPHILS # BLD AUTO: 6.08 X10*3/UL (ref 1.2–7.7)
NEUTROPHILS NFR BLD AUTO: 76.7 %
NRBC BLD-RTO: 0 /100 WBCS (ref 0–0)
OXYHGB MFR BLDA: 82.6 % (ref 94–98)
OXYHGB MFR BLDA: 95.9 % (ref 94–98)
P AXIS: 34 DEGREES
P OFFSET: 178 MS
P ONSET: 117 MS
PCO2 BLDA: 52 MM HG (ref 38–42)
PCO2 BLDA: 52 MM HG (ref 38–42)
PH BLDA: 7.46 PH (ref 7.38–7.42)
PH BLDA: 7.47 PH (ref 7.38–7.42)
PLATELET # BLD AUTO: 262 X10*3/UL (ref 150–450)
PO2 BLDA: 53 MM HG (ref 85–95)
PO2 BLDA: 95 MM HG (ref 85–95)
POTASSIUM BLDA-SCNC: 4 MMOL/L (ref 3.5–5.3)
POTASSIUM BLDA-SCNC: 4 MMOL/L (ref 3.5–5.3)
PR INTERVAL: 182 MS
Q ONSET: 208 MS
QRS COUNT: 13 BEATS
QRS DURATION: 102 MS
QT INTERVAL: 360 MS
QTC CALCULATION(BAZETT): 407 MS
QTC FREDERICIA: 391 MS
R AXIS: 54 DEGREES
RBC # BLD AUTO: 4.48 X10*6/UL (ref 4.5–5.9)
SAO2 % BLDA: 85 % (ref 94–100)
SAO2 % BLDA: 98 % (ref 94–100)
SODIUM BLDA-SCNC: 143 MMOL/L (ref 136–145)
SODIUM BLDA-SCNC: 145 MMOL/L (ref 136–145)
T AXIS: 122 DEGREES
T OFFSET: 388 MS
VENTRICULAR RATE: 77 BPM
WBC # BLD AUTO: 7.9 X10*3/UL (ref 4.4–11.3)

## 2025-05-30 PROCEDURE — 84132 ASSAY OF SERUM POTASSIUM: CPT | Performed by: INTERNAL MEDICINE

## 2025-05-30 PROCEDURE — 82947 ASSAY GLUCOSE BLOOD QUANT: CPT

## 2025-05-30 PROCEDURE — 70450 CT HEAD/BRAIN W/O DYE: CPT

## 2025-05-30 PROCEDURE — 2060000001 HC INTERMEDIATE ICU ROOM DAILY

## 2025-05-30 PROCEDURE — 36600 WITHDRAWAL OF ARTERIAL BLOOD: CPT

## 2025-05-30 PROCEDURE — 2500000004 HC RX 250 GENERAL PHARMACY W/ HCPCS (ALT 636 FOR OP/ED): Mod: JZ | Performed by: INTERNAL MEDICINE

## 2025-05-30 PROCEDURE — 70450 CT HEAD/BRAIN W/O DYE: CPT | Performed by: SURGERY

## 2025-05-30 PROCEDURE — 2500000001 HC RX 250 WO HCPCS SELF ADMINISTERED DRUGS (ALT 637 FOR MEDICARE OP)

## 2025-05-30 PROCEDURE — 2500000002 HC RX 250 W HCPCS SELF ADMINISTERED DRUGS (ALT 637 FOR MEDICARE OP, ALT 636 FOR OP/ED)

## 2025-05-30 PROCEDURE — 85025 COMPLETE CBC W/AUTO DIFF WBC: CPT

## 2025-05-30 PROCEDURE — 99232 SBSQ HOSP IP/OBS MODERATE 35: CPT | Performed by: INTERNAL MEDICINE

## 2025-05-30 PROCEDURE — 2500000004 HC RX 250 GENERAL PHARMACY W/ HCPCS (ALT 636 FOR OP/ED): Mod: JZ

## 2025-05-30 PROCEDURE — 84132 ASSAY OF SERUM POTASSIUM: CPT | Performed by: ANESTHESIOLOGY

## 2025-05-30 PROCEDURE — 9420000001 HC RT PATIENT EDUCATION 5 MIN

## 2025-05-30 PROCEDURE — 2500000004 HC RX 250 GENERAL PHARMACY W/ HCPCS (ALT 636 FOR OP/ED)

## 2025-05-30 PROCEDURE — 2500000005 HC RX 250 GENERAL PHARMACY W/O HCPCS

## 2025-05-30 PROCEDURE — 99232 SBSQ HOSP IP/OBS MODERATE 35: CPT | Performed by: ANESTHESIOLOGY

## 2025-05-30 PROCEDURE — 94640 AIRWAY INHALATION TREATMENT: CPT

## 2025-05-30 PROCEDURE — 93005 ELECTROCARDIOGRAM TRACING: CPT

## 2025-05-30 PROCEDURE — 2500000005 HC RX 250 GENERAL PHARMACY W/O HCPCS: Performed by: INTERNAL MEDICINE

## 2025-05-30 RX ORDER — METOPROLOL TARTRATE 1 MG/ML
5 INJECTION, SOLUTION INTRAVENOUS ONCE
Status: COMPLETED | OUTPATIENT
Start: 2025-05-30 | End: 2025-05-30

## 2025-05-30 RX ADMIN — LISINOPRIL 5 MG: 5 TABLET ORAL at 09:26

## 2025-05-30 RX ADMIN — QUETIAPINE FUMARATE 100 MG: 100 TABLET ORAL at 21:28

## 2025-05-30 RX ADMIN — COLCHICINE 0.6 MG: 0.6 TABLET, FILM COATED ORAL at 09:26

## 2025-05-30 RX ADMIN — CARBOXYMETHYLCELLULOSE SODIUM 1 DROP: 0.5 SOLUTION/ DROPS OPHTHALMIC at 09:26

## 2025-05-30 RX ADMIN — INSULIN LISPRO 6 UNITS: 100 INJECTION, SOLUTION INTRAVENOUS; SUBCUTANEOUS at 21:26

## 2025-05-30 RX ADMIN — PANTOPRAZOLE SODIUM 40 MG: 40 TABLET, DELAYED RELEASE ORAL at 06:06

## 2025-05-30 RX ADMIN — FUROSEMIDE 40 MG: 10 INJECTION, SOLUTION INTRAMUSCULAR; INTRAVENOUS at 21:28

## 2025-05-30 RX ADMIN — DOCUSATE SODIUM LIQUID 100 MG: 100 LIQUID ORAL at 09:26

## 2025-05-30 RX ADMIN — ENOXAPARIN SODIUM 60 MG: 60 INJECTION SUBCUTANEOUS at 09:26

## 2025-05-30 RX ADMIN — INSULIN GLARGINE 30 UNITS: 100 INJECTION, SOLUTION SUBCUTANEOUS at 09:25

## 2025-05-30 RX ADMIN — IPRATROPIUM BROMIDE AND ALBUTEROL SULFATE 3 ML: 2.5; .5 SOLUTION RESPIRATORY (INHALATION) at 12:35

## 2025-05-30 RX ADMIN — INSULIN LISPRO 6 UNITS: 100 INJECTION, SOLUTION INTRAVENOUS; SUBCUTANEOUS at 09:24

## 2025-05-30 RX ADMIN — IPRATROPIUM BROMIDE AND ALBUTEROL SULFATE 3 ML: 2.5; .5 SOLUTION RESPIRATORY (INHALATION) at 07:33

## 2025-05-30 RX ADMIN — INSULIN LISPRO 6 UNITS: 100 INJECTION, SOLUTION INTRAVENOUS; SUBCUTANEOUS at 04:43

## 2025-05-30 RX ADMIN — IPRATROPIUM BROMIDE AND ALBUTEROL SULFATE 3 ML: 2.5; .5 SOLUTION RESPIRATORY (INHALATION) at 19:21

## 2025-05-30 RX ADMIN — INSULIN LISPRO 12 UNITS: 100 INJECTION, SOLUTION INTRAVENOUS; SUBCUTANEOUS at 17:49

## 2025-05-30 RX ADMIN — POLYETHYLENE GLYCOL 3350 17 G: 17 POWDER, FOR SOLUTION ORAL at 09:26

## 2025-05-30 RX ADMIN — FUROSEMIDE 40 MG: 10 INJECTION, SOLUTION INTRAMUSCULAR; INTRAVENOUS at 09:26

## 2025-05-30 RX ADMIN — ASPIRIN 81 MG CHEWABLE TABLET 81 MG: 81 TABLET CHEWABLE at 09:26

## 2025-05-30 RX ADMIN — ENOXAPARIN SODIUM 60 MG: 60 INJECTION SUBCUTANEOUS at 21:27

## 2025-05-30 RX ADMIN — DOCUSATE SODIUM LIQUID 100 MG: 100 LIQUID ORAL at 21:28

## 2025-05-30 RX ADMIN — Medication 5 L/MIN: at 21:29

## 2025-05-30 RX ADMIN — SENNOSIDES 5 ML: 8.8 LIQUID ORAL at 09:26

## 2025-05-30 RX ADMIN — ATORVASTATIN CALCIUM 80 MG: 80 TABLET, FILM COATED ORAL at 21:28

## 2025-05-30 RX ADMIN — AMLODIPINE BESYLATE 5 MG: 5 TABLET ORAL at 09:26

## 2025-05-30 RX ADMIN — FERROUS SULFATE TAB 325 MG (65 MG ELEMENTAL FE) 1 TABLET: 325 (65 FE) TAB at 17:50

## 2025-05-30 RX ADMIN — SENNOSIDES 5 ML: 8.8 LIQUID ORAL at 21:28

## 2025-05-30 RX ADMIN — INSULIN GLARGINE 30 UNITS: 100 INJECTION, SOLUTION SUBCUTANEOUS at 21:27

## 2025-05-30 RX ADMIN — INSULIN LISPRO 12 UNITS: 100 INJECTION, SOLUTION INTRAVENOUS; SUBCUTANEOUS at 14:08

## 2025-05-30 RX ADMIN — METOPROLOL TARTRATE 5 MG: 5 INJECTION INTRAVENOUS at 23:22

## 2025-05-30 RX ADMIN — DULOXETINE 60 MG: 30 CAPSULE, DELAYED RELEASE ORAL at 09:26

## 2025-05-30 ASSESSMENT — PAIN SCALES - GENERAL
PAINLEVEL_OUTOF10: 0 - NO PAIN
PAINLEVEL_OUTOF10: 2

## 2025-05-30 ASSESSMENT — COGNITIVE AND FUNCTIONAL STATUS - GENERAL
PERSONAL GROOMING: TOTAL
STANDING UP FROM CHAIR USING ARMS: TOTAL
DRESSING REGULAR LOWER BODY CLOTHING: TOTAL
WALKING IN HOSPITAL ROOM: TOTAL
MOBILITY SCORE: 8
MOVING FROM LYING ON BACK TO SITTING ON SIDE OF FLAT BED WITH BEDRAILS: A LOT
DAILY ACTIVITIY SCORE: 7
TOILETING: TOTAL
TURNING FROM BACK TO SIDE WHILE IN FLAT BAD: A LOT
DRESSING REGULAR UPPER BODY CLOTHING: TOTAL
EATING MEALS: A LOT
HELP NEEDED FOR BATHING: TOTAL
MOVING TO AND FROM BED TO CHAIR: TOTAL
CLIMB 3 TO 5 STEPS WITH RAILING: TOTAL

## 2025-05-30 NOTE — PROGRESS NOTES
Patient:   NEERAJ SEGOVIA            MRN: [id number]            FIN: [id number]              Age:   73 years     Sex:  MALE     :  47   Associated Diagnoses:   None   Author:   ROBERTA, STEPHANI     Hospitalist Discharge Summary    DISCHARGE DIAGNOSIS:     - B/L leg rash , most likely fungal  : improving.   - Right ankle pain most likely due to Acute Gout  , S/p R ankle joint aspiration. Studies consistent with gout. Final results are negative for infection.  Improved R ankle pain  - SIRS - Sepsis due to Left foot cellulitis (Fever, Leukocytosis and source of inection)  : resolving  - OM of 5th left toe wasruled out with MRI   . However, pathology came back positive --- >  Acute OM of left 5th toe and post OP margins positive for OM. Pt requires 6 weeks of abx.  - Left 5th toe cellulitis/ulcer  . S/p amputation on 19. Wound vac removed on 19. Cx positive for pseudomonas susceptible to quinolones, Zosyn   - Leukocytosis POA : resolved    - S/p left cataract surgery 19   - Hypertensive heart diseaese  , at home on Carvedilol 12.5 mg tab  BID. Uncontrolled.  - Afib on long term AC with eliquis 2.5mg PO q12h renally dosed . rate controlled on BB  - Non specified CHF , chronic. Compensated    - Type 2 DM with renal manifestation CKD stage 3  , Controlled with acceptable A1c for his age : 7.5  % on 19. On insulin at home on:  Insulin glargine  18 unit 0.18 mL, Subcutaneous, Q Bedtime  Insulin human regular 100 unit/mL inj 3 mL BULK  20 unit 0.2 mL, Subcutaneous, TID [before meals]  insulin isophane (NPH)  20 unit 0.2 mL, Subcutaneous, Daily when taking prednisone 15mg PO QD for COPD  - COPD with no exacerbation  on chronic prednisone 15mg PO QD and home oxygen 2 lit  - Chronic Hypoxic respiratory failure.   - acute Kidney injury on CKD stage 3 ,resolved.       HOSPITAL COURSE:     HPI from admission     74 y/o M with AFIB on eliquis, hypertensive heart disease, insulin-dependent type 2  "Music Therapy Note    Therapy Session  Referral Type: New referral this admission  Visit Type: New visit  Session Start Time: 1119  Session End Time: 1129  Intervention Delivery: In-person  Conflict of Service: None  Number of family members present: 0  Number of staff members present: 0     Pre-assessment  Unable to Assess Reason: Outcomes not assessed  Mood/Affect: Calm  Verbalized Emotional State:  (\"in pain\")         Treatment/Interventions  Music Therapy Interventions: Assessment, Empathic listening/validating emotions  Interruption: No  Patient Fell Asleep at End of Session: No    Post-assessment  Total Session Time (min): 10 minutes    Narrative  Assessment Detail: Upon assessment pt reported being in pain and shared the nurse would be coming to the room soon. MT introduced self and services, offering music interventions to assist with pain managment. Pt engaged in discussion regarding music preferences, particularly blues music. 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 Pt became tearful when talking about the different genres he likes to play including rock and heavy metal. MT provided validation and a supportive presence for the pt. MT shared music channel resources for the pt. The pt thanked MT for the visit but declined music at this time. Pt was agreaable to a f/u.  Follow-up: MT will follow up with pt as able    Education Documentation  No documentation found.          " diabetes, CKD3, peripheral neuropathy, chronic systolic heart failure, COPD, dyslipidemia, CRISSY on nightly CPAP presents left leg pain. Patient apparently fell down the stairs on Saturday.  She did not seek any medical attention at that time.  He had a scheduled eye surgery today for his cataract.  He had that done earlier today.  He came in for further  evaluation of his foot after this.  Patient states that his foot has been hurting since the fall.  He does not remember hitting the foot during the fall.  Patient reports he tripped down his stairs when he fell.  He denies any lose of consciousness or palpitations or other precipitating factors for alcohol.  In the ER his vitals have been largely stable.  His labs showed sodium 129 creatinine 1.39 white blood cell count 12.0.  Blood sugars  elevated in the 300s.  Patient reports his blood sugars have been running higher lately.  He had x-rays of his foot done showing lateral dislocation of the middle and distal phalanx of the left fifth toe.  Patient was started on ertapenem for suspected infection of the foot.  Patient reports he has been having issues with his eyes for a long time prompting the surgery.  He also has COPD and has been having some more cough recently as well.   This seems to be getting better over the last couple of weeks.  He denies feeling acutely short of breath or other acute complaints.  Denies any chest pain.  No other falls other than the one on Saturday.  Denies any other acute complaints.  Patient had an unsuccessful attempt at reduction of the toe in the ER.  Orthopedics was consulted.    He had amputation of left 5th toe on 7/25/19. MRI of left foot was negative for OM. But pathology from amputation came back as foot margins positive for acute OM. Report reviewed with ID attending . Therefore, the pt needs to be n Zosyn IV x 6 weks based on cx results :    Anaer/Aerobe Cul/Smr - July 23, 2019 14:45 CDT - Contributor_system, MISYS   Collection Date/Time: 07- 14:45~SPECIMEN DESCRIPTION: DISCHARGE FOOT  GRAM SMEAR: MANY POLYMORPHONUCLEAR CELLS  GRAM SMEAR: RARE EPITHELIAL CELLS  GRAM SMEAR: MODERATE GRAM POSITIVE BACILLI  GRAM SMEAR: FEW GRAM POSITIVE COCCI IN CHAINS  CULTURE: FEW PSEUDOMONAS AERUGINOSA  CULTURE: MODERATE MIXED MERCEDES CONSISTING OF: 2 TYPE(S) OF GRAM NEGATIVE ORGANISM(S) 3 TYPE(S) OF GRAM POSITIVE ORGANISM(S) THIS SAMPLE HAS BEEN SCREENED FOR STAPHYLOCOCCUS AUREUS, GROUP A STREPTOCOCCUS, VANCOMYCIN RESISTANT ENTEROCOCCUS AND PSEUDOMONAS AERUGINOSA.  CULTURE:  IF PRESENT, IDENTIFICATION HAS BEEN PERFORMED AND SUSCEPTIBILITY TESTING  PERFORMED AND REPORTED IF APPLICABLE.  CULTURE: MODERATE MIXED ANAEROBIC MERCEDES No B. fragilis group or C. perfringens isolated.  REPORT STATUS: FINAL 07/29/2019    He develpwed R ankle gout while admitted, had aspiration by ortho which was positive for acute gout. However his pain improved with no intervention . Therefore, steroid joint injection was not recommended by ortho . NSAIDs or colcicine were not used due to his CKD.     Overall good progression over the course of admisison. His BP was uncotrolled and his home meds were adjusted :  carvedilool 12.5 bid PO to 25 mg bid PO. He was started on amlodipine 5,mg PO QD . Todya increaed to 10mg PO QD. Needs to continue to moitor BP at [facility]    PICC line on R arm placed on  8/1/19  by IR.         I & O between:  31-JUL-2019 10:00 TO 01-AUG-2019 10:00  Med Dosing Weight:  108.4  kg   23-JUL-2019  24 Hour Intake:   175.00  ( 1.61 mL/kg )  24 Hour Output:   1300.00           24 Hour Urine/Stool Output:   0.0  24 Hour Balance:   -1125.00           24 Hour Urine Output:   1300.00  ( 0.50 mL/kg/hr )                   Urine Count:  1     Vitals between:   31-JUL-2019 10:00:46   TO   01-AUG-2019 10:00:46                   LAST RESULT MINIMUM MAXIMUM  Temperature 35.5 35.5 36.8  Heart Rate 43 43 62  Respiratory Rate 16 16 16  NISBP           170 132  170  NIDBP           73 69 78  NIMBP           105 90 105  SpO2                    96 95 96        Medications (29) Active  Scheduled: (18)  Allopurinol 100 mg tab  150 mg 1.5 tab, Oral, Daily  AmLODIPine 5 mg tab  5 mg 1 tab, Oral, Q Evening  ApixaBAN 2.5 mg tab  2.5 mg 1 tab, Oral, Q12H  Carvedilol 25 mg tab  25 mg 1 tab, Oral, Q12H  Docusate sodium 100 mg cap  100 mg 1 cap, Oral, Q12H  Fluticasone-vilanterol 200-25 mcg inhalation powder 14s  1 puff, Inhaled, Daily  Furosemide 40 mg tab  40 mg 1 tab, Oral, Daily  insulin glargine  18 unit 0.18 mL, Subcutaneous, Q Bedtime  Insulin human lispro 100 unit/mL inj 3 mL BULK  1-12 units, Subcutaneous, QID [after meals & HS]  Insulin human regular 100 unit/mL inj 3 mL BULK  20 unit 0.2 mL, Subcutaneous, TID [before meals]  insulin isophane (NPH)  20 unit 0.2 mL, Subcutaneous, Daily  Ketorolac 0.5% ophth soln 5 mL  2 drop, Left Eye, Q Bedtime  piperacillin-tazobactam  3.375 gm, IVPB, Q8H (variable)  Polyethylene glycol 3350 oral recon powder 17 gm packet UD  17 gm 1 packet, Oral, Daily  PredniSOLONE acetate 1% ophth susp 5 mL BULK  1 drop, Left Eye, BID  PredniSONE 5 mg tab  15 mg 3 tab, Oral, Daily  Simvastatin 20 mg tab  20 mg 1 tab, Oral, Q Bedtime  Sodium chloride PF 0.9% flush inj 10 mL  10 mL, Flush, Q12H  Continuous: (0)  PRN: (11)  Acetaminophen 325 mg tab  650 mg 2 tab, Oral, Q4H  Dextrose (glucose) 40% 15 gm/37.5 gm oral gel UD  15 gm, Oral, As Directed PRN  Dextrose (glucose) 50% 25 gm/50 mL syringe  12.5 gm 25 mL, IV Push, As Directed PRN  DiphenhydrAMINE 25 mg cap  25 mg 1 cap, Oral, Q6H  Glucagon 1 mg/1 mL emergency kit SDV  1 mg 1 mL, IM, As Directed PRN  Hydrocodone-acetaminophen 5-325 mg tab  1 tab, Oral, Q4H  Hydrocodone-acetaminophen 7.5-325 mg tab  1 tab, Oral, Q4H  Melatonin 3 mg tab  3 mg 1 tab, Oral, Q Bedtime  Morphine 10 mg/5 mL oral liquid UD  2.5 mg 1.25 mL, Oral, Q4H  Sodium chloride PF 0.9% flush inj 10 mL  10 mL, Flush, As Directed PRN  Sodium  chloride PF 0.9% flush inj 10 mL  20 mL, Flush, As Directed PRN        GENERAL:  In no apparent distress.  CHEST:  Chest with clear breath sounds bilaterally.  No wheezes, rales, or rhonchi.  CARDIAC:  Regular rate and rhythm.  S1 and S2, without murmurs, gallops, or rubs.  VASCULAR:  No Edema.  Peripheral pulses normal and equal in all extremities.  ABDOMEN:  (+) BS,  Soft, without detectable tenderness.  No sign of distention.  No   rebound or guarding, and no masses palpated.       MUSCULOSKELETAL:  Left foot:  s/p 5th toe amputation, dressing in place. No bleeding. No discharge. Distal cap refill < 2 seconds.        Right ankle: No edema, no redness, no tenderness. .     Left eye:   No discharge.     PICC line - Right arm, no edema, no bleeding.       SKIN :  Distal aspect of legs with erythematous rash , active border and dry skin. No ulcers, no tenderness, no discharge.     Labs reviewed:         Labs between:  31-JUL-2019 10:01 to 01-AUG-2019 10:01    CBC:                 WBC  HgB  Hct  Plt  MCV  RDW   01-AUG-2019 9.0  (L) 12.6  41.5  222  96.3  14.6     DIFF:                 Seg  Neutroph//ABS  Lymph//ABS  Mono//ABS  EOS/ABS  01-AUG-2019 83  // 7.5 // 1.0 // 0.5 // 0.1    BMP:                 Na  Cl  BUN  Glu   01-AUG-2019 141  107  (H) 34  (H) 183                              K  CO2  Cr  Ca                              3.6  27  (H) 1.33  9.5     Other Chem:             Mg  Phos  Triglycerides  GGTP  DirectBili                                        POC GLU:                 Latest Result  Latest Date  Minimum  Min Date  Maximum  Max Date                             (H) 164  01-AUG-2019 (H) 164  01-AUG-2019 (H) 212  31-JUL-2019                         Radiology results:             Result Type: MRI FOOT LT WO CON  Result Date: July 24, 2019 23:18 CDT  Result Status: Auth (Verified)  Result Title: MRI FOOT LT WO CON  Performed By: ADRIAN TURNER on July 25, 2019 07:59 CDT  Verified By: ADRIAN TURNER  on July 25, 2019 08:09 CDT  Encounter info: [id number], [facility], Inpatient, 07/23/19 -     * Final Report *    Reason For Exam  r/o OM    RADRPT  EXAM: MRI FOOT LT WO CON    CLINICAL INDICATION: Pain and swelling    COMPARISON: Radiographs 07/23/2019    FINDINGS: As shown on the radiographs there is dislocation of the proximal interphalangeal joint of the 5th toe with the middle phalanx displaced laterally and superiorly.  No fracture is seen.     There is no bone marrow edema to suggest osteomyelitis.    There is dorsal edema in the subcutaneous fat.  No abscess is noted.     There are degenerative changes in the 1st metatarsophalangeal joint.  There is a large osteophyte arising from the 1st metatarsal head and extending along the dorsal surface of the joint.  It measures 2 cm.    IMPRESSION:  1.  Dislocation of the proximal interphalangeal joint of the 5th toe  2.  Degenerative changes in the 1st metatarsophalangeal joint with large, 2 cm, dorsal osteophyte  3.  Edema in the foot may represent contusion and/or cellulitis.  There are no signs of osteomyelitis.    Signature Line  -----  F I N A L  -----    Transcribed By: MALIKA   07/25/19 7:59 am    Dictated By:            ADRIAN TURNER    Electronically Reviewed and Approved By:           ADRIAN TURNER  07/25/19 8:09 am    Echocardiogram Results    No Results Have Been Found    PROCEDURE HISTORY:  No qualifying data available.    Pending results: _    DISCHARGE MEDICATION LIST   Allergies: No Known Medication Allergies     MEDICATION  DOSE  ROUTE  FREQUENCY  SPECIAL INSTRUCTIONS   acetaminophen (acetaminophen oral 325 mg tablet (Tylenol))  650 mg=2 tab  Oral  Every 4 hours As Needed: pain mild    acetaminophen-hydrocodone (Norco oral 325-5 mg tablet)  1 tab  Oral  Every 4 hours As Needed: pain moderate    acetaminophen-hydrocodone (Norco oral 325-7.5 mg tablet)  1 tab  Oral  Every 4 hours As Needed: pain severe    allopurinol (allopurinol oral 100 mg tablet)   150 mg=1.5 tab  Oral  Daily    amLODIPine (amLODIPine oral 10 mg tablet)  10 mg=1 tab  Oral  Daily    apixaBAN (Eliquis oral 2.5 mg tablet)  2.5 mg=1 tab  Oral  Twice daily    carvedilol (carvedilol oral 25 mg tablet)  25 mg=1 tab  Oral  Every 12 hours    clotrimazole topical (clotrimazole topical 1% cream)  1 application  Topical  Twice daily, after morning and midday meals    docusate (Colace (sodium) oral 100 mg capsule)  100 mg=1 cap  Oral  Every 12 hours    emollients, topical (Lubriderm topical lotion)  1 application  Topical  Nightly at bedtime As Needed: dry skin  - Apply to distal aspect of both legs qhs  fluticasone-vilanterol (Breo Ellipta inhaler oral 200-25 mcg/puff powder)  1 puff  Inhaled  Daily    furosemide (furosemide oral 40 mg tablet)  40 mg=1 tab  Oral  Daily    insulin glargine (Lantus (insulin glargine) subcutaneous injection 100 unit/mL)  18 unit  Subcutaneous  Nightly at bedtime    insulin isophane (NPH) (HumuLIN N (insulin isophane) 100 units/mL subcutaneous suspension)  20 unit  Subcutaneous  Daily  - WITH THE PREDNISONE DOSE ONLY. DO NOT INJECT I NOT TAKING PREDNISONE  insulin regular (HumuLIN R / NovoLIN R (insulin regular) injection 100 unit/mL)  20 unit  Subcutaneous  Three times daily, before meals  - before each meal  ketorolac ophthalmic (ketorolac ophthalmic 0.5% solution)  1 drop  Each Eye  Nightly at bedtime As Needed: for itchingFor 3 days    piperacillin-tazobactam  3.375 gm  IVPB  Every 8 hours     polyethylene glycol 3350 (MiraLax oral powder for solution)  17 gm  Oral  DailyFor 12 days    potassium CHLORIDE (potassium CHLORIDE 10 mEq oral capsule, extended release)  10 mEq=1 cap  Oral  Daily    predniSOLONE ophthalmic (predniSOLONE acetate 1% preservative-free ophthalmic suspension)  1 drop  Left Eye  Twice dailyFor 3 days    predniSONE (predniSONE oral 5 mg tablet)  15 mg=3 tab  Oral  Daily    simvastatin (simvastatin oral 20 mg tablet)  20 mg=1 tab  Oral  Nightly at  bedtime           Primary Care Physician      Physician Name:  RAYNA ROWLAND  Specialty :  NONE, notified via PS     Consulting Physicians     Physician Name:  TRISTAN MAYA Speciality:  INFECTIOUS DISEASE Consult Reason:  cellulitis, possible osteo     Follow-up instructions:      - Medically clear for DC to SNF. Rehab consulted -- > not a candidate for in patient rehab  - Meds as prescribed  - Cardiac diabetic diet   - Activity as tolerated   - PT at SNF   - Ortho recs  : Follow up in 1 week with NAKUL Yu (Ortho). No Weight bearing on left LE x 2 weeks.     - ID recs  for DC :  - zosyn 4.5g IV q8h through 9-3  - cbc, bmp, esr, crp q Monday; fax results to [phone]  - f/u with Dr Ansari on 9-3; call [phone] for appointment    I spent 45 minutes completing this patient's discharge.

## 2025-05-30 NOTE — SIGNIFICANT EVENT
ABG ran. No critical values. FiO2 increased from 3L to 5L. Dr. Arnold aware. No further action at this time.

## 2025-05-30 NOTE — CARE PLAN
The patient's goals for the shift include breathe better.    The clinical goals for the shift include Pt will note increased resp status    Over the shift, the patient did make progress. Breathing effort decreasing and pt able to tolerate lying flat for repositionings. VS stable. Skin remains in tact.

## 2025-05-30 NOTE — CARE PLAN
The patient's goals for the shift include Getting rest     The clinical goals for the shift include Pt will remain HDS throughout shift    Over the shift, the patient is making progress toward the following goals.    Problem: Diabetes  Goal: Achieve decreasing blood glucose levels by end of shift  Outcome: Progressing  Goal: Increase stability of blood glucose readings by end of shift  Outcome: Progressing  Goal: Decrease in ketones present in urine by end of shift  Outcome: Progressing  Goal: Maintain electrolyte levels within acceptable range throughout shift  Outcome: Progressing  Goal: Maintain glucose levels >70mg/dl to <250mg/dl throughout shift  Outcome: Progressing  Goal: No changes in neurological exam by end of shift  Outcome: Progressing  Goal: Learn about and adhere to nutrition recommendations by end of shift  Outcome: Progressing  Goal: Vital signs within normal range for age by end of shift  Outcome: Progressing  Goal: Increase self care and/or family involovement by end of shift  Outcome: Progressing  Goal: Receive DSME education by end of shift  Outcome: Progressing     Problem: Pain  Goal: Takes deep breaths with improved pain control throughout the shift  Outcome: Progressing  Goal: Turns in bed with improved pain control throughout the shift  Outcome: Progressing  Goal: Walks with improved pain control throughout the shift  Outcome: Progressing  Goal: Performs ADL's with improved pain control throughout shift  Outcome: Progressing  Goal: Participates in PT with improved pain control throughout the shift  Outcome: Progressing  Goal: Free from opioid side effects throughout the shift  Outcome: Progressing  Goal: Free from acute confusion related to pain meds throughout the shift  Outcome: Progressing     Problem: Fall/Injury  Goal: Not fall by end of shift  Outcome: Progressing  Goal: Be free from injury by end of the shift  Outcome: Progressing  Goal: Verbalize understanding of personal risk  factors for fall in the hospital  Outcome: Progressing  Goal: Verbalize understanding of risk factor reduction measures to prevent injury from fall in the home  Outcome: Progressing  Goal: Use assistive devices by end of the shift  Outcome: Progressing  Goal: Pace activities to prevent fatigue by end of the shift  Outcome: Progressing     Problem: Pain - Adult  Goal: Verbalizes/displays adequate comfort level or baseline comfort level  Outcome: Progressing     Problem: Safety - Adult  Goal: Free from fall injury  Outcome: Progressing     Problem: Discharge Planning  Goal: Discharge to home or other facility with appropriate resources  Outcome: Progressing     Problem: Chronic Conditions and Co-morbidities  Goal: Patient's chronic conditions and co-morbidity symptoms are monitored and maintained or improved  Outcome: Progressing     Problem: Nutrition  Goal: Nutrient intake appropriate for maintaining nutritional needs  Outcome: Progressing     Problem: Skin  Goal: Decreased wound size/increased tissue granulation at next dressing change  Outcome: Progressing  Flowsheets (Taken 5/30/2025 0054)  Decreased wound size/increased tissue granulation at next dressing change:   Protective dressings over bony prominences   Utilize specialty bed per algorithm  Goal: Participates in plan/prevention/treatment measures  Outcome: Progressing  Flowsheets (Taken 5/30/2025 0054)  Participates in plan/prevention/treatment measures:   Elevate heels   Increase activity/out of bed for meals  Goal: Prevent/manage excess moisture  Outcome: Progressing  Flowsheets (Taken 5/30/2025 0054)  Prevent/manage excess moisture:   Cleanse incontinence/protect with barrier cream   Follow provider orders for dressing changes   Moisturize dry skin   Monitor for/manage infection if present   Use wicking fabric (obtain order)  Goal: Prevent/minimize sheer/friction injuries  Outcome: Progressing  Flowsheets (Taken 5/30/2025 0054)  Prevent/minimize  sheer/friction injuries:   HOB 30 degrees or less   Turn/reposition every 2 hours/use positioning/transfer devices   Use pull sheet  Goal: Promote/optimize nutrition  Outcome: Progressing  Flowsheets (Taken 5/30/2025 0054)  Promote/optimize nutrition:   Assist with feeding   Offer water/supplements/favorite foods  Goal: Promote skin healing  Outcome: Progressing     Problem: Safety - Medical Restraint  Goal: Remains free of injury from restraints (Restraint for Interference with Medical Device)  Outcome: Progressing  Flowsheets (Taken 5/30/2025 0054)  Remains free of injury from restraints (restraint for interference with medical device):   Determine that other, less restrictive measures have been tried or would not be effective before applying the restraint   Evaluate the patient's condition at the time of restraint application   Inform patient/family regarding the reason for restraint  Goal: Free from restraint(s) (Restraint for Interference with Medical Device)  Outcome: Progressing  Flowsheets (Taken 5/30/2025 0054)  Free from restraint(s) (restraint for interference with medical device): Identify and implement measures to help patient regain control

## 2025-05-30 NOTE — PROGRESS NOTES
Occupational Therapy                 Therapy Communication Note    Patient Name: Jai Shrestha  MRN: [id number]  Department: [facility] [address]  Room: [address] [place] [address]  Today's Date: 5/30/2025     Discipline: Occupational Therapy    Missed Visit: OT Missed Visit: Yes     Missed Visit Reason: Missed Visit Reason: Patient placed on medical hold (Pt on bedrest, will continue to monitor and assess.)    Missed Time: Attempt

## 2025-05-30 NOTE — PROGRESS NOTES
Physical Therapy                 Therapy Communication Note    Patient Name: Jai Shrestha  MRN: [id number]  Department: [facility] [place] [address]  Room: [address] [place] [address]  Today's Date: 5/30/2025     Discipline: Physical Therapy    Missed Visit: PT Missed Visit: Yes     Missed Visit Reason: Pt order received, chart reviewed. Pt is on bedrest.  Will hold for now and attempt again later as appropriate.    Missed Time: Attempt

## 2025-05-30 NOTE — PROGRESS NOTES
"Jai Shrestha is a 55 y.o. male on day 14 of admission presenting with Pneumonia due to infectious organism, unspecified laterality, unspecified part of lung.    Subjective   On oxygen, states having significant pain in his lower back.  No fevers or chills.  No white count off antibiotics.       Objective     Physical Exam    Last Recorded Vitals  Blood pressure (!) 183/94, pulse 98, temperature 36.5 °C (97.7 °F), temperature source Temporal, resp. rate 19, height 1.803 m (5' 10.98\"), weight (!) 182 kg (400 lb 2.2 oz), SpO2 95%.  Intake/Output last 3 Shifts:  I/O last 3 completed shifts:  In: 1971.3 (10.9 mL/kg) [P.O.:500; I.V.:1471.3 (8.1 mL/kg)]  Out: 5905 (32.5 mL/kg) [Urine:5905 (0.9 mL/kg/hr)]  Weight: 181.5 kg     Relevant Results  Results for orders placed or performed during the hospital encounter of 05/16/25 (from the past 24 hours)   POCT GLUCOSE   Result Value Ref Range    POCT Glucose 253 (H) 74 - 99 mg/dL   POCT GLUCOSE   Result Value Ref Range    POCT Glucose 247 (H) 74 - 99 mg/dL   POCT GLUCOSE   Result Value Ref Range    POCT Glucose 228 (H) 74 - 99 mg/dL   CBC and Auto Differential   Result Value Ref Range    WBC 7.9 4.4 - 11.3 x10*3/uL    nRBC 0.0 0.0 - 0.0 /100 WBCs    RBC 4.48 (L) 4.50 - 5.90 x10*6/uL    Hemoglobin 10.0 (L) 13.5 - 17.5 g/dL    Hematocrit 36.5 (L) 41.0 - 52.0 %    MCV 82 80 - 100 fL    MCH 22.3 (L) 26.0 - 34.0 pg    MCHC 27.4 (L) 32.0 - 36.0 g/dL    RDW 16.1 (H) 11.5 - 14.5 %    Platelets 262 150 - 450 x10*3/uL    Neutrophils % 76.7 40.0 - 80.0 %    Immature Granulocytes %, Automated 0.8 0.0 - 0.9 %    Lymphocytes % 11.7 13.0 - 44.0 %    Monocytes % 6.9 2.0 - 10.0 %    Eosinophils % 3.3 0.0 - 6.0 %    Basophils % 0.6 0.0 - 2.0 %    Neutrophils Absolute 6.08 1.20 - 7.70 x10*3/uL    Immature Granulocytes Absolute, Automated 0.06 0.00 - 0.70 x10*3/uL    Lymphocytes Absolute 0.93 (L) 1.20 - 4.80 x10*3/uL    Monocytes Absolute 0.55 0.10 - 1.00 x10*3/uL    Eosinophils Absolute 0.26 0.00 " - 0.70 x10*3/uL    Basophils Absolute 0.05 0.00 - 0.10 x10*3/uL   POCT GLUCOSE   Result Value Ref Range    POCT Glucose 223 (H) 74 - 99 mg/dL   POCT GLUCOSE   Result Value Ref Range    POCT Glucose 303 (H) 74 - 99 mg/dL       Imaging Results  Cxr:        IMPRESSION:  Ongoing CHF with mild interval worsening.      Tubes and lines in place as described.    Medications:  Scheduled Medications[1]   PRN Medications[2]     Assessment/Plan   55 y.o. male with a PMH of super obese, back pain on chronic opioids (Norco 7.5/325 3-4x per day), peripheral neuropathy, HTN, HLD, HFpEF, obesity-hypoventilation syndrome, T2DM on insulin, CKD, GERD admitted to the stepdown unit on 5/16 for acute on chronic diastolic CHF and pneumonia.  Transferred to the ICU on 5/23 following cardiac arrest - initially PEA then asystole - due to a respiratory event.  Patient intubated.  ROSC achieved after ~5 min.     #Cardiac arrest (PEA then asystole) with ROSC  #Acute hypoxic ischemia encephalopathy  #A fibb, new onset  #Back pain on chronic opioids  #Acute on chronic diastolic CHF  #Hypertension, Hyperlipidemia  #Acute hypoxemic respiratory failure  #Obesity Hypoventilation Syndrome  #T2DM on long-term insulin  #Bilateral pneumonia     -extubated on nc oxygen  - Continue home ASA, atorvastatin, and amlodipine.  Holding home lisinopril.  - Continue gentle diuresis with Lasix 40 mg BID as ordered.  - Advance tube feeds (Glucerna).  Dietician consulted.  - Continue current insulin regimen of Lantus 30 units (half his home dose) and sliding scale lispro.  - Seen by ID this admission.  Completed Zosynfor 10-day       DVT Prophylaxis:  Lovenox subq        Yoli Crow MD  Hospital Medicine      Accidentally addended the note. Made no changes.   Ronan [place] MD         [1] amLODIPine, 5 mg, oral, Daily  aspirin, 81 mg, oral, Daily  atorvastatin, 80 mg, oral, Nightly  colchicine, 0.6 mg, oral, Daily  docusate sodium, 100 mg, oral,  BID  DULoxetine, 60 mg, oral, Daily  enoxaparin, 60 mg, subcutaneous, q12h TAMIR  ergocalciferol, 1.25 mg, oral, Weekly  ferrous sulfate, 1 tablet, oral, Every Mon/Wed/Fri  furosemide, 40 mg, intravenous, BID  insulin glargine, 30 Units, subcutaneous, BID  insulin lispro, 0-15 Units, subcutaneous, q4h  ipratropium-albuteroL, 3 mL, nebulization, TID  lisinopril, 5 mg, oral, Daily  lubricating eye drops, 1 drop, Both Eyes, Daily  pantoprazole, 40 mg, oral, Daily before breakfast  polyethylene glycol, 17 g, oral, Daily  QUEtiapine, 100 mg, oral, Nightly  senna, 5 mL, oral, BID    [2] PRN medications: acetaminophen, dextrose, dextrose, glucagon, glucagon, haloperidol lactate, ipratropium-albuteroL, lactulose, naloxone, ondansetron **OR** ondansetron, oxygen

## 2025-05-31 LAB
ANION GAP SERPL CALC-SCNC: 11 MMOL/L (ref 10–20)
BASOPHILS # BLD AUTO: 0.07 X10*3/UL (ref 0–0.1)
BASOPHILS NFR BLD AUTO: 0.7 %
BUN SERPL-MCNC: 31 MG/DL (ref 6–23)
CALCIUM SERPL-MCNC: 8.8 MG/DL (ref 8.6–10.3)
CHLORIDE SERPL-SCNC: 102 MMOL/L (ref 98–107)
CO2 SERPL-SCNC: 34 MMOL/L (ref 21–32)
CREAT SERPL-MCNC: 1.3 MG/DL (ref 0.5–1.3)
EGFRCR SERPLBLD CKD-EPI 2021: 65 ML/MIN/1.73M*2
EOSINOPHIL # BLD AUTO: 0.28 X10*3/UL (ref 0–0.7)
EOSINOPHIL NFR BLD AUTO: 2.6 %
ERYTHROCYTE [DISTWIDTH] IN BLOOD BY AUTOMATED COUNT: 16.1 % (ref 11.5–14.5)
GLUCOSE BLD MANUAL STRIP-MCNC: 149 MG/DL (ref 74–99)
GLUCOSE BLD MANUAL STRIP-MCNC: 150 MG/DL (ref 74–99)
GLUCOSE BLD MANUAL STRIP-MCNC: 186 MG/DL (ref 74–99)
GLUCOSE BLD MANUAL STRIP-MCNC: 221 MG/DL (ref 74–99)
GLUCOSE BLD MANUAL STRIP-MCNC: 234 MG/DL (ref 74–99)
GLUCOSE BLD MANUAL STRIP-MCNC: 245 MG/DL (ref 74–99)
GLUCOSE BLD MANUAL STRIP-MCNC: 318 MG/DL (ref 74–99)
GLUCOSE SERPL-MCNC: 235 MG/DL (ref 74–99)
HCT VFR BLD AUTO: 38.9 % (ref 41–52)
HGB BLD-MCNC: 10.8 G/DL (ref 13.5–17.5)
IMM GRANULOCYTES # BLD AUTO: 0.07 X10*3/UL (ref 0–0.7)
IMM GRANULOCYTES NFR BLD AUTO: 0.7 % (ref 0–0.9)
LYMPHOCYTES # BLD AUTO: 1.28 X10*3/UL (ref 1.2–4.8)
LYMPHOCYTES NFR BLD AUTO: 12.1 %
MAGNESIUM SERPL-MCNC: 2.01 MG/DL (ref 1.6–2.4)
MCH RBC QN AUTO: 22.5 PG (ref 26–34)
MCHC RBC AUTO-ENTMCNC: 27.8 G/DL (ref 32–36)
MCV RBC AUTO: 81 FL (ref 80–100)
MONOCYTES # BLD AUTO: 0.91 X10*3/UL (ref 0.1–1)
MONOCYTES NFR BLD AUTO: 8.6 %
NEUTROPHILS # BLD AUTO: 7.97 X10*3/UL (ref 1.2–7.7)
NEUTROPHILS NFR BLD AUTO: 75.3 %
NRBC BLD-RTO: 0 /100 WBCS (ref 0–0)
PHOSPHATE SERPL-MCNC: 3.5 MG/DL (ref 2.5–4.9)
PLATELET # BLD AUTO: 331 X10*3/UL (ref 150–450)
POTASSIUM SERPL-SCNC: 3.8 MMOL/L (ref 3.5–5.3)
RBC # BLD AUTO: 4.8 X10*6/UL (ref 4.5–5.9)
SODIUM SERPL-SCNC: 143 MMOL/L (ref 136–145)
T4 FREE SERPL-MCNC: 0.91 NG/DL (ref 0.61–1.12)
TSH SERPL-ACNC: 4.38 MIU/L (ref 0.44–3.98)
WBC # BLD AUTO: 10.6 X10*3/UL (ref 4.4–11.3)

## 2025-05-31 PROCEDURE — 99233 SBSQ HOSP IP/OBS HIGH 50: CPT | Performed by: INTERNAL MEDICINE

## 2025-05-31 PROCEDURE — 83735 ASSAY OF MAGNESIUM: CPT | Performed by: NURSE PRACTITIONER

## 2025-05-31 PROCEDURE — 2500000004 HC RX 250 GENERAL PHARMACY W/ HCPCS (ALT 636 FOR OP/ED)

## 2025-05-31 PROCEDURE — 2500000005 HC RX 250 GENERAL PHARMACY W/O HCPCS

## 2025-05-31 PROCEDURE — 2500000004 HC RX 250 GENERAL PHARMACY W/ HCPCS (ALT 636 FOR OP/ED): Mod: JZ

## 2025-05-31 PROCEDURE — 2500000001 HC RX 250 WO HCPCS SELF ADMINISTERED DRUGS (ALT 637 FOR MEDICARE OP)

## 2025-05-31 PROCEDURE — 82947 ASSAY GLUCOSE BLOOD QUANT: CPT

## 2025-05-31 PROCEDURE — 99223 1ST HOSP IP/OBS HIGH 75: CPT | Performed by: INTERNAL MEDICINE

## 2025-05-31 PROCEDURE — 2500000002 HC RX 250 W HCPCS SELF ADMINISTERED DRUGS (ALT 637 FOR MEDICARE OP, ALT 636 FOR OP/ED)

## 2025-05-31 PROCEDURE — 85025 COMPLETE CBC W/AUTO DIFF WBC: CPT

## 2025-05-31 PROCEDURE — 2060000001 HC INTERMEDIATE ICU ROOM DAILY

## 2025-05-31 PROCEDURE — 84100 ASSAY OF PHOSPHORUS: CPT | Performed by: NURSE PRACTITIONER

## 2025-05-31 PROCEDURE — 94640 AIRWAY INHALATION TREATMENT: CPT

## 2025-05-31 PROCEDURE — 84439 ASSAY OF FREE THYROXINE: CPT | Performed by: NURSE PRACTITIONER

## 2025-05-31 PROCEDURE — 2500000001 HC RX 250 WO HCPCS SELF ADMINISTERED DRUGS (ALT 637 FOR MEDICARE OP): Performed by: NURSE PRACTITIONER

## 2025-05-31 PROCEDURE — 2500000004 HC RX 250 GENERAL PHARMACY W/ HCPCS (ALT 636 FOR OP/ED): Mod: JZ | Performed by: ANESTHESIOLOGY

## 2025-05-31 PROCEDURE — 9420000001 HC RT PATIENT EDUCATION 5 MIN

## 2025-05-31 PROCEDURE — 84443 ASSAY THYROID STIM HORMONE: CPT | Performed by: NURSE PRACTITIONER

## 2025-05-31 PROCEDURE — 80048 BASIC METABOLIC PNL TOTAL CA: CPT | Performed by: NURSE PRACTITIONER

## 2025-05-31 RX ORDER — DIGOXIN 0.25 MG/ML
INJECTION INTRAMUSCULAR; INTRAVENOUS
Status: COMPLETED
Start: 2025-05-31 | End: 2025-05-31

## 2025-05-31 RX ORDER — METOPROLOL TARTRATE 1 MG/ML
5 INJECTION, SOLUTION INTRAVENOUS ONCE
Status: COMPLETED | OUTPATIENT
Start: 2025-05-31 | End: 2025-05-31

## 2025-05-31 RX ORDER — DIGOXIN 0.25 MG/ML
250 INJECTION INTRAMUSCULAR; INTRAVENOUS ONCE
Status: COMPLETED | OUTPATIENT
Start: 2025-05-31 | End: 2025-05-31

## 2025-05-31 RX ORDER — DIGOXIN 0.25 MG/ML
250 INJECTION INTRAMUSCULAR; INTRAVENOUS ONCE
Status: DISCONTINUED | OUTPATIENT
Start: 2025-05-31 | End: 2025-05-31

## 2025-05-31 RX ORDER — METOPROLOL TARTRATE 25 MG/1
25 TABLET, FILM COATED ORAL 2 TIMES DAILY
Status: DISCONTINUED | OUTPATIENT
Start: 2025-05-31 | End: 2025-06-05 | Stop reason: HOSPADM

## 2025-05-31 RX ORDER — DIGOXIN 0.25 MG/ML
500 INJECTION INTRAMUSCULAR; INTRAVENOUS ONCE
Status: COMPLETED | OUTPATIENT
Start: 2025-05-31 | End: 2025-05-31

## 2025-05-31 RX ADMIN — INSULIN LISPRO 6 UNITS: 100 INJECTION, SOLUTION INTRAVENOUS; SUBCUTANEOUS at 16:00

## 2025-05-31 RX ADMIN — IPRATROPIUM BROMIDE AND ALBUTEROL SULFATE 3 ML: 2.5; .5 SOLUTION RESPIRATORY (INHALATION) at 15:37

## 2025-05-31 RX ADMIN — ATORVASTATIN CALCIUM 80 MG: 80 TABLET, FILM COATED ORAL at 20:41

## 2025-05-31 RX ADMIN — ASPIRIN 81 MG CHEWABLE TABLET 81 MG: 81 TABLET CHEWABLE at 10:23

## 2025-05-31 RX ADMIN — INSULIN LISPRO 6 UNITS: 100 INJECTION, SOLUTION INTRAVENOUS; SUBCUTANEOUS at 01:38

## 2025-05-31 RX ADMIN — QUETIAPINE FUMARATE 100 MG: 100 TABLET ORAL at 20:42

## 2025-05-31 RX ADMIN — INSULIN LISPRO 12 UNITS: 100 INJECTION, SOLUTION INTRAVENOUS; SUBCUTANEOUS at 13:13

## 2025-05-31 RX ADMIN — INSULIN GLARGINE 30 UNITS: 100 INJECTION, SOLUTION SUBCUTANEOUS at 20:41

## 2025-05-31 RX ADMIN — FUROSEMIDE 40 MG: 10 INJECTION, SOLUTION INTRAMUSCULAR; INTRAVENOUS at 20:41

## 2025-05-31 RX ADMIN — APIXABAN 5 MG: 5 TABLET, FILM COATED ORAL at 15:32

## 2025-05-31 RX ADMIN — ACETAMINOPHEN 650 MG: 325 TABLET, FILM COATED ORAL at 15:32

## 2025-05-31 RX ADMIN — INSULIN GLARGINE 30 UNITS: 100 INJECTION, SOLUTION SUBCUTANEOUS at 10:23

## 2025-05-31 RX ADMIN — IPRATROPIUM BROMIDE AND ALBUTEROL SULFATE 3 ML: 2.5; .5 SOLUTION RESPIRATORY (INHALATION) at 19:59

## 2025-05-31 RX ADMIN — METOPROLOL TARTRATE 25 MG: 25 TABLET, FILM COATED ORAL at 20:42

## 2025-05-31 RX ADMIN — DIGOXIN 250 MCG: 0.25 INJECTION INTRAMUSCULAR; INTRAVENOUS at 15:33

## 2025-05-31 RX ADMIN — DULOXETINE 60 MG: 30 CAPSULE, DELAYED RELEASE ORAL at 10:23

## 2025-05-31 RX ADMIN — FUROSEMIDE 40 MG: 10 INJECTION, SOLUTION INTRAMUSCULAR; INTRAVENOUS at 10:22

## 2025-05-31 RX ADMIN — DIGOXIN 500 MCG: 250 INJECTION, SOLUTION INTRAMUSCULAR; INTRAVENOUS; PARENTERAL at 01:26

## 2025-05-31 RX ADMIN — INSULIN LISPRO 6 UNITS: 100 INJECTION, SOLUTION INTRAVENOUS; SUBCUTANEOUS at 22:45

## 2025-05-31 RX ADMIN — COLCHICINE 0.6 MG: 0.6 TABLET, FILM COATED ORAL at 10:24

## 2025-05-31 RX ADMIN — DIGOXIN 250 MCG: 250 INJECTION, SOLUTION INTRAMUSCULAR; INTRAVENOUS; PARENTERAL at 10:22

## 2025-05-31 RX ADMIN — METOPROLOL TARTRATE 25 MG: 25 TABLET, FILM COATED ORAL at 15:32

## 2025-05-31 RX ADMIN — ENOXAPARIN SODIUM 60 MG: 60 INJECTION SUBCUTANEOUS at 10:23

## 2025-05-31 RX ADMIN — LISINOPRIL 5 MG: 5 TABLET ORAL at 10:24

## 2025-05-31 RX ADMIN — POLYETHYLENE GLYCOL 3350 17 G: 17 POWDER, FOR SOLUTION ORAL at 10:24

## 2025-05-31 RX ADMIN — CARBOXYMETHYLCELLULOSE SODIUM 1 DROP: 0.5 SOLUTION/ DROPS OPHTHALMIC at 10:23

## 2025-05-31 RX ADMIN — AMLODIPINE BESYLATE 5 MG: 5 TABLET ORAL at 10:23

## 2025-05-31 RX ADMIN — DIGOXIN 250 MCG: 250 INJECTION, SOLUTION INTRAMUSCULAR; INTRAVENOUS; PARENTERAL at 15:33

## 2025-05-31 RX ADMIN — METOPROLOL TARTRATE 5 MG: 5 INJECTION INTRAVENOUS at 01:19

## 2025-05-31 RX ADMIN — IPRATROPIUM BROMIDE AND ALBUTEROL SULFATE 3 ML: 2.5; .5 SOLUTION RESPIRATORY (INHALATION) at 07:20

## 2025-05-31 ASSESSMENT — COGNITIVE AND FUNCTIONAL STATUS - GENERAL
TURNING FROM BACK TO SIDE WHILE IN FLAT BAD: A LOT
MOVING TO AND FROM BED TO CHAIR: TOTAL
TOILETING: TOTAL
CLIMB 3 TO 5 STEPS WITH RAILING: TOTAL
MOVING FROM LYING ON BACK TO SITTING ON SIDE OF FLAT BED WITH BEDRAILS: A LOT
EATING MEALS: A LITTLE
PERSONAL GROOMING: TOTAL
DAILY ACTIVITIY SCORE: 8
WALKING IN HOSPITAL ROOM: TOTAL
MOVING TO AND FROM BED TO CHAIR: TOTAL
HELP NEEDED FOR BATHING: TOTAL
PERSONAL GROOMING: TOTAL
STANDING UP FROM CHAIR USING ARMS: TOTAL
TURNING FROM BACK TO SIDE WHILE IN FLAT BAD: A LOT
DRESSING REGULAR LOWER BODY CLOTHING: TOTAL
CLIMB 3 TO 5 STEPS WITH RAILING: TOTAL
TOILETING: TOTAL
DAILY ACTIVITIY SCORE: 8
MOVING FROM LYING ON BACK TO SITTING ON SIDE OF FLAT BED WITH BEDRAILS: A LOT
DRESSING REGULAR UPPER BODY CLOTHING: TOTAL
DRESSING REGULAR UPPER BODY CLOTHING: TOTAL
EATING MEALS: A LITTLE
MOBILITY SCORE: 8
WALKING IN HOSPITAL ROOM: TOTAL
STANDING UP FROM CHAIR USING ARMS: TOTAL
HELP NEEDED FOR BATHING: TOTAL
MOBILITY SCORE: 8
DRESSING REGULAR LOWER BODY CLOTHING: TOTAL

## 2025-05-31 ASSESSMENT — PAIN - FUNCTIONAL ASSESSMENT
PAIN_FUNCTIONAL_ASSESSMENT: 0-10
PAIN_FUNCTIONAL_ASSESSMENT: 0-10

## 2025-05-31 ASSESSMENT — PAIN SCALES - GENERAL
PAINLEVEL_OUTOF10: 0 - NO PAIN

## 2025-05-31 NOTE — PROGRESS NOTES
Physical Therapy                 Therapy Communication Note    Patient Name: Jai Shrestha  MRN: [id number]  Department: [facility] [place] [address]  Room: [address] [place], [place] [address]  Today's Date: 5/31/2025     Discipline: Physical Therapy    Missed Visit: PT Missed Visit: Yes     Missed Visit Reason: Missed Visit Reason: Patient placed on medical hold (per OT communication note, Rapid response early this a.m. & transferred to stepdown unit from 608. Patient still with active orders for strict bedrest. PT to hold at this time and will follow up as appropriate.)    Missed Time: Attempt

## 2025-05-31 NOTE — PROGRESS NOTES
Occupational Therapy                 Therapy Communication Note    Patient Name: Jai Shrestha  MRN: [id number]  Department: [facility] [place] [address]  Room: [address] [place], [place] [address]  Today's Date: 5/31/2025     Discipline: Occupational Therapy    Missed Visit: OT Missed Visit: Yes     Missed Visit Reason: Missed Visit Reason: Patient placed on medical hold (Rapid response early this a.m. Transferred to stepdown unit from 608. Patient still with active orders for strict bedrest.  OT to hold at this time and will follow up as appropriate.)    Missed Time: Cancel

## 2025-05-31 NOTE — SIGNIFICANT EVENT
Rapid called for RIA scott followed by EMRE-monica with RVR ICU present Dr. Tomlin noted patient was weak with left sided weakness left arm drift following simple commands but did not follow finger with his eyes  He was leaning towards the right Starck is coming out okay without  Glucose 212  Vitals 36.6, 108, 22, 125 / 68 98% O2 sat  Physical exam:  Constitutional: awake, alert, oriented  x3  Neuro: Left arm drift              Followed simple commands              Leaning to the right              NIH scale  0 level of consciousness -he was awake and alert oriented to person and place  0 for LOC questions knew the month and his age  0 for LOC commands as he follows simple commands  1  partial gaze palsy as both eyes looked to the right  0 for visual loss  1 facial palsy  2  motor arm he had a left arm drift  1 Best language  0 dysarthria  1 extinction and attention with left-sided neglect  Total score 6  CV: Heart rate is irregularly irregular rate 110  Pulm: Lungs are clear to auscultation B/L  GI: Abdomen is soft nontender  Skin: Intact  Extremities: Bilateral edema    Atrial fibrillation with RVR, short run of NSVT  Given metoprolol 5 mg IV x 1    Left arm drift, leaning to the right/ right MCA stroke (12/24) /right MCA stroke (12/24)   Sent for CT of the head  Glucose 212  Transferred to stepdown

## 2025-05-31 NOTE — PROGRESS NOTES
"Jai Shrestha is a 55 y.o. male on day 15 of admission presenting with Pneumonia due to infectious organism, unspecified laterality, unspecified part of lung.    Subjective   On oxygen, states having significant pain in his lower back.  No fevers or chills.  No white count off antibiotics.       Objective     Physical Exam  Constitutional: Obese gentleman, appears drowsy, somnolent, but respond to questions, cooperative not in acute distress  Lungs: Patent airways, CTABL  Heart: RRR, S1S2, no murmurs appreciated, palpable pulses in all extremities  GI: Soft, NT, ND, bowel sounds present in all quadrants  MSK: Moves all extremities freely, no restriction  of ROM, no joint edema  Extremities: Chronic lymphedema with venous ulcers, bilateral lower extremities peripheral edema  Neurological: no acute focal neurological deficits appreciated  Last Recorded Vitals  Blood pressure 123/72, pulse 96, temperature 37 °C (98.6 °F), temperature source Oral, resp. rate 22, height 1.803 m (5' 10.98\"), weight (!) 182 kg (400 lb 2.2 oz), SpO2 99%.  Intake/Output last 3 Shifts:  I/O last 3 completed shifts:  In: 890 (4.9 mL/kg) [P.O.:890]  Out: 2600 (14.3 mL/kg) [Urine:2600 (0.4 mL/kg/hr)]  Weight: 181.5 kg     Relevant Results  Results for orders placed or performed during the hospital encounter of 05/16/25 (from the past 24 hours)   POCT GLUCOSE   Result Value Ref Range    POCT Glucose 303 (H) 74 - 99 mg/dL   POCT GLUCOSE   Result Value Ref Range    POCT Glucose 316 (H) 74 - 99 mg/dL   Blood Gas Arterial Full Panel   Result Value Ref Range    POCT pH, Arterial 7.47 (H) 7.38 - 7.42 pH    POCT pCO2, Arterial 52 (H) 38 - 42 mm Hg    POCT pO2, Arterial 53 (L) 85 - 95 mm Hg    POCT SO2, Arterial 85 (L) 94 - 100 %    POCT Oxy Hemoglobin, Arterial 82.6 (L) 94.0 - 98.0 %    POCT Hematocrit Calculated, Arterial 34.0 (L) 41.0 - 52.0 %    POCT Sodium, Arterial 143 136 - 145 mmol/L    POCT Potassium, Arterial 4.0 3.5 - 5.3 mmol/L    POCT " Chloride, Arterial 104 98 - 107 mmol/L    POCT Ionized Calcium, Arterial 1.25 1.10 - 1.33 mmol/L    POCT Glucose, Arterial 304 (H) 74 - 99 mg/dL    POCT Lactate, Arterial 1.2 0.4 - 2.0 mmol/L    POCT Base Excess, Arterial 12.4 (H) -2.0 - 3.0 mmol/L    POCT HCO3 Calculated, Arterial 37.8 (H) 22.0 - 26.0 mmol/L    POCT Hemoglobin, Arterial 11.2 (L) 13.5 - 17.5 g/dL    POCT Anion Gap, Arterial 5 (L) 10 - 25 mmo/L    Patient Temperature 37.0 degrees Celsius    FiO2 32 %   POCT GLUCOSE   Result Value Ref Range    POCT Glucose 214 (H) 74 - 99 mg/dL   POCT GLUCOSE   Result Value Ref Range    POCT Glucose 213 (H) 74 - 99 mg/dL   Blood Gas Arterial Full Panel   Result Value Ref Range    POCT pH, Arterial 7.46 (H) 7.38 - 7.42 pH    POCT pCO2, Arterial 52 (H) 38 - 42 mm Hg    POCT pO2, Arterial 95 85 - 95 mm Hg    POCT SO2, Arterial 98 94 - 100 %    POCT Oxy Hemoglobin, Arterial 95.9 94.0 - 98.0 %    POCT Hematocrit Calculated, Arterial 32.0 (L) 41.0 - 52.0 %    POCT Sodium, Arterial 145 136 - 145 mmol/L    POCT Potassium, Arterial 4.0 3.5 - 5.3 mmol/L    POCT Chloride, Arterial 106 98 - 107 mmol/L    POCT Ionized Calcium, Arterial 1.23 1.10 - 1.33 mmol/L    POCT Glucose, Arterial 238 (H) 74 - 99 mg/dL    POCT Lactate, Arterial 0.9 0.4 - 2.0 mmol/L    POCT Base Excess, Arterial 11.5 (H) -2.0 - 3.0 mmol/L    POCT HCO3 Calculated, Arterial 37.0 (H) 22.0 - 26.0 mmol/L    POCT Hemoglobin, Arterial 10.8 (L) 13.5 - 17.5 g/dL    POCT Anion Gap, Arterial 6 (L) 10 - 25 mmo/L    Patient Temperature 37.0 degrees Celsius    FiO2 40 %    Apparatus CANNULA    POCT GLUCOSE   Result Value Ref Range    POCT Glucose 245 (H) 74 - 99 mg/dL   POCT GLUCOSE   Result Value Ref Range    POCT Glucose 149 (H) 74 - 99 mg/dL   POCT GLUCOSE   Result Value Ref Range    POCT Glucose 150 (H) 74 - 99 mg/dL   CBC and Auto Differential   Result Value Ref Range    WBC 10.6 4.4 - 11.3 x10*3/uL    nRBC 0.0 0.0 - 0.0 /100 WBCs    RBC 4.80 4.50 - 5.90 x10*6/uL     Hemoglobin 10.8 (L) 13.5 - 17.5 g/dL    Hematocrit 38.9 (L) 41.0 - 52.0 %    MCV 81 80 - 100 fL    MCH 22.5 (L) 26.0 - 34.0 pg    MCHC 27.8 (L) 32.0 - 36.0 g/dL    RDW 16.1 (H) 11.5 - 14.5 %    Platelets 331 150 - 450 x10*3/uL    Neutrophils % 75.3 40.0 - 80.0 %    Immature Granulocytes %, Automated 0.7 0.0 - 0.9 %    Lymphocytes % 12.1 13.0 - 44.0 %    Monocytes % 8.6 2.0 - 10.0 %    Eosinophils % 2.6 0.0 - 6.0 %    Basophils % 0.7 0.0 - 2.0 %    Neutrophils Absolute 7.97 (H) 1.20 - 7.70 x10*3/uL    Immature Granulocytes Absolute, Automated 0.07 0.00 - 0.70 x10*3/uL    Lymphocytes Absolute 1.28 1.20 - 4.80 x10*3/uL    Monocytes Absolute 0.91 0.10 - 1.00 x10*3/uL    Eosinophils Absolute 0.28 0.00 - 0.70 x10*3/uL    Basophils Absolute 0.07 0.00 - 0.10 x10*3/uL   POCT GLUCOSE   Result Value Ref Range    POCT Glucose 186 (H) 74 - 99 mg/dL       Imaging Results  Cxr:        IMPRESSION:  Ongoing CHF with mild interval worsening.      Tubes and lines in place as described.    Medications:  Scheduled Medications[1]   PRN Medications[2]     Assessment/Plan   55 y.o. male with a PMH of super obese, back pain on chronic opioids (Norco 7.5/325 3-4x per day), peripheral neuropathy, HTN, HLD, HFpEF, obesity-hypoventilation syndrome, T2DM on insulin, CKD, GERD admitted to the stepdown unit on 5/16 for acute on chronic diastolic CHF and pneumonia.  Transferred to the ICU on 5/23 following cardiac arrest - initially PEA then asystole - due to a respiratory event.  Patient intubated.  ROSC achieved after ~5 min.     #Cardiac arrest (PEA then asystole) with ROSC  #Acute hypoxic ischemia encephalopathy  #A fibb, new onset  #Back pain on chronic opioids  #Acute on chronic diastolic CHF  #Hypertension, Hyperlipidemia  #Acute hypoxemic respiratory failure  #Obesity Hypoventilation Syndrome  #T2DM on long-term insulin  #Bilateral pneumonia     -extubated on nc oxygen  - Continue home ASA, atorvastatin, and amlodipine.  Holding home  lisinopril.  - Continue gentle diuresis with Lasix 40 mg BID as ordered.  - Advance tube feeds (Glucerna).  Dietician consulted.  - Continue current insulin regimen of Lantus 30 units (half his home dose) and sliding scale lispro.  - Seen by ID this admission.  Completed Zosynfor 10-day   -cardiology consult for new onset a fibb, in setting of recent cardiac arrest and h/o CHF  -was given digoxin yesterday      DVT Prophylaxis:  Lovenox subq        Ronan [place] MD  [facility]           [1] amLODIPine, 5 mg, oral, Daily  aspirin, 81 mg, oral, Daily  atorvastatin, 80 mg, oral, Nightly  colchicine, 0.6 mg, oral, Daily  digoxin, 250 mcg, intravenous, Once  digoxin, 250 mcg, intravenous, Once  docusate sodium, 100 mg, oral, BID  DULoxetine, 60 mg, oral, Daily  enoxaparin, 60 mg, subcutaneous, q12h TAMIR  ergocalciferol, 1.25 mg, oral, Weekly  ferrous sulfate, 1 tablet, oral, Every Mon/Wed/Fri  furosemide, 40 mg, intravenous, BID  insulin glargine, 30 Units, subcutaneous, BID  insulin lispro, 0-15 Units, subcutaneous, q4h  ipratropium-albuteroL, 3 mL, nebulization, TID  lisinopril, 5 mg, oral, Daily  lubricating eye drops, 1 drop, Both Eyes, Daily  pantoprazole, 40 mg, oral, Daily before breakfast  polyethylene glycol, 17 g, oral, Daily  QUEtiapine, 100 mg, oral, Nightly  senna, 5 mL, oral, BID     [2] PRN medications: acetaminophen, dextrose, dextrose, glucagon, glucagon, haloperidol lactate, ipratropium-albuteroL, lactulose, naloxone, ondansetron **OR** ondansetron, oxygen

## 2025-05-31 NOTE — CARE PLAN
The patient's goals for the shift include      The clinical goals for the shift include Pt will improved bp during shift.    Over the shift, the patient did not make progress toward the following goals. Barriers to progression include . Recommendations to address these barriers include .

## 2025-05-31 NOTE — CARE PLAN
The patient's goals for the shift include      The clinical goals for the shift include Pt will improved bp during shift.    Problem: Diabetes  Goal: Achieve decreasing blood glucose levels by end of shift  Outcome: Progressing  Flowsheets (Taken 5/30/2025 2345)  Achieve decreasing blood glucose levels by end of shift: Med administration/monitoring of effect  Goal: Increase stability of blood glucose readings by end of shift  Outcome: Progressing  Flowsheets (Taken 5/30/2025 2345)  Increase stability of blood glucose readings by end of shift: Med administration/monitoring of effect  Goal: Decrease in ketones present in urine by end of shift  Outcome: Progressing  Flowsheets (Taken 5/30/2025 2345)  Decrease in ketones present in urine by end of shift:   Med administration/monitoring of effect   Monitor urine output  Goal: Maintain electrolyte levels within acceptable range throughout shift  Outcome: Progressing  Flowsheets (Taken 5/30/2025 2345)  Maintain electrolyte levels within acceptable range throughout shift:   Med administration/monitoring of effect   Monitor urine output  Goal: Maintain glucose levels >70mg/dl to <250mg/dl throughout shift  Outcome: Progressing  Flowsheets (Taken 5/30/2025 2345)  Maintain glucose levels >70mg/dl to <250mg/dl throughout shift: Med administration/monitoring of effect  Goal: No changes in neurological exam by end of shift  Outcome: Progressing  Flowsheets (Taken 5/30/2025 2345)  No changes in neurological exam by end of shift: Complete frequent neurological assessments  Goal: Learn about and adhere to nutrition recommendations by end of shift  Outcome: Progressing  Flowsheets (Taken 5/30/2025 2345)  Learn about and adhere to nutrition recommendations by end of shift: Ensure/encourage compliance with appropriate diet  Goal: Vital signs within normal range for age by end of shift  Outcome: Progressing  Flowsheets (Taken 5/30/2025 2345)  Vital signs within normal range for age by  end of shift: Med administration/monitoring of effect  Goal: Increase self care and/or family involovement by end of shift  Outcome: Progressing  Flowsheets (Taken 5/30/2025 2345)  Increase self care and/or family involovement by end of shift: Self monitor blood glucose with staff oversight  Goal: Receive DSME education by end of shift  Outcome: Progressing  Flowsheets (Taken 5/30/2025 2345)  Receive DSME education by end of shift: Provide patient centered education on Diabetic Self Management Education     Problem: Pain  Goal: Takes deep breaths with improved pain control throughout the shift  Outcome: Progressing  Goal: Turns in bed with improved pain control throughout the shift  Outcome: Progressing  Goal: Walks with improved pain control throughout the shift  Outcome: Progressing  Goal: Performs ADL's with improved pain control throughout shift  Outcome: Progressing  Goal: Free from opioid side effects throughout the shift  Outcome: Progressing  Goal: Free from acute confusion related to pain meds throughout the shift  Outcome: Progressing     Problem: Pain - Adult  Goal: Verbalizes/displays adequate comfort level or baseline comfort level  Outcome: Progressing  Flowsheets (Taken 5/16/2025 2156 by Jacqui Adams RN)  Verbalizes/displays adequate comfort level or baseline comfort level:   Encourage patient to monitor pain and request assistance   Assess pain using appropriate pain scale   Administer analgesics based on type and severity of pain and evaluate response   Implement non-pharmacological measures as appropriate and evaluate response   Consider cultural and social influences on pain and pain management   Notify Licensed Independent Practitioner if interventions unsuccessful or patient reports new pain     Problem: Safety - Adult  Goal: Free from fall injury  Outcome: Progressing  Flowsheets (Taken 5/16/2025 2156 by Jacqui Adams RN)  Free from fall injury:   Instruct family/caregiver on patient  safety   Based on caregiver fall risk screen, instruct family/caregiver to ask for assistance with transferring infant if caregiver noted to have fall risk factors

## 2025-05-31 NOTE — PROGRESS NOTES
05/31/25 0954   Discharge Planning   Assistance Needed patient transferred from [id number] [id number]; patient was placed on medical hold at this time; no PT/OT   Expected Discharge Disposition   (TBD)   Does the patient need discharge transport arranged? Yes   RoundTrip coordination needed? Yes   Has discharge transport been arranged? No

## 2025-05-31 NOTE — SIGNIFICANT EVENT
Rapid Response Note    Rapid Response activated @ 2229 for tachycardia.    Patient is known to the ICU service.  Briefly, he's a 55 y.o. male with a PMH of super obese, chronic back pain on opioids (Norco 7.5/325 3-4x per day), peripheral neuropathy, HTN, HLD, HFpEF, obesity-hypoventilation syndrome, T2DM on insulin, CKD, GERD initially admitted to the hospitalist service on 5/16 for acute on chronic diastolic CHF and pneumonia.  Code Blue activated on 5/23 secondary to respiratory event.  Patient intubated and admitted to the ICU accordingly.  Extubated on 5/28 and transferred out of the ICU on 5/29.    Per RN tonight, HR abruptly went into the 150's.  Also he had a change in mentation.  HR was in the 120's at time of my arrival and rhythm appeared to be Afib with frequent PVCs.  This was confirmed on ECG.  Patient not previously known to be in Afib.  /68.  SpO2 98% on 5 L NC (unchanged from prior).  ABG 7.46/52/95.    He was clearly awake but lethargic and staring blankly.  Speech seemed a little garbled.  Right pupil was not reactive and appeared larger and more oval than the right.  Likely a chronic finding.  The left pupil was round and reactive.  He did not visually track my finger or make any attempt to do so.  He did move all four extremities to command.  However, I did activate a Stroke Alert.  He went down for a CT scan and was moved to a stepdown bed.    CT head negative for acute process.  Unchanged from prior.  More awake on follow up exam with clear speech.  Denied chest pain or pressure, SOB.  HR in the low 100's.  Appears to be sinus rhythm with frequent PACs rather than Afib.  He denied visual disturbances and correctly identified the number of fingers I held up.  Attempted to visually track my finger although horizontal eye movements were limited, which I suspect is chronic.    Assessment and Plan    #Afib with RVR, new-onset    - IV metoprolol.  - Low suspicion for acute neurologic  event.      Amarjit Tomlin MD    Patient care time 45 min.

## 2025-05-31 NOTE — CONSULTS
Inpatient consult to Cardiology  Consult performed by: JAYASHREE Rosenberg-CNP  Consult ordered by: Ronan Dumont MD  Reason for consult: chf        History Of Present Illness:    Jai Shrestha is a 55 y.o. male with past medical history of right MCA stroke (12/24), diabetes mellitus type II, CKD stage IIIa, COPD, RAMÍREZ, diastolic heart failure, iron deficiency anemia, anxiety, PTSD, bipolar, morbid obesity, chronic back pain admitted to the stepdown unit on 5/16 for acute on chronic diastolic CHF and pneumonia. Transferred to the ICU on 5/23 following cardiac arrest - initially PEA then asystole - due to a respiratory event ( patient self removed bipap and tried to get OOB) . Patient intubated. ROSC achieved after ~5 min. Extubated on 5/28 and transferred out of the ICU on 5/29.     Cardiology consulted for a fib episode, CHF.    Rapid response called 5/30/2025 at 2229 for tachycardia. Patient HR abruptly went to 150s, also with change in mentation. Patient was awake, but staring blankly.  Speech garbled.  He went down for head CT that was negative for any acute process.     Patient in bedside chair.  Unable to summarize events of current hospitalization.  He currently denies any concerns. Denies any chest pain or angina. SOB with exertion.  Chronic LE swelling.     Home CV medications:  Amlodipine 5 mg p.o. daily  Lisinopril 5mg daily   aspirin 81 mg p.o. daily  atorvastatin 80 mg p.o. daily  torsemide 60 mg p.o. twice daily     Past Cardiology Tests (Last 3 Years):    EKG:        Echo:  Echocardiogram 5/2025  1. Left ventricular ejection fraction is normal, by visual estimate at 55%.   2. Spectral Doppler shows a Grade II (pseudonormal pattern) of left ventricular diastolic filling with an elevated left atrial pressure.   3. Unable to determine right ventricular systolic function.   4. RV was poorly visualized due to sub-optimal acoustic windows    Echocardiogram 4/15/2025   1. Poorly visualized anatomical  structures due to suboptimal image quality.   2. Left ventricular ejection fraction is low normal, by visual estimate at 50-55%.   3. Left ventricular diastolic filling is indeterminate.   4. Unable to determine right ventricular systolic function.   5. The left atrium is mildly dilated.    Echocardiogram 2/6/21:  CONCLUSIONS:   1. The left ventricular systolic function is normal with a 60-65% estimated ejection fraction.   2. The left atrium is mild to moderately dilated.   3. RVSP within normal limits.   4. The pulmonary artery is not well visualized.   5. Technically difficult, even with Definity.    Cath:    Stress Test:    Nuclear stress test 9/9/2016, indication dyspnea  FINDINGS:  Stress and rest images both demonstrate no sign of   ischemia.  A moderate fixed decrease in activity is present within   the inferior wall.  This area demonstrates normal systolic thickening   implying an attenuation artifact.       ECG-gated images demonstrate normal myocardial wall motion with an LV   ejection fraction of 48 percent (normal above 45 percent).     IMPRESSION:  Normal myocardial perfusion imaging in response to   pharmacologic stress with inferior attenuation artifact secondary to   the arms at the sides.    Past Medical History:  He has a past medical history of Chronic back pain, Chronic pain disorder (12 15  2015), CKD (chronic kidney disease), Depression (02 27 1986), Diabetes mellitus (Multi), Extremity pain (12 15 2015), HF (heart failure), diastolic, Hypertension, Joint pain, Low back pain, Neuropathy in diabetes (Multi), Peripheral neuropathy (12 15 2015), and Spinal stenosis.    Past Surgical History:  He has no past surgical history on file.      Social History:  He reports that he has quit smoking. His smoking use included cigarettes. He has never used smokeless tobacco. He reports current alcohol use. He reports that he does not currently use drugs.    Family History:  Family History[1]    "  Allergies:  Metformin    ROS:  10 point review of systems including (Constitutional, Eyes, ENMT, Respiratory, Cardiac, Gastrointestinal, Neurological, Psychiatric, and Hematologic) was performed and is otherwise negative.    Objective Data:  Last Recorded Vitals:  Vitals:    25 0600 25 0720 25 0738 25 1115   BP:   123/72 159/90   BP Location:   Right arm Left arm   Patient Position:   Lying Sitting   Pulse:   96    Resp:   22 20   Temp: 36.3 °C (97.3 °F)  37 °C (98.6 °F) 36.2 °C (97.2 °F)   TempSrc:   Oral Oral   SpO2:  96% 99% 98%   Weight:       Height:         Medical Gas Therapy: Supplemental oxygen  Medical Gas Delivery Method: Nasal cannula  Weight  Av kg (412 lb 15.3 oz)  Min: 174 kg (383 lb)  Max: 195 kg (429 lb 14.4 oz)    LABS:  CMP:  Results from last 7 days   Lab Units 25  1124 25  2353 25  0451 25  0520 25  1357 25  0350   SODIUM mmol/L 147* 149* 147* 143 141 145   POTASSIUM mmol/L 4.6 3.3* 4.1 4.2 4.3 2.8*   CHLORIDE mmol/L 106 112* 104 101 100 116*   CO2 mmol/L 32 32 35* 29 34* 25   ANION GAP mmol/L 14 8* 12 17 11 7*   BUN mg/dL 34* 32* 38* 33* 31* 24*   CREATININE mg/dL 1.30 1.12 1.42* 1.41* 1.42* 0.87   EGFR mL/min/1.73m*2 65 78 59* 59* 59* >90   MAGNESIUM mg/dL 2.34  --  2.24 2.26 2.62* 1.34*   ALBUMIN g/dL 3.2* 2.7* 3.1* 3.2* 3.2* 2.1*     CBC:  Results from last 7 days   Lab Units 25  0627 25  0606 25  0613 25  1124 25  0451 25  0520 25  0350   WBC AUTO x10*3/uL 10.6 7.9 6.9 9.0 11.2 10.2 10.2   HEMOGLOBIN g/dL 10.8* 10.0* 8.8* 8.8* 8.7* 8.8* 8.1*   HEMATOCRIT % 38.9* 36.5* 31.7* 31.2* 30.5* 31.2* 28.2*   PLATELETS AUTO x10*3/uL 331 262 213 216 243 225 213   MCV fL 81 82 82 82 80 80 81     COAG:     ABO: No results found for: \"ABO\"  HEME/ENDO:     CARDIAC:       No lab exists for component: \"CK\", \"CKMBP\"          Last I/O:    Intake/Output Summary (Last 24 hours) at 2025 1307  Last " "data filed at 5/31/2025 1207  Gross per 24 hour   Intake 150 ml   Output 1400 ml   Net -1250 ml     Net IO Since Admission: -12,953.84 mL [05/31/25 1307]      Imaging Results:      Inpatient Medications:  Scheduled Medications[2]  PRN Medications[3]  Continuous Medications[4]    Outpatient Medications:  Current Outpatient Medications   Medication Instructions    amLODIPine (Norvasc) 5 mg tablet 1 tablet, Daily    aspirin 81 mg EC tablet 1 tablet, Daily    atorvastatin (Lipitor) 80 mg tablet 1 tablet, Nightly    cephalexin (KEFLEX) 1,000 mg, oral, 3 times daily    cholecalciferol (VITAMIN D-3) 50,000 Units, Once Weekly    colchicine 0.6 mg, oral, Daily    cyclobenzaprine (FLEXERIL) 10 mg, oral, 2 times daily PRN    DULoxetine (CYMBALTA) 60 mg, oral, Daily, Do not crush or chew.    ferrous sulfate 325 mg, Every Mon/Wed/Fri    HYDROcodone-acetaminophen (Norco) 7.5-325 mg tablet 1 tablet, oral, Every 6 hours PRN    [START ON 6/5/2025] HYDROcodone-acetaminophen (Norco) 7.5-325 mg tablet 1 tablet, oral, Every 6 hours PRN    insulin aspart (NOVOLOG U-100 INSULIN ASPART) 40 Units, subcutaneous, 3 times daily before meals    insulin glargine (LANTUS) 60 Units, subcutaneous, 2 times daily, Take as directed per insulin instructions.    lisinopril 5 mg, Daily RT    naloxone (NARCAN) 4 mg, nasal, As needed, May repeat every 2-3 minutes if needed, alternating nostrils, until medical assistance becomes available.    omeprazole (PriLOSEC) 40 mg DR capsule 1 capsule, 2 times daily    pen needle, diabetic (BD Ultra-Fine Noelle Pen Needle) 32 gauge x 5/32\" needle 1 Pen needle, miscellaneous, 2 times daily    QUEtiapine (SEROQUEL) 100 mg, oral, Nightly    torsemide (DEMADEX) 60 mg, 2 times daily       Physical Exam:    General:  Patient is awake, alert, and oriented, but confused to details of admission.  Patient is in no acute distress.  HEENT:  Normocephalic.  Moist mucosa.    Neck:  Normal Jugular Venous Pressure.  Cardiovascular:  " Regular rate and rhythm.  Normal S1 and S2, no murmurs, rubs or gallops  Pulmonary:  Clear to auscultation bilaterally.  Abdomen:  Soft. Non-tender.   Non-distended.  Positive bowel sounds.  Lower Extremities:  2+ pedal pulses. Massive LE edema, chronic venous changes bilaterally  Neurologic:  Alert and oriented x2-3. No focal deficit.   Skin: Skin warm and dry, normal skin turgor.   Psychiatric: Pleasant, cooperative     Assessment/Plan   Jai Shrestha is a 55 y.o. male with past medical history of right MCA stroke (12/24), diabetes mellitus type II, CKD stage IIIa, COPD, RAMÍREZ, diastolic heart failure, iron deficiency anemia, anxiety, PTSD, bipolar, morbid obesity, chronic back pain admitted to the stepdown unit on 5/16 for acute on chronic diastolic CHF and pneumonia. Transferred to the ICU on 5/23 following cardiac arrest - initially PEA then asystole - due to a respiratory event ( patient self removed bipap and tried to get OOB) . Patient intubated. ROSC achieved after ~5 min. Extubated on 5/28 and transferred out of the ICU on 5/29.     Cardiology consulted for a fib episode, CHF>     Rapid response called 5/30/2025 at 2229 for tachycardia. Patient HR abruptly went to 150s, also with change in mentation. Patient was awake, but staring blankly.  Speech garbled.  He went down for head CT that was negative for any acute process.     Patient in bedside chair.  Unable to summarize events of current hospitalization.  He currently denies any concerns. Denies any chest pain or angina. SOB with exertion.  Chronic LE swelling.     Home CV medications:  Amlodipine 5 mg p.o. daily  Lisinopril 5mg daily  Aspirin 81 mg p.o. daily  atorvastatin 80 mg p.o. daily  torsemide 60 mg p.o. twice daily     Assessment:    # Respiratory / cardiac arrest (PEA then asystole) on 5/23/2025 with ROSC  # Acute hypoxic ischemic encephalopathy    # Episode of atrial fibrillation with RVR.   - converted to NSR after digoxin load and IV metoprolol  x 2.   - currently maintaining NSR.     # Acute on chronic diastolic heart failure   - net negative 13L this admission   - BNP historically low, last 58 on 5/16/2025    # Bilateral pneumonia   - completed abx course of 10 days Zosyn    Plan:  - Start Eliquis 5mg BID  - Start metoprolol tartrate 25mg BID.   - Continue home ASA, atorvastatin, and amlodipine.  Holding home lisinopril.  - Continue gentle diuresis with Lasix 40 mg BID as ordered.  - Strict I/O's. Daily weights. Monitor Mag/K and supplement to >2/4. Close monitoring of renal function.     Code Status:  Full Code    Rosy Springer, JAYASHREE-CNP    STAFF ADDENDUM:    Both the MIMI and I have had a face to face encounter with the patient today. I have examined the patient and edited the documented physical examination as necessary.  I personally reviewed the patient's vital signs, telemetry, recent labs, medications, orders, EKGs, and pertinent cardiac imaging/ echocardiography.  I have reviewed the MIMI's encounter note, approve the MIMI's documentation and have edited the note to reflect my diagnostic and therapeutic plan.      EKG from yesterday sinus tachycardia with frequent PACs, however telemetry from overnight seems more consistent with A-fib with RVR.  Now in sinus rhythm.  Even though relatively short episodes this is new onset paroxysmal atrial fibrillation and with his comorbidities (morbid obesity, diabetes, HTN, HFpEF) he is at least moderate increased risk for CVA and would favor anticoagulation with something like Eliquis 5 mg twice daily.  We will also start beta-blocker for more rate control.  Dig loaded but will observe off digoxin for now.  Dann Loyola DO           [1]   Family History  Problem Relation Name Age of Onset    Arthritis Mother Cesar rojo mom     COPD Mother Cesar rojo mom     Coronary artery disease Mother Cesar rojo mom     Depression Mother Cesar rojo mom     Diabetes Mother Cesar rojo  mom     Hypertension Mother Cesar rojo mom     Stroke Mother Cesar rojo mom    [2]   Scheduled medications   Medication Dose Route Frequency    amLODIPine  5 mg oral Daily    aspirin  81 mg oral Daily    atorvastatin  80 mg oral Nightly    colchicine  0.6 mg oral Daily    digoxin  250 mcg intravenous Once    docusate sodium  100 mg oral BID    DULoxetine  60 mg oral Daily    enoxaparin  60 mg subcutaneous q12h [facility]    ergocalciferol  1.25 mg oral Weekly    ferrous sulfate  1 tablet oral Every Mon/Wed/Fri    furosemide  40 mg intravenous BID    insulin glargine  30 Units subcutaneous BID    insulin lispro  0-15 Units subcutaneous q4h    ipratropium-albuteroL  3 mL nebulization TID    lisinopril  5 mg oral Daily    lubricating eye drops  1 drop Both Eyes Daily    pantoprazole  40 mg oral Daily before breakfast    polyethylene glycol  17 g oral Daily    QUEtiapine  100 mg oral Nightly    senna  5 mL oral BID   [3]   PRN medications   Medication    acetaminophen    dextrose    dextrose    glucagon    glucagon    haloperidol lactate    ipratropium-albuteroL    lactulose    naloxone    ondansetron    Or    ondansetron    oxygen   [4]   Continuous Medications   Medication Dose Last Rate

## 2025-05-31 NOTE — NURSING NOTE
Rapid response was called on patient due to Run of vtach followed by afib with RVR.  Pt was sent to CT to r/o stroke.  Pt was admitted to Stepdown Unit.  Nurse to nurse report was given at bedside to ARIELLE Craven.  Nurse verbalized understanding report.  Patient's belongs were collected and delivered to Stepdown.  Pt is now in care of stepdown.

## 2025-06-01 VITALS
OXYGEN SATURATION: 96 % | HEART RATE: 79 BPM | WEIGHT: 315 LBS | BODY MASS INDEX: 44.1 KG/M2 | DIASTOLIC BLOOD PRESSURE: 67 MMHG | SYSTOLIC BLOOD PRESSURE: 145 MMHG | HEIGHT: 71 IN | TEMPERATURE: 97.6 F | RESPIRATION RATE: 18 BRPM

## 2025-06-01 LAB
ALBUMIN SERPL BCP-MCNC: 3.2 G/DL (ref 3.4–5)
ALP SERPL-CCNC: 84 U/L (ref 33–120)
ALT SERPL W P-5'-P-CCNC: 12 U/L (ref 10–52)
ANION GAP SERPL CALC-SCNC: 9 MMOL/L (ref 10–20)
AST SERPL W P-5'-P-CCNC: 17 U/L (ref 9–39)
BASOPHILS # BLD AUTO: 0.06 X10*3/UL (ref 0–0.1)
BASOPHILS NFR BLD AUTO: 0.8 %
BILIRUB SERPL-MCNC: 0.5 MG/DL (ref 0–1.2)
BUN SERPL-MCNC: 33 MG/DL (ref 6–23)
CALCIUM SERPL-MCNC: 8.8 MG/DL (ref 8.6–10.3)
CHLORIDE SERPL-SCNC: 104 MMOL/L (ref 98–107)
CO2 SERPL-SCNC: 36 MMOL/L (ref 21–32)
CREAT SERPL-MCNC: 1.34 MG/DL (ref 0.5–1.3)
EGFRCR SERPLBLD CKD-EPI 2021: 63 ML/MIN/1.73M*2
EOSINOPHIL # BLD AUTO: 0.41 X10*3/UL (ref 0–0.7)
EOSINOPHIL NFR BLD AUTO: 5.4 %
ERYTHROCYTE [DISTWIDTH] IN BLOOD BY AUTOMATED COUNT: 16.3 % (ref 11.5–14.5)
GLUCOSE BLD MANUAL STRIP-MCNC: 188 MG/DL (ref 74–99)
GLUCOSE BLD MANUAL STRIP-MCNC: 214 MG/DL (ref 74–99)
GLUCOSE BLD MANUAL STRIP-MCNC: 245 MG/DL (ref 74–99)
GLUCOSE BLD MANUAL STRIP-MCNC: 254 MG/DL (ref 74–99)
GLUCOSE BLD MANUAL STRIP-MCNC: 267 MG/DL (ref 74–99)
GLUCOSE BLD MANUAL STRIP-MCNC: 270 MG/DL (ref 74–99)
GLUCOSE BLD MANUAL STRIP-MCNC: 318 MG/DL (ref 74–99)
GLUCOSE SERPL-MCNC: 185 MG/DL (ref 74–99)
HCT VFR BLD AUTO: 36 % (ref 41–52)
HGB BLD-MCNC: 9.9 G/DL (ref 13.5–17.5)
IMM GRANULOCYTES # BLD AUTO: 0.04 X10*3/UL (ref 0–0.7)
IMM GRANULOCYTES NFR BLD AUTO: 0.5 % (ref 0–0.9)
LYMPHOCYTES # BLD AUTO: 1.41 X10*3/UL (ref 1.2–4.8)
LYMPHOCYTES NFR BLD AUTO: 18.7 %
MCH RBC QN AUTO: 22.9 PG (ref 26–34)
MCHC RBC AUTO-ENTMCNC: 27.5 G/DL (ref 32–36)
MCV RBC AUTO: 83 FL (ref 80–100)
MONOCYTES # BLD AUTO: 0.63 X10*3/UL (ref 0.1–1)
MONOCYTES NFR BLD AUTO: 8.4 %
NEUTROPHILS # BLD AUTO: 4.99 X10*3/UL (ref 1.2–7.7)
NEUTROPHILS NFR BLD AUTO: 66.2 %
NRBC BLD-RTO: 0 /100 WBCS (ref 0–0)
PLATELET # BLD AUTO: 255 X10*3/UL (ref 150–450)
POTASSIUM SERPL-SCNC: 3.8 MMOL/L (ref 3.5–5.3)
PROT SERPL-MCNC: 6.6 G/DL (ref 6.4–8.2)
RBC # BLD AUTO: 4.32 X10*6/UL (ref 4.5–5.9)
SODIUM SERPL-SCNC: 145 MMOL/L (ref 136–145)
WBC # BLD AUTO: 7.5 X10*3/UL (ref 4.4–11.3)

## 2025-06-01 PROCEDURE — 94664 DEMO&/EVAL PT USE INHALER: CPT

## 2025-06-01 PROCEDURE — 2500000001 HC RX 250 WO HCPCS SELF ADMINISTERED DRUGS (ALT 637 FOR MEDICARE OP)

## 2025-06-01 PROCEDURE — 2500000002 HC RX 250 W HCPCS SELF ADMINISTERED DRUGS (ALT 637 FOR MEDICARE OP, ALT 636 FOR OP/ED)

## 2025-06-01 PROCEDURE — 82947 ASSAY GLUCOSE BLOOD QUANT: CPT

## 2025-06-01 PROCEDURE — 2500000004 HC RX 250 GENERAL PHARMACY W/ HCPCS (ALT 636 FOR OP/ED): Mod: JZ

## 2025-06-01 PROCEDURE — 2500000004 HC RX 250 GENERAL PHARMACY W/ HCPCS (ALT 636 FOR OP/ED)

## 2025-06-01 PROCEDURE — 2500000005 HC RX 250 GENERAL PHARMACY W/O HCPCS

## 2025-06-01 PROCEDURE — 85025 COMPLETE CBC W/AUTO DIFF WBC: CPT

## 2025-06-01 PROCEDURE — 2060000001 HC INTERMEDIATE ICU ROOM DAILY

## 2025-06-01 PROCEDURE — 80053 COMPREHEN METABOLIC PANEL: CPT | Performed by: INTERNAL MEDICINE

## 2025-06-01 PROCEDURE — 2500000005 HC RX 250 GENERAL PHARMACY W/O HCPCS: Performed by: INTERNAL MEDICINE

## 2025-06-01 PROCEDURE — 99232 SBSQ HOSP IP/OBS MODERATE 35: CPT | Performed by: INTERNAL MEDICINE

## 2025-06-01 PROCEDURE — 99233 SBSQ HOSP IP/OBS HIGH 50: CPT | Performed by: INTERNAL MEDICINE

## 2025-06-01 PROCEDURE — 94640 AIRWAY INHALATION TREATMENT: CPT

## 2025-06-01 PROCEDURE — 2500000001 HC RX 250 WO HCPCS SELF ADMINISTERED DRUGS (ALT 637 FOR MEDICARE OP): Performed by: NURSE PRACTITIONER

## 2025-06-01 RX ADMIN — INSULIN LISPRO 9 UNITS: 100 INJECTION, SOLUTION INTRAVENOUS; SUBCUTANEOUS at 08:20

## 2025-06-01 RX ADMIN — SENNOSIDES 5 ML: 8.8 LIQUID ORAL at 20:36

## 2025-06-01 RX ADMIN — DOCUSATE SODIUM LIQUID 100 MG: 100 LIQUID ORAL at 20:36

## 2025-06-01 RX ADMIN — METOPROLOL TARTRATE 25 MG: 25 TABLET, FILM COATED ORAL at 08:13

## 2025-06-01 RX ADMIN — PANTOPRAZOLE SODIUM 40 MG: 40 TABLET, DELAYED RELEASE ORAL at 06:02

## 2025-06-01 RX ADMIN — AMLODIPINE BESYLATE 5 MG: 5 TABLET ORAL at 08:14

## 2025-06-01 RX ADMIN — INSULIN LISPRO 9 UNITS: 100 INJECTION, SOLUTION INTRAVENOUS; SUBCUTANEOUS at 13:40

## 2025-06-01 RX ADMIN — SENNOSIDES 5 ML: 8.8 LIQUID ORAL at 08:13

## 2025-06-01 RX ADMIN — DULOXETINE 60 MG: 30 CAPSULE, DELAYED RELEASE ORAL at 08:13

## 2025-06-01 RX ADMIN — INSULIN GLARGINE 30 UNITS: 100 INJECTION, SOLUTION SUBCUTANEOUS at 20:36

## 2025-06-01 RX ADMIN — IPRATROPIUM BROMIDE AND ALBUTEROL SULFATE 3 ML: 2.5; .5 SOLUTION RESPIRATORY (INHALATION) at 07:12

## 2025-06-01 RX ADMIN — LISINOPRIL 5 MG: 5 TABLET ORAL at 08:14

## 2025-06-01 RX ADMIN — INSULIN GLARGINE 30 UNITS: 100 INJECTION, SOLUTION SUBCUTANEOUS at 08:13

## 2025-06-01 RX ADMIN — INSULIN LISPRO 3 UNITS: 100 INJECTION, SOLUTION INTRAVENOUS; SUBCUTANEOUS at 05:00

## 2025-06-01 RX ADMIN — METOPROLOL TARTRATE 25 MG: 25 TABLET, FILM COATED ORAL at 20:37

## 2025-06-01 RX ADMIN — ATORVASTATIN CALCIUM 80 MG: 80 TABLET, FILM COATED ORAL at 20:37

## 2025-06-01 RX ADMIN — IPRATROPIUM BROMIDE AND ALBUTEROL SULFATE 3 ML: 2.5; .5 SOLUTION RESPIRATORY (INHALATION) at 12:59

## 2025-06-01 RX ADMIN — QUETIAPINE FUMARATE 100 MG: 100 TABLET ORAL at 20:37

## 2025-06-01 RX ADMIN — COLCHICINE 0.6 MG: 0.6 TABLET, FILM COATED ORAL at 08:13

## 2025-06-01 RX ADMIN — Medication 3 L/MIN: at 19:52

## 2025-06-01 RX ADMIN — FUROSEMIDE 40 MG: 10 INJECTION, SOLUTION INTRAMUSCULAR; INTRAVENOUS at 20:41

## 2025-06-01 RX ADMIN — INSULIN LISPRO 12 UNITS: 100 INJECTION, SOLUTION INTRAVENOUS; SUBCUTANEOUS at 17:23

## 2025-06-01 RX ADMIN — POLYETHYLENE GLYCOL 3350 17 G: 17 POWDER, FOR SOLUTION ORAL at 08:14

## 2025-06-01 RX ADMIN — INSULIN LISPRO 9 UNITS: 100 INJECTION, SOLUTION INTRAVENOUS; SUBCUTANEOUS at 20:36

## 2025-06-01 RX ADMIN — IPRATROPIUM BROMIDE AND ALBUTEROL SULFATE 3 ML: 2.5; .5 SOLUTION RESPIRATORY (INHALATION) at 19:52

## 2025-06-01 RX ADMIN — CARBOXYMETHYLCELLULOSE SODIUM 1 DROP: 0.5 SOLUTION/ DROPS OPHTHALMIC at 08:14

## 2025-06-01 RX ADMIN — FUROSEMIDE 40 MG: 10 INJECTION, SOLUTION INTRAMUSCULAR; INTRAVENOUS at 08:14

## 2025-06-01 RX ADMIN — APIXABAN 5 MG: 5 TABLET, FILM COATED ORAL at 05:03

## 2025-06-01 RX ADMIN — APIXABAN 5 MG: 5 TABLET, FILM COATED ORAL at 17:23

## 2025-06-01 RX ADMIN — ASPIRIN 81 MG CHEWABLE TABLET 81 MG: 81 TABLET CHEWABLE at 08:13

## 2025-06-01 ASSESSMENT — PAIN SCALES - GENERAL
PAINLEVEL_OUTOF10: 0 - NO PAIN

## 2025-06-01 ASSESSMENT — COGNITIVE AND FUNCTIONAL STATUS - GENERAL
EATING MEALS: A LITTLE
PERSONAL GROOMING: TOTAL
DAILY ACTIVITIY SCORE: 8
STANDING UP FROM CHAIR USING ARMS: TOTAL
DRESSING REGULAR UPPER BODY CLOTHING: TOTAL
DRESSING REGULAR LOWER BODY CLOTHING: TOTAL
TOILETING: TOTAL
CLIMB 3 TO 5 STEPS WITH RAILING: TOTAL
MOBILITY SCORE: 8
HELP NEEDED FOR BATHING: TOTAL
MOVING TO AND FROM BED TO CHAIR: TOTAL
MOVING FROM LYING ON BACK TO SITTING ON SIDE OF FLAT BED WITH BEDRAILS: A LOT
TURNING FROM BACK TO SIDE WHILE IN FLAT BAD: A LOT
WALKING IN HOSPITAL ROOM: TOTAL

## 2025-06-01 ASSESSMENT — PAIN - FUNCTIONAL ASSESSMENT
PAIN_FUNCTIONAL_ASSESSMENT: 0-10
PAIN_FUNCTIONAL_ASSESSMENT: 0-10

## 2025-06-01 NOTE — CARE PLAN
Problem: Diabetes  Goal: Achieve decreasing blood glucose levels by end of shift  Outcome: Progressing  Goal: Increase stability of blood glucose readings by end of shift  Outcome: Progressing  Goal: Decrease in ketones present in urine by end of shift  Outcome: Progressing  Goal: Maintain electrolyte levels within acceptable range throughout shift  Outcome: Progressing  Goal: Maintain glucose levels >70mg/dl to <250mg/dl throughout shift  Outcome: Progressing  Goal: No changes in neurological exam by end of shift  Outcome: Progressing  Goal: Learn about and adhere to nutrition recommendations by end of shift  Outcome: Progressing  Goal: Vital signs within normal range for age by end of shift  Outcome: Progressing  Goal: Increase self care and/or family involovement by end of shift  Outcome: Progressing  Goal: Receive DSME education by end of shift  Outcome: Progressing     Problem: Pain  Goal: Takes deep breaths with improved pain control throughout the shift  Outcome: Progressing  Goal: Turns in bed with improved pain control throughout the shift  Outcome: Progressing  Goal: Walks with improved pain control throughout the shift  Outcome: Progressing  Goal: Performs ADL's with improved pain control throughout shift  Outcome: Progressing  Goal: Participates in PT with improved pain control throughout the shift  Outcome: Progressing  Goal: Free from opioid side effects throughout the shift  Outcome: Progressing  Goal: Free from acute confusion related to pain meds throughout the shift  Outcome: Progressing     Problem: Fall/Injury  Goal: Not fall by end of shift  Outcome: Progressing  Goal: Be free from injury by end of the shift  Outcome: Progressing  Goal: Verbalize understanding of personal risk factors for fall in the hospital  Outcome: Progressing  Goal: Verbalize understanding of risk factor reduction measures to prevent injury from fall in the home  Outcome: Progressing  Goal: Use assistive devices by end of  the shift  Outcome: Progressing  Goal: Pace activities to prevent fatigue by end of the shift  Outcome: Progressing     Problem: Pain - Adult  Goal: Verbalizes/displays adequate comfort level or baseline comfort level  Outcome: Progressing     Problem: Safety - Adult  Goal: Free from fall injury  Outcome: Progressing     Problem: Discharge Planning  Goal: Discharge to home or other facility with appropriate resources  Outcome: Progressing     Problem: Chronic Conditions and Co-morbidities  Goal: Patient's chronic conditions and co-morbidity symptoms are monitored and maintained or improved  Outcome: Progressing     Problem: Nutrition  Goal: Nutrient intake appropriate for maintaining nutritional needs  Outcome: Progressing     Problem: Skin  Goal: Decreased wound size/increased tissue granulation at next dressing change  Outcome: Progressing  Goal: Participates in plan/prevention/treatment measures  Outcome: Progressing  Goal: Prevent/manage excess moisture  Outcome: Progressing  Goal: Prevent/minimize sheer/friction injuries  Outcome: Progressing  Goal: Promote/optimize nutrition  Outcome: Progressing  Goal: Promote skin healing  Outcome: Progressing     Problem: Safety - Medical Restraint  Goal: Remains free of injury from restraints (Restraint for Interference with Medical Device)  Outcome: Progressing  Goal: Free from restraint(s) (Restraint for Interference with Medical Device)  Outcome: Progressing   The patient's goals for the shift include      The clinical goals for the shift include pt will remain HDS during this shift

## 2025-06-01 NOTE — CARE PLAN
The patient's goals for the shift include  to remain safe throughout shift  Problem: Diabetes  Goal: Achieve decreasing blood glucose levels by end of shift  Outcome: Progressing     Problem: Diabetes  Goal: Increase stability of blood glucose readings by end of shift  Outcome: Progressing     Problem: Diabetes  Goal: Maintain electrolyte levels within acceptable range throughout shift  Outcome: Progressing     Problem: Diabetes  Goal: Maintain glucose levels >70mg/dl to <250mg/dl throughout shift  Outcome: Progressing     Problem: Diabetes  Goal: No changes in neurological exam by end of shift  Outcome: Progressing     Problem: Diabetes  Goal: Learn about and adhere to nutrition recommendations by end of shift  Outcome: Progressing     Problem: Diabetes  Goal: Vital signs within normal range for age by end of shift  Outcome: Progressing     Problem: Pain  Goal: Takes deep breaths with improved pain control throughout the shift  Outcome: Progressing     Problem: Pain  Goal: Turns in bed with improved pain control throughout the shift  Outcome: Progressing     Problem: Pain  Goal: Free from opioid side effects throughout the shift  Outcome: Progressing     Problem: Pain  Goal: Free from acute confusion related to pain meds throughout the shift  Outcome: Progressing     Problem: Fall/Injury  Goal: Not fall by end of shift  Outcome: Progressing     Problem: Fall/Injury  Goal: Be free from injury by end of the shift  Outcome: Progressing     Problem: Fall/Injury  Goal: Verbalize understanding of personal risk factors for fall in the hospital  Outcome: Progressing     Problem: Fall/Injury  Goal: Verbalize understanding of risk factor reduction measures to prevent injury from fall in the home  Outcome: Progressing     Problem: Pain - Adult  Goal: Verbalizes/displays adequate comfort level or baseline comfort level  Outcome: Progressing     Problem: Chronic Conditions and Co-morbidities  Goal: Patient's chronic conditions  and co-morbidity symptoms are monitored and maintained or improved  Outcome: Progressing     Problem: Nutrition  Goal: Nutrient intake appropriate for maintaining nutritional needs  Outcome: Progressing     Problem: Skin  Goal: Decreased wound size/increased tissue granulation at next dressing change  Outcome: Progressing     Problem: Skin  Goal: Participates in plan/prevention/treatment measures  Outcome: Progressing     Problem: Skin  Goal: Prevent/manage excess moisture  Outcome: Progressing     Problem: Skin  Goal: Prevent/minimize sheer/friction injuries  Outcome: Progressing       The clinical goals for the shift include pt will remain HDS during this shift    Over the shift, the patient did not make progress toward the following goals. Barriers to progression include . Recommendations to address these barriers include .

## 2025-06-01 NOTE — PROGRESS NOTES
"Jai Shrestha is a 55 y.o. male on day 16 of admission presenting with Pneumonia due to infectious organism, unspecified laterality, unspecified part of lung.    Subjective   On NC alert and oriented x3, has a sitter at bedside as patient keeps trying to get out of bed he is uncomfortable.  No fevers or chills.  No nausea no vomiting i       Objective     Physical Exam  Constitutional: Obese gentleman, appears drowsy, somnolent, but respond to questions, cooperative not in acute distress  Lungs: Patent airways, CTABL  Heart: RRR, S1S2, no murmurs appreciated, palpable pulses in all extremities  GI: Soft, NT, ND, bowel sounds present in all quadrants  MSK: Moves all extremities freely, no restriction  of ROM, no joint edema  Extremities: Chronic lymphedema with venous ulcers, bilateral lower extremities peripheral edema  Neurological: no acute focal neurological deficits appreciated  Last Recorded Vitals  Blood pressure 146/77, pulse 76, temperature 36.3 °C (97.3 °F), temperature source Temporal, resp. rate 18, height 1.803 m (5' 10.98\"), weight (!) 176 kg (388 lb 7.2 oz), SpO2 97%.  Intake/Output last 3 Shifts:  I/O last 3 completed shifts:  In: 630 (3.6 mL/kg) [P.O.:630]  Out: 2500 (14.2 mL/kg) [Urine:2500 (0.4 mL/kg/hr)]  Weight: 176.2 kg     Relevant Results  Results for orders placed or performed during the hospital encounter of 05/16/25 (from the past 24 hours)   POCT GLUCOSE   Result Value Ref Range    POCT Glucose 318 (H) 74 - 99 mg/dL   POCT GLUCOSE   Result Value Ref Range    POCT Glucose 234 (H) 74 - 99 mg/dL   Basic Metabolic Panel   Result Value Ref Range    Glucose 235 (H) 74 - 99 mg/dL    Sodium 143 136 - 145 mmol/L    Potassium 3.8 3.5 - 5.3 mmol/L    Chloride 102 98 - 107 mmol/L    Bicarbonate 34 (H) 21 - 32 mmol/L    Anion Gap 11 10 - 20 mmol/L    Urea Nitrogen 31 (H) 6 - 23 mg/dL    Creatinine 1.30 0.50 - 1.30 mg/dL    eGFR 65 >60 mL/min/1.73m*2    Calcium 8.8 8.6 - 10.3 mg/dL   Magnesium   Result " Value Ref Range    Magnesium 2.01 1.60 - 2.40 mg/dL   Phosphorus   Result Value Ref Range    Phosphorus 3.5 2.5 - 4.9 mg/dL   TSH with reflex to Free T4 if abnormal   Result Value Ref Range    Thyroid Stimulating Hormone 4.38 (H) 0.44 - 3.98 mIU/L   Thyroxine, Free   Result Value Ref Range    Thyroxine, Free 0.91 0.61 - 1.12 ng/dL   POCT GLUCOSE   Result Value Ref Range    POCT Glucose 221 (H) 74 - 99 mg/dL   POCT GLUCOSE   Result Value Ref Range    POCT Glucose 214 (H) 74 - 99 mg/dL   POCT GLUCOSE   Result Value Ref Range    POCT Glucose 188 (H) 74 - 99 mg/dL   CBC and Auto Differential   Result Value Ref Range    WBC 7.5 4.4 - 11.3 x10*3/uL    nRBC 0.0 0.0 - 0.0 /100 WBCs    RBC 4.32 (L) 4.50 - 5.90 x10*6/uL    Hemoglobin 9.9 (L) 13.5 - 17.5 g/dL    Hematocrit 36.0 (L) 41.0 - 52.0 %    MCV 83 80 - 100 fL    MCH 22.9 (L) 26.0 - 34.0 pg    MCHC 27.5 (L) 32.0 - 36.0 g/dL    RDW 16.3 (H) 11.5 - 14.5 %    Platelets 255 150 - 450 x10*3/uL    Neutrophils % 66.2 40.0 - 80.0 %    Immature Granulocytes %, Automated 0.5 0.0 - 0.9 %    Lymphocytes % 18.7 13.0 - 44.0 %    Monocytes % 8.4 2.0 - 10.0 %    Eosinophils % 5.4 0.0 - 6.0 %    Basophils % 0.8 0.0 - 2.0 %    Neutrophils Absolute 4.99 1.20 - 7.70 x10*3/uL    Immature Granulocytes Absolute, Automated 0.04 0.00 - 0.70 x10*3/uL    Lymphocytes Absolute 1.41 1.20 - 4.80 x10*3/uL    Monocytes Absolute 0.63 0.10 - 1.00 x10*3/uL    Eosinophils Absolute 0.41 0.00 - 0.70 x10*3/uL    Basophils Absolute 0.06 0.00 - 0.10 x10*3/uL   Comprehensive Metabolic Panel   Result Value Ref Range    Glucose 185 (H) 74 - 99 mg/dL    Sodium 145 136 - 145 mmol/L    Potassium 3.8 3.5 - 5.3 mmol/L    Chloride 104 98 - 107 mmol/L    Bicarbonate 36 (H) 21 - 32 mmol/L    Anion Gap 9 (L) 10 - 20 mmol/L    Urea Nitrogen 33 (H) 6 - 23 mg/dL    Creatinine 1.34 (H) 0.50 - 1.30 mg/dL    eGFR 63 >60 mL/min/1.73m*2    Calcium 8.8 8.6 - 10.3 mg/dL    Albumin 3.2 (L) 3.4 - 5.0 g/dL    Alkaline Phosphatase 84 33  - 120 U/L    Total Protein 6.6 6.4 - 8.2 g/dL    AST 17 9 - 39 U/L    Bilirubin, Total 0.5 0.0 - 1.2 mg/dL    ALT 12 10 - 52 U/L   POCT GLUCOSE   Result Value Ref Range    POCT Glucose 254 (H) 74 - 99 mg/dL       Imaging Results  Cxr:        IMPRESSION:  Ongoing CHF with mild interval worsening.      Tubes and lines in place as described.    Medications:  Scheduled Medications[1]   PRN Medications[2]     Assessment/Plan   55 y.o. male with a PMH of super obese, back pain on chronic opioids (Norco 7.5/325 3-4x per day), peripheral neuropathy, HTN, HLD, HFpEF, obesity-hypoventilation syndrome, T2DM on insulin, CKD, GERD admitted to the stepdown unit on 5/16 for acute on chronic diastolic CHF and pneumonia.  Transferred to the ICU on 5/23 following cardiac arrest - initially PEA then asystole - due to a respiratory event.  Patient intubated.  ROSC achieved after ~5 min.     #Cardiac arrest (PEA then asystole) with ROSC  #Acute hypoxic ischemia encephalopathy  #A fibb, new onset  #Back pain on chronic opioids  #Acute on chronic diastolic CHF  #Hypertension, Hyperlipidemia  #Acute hypoxemic respiratory failure  #Obesity Hypoventilation Syndrome  #T2DM on long-term insulin  #Bilateral pneumonia     -extubated on nc oxygen  - Continue home ASA, atorvastatin, and amlodipine.  Holding home lisinopril.  - Continue gentle diuresis with Lasix 40 mg BID as ordered.  - Advance tube feeds (Glucerna).  Dietician consulted.  - Continue current insulin regimen of Lantus 30 units (half his home dose) and sliding scale lispro.  - Seen by ID this admission.  Completed Zosynfor 10-day   -cardiology consult for new onset a fibb, in setting of recent cardiac arrest and h/o CHF      Pt/ot      DVT Prophylaxis:  Lovenox subq        Yoli Crow MD  [facility]           [1] amLODIPine, 5 mg, oral, Daily  apixaban, 5 mg, oral, q12h  aspirin, 81 mg, oral, Daily  atorvastatin, 80 mg, oral, Nightly  colchicine, 0.6 mg, oral,  Daily  docusate sodium, 100 mg, oral, BID  DULoxetine, 60 mg, oral, Daily  ergocalciferol, 1.25 mg, oral, Weekly  ferrous sulfate, 1 tablet, oral, Every Mon/Wed/Fri  furosemide, 40 mg, intravenous, BID  insulin glargine, 30 Units, subcutaneous, BID  insulin lispro, 0-15 Units, subcutaneous, q4h  ipratropium-albuteroL, 3 mL, nebulization, TID  lisinopril, 5 mg, oral, Daily  lubricating eye drops, 1 drop, Both Eyes, Daily  metoprolol tartrate, 25 mg, oral, BID  pantoprazole, 40 mg, oral, Daily before breakfast  polyethylene glycol, 17 g, oral, Daily  QUEtiapine, 100 mg, oral, Nightly  senna, 5 mL, oral, BID     [2] PRN medications: acetaminophen, dextrose, dextrose, glucagon, glucagon, haloperidol lactate, ipratropium-albuteroL, lactulose, naloxone, ondansetron **OR** ondansetron, oxygen

## 2025-06-01 NOTE — PROGRESS NOTES
Subjective Data:  Barely arousable today.  Do not think he wants to answer my questions today  Telemetry reviewed showing no further atrial fibrillation    Overnight Events:    None     Objective Data:  Last Recorded Vitals:  Vitals:    06/01/25 0030 06/01/25 0400 06/01/25 0712 06/01/25 0745   BP: 144/74 100/64  146/77   BP Location: Right arm Right arm  Right arm   Patient Position: Lying Lying  Lying   Pulse: 73 76     Resp: 19   18   Temp: 36.6 °C (97.9 °F) 36.6 °C (97.8 °F)  36.3 °C (97.3 °F)   TempSrc: Oral Axillary  Temporal   SpO2: 98% 99% 99% 97%   Weight:  (!) 176 kg (388 lb 7.2 oz)     Height:           Last Labs:  CBC - 6/1/2025:  5:02 AM  7.5 9.9 255    36.0      CMP - 6/1/2025:  5:02 AM  8.8 6.6 17 --- 0.5   3.5 3.2 12 84      PTT - 5/23/2025:  6:19 PM  1.3   14.3 32     TROPHS   Date/Time Value Ref Range Status   05/16/2025 02:39 PM 20 0 - 20 ng/L Final   05/16/2025 01:30 PM 20 0 - 20 ng/L Final   04/24/2025 05:41 AM 16 0 - 20 ng/L Final     BNP   Date/Time Value Ref Range Status   05/16/2025 01:32 PM 58 0 - 99 pg/mL Final   04/14/2025 07:22 PM 78 0 - 99 pg/mL Final     HGBA1C   Date/Time Value Ref Range Status   04/17/2025 06:40 AM 7.2 See comment % Final   06/11/2024 10:43 AM 9.0 see below % Final     LDLCALC   Date/Time Value Ref Range Status   04/15/2025 11:53 AM 30 <=99 mg/dL Final     Comment:                                 Near   Borderline      AGE      Desirable  Optimal    High     High     Very High     0-19 Y     0 - 109     ---    110-129   >/= 130     ----    20-24 Y     0 - 119     ---    120-159   >/= 160     ----      >24 Y     0 -  99   100-129  130-159   160-189     >/=190       VLDL   Date/Time Value Ref Range Status   04/15/2025 11:53 AM 50 0 - 40 mg/dL Final   12/24/2022 11:51 AM 47 0 - 40 mg/dL Final      Last I/O:  I/O last 3 completed shifts:  In: 630 (3.6 mL/kg) [P.O.:630]  Out: 2500 (14.2 mL/kg) [Urine:2500 (0.4 mL/kg/hr)]  Weight: 176.2 kg     Past Cardiology Tests (Last 3  Years):  Echo:  Echocardiogram 5/2025  1. Left ventricular ejection fraction is normal, by visual estimate at 55%.   2. Spectral Doppler shows a Grade II (pseudonormal pattern) of left ventricular diastolic filling with an elevated left atrial pressure.   3. Unable to determine right ventricular systolic function.   4. RV was poorly visualized due to sub-optimal acoustic windows     Echocardiogram 4/15/2025   1. Poorly visualized anatomical structures due to suboptimal image quality.   2. Left ventricular ejection fraction is low normal, by visual estimate at 50-55%.   3. Left ventricular diastolic filling is indeterminate.   4. Unable to determine right ventricular systolic function.   5. The left atrium is mildly dilated.     Echocardiogram 2/6/21:  CONCLUSIONS:   1. The left ventricular systolic function is normal with a 60-65% estimated ejection fraction.   2. The left atrium is mild to moderately dilated.   3. RVSP within normal limits.   4. The pulmonary artery is not well visualized.   5. Technically difficult, even with Definity.     Cath:     Stress Test:     Nuclear stress test 9/9/2016, indication dyspnea  FINDINGS:  Stress and rest images both demonstrate no sign of   ischemia.  A moderate fixed decrease in activity is present within   the inferior wall.  This area demonstrates normal systolic thickening   implying an attenuation artifact.       ECG-gated images demonstrate normal myocardial wall motion with an LV   ejection fraction of 48 percent (normal above 45 percent).     IMPRESSION:  Normal myocardial perfusion imaging in response to   pharmacologic stress with inferior attenuation artifact secondary to   the arms at the sides.    Ejection Fractions:  EF   Date/Time Value Ref Range Status   05/24/2025 02:15 PM 55 %    04/15/2025 03:05 PM 53 %      Cath:  No results found for this or any previous visit from the past 1095 days.    Stress Test:  No results found for this or any previous visit from  the past 1095 days.    Cardiac Imaging:  No results found for this or any previous visit from the past 1095 days.      Inpatient Medications:  Scheduled Medications[1]  PRN Medications[2]  Continuous Medications[3]    Physical Exam:  GENERAL: Somnolent but arousable  SKIN: warm, dry, no rash.  NECK: no JVD, no BENJAMIN  CARDIAC: Regular rate and rhythm with no rubs, murmurs, or gallops  CHEST: Normal respiratory efforts, lungs clear to auscultation bilaterally.  ABDOMEN: soft, nontender, nondistended  EXTREMITIES:1+ edema  NEURO: Alert and oriented x 3.  Grossly normal.  Moves all 4 extremities.       Assessment/Plan   55 y.o. male with past medical history of right MCA stroke (12/24), diabetes mellitus type II, CKD stage IIIa, COPD, RAMÍREZ, diastolic heart failure, iron deficiency anemia, anxiety, PTSD, bipolar, morbid obesity, chronic back pain admitted to the stepdown unit on 5/16 for acute on chronic diastolic CHF and pneumonia. Transferred to the ICU on 5/23 following cardiac arrest - initially PEA then asystole - due to a respiratory event ( patient self removed bipap and tried to get OOB) . Patient intubated. ROSC achieved after ~5 min. Extubated on 5/28 and transferred out of the ICU on 5/29.     Cardiology consulted for a fib episode, CHF>      Rapid response called 5/30/2025 at 2229 for tachycardia. Patient HR abruptly went to 150s, also with change in mentation. Patient was awake, but staring blankly.  Speech garbled.  He went down for head CT that was negative for any acute process.      Patient in bedside chair.  Unable to summarize events of current hospitalization.  He currently denies any concerns. Denies any chest pain or angina. SOB with exertion.  Chronic LE swelling.      Home CV medications:  Amlodipine 5 mg p.o. daily  Lisinopril 5mg daily  Aspirin 81 mg p.o. daily  atorvastatin 80 mg p.o. daily  torsemide 60 mg p.o. twice daily      Assessment:     # Respiratory arrest (PEA then asystole) on 5/23/2025  with ROSC  # Acute hypoxic ischemic encephalopathy     # Single Episode of atrial fibrillation with RVR. 5/30/2025              - converted to NSR after digoxin load and IV metoprolol x 2.              - currently maintaining NSR.   -[id number] of 3 and favor anticoagulation.  Also with his morbid obesity he is high thrombotic risk.  -Continue Eliquis and metoprolol which had been started this admission    # Acute on chronic diastolic heart failure              - net negative 13L this admission              - BNP historically low, last 58 on 5/16/2025     # Bilateral pneumonia              - completed abx course of 10 days Zosyn     Plan:  -Continue Eliquis and metoprolol  - Continue home ASA, atorvastatin, and amlodipine.  Holding home lisinopril.  - Continue gentle diuresis with Lasix 40 mg BID as ordered.  - Strict I/O's. Daily weights. Monitor Mag/K and supplement to >2/4. Close monitoring of renal function.       Code Status:  Full Code        Dann Loyola DO       [1]   Scheduled medications   Medication Dose Route Frequency    amLODIPine  5 mg oral Daily    apixaban  5 mg oral q12h    aspirin  81 mg oral Daily    atorvastatin  80 mg oral Nightly    colchicine  0.6 mg oral Daily    docusate sodium  100 mg oral BID    DULoxetine  60 mg oral Daily    ergocalciferol  1.25 mg oral Weekly    ferrous sulfate  1 tablet oral Every Mon/Wed/Fri    furosemide  40 mg intravenous BID    insulin glargine  30 Units subcutaneous BID    insulin lispro  0-15 Units subcutaneous q4h    ipratropium-albuteroL  3 mL nebulization TID    lisinopril  5 mg oral Daily    lubricating eye drops  1 drop Both Eyes Daily    metoprolol tartrate  25 mg oral BID    pantoprazole  40 mg oral Daily before breakfast    polyethylene glycol  17 g oral Daily    QUEtiapine  100 mg oral Nightly    senna  5 mL oral BID   [2]   PRN medications   Medication    acetaminophen    dextrose    dextrose    glucagon    glucagon    haloperidol lactate     ipratropium-albuteroL    lactulose    naloxone    ondansetron    Or    ondansetron    oxygen   [3]   Continuous Medications   Medication Dose Last Rate

## 2025-06-02 LAB
ANION GAP BLDA CALCULATED.4IONS-SCNC: 4 MMO/L (ref 10–25)
ANION GAP SERPL CALC-SCNC: 9 MMOL/L (ref 10–20)
ATRIAL RATE: 132 BPM
ATRIAL RATE: 77 BPM
BASE EXCESS BLDA CALC-SCNC: 11.1 MMOL/L (ref -2–3)
BASOPHILS # BLD AUTO: 0.04 X10*3/UL (ref 0–0.1)
BASOPHILS NFR BLD AUTO: 0.5 %
BODY TEMPERATURE: 37 DEGREES CELSIUS
BUN SERPL-MCNC: 25 MG/DL (ref 6–23)
CA-I BLDA-SCNC: 1.18 MMOL/L (ref 1.1–1.33)
CALCIUM SERPL-MCNC: 8.6 MG/DL (ref 8.6–10.3)
CHLORIDE BLDA-SCNC: 100 MMOL/L (ref 98–107)
CHLORIDE SERPL-SCNC: 102 MMOL/L (ref 98–107)
CO2 SERPL-SCNC: 36 MMOL/L (ref 21–32)
CREAT SERPL-MCNC: 1.07 MG/DL (ref 0.5–1.3)
EGFRCR SERPLBLD CKD-EPI 2021: 82 ML/MIN/1.73M*2
EOSINOPHIL # BLD AUTO: 0.44 X10*3/UL (ref 0–0.7)
EOSINOPHIL NFR BLD AUTO: 5.6 %
ERYTHROCYTE [DISTWIDTH] IN BLOOD BY AUTOMATED COUNT: 16 % (ref 11.5–14.5)
GLUCOSE BLD MANUAL STRIP-MCNC: 137 MG/DL (ref 74–99)
GLUCOSE BLD MANUAL STRIP-MCNC: 182 MG/DL (ref 74–99)
GLUCOSE BLD MANUAL STRIP-MCNC: 303 MG/DL (ref 74–99)
GLUCOSE BLD MANUAL STRIP-MCNC: 310 MG/DL (ref 74–99)
GLUCOSE BLD MANUAL STRIP-MCNC: 336 MG/DL (ref 74–99)
GLUCOSE BLDA-MCNC: 325 MG/DL (ref 74–99)
GLUCOSE SERPL-MCNC: 150 MG/DL (ref 74–99)
HCO3 BLDA-SCNC: 37.2 MMOL/L (ref 22–26)
HCT VFR BLD AUTO: 34.6 % (ref 41–52)
HCT VFR BLD EST: 32 % (ref 41–52)
HGB BLD-MCNC: 9.9 G/DL (ref 13.5–17.5)
HGB BLDA-MCNC: 10.6 G/DL (ref 13.5–17.5)
IMM GRANULOCYTES # BLD AUTO: 0.04 X10*3/UL (ref 0–0.7)
IMM GRANULOCYTES NFR BLD AUTO: 0.5 % (ref 0–0.9)
INHALED O2 CONCENTRATION: 28 %
LACTATE BLDA-SCNC: 0.6 MMOL/L (ref 0.4–2)
LYMPHOCYTES # BLD AUTO: 1.14 X10*3/UL (ref 1.2–4.8)
LYMPHOCYTES NFR BLD AUTO: 14.4 %
MCH RBC QN AUTO: 22.9 PG (ref 26–34)
MCHC RBC AUTO-ENTMCNC: 28.6 G/DL (ref 32–36)
MCV RBC AUTO: 80 FL (ref 80–100)
MONOCYTES # BLD AUTO: 0.52 X10*3/UL (ref 0.1–1)
MONOCYTES NFR BLD AUTO: 6.6 %
NEUTROPHILS # BLD AUTO: 5.72 X10*3/UL (ref 1.2–7.7)
NEUTROPHILS NFR BLD AUTO: 72.4 %
NRBC BLD-RTO: 0 /100 WBCS (ref 0–0)
OXYHGB MFR BLDA: 94.5 % (ref 94–98)
P AXIS: 31 DEGREES
P AXIS: 34 DEGREES
P OFFSET: 178 MS
P OFFSET: 180 MS
P ONSET: 117 MS
P ONSET: 144 MS
PCO2 BLDA: 56 MM HG (ref 38–42)
PH BLDA: 7.43 PH (ref 7.38–7.42)
PLATELET # BLD AUTO: 264 X10*3/UL (ref 150–450)
PO2 BLDA: 86 MM HG (ref 85–95)
POTASSIUM BLDA-SCNC: 3.9 MMOL/L (ref 3.5–5.3)
POTASSIUM SERPL-SCNC: 3.7 MMOL/L (ref 3.5–5.3)
PR INTERVAL: 160 MS
PR INTERVAL: 182 MS
Q ONSET: 208 MS
Q ONSET: 224 MS
QRS COUNT: 13 BEATS
QRS COUNT: 22 BEATS
QRS DURATION: 102 MS
QRS DURATION: 88 MS
QT INTERVAL: 228 MS
QT INTERVAL: 360 MS
QTC CALCULATION(BAZETT): 337 MS
QTC CALCULATION(BAZETT): 407 MS
QTC FREDERICIA: 296 MS
QTC FREDERICIA: 391 MS
R AXIS: 38 DEGREES
R AXIS: 54 DEGREES
RBC # BLD AUTO: 4.32 X10*6/UL (ref 4.5–5.9)
SAO2 % BLDA: 97 % (ref 94–100)
SODIUM BLDA-SCNC: 137 MMOL/L (ref 136–145)
SODIUM SERPL-SCNC: 143 MMOL/L (ref 136–145)
T AXIS: 122 DEGREES
T AXIS: 173 DEGREES
T OFFSET: 338 MS
T OFFSET: 388 MS
VENTRICULAR RATE: 132 BPM
VENTRICULAR RATE: 77 BPM
WBC # BLD AUTO: 7.9 X10*3/UL (ref 4.4–11.3)

## 2025-06-02 PROCEDURE — 2500000001 HC RX 250 WO HCPCS SELF ADMINISTERED DRUGS (ALT 637 FOR MEDICARE OP)

## 2025-06-02 PROCEDURE — 2500000001 HC RX 250 WO HCPCS SELF ADMINISTERED DRUGS (ALT 637 FOR MEDICARE OP): Performed by: NURSE PRACTITIONER

## 2025-06-02 PROCEDURE — 2500000002 HC RX 250 W HCPCS SELF ADMINISTERED DRUGS (ALT 637 FOR MEDICARE OP, ALT 636 FOR OP/ED)

## 2025-06-02 PROCEDURE — 97112 NEUROMUSCULAR REEDUCATION: CPT | Mod: GP

## 2025-06-02 PROCEDURE — 2500000005 HC RX 250 GENERAL PHARMACY W/O HCPCS

## 2025-06-02 PROCEDURE — 97165 OT EVAL LOW COMPLEX 30 MIN: CPT | Mod: GO

## 2025-06-02 PROCEDURE — 82374 ASSAY BLOOD CARBON DIOXIDE: CPT | Performed by: INTERNAL MEDICINE

## 2025-06-02 PROCEDURE — 2500000004 HC RX 250 GENERAL PHARMACY W/ HCPCS (ALT 636 FOR OP/ED)

## 2025-06-02 PROCEDURE — 84132 ASSAY OF SERUM POTASSIUM: CPT | Performed by: INTERNAL MEDICINE

## 2025-06-02 PROCEDURE — 82947 ASSAY GLUCOSE BLOOD QUANT: CPT

## 2025-06-02 PROCEDURE — 9420000001 HC RT PATIENT EDUCATION 5 MIN

## 2025-06-02 PROCEDURE — 99232 SBSQ HOSP IP/OBS MODERATE 35: CPT | Performed by: NURSE PRACTITIONER

## 2025-06-02 PROCEDURE — 2500000004 HC RX 250 GENERAL PHARMACY W/ HCPCS (ALT 636 FOR OP/ED): Performed by: INTERNAL MEDICINE

## 2025-06-02 PROCEDURE — 2060000001 HC INTERMEDIATE ICU ROOM DAILY

## 2025-06-02 PROCEDURE — 2500000005 HC RX 250 GENERAL PHARMACY W/O HCPCS: Performed by: INTERNAL MEDICINE

## 2025-06-02 PROCEDURE — 85025 COMPLETE CBC W/AUTO DIFF WBC: CPT | Performed by: INTERNAL MEDICINE

## 2025-06-02 PROCEDURE — 99232 SBSQ HOSP IP/OBS MODERATE 35: CPT | Performed by: INTERNAL MEDICINE

## 2025-06-02 PROCEDURE — 97162 PT EVAL MOD COMPLEX 30 MIN: CPT | Mod: GP

## 2025-06-02 PROCEDURE — 94640 AIRWAY INHALATION TREATMENT: CPT

## 2025-06-02 RX ORDER — HYDROMORPHONE HYDROCHLORIDE 1 MG/ML
0.6 INJECTION, SOLUTION INTRAMUSCULAR; INTRAVENOUS; SUBCUTANEOUS ONCE
Status: COMPLETED | OUTPATIENT
Start: 2025-06-02 | End: 2025-06-02

## 2025-06-02 RX ADMIN — COLCHICINE 0.6 MG: 0.6 TABLET, FILM COATED ORAL at 08:41

## 2025-06-02 RX ADMIN — IPRATROPIUM BROMIDE AND ALBUTEROL SULFATE 3 ML: 2.5; .5 SOLUTION RESPIRATORY (INHALATION) at 07:35

## 2025-06-02 RX ADMIN — CARBOXYMETHYLCELLULOSE SODIUM 1 DROP: 0.5 SOLUTION/ DROPS OPHTHALMIC at 08:42

## 2025-06-02 RX ADMIN — INSULIN GLARGINE 30 UNITS: 100 INJECTION, SOLUTION SUBCUTANEOUS at 20:49

## 2025-06-02 RX ADMIN — FERROUS SULFATE TAB 325 MG (65 MG ELEMENTAL FE) 1 TABLET: 325 (65 FE) TAB at 17:20

## 2025-06-02 RX ADMIN — QUETIAPINE FUMARATE 100 MG: 100 TABLET ORAL at 20:49

## 2025-06-02 RX ADMIN — FUROSEMIDE 40 MG: 10 INJECTION, SOLUTION INTRAMUSCULAR; INTRAVENOUS at 08:42

## 2025-06-02 RX ADMIN — HYDROMORPHONE HYDROCHLORIDE 0.6 MG: 1 INJECTION, SOLUTION INTRAMUSCULAR; INTRAVENOUS; SUBCUTANEOUS at 01:32

## 2025-06-02 RX ADMIN — ASPIRIN 81 MG CHEWABLE TABLET 81 MG: 81 TABLET CHEWABLE at 08:42

## 2025-06-02 RX ADMIN — METOPROLOL TARTRATE 25 MG: 25 TABLET, FILM COATED ORAL at 08:41

## 2025-06-02 RX ADMIN — INSULIN LISPRO 6 UNITS: 100 INJECTION, SOLUTION INTRAVENOUS; SUBCUTANEOUS at 00:26

## 2025-06-02 RX ADMIN — FUROSEMIDE 40 MG: 10 INJECTION, SOLUTION INTRAMUSCULAR; INTRAVENOUS at 20:49

## 2025-06-02 RX ADMIN — DOCUSATE SODIUM LIQUID 100 MG: 100 LIQUID ORAL at 20:49

## 2025-06-02 RX ADMIN — PANTOPRAZOLE SODIUM 40 MG: 40 TABLET, DELAYED RELEASE ORAL at 06:03

## 2025-06-02 RX ADMIN — INSULIN LISPRO 12 UNITS: 100 INJECTION, SOLUTION INTRAVENOUS; SUBCUTANEOUS at 17:20

## 2025-06-02 RX ADMIN — AMLODIPINE BESYLATE 5 MG: 5 TABLET ORAL at 08:41

## 2025-06-02 RX ADMIN — ATORVASTATIN CALCIUM 80 MG: 80 TABLET, FILM COATED ORAL at 20:48

## 2025-06-02 RX ADMIN — INSULIN LISPRO 12 UNITS: 100 INJECTION, SOLUTION INTRAVENOUS; SUBCUTANEOUS at 20:57

## 2025-06-02 RX ADMIN — APIXABAN 5 MG: 5 TABLET, FILM COATED ORAL at 06:02

## 2025-06-02 RX ADMIN — INSULIN LISPRO 3 UNITS: 100 INJECTION, SOLUTION INTRAVENOUS; SUBCUTANEOUS at 08:49

## 2025-06-02 RX ADMIN — IPRATROPIUM BROMIDE AND ALBUTEROL SULFATE 3 ML: 2.5; .5 SOLUTION RESPIRATORY (INHALATION) at 20:29

## 2025-06-02 RX ADMIN — DOCUSATE SODIUM LIQUID 100 MG: 100 LIQUID ORAL at 08:41

## 2025-06-02 RX ADMIN — INSULIN GLARGINE 30 UNITS: 100 INJECTION, SOLUTION SUBCUTANEOUS at 08:42

## 2025-06-02 RX ADMIN — METOPROLOL TARTRATE 25 MG: 25 TABLET, FILM COATED ORAL at 20:49

## 2025-06-02 RX ADMIN — APIXABAN 5 MG: 5 TABLET, FILM COATED ORAL at 17:20

## 2025-06-02 RX ADMIN — INSULIN LISPRO 12 UNITS: 100 INJECTION, SOLUTION INTRAVENOUS; SUBCUTANEOUS at 13:22

## 2025-06-02 RX ADMIN — LISINOPRIL 5 MG: 5 TABLET ORAL at 08:42

## 2025-06-02 RX ADMIN — IPRATROPIUM BROMIDE AND ALBUTEROL SULFATE 3 ML: 2.5; .5 SOLUTION RESPIRATORY (INHALATION) at 12:00

## 2025-06-02 RX ADMIN — ERGOCALCIFEROL 1.25 MG: 1.25 CAPSULE ORAL at 08:41

## 2025-06-02 RX ADMIN — Medication 1 L/MIN: at 20:29

## 2025-06-02 RX ADMIN — DULOXETINE 60 MG: 30 CAPSULE, DELAYED RELEASE ORAL at 08:41

## 2025-06-02 RX ADMIN — POLYETHYLENE GLYCOL 3350 17 G: 17 POWDER, FOR SOLUTION ORAL at 08:42

## 2025-06-02 RX ADMIN — ACETAMINOPHEN 650 MG: 325 TABLET, FILM COATED ORAL at 00:25

## 2025-06-02 ASSESSMENT — PAIN DESCRIPTION - LOCATION
LOCATION: ABDOMEN
LOCATION: ABDOMEN

## 2025-06-02 ASSESSMENT — PAIN - FUNCTIONAL ASSESSMENT
PAIN_FUNCTIONAL_ASSESSMENT: 0-10

## 2025-06-02 ASSESSMENT — COGNITIVE AND FUNCTIONAL STATUS - GENERAL
CLIMB 3 TO 5 STEPS WITH RAILING: TOTAL
DAILY ACTIVITIY SCORE: 14
TURNING FROM BACK TO SIDE WHILE IN FLAT BAD: TOTAL
DRESSING REGULAR LOWER BODY CLOTHING: TOTAL
EATING MEALS: TOTAL
MOBILITY SCORE: 6
HELP NEEDED FOR BATHING: TOTAL
TURNING FROM BACK TO SIDE WHILE IN FLAT BAD: A LOT
MOVING FROM LYING ON BACK TO SITTING ON SIDE OF FLAT BED WITH BEDRAILS: A LOT
MOVING TO AND FROM BED TO CHAIR: TOTAL
DAILY ACTIVITIY SCORE: 6
STANDING UP FROM CHAIR USING ARMS: TOTAL
DRESSING REGULAR UPPER BODY CLOTHING: A LOT
HELP NEEDED FOR BATHING: A LOT
CLIMB 3 TO 5 STEPS WITH RAILING: TOTAL
TOILETING: TOTAL
MOVING TO AND FROM BED TO CHAIR: TOTAL
MOVING FROM LYING ON BACK TO SITTING ON SIDE OF FLAT BED WITH BEDRAILS: TOTAL
PERSONAL GROOMING: TOTAL
DRESSING REGULAR LOWER BODY CLOTHING: TOTAL
WALKING IN HOSPITAL ROOM: TOTAL
WALKING IN HOSPITAL ROOM: TOTAL
STANDING UP FROM CHAIR USING ARMS: TOTAL
DRESSING REGULAR UPPER BODY CLOTHING: TOTAL
MOBILITY SCORE: 8
TOILETING: TOTAL

## 2025-06-02 ASSESSMENT — PAIN SCALES - GENERAL
PAINLEVEL_OUTOF10: 8
PAINLEVEL_OUTOF10: 8
PAINLEVEL_OUTOF10: 0 - NO PAIN
PAINLEVEL_OUTOF10: 8
PAINLEVEL_OUTOF10: 3
PAINLEVEL_OUTOF10: 0 - NO PAIN
PAINLEVEL_OUTOF10: 0 - NO PAIN

## 2025-06-02 ASSESSMENT — ACTIVITIES OF DAILY LIVING (ADL)
ADL_ASSISTANCE: INDEPENDENT
BATHING_ASSISTANCE: MAXIMAL
ADL_ASSISTANCE: INDEPENDENT

## 2025-06-02 ASSESSMENT — PAIN DESCRIPTION - DESCRIPTORS
DESCRIPTORS: ACHING;THROBBING
DESCRIPTORS: ACHING;THROBBING

## 2025-06-02 NOTE — PROGRESS NOTES
Transitional Care Coordination Progress Note:  Plan per Medical/Surgical team: treatment of bilat PN, CHF & cardio pulm arrest with IV lasix, oxygen @ 3 liters NC, cardio & ID following (completed IV ATB course)   Status: Inpatient day 17  Payor source: medicare A/B  Discharge disposition: Home alone with private aide & [facility]  ?new SNF needed, PT/OT evals pending, poor patient participation    Potential Barriers: ?need oxygen set up @ home  ADOD: 6/5/2025   SUNG Lyles RN, BSN Transitional Care Coordinator ED# [phone]     PT rec MOD. Discussed purpose & benefits of SNF. Patient provided choice list for new snf. Patient still very hopeful to go home.  Referrals sent to:  [facility]  [facility]  [facility]  [facility]     06/02/25 0831   Discharge Planning   Assistance Needed oxygen- ?need set up @ home, need PT/OT evals- possible SNF placement   Stroke Family Assessment   Stroke Family Assessment Needed No   Intensity of Service   Intensity of Service 0-30 min

## 2025-06-02 NOTE — PROGRESS NOTES
Occupational Therapy    Evaluation    Patient Name: Jai Shrestha  MRN: [id number]  Department: [facility]   Room: [address] [place] [address]  Today's Date: 6/2/2025  Time Calculation  Start Time: 0902  Stop Time: 0930  Time Calculation (min): 28 min    Assessment  IP OT Assessment  OT Assessment: Pt seen for OT eval. Pt demonstrates with decreased endurance, assist with mobility and ADLS.  Prognosis: Good  Barriers to Discharge Home: Caregiver assistance, Physical needs  Caregiver Assistance: Patient lives alone and/or does not have reliable caregiver assistance  Physical Needs: 24hr mobility assistance needed, 24hr ADL assistance needed, High falls risk due to function or environment  Evaluation/Treatment Tolerance: Patient limited by fatigue  Medical Staff Made Aware: Yes  End of Session Communication: Bedside nurse  End of Session Patient Position: Bed, 4 rail up, Alarm on (pt requests rails up for safety due to not liking bad)  Plan:  Treatment Interventions: ADL retraining, Functional transfer training, Endurance training, Equipment evaluation/education, Neuromuscular reeducation, Patient/family training  OT Frequency: 3 times per week  OT Discharge Recommendations: Moderate intensity level of continued care  Equipment Recommended upon Discharge:  (TBD)  OT Recommended Transfer Status: Dependent, Assist of 2 (bed mobility)  OT - OK to Discharge: Yes (Per POC)    Subjective   Current Problem:  1. Pneumonia due to infectious organism, unspecified laterality, unspecified part of lung  Referral to Home Care    levoFLOXacin (Levaquin) 750 mg tablet    Referral to Healthy at Home Program    Oxygen therapy for home    CANCELED: Oxygen therapy for home      2. Hypoxia        3. Cardiac arrest  Transthoracic Echo (TTE) Limited    Transthoracic Echo (TTE) Limited        OT Visit Info:  OT Received On: 06/02/25  General Visit Info:  General  Reason for Referral: SOB  Referred By: DAMASO DÍAZ  Past Medical History Relevant to Rehab: Medical  History[1]    Family/Caregiver Present: No  Co-Treatment: PT  Co-Treatment Reason: Co eval with PT to maximize pt safety, participation and mobility  Prior to Session Communication: Bedside nurse  Patient Position Received: Bed, 4 rail up, Alarm on (pt requesting rails up for safety does not lke bed)  Preferred Learning Style: kinesthetic, auditory, verbal, visual, written  Precautions:  Medical Precautions: Fall precautions    Pain:  Pain Assessment  Pain Assessment: 0-10  0-10 (Numeric) Pain Score: 8  Pain Type: Acute pain  Pain Location: Leg  Pain Orientation: Right, Left  Pain Descriptors: Aching, Throbbing  Pain Frequency: Constant/continuous  Pain Interventions: Repositioned, Ambulation/increased activity, Distraction    Objective   Cognition:  Overall Cognitive Status: Within Functional Limits  Orientation Level: Oriented X4  Attention: Within Functional Limits  Memory: Within Funtional Limits  Insight: Within function limits  Impulsive: Mildly           Home Living:  Type of Home: House  Lives With: Alone  Home Adaptive Equipment: Walker rolling or standard, Cane, Reacher, Sock aid  Home Layout: One level  Home Access: Stairs to enter with rails  Entrance Stairs-Rails:  (unilateral)  Entrance Stairs-Number of Steps: 3  Bathroom Shower/Tub: Tub/shower unit  Bathroom Toilet: Handicapped height  Bathroom Equipment: Grab bars in shower (reports shower chair does not fit in shower)  Home Living Comments: reports neighbors assist with garbage, friends assist with transportation   Prior Function:  Level of [place]: Independent with homemaking with ambulation, Independent with ADLs and functional transfers  Receives Help From: Neighbor  ADL Assistance: Independent  Homemaking Assistance: Independent  Ambulatory Assistance: Independent (rollator)  Hand Dominance: Right  IADL History:  Homemaking Responsibilities: Yes  Meal Prep Responsibility: Primary  Laundry Responsibility: Primary  Cleaning Responsibility:  Primary  Bill Paying/Finance Responsibility: Primary  Current License: No  Mode of Transportation: Car, Friends  ADL:  Eating Assistance: Independent  Grooming Assistance: Stand by  Grooming Deficit: Setup  Bathing Assistance: Maximal  Bathing Deficit: Right lower leg including foot, Left lower leg including foot, Right upper leg, Left upper leg, Buttocks  UE Dressing Assistance: Moderate  UE Dressing Deficit: Setup, Increased time to complete, Pull down in back, Pull around back, Pull over head  LE Dressing Assistance: Total  LE Dressing Deficit: Don/doff R sock, Don/doff L sock  Toileting Assistance with Device: Total  Activity Tolerance:  Endurance: Tolerates 10 - 20 min exercise with multiple rests  Activity Tolerance Comments: fatigue with activity  Bed Mobility/Transfers: Bed Mobility  Bed Mobility: Yes  Bed Mobility 1  Bed Mobility 1: Supine to sitting, Sitting to supine  Level of Assistance 1: Dependent, +2, Maximum tactile cues, Maximum verbal cues    Sitting Balance:  Static Sitting Balance  Static Sitting-Balance Support: Feet supported, Bilateral upper extremity supported  Static Sitting-Level of Assistance:  (min A- CGA)  Modalities:     IADL's:   Homemaking Responsibilities: Yes  Meal Prep Responsibility: Primary  Laundry Responsibility: Primary  Cleaning Responsibility: Primary  Bill Paying/Finance Responsibility: Primary  Current License: No  Mode of Transportation: Car, Friends  Vision: Vision - Basic Assessment  Current Vision: No visual deficits  Sensation:  Light Touch: No apparent deficits  Strength:  Strength Comments: B UE WFL  Coordination:  Movements are Fluid and Coordinated: Yes  Finger to Nose: Intact  Finger to Target: Intact  Coordination Comment: intact   Hand Function:  Hand Function  Gross Grasp: Functional  Coordination: Functional  Extremities: RUE   RUE : Within Functional Limits and LUE   LUE: Within Functional Limits    Outcome Measures: [facility] Daily Activity  Putting on and  taking off regular lower body clothing: Total  Bathing (including washing, rinsing, drying): A lot  Putting on and taking off regular upper body clothing: A lot  Toileting, which includes using toilet, bedpan or urinal: Total  Taking care of personal grooming such as brushing teeth: None  Eating Meals: None  Daily Activity - Total Score: 14      Education Documentation  Precautions, taught by Melanie Peguero OT at 6/2/2025 11:18 AM.  Learner: Patient  Readiness: Acceptance  Method: Explanation, Demonstration  Response: Verbalizes Understanding, Needs Reinforcement    Handouts, taught by Melanie Peguero OT at 6/2/2025 11:18 AM.  Learner: Patient  Readiness: Acceptance  Method: Explanation, Demonstration  Response: Verbalizes Understanding, Needs Reinforcement    ADL Training, taught by Melanie Peguero OT at 6/2/2025 11:18 AM.  Learner: Patient  Readiness: Acceptance  Method: Explanation, Demonstration  Response: Verbalizes Understanding, Needs Reinforcement    Education Comments  No comments found.      Goals:   Encounter Problems       Encounter Problems (Active)       ADLs       Patient will perform UB and LB bathing 50% with moderate assist level of assistance and adaptive equipment prn . (Progressing)       Start:  06/02/25    Expected End:  06/16/25            Patient with complete upper body dressing with contact guard assist level of assistance donning and doffing all UE clothes with no adaptive equipment while supported sitting (Progressing)       Start:  06/02/25    Expected End:  06/16/25            Patient with complete lower body dressing with moderate assist level of assistance donning and doffing all LE clothes  with PRN adaptive equipment while supported sitting (Progressing)       Start:  06/02/25    Expected End:  06/16/25            Pt will tolerate 30 min OT tx session w/o subj/obj c/o fatigue/SOB with activity to increase independence with ADLS (Progressing)       Start:  06/02/25     Expected End:  06/16/25               TRANSFERS       Patient will perform bed mobility moderate assist level of assistance and bed rail/draw sheet  in order to improve safety and independence with mobility (Progressing)       Start:  06/02/25    Expected End:  06/16/25                      [1]   Past Medical History:  Diagnosis Date    Chronic back pain     Chronic pain disorder 12 15  2015    CKD (chronic kidney disease)     Depression 02 27 1986    Diabetes mellitus (Multi)     Extremity pain 12 15 2015    HF (heart failure), diastolic     Hypertension     Joint pain     Low back pain     Neuropathy in diabetes (Multi)     Peripheral neuropathy 12 15 2015    Spinal stenosis

## 2025-06-02 NOTE — CARE PLAN
Problem: Diabetes  Goal: Achieve decreasing blood glucose levels by end of shift  Outcome: Progressing  Goal: Increase stability of blood glucose readings by end of shift  Outcome: Progressing  Goal: Decrease in ketones present in urine by end of shift  Outcome: Progressing  Goal: Maintain electrolyte levels within acceptable range throughout shift  Outcome: Progressing  Goal: Maintain glucose levels >70mg/dl to <250mg/dl throughout shift  Outcome: Progressing  Goal: No changes in neurological exam by end of shift  Outcome: Progressing  Goal: Learn about and adhere to nutrition recommendations by end of shift  Outcome: Progressing  Goal: Vital signs within normal range for age by end of shift  Outcome: Progressing  Goal: Increase self care and/or family involovement by end of shift  Outcome: Progressing  Goal: Receive DSME education by end of shift  Outcome: Progressing     Problem: Pain  Goal: Takes deep breaths with improved pain control throughout the shift  Outcome: Progressing  Goal: Turns in bed with improved pain control throughout the shift  Outcome: Progressing  Goal: Walks with improved pain control throughout the shift  Outcome: Progressing  Goal: Performs ADL's with improved pain control throughout shift  Outcome: Progressing  Goal: Participates in PT with improved pain control throughout the shift  Outcome: Progressing  Goal: Free from opioid side effects throughout the shift  Outcome: Progressing  Goal: Free from acute confusion related to pain meds throughout the shift  Outcome: Progressing     Problem: Fall/Injury  Goal: Not fall by end of shift  Outcome: Progressing  Goal: Be free from injury by end of the shift  Outcome: Progressing  Goal: Verbalize understanding of personal risk factors for fall in the hospital  Outcome: Progressing  Goal: Verbalize understanding of risk factor reduction measures to prevent injury from fall in the home  Outcome: Progressing  Goal: Use assistive devices by end of  the shift  Outcome: Progressing  Goal: Pace activities to prevent fatigue by end of the shift  Outcome: Progressing     Problem: Pain - Adult  Goal: Verbalizes/displays adequate comfort level or baseline comfort level  Outcome: Progressing     Problem: Safety - Adult  Goal: Free from fall injury  Outcome: Progressing     Problem: Discharge Planning  Goal: Discharge to home or other facility with appropriate resources  Outcome: Progressing     Problem: Chronic Conditions and Co-morbidities  Goal: Patient's chronic conditions and co-morbidity symptoms are monitored and maintained or improved  Outcome: Progressing     Problem: Nutrition  Goal: Nutrient intake appropriate for maintaining nutritional needs  Outcome: Progressing     Problem: Skin  Goal: Decreased wound size/increased tissue granulation at next dressing change  Outcome: Progressing  Goal: Participates in plan/prevention/treatment measures  Outcome: Progressing  Goal: Prevent/manage excess moisture  Outcome: Progressing  Goal: Prevent/minimize sheer/friction injuries  Outcome: Progressing  Goal: Promote/optimize nutrition  Outcome: Progressing  Goal: Promote skin healing  Outcome: Progressing     Problem: Safety - Medical Restraint  Goal: Remains free of injury from restraints (Restraint for Interference with Medical Device)  Outcome: Progressing  Goal: Free from restraint(s) (Restraint for Interference with Medical Device)  Outcome: Progressing   The patient's goals for the shift include      The clinical goals for the shift include pt will remain free from harm throughout the shift

## 2025-06-02 NOTE — PROGRESS NOTES
"Jai Shrestha is a 55 y.o. male on day 17 of admission presenting with Pneumonia due to infectious organism, unspecified laterality, unspecified part of lung.    Subjective   Patient on my initial evaluation was lethargic but after that patient woke up was on the phone alert oriented x 3 I did order an ABG overall doing well no overnight events worked with PT this morning       Objective     Physical Exam  Constitutional: Obese gentleman, appears drowsy, somnolent, but respond to questions, cooperative not in acute distress  Lungs: Patent airways, CTABL  Heart: RRR, S1S2, no murmurs appreciated, palpable pulses in all extremities  GI: Soft, NT, ND, bowel sounds present in all quadrants  MSK: Moves all extremities freely, no restriction  of ROM, no joint edema  Extremities: Chronic lymphedema with venous ulcers, bilateral lower extremities peripheral edema  Neurological: no acute focal neurological deficits appreciated  Last Recorded Vitals  Blood pressure 158/77, pulse 74, temperature 36.5 °C (97.7 °F), temperature source Temporal, resp. rate 20, height 1.803 m (5' 10.98\"), weight (!) 174 kg (383 lb 13.1 oz), SpO2 96%.  Intake/Output last 3 Shifts:  I/O last 3 completed shifts:  In: 630 (3.6 mL/kg) [P.O.:630]  Out: 3475 (20 mL/kg) [Urine:3475 (0.6 mL/kg/hr)]  Weight: 174.1 kg     Relevant Results  Results for orders placed or performed during the hospital encounter of 05/16/25 (from the past 24 hours)   POCT GLUCOSE   Result Value Ref Range    POCT Glucose 267 (H) 74 - 99 mg/dL   POCT GLUCOSE   Result Value Ref Range    POCT Glucose 318 (H) 74 - 99 mg/dL   POCT GLUCOSE   Result Value Ref Range    POCT Glucose 270 (H) 74 - 99 mg/dL   POCT GLUCOSE   Result Value Ref Range    POCT Glucose 245 (H) 74 - 99 mg/dL   POCT GLUCOSE   Result Value Ref Range    POCT Glucose 137 (H) 74 - 99 mg/dL   Basic metabolic panel   Result Value Ref Range    Glucose 150 (H) 74 - 99 mg/dL    Sodium 143 136 - 145 mmol/L    Potassium 3.7 3.5 - " 5.3 mmol/L    Chloride 102 98 - 107 mmol/L    Bicarbonate 36 (H) 21 - 32 mmol/L    Anion Gap 9 (L) 10 - 20 mmol/L    Urea Nitrogen 25 (H) 6 - 23 mg/dL    Creatinine 1.07 0.50 - 1.30 mg/dL    eGFR 82 >60 mL/min/1.73m*2    Calcium 8.6 8.6 - 10.3 mg/dL   CBC and Auto Differential   Result Value Ref Range    WBC 7.9 4.4 - 11.3 x10*3/uL    nRBC 0.0 0.0 - 0.0 /100 WBCs    RBC 4.32 (L) 4.50 - 5.90 x10*6/uL    Hemoglobin 9.9 (L) 13.5 - 17.5 g/dL    Hematocrit 34.6 (L) 41.0 - 52.0 %    MCV 80 80 - 100 fL    MCH 22.9 (L) 26.0 - 34.0 pg    MCHC 28.6 (L) 32.0 - 36.0 g/dL    RDW 16.0 (H) 11.5 - 14.5 %    Platelets 264 150 - 450 x10*3/uL    Neutrophils % 72.4 40.0 - 80.0 %    Immature Granulocytes %, Automated 0.5 0.0 - 0.9 %    Lymphocytes % 14.4 13.0 - 44.0 %    Monocytes % 6.6 2.0 - 10.0 %    Eosinophils % 5.6 0.0 - 6.0 %    Basophils % 0.5 0.0 - 2.0 %    Neutrophils Absolute 5.72 1.20 - 7.70 x10*3/uL    Immature Granulocytes Absolute, Automated 0.04 0.00 - 0.70 x10*3/uL    Lymphocytes Absolute 1.14 (L) 1.20 - 4.80 x10*3/uL    Monocytes Absolute 0.52 0.10 - 1.00 x10*3/uL    Eosinophils Absolute 0.44 0.00 - 0.70 x10*3/uL    Basophils Absolute 0.04 0.00 - 0.10 x10*3/uL   POCT GLUCOSE   Result Value Ref Range    POCT Glucose 182 (H) 74 - 99 mg/dL   Blood Gas Arterial Full Panel   Result Value Ref Range    POCT pH, Arterial 7.43 (H) 7.38 - 7.42 pH    POCT pCO2, Arterial 56 (H) 38 - 42 mm Hg    POCT pO2, Arterial 86 85 - 95 mm Hg    POCT SO2, Arterial 97 94 - 100 %    POCT Oxy Hemoglobin, Arterial 94.5 94.0 - 98.0 %    POCT Hematocrit Calculated, Arterial 32.0 (L) 41.0 - 52.0 %    POCT Sodium, Arterial 137 136 - 145 mmol/L    POCT Potassium, Arterial 3.9 3.5 - 5.3 mmol/L    POCT Chloride, Arterial 100 98 - 107 mmol/L    POCT Ionized Calcium, Arterial 1.18 1.10 - 1.33 mmol/L    POCT Glucose, Arterial 325 (H) 74 - 99 mg/dL    POCT Lactate, Arterial 0.6 0.4 - 2.0 mmol/L    POCT Base Excess, Arterial 11.1 (H) -2.0 - 3.0 mmol/L    POCT  HCO3 Calculated, Arterial 37.2 (H) 22.0 - 26.0 mmol/L    POCT Hemoglobin, Arterial 10.6 (L) 13.5 - 17.5 g/dL    POCT Anion Gap, Arterial 4 (L) 10 - 25 mmo/L    Patient Temperature 37.0 degrees Celsius    FiO2 28 %       Imaging Results  Cxr:        IMPRESSION:  Ongoing CHF with mild interval worsening.      Tubes and lines in place as described.    Medications:  Scheduled Medications[1]   PRN Medications[2]     Assessment/Plan   55 y.o. male with a PMH of super obese, back pain on chronic opioids (Norco 7.5/325 3-4x per day), peripheral neuropathy, HTN, HLD, HFpEF, obesity-hypoventilation syndrome, T2DM on insulin, CKD, GERD admitted to the stepdown unit on 5/16 for acute on chronic diastolic CHF and pneumonia.  Transferred to the ICU on 5/23 following cardiac arrest - initially PEA then asystole - due to a respiratory event.  Patient intubated.  ROSC achieved after ~5 min.     #Cardiac arrest (PEA then asystole) with ROSC  #Acute hypoxic ischemia encephalopathy  #A fib, new onset  #Back pain on chronic opioids  #Acute on chronic diastolic CHF  #Hypertension, Hyperlipidemia  #Acute hypoxemic respiratory failure  #Obesity Hypoventilation Syndrome  #T2DM on long-term insulin  #Bilateral pneumonia     -extubated on nc oxygen  - Continue home ASA, atorvastatin, and amlodipine.  Holding home lisinopril.  - Continue gentle diuresis with Lasix 40 mg BID as ordered.  - Continue current insulin regimen of Lantus 30 units (half his home dose) and sliding scale lispro.  - Seen by ID this admission.  Completed Zosynfor 10-day   -cardiology on consult for new onset a fib, in setting of recent cardiac arrest and h/o CHF  - Patient only had 1 episode of A-fib on May 30, converted to normal sinus rhythm after digoxin load, continuing in normal sinus rhythm, continue Eliquis, Zio patch at discharge      Pt/ot      DVT Prophylaxis:  Lovenox subq        Yoli Crow MD  [facility]           [1] amLODIPine, 5 mg, oral,  Daily  apixaban, 5 mg, oral, q12h  aspirin, 81 mg, oral, Daily  atorvastatin, 80 mg, oral, Nightly  colchicine, 0.6 mg, oral, Daily  docusate sodium, 100 mg, oral, BID  DULoxetine, 60 mg, oral, Daily  ergocalciferol, 1.25 mg, oral, Weekly  ferrous sulfate, 1 tablet, oral, Every Mon/Wed/Fri  furosemide, 40 mg, intravenous, BID  insulin glargine, 30 Units, subcutaneous, BID  insulin lispro, 0-15 Units, subcutaneous, q4h  ipratropium-albuteroL, 3 mL, nebulization, TID  lisinopril, 5 mg, oral, Daily  lubricating eye drops, 1 drop, Both Eyes, Daily  metoprolol tartrate, 25 mg, oral, BID  pantoprazole, 40 mg, oral, Daily before breakfast  polyethylene glycol, 17 g, oral, Daily  QUEtiapine, 100 mg, oral, Nightly  senna, 5 mL, oral, BID     [2] PRN medications: acetaminophen, dextrose, dextrose, glucagon, glucagon, haloperidol lactate, ipratropium-albuteroL, lactulose, naloxone, ondansetron **OR** ondansetron, oxygen

## 2025-06-02 NOTE — CARE PLAN
The patient's goals for the shift include      The clinical goals for the shift include pt will remain free from harm throughout the shift      Problem: Diabetes  Goal: Achieve decreasing blood glucose levels by end of shift  6/2/2025 0202 by Kristal Dubose RN  Outcome: Progressing  6/2/2025 0202 by Kristal Dubose RN  Outcome: Progressing  Goal: Increase stability of blood glucose readings by end of shift  6/2/2025 0202 by Kristal Dubose RN  Outcome: Progressing  6/2/2025 0202 by Kristal Dubose RN  Outcome: Progressing  Goal: Decrease in ketones present in urine by end of shift  6/2/2025 0202 by Kristal Dubose RN  Outcome: Progressing  6/2/2025 0202 by Kristal Dubose RN  Outcome: Progressing  Goal: Maintain electrolyte levels within acceptable range throughout shift  6/2/2025 0202 by Kristal Dubose RN  Outcome: Progressing  6/2/2025 0202 by Kristal Dubose RN  Outcome: Progressing  Goal: Maintain glucose levels >70mg/dl to <250mg/dl throughout shift  6/2/2025 0202 by Kristal Dubose RN  Outcome: Progressing  6/2/2025 0202 by Kristal Dubose RN  Outcome: Progressing  Goal: No changes in neurological exam by end of shift  6/2/2025 0202 by Kristal Dubose RN  Outcome: Progressing  6/2/2025 0202 by Kristal Dubose RN  Outcome: Progressing  Goal: Learn about and adhere to nutrition recommendations by end of shift  6/2/2025 0202 by Kristal Dubose RN  Outcome: Progressing  6/2/2025 0202 by Kristal Dubose RN  Outcome: Progressing  Goal: Vital signs within normal range for age by end of shift  6/2/2025 0202 by Kristal Dubose RN  Outcome: Progressing  6/2/2025 0202 by Kristal Dubose RN  Outcome: Progressing  Goal: Increase self care and/or family involovement by end of shift  6/2/2025 0202 by Kristal Dubose RN  Outcome: Progressing  6/2/2025 0202 by Kristal Dubose RN  Outcome: Progressing  Goal: Receive DSME education by end of shift  6/2/2025 0202 by Kristal Dubose RN  Outcome: Progressing  6/2/2025 0202 by Kristal Dubose RN  Outcome: Progressing     Problem:  Pain  Goal: Takes deep breaths with improved pain control throughout the shift  6/2/2025 0202 by Kristal Dubose RN  Outcome: Progressing  6/2/2025 0202 by Kristal Dubose RN  Outcome: Progressing  Goal: Turns in bed with improved pain control throughout the shift  6/2/2025 0202 by Kristal Dubose RN  Outcome: Progressing  6/2/2025 0202 by Kristal Dubose RN  Outcome: Progressing  Goal: Walks with improved pain control throughout the shift  6/2/2025 0202 by Kristal Dubose RN  Outcome: Progressing  6/2/2025 0202 by Kristal Dubose RN  Outcome: Progressing  Goal: Performs ADL's with improved pain control throughout shift  6/2/2025 0202 by Kristal Dubose RN  Outcome: Progressing  6/2/2025 0202 by Kristal Dubose RN  Outcome: Progressing  Goal: Participates in PT with improved pain control throughout the shift  6/2/2025 0202 by Kristal Dubose RN  Outcome: Progressing  6/2/2025 0202 by Kristal Dubose RN  Outcome: Progressing  Goal: Free from opioid side effects throughout the shift  6/2/2025 0202 by Kristal Dubose RN  Outcome: Progressing  6/2/2025 0202 by Kristal Dubose RN  Outcome: Progressing  Goal: Free from acute confusion related to pain meds throughout the shift  6/2/2025 0202 by Kristal Dubose RN  Outcome: Progressing  6/2/2025 0202 by Kristal Dubose RN  Outcome: Progressing     Problem: Fall/Injury  Goal: Not fall by end of shift  6/2/2025 0202 by Kristal Dubose RN  Outcome: Progressing  6/2/2025 0202 by Kristal Dubose RN  Outcome: Progressing  Goal: Be free from injury by end of the shift  6/2/2025 0202 by Kristal Dubose RN  Outcome: Progressing  6/2/2025 0202 by Kristal Dubose RN  Outcome: Progressing  Goal: Verbalize understanding of personal risk factors for fall in the hospital  6/2/2025 0202 by Kristal Dubose RN  Outcome: Progressing  6/2/2025 0202 by Kristal Dubose RN  Outcome: Progressing  Goal: Verbalize understanding of risk factor reduction measures to prevent injury from fall in the home  6/2/2025 0202 by Kristal Dubose RN  Outcome:  Progressing  6/2/2025 0202 by Kristal Dubose RN  Outcome: Progressing  Goal: Use assistive devices by end of the shift  6/2/2025 0202 by Kristal Dubose RN  Outcome: Progressing  6/2/2025 0202 by Kristal Dubose RN  Outcome: Progressing  Goal: Pace activities to prevent fatigue by end of the shift  6/2/2025 0202 by Kristal Dubose RN  Outcome: Progressing  6/2/2025 0202 by Kristal Dubose RN  Outcome: Progressing     Problem: Pain - Adult  Goal: Verbalizes/displays adequate comfort level or baseline comfort level  6/2/2025 0202 by Kristal Dubose RN  Outcome: Progressing  6/2/2025 0202 by Kristal Dubose RN  Outcome: Progressing     Problem: Safety - Adult  Goal: Free from fall injury  6/2/2025 0202 by Kristal Dubose RN  Outcome: Progressing  6/2/2025 0202 by Kristal Dubose RN  Outcome: Progressing     Problem: Discharge Planning  Goal: Discharge to home or other facility with appropriate resources  6/2/2025 0202 by Kristal Dubose RN  Outcome: Progressing  6/2/2025 0202 by Kristal Dubose RN  Outcome: Progressing     Problem: Chronic Conditions and Co-morbidities  Goal: Patient's chronic conditions and co-morbidity symptoms are monitored and maintained or improved  6/2/2025 0202 by Kristal Dubose RN  Outcome: Progressing  6/2/2025 0202 by Kristla Dubose RN  Outcome: Progressing     Problem: Nutrition  Goal: Nutrient intake appropriate for maintaining nutritional needs  6/2/2025 0202 by Kristal Dubose RN  Outcome: Progressing  6/2/2025 0202 by Kristal Dubose RN  Outcome: Progressing     Problem: Skin  Goal: Decreased wound size/increased tissue granulation at next dressing change  6/2/2025 0202 by Kristal Dubose RN  Outcome: Progressing  6/2/2025 0202 by Kristal Dubose RN  Outcome: Progressing  Goal: Participates in plan/prevention/treatment measures  6/2/2025 0202 by Kristal Dubose RN  Outcome: Progressing  6/2/2025 0202 by Kristal Dubose RN  Outcome: Progressing  Goal: Prevent/manage excess moisture  6/2/2025 0202 by Kristal Duboes RN  Outcome:  Progressing  6/2/2025 0202 by Kristal Dubose RN  Outcome: Progressing  Goal: Prevent/minimize sheer/friction injuries  6/2/2025 0202 by Kristal Dubose RN  Outcome: Progressing  Flowsheets (Taken 6/2/2025 0202)  Prevent/minimize sheer/friction injuries:   Turn/reposition every 2 hours/use positioning/transfer devices   Use pull sheet   Utilize specialty bed per algorithm  6/2/2025 0202 by Kristal Dubose RN  Outcome: Progressing  Flowsheets (Taken 6/2/2025 0202)  Prevent/minimize sheer/friction injuries:   Turn/reposition every 2 hours/use positioning/transfer devices   Use pull sheet   Utilize specialty bed per algorithm  Goal: Promote/optimize nutrition  6/2/2025 0202 by Kristal Dubose RN  Outcome: Progressing  6/2/2025 0202 by Kristal Dubose RN  Outcome: Progressing  Goal: Promote skin healing  6/2/2025 0202 by Kristal Dubose RN  Outcome: Progressing  Flowsheets (Taken 6/2/2025 0202)  Promote skin healing:   Assess skin/pad under line(s)/device(s)   Turn/reposition every 2 hours/use positioning/transfer devices  6/2/2025 0202 by Kristal Dubose RN  Outcome: Progressing  Flowsheets (Taken 6/2/2025 0202)  Promote skin healing:   Assess skin/pad under line(s)/device(s)   Turn/reposition every 2 hours/use positioning/transfer devices

## 2025-06-02 NOTE — PROGRESS NOTES
Subjective Data:  2L NC   -2525( since 5/19 -17018)  SR per monitor in the 70s  Can barely open eyes this morning     Objective Data:  Last Recorded Vitals:  Vitals:    06/02/25 0300 06/02/25 0549 06/02/25 0735 06/02/25 0742   BP: 106/54   158/77   BP Location: Right arm   Left arm   Patient Position: Lying   Lying   Pulse:    74   Resp: 18   20   Temp: 36.3 °C (97.3 °F)   36.5 °C (97.7 °F)   TempSrc: Temporal   Temporal   SpO2: 97%  98% 98%   Weight:  (!) 174 kg (383 lb 13.1 oz)     Height:           Last Labs:  CBC - 6/2/2025:  6:31 AM  7.9 9.9 264    34.6      CMP - 6/2/2025:  6:31 AM  8.6 6.6 17 --- 0.5   3.5 3.2 12 84      PTT - 5/23/2025:  6:19 PM  1.3   14.3 32     TROPHS   Date/Time Value Ref Range Status   05/16/2025 02:39 PM 20 0 - 20 ng/L Final   05/16/2025 01:30 PM 20 0 - 20 ng/L Final   04/24/2025 05:41 AM 16 0 - 20 ng/L Final     BNP   Date/Time Value Ref Range Status   05/16/2025 01:32 PM 58 0 - 99 pg/mL Final   04/14/2025 07:22 PM 78 0 - 99 pg/mL Final     HGBA1C   Date/Time Value Ref Range Status   04/17/2025 06:40 AM 7.2 See comment % Final   06/11/2024 10:43 AM 9.0 see below % Final     LDLCALC   Date/Time Value Ref Range Status   04/15/2025 11:53 AM 30 <=99 mg/dL Final     Comment:                                 Near   Borderline      AGE      Desirable  Optimal    High     High     Very High     0-19 Y     0 - 109     ---    110-129   >/= 130     ----    20-24 Y     0 - 119     ---    120-159   >/= 160     ----      >24 Y     0 -  99   100-129  130-159   160-189     >/=190       VLDL   Date/Time Value Ref Range Status   04/15/2025 11:53 AM 50 0 - 40 mg/dL Final   12/24/2022 11:51 AM 47 0 - 40 mg/dL Final      Last I/O:  I/O last 3 completed shifts:  In: 630 (3.6 mL/kg) [P.O.:630]  Out: 3475 (20 mL/kg) [Urine:3475 (0.6 mL/kg/hr)]  Weight: 174.1 kg     Past Cardiology Tests (Last 3 Years):  Echo:  Echocardiogram 5/2025  1. Left ventricular ejection fraction is normal, by visual estimate at 55%.    2. Spectral Doppler shows a Grade II (pseudonormal pattern) of left ventricular diastolic filling with an elevated left atrial pressure.   3. Unable to determine right ventricular systolic function.   4. RV was poorly visualized due to sub-optimal acoustic windows     Echocardiogram 4/15/2025   1. Poorly visualized anatomical structures due to suboptimal image quality.   2. Left ventricular ejection fraction is low normal, by visual estimate at 50-55%.   3. Left ventricular diastolic filling is indeterminate.   4. Unable to determine right ventricular systolic function.   5. The left atrium is mildly dilated.     Echocardiogram 2/6/21:  CONCLUSIONS:   1. The left ventricular systolic function is normal with a 60-65% estimated ejection fraction.   2. The left atrium is mild to moderately dilated.   3. RVSP within normal limits.   4. The pulmonary artery is not well visualized.   5. Technically difficult, even with Definity.     Nuclear stress test 9/9/2016, indication dyspnea  FINDINGS:  Stress and rest images both demonstrate no sign of   ischemia.  A moderate fixed decrease in activity is present within   the inferior wall.  This area demonstrates normal systolic thickening   implying an attenuation artifact.       ECG-gated images demonstrate normal myocardial wall motion with an LV   ejection fraction of 48 percent (normal above 45 percent).     IMPRESSION:  Normal myocardial perfusion imaging in response to   pharmacologic stress with inferior attenuation artifact secondary to   the arms at the sides.    Ejection Fractions:  EF   Date/Time Value Ref Range Status   05/24/2025 02:15 PM 55 %    04/15/2025 03:05 PM 53 %            Inpatient Medications:  Scheduled Medications[1]  PRN Medications[2]  Continuous Medications[3]    Physical Exam:  GENERAL: Somnolent but arousable  SKIN: warm, dry, no rash.  NECK: no JVD, no BENJAMIN  CARDIAC: Regular rate and rhythm with no rubs, murmurs, or gallops  CHEST: Normal  respiratory efforts, lungs clear to auscultation bilaterally.  ABDOMEN: soft, nontender, nondistended  EXTREMITIES:1+ edema  NEURO: Alert and oriented x 3.  Grossly normal.  Moves all 4 extremities.       Assessment/Plan   55 y.o. male with past medical history of right MCA stroke (12/24), diabetes mellitus type II, CKD stage IIIa, COPD, RAMÍREZ, diastolic heart failure, iron deficiency anemia, anxiety, PTSD, bipolar, morbid obesity, chronic back pain admitted to the stepdown unit on 5/16 for acute on chronic diastolic CHF and pneumonia. Transferred to the ICU on 5/23 following cardiac arrest - initially PEA then asystole - due to a respiratory event ( patient self removed bipap and tried to get OOB) . Patient intubated. ROSC achieved after ~5 min. Extubated on 5/28 and transferred out of the ICU on 5/29.     Cardiology consulted for a fib episode, CHF>      Rapid response called 5/30/2025 at 2229 for tachycardia. Patient HR abruptly went to 150s, also with change in mentation. Patient was awake, but staring blankly.  Speech garbled.  He went down for head CT that was negative for any acute process.      Patient in bedside chair.  Unable to summarize events of current hospitalization.  He currently denies any concerns. Denies any chest pain or angina. SOB with exertion.  Chronic LE swelling.      Home CV medications:  Amlodipine 5 mg p.o. daily  Lisinopril 5mg daily  Aspirin 81 mg p.o. daily  atorvastatin 80 mg p.o. daily  torsemide 60 mg p.o. twice daily      Assessment:  #Respiratory arrest (PEA then asystole) on 5/23/2025 with ROSC  #Acute hypoxic ischemic encephalopathy     #Single Episode of atrial fibrillation with RVR. 5/30/2025              - converted to NSR after digoxin load and IV metoprolol x 2.              - currently maintaining NSR.   -[id number] of 3 and favor anticoagulation.  Also with his morbid obesity he is high thrombotic risk.  -Continue Eliquis and metoprolol which had been started this  admission    # Acute on chronic diastolic heart failure              - net negative 13L this admission              - BNP historically low, last 58 on 5/16/2025     # Bilateral pneumonia              - completed abx course of 10 days Blu     Plan:  -Continue Eliquis and metoprolol  -Continue home ASA, atorvastatin, and amlodipine.  Holding home lisinopril.  -The patient will benefit from discharge with a Zio or Preventice Patch event monitor for 2 weeks.  Zio or Preventice Patch can only be placed by [facility] nursing during regular business hours (9AM-4PM Monday-Friday) after the discharge order has been placed.  The results will be discussed at the patient's followup visit-> if patient is willing to wear.   -Continue gentle diuresis with Lasix 40 mg BID as ordered-can transition to home going diuretic on discharge  -Maybe obtain an ABG this am to assess CO2 levels???  -Strict I/O's. Daily weights. Monitor Mag/K and supplement to >2/4. Close monitoring of renal function.   -Cards will sign off -> reach out any questions      Code Status:  Full Code        JAYASHREE Smith-CNP         [1]   Scheduled medications   Medication Dose Route Frequency    amLODIPine  5 mg oral Daily    apixaban  5 mg oral q12h    aspirin  81 mg oral Daily    atorvastatin  80 mg oral Nightly    colchicine  0.6 mg oral Daily    docusate sodium  100 mg oral BID    DULoxetine  60 mg oral Daily    ergocalciferol  1.25 mg oral Weekly    ferrous sulfate  1 tablet oral Every Mon/Wed/Fri    furosemide  40 mg intravenous BID    insulin glargine  30 Units subcutaneous BID    insulin lispro  0-15 Units subcutaneous q4h    ipratropium-albuteroL  3 mL nebulization TID    lisinopril  5 mg oral Daily    lubricating eye drops  1 drop Both Eyes Daily    metoprolol tartrate  25 mg oral BID    pantoprazole  40 mg oral Daily before breakfast    polyethylene glycol  17 g oral Daily    QUEtiapine  100 mg oral Nightly    senna  5 mL oral BID   [2]    PRN medications   Medication    acetaminophen    dextrose    dextrose    glucagon    glucagon    haloperidol lactate    ipratropium-albuteroL    lactulose    naloxone    ondansetron    Or    ondansetron    oxygen   [3]   Continuous Medications   Medication Dose Last Rate

## 2025-06-02 NOTE — PROGRESS NOTES
Physical Therapy    Physical Therapy Evaluation & Treatment    Patient Name: Jai Shrestha  MRN: [id number]  Department: [place] [id number]  Room: [address] [place] [address]-  Today's Date: 6/2/2025   Time Calculation  Start Time: 0901  Stop Time: 0929  Time Calculation (min): 28 min    Assessment/Plan   PT Assessment  PT Assessment Results: Decreased strength, Decreased endurance, Impaired balance, Decreased mobility, Impaired judgement, Decreased safety awareness, Obesity, Decreased skin integrity, Pain  Rehab Prognosis: Good  Barriers to Discharge Home: Caregiver assistance, Physical needs  Caregiver Assistance: Patient lives alone and/or does not have reliable caregiver assistance  Physical Needs: Ambulating household distances limited by function/safety, 24hr mobility assistance needed, 24hr ADL assistance needed  Evaluation/Treatment Tolerance: Patient tolerated treatment well, Patient limited by fatigue, Patient limited by pain  Medical Staff Made Aware: Yes  Strengths: Ability to acquire knowledge, Insight into problems, Living arrangement secure, Housing layout, Premorbid level of function  Barriers to Participation: Comorbidities  End of Session Communication: Bedside nurse  Assessment Comment: Pt demonstrating deficits in generalized strength, endurance and balance as well as increased pain resulting in impaired functional mobility, gait and self care. Due to the impairments listed above, pt would benefit from skilled physical therapy services in acute care setting as well as additional therapy in MOD intensity therapy setting  End of Session Patient Position: Alarm on, Bed, 4 rail up (pt requests rails up for safety due to pt complaints about bed)   IP OR SWING BED PT PLAN  Inpatient or Swing Bed: Inpatient  PT Plan  Treatment/Interventions: Bed mobility, Transfer training, Gait training, Balance training, Neuromuscular re-education, Strengthening, Endurance training, Therapeutic exercise, Therapeutic activity, Home exercise  "program  PT Plan: Ongoing PT  PT Frequency: 3 times per week  PT Discharge Recommendations: Moderate intensity level of continued care  Equipment Recommended upon Discharge:  (TBD)  PT Recommended Transfer Status: Assist x2, Total assist  PT - OK to Discharge: Yes (PT POC initiated this date)      Subjective     PT Visit Info:  PT Received On: 06/02/25  General Visit Information:  General  Reason for Referral: Pt is a 56 yo male presenting initially due to SOB and found to have CHF/PNA. Course complicated by cardiac arrest on 5/23 (PEA to asystole) requiring intubation following CPR. Extubated on 5/28. Additional complications due to tachycardia requiring rapid response. Pt previously on bedrest however cleared for mobility per Dr. Crow on 6/2.  Referred By: Jesenia DÍAZ  Past Medical History Relevant to Rehab: Medical History[1]    Family/Caregiver Present: No  Co-Treatment: OT  Co-Treatment Reason: for maximal pt safety and participation (Pt requiring substantial assist as well as weight >380 lb. Recommend staff assist of 2 or more for safety required)  Prior to Session Communication: Bedside nurse  Patient Position Received: Bed, 4 rail up, Alarm on (pt requests rails up for safety due to pt complaints about bed)  General Comment: Pt agreeable to PT/OT evaluations. Pt distractible and intermittently frustrated (with hospital situation as well as reporting \"this bed is junk and broken\"). Pt also limited by intermittent nausea  Home Living:  Home Living  Type of Home: House  Lives With: Alone  Home Adaptive Equipment: Walker rolling or standard, Cane (rollator)  Home Layout: One level  Home Access: Stairs to enter with rails  Entrance Stairs-Rails:  (unilateral HR via back entrance; no HR via front)  Entrance Stairs-Number of Steps: 3 LOBO via back entrance; 2 LOBO via front  Bathroom Shower/Tub: Tub/shower unit  Prior Level of Function:  Prior Function Per Pt/Caregiver Report  Level of [place]: Independent " with ADLs and functional transfers, Independent with homemaking with ambulation  Receives Help From:  ([facility] 2 hrs 1x  week - home management)  ADL Assistance: Independent  Homemaking Assistance: Needs assistance  Ambulatory Assistance: Independent (Primarily uses rollator)  Precautions:  Precautions  Medical Precautions: Fall precautions           Objective   Pain:  Pain Assessment  Pain Assessment: 0-10  0-10 (Numeric) Pain Score: 8  Pain Type: Acute pain, Chronic pain  Pain Location: Hip (As well as chest soreness (especially when coughing))  Pain Orientation: Left  Pain Interventions: Repositioned  Cognition:  Cognition  Overall Cognitive Status:  (mild impaired insight/understanding of therapy purpose/strategies requiring additional education)  Orientation Level: Oriented X4  Attention: Within Functional Limits  Memory: Within Funtional Limits  Insight: Mild  Impulsive: Within functional limits    General Assessments:  Activity Tolerance  Endurance: Tolerates 10 - 20 min exercise with multiple rests    Sensation  Light Touch: No apparent deficits    Coordination  Movements are Fluid and Coordinated: Yes    Static Sitting Balance  Static Sitting-Balance Support: No upper extremity supported, Feet supported  Static Sitting-Level of Assistance:  (initially min A x2 due to posterior LOB and sliding anteriorly however progressing to SBA once ARAM established)  Static Sitting-Comment/Number of Minutes: 10 min (       Functional Assessments:  Bed Mobility  Bed Mobility: Yes  Bed Mobility 1  Bed Mobility 1: Supine to sitting, Sitting to supine  Level of Assistance 1: Dependent, +2, Maximum tactile cues, Maximum verbal cues  Bed Mobility Comments 1: Total assist at trunk and BLEs. Draw pad utilized to assist    Transfers  Transfer: No (Unsafe to attempt stand due to substantial need for assist with bed mobility and general deconditioning)    Ambulation/Gait Training  Ambulation/Gait Training Performed: No  Extremity/Trunk  Assessments:  RLE   RLE : Exceptions to WFL (grossly 3-/5 (limited by pain))  LLE   LLE : Exceptions to WFL (grossly 3-/5 (limited by pain))  Treatments:  Balance/Neuromuscular Re-Education  Balance/Neuromuscular Re-Education Activity Performed: Yes  Balance/Neuromuscular Re-Education Activity 1: See balance section - per pt request completed additional static sitting balance activities at EOB. Initially Nathaly x2 due to posterior LOB and hips sliding anteriorly at EOB however progressed to SBA  Outcome Measures:  [facility] Basic Mobility  Turning from your back to your side while in a flat bed without using bedrails: Total  Moving from lying on your back to sitting on the side of a flat bed without using bedrails: Total  Moving to and from bed to chair (including a wheelchair): Total  Standing up from a chair using your arms (e.g. wheelchair or bedside chair): Total  To walk in hospital room: Total  Climbing 3-5 steps with railing: Total  Basic Mobility - Total Score: 6    Encounter Problems       Encounter Problems (Active)       Balance       STG - Maintains dynamic sitting balance without upper extremity support       Start:  06/02/25    Expected End:  06/16/25       SUP >15 min            Mobility          PT Transfers       STG - Patient will perform bed mobility       Start:  06/02/25    Expected End:  06/16/25       Mod A x2         STG - Patient will transfer sit to and from stand       Start:  06/02/25    Expected End:  06/16/25       Mod A x2 from elevate bed surface (if medically safe to attempt)            Pain - Adult              Education Documentation  Body Mechanics, taught by Moe Polk, PT at 6/2/2025 11:28 AM.  Learner: Patient  Readiness: Acceptance  Method: Explanation  Response: Verbalizes Understanding  Comment: see above    Mobility Training, taught by Moe Polk, PT at 6/2/2025 11:28 AM.  Learner: Patient  Readiness: Acceptance  Method: Explanation  Response: Verbalizes  Understanding  Comment: see above    Education Comments  No comments found.           [1]   Past Medical History:  Diagnosis Date    Chronic back pain     Chronic pain disorder 12 15  2015    CKD (chronic kidney disease)     Depression 02 27 1986    Diabetes mellitus (Multi)     Extremity pain 12 15 2015    HF (heart failure), diastolic     Hypertension     Joint pain     Low back pain     Neuropathy in diabetes (Multi)     Peripheral neuropathy 12 15 2015    Spinal stenosis

## 2025-06-03 LAB
ANION GAP SERPL CALC-SCNC: 13 MMOL/L (ref 10–20)
BASOPHILS # BLD AUTO: 0.03 X10*3/UL (ref 0–0.1)
BASOPHILS NFR BLD AUTO: 0.4 %
BUN SERPL-MCNC: 25 MG/DL (ref 6–23)
CALCIUM SERPL-MCNC: 8.8 MG/DL (ref 8.6–10.3)
CHLORIDE SERPL-SCNC: 101 MMOL/L (ref 98–107)
CO2 SERPL-SCNC: 31 MMOL/L (ref 21–32)
CREAT SERPL-MCNC: 1.06 MG/DL (ref 0.5–1.3)
EGFRCR SERPLBLD CKD-EPI 2021: 83 ML/MIN/1.73M*2
EOSINOPHIL # BLD AUTO: 0.33 X10*3/UL (ref 0–0.7)
EOSINOPHIL NFR BLD AUTO: 4.6 %
ERYTHROCYTE [DISTWIDTH] IN BLOOD BY AUTOMATED COUNT: 15.9 % (ref 11.5–14.5)
GLUCOSE BLD MANUAL STRIP-MCNC: 149 MG/DL (ref 74–99)
GLUCOSE BLD MANUAL STRIP-MCNC: 153 MG/DL (ref 74–99)
GLUCOSE BLD MANUAL STRIP-MCNC: 253 MG/DL (ref 74–99)
GLUCOSE BLD MANUAL STRIP-MCNC: 275 MG/DL (ref 74–99)
GLUCOSE BLD MANUAL STRIP-MCNC: 276 MG/DL (ref 74–99)
GLUCOSE BLD MANUAL STRIP-MCNC: 315 MG/DL (ref 74–99)
GLUCOSE SERPL-MCNC: 133 MG/DL (ref 74–99)
HCT VFR BLD AUTO: 32.4 % (ref 41–52)
HGB BLD-MCNC: 9.8 G/DL (ref 13.5–17.5)
IMM GRANULOCYTES # BLD AUTO: 0.03 X10*3/UL (ref 0–0.7)
IMM GRANULOCYTES NFR BLD AUTO: 0.4 % (ref 0–0.9)
LYMPHOCYTES # BLD AUTO: 1.11 X10*3/UL (ref 1.2–4.8)
LYMPHOCYTES NFR BLD AUTO: 15.4 %
MCH RBC QN AUTO: 22.8 PG (ref 26–34)
MCHC RBC AUTO-ENTMCNC: 30.2 G/DL (ref 32–36)
MCV RBC AUTO: 76 FL (ref 80–100)
MONOCYTES # BLD AUTO: 0.55 X10*3/UL (ref 0.1–1)
MONOCYTES NFR BLD AUTO: 7.6 %
NEUTROPHILS # BLD AUTO: 5.15 X10*3/UL (ref 1.2–7.7)
NEUTROPHILS NFR BLD AUTO: 71.6 %
NRBC BLD-RTO: 0 /100 WBCS (ref 0–0)
PLATELET # BLD AUTO: 261 X10*3/UL (ref 150–450)
POTASSIUM SERPL-SCNC: 3.6 MMOL/L (ref 3.5–5.3)
RBC # BLD AUTO: 4.29 X10*6/UL (ref 4.5–5.9)
SODIUM SERPL-SCNC: 141 MMOL/L (ref 136–145)
WBC # BLD AUTO: 7.2 X10*3/UL (ref 4.4–11.3)

## 2025-06-03 PROCEDURE — 2500000001 HC RX 250 WO HCPCS SELF ADMINISTERED DRUGS (ALT 637 FOR MEDICARE OP)

## 2025-06-03 PROCEDURE — 94640 AIRWAY INHALATION TREATMENT: CPT

## 2025-06-03 PROCEDURE — 82947 ASSAY GLUCOSE BLOOD QUANT: CPT

## 2025-06-03 PROCEDURE — 2500000005 HC RX 250 GENERAL PHARMACY W/O HCPCS: Performed by: INTERNAL MEDICINE

## 2025-06-03 PROCEDURE — 9420000001 HC RT PATIENT EDUCATION 5 MIN

## 2025-06-03 PROCEDURE — 94664 DEMO&/EVAL PT USE INHALER: CPT

## 2025-06-03 PROCEDURE — 85025 COMPLETE CBC W/AUTO DIFF WBC: CPT | Performed by: INTERNAL MEDICINE

## 2025-06-03 PROCEDURE — 2500000001 HC RX 250 WO HCPCS SELF ADMINISTERED DRUGS (ALT 637 FOR MEDICARE OP): Performed by: INTERNAL MEDICINE

## 2025-06-03 PROCEDURE — 2500000004 HC RX 250 GENERAL PHARMACY W/ HCPCS (ALT 636 FOR OP/ED)

## 2025-06-03 PROCEDURE — 2500000005 HC RX 250 GENERAL PHARMACY W/O HCPCS

## 2025-06-03 PROCEDURE — 99233 SBSQ HOSP IP/OBS HIGH 50: CPT | Performed by: INTERNAL MEDICINE

## 2025-06-03 PROCEDURE — 2500000001 HC RX 250 WO HCPCS SELF ADMINISTERED DRUGS (ALT 637 FOR MEDICARE OP): Performed by: HOSPITALIST

## 2025-06-03 PROCEDURE — 2500000001 HC RX 250 WO HCPCS SELF ADMINISTERED DRUGS (ALT 637 FOR MEDICARE OP): Performed by: NURSE PRACTITIONER

## 2025-06-03 PROCEDURE — 2500000002 HC RX 250 W HCPCS SELF ADMINISTERED DRUGS (ALT 637 FOR MEDICARE OP, ALT 636 FOR OP/ED)

## 2025-06-03 PROCEDURE — 80048 BASIC METABOLIC PNL TOTAL CA: CPT | Performed by: INTERNAL MEDICINE

## 2025-06-03 PROCEDURE — 1100000001 HC PRIVATE ROOM DAILY

## 2025-06-03 RX ORDER — OXYCODONE HYDROCHLORIDE 5 MG/1
5 TABLET ORAL ONCE
Refills: 0 | Status: COMPLETED | OUTPATIENT
Start: 2025-06-03 | End: 2025-06-03

## 2025-06-03 RX ORDER — HYDROCODONE BITARTRATE AND ACETAMINOPHEN 5; 325 MG/1; MG/1
1 TABLET ORAL EVERY 6 HOURS PRN
Refills: 0 | Status: DISCONTINUED | OUTPATIENT
Start: 2025-06-03 | End: 2025-06-05 | Stop reason: HOSPADM

## 2025-06-03 RX ADMIN — ASPIRIN 81 MG CHEWABLE TABLET 81 MG: 81 TABLET CHEWABLE at 10:01

## 2025-06-03 RX ADMIN — PANTOPRAZOLE SODIUM 40 MG: 40 TABLET, DELAYED RELEASE ORAL at 06:02

## 2025-06-03 RX ADMIN — IPRATROPIUM BROMIDE AND ALBUTEROL SULFATE 3 ML: 2.5; .5 SOLUTION RESPIRATORY (INHALATION) at 20:51

## 2025-06-03 RX ADMIN — APIXABAN 5 MG: 5 TABLET, FILM COATED ORAL at 06:02

## 2025-06-03 RX ADMIN — INSULIN LISPRO 9 UNITS: 100 INJECTION, SOLUTION INTRAVENOUS; SUBCUTANEOUS at 22:00

## 2025-06-03 RX ADMIN — FUROSEMIDE 40 MG: 10 INJECTION, SOLUTION INTRAMUSCULAR; INTRAVENOUS at 22:01

## 2025-06-03 RX ADMIN — FUROSEMIDE 40 MG: 10 INJECTION, SOLUTION INTRAMUSCULAR; INTRAVENOUS at 10:02

## 2025-06-03 RX ADMIN — IPRATROPIUM BROMIDE AND ALBUTEROL SULFATE 3 ML: 2.5; .5 SOLUTION RESPIRATORY (INHALATION) at 07:23

## 2025-06-03 RX ADMIN — APIXABAN 5 MG: 5 TABLET, FILM COATED ORAL at 17:10

## 2025-06-03 RX ADMIN — DULOXETINE 60 MG: 30 CAPSULE, DELAYED RELEASE ORAL at 10:00

## 2025-06-03 RX ADMIN — ATORVASTATIN CALCIUM 80 MG: 80 TABLET, FILM COATED ORAL at 22:02

## 2025-06-03 RX ADMIN — CARBOXYMETHYLCELLULOSE SODIUM 1 DROP: 0.5 SOLUTION/ DROPS OPHTHALMIC at 10:01

## 2025-06-03 RX ADMIN — AMLODIPINE BESYLATE 5 MG: 5 TABLET ORAL at 10:02

## 2025-06-03 RX ADMIN — INSULIN LISPRO 3 UNITS: 100 INJECTION, SOLUTION INTRAVENOUS; SUBCUTANEOUS at 04:31

## 2025-06-03 RX ADMIN — INSULIN LISPRO 9 UNITS: 100 INJECTION, SOLUTION INTRAVENOUS; SUBCUTANEOUS at 12:36

## 2025-06-03 RX ADMIN — OXYCODONE HYDROCHLORIDE 5 MG: 5 TABLET ORAL at 00:19

## 2025-06-03 RX ADMIN — LISINOPRIL 5 MG: 5 TABLET ORAL at 10:00

## 2025-06-03 RX ADMIN — POLYETHYLENE GLYCOL 3350 17 G: 17 POWDER, FOR SOLUTION ORAL at 10:02

## 2025-06-03 RX ADMIN — METOPROLOL TARTRATE 25 MG: 25 TABLET, FILM COATED ORAL at 10:02

## 2025-06-03 RX ADMIN — INSULIN LISPRO 12 UNITS: 100 INJECTION, SOLUTION INTRAVENOUS; SUBCUTANEOUS at 17:10

## 2025-06-03 RX ADMIN — Medication 1 L/MIN: at 07:23

## 2025-06-03 RX ADMIN — IPRATROPIUM BROMIDE AND ALBUTEROL SULFATE 3 ML: 2.5; .5 SOLUTION RESPIRATORY (INHALATION) at 12:12

## 2025-06-03 RX ADMIN — Medication 1 L/MIN: at 20:51

## 2025-06-03 RX ADMIN — INSULIN LISPRO 9 UNITS: 100 INJECTION, SOLUTION INTRAVENOUS; SUBCUTANEOUS at 00:19

## 2025-06-03 RX ADMIN — COLCHICINE 0.6 MG: 0.6 TABLET, FILM COATED ORAL at 10:00

## 2025-06-03 RX ADMIN — INSULIN GLARGINE 30 UNITS: 100 INJECTION, SOLUTION SUBCUTANEOUS at 10:02

## 2025-06-03 RX ADMIN — INSULIN GLARGINE 30 UNITS: 100 INJECTION, SOLUTION SUBCUTANEOUS at 22:00

## 2025-06-03 RX ADMIN — OXYCODONE HYDROCHLORIDE 5 MG: 5 TABLET ORAL at 01:43

## 2025-06-03 RX ADMIN — HYDROCODONE BITARTRATE AND ACETAMINOPHEN 1 TABLET: 5; 325 TABLET ORAL at 13:25

## 2025-06-03 RX ADMIN — METOPROLOL TARTRATE 25 MG: 25 TABLET, FILM COATED ORAL at 22:01

## 2025-06-03 RX ADMIN — QUETIAPINE FUMARATE 100 MG: 100 TABLET ORAL at 22:01

## 2025-06-03 ASSESSMENT — PAIN - FUNCTIONAL ASSESSMENT
PAIN_FUNCTIONAL_ASSESSMENT: 0-10

## 2025-06-03 ASSESSMENT — PAIN SCALES - GENERAL
PAINLEVEL_OUTOF10: 8
PAINLEVEL_OUTOF10: 8
PAINLEVEL_OUTOF10: 0 - NO PAIN
PAINLEVEL_OUTOF10: 0 - NO PAIN
PAINLEVEL_OUTOF10: 8
PAINLEVEL_OUTOF10: 0 - NO PAIN

## 2025-06-03 ASSESSMENT — COGNITIVE AND FUNCTIONAL STATUS - GENERAL
HELP NEEDED FOR BATHING: TOTAL
DAILY ACTIVITIY SCORE: 10
MOVING FROM LYING ON BACK TO SITTING ON SIDE OF FLAT BED WITH BEDRAILS: A LOT
TOILETING: TOTAL
EATING MEALS: A LITTLE
STANDING UP FROM CHAIR USING ARMS: TOTAL
DRESSING REGULAR LOWER BODY CLOTHING: TOTAL
PERSONAL GROOMING: A LITTLE
DRESSING REGULAR UPPER BODY CLOTHING: TOTAL
WALKING IN HOSPITAL ROOM: TOTAL
CLIMB 3 TO 5 STEPS WITH RAILING: TOTAL
MOVING TO AND FROM BED TO CHAIR: TOTAL
MOBILITY SCORE: 8
TURNING FROM BACK TO SIDE WHILE IN FLAT BAD: A LOT

## 2025-06-03 ASSESSMENT — PAIN DESCRIPTION - DESCRIPTORS
DESCRIPTORS: ACHING

## 2025-06-03 ASSESSMENT — PAIN DESCRIPTION - LOCATION: LOCATION: GENERALIZED

## 2025-06-03 NOTE — CARE PLAN
"The patient's goals for the shift include  \"get out of here.\"    The clinical goals for the shift include pt will remain safe throughout shift      Problem: Diabetes  Goal: Achieve decreasing blood glucose levels by end of shift  Outcome: Progressing  Goal: Increase stability of blood glucose readings by end of shift  Outcome: Progressing  Goal: Decrease in ketones present in urine by end of shift  Outcome: Progressing  Goal: Maintain electrolyte levels within acceptable range throughout shift  Outcome: Progressing  Goal: Maintain glucose levels >70mg/dl to <250mg/dl throughout shift  Outcome: Progressing  Goal: No changes in neurological exam by end of shift  Outcome: Progressing  Goal: Learn about and adhere to nutrition recommendations by end of shift  Outcome: Progressing  Goal: Vital signs within normal range for age by end of shift  Outcome: Progressing  Goal: Increase self care and/or family involovement by end of shift  Outcome: Progressing  Goal: Receive DSME education by end of shift  Outcome: Progressing     Problem: Pain  Goal: Takes deep breaths with improved pain control throughout the shift  Outcome: Progressing  Goal: Turns in bed with improved pain control throughout the shift  Outcome: Progressing  Goal: Walks with improved pain control throughout the shift  Outcome: Progressing  Goal: Performs ADL's with improved pain control throughout shift  Outcome: Progressing  Goal: Participates in PT with improved pain control throughout the shift  Outcome: Progressing  Goal: Free from opioid side effects throughout the shift  Outcome: Progressing  Goal: Free from acute confusion related to pain meds throughout the shift  Outcome: Progressing     Problem: Fall/Injury  Goal: Not fall by end of shift  Outcome: Progressing  Goal: Be free from injury by end of the shift  Outcome: Progressing  Goal: Verbalize understanding of personal risk factors for fall in the hospital  Outcome: Progressing  Goal: Verbalize " understanding of risk factor reduction measures to prevent injury from fall in the home  Outcome: Progressing  Goal: Use assistive devices by end of the shift  Outcome: Progressing  Goal: Pace activities to prevent fatigue by end of the shift  Outcome: Progressing     Problem: Pain - Adult  Goal: Verbalizes/displays adequate comfort level or baseline comfort level  Outcome: Progressing     Problem: Discharge Planning  Goal: Discharge to home or other facility with appropriate resources  Outcome: Progressing     Problem: Chronic Conditions and Co-morbidities  Goal: Patient's chronic conditions and co-morbidity symptoms are monitored and maintained or improved  Outcome: Progressing     Problem: Nutrition  Goal: Nutrient intake appropriate for maintaining nutritional needs  Outcome: Progressing     Problem: Skin  Goal: Decreased wound size/increased tissue granulation at next dressing change  Outcome: Progressing  Goal: Participates in plan/prevention/treatment measures  Outcome: Progressing  Goal: Prevent/manage excess moisture  Outcome: Progressing  Goal: Prevent/minimize sheer/friction injuries  Outcome: Progressing  Goal: Promote/optimize nutrition  Outcome: Progressing  Goal: Promote skin healing  Outcome: Progressing     Problem: Safety - Medical Restraint  Goal: Remains free of injury from restraints (Restraint for Interference with Medical Device)  Outcome: Progressing  Goal: Free from restraint(s) (Restraint for Interference with Medical Device)  Outcome: Progressing

## 2025-06-03 NOTE — PROGRESS NOTES
"SW met with patient at bedside- he expressed that he would like to go home and feels that he will not receive \"good\" care for the rest of his hospital stay if he does not go to SNF. SW explained patient choice and briefly discussed if patient feels he can care for himself at home. Patient became frustrated and requested that SW return later.   "

## 2025-06-03 NOTE — CARE PLAN
The patient's goals for the shift include      The clinical goals for the shift include ptwill remain safe throughout shift    Over the shift, the patient did not make progress toward the following goals. Barriers to progression include . Recommendations to address these barriers include .

## 2025-06-03 NOTE — PROGRESS NOTES
"Jai Shrestha is a 55 y.o. male on day 18 of admission presenting with Pneumonia due to infectious organism, unspecified laterality, unspecified part of lung.    Subjective   Pt seen and examined.        Objective     Physical Exam  Constitutional: Obese gentleman, appears drowsy, somnolent, but respond to questions, cooperative not in acute distress  Lungs: Patent airways, CTABL  Heart: RRR, S1S2, no murmurs appreciated, palpable pulses in all extremities  GI: Soft, NT, ND, bowel sounds present in all quadrants  MSK: Moves all extremities freely, no restriction  of ROM, no joint edema  Extremities: Chronic lymphedema with venous ulcers, bilateral lower extremities peripheral edema  Neurological: no acute focal neurological deficits appreciated  Last Recorded Vitals  Blood pressure 155/90, pulse 74, temperature 36.7 °C (98.1 °F), temperature source Temporal, resp. rate 20, height 1.803 m (5' 10.98\"), weight (!) 174 kg (383 lb 13.1 oz), SpO2 93%.  Intake/Output last 3 Shifts:  I/O last 3 completed shifts:  In: 240 (1.4 mL/kg) [P.O.:240]  Out: 5650 (32.5 mL/kg) [Urine:5650 (0.9 mL/kg/hr)]  Weight: 174.1 kg     Relevant Results  Results for orders placed or performed during the hospital encounter of 05/16/25 (from the past 24 hours)   Blood Gas Arterial Full Panel   Result Value Ref Range    POCT pH, Arterial 7.43 (H) 7.38 - 7.42 pH    POCT pCO2, Arterial 56 (H) 38 - 42 mm Hg    POCT pO2, Arterial 86 85 - 95 mm Hg    POCT SO2, Arterial 97 94 - 100 %    POCT Oxy Hemoglobin, Arterial 94.5 94.0 - 98.0 %    POCT Hematocrit Calculated, Arterial 32.0 (L) 41.0 - 52.0 %    POCT Sodium, Arterial 137 136 - 145 mmol/L    POCT Potassium, Arterial 3.9 3.5 - 5.3 mmol/L    POCT Chloride, Arterial 100 98 - 107 mmol/L    POCT Ionized Calcium, Arterial 1.18 1.10 - 1.33 mmol/L    POCT Glucose, Arterial 325 (H) 74 - 99 mg/dL    POCT Lactate, Arterial 0.6 0.4 - 2.0 mmol/L    POCT Base Excess, Arterial 11.1 (H) -2.0 - 3.0 mmol/L    POCT HCO3 " Calculated, Arterial 37.2 (H) 22.0 - 26.0 mmol/L    POCT Hemoglobin, Arterial 10.6 (L) 13.5 - 17.5 g/dL    POCT Anion Gap, Arterial 4 (L) 10 - 25 mmo/L    Patient Temperature 37.0 degrees Celsius    FiO2 28 %   POCT GLUCOSE   Result Value Ref Range    POCT Glucose 310 (H) 74 - 99 mg/dL   POCT GLUCOSE   Result Value Ref Range    POCT Glucose 303 (H) 74 - 99 mg/dL   POCT GLUCOSE   Result Value Ref Range    POCT Glucose 336 (H) 74 - 99 mg/dL   POCT GLUCOSE   Result Value Ref Range    POCT Glucose 253 (H) 74 - 99 mg/dL   POCT GLUCOSE   Result Value Ref Range    POCT Glucose 153 (H) 74 - 99 mg/dL   Basic Metabolic Panel   Result Value Ref Range    Glucose 133 (H) 74 - 99 mg/dL    Sodium 141 136 - 145 mmol/L    Potassium 3.6 3.5 - 5.3 mmol/L    Chloride 101 98 - 107 mmol/L    Bicarbonate 31 21 - 32 mmol/L    Anion Gap 13 10 - 20 mmol/L    Urea Nitrogen 25 (H) 6 - 23 mg/dL    Creatinine 1.06 0.50 - 1.30 mg/dL    eGFR 83 >60 mL/min/1.73m*2    Calcium 8.8 8.6 - 10.3 mg/dL   CBC and Auto Differential   Result Value Ref Range    WBC 7.2 4.4 - 11.3 x10*3/uL    nRBC 0.0 0.0 - 0.0 /100 WBCs    RBC 4.29 (L) 4.50 - 5.90 x10*6/uL    Hemoglobin 9.8 (L) 13.5 - 17.5 g/dL    Hematocrit 32.4 (L) 41.0 - 52.0 %    MCV 76 (L) 80 - 100 fL    MCH 22.8 (L) 26.0 - 34.0 pg    MCHC 30.2 (L) 32.0 - 36.0 g/dL    RDW 15.9 (H) 11.5 - 14.5 %    Platelets 261 150 - 450 x10*3/uL    Neutrophils % 71.6 40.0 - 80.0 %    Immature Granulocytes %, Automated 0.4 0.0 - 0.9 %    Lymphocytes % 15.4 13.0 - 44.0 %    Monocytes % 7.6 2.0 - 10.0 %    Eosinophils % 4.6 0.0 - 6.0 %    Basophils % 0.4 0.0 - 2.0 %    Neutrophils Absolute 5.15 1.20 - 7.70 x10*3/uL    Immature Granulocytes Absolute, Automated 0.03 0.00 - 0.70 x10*3/uL    Lymphocytes Absolute 1.11 (L) 1.20 - 4.80 x10*3/uL    Monocytes Absolute 0.55 0.10 - 1.00 x10*3/uL    Eosinophils Absolute 0.33 0.00 - 0.70 x10*3/uL    Basophils Absolute 0.03 0.00 - 0.10 x10*3/uL   POCT GLUCOSE   Result Value Ref Range     POCT Glucose 149 (H) 74 - 99 mg/dL       Imaging Results  Cxr:        IMPRESSION:  Ongoing CHF with mild interval worsening.      Tubes and lines in place as described.    Medications:  Scheduled Medications[1]   PRN Medications[2]     Assessment/Plan   55 y.o. male with a PMH of super obese, back pain on chronic opioids (Norco 7.5/325 3-4x per day), peripheral neuropathy, HTN, HLD, HFpEF, obesity-hypoventilation syndrome, T2DM on insulin, CKD, GERD admitted to the stepdown unit on 5/16 for acute on chronic diastolic CHF and pneumonia.  Transferred to the ICU on 5/23 following cardiac arrest - initially PEA then asystole - due to a respiratory event.  Patient intubated.  ROSC achieved after ~5 min.     #Cardiac arrest (PEA then asystole) with ROSC  #Acute hypoxic ischemia encephalopathy  #A fib, new onset  #Back pain on chronic opioids  #Acute on chronic diastolic CHF  #Hypertension, Hyperlipidemia  #Acute hypoxemic respiratory failure  #Obesity Hypoventilation Syndrome  #T2DM on long-term insulin  #Bilateral pneumonia     -extubated on nc oxygen  - Continue home ASA, atorvastatin, and amlodipine.  Holding home lisinopril.  - Continue gentle diuresis with Lasix 40 mg BID as ordered.  - Continue current insulin regimen of Lantus 30 units (half his home dose) and sliding scale lispro.  - Seen by ID this admission.  Completed Zosynfor 10-day   -cardiology on consult for new onset a fib, in setting of recent cardiac arrest and h/o CHF  - Patient only had 1 episode of A-fib on May 30, converted to normal sinus rhythm after digoxin load, continuing in normal sinus rhythm, continue Eliquis, Zio patch at discharge    PT/OT    Dispo: home vs SNF.   Patient qualifies for SNF placement but he was refusing.  Discussed with patient and recommended SNF on discharge given his prolonged hospital stay and weakness.  He agreed for SNF placement.  Case management informed.       DVT Prophylaxis:  Lovenox subq        Ishwinder S  MD Julia  [facility]           [1] amLODIPine, 5 mg, oral, Daily  apixaban, 5 mg, oral, q12h  aspirin, 81 mg, oral, Daily  atorvastatin, 80 mg, oral, Nightly  colchicine, 0.6 mg, oral, Daily  docusate sodium, 100 mg, oral, BID  DULoxetine, 60 mg, oral, Daily  ergocalciferol, 1.25 mg, oral, Weekly  ferrous sulfate, 1 tablet, oral, Every Mon/Wed/Fri  furosemide, 40 mg, intravenous, BID  insulin glargine, 30 Units, subcutaneous, BID  insulin lispro, 0-15 Units, subcutaneous, q4h  ipratropium-albuteroL, 3 mL, nebulization, TID  lisinopril, 5 mg, oral, Daily  lubricating eye drops, 1 drop, Both Eyes, Daily  metoprolol tartrate, 25 mg, oral, BID  pantoprazole, 40 mg, oral, Daily before breakfast  polyethylene glycol, 17 g, oral, Daily  QUEtiapine, 100 mg, oral, Nightly  senna, 5 mL, oral, BID     [2] PRN medications: acetaminophen, dextrose, dextrose, glucagon, glucagon, haloperidol lactate, ipratropium-albuteroL, lactulose, naloxone, ondansetron **OR** ondansetron, oxygen

## 2025-06-03 NOTE — PROGRESS NOTES
Jai Shrestha was identified as being a new start on a high cost medication.    Medication name(s): Eliquis    Discussed with patient at bedside. He is very upset about the care he has received at the hospital. Difficult to redirect and using vulgar language. Tried to provide some verbal support for his concerns.    Unable to explicitly discuss cost (patient preemptively states it won't be affordable), but per test claim on  Pharmacy software, the coinsurance is $125.09 per month.The cost is not affordable for patient.     Since cost not affordable, discussed cost assistance options that may be available to the patient: [facility] [employer] resources    For discharge: Will apply  free trial if not previously used (not previously used per test claim)    Answered all questions regarding medication, cost, and assistance options to the best of my ability. Provided patient with a handout on afib and written instruction to work with [employer] for assistance.    Sent secure chat to 4th  Donna Macedo regarding patient concerns.    Yamel Daniel, PharmD  Transitions of Care  P: [phone]  [facility] secure chat preferred

## 2025-06-03 NOTE — PROGRESS NOTES
Care Coordinator Note:    Plan: patient in with [facility] PNA/chf/cardiac arrest. Remains on iv lasix. 3L NC. Completed IV atb. New afib/eliquis. Plan for ziopatch at dc.    PT OT rec MOD    Status: inpatient  Payor: medicare  Disposition: NEW SNF. Awaiting FOC from patient. No auth needed  Barrier: IF lasix.   ADOD: 2-3 days    Rupauriel FuJeff TCC      06/03/25 1354   Discharge Planning   Expected Discharge Disposition SNF  (waiting on FOC from patient. no auth needed)   Patient Choice   Provider Choice list and CMS website (https://medicare.gov/care-compare#search) for post-acute Quality and Resource Measure Data were provided and reviewed with: Patient   Intensity of Service   Intensity of Service 0-30 min

## 2025-06-03 NOTE — PROGRESS NOTES
Nutrition Follow-up Note  -late entry-     Chart reviewed.  Pt extubated 5/28.  Back on oral diet, intake remains fair.  Weight fluctuation noted- potentially from fluid fluctuation.    Results for orders placed or performed during the hospital encounter of 05/16/25 (from the past 24 hours)   POCT GLUCOSE   Result Value Ref Range    POCT Glucose 182 (H) 74 - 99 mg/dL   Blood Gas Arterial Full Panel   Result Value Ref Range    POCT pH, Arterial 7.43 (H) 7.38 - 7.42 pH    POCT pCO2, Arterial 56 (H) 38 - 42 mm Hg    POCT pO2, Arterial 86 85 - 95 mm Hg    POCT SO2, Arterial 97 94 - 100 %    POCT Oxy Hemoglobin, Arterial 94.5 94.0 - 98.0 %    POCT Hematocrit Calculated, Arterial 32.0 (L) 41.0 - 52.0 %    POCT Sodium, Arterial 137 136 - 145 mmol/L    POCT Potassium, Arterial 3.9 3.5 - 5.3 mmol/L    POCT Chloride, Arterial 100 98 - 107 mmol/L    POCT Ionized Calcium, Arterial 1.18 1.10 - 1.33 mmol/L    POCT Glucose, Arterial 325 (H) 74 - 99 mg/dL    POCT Lactate, Arterial 0.6 0.4 - 2.0 mmol/L    POCT Base Excess, Arterial 11.1 (H) -2.0 - 3.0 mmol/L    POCT HCO3 Calculated, Arterial 37.2 (H) 22.0 - 26.0 mmol/L    POCT Hemoglobin, Arterial 10.6 (L) 13.5 - 17.5 g/dL    POCT Anion Gap, Arterial 4 (L) 10 - 25 mmo/L    Patient Temperature 37.0 degrees Celsius    FiO2 28 %   POCT GLUCOSE   Result Value Ref Range    POCT Glucose 310 (H) 74 - 99 mg/dL   POCT GLUCOSE   Result Value Ref Range    POCT Glucose 303 (H) 74 - 99 mg/dL   POCT GLUCOSE   Result Value Ref Range    POCT Glucose 336 (H) 74 - 99 mg/dL   POCT GLUCOSE   Result Value Ref Range    POCT Glucose 253 (H) 74 - 99 mg/dL   POCT GLUCOSE   Result Value Ref Range    POCT Glucose 153 (H) 74 - 99 mg/dL   Basic Metabolic Panel   Result Value Ref Range    Glucose 133 (H) 74 - 99 mg/dL    Sodium 141 136 - 145 mmol/L    Potassium 3.6 3.5 - 5.3 mmol/L    Chloride 101 98 - 107 mmol/L    Bicarbonate 31 21 - 32 mmol/L    Anion Gap 13 10 - 20 mmol/L    Urea Nitrogen 25 (H) 6 - 23 mg/dL  "   Creatinine 1.06 0.50 - 1.30 mg/dL    eGFR 83 >60 mL/min/1.73m*2    Calcium 8.8 8.6 - 10.3 mg/dL   CBC and Auto Differential   Result Value Ref Range    WBC 7.2 4.4 - 11.3 x10*3/uL    nRBC 0.0 0.0 - 0.0 /100 WBCs    RBC 4.29 (L) 4.50 - 5.90 x10*6/uL    Hemoglobin 9.8 (L) 13.5 - 17.5 g/dL    Hematocrit 32.4 (L) 41.0 - 52.0 %    MCV 76 (L) 80 - 100 fL    MCH 22.8 (L) 26.0 - 34.0 pg    MCHC 30.2 (L) 32.0 - 36.0 g/dL    RDW 15.9 (H) 11.5 - 14.5 %    Platelets 261 150 - 450 x10*3/uL    Neutrophils % 71.6 40.0 - 80.0 %    Immature Granulocytes %, Automated 0.4 0.0 - 0.9 %    Lymphocytes % 15.4 13.0 - 44.0 %    Monocytes % 7.6 2.0 - 10.0 %    Eosinophils % 4.6 0.0 - 6.0 %    Basophils % 0.4 0.0 - 2.0 %    Neutrophils Absolute 5.15 1.20 - 7.70 x10*3/uL    Immature Granulocytes Absolute, Automated 0.03 0.00 - 0.70 x10*3/uL    Lymphocytes Absolute 1.11 (L) 1.20 - 4.80 x10*3/uL    Monocytes Absolute 0.55 0.10 - 1.00 x10*3/uL    Eosinophils Absolute 0.33 0.00 - 0.70 x10*3/uL    Basophils Absolute 0.03 0.00 - 0.10 x10*3/uL     Scheduled medications  Scheduled Medications[1]  Continuous medications  Continuous Medications[2]  PRN medications  PRN Medications[3]  Dietary Orders (From admission, onward)       Start     Ordered    05/28/25 1526  Adult diet Regular  Diet effective now        Comments: Soft diet   Question:  Diet type  Answer:  Regular    05/28/25 1526    05/27/25 0919  May Participate in Room Service  ( ROOM SERVICE MAY PARTICIPATE)  Once        Question:  .  Answer:  Yes    05/27/25 0918                    History:  Energy Intake: Fair 50-75 %  Food and Nutrient History: per flowsheets    Anthropometrics:  Height: 180.3 cm (5' 10.98\")  Weight: (!) 174 kg (383 lb 13.1 oz)  BMI (Calculated): 53.56    Weight         5/27/2025  0916 5/28/2025  0547 5/29/2025  0600 6/1/2025  0400 6/2/2025  0549    Weight: 188 kg (413 lb 5.8 oz) 190 kg (418 lb 14 oz) 182 kg (400 lb 2.2 oz) 176 kg (388 lb 7.2 oz) 174 kg (383 lb 13.1 " oz)              Weight History / % Weight Change: 173.7kg 5/16/25 stated weight, 195.0kg 5/24/25 bed scale, 191.1kg 5/25/25, 192.2kg 5/26/25  Significant Weight Loss: Yes  Interpretation of Weight Loss: >2% in 1 week  Significant Weight Gain: Fluid related         IBW/kg (Dietitian Calculated): 78.2 kg          Total Energy Estimated Needs in 24 hours (kCal): 3480 kCal  Energy Estimated Needs per kg Body Weight in 24 hours (kCal/kg): 3830 kCal/kg  Method for Estimating Needs: 20-22    Total Protein Estimated Needs in 24 Hours (g): 140 g  Protein Estimated Needs per kg Body Weight in 24 Hours (g/kg): 175 g/kg  Method for Estimating 24 Hour Protein Needs: 0.8-1.0    Method for Estimating 24 Hour Fluid Needs: 1ml/kcal or per MD    Nutrition Focused Physical Findings:  Orbital Fat Pads: Well nourished (slightly bulging fat pads)  Buccal Fat Pads: Well nourished (full, rounded cheeks)    Temporalis: Well nourished (well-defined muscle)    Edema: +2 mild  Edema Location: RLE, LLE    Skin: Positive  Positive Skin Findings: Impaired wound healing (R heel diabetic ulcer)     Nutrition Diagnosis   Patient has Nutrition Diagnosis: Yes  Nutrition Diagnosis 1: Inadequate oral intake  Diagnosis Status (1): Resolved  Related to (1): acute illness  As Evidenced by (1): pt NPO requiring enteral feeds to meet nutritional needs       Nutrition Diagnosis 2: Overweight, adult or pediatric  Diagnosis Status (2): New  Related to (2): chronic illness  As Evidenced by (2): BMI 53.6       Nutrition Interventions/Recommendations   Nutrition Prescription: Nutrition prescription for oral nutrition  Individualized Nutrition Prescription Provided for : Continue oral diet as ordered.    Food and/or Nutrient Delivery Interventions  Meals and Snacks: General healthful diet  Goal: > 75% of meals consumed     Education Documentation  N/A      Nutrition Monitoring and Evaluation   Food and Nutrient Related History  Estimated Energy Intake: Energy  intake greater or equal to 75% of estimated energy needs    Fluid Intake: Estimated fluid intake    Anthropometrics: Body Composition/Growth/Weight History  Body Weight: Body weight - Maintain stable weight, Body weight - Weight reduction from fluids, as needed    Biochemical Data, Medical Tests and Procedures  Electrolyte and Renal Panel: Other (Comment)  Criteria: as clinically indicated    Gastrointestinal Profile: Other (Comment)  Criteria: as clinically indicated    Glucose/Endocrine Profile: Other (Comment)  Criteria: as clinically indicated    Nutritional Anemia Profile: Other (Comment)  Criteria: as clinically indicated    Vitamin Profile: Other (Comment)  Criteria: as clinically indicated    Nutrition Focused Physical Findings  Digestive System Finding: Other (Comment)  Criteria: Stool output, Urine volume, Overall appearance    Skin Finding: Impaired wound healing - Improved wound healing    Edema Finding: +2 Pitting edema    Time Spent (min): 60 minutes  Last Date of Nutrition Visit: 06/02/25  Nutrition Follow-Up Needed?: Dietitian to reassess per policy  Follow up Comment: GS EN follow up       [1] amLODIPine, 5 mg, oral, Daily  apixaban, 5 mg, oral, q12h  aspirin, 81 mg, oral, Daily  atorvastatin, 80 mg, oral, Nightly  colchicine, 0.6 mg, oral, Daily  docusate sodium, 100 mg, oral, BID  DULoxetine, 60 mg, oral, Daily  ergocalciferol, 1.25 mg, oral, Weekly  ferrous sulfate, 1 tablet, oral, Every Mon/Wed/Fri  furosemide, 40 mg, intravenous, BID  insulin glargine, 30 Units, subcutaneous, BID  insulin lispro, 0-15 Units, subcutaneous, q4h  ipratropium-albuteroL, 3 mL, nebulization, TID  lisinopril, 5 mg, oral, Daily  lubricating eye drops, 1 drop, Both Eyes, Daily  metoprolol tartrate, 25 mg, oral, BID  pantoprazole, 40 mg, oral, Daily before breakfast  polyethylene glycol, 17 g, oral, Daily  QUEtiapine, 100 mg, oral, Nightly  senna, 5 mL, oral, BID     [2]    [3] PRN medications: acetaminophen, dextrose,  dextrose, glucagon, glucagon, haloperidol lactate, ipratropium-albuteroL, lactulose, naloxone, ondansetron **OR** ondansetron, oxygen

## 2025-06-04 ENCOUNTER — APPOINTMENT (OUTPATIENT)
Dept: CARDIOLOGY | Facility: HOSPITAL | Age: 55
End: 2025-06-04
Payer: MEDICARE

## 2025-06-04 LAB
ANION GAP SERPL CALC-SCNC: 9 MMOL/L (ref 10–20)
BASOPHILS # BLD AUTO: 0.04 X10*3/UL (ref 0–0.1)
BASOPHILS NFR BLD AUTO: 0.6 %
BUN SERPL-MCNC: 23 MG/DL (ref 6–23)
CALCIUM SERPL-MCNC: 8.5 MG/DL (ref 8.6–10.3)
CHLORIDE SERPL-SCNC: 100 MMOL/L (ref 98–107)
CO2 SERPL-SCNC: 35 MMOL/L (ref 21–32)
CREAT SERPL-MCNC: 1.01 MG/DL (ref 0.5–1.3)
EGFRCR SERPLBLD CKD-EPI 2021: 88 ML/MIN/1.73M*2
EOSINOPHIL # BLD AUTO: 0.31 X10*3/UL (ref 0–0.7)
EOSINOPHIL NFR BLD AUTO: 4.8 %
ERYTHROCYTE [DISTWIDTH] IN BLOOD BY AUTOMATED COUNT: 16.2 % (ref 11.5–14.5)
GLUCOSE BLD MANUAL STRIP-MCNC: 184 MG/DL (ref 74–99)
GLUCOSE BLD MANUAL STRIP-MCNC: 252 MG/DL (ref 74–99)
GLUCOSE BLD MANUAL STRIP-MCNC: 276 MG/DL (ref 74–99)
GLUCOSE BLD MANUAL STRIP-MCNC: 281 MG/DL (ref 74–99)
GLUCOSE BLD MANUAL STRIP-MCNC: 285 MG/DL (ref 74–99)
GLUCOSE BLD MANUAL STRIP-MCNC: 297 MG/DL (ref 74–99)
GLUCOSE BLD MANUAL STRIP-MCNC: 303 MG/DL (ref 74–99)
GLUCOSE SERPL-MCNC: 211 MG/DL (ref 74–99)
HCT VFR BLD AUTO: 36.7 % (ref 41–52)
HGB BLD-MCNC: 10.3 G/DL (ref 13.5–17.5)
IMM GRANULOCYTES # BLD AUTO: 0.02 X10*3/UL (ref 0–0.7)
IMM GRANULOCYTES NFR BLD AUTO: 0.3 % (ref 0–0.9)
LYMPHOCYTES # BLD AUTO: 1.25 X10*3/UL (ref 1.2–4.8)
LYMPHOCYTES NFR BLD AUTO: 19.5 %
MCH RBC QN AUTO: 22.5 PG (ref 26–34)
MCHC RBC AUTO-ENTMCNC: 28.1 G/DL (ref 32–36)
MCV RBC AUTO: 80 FL (ref 80–100)
MONOCYTES # BLD AUTO: 0.5 X10*3/UL (ref 0.1–1)
MONOCYTES NFR BLD AUTO: 7.8 %
NEUTROPHILS # BLD AUTO: 4.28 X10*3/UL (ref 1.2–7.7)
NEUTROPHILS NFR BLD AUTO: 67 %
NRBC BLD-RTO: 0 /100 WBCS (ref 0–0)
PLATELET # BLD AUTO: 280 X10*3/UL (ref 150–450)
POTASSIUM SERPL-SCNC: 4 MMOL/L (ref 3.5–5.3)
RBC # BLD AUTO: 4.58 X10*6/UL (ref 4.5–5.9)
SODIUM SERPL-SCNC: 140 MMOL/L (ref 136–145)
WBC # BLD AUTO: 6.4 X10*3/UL (ref 4.4–11.3)

## 2025-06-04 PROCEDURE — 2500000001 HC RX 250 WO HCPCS SELF ADMINISTERED DRUGS (ALT 637 FOR MEDICARE OP)

## 2025-06-04 PROCEDURE — 2500000005 HC RX 250 GENERAL PHARMACY W/O HCPCS

## 2025-06-04 PROCEDURE — 85025 COMPLETE CBC W/AUTO DIFF WBC: CPT | Performed by: INTERNAL MEDICINE

## 2025-06-04 PROCEDURE — 2500000002 HC RX 250 W HCPCS SELF ADMINISTERED DRUGS (ALT 637 FOR MEDICARE OP, ALT 636 FOR OP/ED)

## 2025-06-04 PROCEDURE — 93005 ELECTROCARDIOGRAM TRACING: CPT

## 2025-06-04 PROCEDURE — 1100000001 HC PRIVATE ROOM DAILY

## 2025-06-04 PROCEDURE — 2500000004 HC RX 250 GENERAL PHARMACY W/ HCPCS (ALT 636 FOR OP/ED)

## 2025-06-04 PROCEDURE — 94640 AIRWAY INHALATION TREATMENT: CPT

## 2025-06-04 PROCEDURE — 36415 COLL VENOUS BLD VENIPUNCTURE: CPT | Performed by: INTERNAL MEDICINE

## 2025-06-04 PROCEDURE — 97530 THERAPEUTIC ACTIVITIES: CPT | Mod: GP,CQ | Performed by: PHYSICAL THERAPY ASSISTANT

## 2025-06-04 PROCEDURE — 2500000005 HC RX 250 GENERAL PHARMACY W/O HCPCS: Performed by: INTERNAL MEDICINE

## 2025-06-04 PROCEDURE — 80048 BASIC METABOLIC PNL TOTAL CA: CPT | Performed by: INTERNAL MEDICINE

## 2025-06-04 PROCEDURE — 2500000001 HC RX 250 WO HCPCS SELF ADMINISTERED DRUGS (ALT 637 FOR MEDICARE OP): Performed by: INTERNAL MEDICINE

## 2025-06-04 PROCEDURE — 2500000001 HC RX 250 WO HCPCS SELF ADMINISTERED DRUGS (ALT 637 FOR MEDICARE OP): Performed by: NURSE PRACTITIONER

## 2025-06-04 PROCEDURE — 82947 ASSAY GLUCOSE BLOOD QUANT: CPT

## 2025-06-04 PROCEDURE — 99233 SBSQ HOSP IP/OBS HIGH 50: CPT | Performed by: INTERNAL MEDICINE

## 2025-06-04 RX ADMIN — ACETAMINOPHEN 650 MG: 325 TABLET, FILM COATED ORAL at 10:03

## 2025-06-04 RX ADMIN — LISINOPRIL 5 MG: 5 TABLET ORAL at 10:03

## 2025-06-04 RX ADMIN — HYDROCODONE BITARTRATE AND ACETAMINOPHEN 1 TABLET: 5; 325 TABLET ORAL at 20:17

## 2025-06-04 RX ADMIN — FUROSEMIDE 40 MG: 10 INJECTION, SOLUTION INTRAMUSCULAR; INTRAVENOUS at 20:18

## 2025-06-04 RX ADMIN — DULOXETINE 60 MG: 30 CAPSULE, DELAYED RELEASE ORAL at 10:03

## 2025-06-04 RX ADMIN — AMLODIPINE BESYLATE 5 MG: 5 TABLET ORAL at 10:03

## 2025-06-04 RX ADMIN — ASPIRIN 81 MG CHEWABLE TABLET 81 MG: 81 TABLET CHEWABLE at 10:03

## 2025-06-04 RX ADMIN — HYDROCODONE BITARTRATE AND ACETAMINOPHEN 1 TABLET: 5; 325 TABLET ORAL at 14:51

## 2025-06-04 RX ADMIN — INSULIN LISPRO 9 UNITS: 100 INJECTION, SOLUTION INTRAVENOUS; SUBCUTANEOUS at 04:24

## 2025-06-04 RX ADMIN — FUROSEMIDE 40 MG: 10 INJECTION, SOLUTION INTRAMUSCULAR; INTRAVENOUS at 10:03

## 2025-06-04 RX ADMIN — INSULIN LISPRO 3 UNITS: 100 INJECTION, SOLUTION INTRAVENOUS; SUBCUTANEOUS at 10:03

## 2025-06-04 RX ADMIN — FERROUS SULFATE TAB 325 MG (65 MG ELEMENTAL FE) 1 TABLET: 325 (65 FE) TAB at 18:08

## 2025-06-04 RX ADMIN — COLCHICINE 0.6 MG: 0.6 TABLET, FILM COATED ORAL at 10:03

## 2025-06-04 RX ADMIN — IPRATROPIUM BROMIDE AND ALBUTEROL SULFATE 3 ML: 2.5; .5 SOLUTION RESPIRATORY (INHALATION) at 20:00

## 2025-06-04 RX ADMIN — APIXABAN 5 MG: 5 TABLET, FILM COATED ORAL at 06:13

## 2025-06-04 RX ADMIN — METOPROLOL TARTRATE 25 MG: 25 TABLET, FILM COATED ORAL at 20:17

## 2025-06-04 RX ADMIN — Medication 1 L/MIN: at 20:00

## 2025-06-04 RX ADMIN — INSULIN LISPRO 9 UNITS: 100 INJECTION, SOLUTION INTRAVENOUS; SUBCUTANEOUS at 17:47

## 2025-06-04 RX ADMIN — METOPROLOL TARTRATE 25 MG: 25 TABLET, FILM COATED ORAL at 10:03

## 2025-06-04 RX ADMIN — INSULIN GLARGINE 30 UNITS: 100 INJECTION, SOLUTION SUBCUTANEOUS at 10:02

## 2025-06-04 RX ADMIN — ACETAMINOPHEN 650 MG: 325 TABLET, FILM COATED ORAL at 18:08

## 2025-06-04 RX ADMIN — ATORVASTATIN CALCIUM 80 MG: 80 TABLET, FILM COATED ORAL at 20:17

## 2025-06-04 RX ADMIN — INSULIN GLARGINE 30 UNITS: 100 INJECTION, SOLUTION SUBCUTANEOUS at 20:53

## 2025-06-04 RX ADMIN — QUETIAPINE FUMARATE 100 MG: 100 TABLET ORAL at 20:18

## 2025-06-04 RX ADMIN — PANTOPRAZOLE SODIUM 40 MG: 40 TABLET, DELAYED RELEASE ORAL at 10:03

## 2025-06-04 RX ADMIN — HYDROCODONE BITARTRATE AND ACETAMINOPHEN 1 TABLET: 5; 325 TABLET ORAL at 05:11

## 2025-06-04 RX ADMIN — ACETAMINOPHEN 650 MG: 325 TABLET, FILM COATED ORAL at 02:52

## 2025-06-04 RX ADMIN — INSULIN LISPRO 9 UNITS: 100 INJECTION, SOLUTION INTRAVENOUS; SUBCUTANEOUS at 13:02

## 2025-06-04 RX ADMIN — APIXABAN 5 MG: 5 TABLET, FILM COATED ORAL at 17:48

## 2025-06-04 RX ADMIN — CARBOXYMETHYLCELLULOSE SODIUM 1 DROP: 0.5 SOLUTION/ DROPS OPHTHALMIC at 10:04

## 2025-06-04 RX ADMIN — INSULIN LISPRO 9 UNITS: 100 INJECTION, SOLUTION INTRAVENOUS; SUBCUTANEOUS at 20:53

## 2025-06-04 ASSESSMENT — PAIN - FUNCTIONAL ASSESSMENT
PAIN_FUNCTIONAL_ASSESSMENT: 0-10

## 2025-06-04 ASSESSMENT — PAIN DESCRIPTION - LOCATION
LOCATION: OTHER (COMMENT)
LOCATION: BACK
LOCATION: BACK

## 2025-06-04 ASSESSMENT — COGNITIVE AND FUNCTIONAL STATUS - GENERAL
DAILY ACTIVITIY SCORE: 14
MOVING FROM LYING ON BACK TO SITTING ON SIDE OF FLAT BED WITH BEDRAILS: A LOT
EATING MEALS: A LITTLE
DRESSING REGULAR UPPER BODY CLOTHING: TOTAL
TOILETING: TOTAL
CLIMB 3 TO 5 STEPS WITH RAILING: TOTAL
MOBILITY SCORE: 8
DAILY ACTIVITIY SCORE: 11
PERSONAL GROOMING: A LITTLE
MOVING FROM LYING ON BACK TO SITTING ON SIDE OF FLAT BED WITH BEDRAILS: A LOT
DRESSING REGULAR LOWER BODY CLOTHING: TOTAL
PERSONAL GROOMING: A LITTLE
CLIMB 3 TO 5 STEPS WITH RAILING: A LOT
MOVING TO AND FROM BED TO CHAIR: TOTAL
TURNING FROM BACK TO SIDE WHILE IN FLAT BAD: A LOT
MOVING TO AND FROM BED TO CHAIR: A LOT
DRESSING REGULAR LOWER BODY CLOTHING: TOTAL
HELP NEEDED FOR BATHING: TOTAL
CLIMB 3 TO 5 STEPS WITH RAILING: TOTAL
MOBILITY SCORE: 11
DAILY ACTIVITIY SCORE: 10
TURNING FROM BACK TO SIDE WHILE IN FLAT BAD: A LITTLE
MOVING TO AND FROM BED TO CHAIR: A LOT
WALKING IN HOSPITAL ROOM: A LOT
HELP NEEDED FOR BATHING: TOTAL
TOILETING: TOTAL
STANDING UP FROM CHAIR USING ARMS: TOTAL
STANDING UP FROM CHAIR USING ARMS: A LOT
STANDING UP FROM CHAIR USING ARMS: A LOT
TURNING FROM BACK TO SIDE WHILE IN FLAT BAD: A LOT
EATING MEALS: A LITTLE
WALKING IN HOSPITAL ROOM: A LITTLE
DRESSING REGULAR UPPER BODY CLOTHING: A LOT
WALKING IN HOSPITAL ROOM: TOTAL
HELP NEEDED FOR BATHING: A LOT
DRESSING REGULAR LOWER BODY CLOTHING: TOTAL
MOBILITY SCORE: 15
MOVING FROM LYING ON BACK TO SITTING ON SIDE OF FLAT BED WITH BEDRAILS: A LITTLE
TOILETING: TOTAL
DRESSING REGULAR UPPER BODY CLOTHING: A LOT

## 2025-06-04 ASSESSMENT — PAIN SCALES - GENERAL
PAINLEVEL_OUTOF10: 3
PAINLEVEL_OUTOF10: 9
PAINLEVEL_OUTOF10: 9
PAINLEVEL_OUTOF10: 5 - MODERATE PAIN
PAINLEVEL_OUTOF10: 8
PAINLEVEL_OUTOF10: 0 - NO PAIN
PAINLEVEL_OUTOF10: 0 - NO PAIN
PAINLEVEL_OUTOF10: 8
PAINLEVEL_OUTOF10: 0 - NO PAIN
PAINLEVEL_OUTOF10: 0 - NO PAIN
PAINLEVEL_OUTOF10: 5 - MODERATE PAIN

## 2025-06-04 ASSESSMENT — PAIN DESCRIPTION - DESCRIPTORS
DESCRIPTORS: DISCOMFORT;THROBBING
DESCRIPTORS: DISCOMFORT

## 2025-06-04 ASSESSMENT — PAIN DESCRIPTION - ORIENTATION: ORIENTATION: MID

## 2025-06-04 NOTE — CARE PLAN
The patient's goals for the shift include      The clinical goals for the shift include Pt will remain free from harm throughout the shift      Problem: Diabetes  Goal: Achieve decreasing blood glucose levels by end of shift  Outcome: Progressing  Goal: Increase stability of blood glucose readings by end of shift  Outcome: Progressing  Goal: Decrease in ketones present in urine by end of shift  Outcome: Progressing  Goal: Maintain electrolyte levels within acceptable range throughout shift  Outcome: Progressing  Goal: Maintain glucose levels >70mg/dl to <250mg/dl throughout shift  Outcome: Progressing  Goal: No changes in neurological exam by end of shift  Outcome: Progressing  Goal: Learn about and adhere to nutrition recommendations by end of shift  Outcome: Progressing  Goal: Vital signs within normal range for age by end of shift  Outcome: Progressing  Goal: Increase self care and/or family involovement by end of shift  Outcome: Progressing  Goal: Receive DSME education by end of shift  Outcome: Progressing     Problem: Pain  Goal: Takes deep breaths with improved pain control throughout the shift  Outcome: Progressing  Goal: Turns in bed with improved pain control throughout the shift  Outcome: Progressing  Goal: Walks with improved pain control throughout the shift  Outcome: Progressing  Goal: Performs ADL's with improved pain control throughout shift  Outcome: Progressing  Goal: Participates in PT with improved pain control throughout the shift  Outcome: Progressing  Goal: Free from opioid side effects throughout the shift  Outcome: Progressing  Goal: Free from acute confusion related to pain meds throughout the shift  Outcome: Progressing     Problem: Fall/Injury  Goal: Not fall by end of shift  Outcome: Progressing  Goal: Be free from injury by end of the shift  Outcome: Progressing  Goal: Verbalize understanding of personal risk factors for fall in the hospital  Outcome: Progressing  Goal: Verbalize  understanding of risk factor reduction measures to prevent injury from fall in the home  Outcome: Progressing  Goal: Use assistive devices by end of the shift  Outcome: Progressing  Goal: Pace activities to prevent fatigue by end of the shift  Outcome: Progressing     Problem: Pain - Adult  Goal: Verbalizes/displays adequate comfort level or baseline comfort level  Outcome: Progressing     Problem: Safety - Adult  Goal: Free from fall injury  Outcome: Progressing     Problem: Discharge Planning  Goal: Discharge to home or other facility with appropriate resources  Outcome: Progressing     Problem: Chronic Conditions and Co-morbidities  Goal: Patient's chronic conditions and co-morbidity symptoms are monitored and maintained or improved  Outcome: Progressing     Problem: Nutrition  Goal: Nutrient intake appropriate for maintaining nutritional needs  Outcome: Progressing     Problem: Skin  Goal: Decreased wound size/increased tissue granulation at next dressing change  Outcome: Progressing  Goal: Participates in plan/prevention/treatment measures  Outcome: Progressing  Goal: Prevent/manage excess moisture  Outcome: Progressing  Goal: Prevent/minimize sheer/friction injuries  Outcome: Progressing  Goal: Promote/optimize nutrition  Outcome: Progressing  Goal: Promote skin healing  Outcome: Progressing     Problem: Safety - Medical Restraint  Goal: Remains free of injury from restraints (Restraint for Interference with Medical Device)  Outcome: Progressing  Goal: Free from restraint(s) (Restraint for Interference with Medical Device)  Outcome: Progressing

## 2025-06-04 NOTE — PROGRESS NOTES
Occupational Therapy    Occupational Therapy Treatment    Name: Jai Shrestha  MRN: [id number]  Department: [facility] [address]  Room: [address] [place] [address]  Date: 06/04/25       Assessment:  OT Assessment: Pt seen with PT to assist with mobility, Pt declined further therapy once up in chair. Increased irritability intermittently during session  Prognosis: Good  Barriers to Discharge Home: Caregiver assistance, Physical needs  Caregiver Assistance: Patient lives alone and/or does not have reliable caregiver assistance  Physical Needs: 24hr mobility assistance needed, 24hr ADL assistance needed, High falls risk due to function or environment  Evaluation/Treatment Tolerance: Patient limited by fatigue  Medical Staff Made Aware: Yes  End of Session Communication: Bedside nurse  End of Session Patient Position: Up in chair, Alarm on  Plan:  Treatment Interventions: ADL retraining, Functional transfer training, Endurance training, Patient/family training, Equipment evaluation/education  OT Frequency: 3 times per week  OT Discharge Recommendations: Moderate intensity level of continued care  Equipment Recommended upon Discharge:  (TBD)  OT Recommended Transfer Status: Moderate assist, Assist of 2  OT - OK to Discharge: Yes (Per POC)    Subjective     OT Visit Info:  OT Received On: 06/04/25  General:  General  Reason for Referral: SOB  Co-Treatment: PT  Co-Treatment Reason: Co tx with PT to maximize pt safety, participation and mobility  Prior to Session Communication: Bedside nurse  Patient Position Received: Bed, 4 rail up, Alarm on  Preferred Learning Style: kinesthetic, auditory, verbal, visual, written  General Comment: Pt agreeable to PT/OT tx.  Pt distractible and intermittently frustrated (with hospital situation, staff and limitations at this time). Increased irritability during session. calming once up and in chair  Precautions:  Medical Precautions: Fall precautions           Pain Assessment:  Pain Assessment  Pain Assessment: 0-10  0-10  (Numeric) Pain Score: 5 - Moderate pain  Pain Type: Acute pain  Pain Location: Generalized (repo)  Pain Descriptors: Discomfort  Pain Frequency: Constant/continuous  Pain Interventions: Repositioned, Ambulation/increased activity, Distraction    Objective   Cognition:  Overall Cognitive Status: Within Functional Limits  Orientation Level: Oriented X4  Attention: Within Functional Limits  Memory: Within Funtional Limits  Insight: Within function limits  Impulsive: Mildly     Bed Mobility/Transfers: Bed Mobility  Bed Mobility: Yes  Bed Mobility 1  Bed Mobility 1: Supine to sitting, Sitting to supine  Level of Assistance 1: Minimum assistance, +2 (Asssit to giude L Arm to R bedrail and move feet off bed)    Transfers  Transfer: Yes  Transfer 1  Transfer From 1: Bed to  Transfer to 1:  (walker)  Technique 1: Sit to stand, Stand to sit  Transfer Device 1: Walker, Gait belt  Transfer Level of Assistance 1: Moderate assistance, +2, Minimal verbal cues, Moderate tactile cues      Functional Mobility:  Functional Mobility  Functional Mobility Performed: Yes  Functional Mobility 1  Surface 1: Level tile  Device 1: Rolling walker  Functional Mobility Support Devices: Gait belt  Assistance 1: Minimum assistance, Minimal verbal cues, Minimal tactile cues (x2)  Sitting Balance:  Static Sitting Balance  Static Sitting-Balance Support: Feet supported  Static Sitting-Level of Assistance: Close supervision  Dynamic Sitting Balance  Dynamic Sitting-Balance Support: Feet supported  Dynamic Sitting-Level of Assistance: Contact guard  Dynamic Sitting-Balance: Reaching for objects, Reaching across midline  Standing Balance:  Static Standing Balance  Static Standing-Balance Support: Bilateral upper extremity supported  Static Standing-Level of Assistance: Minimum assistance (x2)  Dynamic Standing Balance  Dynamic Standing-Balance Support: Bilateral upper extremity supported  Dynamic Standing-Level of Assistance: Minimum assistance (x2)         Outcome Measures:  [facility] Daily Activity  Putting on and taking off regular lower body clothing: Total  Bathing (including washing, rinsing, drying): A lot  Putting on and taking off regular upper body clothing: A lot  Toileting, which includes using toilet, bedpan or urinal: Total  Taking care of personal grooming such as brushing teeth: None  Eating Meals: None  Daily Activity - Total Score: 14        Education Documentation  Precautions, taught by Melanie Peguero OT at 6/4/2025  3:09 PM.  Learner: Patient  Readiness: Acceptance  Method: Explanation  Response: Verbalizes Understanding, Needs Reinforcement    Handouts, taught by Melanie Peguero OT at 6/4/2025  3:09 PM.  Learner: Patient  Readiness: Acceptance  Method: Explanation  Response: Verbalizes Understanding, Needs Reinforcement    ADL Training, taught by Melanie Peguero OT at 6/4/2025  3:09 PM.  Learner: Patient  Readiness: Acceptance  Method: Explanation  Response: Verbalizes Understanding, Needs Reinforcement    Precautions, taught by Melanie Peguero OT at 6/4/2025  3:09 PM.  Learner: Patient  Readiness: Acceptance  Method: Explanation  Response: Verbalizes Understanding, Needs Reinforcement    Handouts, taught by Melanie Peguero OT at 6/4/2025  3:09 PM.  Learner: Patient  Readiness: Acceptance  Method: Explanation  Response: Verbalizes Understanding, Needs Reinforcement    ADL Training, taught by Melanie Peguero OT at 6/4/2025  3:09 PM.  Learner: Patient  Readiness: Acceptance  Method: Explanation  Response: Verbalizes Understanding, Needs Reinforcement    Education Comments  No comments found.      Goals:  Encounter Problems       Encounter Problems (Active)       ADLs       Patient will perform UB and LB bathing 50% with moderate assist level of assistance and adaptive equipment prn . (Progressing)       Start:  06/02/25    Expected End:  06/16/25            Patient with complete upper body dressing with contact guard  assist level of assistance donning and doffing all UE clothes with no adaptive equipment while supported sitting (Progressing)       Start:  06/02/25    Expected End:  06/16/25            Patient with complete lower body dressing with moderate assist level of assistance donning and doffing all LE clothes  with PRN adaptive equipment while supported sitting (Progressing)       Start:  06/02/25    Expected End:  06/16/25            Pt will tolerate 30 min OT tx session w/o subj/obj c/o fatigue/SOB with activity to increase independence with ADLS (Progressing)       Start:  06/02/25    Expected End:  06/16/25               TRANSFERS       Patient will perform bed mobility moderate assist level of assistance and bed rail/draw sheet  in order to improve safety and independence with mobility (Progressing)       Start:  06/02/25    Expected End:  06/16/25

## 2025-06-04 NOTE — PROGRESS NOTES
06/04/25 1510   Discharge Planning   Home or Post Acute Services Post acute facilities (Rehab/SNF/etc)   Expected Discharge Disposition SNF  ([facility])   Does the patient need discharge transport arranged? Yes   RoundTrip coordination needed? Yes   Has discharge transport been arranged? No   What day is the transport expected? 06/05/25   Patient Choice   Provider Choice list and CMS website (https://medicare.gov/care-compare#search) for post-acute Quality and Resource Measure Data were provided and reviewed with: Patient     Met with patient at bedside. Patient would like to discharge home, but notes that his friends and neighbors would be upset with him. He also recognizes that he would not be able to care for himself at home. Patient agreeable to SNF- identified [facility] as FOC. Patient will most likely dc tomorrow AM. SW will follow.

## 2025-06-04 NOTE — PROGRESS NOTES
Physical Therapy    Physical Therapy Treatment    Patient Name: Jai Shrestha  MRN: [id number]  Department: [facility] [address]  Room: [address] [place] [address]  Today's Date: 6/4/2025  Time Calculation  Start Time: 1412  Stop Time: 1427  Time Calculation (min): 15 min         Assessment/Plan   PT Assessment  Rehab Prognosis: Fair  Barriers to Discharge Home: Caregiver assistance, Physical needs  Caregiver Assistance: Patient lives alone and/or does not have reliable caregiver assistance  Physical Needs: Ambulating household distances limited by function/safety, 24hr mobility assistance needed, 24hr ADL assistance needed  Evaluation/Treatment Tolerance: Patient tolerated treatment well  End of Session Communication: Bedside nurse  Assessment Comment:  (pt dmeo fair fredis to activity)  End of Session Patient Position: Up in chair, Alarm on  PT Plan  Inpatient/Swing Bed or Outpatient: Inpatient  PT Plan  Treatment/Interventions: Bed mobility, Transfer training, Gait training, Therapeutic activity  PT Plan: Ongoing PT  PT Frequency: 3 times per week  PT Discharge Recommendations: Moderate intensity level of continued care  Equipment Recommended upon Discharge:  (TBD)  PT Recommended Transfer Status: Assist x2, Total assist  PT - OK to Discharge: Yes (per PT POC)    PT Visit Info:  PT Received On: 06/04/25     General Visit Information:   General  Reason for Referral: SOB  Referred By: DAMASO Barros PAC  Co-Treatment: OT  Co-Treatment Reason: Cotx with OT to maximize pt safety, participation and mobility  Prior to Session Communication: Bedside nurse  Patient Position Received: Bed, 4 rail up, Alarm on  Preferred Learning Style: kinesthetic, auditory, verbal, visual, written  General Comment:  (pt agreeable to tx.)    Subjective   Precautions:  Precautions  Medical Precautions: Fall precautions            Objective   Pain:  Pain Assessment  Pain Assessment: 0-10  0-10 (Numeric) Pain Score: 5 - Moderate pain (RN aware)  Pain Location:  Generalized  Cognition:  Cognition  Orientation Level: Oriented X4  Insight: Moderate  Impulsive: Moderately  Coordination:     Postural Control:     Extremity/Trunk Assessments:    Activity Tolerance:  Activity Tolerance  Endurance: Decreased tolerance for upright activites  Treatments:       Bed Mobility  Bed Mobility: Yes  Bed Mobility 1  Bed Mobility 1: Supine to sitting  Level of Assistance 1: Minimum assistance, Moderate verbal cues, Moderate tactile cues, +2  Bed Mobility Comments 1:  (pt req increased cues for proper hand placement and sequencing)    Ambulation/Gait Training  Ambulation/Gait Training Performed: Yes  Ambulation/Gait Training 1  Surface 1: Level tile  Device 1: Rolling walker  Gait Support Devices: Gait belt  Assistance 1: Minimum assistance, Moderate verbal cues, Moderate tactile cues (x2)  Quality of Gait 1: Narrow base of support, Decreased step length, Inconsistent stride length, Antalgic  Comments/Distance (ft) 1: 5-6 short steps bed to chair (pt req increased cues for proper FWW placement and sequencing.)  Transfers  Transfer: Yes  Transfer 1  Transfer From 1: Sit to, Stand to  Technique 1: Sit to stand, Stand to sit  Transfer Device 1: Walker, Gait belt  Transfer Level of Assistance 1: Moderate assistance, Moderate verbal cues, Moderate tactile cues  Trials/Comments 1:  (x2)    Outcome Measures:  [facility] Basic Mobility  Turning from your back to your side while in a flat bed without using bedrails: A lot  Moving from lying on your back to sitting on the side of a flat bed without using bedrails: A lot  Moving to and from bed to chair (including a wheelchair): A lot  Standing up from a chair using your arms (e.g. wheelchair or bedside chair): A lot  To walk in hospital room: A lot  Climbing 3-5 steps with railing: Total  Basic Mobility - Total Score: 11    Education Documentation  Body Mechanics, taught by Varinder Paredes PTA at 6/4/2025  3:31 PM.  Learner: Patient  Readiness:  Acceptance  Method: Explanation  Response: Needs Reinforcement    Mobility Training, taught by Varinder Paredes PTA at 6/4/2025  3:31 PM.  Learner: Patient  Readiness: Acceptance  Method: Explanation  Response: Needs Reinforcement    Education Comments  No comments found.        OP EDUCATION:       Encounter Problems       Encounter Problems (Active)       Balance       STG - Maintains dynamic sitting balance without upper extremity support (Progressing)       Start:  06/02/25    Expected End:  06/16/25       SUP >15 min            Mobility          PT Transfers       STG - Patient will perform bed mobility (Progressing)       Start:  06/02/25    Expected End:  06/16/25       Mod A x2         STG - Patient will transfer sit to and from stand (Progressing)       Start:  06/02/25    Expected End:  06/16/25       Mod A x2 from elevate bed surface (if medically safe to attempt)            Pain - Adult

## 2025-06-04 NOTE — CARE PLAN
The patient's goals for the shift include      The clinical goals for the shift include Pt. will remain safe & free from falls throughout the shift.      Problem: Diabetes  Goal: Achieve decreasing blood glucose levels by end of shift  Outcome: Progressing  Goal: Increase stability of blood glucose readings by end of shift  Outcome: Progressing  Goal: Decrease in ketones present in urine by end of shift  Outcome: Progressing  Goal: Maintain electrolyte levels within acceptable range throughout shift  Outcome: Progressing  Goal: Maintain glucose levels >70mg/dl to <250mg/dl throughout shift  Outcome: Progressing  Goal: No changes in neurological exam by end of shift  Outcome: Progressing  Goal: Learn about and adhere to nutrition recommendations by end of shift  Outcome: Progressing  Goal: Vital signs within normal range for age by end of shift  Outcome: Progressing  Goal: Increase self care and/or family involovement by end of shift  Outcome: Progressing  Goal: Receive DSME education by end of shift  Outcome: Progressing     Problem: Pain  Goal: Takes deep breaths with improved pain control throughout the shift  Outcome: Progressing  Goal: Turns in bed with improved pain control throughout the shift  Outcome: Progressing  Goal: Walks with improved pain control throughout the shift  Outcome: Progressing  Goal: Performs ADL's with improved pain control throughout shift  Outcome: Progressing  Goal: Participates in PT with improved pain control throughout the shift  Outcome: Progressing  Goal: Free from opioid side effects throughout the shift  Outcome: Progressing  Goal: Free from acute confusion related to pain meds throughout the shift  Outcome: Progressing     Problem: Fall/Injury  Goal: Not fall by end of shift  Outcome: Progressing  Goal: Be free from injury by end of the shift  Outcome: Progressing  Goal: Verbalize understanding of personal risk factors for fall in the hospital  Outcome: Progressing  Goal:  Verbalize understanding of risk factor reduction measures to prevent injury from fall in the home  Outcome: Progressing  Goal: Use assistive devices by end of the shift  Outcome: Progressing  Goal: Pace activities to prevent fatigue by end of the shift  Outcome: Progressing     Problem: Pain - Adult  Goal: Verbalizes/displays adequate comfort level or baseline comfort level  Outcome: Progressing     Problem: Safety - Adult  Goal: Free from fall injury  Outcome: Progressing     Problem: Discharge Planning  Goal: Discharge to home or other facility with appropriate resources  Outcome: Progressing     Problem: Chronic Conditions and Co-morbidities  Goal: Patient's chronic conditions and co-morbidity symptoms are monitored and maintained or improved  Outcome: Progressing     Problem: Nutrition  Goal: Nutrient intake appropriate for maintaining nutritional needs  Outcome: Progressing     Problem: Skin  Goal: Decreased wound size/increased tissue granulation at next dressing change  Outcome: Progressing  Goal: Participates in plan/prevention/treatment measures  Outcome: Progressing  Goal: Prevent/manage excess moisture  Outcome: Progressing  Goal: Prevent/minimize sheer/friction injuries  Outcome: Progressing  Flowsheets (Taken 6/4/2025 1502)  Prevent/minimize sheer/friction injuries: Complete micro-shifts as needed if patient unable. Adjust patient position to relieve pressure points, not a full turn  Goal: Promote/optimize nutrition  Outcome: Progressing  Goal: Promote skin healing  Outcome: Progressing     Problem: Safety - Medical Restraint  Goal: Remains free of injury from restraints (Restraint for Interference with Medical Device)  Outcome: Progressing  Goal: Free from restraint(s) (Restraint for Interference with Medical Device)  Outcome: Progressing

## 2025-06-04 NOTE — PROGRESS NOTES
"Jai Shrestha is a 55 y.o. male on day 19 of admission presenting with Pneumonia due to infectious organism, unspecified laterality, unspecified part of lung.    Subjective   Pt seen and examined.        Objective     Physical Exam  Constitutional: Obese gentleman, appears drowsy, somnolent, but respond to questions, cooperative not in acute distress  Lungs: Patent airways, CTABL  Heart: RRR, S1S2, no murmurs appreciated, palpable pulses in all extremities  GI: Soft, NT, ND, bowel sounds present in all quadrants  MSK: Moves all extremities freely, no restriction  of ROM, no joint edema  Extremities: Chronic lymphedema with venous ulcers, bilateral lower extremities peripheral edema  Neurological: no acute focal neurological deficits appreciated  Last Recorded Vitals  Blood pressure 166/81, pulse 65, temperature 35.9 °C (96.6 °F), temperature source Temporal, resp. rate 20, height 1.803 m (5' 10.98\"), weight (!) 174 kg (383 lb 6.1 oz), SpO2 98%.  Intake/Output last 3 Shifts:  I/O last 3 completed shifts:  In: 240 (1.4 mL/kg) [P.O.:240]  Out: 5625 (32.3 mL/kg) [Urine:5625 (0.9 mL/kg/hr)]  Weight: 173.9 kg     Relevant Results  Results for orders placed or performed during the hospital encounter of 05/16/25 (from the past 24 hours)   POCT GLUCOSE   Result Value Ref Range    POCT Glucose 276 (H) 74 - 99 mg/dL   POCT GLUCOSE   Result Value Ref Range    POCT Glucose 315 (H) 74 - 99 mg/dL   POCT GLUCOSE   Result Value Ref Range    POCT Glucose 275 (H) 74 - 99 mg/dL   POCT GLUCOSE   Result Value Ref Range    POCT Glucose 276 (H) 74 - 99 mg/dL   POCT GLUCOSE   Result Value Ref Range    POCT Glucose 252 (H) 74 - 99 mg/dL   Basic Metabolic Panel   Result Value Ref Range    Glucose 211 (H) 74 - 99 mg/dL    Sodium 140 136 - 145 mmol/L    Potassium 4.0 3.5 - 5.3 mmol/L    Chloride 100 98 - 107 mmol/L    Bicarbonate 35 (H) 21 - 32 mmol/L    Anion Gap 9 (L) 10 - 20 mmol/L    Urea Nitrogen 23 6 - 23 mg/dL    Creatinine 1.01 0.50 - " 1.30 mg/dL    eGFR 88 >60 mL/min/1.73m*2    Calcium 8.5 (L) 8.6 - 10.3 mg/dL   CBC and Auto Differential   Result Value Ref Range    WBC 6.4 4.4 - 11.3 x10*3/uL    nRBC 0.0 0.0 - 0.0 /100 WBCs    RBC 4.58 4.50 - 5.90 x10*6/uL    Hemoglobin 10.3 (L) 13.5 - 17.5 g/dL    Hematocrit 36.7 (L) 41.0 - 52.0 %    MCV 80 80 - 100 fL    MCH 22.5 (L) 26.0 - 34.0 pg    MCHC 28.1 (L) 32.0 - 36.0 g/dL    RDW 16.2 (H) 11.5 - 14.5 %    Platelets 280 150 - 450 x10*3/uL    Neutrophils % 67.0 40.0 - 80.0 %    Immature Granulocytes %, Automated 0.3 0.0 - 0.9 %    Lymphocytes % 19.5 13.0 - 44.0 %    Monocytes % 7.8 2.0 - 10.0 %    Eosinophils % 4.8 0.0 - 6.0 %    Basophils % 0.6 0.0 - 2.0 %    Neutrophils Absolute 4.28 1.20 - 7.70 x10*3/uL    Immature Granulocytes Absolute, Automated 0.02 0.00 - 0.70 x10*3/uL    Lymphocytes Absolute 1.25 1.20 - 4.80 x10*3/uL    Monocytes Absolute 0.50 0.10 - 1.00 x10*3/uL    Eosinophils Absolute 0.31 0.00 - 0.70 x10*3/uL    Basophils Absolute 0.04 0.00 - 0.10 x10*3/uL   POCT GLUCOSE   Result Value Ref Range    POCT Glucose 184 (H) 74 - 99 mg/dL       Imaging Results  Cxr:        IMPRESSION:  Ongoing CHF with mild interval worsening.      Tubes and lines in place as described.    Medications:  Scheduled Medications[1]   PRN Medications[2]     Assessment/Plan   55 y.o. male with a PMH of super obese, back pain on chronic opioids (Norco 7.5/325 3-4x per day), peripheral neuropathy, HTN, HLD, HFpEF, obesity-hypoventilation syndrome, T2DM on insulin, CKD, GERD admitted to the stepdown unit on 5/16 for acute on chronic diastolic CHF and pneumonia.  Transferred to the ICU on 5/23 following cardiac arrest - initially PEA then asystole - due to a respiratory event.  Patient intubated.  ROSC achieved after ~5 min.     #Cardiac arrest (PEA then asystole) with ROSC  #Acute hypoxic ischemia encephalopathy  #A fib, new onset  #Back pain on chronic opioids  #Acute on chronic diastolic CHF  #Hypertension,  Hyperlipidemia  #Acute hypoxemic respiratory failure  #Obesity Hypoventilation Syndrome  #T2DM on long-term insulin  #Bilateral pneumonia     -extubated on nc oxygen  - Continue home ASA, atorvastatin, and amlodipine.  Holding home lisinopril.  - Continue gentle diuresis with Lasix 40 mg BID as ordered.  - Continue current insulin regimen of Lantus 30 units (half his home dose) and sliding scale lispro.  - Seen by ID this admission.  Completed Zosynfor 10-day   -cardiology on consult for new onset a fib, in setting of recent cardiac arrest and h/o CHF  - Patient only had 1 episode of A-fib on May 30, converted to normal sinus rhythm after digoxin load, continuing in normal sinus rhythm, continue Eliquis, Zio patch at discharge    PT/OT    Dispo: home vs SNF.   Patient qualifies for SNF placement but he was refusing.  Discussed with patient and recommended SNF on discharge given his prolonged hospital stay and weakness.  He agreed for SNF placement.  Case management informed.     Pending SNF placement.      DVT Prophylaxis:  Lovenox subq        Ronan Dumont MD  [facility]           [1] amLODIPine, 5 mg, oral, Daily  apixaban, 5 mg, oral, q12h  aspirin, 81 mg, oral, Daily  atorvastatin, 80 mg, oral, Nightly  colchicine, 0.6 mg, oral, Daily  docusate sodium, 100 mg, oral, BID  DULoxetine, 60 mg, oral, Daily  ergocalciferol, 1.25 mg, oral, Weekly  ferrous sulfate, 1 tablet, oral, Every Mon/Wed/Fri  furosemide, 40 mg, intravenous, BID  insulin glargine, 30 Units, subcutaneous, BID  insulin lispro, 0-15 Units, subcutaneous, q4h  ipratropium-albuteroL, 3 mL, nebulization, TID  lisinopril, 5 mg, oral, Daily  lubricating eye drops, 1 drop, Both Eyes, Daily  metoprolol tartrate, 25 mg, oral, BID  pantoprazole, 40 mg, oral, Daily before breakfast  polyethylene glycol, 17 g, oral, Daily  QUEtiapine, 100 mg, oral, Nightly  senna, 5 mL, oral, BID     [2] PRN medications: acetaminophen, dextrose, dextrose, glucagon,  glucagon, haloperidol lactate, HYDROcodone-acetaminophen, ipratropium-albuteroL, lactulose, naloxone, ondansetron **OR** ondansetron, oxygen

## 2025-06-04 NOTE — PROGRESS NOTES
06/04/25 1540   Discharge Planning   Expected Discharge Disposition SNF  ([facility])   Does the patient need discharge transport arranged? Yes   RoundTrip coordination needed? Yes   Has discharge transport been arranged? No          Patient is deciding between going to SNF or going home with C. SW will follow on FOC. He will not need auth. He was downgraded from SDU to med-surg status  and will be transferred to med-surg floor.

## 2025-06-05 ENCOUNTER — APPOINTMENT (OUTPATIENT)
Dept: CARDIOLOGY | Facility: HOSPITAL | Age: 55
End: 2025-06-05
Payer: MEDICARE

## 2025-06-05 VITALS
HEART RATE: 75 BPM | OXYGEN SATURATION: 98 % | HEIGHT: 71 IN | RESPIRATION RATE: 18 BRPM | BODY MASS INDEX: 44.1 KG/M2 | WEIGHT: 315 LBS | SYSTOLIC BLOOD PRESSURE: 165 MMHG | TEMPERATURE: 97.5 F | DIASTOLIC BLOOD PRESSURE: 88 MMHG

## 2025-06-05 LAB
ANION GAP SERPL CALC-SCNC: 9 MMOL/L (ref 10–20)
ATRIAL RATE: 74 BPM
BASOPHILS # BLD AUTO: 0.04 X10*3/UL (ref 0–0.1)
BASOPHILS NFR BLD AUTO: 0.8 %
BODY SURFACE AREA: 2.93 M2
BUN SERPL-MCNC: 25 MG/DL (ref 6–23)
CALCIUM SERPL-MCNC: 8.2 MG/DL (ref 8.6–10.3)
CHLORIDE SERPL-SCNC: 98 MMOL/L (ref 98–107)
CO2 SERPL-SCNC: 34 MMOL/L (ref 21–32)
CREAT SERPL-MCNC: 1.04 MG/DL (ref 0.5–1.3)
EGFRCR SERPLBLD CKD-EPI 2021: 85 ML/MIN/1.73M*2
EOSINOPHIL # BLD AUTO: 0.26 X10*3/UL (ref 0–0.7)
EOSINOPHIL NFR BLD AUTO: 5 %
ERYTHROCYTE [DISTWIDTH] IN BLOOD BY AUTOMATED COUNT: 16.1 % (ref 11.5–14.5)
GLUCOSE BLD MANUAL STRIP-MCNC: 263 MG/DL (ref 74–99)
GLUCOSE BLD MANUAL STRIP-MCNC: 289 MG/DL (ref 74–99)
GLUCOSE BLD MANUAL STRIP-MCNC: 310 MG/DL (ref 74–99)
GLUCOSE SERPL-MCNC: 320 MG/DL (ref 74–99)
HCT VFR BLD AUTO: 35.1 % (ref 41–52)
HGB BLD-MCNC: 10 G/DL (ref 13.5–17.5)
IMM GRANULOCYTES # BLD AUTO: 0.03 X10*3/UL (ref 0–0.7)
IMM GRANULOCYTES NFR BLD AUTO: 0.6 % (ref 0–0.9)
LYMPHOCYTES # BLD AUTO: 1.32 X10*3/UL (ref 1.2–4.8)
LYMPHOCYTES NFR BLD AUTO: 25.2 %
MCH RBC QN AUTO: 22.8 PG (ref 26–34)
MCHC RBC AUTO-ENTMCNC: 28.5 G/DL (ref 32–36)
MCV RBC AUTO: 80 FL (ref 80–100)
MONOCYTES # BLD AUTO: 0.44 X10*3/UL (ref 0.1–1)
MONOCYTES NFR BLD AUTO: 8.4 %
NEUTROPHILS # BLD AUTO: 3.15 X10*3/UL (ref 1.2–7.7)
NEUTROPHILS NFR BLD AUTO: 60 %
NRBC BLD-RTO: 0 /100 WBCS (ref 0–0)
P AXIS: 18 DEGREES
P OFFSET: 177 MS
P ONSET: 128 MS
PLATELET # BLD AUTO: 256 X10*3/UL (ref 150–450)
POTASSIUM SERPL-SCNC: 3.9 MMOL/L (ref 3.5–5.3)
PR INTERVAL: 186 MS
Q ONSET: 221 MS
QRS COUNT: 12 BEATS
QRS DURATION: 74 MS
QT INTERVAL: 378 MS
QTC CALCULATION(BAZETT): 419 MS
QTC FREDERICIA: 405 MS
R AXIS: 44 DEGREES
RBC # BLD AUTO: 4.38 X10*6/UL (ref 4.5–5.9)
SODIUM SERPL-SCNC: 137 MMOL/L (ref 136–145)
T AXIS: 79 DEGREES
T OFFSET: 410 MS
VENTRICULAR RATE: 74 BPM
WBC # BLD AUTO: 5.2 X10*3/UL (ref 4.4–11.3)

## 2025-06-05 PROCEDURE — 2500000001 HC RX 250 WO HCPCS SELF ADMINISTERED DRUGS (ALT 637 FOR MEDICARE OP): Performed by: INTERNAL MEDICINE

## 2025-06-05 PROCEDURE — 2500000004 HC RX 250 GENERAL PHARMACY W/ HCPCS (ALT 636 FOR OP/ED)

## 2025-06-05 PROCEDURE — 2500000005 HC RX 250 GENERAL PHARMACY W/O HCPCS

## 2025-06-05 PROCEDURE — 85025 COMPLETE CBC W/AUTO DIFF WBC: CPT | Performed by: INTERNAL MEDICINE

## 2025-06-05 PROCEDURE — 84520 ASSAY OF UREA NITROGEN: CPT | Performed by: INTERNAL MEDICINE

## 2025-06-05 PROCEDURE — 2500000001 HC RX 250 WO HCPCS SELF ADMINISTERED DRUGS (ALT 637 FOR MEDICARE OP)

## 2025-06-05 PROCEDURE — 82947 ASSAY GLUCOSE BLOOD QUANT: CPT

## 2025-06-05 PROCEDURE — 93246 EXT ECG>7D<15D RECORDING: CPT

## 2025-06-05 PROCEDURE — 99239 HOSP IP/OBS DSCHRG MGMT >30: CPT | Performed by: INTERNAL MEDICINE

## 2025-06-05 PROCEDURE — 2500000002 HC RX 250 W HCPCS SELF ADMINISTERED DRUGS (ALT 637 FOR MEDICARE OP, ALT 636 FOR OP/ED)

## 2025-06-05 PROCEDURE — 94640 AIRWAY INHALATION TREATMENT: CPT

## 2025-06-05 PROCEDURE — 2500000001 HC RX 250 WO HCPCS SELF ADMINISTERED DRUGS (ALT 637 FOR MEDICARE OP): Performed by: NURSE PRACTITIONER

## 2025-06-05 RX ORDER — METOPROLOL TARTRATE 25 MG/1
25 TABLET, FILM COATED ORAL 2 TIMES DAILY
Qty: 60 TABLET | Refills: 11 | Status: SHIPPED | OUTPATIENT
Start: 2025-06-05 | End: 2026-06-05

## 2025-06-05 RX ADMIN — CARBOXYMETHYLCELLULOSE SODIUM 1 DROP: 0.5 SOLUTION/ DROPS OPHTHALMIC at 09:03

## 2025-06-05 RX ADMIN — INSULIN GLARGINE 30 UNITS: 100 INJECTION, SOLUTION SUBCUTANEOUS at 09:03

## 2025-06-05 RX ADMIN — PANTOPRAZOLE SODIUM 40 MG: 40 TABLET, DELAYED RELEASE ORAL at 06:34

## 2025-06-05 RX ADMIN — INSULIN LISPRO 12 UNITS: 100 INJECTION, SOLUTION INTRAVENOUS; SUBCUTANEOUS at 04:35

## 2025-06-05 RX ADMIN — METOPROLOL TARTRATE 25 MG: 25 TABLET, FILM COATED ORAL at 09:02

## 2025-06-05 RX ADMIN — DULOXETINE 60 MG: 30 CAPSULE, DELAYED RELEASE ORAL at 09:03

## 2025-06-05 RX ADMIN — HYDROCODONE BITARTRATE AND ACETAMINOPHEN 1 TABLET: 5; 325 TABLET ORAL at 14:42

## 2025-06-05 RX ADMIN — IPRATROPIUM BROMIDE AND ALBUTEROL SULFATE 3 ML: 2.5; .5 SOLUTION RESPIRATORY (INHALATION) at 13:48

## 2025-06-05 RX ADMIN — APIXABAN 5 MG: 5 TABLET, FILM COATED ORAL at 06:34

## 2025-06-05 RX ADMIN — AMLODIPINE BESYLATE 5 MG: 5 TABLET ORAL at 09:03

## 2025-06-05 RX ADMIN — INSULIN LISPRO 9 UNITS: 100 INJECTION, SOLUTION INTRAVENOUS; SUBCUTANEOUS at 09:03

## 2025-06-05 RX ADMIN — POLYETHYLENE GLYCOL 3350 17 G: 17 POWDER, FOR SOLUTION ORAL at 09:03

## 2025-06-05 RX ADMIN — ACETAMINOPHEN 650 MG: 325 TABLET, FILM COATED ORAL at 00:31

## 2025-06-05 RX ADMIN — LISINOPRIL 5 MG: 5 TABLET ORAL at 09:01

## 2025-06-05 RX ADMIN — COLCHICINE 0.6 MG: 0.6 TABLET, FILM COATED ORAL at 09:01

## 2025-06-05 RX ADMIN — ASPIRIN 81 MG CHEWABLE TABLET 81 MG: 81 TABLET CHEWABLE at 09:01

## 2025-06-05 RX ADMIN — INSULIN LISPRO 9 UNITS: 100 INJECTION, SOLUTION INTRAVENOUS; SUBCUTANEOUS at 12:50

## 2025-06-05 RX ADMIN — INSULIN LISPRO 12 UNITS: 100 INJECTION, SOLUTION INTRAVENOUS; SUBCUTANEOUS at 00:28

## 2025-06-05 RX ADMIN — HYDROCODONE BITARTRATE AND ACETAMINOPHEN 1 TABLET: 5; 325 TABLET ORAL at 06:34

## 2025-06-05 ASSESSMENT — PAIN SCALES - WONG BAKER: WONGBAKER_NUMERICALRESPONSE: HURTS WORST

## 2025-06-05 ASSESSMENT — COGNITIVE AND FUNCTIONAL STATUS - GENERAL
TURNING FROM BACK TO SIDE WHILE IN FLAT BAD: A LITTLE
PERSONAL GROOMING: A LITTLE
MOVING TO AND FROM BED TO CHAIR: A LOT
DRESSING REGULAR LOWER BODY CLOTHING: TOTAL
EATING MEALS: A LITTLE
WALKING IN HOSPITAL ROOM: A LITTLE
DRESSING REGULAR UPPER BODY CLOTHING: A LOT
MOBILITY SCORE: 15
MOVING FROM LYING ON BACK TO SITTING ON SIDE OF FLAT BED WITH BEDRAILS: A LITTLE
HELP NEEDED FOR BATHING: TOTAL
DAILY ACTIVITIY SCORE: 11
STANDING UP FROM CHAIR USING ARMS: A LOT
TOILETING: TOTAL
CLIMB 3 TO 5 STEPS WITH RAILING: A LOT

## 2025-06-05 ASSESSMENT — PAIN SCALES - GENERAL: PAINLEVEL_OUTOF10: 9

## 2025-06-05 NOTE — CARE PLAN
The patient's goals for the shift include  pain control    The clinical goals for the shift include pain control    Problem: Skin  Goal: Decreased wound size/increased tissue granulation at next dressing change  Flowsheets (Taken 6/4/2025 2123)  Decreased wound size/increased tissue granulation at next dressing change: Promote sleep for wound healing  Goal: Participates in plan/prevention/treatment measures  Flowsheets (Taken 6/4/2025 2123)  Participates in plan/prevention/treatment measures:   Elevate heels   Discuss with provider PT/OT consult   Increase activity/out of bed for meals  Goal: Prevent/manage excess moisture  Flowsheets (Taken 6/4/2025 2123)  Prevent/manage excess moisture:   Cleanse incontinence/protect with barrier cream   Moisturize dry skin  Goal: Prevent/minimize sheer/friction injuries  Flowsheets (Taken 6/4/2025 2123)  Prevent/minimize sheer/friction injuries:   Turn/reposition every 2 hours/use positioning/transfer devices   HOB 30 degrees or less   Increase activity/out of bed for meals  Goal: Promote/optimize nutrition  Flowsheets (Taken 6/4/2025 2123)  Promote/optimize nutrition:   Consume > 50% meals/supplements   Offer water/supplements/favorite foods   Monitor/record intake including meals  Goal: Promote skin healing  Flowsheets (Taken 6/4/2025 2123)  Promote skin healing:   Protective dressings over bony prominences   Turn/reposition every 2 hours/use positioning/transfer devices

## 2025-06-05 NOTE — PROGRESS NOTES
6/5/2026 10:58  AM Message left for friend that patient will be discharged at 1:00 PM today. Nano JAEGER

## 2025-06-05 NOTE — CARE PLAN
The patient's goals for the shift include      The clinical goals for the shift include pt will discharge to SNF without complications      Problem: Discharge Planning  Goal: Discharge to home or other facility with appropriate resources  Outcome: Progressing     Problem: Chronic Conditions and Co-morbidities  Goal: Patient's chronic conditions and co-morbidity symptoms are monitored and maintained or improved  Outcome: Progressing     Problem: Skin  Goal: Decreased wound size/increased tissue granulation at next dressing change  Outcome: Progressing

## 2025-06-05 NOTE — PROGRESS NOTES
Care Coordinator Note:    Plan: patient in with PNA/cardiac arrest. On RA. Cardio cleared for dc- plan for removal of Central line and odom and will have zio patch placed prior to dc to SNF today.   Patient is agreeable to DC today.     Status: inpatient  Payor: medicare  Disposition: NEW SNF- [facility]  Barrier: Needs central line and odom removed and ziopatch placed prior to dc. RN is aware.   ADOD: this afternoon. Transport stretcher requested for 1300. RN report can be called to [phone]     Rupa Aguilar [facility]      06/05/25 1032   Rapid Rounds   Attendance Provider;Care Transitions   Expected Discharge Disposition SNF  ([facility])   Today we still await:   (MED READY TODAY FOR DC)

## 2025-06-05 NOTE — PROGRESS NOTES
Music Therapy Note      Therapy Session  Referral Type: New referral this admission  Visit Type: Follow-up visit  Session Start Time: 1202  Intervention Delivery: In-person  Conflict of Service: Asleep  Number of family members present: 0  Number of staff members present: 0        Narrative  Assessment Detail: At the time of assessment pt was asleep with no family present at bedside.  Follow-up: MT will follow up with pt as able.    Education Documentation  No documentation found.

## 2025-06-05 NOTE — DISCHARGE SUMMARY
Discharge Diagnosis  Pneumonia due to infectious organism, unspecified laterality, unspecified part of lung           Issues Requiring Follow-Up  PCP follow up    Discharge Meds     Medication List      START taking these medications     apixaban 5 mg tablet; Commonly known as: Eliquis; Take 1 tablet (5 mg)   by mouth every 12 hours.   metoprolol tartrate 25 mg tablet; Commonly known as: Lopressor; Take 1   tablet (25 mg) by mouth 2 times a day.     CHANGE how you take these medications     HYDROcodone-acetaminophen 7.5-325 mg tablet; Commonly known as: Norco;   Take 1 tablet by mouth every 6 hours if needed for severe pain (7 - 10)   for up to 28 days. Do not fill before June 5, 2025.; What changed: Another   medication with the same name was removed. Continue taking this   medication, and follow the directions you see here.     CONTINUE taking these medications     amLODIPine 5 mg tablet; Commonly known as: Norvasc   aspirin 81 mg EC tablet   atorvastatin 80 mg tablet; Commonly known as: Lipitor   cholecalciferol 1.25 mg (50,000 units) capsule; Commonly known as:   Vitamin D-3   colchicine 0.6 mg tablet; Take 1 tablet (0.6 mg) by mouth once daily. Do   not fill before April 28, 2025.   cyclobenzaprine 10 mg tablet; Commonly known as: Flexeril; Take 1 tablet   (10 mg) by mouth 2 times a day as needed for muscle spasms for up to 5   days.   DULoxetine 60 mg DR capsule; Commonly known as: Cymbalta; Take 1 capsule   (60 mg) by mouth once daily. Do not crush or chew.   ferrous sulfate 325 mg (65 mg elemental) tablet   insulin aspart 100 unit/mL injection; Commonly known as: NovoLOG U-100   Insulin aspart; Inject 40 Units under the skin 3 times a day before meals.   insulin glargine 100 unit/mL (3 mL) pen; Commonly known as: Lantus;   Inject 60 Units under the skin 2 times a day. Take as directed per insulin   instructions.   lisinopril 5 mg tablet   omeprazole 40 mg DR capsule; Commonly known as: PriLOSEC   pen needle,  "diabetic 32 gauge x 5/32\" needle; Commonly known as: BD   Ultra-Fine Noelle Pen Needle; 1 Pen needle 2 times a day.   QUEtiapine 100 mg tablet; Commonly known as: SEROquel; Take 1 tablet   (100 mg) by mouth once daily at bedtime.   torsemide 20 mg tablet; Commonly known as: Demadex     STOP taking these medications     cephalexin 500 mg capsule; Commonly known as: Keflex     ASK your doctor about these medications     naloxone 4 mg/0.1 mL nasal spray; Commonly known as: Narcan; Administer   1 spray (4 mg) into affected nostril(s) if needed for opioid reversal. May   repeat every 2-3 minutes if needed, alternating nostrils, until medical   assistance becomes available.       Test Results Pending At Discharge  Pending Labs       No current pending labs.            Hospital Course     55 y.o. male with a PMH of super obese, back pain on chronic opioids (Norco 7.5/325 3-4x per day), peripheral neuropathy, HTN, HLD, HFpEF, obesity-hypoventilation syndrome, T2DM on insulin, CKD, GERD admitted to the stepdown unit on 5/16 for acute on chronic diastolic CHF and pneumonia.  Transferred to the ICU on 5/23 following cardiac arrest - initially PEA then asystole - due to a respiratory event.  Patient intubated.  ROSC achieved after ~5 min.     #Cardiac arrest (PEA then asystole) with ROSC  #Acute hypoxic ischemia encephalopathy  #A fib, new onset  #Back pain on chronic opioids  #Acute on chronic diastolic CHF  #Hypertension, Hyperlipidemia  #Acute hypoxemic respiratory failure  #Obesity Hypoventilation Syndrome  #T2DM on long-term insulin  #Bilateral pneumonia     -extubated on nc oxygen  - Continue home ASA, atorvastatin, and amlodipine.  Holding home lisinopril.  - Continue gentle diuresis with Lasix 40 mg BID as ordered.  - Continue current insulin regimen of Lantus 30 units (half his home dose) and sliding scale lispro.  - Seen by ID this admission.  Completed Zosyn for 10-day   -cardiology on consult for new onset a fib, in " setting of recent cardiac arrest and h/o CHF  - Patient only had 1 episode of A-fib on May 30, converted to normal sinus rhythm after digoxin load, continuing in normal sinus rhythm, continue Eliquis, Zio patch at discharge    Patient is doing much better today.  He will be discharged to SNF today.  Advised him to follow with his PCP as outpatient in 1 to 2 weeks.      Discharge time spent more than 30 minutes.    Pertinent Physical Exam At Time of Discharge  Physical Exam  Constitutional: Obese gentleman, appears drowsy, somnolent, but respond to questions, cooperative not in acute distress  Lungs: Patent airways, CTABL  Heart: RRR, S1S2, no murmurs appreciated, palpable pulses in all extremities  GI: Soft, NT, ND, bowel sounds present in all quadrants  MSK: Moves all extremities freely, no restriction  of ROM, no joint edema  Extremities: Chronic lymphedema with venous ulcers, bilateral lower extremities peripheral edema  Neurological: no acute focal neurological deficits appreciated  Outpatient Follow-Up  Future Appointments   Date Time Provider Department Center   6/23/2025  1:45 PM Lucius Reyes MD [id number]         Ronan Dumont MD

## 2025-06-12 LAB
ATRIAL RATE: 132 BPM
P AXIS: 31 DEGREES
P OFFSET: 180 MS
P ONSET: 144 MS
PR INTERVAL: 160 MS
Q ONSET: 224 MS
QRS COUNT: 22 BEATS
QRS DURATION: 88 MS
QT INTERVAL: 228 MS
QTC CALCULATION(BAZETT): 337 MS
QTC FREDERICIA: 296 MS
R AXIS: 38 DEGREES
T AXIS: 173 DEGREES
T OFFSET: 338 MS
VENTRICULAR RATE: 132 BPM

## 2025-06-13 LAB
ATRIAL RATE: 74 BPM
P AXIS: 18 DEGREES
P OFFSET: 177 MS
P ONSET: 128 MS
PR INTERVAL: 186 MS
Q ONSET: 221 MS
QRS COUNT: 12 BEATS
QRS DURATION: 74 MS
QT INTERVAL: 378 MS
QTC CALCULATION(BAZETT): 419 MS
QTC FREDERICIA: 405 MS
R AXIS: 44 DEGREES
T AXIS: 79 DEGREES
T OFFSET: 410 MS
VENTRICULAR RATE: 74 BPM

## 2025-06-23 ENCOUNTER — APPOINTMENT (OUTPATIENT)
Dept: PAIN MEDICINE | Facility: CLINIC | Age: 55
End: 2025-06-23
Payer: MEDICARE

## 2025-07-02 ENCOUNTER — TELEMEDICINE (OUTPATIENT)
Dept: PAIN MEDICINE | Facility: CLINIC | Age: 55
End: 2025-07-02
Payer: MEDICARE

## 2025-07-02 DIAGNOSIS — M54.16 LEFT LUMBAR RADICULOPATHY: ICD-10-CM

## 2025-07-02 DIAGNOSIS — Z79.891 LONG TERM CURRENT USE OF OPIATE ANALGESIC: ICD-10-CM

## 2025-07-02 DIAGNOSIS — M75.82 TENDINITIS OF LEFT ROTATOR CUFF: ICD-10-CM

## 2025-07-02 DIAGNOSIS — I69.954 HEMIPLEGIA OF LEFT NONDOMINANT SIDE AS LATE EFFECT OF CEREBROVASCULAR DISEASE, UNSPECIFIED CEREBROVASCULAR DISEASE TYPE, UNSPECIFIED HEMIPLEGIA TYPE (MULTI): Primary | ICD-10-CM

## 2025-07-02 DIAGNOSIS — M51.369 LUMBAR DISC NARROWING: ICD-10-CM

## 2025-07-02 PROCEDURE — 99214 OFFICE O/P EST MOD 30 MIN: CPT | Performed by: PHYSICAL MEDICINE & REHABILITATION

## 2025-07-02 PROCEDURE — 4010F ACE/ARB THERAPY RXD/TAKEN: CPT | Performed by: PHYSICAL MEDICINE & REHABILITATION

## 2025-07-02 PROCEDURE — 1036F TOBACCO NON-USER: CPT | Performed by: PHYSICAL MEDICINE & REHABILITATION

## 2025-07-02 PROCEDURE — G2211 COMPLEX E/M VISIT ADD ON: HCPCS | Performed by: PHYSICAL MEDICINE & REHABILITATION

## 2025-07-02 PROCEDURE — 3048F LDL-C <100 MG/DL: CPT | Performed by: PHYSICAL MEDICINE & REHABILITATION

## 2025-07-02 PROCEDURE — 3051F HG A1C>EQUAL 7.0%<8.0%: CPT | Performed by: PHYSICAL MEDICINE & REHABILITATION

## 2025-07-02 RX ORDER — HYDROCODONE BITARTRATE AND ACETAMINOPHEN 7.5; 325 MG/1; MG/1
1 TABLET ORAL EVERY 6 HOURS PRN
Qty: 100 TABLET | Refills: 0 | Status: SHIPPED | OUTPATIENT
Start: 2025-07-02 | End: 2025-07-30

## 2025-07-02 RX ORDER — HYDROCODONE BITARTRATE AND ACETAMINOPHEN 7.5; 325 MG/1; MG/1
1 TABLET ORAL EVERY 6 HOURS PRN
Qty: 100 TABLET | Refills: 0 | Status: SHIPPED | OUTPATIENT
Start: 2025-07-02 | End: 2025-07-02 | Stop reason: SDUPTHER

## 2025-07-02 RX ORDER — HYDROCODONE BITARTRATE AND ACETAMINOPHEN 7.5; 325 MG/1; MG/1
1 TABLET ORAL EVERY 6 HOURS PRN
Qty: 100 TABLET | Refills: 0 | Status: SHIPPED | OUTPATIENT
Start: 2025-07-30 | End: 2025-08-27

## 2025-07-02 RX ORDER — NALOXONE HYDROCHLORIDE 4 MG/.1ML
1 SPRAY NASAL AS NEEDED
Qty: 2 EACH | Refills: 0 | Status: SHIPPED | OUTPATIENT
Start: 2025-07-02

## 2025-07-02 NOTE — PROGRESS NOTES
Chief complaint  Back and lower limb pain    Jai Shrestha was at home in [place].  Could not physically come to the office today .  I was at the office.  I used secure audiovisual communication provided by the Epic system. Jai Shrestha  agreed on this form of communication and consented to the visit via A/V method.  The patient also understood that this will be billed as office visit.     History  Jai Shrestha is back for pain management office visit  He was in the hospital after he had help at rest and he went to rehab he was not given his medication he uses all supply.  He is running out the pain is deep aching in the back worse with movement we have been trying to cut back on the medication currently taking 100 tablets for 28 days down from 112 tabs he could not cut back any further.  The pain is deep achy stabbing in the back area worse with movement it does wake him up at night on intermittent basis.    Today again discussed about emerging data about tapering opioid therapy for chronic nonmalignant pain.  These are showing increasing evidence of improving the chronic pain itself, anxiety and depression, with the tapering of opioid therapy for chronic nonmalignant pain.  These are additional benefits to the above that we have been discussing.  As long as the cut back is done progressively and safely which we are doing.       Pain level without medication is 8/10 , with the medication pain level between 2 and 3/10.     Pain disability index (PDI) improvement   across different functional categories, with the pain control with the meds. Forms filled by patient are scanned in the chart    The pain meds are helping control the pain and improving Activities of Daily living and quality of life and quality of sleep.    opioids treatment agreement Jan 2025    Pill count reported on schedule last fill was on 6/6  for 100 tabs,  the meds pill count today is correct  Oarrs pulled and reviewed, no concerns  last urine  "toxicology testing was compliant this was done on : Feb 2025  Xray updated spine  ORT (opioid risk tool) score is 1 for anxiety and depression but this is controlled  Pain pathology and pain generators spine  Modalities tried injection, surgery, physical therapy, TENS unit, nonsteroidal anti-inflammatory medication       Review of Systems :  Denied any fever or chills. No weight loss and no night sweats. No cough or sputum production. No diarrhea   The constipation has been responding to fibers and over the counter medications.     No bladder and bowel incontinence and no other changes in bladder and bowel. No skin changes.  Reports tiredness and fatigability only if the pain is not controlled.     I further Asked about symptoms or changes affecting the vision, hearing, breathing, digestive system, urinary symptoms, skin, other musculoskeletal condition, neurological conditions these are negative except as detailed in the history and physical examination above    Denied opioids diversion and abuse and denies alcoholism. Denies overuse of the pain medications.  No reported euphoria sensation or getting a \"high\" on the pain medications.    The control of the pain with the pain medications is helping the control of the symptoms and allowing the function and activities of daily living, enjoyment of life, improving the quality of life and sleep with less interruption by the pain. The goal is symptomatic control of the nonmalignant chronic pain and not to repair the permanent damage in the tissues inducing the chronic pain conditions. We are aiming to shift the focus from the nonmalignant chronic pain to other aspects of life by symptomatically treating this chronic pain. If this pain is not treated it will lead to major morbidity and it is also associated with increased risks of mortality. The patient understands those very clearly and also understand high risks of morbidity and mortality if not strictly adherent to the " treatment recommendations and reporting any associated side effects. Also patient understand the full responsibility associated with these medications to avoid abuse or overuse or any use of these medications for anything besides treating the patient's own chronic pain and nothing else under any circumstances.        Physical examination  Awake, alert and oriented for time place and persons   Pupils are equal and reactive to light and accommodation    This is a secure A/V visit    Diagnosis  Problem List Items Addressed This Visit       Lumbar disc narrowing    Relevant Medications    HYDROcodone-acetaminophen (Norco) 7.5-325 mg tablet    HYDROcodone-acetaminophen (Norco) 7.5-325 mg tablet (Start on 7/30/2025)    Left lumbar radiculopathy    Relevant Medications    HYDROcodone-acetaminophen (Norco) 7.5-325 mg tablet    HYDROcodone-acetaminophen (Norco) 7.5-325 mg tablet (Start on 7/30/2025)    Rotator cuff tendonitis    Relevant Medications    HYDROcodone-acetaminophen (Norco) 7.5-325 mg tablet    HYDROcodone-acetaminophen (Norco) 7.5-325 mg tablet (Start on 7/30/2025)    Long term current use of opiate analgesic    Relevant Medications    naloxone (Narcan) 4 mg/0.1 mL nasal spray        Plan  Reviewed the pain generators.  Went over the types of pain with neuropathic and nociceptive and different pathologies and therapeutic modalities. Discussed the mechanism of action of interventions from acupuncture, physical therapy , regular exercises, injections, botox, spinal cord stimulation, and role of surgery     Went over pathology of the intervertebral disc displacement and the anatomical relation to the Nerve roots and relation to the radicular symptoms. Went over treatment modalities with conservative treatment including acupuncture   and epidural steroid injection with fluoroscopy guidance and last resort of surgery    Based on the above findings and the clinical response to the opioids medications and improvement  of the activities of daily living, sleep, and work performance. We made this complex decision to continue the opioids therapy in light of the evidence of the patient's responsibility in using the pain medications as prescribed for the nonmalignant chronic pain condition. We discussed about the use of the pain medications to treat the symptoms of chronic nonmalignant pain and we are not trying the repair the permanent damage in the tissues, rather we are trying to control the symptoms induced by the permanent damage to the tissues inducing the chronic pain condition and resulting disability. I explained the difference and discussed it with the patient and stressed the importance of knowing the difference especially because of the potential side effects and the potential addicting effect and habit forming nature of the dangerous drugs we are using to treat the symptoms of the chronic pain.      We discussed that we are prescribing the medications on good alirio and legitimate medical reason within the scope of professional medical practice.     We reviewed the side effects and precautions of opioids prescriptions as discussed in the opioids treatment agreement.    realizes the interaction between the therapeutic classes including the respiratory depression and potential death     Random drug testing   we will submit         Discussed about NSAIDS and I explained about the opioids sparing effect to allow keeping the opioids dose at minimal effective dose.   I went over the potential side effects of the NSAIDS on the gastrointestinal, renal and cardiovascular systems.      I detailed the side effects from the acetaminophen in the medication and made aware of those. I also explained about the cumulative effects on the organs and mainly the liver.     Given the opioids therapy , we discussed about the risk for accidental over dose on the pain medications, either for patient or other household. I went over the mechanism of  action and mode of use of the Naloxone according to the  recommendations. I will provide a prescription for a kit.     Focus of Today's Visit :  Continue to try to cut back on the medication.  Continue with home exercise program.  Consider injection for aggravation      Follow-up 8 weeks or earlier if needed     The level of clinical decision making in this office visit,  is high, given the high risks of complications with the morbidity and mortality due to the fact that acute and chronic pain may pose a threat to life and bodily function, if under treated, poorly treated, or with failure to maintain adequate treatment and timely medical follow up. Additionally over treatment has its own set of complications including overdosing on the pain medications and also the habit forming potentials with the use of the medications used to treat chronic painful conditions including therapeutic classes classified as dangerous medications. Given the serious and fluctuating nature of pain level and instensity with extensive consideration for whenever pain changes, there is always the risk of prolonged functional impairment requiring close patient monitoring with regular assessments and reassessments and high level medical decision making at every office visit. The amount and complexity of data reviewed is high given the patient clinical presentation, labs,  data, radiology reports, and other tests as discussed during office visits. Pertinent data whether positive or negative were taken in consideration in the process of making this high level medical decision.

## 2025-08-17 ENCOUNTER — HOSPITAL ENCOUNTER (INPATIENT)
Facility: HOSPITAL | Age: 55
DRG: 871 | End: 2025-08-17
Attending: EMERGENCY MEDICINE | Admitting: SURGERY
Payer: MEDICARE

## 2025-08-17 ENCOUNTER — APPOINTMENT (OUTPATIENT)
Dept: RADIOLOGY | Facility: HOSPITAL | Age: 55
DRG: 871 | End: 2025-08-17
Payer: MEDICARE

## 2025-08-17 ENCOUNTER — APPOINTMENT (OUTPATIENT)
Dept: CARDIOLOGY | Facility: HOSPITAL | Age: 55
DRG: 871 | End: 2025-08-17
Payer: MEDICARE

## 2025-08-17 DIAGNOSIS — J18.9 PNEUMONIA DUE TO INFECTIOUS ORGANISM, UNSPECIFIED LATERALITY, UNSPECIFIED PART OF LUNG: ICD-10-CM

## 2025-08-17 DIAGNOSIS — J18.9 SEVERE PNEUMONIA: Primary | ICD-10-CM

## 2025-08-17 LAB
ALBUMIN SERPL BCP-MCNC: 3.6 G/DL (ref 3.4–5)
ALP SERPL-CCNC: 128 U/L (ref 33–120)
ALT SERPL W P-5'-P-CCNC: 12 U/L (ref 10–52)
ANION GAP BLDA CALCULATED.4IONS-SCNC: 13 MMO/L (ref 10–25)
ANION GAP SERPL CALC-SCNC: 18 MMOL/L (ref 10–20)
APPEARANCE UR: ABNORMAL
ARTERIAL PATENCY WRIST A: POSITIVE
AST SERPL W P-5'-P-CCNC: 14 U/L (ref 9–39)
BACTERIA #/AREA URNS AUTO: ABNORMAL /HPF
BASE EXCESS BLDA CALC-SCNC: 0 MMOL/L (ref -2–3)
BASE EXCESS BLDA CALC-SCNC: 1.4 MMOL/L (ref -2–3)
BASOPHILS # BLD AUTO: 0.05 X10*3/UL (ref 0–0.1)
BASOPHILS NFR BLD AUTO: 0.2 %
BILIRUB SERPL-MCNC: 0.5 MG/DL (ref 0–1.2)
BILIRUB UR STRIP.AUTO-MCNC: NEGATIVE MG/DL
BNP SERPL-MCNC: 142 PG/ML (ref 0–99)
BODY TEMPERATURE: 37 DEGREES CELSIUS
BODY TEMPERATURE: 37 DEGREES CELSIUS
BUN SERPL-MCNC: 32 MG/DL (ref 6–23)
CA-I BLDA-SCNC: 1.13 MMOL/L (ref 1.1–1.33)
CALCIUM SERPL-MCNC: 8.7 MG/DL (ref 8.6–10.3)
CARDIAC TROPONIN I PNL SERPL HS: 106 NG/L (ref 0–20)
CARDIAC TROPONIN I PNL SERPL HS: 75 NG/L (ref 0–20)
CHLORIDE BLDA-SCNC: 95 MMOL/L (ref 98–107)
CHLORIDE SERPL-SCNC: 98 MMOL/L (ref 98–107)
CO2 SERPL-SCNC: 24 MMOL/L (ref 21–32)
COLOR UR: ABNORMAL
CREAT SERPL-MCNC: 1.31 MG/DL (ref 0.5–1.3)
EGFRCR SERPLBLD CKD-EPI 2021: 65 ML/MIN/1.73M*2
EOSINOPHIL # BLD AUTO: 0.07 X10*3/UL (ref 0–0.7)
EOSINOPHIL NFR BLD AUTO: 0.3 %
ERYTHROCYTE [DISTWIDTH] IN BLOOD BY AUTOMATED COUNT: 18.2 % (ref 11.5–14.5)
FLUAV RNA RESP QL NAA+PROBE: NOT DETECTED
FLUBV RNA RESP QL NAA+PROBE: NOT DETECTED
GLUCOSE BLD MANUAL STRIP-MCNC: 304 MG/DL (ref 74–99)
GLUCOSE BLDA-MCNC: 336 MG/DL (ref 74–99)
GLUCOSE SERPL-MCNC: 290 MG/DL (ref 74–99)
GLUCOSE UR STRIP.AUTO-MCNC: ABNORMAL MG/DL
HCO3 BLDA-SCNC: 28.7 MMOL/L (ref 22–26)
HCO3 BLDA-SCNC: 29.1 MMOL/L (ref 22–26)
HCT VFR BLD AUTO: 28.6 % (ref 41–52)
HCT VFR BLD EST: 36 % (ref 41–52)
HGB BLD-MCNC: 8.2 G/DL (ref 13.5–17.5)
HGB BLDA-MCNC: 12 G/DL (ref 13.5–17.5)
IMM GRANULOCYTES # BLD AUTO: 0.39 X10*3/UL (ref 0–0.7)
IMM GRANULOCYTES NFR BLD AUTO: 1.9 % (ref 0–0.9)
INHALED O2 CONCENTRATION: 50 %
INHALED O2 CONCENTRATION: 50 %
KETONES UR STRIP.AUTO-MCNC: NEGATIVE MG/DL
LACTATE BLDA-SCNC: 2.3 MMOL/L (ref 0.4–2)
LACTATE SERPL-SCNC: 2.5 MMOL/L (ref 0.4–2)
LEUKOCYTE ESTERASE UR QL STRIP.AUTO: ABNORMAL
LYMPHOCYTES # BLD AUTO: 0.69 X10*3/UL (ref 1.2–4.8)
LYMPHOCYTES NFR BLD AUTO: 3.3 %
MCH RBC QN AUTO: 23.9 PG (ref 26–34)
MCHC RBC AUTO-ENTMCNC: 28.7 G/DL (ref 32–36)
MCV RBC AUTO: 83 FL (ref 80–100)
MONOCYTES # BLD AUTO: 0.99 X10*3/UL (ref 0.1–1)
MONOCYTES NFR BLD AUTO: 4.7 %
MRSA DNA SPEC QL NAA+PROBE: DETECTED
MUCOUS THREADS #/AREA URNS AUTO: ABNORMAL /LPF
NEUTROPHILS # BLD AUTO: 18.73 X10*3/UL (ref 1.2–7.7)
NEUTROPHILS NFR BLD AUTO: 89.6 %
NITRITE UR QL STRIP.AUTO: NEGATIVE
NRBC BLD-RTO: 0.1 /100 WBCS (ref 0–0)
OXYHGB MFR BLDA: 91.6 % (ref 94–98)
OXYHGB MFR BLDA: 96.5 % (ref 94–98)
PCO2 BLDA: 57 MM HG (ref 38–42)
PCO2 BLDA: 68 MM HG (ref 38–42)
PH BLDA: 7.24 PH (ref 7.38–7.42)
PH BLDA: 7.31 PH (ref 7.38–7.42)
PH UR STRIP.AUTO: 5 [PH]
PLATELET # BLD AUTO: 311 X10*3/UL (ref 150–450)
PO2 BLDA: 128 MM HG (ref 85–95)
PO2 BLDA: 72 MM HG (ref 85–95)
POTASSIUM BLDA-SCNC: 5.7 MMOL/L (ref 3.5–5.3)
POTASSIUM SERPL-SCNC: 5.1 MMOL/L (ref 3.5–5.3)
PROT SERPL-MCNC: 7.1 G/DL (ref 6.4–8.2)
PROT UR STRIP.AUTO-MCNC: ABNORMAL MG/DL
RBC # BLD AUTO: 3.43 X10*6/UL (ref 4.5–5.9)
RBC # UR STRIP.AUTO: ABNORMAL MG/DL
RBC #/AREA URNS AUTO: >20 /HPF
RSV RNA RESP QL NAA+PROBE: NOT DETECTED
SAO2 % BLDA: 94 % (ref 94–100)
SAO2 % BLDA: 99 % (ref 94–100)
SARS-COV-2 RNA RESP QL NAA+PROBE: NOT DETECTED
SODIUM BLDA-SCNC: 131 MMOL/L (ref 136–145)
SODIUM SERPL-SCNC: 135 MMOL/L (ref 136–145)
SP GR UR STRIP.AUTO: 1.02
SPECIMEN DRAWN FROM PATIENT: ABNORMAL
SQUAMOUS #/AREA URNS AUTO: ABNORMAL /HPF
UROBILINOGEN UR STRIP.AUTO-MCNC: NORMAL MG/DL
WBC # BLD AUTO: 20.9 X10*3/UL (ref 4.4–11.3)
WBC #/AREA URNS AUTO: >50 /HPF

## 2025-08-17 PROCEDURE — 96366 THER/PROPH/DIAG IV INF ADDON: CPT | Mod: 59

## 2025-08-17 PROCEDURE — 99291 CRITICAL CARE FIRST HOUR: CPT | Performed by: NURSE PRACTITIONER

## 2025-08-17 PROCEDURE — 96375 TX/PRO/DX INJ NEW DRUG ADDON: CPT | Mod: 59

## 2025-08-17 PROCEDURE — 80053 COMPREHEN METABOLIC PANEL: CPT | Performed by: INTERNAL MEDICINE

## 2025-08-17 PROCEDURE — 71045 X-RAY EXAM CHEST 1 VIEW: CPT

## 2025-08-17 PROCEDURE — 71045 X-RAY EXAM CHEST 1 VIEW: CPT | Performed by: STUDENT IN AN ORGANIZED HEALTH CARE EDUCATION/TRAINING PROGRAM

## 2025-08-17 PROCEDURE — 93005 ELECTROCARDIOGRAM TRACING: CPT

## 2025-08-17 PROCEDURE — 94681 O2 UPTK CO2 OUTP % O2 XTRC: CPT

## 2025-08-17 PROCEDURE — 94660 CPAP INITIATION&MGMT: CPT

## 2025-08-17 PROCEDURE — 2500000005 HC RX 250 GENERAL PHARMACY W/O HCPCS: Performed by: EMERGENCY MEDICINE

## 2025-08-17 PROCEDURE — 36600 WITHDRAWAL OF ARTERIAL BLOOD: CPT

## 2025-08-17 PROCEDURE — 2020000001 HC ICU ROOM DAILY

## 2025-08-17 PROCEDURE — 99285 EMERGENCY DEPT VISIT HI MDM: CPT | Mod: 25 | Performed by: EMERGENCY MEDICINE

## 2025-08-17 PROCEDURE — 5A1945Z RESPIRATORY VENTILATION, 24-96 CONSECUTIVE HOURS: ICD-10-PCS | Performed by: INTERNAL MEDICINE

## 2025-08-17 PROCEDURE — 82805 BLOOD GASES W/O2 SATURATION: CPT | Performed by: EMERGENCY MEDICINE

## 2025-08-17 PROCEDURE — 2500000004 HC RX 250 GENERAL PHARMACY W/ HCPCS (ALT 636 FOR OP/ED)

## 2025-08-17 PROCEDURE — 96374 THER/PROPH/DIAG INJ IV PUSH: CPT | Mod: 59

## 2025-08-17 PROCEDURE — 94640 AIRWAY INHALATION TREATMENT: CPT

## 2025-08-17 PROCEDURE — 87641 MR-STAPH DNA AMP PROBE: CPT | Performed by: EMERGENCY MEDICINE

## 2025-08-17 PROCEDURE — 84484 ASSAY OF TROPONIN QUANT: CPT | Performed by: INTERNAL MEDICINE

## 2025-08-17 PROCEDURE — 94002 VENT MGMT INPAT INIT DAY: CPT | Mod: CCI

## 2025-08-17 PROCEDURE — 99291 CRITICAL CARE FIRST HOUR: CPT | Performed by: EMERGENCY MEDICINE

## 2025-08-17 PROCEDURE — 31500 INSERT EMERGENCY AIRWAY: CPT

## 2025-08-17 PROCEDURE — 5A09357 ASSISTANCE WITH RESPIRATORY VENTILATION, LESS THAN 24 CONSECUTIVE HOURS, CONTINUOUS POSITIVE AIRWAY PRESSURE: ICD-10-PCS | Performed by: INTERNAL MEDICINE

## 2025-08-17 PROCEDURE — 31500 INSERT EMERGENCY AIRWAY: CPT | Performed by: EMERGENCY MEDICINE

## 2025-08-17 PROCEDURE — 87205 SMEAR GRAM STAIN: CPT | Mod: AHULAB | Performed by: NURSE PRACTITIONER

## 2025-08-17 PROCEDURE — 84132 ASSAY OF SERUM POTASSIUM: CPT | Performed by: INTERNAL MEDICINE

## 2025-08-17 PROCEDURE — 84132 ASSAY OF SERUM POTASSIUM: CPT | Performed by: NURSE PRACTITIONER

## 2025-08-17 PROCEDURE — 2500000002 HC RX 250 W HCPCS SELF ADMINISTERED DRUGS (ALT 637 FOR MEDICARE OP, ALT 636 FOR OP/ED): Performed by: NURSE PRACTITIONER

## 2025-08-17 PROCEDURE — 0BH17EZ INSERTION OF ENDOTRACHEAL AIRWAY INTO TRACHEA, VIA NATURAL OR ARTIFICIAL OPENING: ICD-10-PCS | Performed by: INTERNAL MEDICINE

## 2025-08-17 PROCEDURE — 96367 TX/PROPH/DG ADDL SEQ IV INF: CPT | Mod: 59

## 2025-08-17 PROCEDURE — 81001 URINALYSIS AUTO W/SCOPE: CPT | Performed by: NURSE PRACTITIONER

## 2025-08-17 PROCEDURE — 82947 ASSAY GLUCOSE BLOOD QUANT: CPT

## 2025-08-17 PROCEDURE — 87637 SARSCOV2&INF A&B&RSV AMP PRB: CPT | Performed by: INTERNAL MEDICINE

## 2025-08-17 PROCEDURE — 2500000005 HC RX 250 GENERAL PHARMACY W/O HCPCS: Performed by: NURSE PRACTITIONER

## 2025-08-17 PROCEDURE — 96365 THER/PROPH/DIAG IV INF INIT: CPT | Mod: 59

## 2025-08-17 PROCEDURE — 2500000002 HC RX 250 W HCPCS SELF ADMINISTERED DRUGS (ALT 637 FOR MEDICARE OP, ALT 636 FOR OP/ED): Performed by: EMERGENCY MEDICINE

## 2025-08-17 PROCEDURE — 74018 RADEX ABDOMEN 1 VIEW: CPT | Performed by: STUDENT IN AN ORGANIZED HEALTH CARE EDUCATION/TRAINING PROGRAM

## 2025-08-17 PROCEDURE — 94799 UNLISTED PULMONARY SVC/PX: CPT

## 2025-08-17 PROCEDURE — 96372 THER/PROPH/DIAG INJ SC/IM: CPT | Performed by: EMERGENCY MEDICINE

## 2025-08-17 PROCEDURE — 83880 ASSAY OF NATRIURETIC PEPTIDE: CPT | Performed by: INTERNAL MEDICINE

## 2025-08-17 PROCEDURE — 36415 COLL VENOUS BLD VENIPUNCTURE: CPT | Performed by: EMERGENCY MEDICINE

## 2025-08-17 PROCEDURE — 83605 ASSAY OF LACTIC ACID: CPT | Performed by: EMERGENCY MEDICINE

## 2025-08-17 PROCEDURE — 74018 RADEX ABDOMEN 1 VIEW: CPT

## 2025-08-17 PROCEDURE — 2500000004 HC RX 250 GENERAL PHARMACY W/ HCPCS (ALT 636 FOR OP/ED): Performed by: NURSE PRACTITIONER

## 2025-08-17 PROCEDURE — 87040 BLOOD CULTURE FOR BACTERIA: CPT | Mod: AHULAB | Performed by: EMERGENCY MEDICINE

## 2025-08-17 PROCEDURE — 85025 COMPLETE CBC W/AUTO DIFF WBC: CPT | Performed by: INTERNAL MEDICINE

## 2025-08-17 PROCEDURE — 2500000004 HC RX 250 GENERAL PHARMACY W/ HCPCS (ALT 636 FOR OP/ED): Performed by: EMERGENCY MEDICINE

## 2025-08-17 RX ORDER — FENTANYL CITRATE-0.9 % NACL/PF 10 MCG/ML
25-150 PLASTIC BAG, INJECTION (ML) INTRAVENOUS CONTINUOUS
Status: DISCONTINUED | OUTPATIENT
Start: 2025-08-17 | End: 2025-08-18

## 2025-08-17 RX ORDER — AMLODIPINE BESYLATE 5 MG/1
5 TABLET ORAL DAILY
Status: DISCONTINUED | OUTPATIENT
Start: 2025-08-18 | End: 2025-08-26 | Stop reason: HOSPADM

## 2025-08-17 RX ORDER — PROPOFOL 10 MG/ML
5-50 INJECTION, EMULSION INTRAVENOUS CONTINUOUS
Status: DISCONTINUED | OUTPATIENT
Start: 2025-08-17 | End: 2025-08-17

## 2025-08-17 RX ORDER — CYCLOBENZAPRINE HCL 5 MG
10 TABLET ORAL 2 TIMES DAILY PRN
Status: DISCONTINUED | OUTPATIENT
Start: 2025-08-17 | End: 2025-08-26 | Stop reason: HOSPADM

## 2025-08-17 RX ORDER — IPRATROPIUM BROMIDE AND ALBUTEROL SULFATE 2.5; .5 MG/3ML; MG/3ML
9 SOLUTION RESPIRATORY (INHALATION) ONCE
Status: COMPLETED | OUTPATIENT
Start: 2025-08-17 | End: 2025-08-17

## 2025-08-17 RX ORDER — PROPOFOL 10 MG/ML
5-50 INJECTION, EMULSION INTRAVENOUS CONTINUOUS
Status: DISCONTINUED | OUTPATIENT
Start: 2025-08-17 | End: 2025-08-19

## 2025-08-17 RX ORDER — SUCCINYLCHOLINE CHLORIDE 20 MG/ML
INJECTION INTRAMUSCULAR; INTRAVENOUS CODE/TRAUMA/SEDATION MEDICATION
Status: COMPLETED | OUTPATIENT
Start: 2025-08-17 | End: 2025-08-17

## 2025-08-17 RX ORDER — SUCCINYLCHOLINE CHLORIDE 20 MG/ML
200 INJECTION INTRAMUSCULAR; INTRAVENOUS ONCE
Status: DISCONTINUED | OUTPATIENT
Start: 2025-08-17 | End: 2025-08-22

## 2025-08-17 RX ORDER — LISINOPRIL 5 MG/1
5 TABLET ORAL DAILY
Status: DISCONTINUED | OUTPATIENT
Start: 2025-08-18 | End: 2025-08-26 | Stop reason: HOSPADM

## 2025-08-17 RX ORDER — ACETAMINOPHEN 650 MG/1
650 SUPPOSITORY RECTAL EVERY 6 HOURS SCHEDULED
Status: DISCONTINUED | OUTPATIENT
Start: 2025-08-18 | End: 2025-08-18

## 2025-08-17 RX ORDER — METOPROLOL TARTRATE 25 MG/1
25 TABLET, FILM COATED ORAL 2 TIMES DAILY
Status: DISCONTINUED | OUTPATIENT
Start: 2025-08-17 | End: 2025-08-18

## 2025-08-17 RX ORDER — MIDAZOLAM HYDROCHLORIDE 2 MG/2ML
4 INJECTION, SOLUTION INTRAMUSCULAR; INTRAVENOUS ONCE
Status: DISCONTINUED | OUTPATIENT
Start: 2025-08-17 | End: 2025-08-20

## 2025-08-17 RX ORDER — MIDAZOLAM HYDROCHLORIDE 5 MG/ML
INJECTION INTRAMUSCULAR; INTRAVENOUS
Status: COMPLETED
Start: 2025-08-17 | End: 2025-08-17

## 2025-08-17 RX ORDER — NITROGLYCERIN 20 MG/G
1 OINTMENT TOPICAL ONCE
Status: COMPLETED | OUTPATIENT
Start: 2025-08-17 | End: 2025-08-17

## 2025-08-17 RX ORDER — TORSEMIDE 20 MG/1
60 TABLET ORAL 2 TIMES DAILY
Status: DISCONTINUED | OUTPATIENT
Start: 2025-08-17 | End: 2025-08-20

## 2025-08-17 RX ORDER — KETAMINE HYDROCHLORIDE 50 MG/ML
1 INJECTION, SOLUTION INTRAMUSCULAR; INTRAVENOUS ONCE
Status: DISCONTINUED | OUTPATIENT
Start: 2025-08-17 | End: 2025-08-26 | Stop reason: HOSPADM

## 2025-08-17 RX ORDER — DULOXETIN HYDROCHLORIDE 30 MG/1
60 CAPSULE, DELAYED RELEASE ORAL DAILY
Status: DISCONTINUED | OUTPATIENT
Start: 2025-08-18 | End: 2025-08-26 | Stop reason: HOSPADM

## 2025-08-17 RX ORDER — FUROSEMIDE 10 MG/ML
40 INJECTION INTRAMUSCULAR; INTRAVENOUS ONCE
Status: COMPLETED | OUTPATIENT
Start: 2025-08-17 | End: 2025-08-17

## 2025-08-17 RX ORDER — DEXTROSE 50 % IN WATER (D50W) INTRAVENOUS SYRINGE
12.5
Status: DISCONTINUED | OUTPATIENT
Start: 2025-08-17 | End: 2025-08-26 | Stop reason: HOSPADM

## 2025-08-17 RX ORDER — INSULIN GLARGINE 100 [IU]/ML
60 INJECTION, SOLUTION SUBCUTANEOUS EVERY 12 HOURS SCHEDULED
Status: DISCONTINUED | OUTPATIENT
Start: 2025-08-17 | End: 2025-08-20

## 2025-08-17 RX ORDER — DEXTROSE 50 % IN WATER (D50W) INTRAVENOUS SYRINGE
25
Status: DISCONTINUED | OUTPATIENT
Start: 2025-08-17 | End: 2025-08-26 | Stop reason: HOSPADM

## 2025-08-17 RX ORDER — POLYETHYLENE GLYCOL 3350 17 G/17G
17 POWDER, FOR SOLUTION ORAL DAILY
Status: DISCONTINUED | OUTPATIENT
Start: 2025-08-18 | End: 2025-08-19

## 2025-08-17 RX ORDER — KETAMINE HYDROCHLORIDE 50 MG/ML
2 INJECTION, SOLUTION INTRAMUSCULAR; INTRAVENOUS ONCE
Status: COMPLETED | OUTPATIENT
Start: 2025-08-17 | End: 2025-08-17

## 2025-08-17 RX ORDER — ATORVASTATIN CALCIUM 80 MG/1
80 TABLET, FILM COATED ORAL NIGHTLY
Status: DISCONTINUED | OUTPATIENT
Start: 2025-08-17 | End: 2025-08-26 | Stop reason: HOSPADM

## 2025-08-17 RX ORDER — KETAMINE HYDROCHLORIDE 10 MG/ML
INJECTION, SOLUTION INTRAMUSCULAR; INTRAVENOUS
Status: DISPENSED
Start: 2025-08-17 | End: 2025-08-18

## 2025-08-17 RX ORDER — HALOPERIDOL LACTATE 5 MG/ML
5 INJECTION, SOLUTION INTRAMUSCULAR ONCE
Status: COMPLETED | OUTPATIENT
Start: 2025-08-17 | End: 2025-08-17

## 2025-08-17 RX ORDER — PANTOPRAZOLE SODIUM 40 MG/1
40 TABLET, DELAYED RELEASE ORAL
Status: DISCONTINUED | OUTPATIENT
Start: 2025-08-18 | End: 2025-08-26 | Stop reason: HOSPADM

## 2025-08-17 RX ORDER — VANCOMYCIN HYDROCHLORIDE 1 G/20ML
INJECTION, POWDER, LYOPHILIZED, FOR SOLUTION INTRAVENOUS DAILY PRN
Status: DISCONTINUED | OUTPATIENT
Start: 2025-08-17 | End: 2025-08-22

## 2025-08-17 RX ORDER — ACETAMINOPHEN 325 MG/1
650 TABLET ORAL EVERY 6 HOURS SCHEDULED
Status: DISCONTINUED | OUTPATIENT
Start: 2025-08-18 | End: 2025-08-18

## 2025-08-17 RX ORDER — FERROUS SULFATE 325(65) MG
1 TABLET ORAL
Status: DISCONTINUED | OUTPATIENT
Start: 2025-08-18 | End: 2025-08-26 | Stop reason: HOSPADM

## 2025-08-17 RX ORDER — ESOMEPRAZOLE MAGNESIUM 40 MG/1
40 GRANULE, DELAYED RELEASE ORAL
Status: DISCONTINUED | OUTPATIENT
Start: 2025-08-18 | End: 2025-08-19

## 2025-08-17 RX ORDER — PANTOPRAZOLE SODIUM 40 MG/10ML
40 INJECTION, POWDER, LYOPHILIZED, FOR SOLUTION INTRAVENOUS
Status: DISCONTINUED | OUTPATIENT
Start: 2025-08-18 | End: 2025-08-26 | Stop reason: HOSPADM

## 2025-08-17 RX ORDER — ASPIRIN 81 MG/1
81 TABLET ORAL DAILY
Status: DISCONTINUED | OUTPATIENT
Start: 2025-08-18 | End: 2025-08-26 | Stop reason: HOSPADM

## 2025-08-17 RX ORDER — QUETIAPINE FUMARATE 100 MG/1
100 TABLET, FILM COATED ORAL NIGHTLY
Status: DISCONTINUED | OUTPATIENT
Start: 2025-08-17 | End: 2025-08-18

## 2025-08-17 RX ORDER — HYDROCODONE BITARTRATE AND ACETAMINOPHEN 5; 325 MG/1; MG/1
1.5 TABLET ORAL EVERY 6 HOURS PRN
Status: DISCONTINUED | OUTPATIENT
Start: 2025-08-17 | End: 2025-08-19

## 2025-08-17 RX ORDER — COLCHICINE 0.6 MG/1
0.6 TABLET ORAL DAILY
Status: DISCONTINUED | OUTPATIENT
Start: 2025-08-18 | End: 2025-08-26 | Stop reason: HOSPADM

## 2025-08-17 RX ORDER — ACETAMINOPHEN 160 MG/5ML
650 SOLUTION ORAL EVERY 6 HOURS SCHEDULED
Status: DISCONTINUED | OUTPATIENT
Start: 2025-08-18 | End: 2025-08-18

## 2025-08-17 RX ORDER — HALOPERIDOL LACTATE 5 MG/ML
2 INJECTION, SOLUTION INTRAMUSCULAR ONCE
Status: COMPLETED | OUTPATIENT
Start: 2025-08-17 | End: 2025-08-17

## 2025-08-17 RX ORDER — INSULIN LISPRO 100 [IU]/ML
0-10 INJECTION, SOLUTION INTRAVENOUS; SUBCUTANEOUS
Status: DISCONTINUED | OUTPATIENT
Start: 2025-08-18 | End: 2025-08-20

## 2025-08-17 RX ADMIN — SUCCINYLCHOLINE CHLORIDE 200 MG: 20 INJECTION, SOLUTION INTRAMUSCULAR; INTRAVENOUS at 18:42

## 2025-08-17 RX ADMIN — FUROSEMIDE 40 MG: 10 INJECTION, SOLUTION INTRAMUSCULAR; INTRAVENOUS at 16:10

## 2025-08-17 RX ADMIN — HALOPERIDOL LACTATE 2 MG: 5 INJECTION, SOLUTION INTRAMUSCULAR at 18:14

## 2025-08-17 RX ADMIN — SODIUM CHLORIDE, SODIUM LACTATE, POTASSIUM CHLORIDE, AND CALCIUM CHLORIDE 500 ML: .6; .31; .03; .02 INJECTION, SOLUTION INTRAVENOUS at 22:42

## 2025-08-17 RX ADMIN — KETAMINE HYDROCHLORIDE 342 MG: 50 INJECTION, SOLUTION INTRAMUSCULAR; INTRAVENOUS at 18:41

## 2025-08-17 RX ADMIN — VANCOMYCIN HYDROCHLORIDE 2 G: 5 INJECTION, POWDER, LYOPHILIZED, FOR SOLUTION INTRAVENOUS at 21:39

## 2025-08-17 RX ADMIN — Medication 1 DOSE: at 19:22

## 2025-08-17 RX ADMIN — PROPOFOL 10 MCG/KG/MIN: 10 INJECTION, EMULSION INTRAVENOUS at 18:50

## 2025-08-17 RX ADMIN — AZITHROMYCIN MONOHYDRATE 500 MG: 500 INJECTION, POWDER, LYOPHILIZED, FOR SOLUTION INTRAVENOUS at 18:08

## 2025-08-17 RX ADMIN — Medication 1 DOSE: at 15:45

## 2025-08-17 RX ADMIN — MIDAZOLAM 4 MG: 5 INJECTION INTRAMUSCULAR; INTRAVENOUS at 15:51

## 2025-08-17 RX ADMIN — PIPERACILLIN SODIUM AND TAZOBACTAM SODIUM 4.5 G: 4; .5 INJECTION, SOLUTION INTRAVENOUS at 23:38

## 2025-08-17 RX ADMIN — IPRATROPIUM BROMIDE AND ALBUTEROL SULFATE 9 ML: 2.5; .5 SOLUTION RESPIRATORY (INHALATION) at 16:09

## 2025-08-17 RX ADMIN — HALOPERIDOL LACTATE 5 MG: 5 INJECTION, SOLUTION INTRAMUSCULAR at 16:09

## 2025-08-17 RX ADMIN — PROPOFOL 30 MCG/KG/MIN: 10 INJECTION, EMULSION INTRAVENOUS at 21:39

## 2025-08-17 RX ADMIN — INSULIN LISPRO 8 UNITS: 100 INJECTION, SOLUTION INTRAVENOUS; SUBCUTANEOUS at 23:38

## 2025-08-17 RX ADMIN — PIPERACILLIN SODIUM AND TAZOBACTAM SODIUM 4.5 G: 4; .5 INJECTION, SOLUTION INTRAVENOUS at 17:28

## 2025-08-17 RX ADMIN — NITROGLYCERIN 1 INCH: 20 OINTMENT TOPICAL at 17:48

## 2025-08-17 RX ADMIN — INSULIN GLARGINE 60 UNITS: 100 INJECTION, SOLUTION SUBCUTANEOUS at 22:42

## 2025-08-17 RX ADMIN — Medication 100 MCG/HR: at 19:29

## 2025-08-17 SDOH — SOCIAL STABILITY: SOCIAL INSECURITY: ARE THERE ANY APPARENT SIGNS OF INJURIES/BEHAVIORS THAT COULD BE RELATED TO ABUSE/NEGLECT?: UNABLE TO ASSESS

## 2025-08-17 SDOH — ECONOMIC STABILITY: FOOD INSECURITY
WITHIN THE PAST 12 MONTHS, YOU WORRIED THAT YOUR FOOD WOULD RUN OUT BEFORE YOU GOT THE MONEY TO BUY MORE.: PATIENT UNABLE TO ANSWER

## 2025-08-17 SDOH — ECONOMIC STABILITY: TRANSPORTATION INSECURITY
IN THE PAST 12 MONTHS, HAS LACK OF TRANSPORTATION KEPT YOU FROM MEDICAL APPOINTMENTS OR FROM GETTING MEDICATIONS?: PATIENT UNABLE TO ANSWER

## 2025-08-17 SDOH — SOCIAL STABILITY: SOCIAL INSECURITY
WITHIN THE LAST YEAR, HAVE YOU BEEN HUMILIATED OR EMOTIONALLY ABUSED IN OTHER WAYS BY YOUR PARTNER OR EX-PARTNER?: PATIENT UNABLE TO ANSWER

## 2025-08-17 SDOH — ECONOMIC STABILITY: HOUSING INSECURITY: AT ANY TIME IN THE PAST 12 MONTHS, WERE YOU HOMELESS OR LIVING IN A SHELTER (INCLUDING NOW)?: PATIENT UNABLE TO ANSWER

## 2025-08-17 SDOH — SOCIAL STABILITY: SOCIAL INSECURITY: WERE YOU ABLE TO COMPLETE ALL THE BEHAVIORAL HEALTH SCREENINGS?: NO

## 2025-08-17 SDOH — ECONOMIC STABILITY: INCOME INSECURITY
IN THE PAST 12 MONTHS HAS THE ELECTRIC, GAS, OIL, OR WATER COMPANY THREATENED TO SHUT OFF SERVICES IN YOUR HOME?: PATIENT UNABLE TO ANSWER

## 2025-08-17 SDOH — HEALTH STABILITY: PHYSICAL HEALTH
HOW OFTEN DO YOU NEED TO HAVE SOMEONE HELP YOU WHEN YOU READ INSTRUCTIONS, PAMPHLETS, OR OTHER WRITTEN MATERIAL FROM YOUR DOCTOR OR PHARMACY?: PATIENT UNABLE TO RESPOND

## 2025-08-17 SDOH — SOCIAL STABILITY: SOCIAL INSECURITY: WITHIN THE LAST YEAR, HAVE YOU BEEN AFRAID OF YOUR PARTNER OR EX-PARTNER?: PATIENT UNABLE TO ANSWER

## 2025-08-17 SDOH — ECONOMIC STABILITY: FOOD INSECURITY
HOW HARD IS IT FOR YOU TO PAY FOR THE VERY BASICS LIKE FOOD, HOUSING, MEDICAL CARE, AND HEATING?: PATIENT UNABLE TO ANSWER

## 2025-08-17 SDOH — SOCIAL STABILITY: SOCIAL INSECURITY
WITHIN THE LAST YEAR, HAVE YOU BEEN RAPED OR FORCED TO HAVE ANY KIND OF SEXUAL ACTIVITY BY YOUR PARTNER OR EX-PARTNER?: PATIENT UNABLE TO ANSWER

## 2025-08-17 SDOH — ECONOMIC STABILITY: HOUSING INSECURITY: IN THE PAST 12 MONTHS, HOW MANY TIMES HAVE YOU MOVED WHERE YOU WERE LIVING?: 0

## 2025-08-17 SDOH — SOCIAL STABILITY: SOCIAL INSECURITY
WITHIN THE LAST YEAR, HAVE YOU BEEN KICKED, HIT, SLAPPED, OR OTHERWISE PHYSICALLY HURT BY YOUR PARTNER OR EX-PARTNER?: PATIENT UNABLE TO ANSWER

## 2025-08-17 SDOH — ECONOMIC STABILITY: FOOD INSECURITY
WITHIN THE PAST 12 MONTHS, THE FOOD YOU BOUGHT JUST DIDN'T LAST AND YOU DIDN'T HAVE MONEY TO GET MORE.: PATIENT UNABLE TO ANSWER

## 2025-08-17 SDOH — SOCIAL STABILITY: SOCIAL INSECURITY: HAS ANYONE EVER THREATENED TO HURT YOUR FAMILY OR YOUR PETS?: UNABLE TO ASSESS

## 2025-08-17 SDOH — SOCIAL STABILITY: SOCIAL INSECURITY: HAVE YOU HAD THOUGHTS OF HARMING ANYONE ELSE?: UNABLE TO ASSESS

## 2025-08-17 SDOH — ECONOMIC STABILITY: HOUSING INSECURITY
IN THE LAST 12 MONTHS, WAS THERE A TIME WHEN YOU WERE NOT ABLE TO PAY THE MORTGAGE OR RENT ON TIME?: PATIENT UNABLE TO ANSWER

## 2025-08-17 SDOH — SOCIAL STABILITY: SOCIAL INSECURITY: DO YOU FEEL ANYONE HAS EXPLOITED OR TAKEN ADVANTAGE OF YOU FINANCIALLY OR OF YOUR PERSONAL PROPERTY?: UNABLE TO ASSESS

## 2025-08-17 SDOH — SOCIAL STABILITY: SOCIAL INSECURITY: ABUSE: ADULT

## 2025-08-17 SDOH — SOCIAL STABILITY: SOCIAL INSECURITY: HAVE YOU HAD ANY THOUGHTS OF HARMING ANYONE ELSE?: UNABLE TO ASSESS

## 2025-08-17 SDOH — SOCIAL STABILITY: SOCIAL INSECURITY: DO YOU FEEL UNSAFE GOING BACK TO THE PLACE WHERE YOU ARE LIVING?: UNABLE TO ASSESS

## 2025-08-17 SDOH — SOCIAL STABILITY: SOCIAL INSECURITY: ARE YOU OR HAVE YOU BEEN THREATENED OR ABUSED PHYSICALLY, EMOTIONALLY, OR SEXUALLY BY ANYONE?: UNABLE TO ASSESS

## 2025-08-17 SDOH — SOCIAL STABILITY: SOCIAL INSECURITY: DOES ANYONE TRY TO KEEP YOU FROM HAVING/CONTACTING OTHER FRIENDS OR DOING THINGS OUTSIDE YOUR HOME?: UNABLE TO ASSESS

## 2025-08-17 ASSESSMENT — LIFESTYLE VARIABLES
HOW OFTEN DO YOU HAVE A DRINK CONTAINING ALCOHOL: PATIENT UNABLE TO ANSWER
AUDIT-C TOTAL SCORE: -1
AUDIT-C TOTAL SCORE: -1
SKIP TO QUESTIONS 9-10: 0
HOW MANY STANDARD DRINKS CONTAINING ALCOHOL DO YOU HAVE ON A TYPICAL DAY: PATIENT UNABLE TO ANSWER
HOW OFTEN DO YOU HAVE 6 OR MORE DRINKS ON ONE OCCASION: PATIENT UNABLE TO ANSWER

## 2025-08-17 ASSESSMENT — ACTIVITIES OF DAILY LIVING (ADL)
DRESSING YOURSELF: UNABLE TO ASSESS
FEEDING YOURSELF: UNABLE TO ASSESS
GROOMING: UNABLE TO ASSESS
ADEQUATE_TO_COMPLETE_ADL: UNABLE TO ASSESS
PATIENT'S MEMORY ADEQUATE TO SAFELY COMPLETE DAILY ACTIVITIES?: UNABLE TO ASSESS
JUDGMENT_ADEQUATE_SAFELY_COMPLETE_DAILY_ACTIVITIES: UNABLE TO ASSESS
WALKS IN HOME: UNABLE TO ASSESS
LACK_OF_TRANSPORTATION: PATIENT UNABLE TO ANSWER
LACK_OF_TRANSPORTATION: PATIENT UNABLE TO ANSWER
BATHING: UNABLE TO ASSESS
HEARING - LEFT EAR: UNABLE TO ASSESS
ASSISTIVE_DEVICE: WALKER
TOILETING: UNABLE TO ASSESS
HEARING - RIGHT EAR: UNABLE TO ASSESS

## 2025-08-17 ASSESSMENT — COGNITIVE AND FUNCTIONAL STATUS - GENERAL: PATIENT BASELINE BEDBOUND: NO

## 2025-08-17 ASSESSMENT — PAIN SCALES - GENERAL
PAINLEVEL_OUTOF10: 6
PAINLEVEL_OUTOF10: 0 - NO PAIN

## 2025-08-17 ASSESSMENT — PAIN DESCRIPTION - LOCATION: LOCATION: CHEST

## 2025-08-18 ENCOUNTER — APPOINTMENT (OUTPATIENT)
Dept: RADIOLOGY | Facility: HOSPITAL | Age: 55
DRG: 871 | End: 2025-08-18
Payer: MEDICARE

## 2025-08-18 LAB
ALBUMIN SERPL BCP-MCNC: 3.3 G/DL (ref 3.4–5)
ANION GAP BLDV CALCULATED.4IONS-SCNC: 15 MMOL/L (ref 10–25)
ANION GAP SERPL CALC-SCNC: 15 MMOL/L (ref 10–20)
BASE EXCESS BLDV CALC-SCNC: 0.7 MMOL/L (ref -2–3)
BODY TEMPERATURE: 37 DEGREES CELSIUS
BUN SERPL-MCNC: 37 MG/DL (ref 6–23)
CA-I BLDV-SCNC: 1.15 MMOL/L (ref 1.1–1.33)
CALCIUM SERPL-MCNC: 8.6 MG/DL (ref 8.6–10.3)
CHLORIDE BLDV-SCNC: 97 MMOL/L (ref 98–107)
CHLORIDE SERPL-SCNC: 98 MMOL/L (ref 98–107)
CO2 SERPL-SCNC: 25 MMOL/L (ref 21–32)
CREAT SERPL-MCNC: 1.58 MG/DL (ref 0.5–1.3)
EGFRCR SERPLBLD CKD-EPI 2021: 52 ML/MIN/1.73M*2
ERYTHROCYTE [DISTWIDTH] IN BLOOD BY AUTOMATED COUNT: 18.3 % (ref 11.5–14.5)
GLUCOSE BLD MANUAL STRIP-MCNC: 121 MG/DL (ref 74–99)
GLUCOSE BLD MANUAL STRIP-MCNC: 153 MG/DL (ref 74–99)
GLUCOSE BLD MANUAL STRIP-MCNC: 154 MG/DL (ref 74–99)
GLUCOSE BLD MANUAL STRIP-MCNC: 212 MG/DL (ref 74–99)
GLUCOSE BLD MANUAL STRIP-MCNC: 215 MG/DL (ref 74–99)
GLUCOSE BLD MANUAL STRIP-MCNC: 96 MG/DL (ref 74–99)
GLUCOSE BLDV-MCNC: 318 MG/DL (ref 74–99)
GLUCOSE SERPL-MCNC: 221 MG/DL (ref 74–99)
HCO3 BLDV-SCNC: 27.6 MMOL/L (ref 22–26)
HCT VFR BLD AUTO: 25.7 % (ref 41–52)
HCT VFR BLD EST: 26 % (ref 41–52)
HGB BLD-MCNC: 7.6 G/DL (ref 13.5–17.5)
HGB BLDV-MCNC: 8.8 G/DL (ref 13.5–17.5)
INHALED O2 CONCENTRATION: 50 %
LACTATE BLDV-SCNC: 3.8 MMOL/L (ref 0.4–2)
LACTATE SERPL-SCNC: 1.7 MMOL/L (ref 0.4–2)
MCH RBC QN AUTO: 24.4 PG (ref 26–34)
MCHC RBC AUTO-ENTMCNC: 29.6 G/DL (ref 32–36)
MCV RBC AUTO: 83 FL (ref 80–100)
NRBC BLD-RTO: 0.3 /100 WBCS (ref 0–0)
OXYHGB MFR BLDV: 65.3 % (ref 45–75)
PCO2 BLDV: 56 MM HG (ref 41–51)
PH BLDV: 7.3 PH (ref 7.33–7.43)
PHOSPHATE SERPL-MCNC: 4.1 MG/DL (ref 2.5–4.9)
PLATELET # BLD AUTO: 251 X10*3/UL (ref 150–450)
PO2 BLDV: 42 MM HG (ref 35–45)
POTASSIUM BLDV-SCNC: 5.2 MMOL/L (ref 3.5–5.3)
POTASSIUM SERPL-SCNC: 4.4 MMOL/L (ref 3.5–5.3)
RBC # BLD AUTO: 3.11 X10*6/UL (ref 4.5–5.9)
SAO2 % BLDV: 67 % (ref 45–75)
SODIUM BLDV-SCNC: 134 MMOL/L (ref 136–145)
SODIUM SERPL-SCNC: 134 MMOL/L (ref 136–145)
TRIGL SERPL-MCNC: 148 MG/DL (ref 0–149)
VANCOMYCIN SERPL-MCNC: 14.4 UG/ML (ref 5–20)
WBC # BLD AUTO: 14.9 X10*3/UL (ref 4.4–11.3)

## 2025-08-18 PROCEDURE — 2500000001 HC RX 250 WO HCPCS SELF ADMINISTERED DRUGS (ALT 637 FOR MEDICARE OP): Performed by: NURSE PRACTITIONER

## 2025-08-18 PROCEDURE — 80202 ASSAY OF VANCOMYCIN: CPT | Performed by: PHARMACIST

## 2025-08-18 PROCEDURE — 2500000004 HC RX 250 GENERAL PHARMACY W/ HCPCS (ALT 636 FOR OP/ED): Performed by: NURSE PRACTITIONER

## 2025-08-18 PROCEDURE — 84478 ASSAY OF TRIGLYCERIDES: CPT | Performed by: NURSE PRACTITIONER

## 2025-08-18 PROCEDURE — 74176 CT ABD & PELVIS W/O CONTRAST: CPT

## 2025-08-18 PROCEDURE — 80069 RENAL FUNCTION PANEL: CPT | Performed by: NURSE PRACTITIONER

## 2025-08-18 PROCEDURE — 36415 COLL VENOUS BLD VENIPUNCTURE: CPT | Performed by: NURSE PRACTITIONER

## 2025-08-18 PROCEDURE — 76705 ECHO EXAM OF ABDOMEN: CPT | Performed by: RADIOLOGY

## 2025-08-18 PROCEDURE — 71250 CT THORAX DX C-: CPT | Performed by: RADIOLOGY

## 2025-08-18 PROCEDURE — 82947 ASSAY GLUCOSE BLOOD QUANT: CPT

## 2025-08-18 PROCEDURE — 74176 CT ABD & PELVIS W/O CONTRAST: CPT | Performed by: RADIOLOGY

## 2025-08-18 PROCEDURE — 2500000005 HC RX 250 GENERAL PHARMACY W/O HCPCS: Performed by: PHARMACIST

## 2025-08-18 PROCEDURE — 2500000001 HC RX 250 WO HCPCS SELF ADMINISTERED DRUGS (ALT 637 FOR MEDICARE OP): Performed by: PHARMACIST

## 2025-08-18 PROCEDURE — 2500000002 HC RX 250 W HCPCS SELF ADMINISTERED DRUGS (ALT 637 FOR MEDICARE OP, ALT 636 FOR OP/ED): Performed by: NURSE PRACTITIONER

## 2025-08-18 PROCEDURE — 99291 CRITICAL CARE FIRST HOUR: CPT | Performed by: NURSE PRACTITIONER

## 2025-08-18 PROCEDURE — 2500000005 HC RX 250 GENERAL PHARMACY W/O HCPCS: Performed by: SURGERY

## 2025-08-18 PROCEDURE — 83605 ASSAY OF LACTIC ACID: CPT | Performed by: NURSE PRACTITIONER

## 2025-08-18 PROCEDURE — 76705 ECHO EXAM OF ABDOMEN: CPT

## 2025-08-18 PROCEDURE — 94681 O2 UPTK CO2 OUTP % O2 XTRC: CPT

## 2025-08-18 PROCEDURE — 2500000004 HC RX 250 GENERAL PHARMACY W/ HCPCS (ALT 636 FOR OP/ED): Mod: JZ | Performed by: NURSE PRACTITIONER

## 2025-08-18 PROCEDURE — 85027 COMPLETE CBC AUTOMATED: CPT | Performed by: NURSE PRACTITIONER

## 2025-08-18 PROCEDURE — 2500000004 HC RX 250 GENERAL PHARMACY W/ HCPCS (ALT 636 FOR OP/ED): Mod: JZ | Performed by: PHARMACIST

## 2025-08-18 PROCEDURE — 94003 VENT MGMT INPAT SUBQ DAY: CPT

## 2025-08-18 PROCEDURE — 2500000004 HC RX 250 GENERAL PHARMACY W/ HCPCS (ALT 636 FOR OP/ED): Performed by: PHARMACIST

## 2025-08-18 PROCEDURE — 2020000001 HC ICU ROOM DAILY

## 2025-08-18 RX ORDER — ACETAMINOPHEN 325 MG/1
650 TABLET ORAL EVERY 6 HOURS SCHEDULED
Status: DISCONTINUED | OUTPATIENT
Start: 2025-08-18 | End: 2025-08-19

## 2025-08-18 RX ORDER — ACETAMINOPHEN 160 MG/5ML
650 SOLUTION ORAL EVERY 6 HOURS SCHEDULED
Status: DISCONTINUED | OUTPATIENT
Start: 2025-08-18 | End: 2025-08-19

## 2025-08-18 RX ORDER — ACETAMINOPHEN 650 MG/1
650 SUPPOSITORY RECTAL EVERY 6 HOURS SCHEDULED
Status: DISCONTINUED | OUTPATIENT
Start: 2025-08-18 | End: 2025-08-19

## 2025-08-18 RX ORDER — METOPROLOL TARTRATE 25 MG/1
25 TABLET, FILM COATED ORAL 2 TIMES DAILY
Status: DISCONTINUED | OUTPATIENT
Start: 2025-08-18 | End: 2025-08-18

## 2025-08-18 RX ORDER — DEXMEDETOMIDINE HYDROCHLORIDE 4 UG/ML
0-1.5 INJECTION, SOLUTION INTRAVENOUS CONTINUOUS
Status: DISCONTINUED | OUTPATIENT
Start: 2025-08-18 | End: 2025-08-21

## 2025-08-18 RX ORDER — QUETIAPINE FUMARATE 100 MG/1
200 TABLET, FILM COATED ORAL 2 TIMES DAILY
Status: DISCONTINUED | OUTPATIENT
Start: 2025-08-18 | End: 2025-08-19

## 2025-08-18 RX ORDER — METOPROLOL TARTRATE 25 MG/1
12.5 TABLET, FILM COATED ORAL 2 TIMES DAILY
Status: DISCONTINUED | OUTPATIENT
Start: 2025-08-19 | End: 2025-08-21

## 2025-08-18 RX ADMIN — DEXMEDETOMIDINE HYDROCHLORIDE 0.2 MCG/KG/HR: 400 INJECTION INTRAVENOUS at 20:06

## 2025-08-18 RX ADMIN — QUETIAPINE FUMARATE 200 MG: 100 TABLET ORAL at 11:47

## 2025-08-18 RX ADMIN — APIXABAN 5 MG: 5 TABLET, FILM COATED ORAL at 21:21

## 2025-08-18 RX ADMIN — VANCOMYCIN HYDROCHLORIDE 1500 MG: 5 INJECTION, POWDER, LYOPHILIZED, FOR SOLUTION INTRAVENOUS at 21:21

## 2025-08-18 RX ADMIN — AZITHROMYCIN MONOHYDRATE 500 MG: 500 INJECTION, POWDER, LYOPHILIZED, FOR SOLUTION INTRAVENOUS at 18:36

## 2025-08-18 RX ADMIN — Medication: at 03:27

## 2025-08-18 RX ADMIN — ATORVASTATIN CALCIUM 80 MG: 80 TABLET, FILM COATED ORAL at 21:21

## 2025-08-18 RX ADMIN — ASPIRIN 81 MG: 81 TABLET, DELAYED RELEASE ORAL at 09:58

## 2025-08-18 RX ADMIN — Medication: at 18:52

## 2025-08-18 RX ADMIN — COLCHICINE 0.6 MG: 0.6 TABLET, FILM COATED ORAL at 09:58

## 2025-08-18 RX ADMIN — PIPERACILLIN SODIUM AND TAZOBACTAM SODIUM 4.5 G: 4; .5 INJECTION, SOLUTION INTRAVENOUS at 12:37

## 2025-08-18 RX ADMIN — GLUCAGON 1 MG: KIT at 22:57

## 2025-08-18 RX ADMIN — QUETIAPINE FUMARATE 200 MG: 100 TABLET ORAL at 21:21

## 2025-08-18 RX ADMIN — APIXABAN 5 MG: 5 TABLET, FILM COATED ORAL at 09:58

## 2025-08-18 RX ADMIN — POLYETHYLENE GLYCOL 3350 17 G: 17 POWDER, FOR SOLUTION ORAL at 10:03

## 2025-08-18 RX ADMIN — ACETAMINOPHEN 650 MG: 650 SOLUTION ORAL at 18:35

## 2025-08-18 RX ADMIN — ACETAMINOPHEN 650 MG: 650 SOLUTION ORAL at 00:30

## 2025-08-18 RX ADMIN — PANTOPRAZOLE SODIUM 40 MG: 40 INJECTION, POWDER, FOR SOLUTION INTRAVENOUS at 06:13

## 2025-08-18 RX ADMIN — GLUCAGON 1 MG: KIT at 22:24

## 2025-08-18 RX ADMIN — INSULIN LISPRO 4 UNITS: 100 INJECTION, SOLUTION INTRAVENOUS; SUBCUTANEOUS at 04:05

## 2025-08-18 RX ADMIN — METOPROLOL TARTRATE 25 MG: 25 TABLET, FILM COATED ORAL at 21:22

## 2025-08-18 RX ADMIN — PROPOFOL 15 MCG/KG/MIN: 10 INJECTION, EMULSION INTRAVENOUS at 02:36

## 2025-08-18 RX ADMIN — PIPERACILLIN SODIUM AND TAZOBACTAM SODIUM 4.5 G: 4; .5 INJECTION, SOLUTION INTRAVENOUS at 18:35

## 2025-08-18 RX ADMIN — TORSEMIDE 60 MG: 20 TABLET ORAL at 21:21

## 2025-08-18 RX ADMIN — PROPOFOL 15 MCG/KG/MIN: 10 INJECTION, EMULSION INTRAVENOUS at 12:02

## 2025-08-18 RX ADMIN — PROPOFOL 15 MCG/KG/MIN: 10 INJECTION, EMULSION INTRAVENOUS at 07:57

## 2025-08-18 RX ADMIN — INSULIN GLARGINE 60 UNITS: 100 INJECTION, SOLUTION SUBCUTANEOUS at 09:57

## 2025-08-18 RX ADMIN — Medication: at 23:13

## 2025-08-18 RX ADMIN — INSULIN LISPRO 4 UNITS: 100 INJECTION, SOLUTION INTRAVENOUS; SUBCUTANEOUS at 23:36

## 2025-08-18 RX ADMIN — PIPERACILLIN SODIUM AND TAZOBACTAM SODIUM 4.5 G: 4; .5 INJECTION, SOLUTION INTRAVENOUS at 06:13

## 2025-08-18 RX ADMIN — ACETAMINOPHEN 650 MG: 650 SOLUTION ORAL at 06:13

## 2025-08-18 RX ADMIN — FERROUS SULFATE TAB 325 MG (65 MG ELEMENTAL FE) 1 TABLET: 325 (65 FE) TAB at 09:58

## 2025-08-18 RX ADMIN — ACETAMINOPHEN 650 MG: 650 SOLUTION ORAL at 23:20

## 2025-08-18 RX ADMIN — PROPOFOL 10 MCG/KG/MIN: 10 INJECTION, EMULSION INTRAVENOUS at 21:36

## 2025-08-18 RX ADMIN — GLUCAGON 1 MG: 1 INJECTION, POWDER, LYOPHILIZED, FOR SOLUTION INTRAMUSCULAR; INTRAVENOUS at 23:20

## 2025-08-18 RX ADMIN — ACETAMINOPHEN 650 MG: 325 TABLET ORAL at 11:47

## 2025-08-18 RX ADMIN — INSULIN GLARGINE 60 UNITS: 100 INJECTION, SOLUTION SUBCUTANEOUS at 21:47

## 2025-08-18 RX ADMIN — Medication 50 MCG/HR: at 07:57

## 2025-08-18 RX ADMIN — Medication: at 07:31

## 2025-08-18 RX ADMIN — VANCOMYCIN HYDROCHLORIDE 1500 MG: 5 INJECTION, POWDER, LYOPHILIZED, FOR SOLUTION INTRAVENOUS at 09:58

## 2025-08-18 ASSESSMENT — PAIN - FUNCTIONAL ASSESSMENT
PAIN_FUNCTIONAL_ASSESSMENT: CPOT (CRITICAL CARE PAIN OBSERVATION TOOL)

## 2025-08-18 ASSESSMENT — COGNITIVE AND FUNCTIONAL STATUS - GENERAL
MOBILITY SCORE: 6
EATING MEALS: TOTAL
TOILETING: TOTAL
PERSONAL GROOMING: TOTAL
MOVING FROM LYING ON BACK TO SITTING ON SIDE OF FLAT BED WITH BEDRAILS: TOTAL
TURNING FROM BACK TO SIDE WHILE IN FLAT BAD: TOTAL
DRESSING REGULAR LOWER BODY CLOTHING: TOTAL
DRESSING REGULAR UPPER BODY CLOTHING: TOTAL
DAILY ACTIVITIY SCORE: 6
HELP NEEDED FOR BATHING: TOTAL
WALKING IN HOSPITAL ROOM: TOTAL
CLIMB 3 TO 5 STEPS WITH RAILING: TOTAL
STANDING UP FROM CHAIR USING ARMS: TOTAL
MOVING TO AND FROM BED TO CHAIR: TOTAL

## 2025-08-18 ASSESSMENT — PAIN SCALES - GENERAL
PAINLEVEL_OUTOF10: 0 - NO PAIN
PAINLEVEL_OUTOF10: 0 - NO PAIN

## 2025-08-18 ASSESSMENT — ACTIVITIES OF DAILY LIVING (ADL): LACK_OF_TRANSPORTATION: PATIENT UNABLE TO ANSWER

## 2025-08-19 ENCOUNTER — APPOINTMENT (OUTPATIENT)
Dept: PAIN MEDICINE | Facility: CLINIC | Age: 55
End: 2025-08-19
Payer: MEDICARE

## 2025-08-19 LAB
ALBUMIN SERPL BCP-MCNC: 3 G/DL (ref 3.4–5)
ANION GAP SERPL CALC-SCNC: 18 MMOL/L (ref 10–20)
BUN SERPL-MCNC: 37 MG/DL (ref 6–23)
CALCIUM SERPL-MCNC: 8.1 MG/DL (ref 8.6–10.3)
CHLORIDE SERPL-SCNC: 99 MMOL/L (ref 98–107)
CO2 SERPL-SCNC: 24 MMOL/L (ref 21–32)
CREAT SERPL-MCNC: 1.55 MG/DL (ref 0.5–1.3)
EGFRCR SERPLBLD CKD-EPI 2021: 53 ML/MIN/1.73M*2
ERYTHROCYTE [DISTWIDTH] IN BLOOD BY AUTOMATED COUNT: 18.1 % (ref 11.5–14.5)
GLUCOSE BLD MANUAL STRIP-MCNC: 178 MG/DL (ref 74–99)
GLUCOSE BLD MANUAL STRIP-MCNC: 189 MG/DL (ref 74–99)
GLUCOSE BLD MANUAL STRIP-MCNC: 217 MG/DL (ref 74–99)
GLUCOSE BLD MANUAL STRIP-MCNC: 223 MG/DL (ref 74–99)
GLUCOSE BLD MANUAL STRIP-MCNC: 249 MG/DL (ref 74–99)
GLUCOSE SERPL-MCNC: 242 MG/DL (ref 74–99)
HCT VFR BLD AUTO: 28.9 % (ref 41–52)
HGB BLD-MCNC: 8.7 G/DL (ref 13.5–17.5)
MCH RBC QN AUTO: 24.3 PG (ref 26–34)
MCHC RBC AUTO-ENTMCNC: 30.1 G/DL (ref 32–36)
MCV RBC AUTO: 81 FL (ref 80–100)
NRBC BLD-RTO: 0.3 /100 WBCS (ref 0–0)
PHOSPHATE SERPL-MCNC: 3.4 MG/DL (ref 2.5–4.9)
PLATELET # BLD AUTO: 297 X10*3/UL (ref 150–450)
POTASSIUM SERPL-SCNC: 4.5 MMOL/L (ref 3.5–5.3)
RBC # BLD AUTO: 3.58 X10*6/UL (ref 4.5–5.9)
SODIUM SERPL-SCNC: 136 MMOL/L (ref 136–145)
VANCOMYCIN SERPL-MCNC: 16.6 UG/ML (ref 5–20)
WBC # BLD AUTO: 14.4 X10*3/UL (ref 4.4–11.3)

## 2025-08-19 PROCEDURE — 2500000005 HC RX 250 GENERAL PHARMACY W/O HCPCS: Performed by: PHARMACIST

## 2025-08-19 PROCEDURE — 94640 AIRWAY INHALATION TREATMENT: CPT

## 2025-08-19 PROCEDURE — 2500000001 HC RX 250 WO HCPCS SELF ADMINISTERED DRUGS (ALT 637 FOR MEDICARE OP): Performed by: INTERNAL MEDICINE

## 2025-08-19 PROCEDURE — 2500000004 HC RX 250 GENERAL PHARMACY W/ HCPCS (ALT 636 FOR OP/ED): Performed by: INTERNAL MEDICINE

## 2025-08-19 PROCEDURE — 2500000004 HC RX 250 GENERAL PHARMACY W/ HCPCS (ALT 636 FOR OP/ED): Performed by: PHARMACIST

## 2025-08-19 PROCEDURE — 2500000002 HC RX 250 W HCPCS SELF ADMINISTERED DRUGS (ALT 637 FOR MEDICARE OP, ALT 636 FOR OP/ED): Performed by: NURSE PRACTITIONER

## 2025-08-19 PROCEDURE — 99291 CRITICAL CARE FIRST HOUR: CPT

## 2025-08-19 PROCEDURE — 94681 O2 UPTK CO2 OUTP % O2 XTRC: CPT

## 2025-08-19 PROCEDURE — 2500000004 HC RX 250 GENERAL PHARMACY W/ HCPCS (ALT 636 FOR OP/ED): Performed by: NURSE PRACTITIONER

## 2025-08-19 PROCEDURE — 36415 COLL VENOUS BLD VENIPUNCTURE: CPT | Performed by: NURSE PRACTITIONER

## 2025-08-19 PROCEDURE — 2500000004 HC RX 250 GENERAL PHARMACY W/ HCPCS (ALT 636 FOR OP/ED): Performed by: SURGERY

## 2025-08-19 PROCEDURE — 2500000001 HC RX 250 WO HCPCS SELF ADMINISTERED DRUGS (ALT 637 FOR MEDICARE OP): Performed by: PHARMACIST

## 2025-08-19 PROCEDURE — 5A0935A ASSISTANCE WITH RESPIRATORY VENTILATION, LESS THAN 24 CONSECUTIVE HOURS, HIGH NASAL FLOW/VELOCITY: ICD-10-PCS | Performed by: INTERNAL MEDICINE

## 2025-08-19 PROCEDURE — 2020000001 HC ICU ROOM DAILY

## 2025-08-19 PROCEDURE — 2500000005 HC RX 250 GENERAL PHARMACY W/O HCPCS: Performed by: INTERNAL MEDICINE

## 2025-08-19 PROCEDURE — 94003 VENT MGMT INPAT SUBQ DAY: CPT

## 2025-08-19 PROCEDURE — 85027 COMPLETE CBC AUTOMATED: CPT | Performed by: NURSE PRACTITIONER

## 2025-08-19 PROCEDURE — 82947 ASSAY GLUCOSE BLOOD QUANT: CPT

## 2025-08-19 PROCEDURE — 2500000002 HC RX 250 W HCPCS SELF ADMINISTERED DRUGS (ALT 637 FOR MEDICARE OP, ALT 636 FOR OP/ED): Performed by: SURGERY

## 2025-08-19 PROCEDURE — 94668 MNPJ CHEST WALL SBSQ: CPT

## 2025-08-19 PROCEDURE — 80202 ASSAY OF VANCOMYCIN: CPT

## 2025-08-19 PROCEDURE — 2500000005 HC RX 250 GENERAL PHARMACY W/O HCPCS: Performed by: SURGERY

## 2025-08-19 PROCEDURE — 84132 ASSAY OF SERUM POTASSIUM: CPT | Performed by: NURSE PRACTITIONER

## 2025-08-19 PROCEDURE — 2500000001 HC RX 250 WO HCPCS SELF ADMINISTERED DRUGS (ALT 637 FOR MEDICARE OP): Performed by: NURSE PRACTITIONER

## 2025-08-19 PROCEDURE — 2500000002 HC RX 250 W HCPCS SELF ADMINISTERED DRUGS (ALT 637 FOR MEDICARE OP, ALT 636 FOR OP/ED): Performed by: ANESTHESIOLOGY

## 2025-08-19 RX ORDER — OXYCODONE HCL 5 MG/5 ML
5 SOLUTION, ORAL ORAL EVERY 6 HOURS PRN
Refills: 0 | Status: DISCONTINUED | OUTPATIENT
Start: 2025-08-19 | End: 2025-08-20

## 2025-08-19 RX ORDER — NOREPINEPHRINE BITARTRATE/D5W 8 MG/250ML
0-.5 PLASTIC BAG, INJECTION (ML) INTRAVENOUS CONTINUOUS
Status: DISCONTINUED | OUTPATIENT
Start: 2025-08-19 | End: 2025-08-19

## 2025-08-19 RX ORDER — HALOPERIDOL LACTATE 5 MG/ML
10 INJECTION, SOLUTION INTRAMUSCULAR ONCE
Status: COMPLETED | OUTPATIENT
Start: 2025-08-19 | End: 2025-08-19

## 2025-08-19 RX ORDER — IPRATROPIUM BROMIDE AND ALBUTEROL SULFATE 2.5; .5 MG/3ML; MG/3ML
3 SOLUTION RESPIRATORY (INHALATION) EVERY 2 HOUR PRN
Status: DISCONTINUED | OUTPATIENT
Start: 2025-08-19 | End: 2025-08-26 | Stop reason: HOSPADM

## 2025-08-19 RX ORDER — NOREPINEPHRINE BITARTRATE/D5W 8 MG/250ML
0-.5 PLASTIC BAG, INJECTION (ML) INTRAVENOUS CONTINUOUS
Status: DISCONTINUED | OUTPATIENT
Start: 2025-08-19 | End: 2025-08-20

## 2025-08-19 RX ORDER — PROPOFOL 10 MG/ML
0-20 INJECTION, EMULSION INTRAVENOUS CONTINUOUS
Status: DISCONTINUED | OUTPATIENT
Start: 2025-08-19 | End: 2025-08-19

## 2025-08-19 RX ORDER — IPRATROPIUM BROMIDE AND ALBUTEROL SULFATE 2.5; .5 MG/3ML; MG/3ML
3 SOLUTION RESPIRATORY (INHALATION)
Status: DISCONTINUED | OUTPATIENT
Start: 2025-08-19 | End: 2025-08-25

## 2025-08-19 RX ORDER — ACETAMINOPHEN 160 MG/5ML
650 SOLUTION ORAL EVERY 4 HOURS PRN
Status: DISCONTINUED | OUTPATIENT
Start: 2025-08-19 | End: 2025-08-26 | Stop reason: HOSPADM

## 2025-08-19 RX ORDER — POLYETHYLENE GLYCOL 3350 17 G/17G
17 POWDER, FOR SOLUTION ORAL 2 TIMES DAILY
Status: DISCONTINUED | OUTPATIENT
Start: 2025-08-19 | End: 2025-08-26 | Stop reason: HOSPADM

## 2025-08-19 RX ORDER — OXYCODONE HCL 5 MG/5 ML
10 SOLUTION, ORAL ORAL EVERY 6 HOURS PRN
Refills: 0 | Status: DISCONTINUED | OUTPATIENT
Start: 2025-08-19 | End: 2025-08-20

## 2025-08-19 RX ORDER — QUETIAPINE FUMARATE 100 MG/1
100 TABLET, FILM COATED ORAL 2 TIMES DAILY
Status: DISCONTINUED | OUTPATIENT
Start: 2025-08-19 | End: 2025-08-20

## 2025-08-19 RX ORDER — ACETAMINOPHEN 325 MG/1
650 TABLET ORAL EVERY 4 HOURS PRN
Status: DISCONTINUED | OUTPATIENT
Start: 2025-08-19 | End: 2025-08-26 | Stop reason: HOSPADM

## 2025-08-19 RX ADMIN — HALOPERIDOL LACTATE 10 MG: 5 INJECTION, SOLUTION INTRAMUSCULAR at 18:22

## 2025-08-19 RX ADMIN — NOREPINEPHRINE BITARTRATE 0.01 MCG/KG/MIN: 8 INJECTION, SOLUTION INTRAVENOUS at 01:20

## 2025-08-19 RX ADMIN — CYCLOBENZAPRINE HYDROCHLORIDE 10 MG: 5 TABLET, FILM COATED ORAL at 19:47

## 2025-08-19 RX ADMIN — QUETIAPINE FUMARATE 100 MG: 100 TABLET ORAL at 20:50

## 2025-08-19 RX ADMIN — DEXMEDETOMIDINE HYDROCHLORIDE 1 MCG/KG/HR: 400 INJECTION INTRAVENOUS at 17:06

## 2025-08-19 RX ADMIN — DEXMEDETOMIDINE HYDROCHLORIDE 0.4 MCG/KG/HR: 400 INJECTION INTRAVENOUS at 12:13

## 2025-08-19 RX ADMIN — Medication: at 11:19

## 2025-08-19 RX ADMIN — Medication: at 15:41

## 2025-08-19 RX ADMIN — PIPERACILLIN SODIUM AND TAZOBACTAM SODIUM 4.5 G: 4; .5 INJECTION, SOLUTION INTRAVENOUS at 12:03

## 2025-08-19 RX ADMIN — HYDROMORPHONE HYDROCHLORIDE 0.4 MG: 1 INJECTION, SOLUTION INTRAMUSCULAR; INTRAVENOUS; SUBCUTANEOUS at 21:50

## 2025-08-19 RX ADMIN — Medication: at 19:59

## 2025-08-19 RX ADMIN — VANCOMYCIN HYDROCHLORIDE 1500 MG: 5 INJECTION, POWDER, LYOPHILIZED, FOR SOLUTION INTRAVENOUS at 08:27

## 2025-08-19 RX ADMIN — PROPOFOL 10 MCG/KG/MIN: 10 INJECTION, EMULSION INTRAVENOUS at 10:03

## 2025-08-19 RX ADMIN — AZITHROMYCIN MONOHYDRATE 500 MG: 500 INJECTION, POWDER, LYOPHILIZED, FOR SOLUTION INTRAVENOUS at 17:37

## 2025-08-19 RX ADMIN — PIPERACILLIN SODIUM AND TAZOBACTAM SODIUM 4.5 G: 4; .5 INJECTION, SOLUTION INTRAVENOUS at 07:46

## 2025-08-19 RX ADMIN — APIXABAN 5 MG: 5 TABLET, FILM COATED ORAL at 20:50

## 2025-08-19 RX ADMIN — COLCHICINE 0.6 MG: 0.6 TABLET, FILM COATED ORAL at 08:19

## 2025-08-19 RX ADMIN — INSULIN LISPRO 2 UNITS: 100 INJECTION, SOLUTION INTRAVENOUS; SUBCUTANEOUS at 03:58

## 2025-08-19 RX ADMIN — PIPERACILLIN SODIUM AND TAZOBACTAM SODIUM 4.5 G: 4; .5 INJECTION, SOLUTION INTRAVENOUS at 17:37

## 2025-08-19 RX ADMIN — DEXMEDETOMIDINE HYDROCHLORIDE 0.04 MCG/KG/HR: 400 INJECTION INTRAVENOUS at 02:10

## 2025-08-19 RX ADMIN — INSULIN GLARGINE 60 UNITS: 100 INJECTION, SOLUTION SUBCUTANEOUS at 20:12

## 2025-08-19 RX ADMIN — OXYCODONE HYDROCHLORIDE 10 MG: 5 SOLUTION ORAL at 19:46

## 2025-08-19 RX ADMIN — HYDROMORPHONE HYDROCHLORIDE 0.4 MG: 1 INJECTION, SOLUTION INTRAMUSCULAR; INTRAVENOUS; SUBCUTANEOUS at 20:47

## 2025-08-19 RX ADMIN — DEXMEDETOMIDINE HYDROCHLORIDE 0.65 MCG/KG/HR: 400 INJECTION INTRAVENOUS at 21:47

## 2025-08-19 RX ADMIN — INSULIN LISPRO 4 UNITS: 100 INJECTION, SOLUTION INTRAVENOUS; SUBCUTANEOUS at 16:22

## 2025-08-19 RX ADMIN — HYDROMORPHONE HYDROCHLORIDE 0.4 MG: 1 INJECTION, SOLUTION INTRAMUSCULAR; INTRAVENOUS; SUBCUTANEOUS at 14:20

## 2025-08-19 RX ADMIN — ATORVASTATIN CALCIUM 80 MG: 80 TABLET, FILM COATED ORAL at 20:50

## 2025-08-19 RX ADMIN — INSULIN LISPRO 4 UNITS: 100 INJECTION, SOLUTION INTRAVENOUS; SUBCUTANEOUS at 08:20

## 2025-08-19 RX ADMIN — PIPERACILLIN SODIUM AND TAZOBACTAM SODIUM 4.5 G: 4; .5 INJECTION, SOLUTION INTRAVENOUS at 23:42

## 2025-08-19 RX ADMIN — QUETIAPINE FUMARATE 100 MG: 100 TABLET ORAL at 08:19

## 2025-08-19 RX ADMIN — VANCOMYCIN HYDROCHLORIDE 1250 MG: 1.25 INJECTION, POWDER, LYOPHILIZED, FOR SOLUTION INTRAVENOUS at 21:07

## 2025-08-19 RX ADMIN — HYDROMORPHONE HYDROCHLORIDE 0.4 MG: 1 INJECTION, SOLUTION INTRAMUSCULAR; INTRAVENOUS; SUBCUTANEOUS at 17:36

## 2025-08-19 RX ADMIN — Medication: at 13:39

## 2025-08-19 RX ADMIN — INSULIN LISPRO 2 UNITS: 100 INJECTION, SOLUTION INTRAVENOUS; SUBCUTANEOUS at 20:12

## 2025-08-19 RX ADMIN — ASPIRIN 81 MG: 81 TABLET, DELAYED RELEASE ORAL at 08:18

## 2025-08-19 RX ADMIN — HYDROMORPHONE HYDROCHLORIDE 0.4 MG: 1 INJECTION, SOLUTION INTRAMUSCULAR; INTRAVENOUS; SUBCUTANEOUS at 08:32

## 2025-08-19 RX ADMIN — Medication: at 02:56

## 2025-08-19 RX ADMIN — INSULIN GLARGINE 60 UNITS: 100 INJECTION, SOLUTION SUBCUTANEOUS at 08:20

## 2025-08-19 RX ADMIN — Medication: at 07:30

## 2025-08-19 RX ADMIN — IPRATROPIUM BROMIDE AND ALBUTEROL SULFATE 3 ML: 2.5; .5 SOLUTION RESPIRATORY (INHALATION) at 20:32

## 2025-08-19 RX ADMIN — PIPERACILLIN SODIUM AND TAZOBACTAM SODIUM 4.5 G: 4; .5 INJECTION, SOLUTION INTRAVENOUS at 01:00

## 2025-08-19 RX ADMIN — APIXABAN 5 MG: 5 TABLET, FILM COATED ORAL at 08:19

## 2025-08-19 RX ADMIN — HYDROMORPHONE HYDROCHLORIDE 0.4 MG: 1 INJECTION, SOLUTION INTRAMUSCULAR; INTRAVENOUS; SUBCUTANEOUS at 15:44

## 2025-08-19 RX ADMIN — HYDROMORPHONE HYDROCHLORIDE 0.4 MG: 1 INJECTION, SOLUTION INTRAMUSCULAR; INTRAVENOUS; SUBCUTANEOUS at 19:11

## 2025-08-19 RX ADMIN — SODIUM CHLORIDE, SODIUM LACTATE, POTASSIUM CHLORIDE, AND CALCIUM CHLORIDE 1000 ML: .6; .31; .03; .02 INJECTION, SOLUTION INTRAVENOUS at 01:24

## 2025-08-19 RX ADMIN — ESOMEPRAZOLE MAGNESIUM 40 MG: 40 FOR SUSPENSION ORAL at 07:06

## 2025-08-19 RX ADMIN — SODIUM CHLORIDE, SODIUM LACTATE, POTASSIUM CHLORIDE, AND CALCIUM CHLORIDE 500 ML: .6; .31; .03; .02 INJECTION, SOLUTION INTRAVENOUS at 00:21

## 2025-08-19 RX ADMIN — INSULIN LISPRO 4 UNITS: 100 INJECTION, SOLUTION INTRAVENOUS; SUBCUTANEOUS at 11:52

## 2025-08-19 RX ADMIN — POLYETHYLENE GLYCOL 3350 17 G: 17 POWDER, FOR SOLUTION ORAL at 08:20

## 2025-08-19 RX ADMIN — ACETAMINOPHEN 650 MG: 650 SOLUTION ORAL at 07:06

## 2025-08-19 RX ADMIN — DEXMEDETOMIDINE HYDROCHLORIDE 0.4 MCG/KG/HR: 400 INJECTION INTRAVENOUS at 07:12

## 2025-08-19 ASSESSMENT — PAIN SCALES - GENERAL
PAINLEVEL_OUTOF10: 10 - WORST POSSIBLE PAIN
PAINLEVEL_OUTOF10: 0 - NO PAIN
PAINLEVEL_OUTOF10: 10 - WORST POSSIBLE PAIN
PAINLEVEL_OUTOF10: 6
PAINLEVEL_OUTOF10: 0 - NO PAIN
PAINLEVEL_OUTOF10: 2

## 2025-08-19 ASSESSMENT — COGNITIVE AND FUNCTIONAL STATUS - GENERAL
HELP NEEDED FOR BATHING: TOTAL
TURNING FROM BACK TO SIDE WHILE IN FLAT BAD: TOTAL
CLIMB 3 TO 5 STEPS WITH RAILING: TOTAL
TOILETING: TOTAL
DRESSING REGULAR LOWER BODY CLOTHING: TOTAL
DRESSING REGULAR UPPER BODY CLOTHING: TOTAL
DAILY ACTIVITIY SCORE: 6
STANDING UP FROM CHAIR USING ARMS: TOTAL
MOBILITY SCORE: 6
EATING MEALS: TOTAL
MOVING TO AND FROM BED TO CHAIR: TOTAL
WALKING IN HOSPITAL ROOM: TOTAL
PERSONAL GROOMING: TOTAL
MOVING FROM LYING ON BACK TO SITTING ON SIDE OF FLAT BED WITH BEDRAILS: TOTAL

## 2025-08-19 ASSESSMENT — PAIN - FUNCTIONAL ASSESSMENT
PAIN_FUNCTIONAL_ASSESSMENT: 0-10
PAIN_FUNCTIONAL_ASSESSMENT: CPOT (CRITICAL CARE PAIN OBSERVATION TOOL)
PAIN_FUNCTIONAL_ASSESSMENT: 0-10
PAIN_FUNCTIONAL_ASSESSMENT: 0-10
PAIN_FUNCTIONAL_ASSESSMENT: CPOT (CRITICAL CARE PAIN OBSERVATION TOOL)
PAIN_FUNCTIONAL_ASSESSMENT: 0-10

## 2025-08-19 ASSESSMENT — PAIN DESCRIPTION - DESCRIPTORS
DESCRIPTORS: ACHING
DESCRIPTORS: DISCOMFORT;ACHING
DESCRIPTORS: ACHING

## 2025-08-19 ASSESSMENT — PAIN DESCRIPTION - LOCATION
LOCATION: GENERALIZED
LOCATION: GENERALIZED
LOCATION: OTHER (COMMENT)
LOCATION: GENERALIZED
LOCATION: GENERALIZED

## 2025-08-19 ASSESSMENT — PAIN DESCRIPTION - ORIENTATION: ORIENTATION: OTHER (COMMENT)

## 2025-08-20 ENCOUNTER — APPOINTMENT (OUTPATIENT)
Dept: RADIOLOGY | Facility: HOSPITAL | Age: 55
DRG: 871 | End: 2025-08-20
Payer: MEDICARE

## 2025-08-20 LAB
ALBUMIN SERPL BCP-MCNC: 3.1 G/DL (ref 3.4–5)
ANION GAP BLDA CALCULATED.4IONS-SCNC: 14 MMO/L (ref 10–25)
ANION GAP SERPL CALC-SCNC: 11 MMOL/L (ref 10–20)
BACTERIA SPEC RESP CULT: NORMAL
BASE EXCESS BLDA CALC-SCNC: 0.1 MMOL/L (ref -2–3)
BODY TEMPERATURE: 37 DEGREES CELSIUS
BUN SERPL-MCNC: 40 MG/DL (ref 6–23)
CA-I BLDA-SCNC: 1.09 MMOL/L (ref 1.1–1.33)
CALCIUM SERPL-MCNC: 8 MG/DL (ref 8.6–10.3)
CHLORIDE BLDA-SCNC: 96 MMOL/L (ref 98–107)
CHLORIDE SERPL-SCNC: 99 MMOL/L (ref 98–107)
CO2 SERPL-SCNC: 32 MMOL/L (ref 21–32)
CREAT SERPL-MCNC: 1.63 MG/DL (ref 0.5–1.3)
EGFRCR SERPLBLD CKD-EPI 2021: 50 ML/MIN/1.73M*2
ERYTHROCYTE [DISTWIDTH] IN BLOOD BY AUTOMATED COUNT: 17.8 % (ref 11.5–14.5)
GLUCOSE BLD MANUAL STRIP-MCNC: 104 MG/DL (ref 74–99)
GLUCOSE BLD MANUAL STRIP-MCNC: 109 MG/DL (ref 74–99)
GLUCOSE BLD MANUAL STRIP-MCNC: 150 MG/DL (ref 74–99)
GLUCOSE BLD MANUAL STRIP-MCNC: 61 MG/DL (ref 74–99)
GLUCOSE BLD MANUAL STRIP-MCNC: 76 MG/DL (ref 74–99)
GLUCOSE BLD MANUAL STRIP-MCNC: 79 MG/DL (ref 74–99)
GLUCOSE BLD MANUAL STRIP-MCNC: 93 MG/DL (ref 74–99)
GLUCOSE BLD MANUAL STRIP-MCNC: 95 MG/DL (ref 74–99)
GLUCOSE BLDA-MCNC: 156 MG/DL (ref 74–99)
GLUCOSE SERPL-MCNC: 73 MG/DL (ref 74–99)
GRAM STN SPEC: NORMAL
GRAM STN SPEC: NORMAL
HCO3 BLDA-SCNC: 27.8 MMOL/L (ref 22–26)
HCT VFR BLD AUTO: 27.9 % (ref 41–52)
HCT VFR BLD EST: 28 % (ref 41–52)
HGB BLD-MCNC: 8 G/DL (ref 13.5–17.5)
HGB BLDA-MCNC: 9.2 G/DL (ref 13.5–17.5)
HOLD SPECIMEN: NORMAL
INHALED O2 CONCENTRATION: 100 %
LACTATE BLDA-SCNC: 0.8 MMOL/L (ref 0.4–2)
MCH RBC QN AUTO: 24.2 PG (ref 26–34)
MCHC RBC AUTO-ENTMCNC: 28.7 G/DL (ref 32–36)
MCV RBC AUTO: 84 FL (ref 80–100)
NRBC BLD-RTO: 0.3 /100 WBCS (ref 0–0)
OXYHGB MFR BLDA: 96.3 % (ref 94–98)
PCO2 BLDA: 62 MM HG (ref 38–42)
PH BLDA: 7.26 PH (ref 7.38–7.42)
PHOSPHATE SERPL-MCNC: 4.2 MG/DL (ref 2.5–4.9)
PLATELET # BLD AUTO: 292 X10*3/UL (ref 150–450)
PO2 BLDA: 103 MM HG (ref 85–95)
POTASSIUM BLDA-SCNC: 4.8 MMOL/L (ref 3.5–5.3)
POTASSIUM SERPL-SCNC: 4.2 MMOL/L (ref 3.5–5.3)
RBC # BLD AUTO: 3.31 X10*6/UL (ref 4.5–5.9)
SAO2 % BLDA: 98 % (ref 94–100)
SODIUM BLDA-SCNC: 133 MMOL/L (ref 136–145)
SODIUM SERPL-SCNC: 138 MMOL/L (ref 136–145)
WBC # BLD AUTO: 13.8 X10*3/UL (ref 4.4–11.3)

## 2025-08-20 PROCEDURE — 84132 ASSAY OF SERUM POTASSIUM: CPT | Performed by: NURSE PRACTITIONER

## 2025-08-20 PROCEDURE — 82947 ASSAY GLUCOSE BLOOD QUANT: CPT

## 2025-08-20 PROCEDURE — 2500000005 HC RX 250 GENERAL PHARMACY W/O HCPCS: Performed by: NURSE PRACTITIONER

## 2025-08-20 PROCEDURE — 2020000001 HC ICU ROOM DAILY

## 2025-08-20 PROCEDURE — 94660 CPAP INITIATION&MGMT: CPT

## 2025-08-20 PROCEDURE — 2500000002 HC RX 250 W HCPCS SELF ADMINISTERED DRUGS (ALT 637 FOR MEDICARE OP, ALT 636 FOR OP/ED): Performed by: INTERNAL MEDICINE

## 2025-08-20 PROCEDURE — 85027 COMPLETE CBC AUTOMATED: CPT | Performed by: NURSE PRACTITIONER

## 2025-08-20 PROCEDURE — 2500000004 HC RX 250 GENERAL PHARMACY W/ HCPCS (ALT 636 FOR OP/ED): Performed by: NURSE PRACTITIONER

## 2025-08-20 PROCEDURE — 2500000004 HC RX 250 GENERAL PHARMACY W/ HCPCS (ALT 636 FOR OP/ED): Performed by: PHARMACIST

## 2025-08-20 PROCEDURE — 31720 CLEARANCE OF AIRWAYS: CPT

## 2025-08-20 PROCEDURE — 99291 CRITICAL CARE FIRST HOUR: CPT

## 2025-08-20 PROCEDURE — 2500000002 HC RX 250 W HCPCS SELF ADMINISTERED DRUGS (ALT 637 FOR MEDICARE OP, ALT 636 FOR OP/ED): Performed by: SURGERY

## 2025-08-20 PROCEDURE — 2500000002 HC RX 250 W HCPCS SELF ADMINISTERED DRUGS (ALT 637 FOR MEDICARE OP, ALT 636 FOR OP/ED)

## 2025-08-20 PROCEDURE — 71045 X-RAY EXAM CHEST 1 VIEW: CPT

## 2025-08-20 PROCEDURE — 36415 COLL VENOUS BLD VENIPUNCTURE: CPT | Performed by: NURSE PRACTITIONER

## 2025-08-20 PROCEDURE — 36600 WITHDRAWAL OF ARTERIAL BLOOD: CPT

## 2025-08-20 PROCEDURE — 94640 AIRWAY INHALATION TREATMENT: CPT

## 2025-08-20 PROCEDURE — 2500000001 HC RX 250 WO HCPCS SELF ADMINISTERED DRUGS (ALT 637 FOR MEDICARE OP): Performed by: NURSE PRACTITIONER

## 2025-08-20 PROCEDURE — 80069 RENAL FUNCTION PANEL: CPT | Performed by: NURSE PRACTITIONER

## 2025-08-20 PROCEDURE — 2500000001 HC RX 250 WO HCPCS SELF ADMINISTERED DRUGS (ALT 637 FOR MEDICARE OP): Performed by: INTERNAL MEDICINE

## 2025-08-20 PROCEDURE — 2500000002 HC RX 250 W HCPCS SELF ADMINISTERED DRUGS (ALT 637 FOR MEDICARE OP, ALT 636 FOR OP/ED): Performed by: ANESTHESIOLOGY

## 2025-08-20 PROCEDURE — 2500000004 HC RX 250 GENERAL PHARMACY W/ HCPCS (ALT 636 FOR OP/ED): Performed by: INTERNAL MEDICINE

## 2025-08-20 PROCEDURE — 94668 MNPJ CHEST WALL SBSQ: CPT

## 2025-08-20 PROCEDURE — 2500000004 HC RX 250 GENERAL PHARMACY W/ HCPCS (ALT 636 FOR OP/ED): Mod: JZ

## 2025-08-20 PROCEDURE — 80307 DRUG TEST PRSMV CHEM ANLYZR: CPT | Mod: AHULAB | Performed by: PSYCHIATRY & NEUROLOGY

## 2025-08-20 PROCEDURE — 80346 BENZODIAZEPINES1-12: CPT | Mod: AHULAB | Performed by: PSYCHIATRY & NEUROLOGY

## 2025-08-20 PROCEDURE — 2500000001 HC RX 250 WO HCPCS SELF ADMINISTERED DRUGS (ALT 637 FOR MEDICARE OP)

## 2025-08-20 PROCEDURE — 71045 X-RAY EXAM CHEST 1 VIEW: CPT | Performed by: RADIOLOGY

## 2025-08-20 RX ORDER — QUETIAPINE FUMARATE 100 MG/1
200 TABLET, FILM COATED ORAL 2 TIMES DAILY
Status: DISCONTINUED | OUTPATIENT
Start: 2025-08-20 | End: 2025-08-21

## 2025-08-20 RX ORDER — HYDROXYZINE HYDROCHLORIDE 25 MG/1
25 TABLET, FILM COATED ORAL EVERY 6 HOURS PRN
Status: DISCONTINUED | OUTPATIENT
Start: 2025-08-20 | End: 2025-08-26 | Stop reason: HOSPADM

## 2025-08-20 RX ORDER — OXYCODONE HYDROCHLORIDE 5 MG/1
5 TABLET ORAL EVERY 6 HOURS PRN
Refills: 0 | Status: DISCONTINUED | OUTPATIENT
Start: 2025-08-20 | End: 2025-08-26 | Stop reason: HOSPADM

## 2025-08-20 RX ORDER — INSULIN LISPRO 100 [IU]/ML
0-10 INJECTION, SOLUTION INTRAVENOUS; SUBCUTANEOUS
Status: DISCONTINUED | OUTPATIENT
Start: 2025-08-20 | End: 2025-08-26 | Stop reason: HOSPADM

## 2025-08-20 RX ORDER — OXYCODONE HYDROCHLORIDE 5 MG/1
10 TABLET ORAL EVERY 6 HOURS PRN
Refills: 0 | Status: DISCONTINUED | OUTPATIENT
Start: 2025-08-20 | End: 2025-08-26 | Stop reason: HOSPADM

## 2025-08-20 RX ORDER — FUROSEMIDE 10 MG/ML
40 INJECTION INTRAMUSCULAR; INTRAVENOUS ONCE
Status: COMPLETED | OUTPATIENT
Start: 2025-08-20 | End: 2025-08-20

## 2025-08-20 RX ORDER — AMOXICILLIN 250 MG
1 CAPSULE ORAL NIGHTLY
Status: DISCONTINUED | OUTPATIENT
Start: 2025-08-20 | End: 2025-08-22

## 2025-08-20 RX ORDER — TORSEMIDE 20 MG/1
60 TABLET ORAL 2 TIMES DAILY
Status: DISCONTINUED | OUTPATIENT
Start: 2025-08-20 | End: 2025-08-26 | Stop reason: HOSPADM

## 2025-08-20 RX ORDER — BISACODYL 10 MG/1
10 SUPPOSITORY RECTAL DAILY PRN
Status: DISCONTINUED | OUTPATIENT
Start: 2025-08-20 | End: 2025-08-26 | Stop reason: HOSPADM

## 2025-08-20 RX ORDER — INSULIN GLARGINE 100 [IU]/ML
60 INJECTION, SOLUTION SUBCUTANEOUS EVERY 24 HOURS
Status: DISCONTINUED | OUTPATIENT
Start: 2025-08-20 | End: 2025-08-21

## 2025-08-20 RX ADMIN — HYDROMORPHONE HYDROCHLORIDE 0.4 MG: 1 INJECTION, SOLUTION INTRAMUSCULAR; INTRAVENOUS; SUBCUTANEOUS at 07:58

## 2025-08-20 RX ADMIN — TORSEMIDE 60 MG: 20 TABLET ORAL at 08:45

## 2025-08-20 RX ADMIN — DEXMEDETOMIDINE HYDROCHLORIDE 1.5 MCG/KG/HR: 400 INJECTION INTRAVENOUS at 13:55

## 2025-08-20 RX ADMIN — VANCOMYCIN HYDROCHLORIDE 1250 MG: 1.25 INJECTION, POWDER, LYOPHILIZED, FOR SOLUTION INTRAVENOUS at 09:30

## 2025-08-20 RX ADMIN — HYDROMORPHONE HYDROCHLORIDE 0.4 MG: 1 INJECTION, SOLUTION INTRAMUSCULAR; INTRAVENOUS; SUBCUTANEOUS at 00:32

## 2025-08-20 RX ADMIN — INSULIN GLARGINE 60 UNITS: 100 INJECTION, SOLUTION SUBCUTANEOUS at 20:02

## 2025-08-20 RX ADMIN — IPRATROPIUM BROMIDE AND ALBUTEROL SULFATE 3 ML: 2.5; .5 SOLUTION RESPIRATORY (INHALATION) at 07:10

## 2025-08-20 RX ADMIN — PANTOPRAZOLE SODIUM 40 MG: 40 INJECTION, POWDER, FOR SOLUTION INTRAVENOUS at 06:01

## 2025-08-20 RX ADMIN — PIPERACILLIN SODIUM AND TAZOBACTAM SODIUM 3.38 G: 3; .375 INJECTION, SOLUTION INTRAVENOUS at 23:48

## 2025-08-20 RX ADMIN — PIPERACILLIN SODIUM AND TAZOBACTAM SODIUM 3.38 G: 3; .375 INJECTION, SOLUTION INTRAVENOUS at 17:49

## 2025-08-20 RX ADMIN — COLCHICINE 0.6 MG: 0.6 TABLET, FILM COATED ORAL at 08:45

## 2025-08-20 RX ADMIN — PIPERACILLIN SODIUM AND TAZOBACTAM SODIUM 3.38 G: 3; .375 INJECTION, SOLUTION INTRAVENOUS at 11:01

## 2025-08-20 RX ADMIN — IPRATROPIUM BROMIDE AND ALBUTEROL SULFATE 3 ML: 2.5; .5 SOLUTION RESPIRATORY (INHALATION) at 01:49

## 2025-08-20 RX ADMIN — DEXMEDETOMIDINE HYDROCHLORIDE 1 MCG/KG/HR: 400 INJECTION INTRAVENOUS at 19:39

## 2025-08-20 RX ADMIN — Medication: at 09:50

## 2025-08-20 RX ADMIN — IPRATROPIUM BROMIDE AND ALBUTEROL SULFATE 3 ML: 2.5; .5 SOLUTION RESPIRATORY (INHALATION) at 19:27

## 2025-08-20 RX ADMIN — HYDROMORPHONE HYDROCHLORIDE 0.4 MG: 1 INJECTION, SOLUTION INTRAMUSCULAR; INTRAVENOUS; SUBCUTANEOUS at 04:36

## 2025-08-20 RX ADMIN — HYDROMORPHONE HYDROCHLORIDE 0.4 MG: 1 INJECTION, SOLUTION INTRAMUSCULAR; INTRAVENOUS; SUBCUTANEOUS at 16:09

## 2025-08-20 RX ADMIN — DEXTROSE MONOHYDRATE 12.5 G: 25 INJECTION, SOLUTION INTRAVENOUS at 06:07

## 2025-08-20 RX ADMIN — Medication: at 11:43

## 2025-08-20 RX ADMIN — DEXMEDETOMIDINE HYDROCHLORIDE 1.5 MCG/KG/HR: 400 INJECTION INTRAVENOUS at 10:55

## 2025-08-20 RX ADMIN — DEXMEDETOMIDINE HYDROCHLORIDE 1 MCG/KG/HR: 400 INJECTION INTRAVENOUS at 01:14

## 2025-08-20 RX ADMIN — HYDROMORPHONE HYDROCHLORIDE 0.4 MG: 1 INJECTION, SOLUTION INTRAMUSCULAR; INTRAVENOUS; SUBCUTANEOUS at 02:00

## 2025-08-20 RX ADMIN — ASPIRIN 81 MG: 81 TABLET, DELAYED RELEASE ORAL at 08:45

## 2025-08-20 RX ADMIN — APIXABAN 5 MG: 5 TABLET, FILM COATED ORAL at 08:45

## 2025-08-20 RX ADMIN — QUETIAPINE FUMARATE 200 MG: 100 TABLET ORAL at 10:58

## 2025-08-20 RX ADMIN — SENNOSIDES AND DOCUSATE SODIUM 1 TABLET: 8.6; 5 TABLET ORAL at 17:07

## 2025-08-20 RX ADMIN — DEXMEDETOMIDINE HYDROCHLORIDE 1.25 MCG/KG/HR: 400 INJECTION INTRAVENOUS at 05:04

## 2025-08-20 RX ADMIN — DULOXETINE 60 MG: 30 CAPSULE, DELAYED RELEASE ORAL at 08:45

## 2025-08-20 RX ADMIN — DEXMEDETOMIDINE HYDROCHLORIDE 1 MCG/KG/HR: 400 INJECTION INTRAVENOUS at 07:16

## 2025-08-20 RX ADMIN — Medication: at 03:25

## 2025-08-20 RX ADMIN — QUETIAPINE FUMARATE 100 MG: 100 TABLET ORAL at 08:45

## 2025-08-20 RX ADMIN — OXYCODONE HYDROCHLORIDE 10 MG: 5 TABLET ORAL at 21:41

## 2025-08-20 RX ADMIN — PIPERACILLIN SODIUM AND TAZOBACTAM SODIUM 4.5 G: 4; .5 INJECTION, SOLUTION INTRAVENOUS at 05:56

## 2025-08-20 RX ADMIN — Medication: at 11:00

## 2025-08-20 RX ADMIN — POLYETHYLENE GLYCOL 3350 17 G: 17 POWDER, FOR SOLUTION ORAL at 09:45

## 2025-08-20 RX ADMIN — DEXMEDETOMIDINE HYDROCHLORIDE 1 MCG/KG/HR: 400 INJECTION INTRAVENOUS at 08:50

## 2025-08-20 RX ADMIN — Medication: at 19:57

## 2025-08-20 RX ADMIN — METOPROLOL TARTRATE 12.5 MG: 25 TABLET, FILM COATED ORAL at 08:45

## 2025-08-20 RX ADMIN — IPRATROPIUM BROMIDE AND ALBUTEROL SULFATE 3 ML: 2.5; .5 SOLUTION RESPIRATORY (INHALATION) at 11:37

## 2025-08-20 RX ADMIN — TORSEMIDE 60 MG: 20 TABLET ORAL at 02:11

## 2025-08-20 RX ADMIN — DEXMEDETOMIDINE HYDROCHLORIDE 1.25 MCG/KG/HR: 400 INJECTION INTRAVENOUS at 03:10

## 2025-08-20 RX ADMIN — QUETIAPINE FUMARATE 200 MG: 100 TABLET ORAL at 20:10

## 2025-08-20 RX ADMIN — DEXMEDETOMIDINE HYDROCHLORIDE 1.5 MCG/KG/HR: 400 INJECTION INTRAVENOUS at 17:49

## 2025-08-20 RX ADMIN — HYDROMORPHONE HYDROCHLORIDE 0.4 MG: 1 INJECTION, SOLUTION INTRAMUSCULAR; INTRAVENOUS; SUBCUTANEOUS at 10:59

## 2025-08-20 RX ADMIN — IPRATROPIUM BROMIDE AND ALBUTEROL SULFATE 3 ML: 2.5; .5 SOLUTION RESPIRATORY (INHALATION) at 15:44

## 2025-08-20 RX ADMIN — TORSEMIDE 60 MG: 20 TABLET ORAL at 20:10

## 2025-08-20 RX ADMIN — DEXMEDETOMIDINE HYDROCHLORIDE 1.5 MCG/KG/HR: 400 INJECTION INTRAVENOUS at 16:30

## 2025-08-20 RX ADMIN — DEXMEDETOMIDINE HYDROCHLORIDE 1.5 MCG/KG/HR: 400 INJECTION INTRAVENOUS at 14:59

## 2025-08-20 RX ADMIN — HYDROMORPHONE HYDROCHLORIDE 0.4 MG: 1 INJECTION, SOLUTION INTRAMUSCULAR; INTRAVENOUS; SUBCUTANEOUS at 20:02

## 2025-08-20 RX ADMIN — Medication: at 15:44

## 2025-08-20 RX ADMIN — VANCOMYCIN HYDROCHLORIDE 1250 MG: 1.25 INJECTION, POWDER, LYOPHILIZED, FOR SOLUTION INTRAVENOUS at 20:05

## 2025-08-20 RX ADMIN — HYDROXYZINE HYDROCHLORIDE 25 MG: 25 TABLET, FILM COATED ORAL at 10:59

## 2025-08-20 RX ADMIN — FUROSEMIDE 40 MG: 10 INJECTION, SOLUTION INTRAMUSCULAR; INTRAVENOUS at 21:47

## 2025-08-20 ASSESSMENT — PAIN DESCRIPTION - LOCATION
LOCATION: GENERALIZED

## 2025-08-20 ASSESSMENT — PAIN SCALES - GENERAL
PAINLEVEL_OUTOF10: 10 - WORST POSSIBLE PAIN
PAINLEVEL_OUTOF10: 0 - NO PAIN
PAINLEVEL_OUTOF10: 6
PAINLEVEL_OUTOF10: 0 - NO PAIN
PAINLEVEL_OUTOF10: 10 - WORST POSSIBLE PAIN
PAINLEVEL_OUTOF10: 0 - NO PAIN
PAINLEVEL_OUTOF10: 10 - WORST POSSIBLE PAIN
PAINLEVEL_OUTOF10: 10 - WORST POSSIBLE PAIN
PAINLEVEL_OUTOF10: 2
PAINLEVEL_OUTOF10: 0 - NO PAIN
PAINLEVEL_OUTOF10: 0 - NO PAIN
PAINLEVEL_OUTOF10: 10 - WORST POSSIBLE PAIN

## 2025-08-20 ASSESSMENT — PAIN DESCRIPTION - DESCRIPTORS
DESCRIPTORS: ACHING

## 2025-08-20 ASSESSMENT — PAIN - FUNCTIONAL ASSESSMENT
PAIN_FUNCTIONAL_ASSESSMENT: 0-10

## 2025-08-21 LAB
ALBUMIN SERPL BCP-MCNC: 3.1 G/DL (ref 3.4–5)
AMPHETAMINES UR QL SCN: NORMAL
ANION GAP SERPL CALC-SCNC: 21 MMOL/L (ref 10–20)
BARBITURATES UR QL SCN: NORMAL
BUN SERPL-MCNC: 43 MG/DL (ref 6–23)
BZE UR QL SCN: NORMAL
CALCIUM SERPL-MCNC: 7.7 MG/DL (ref 8.6–10.3)
CANNABINOIDS UR QL SCN: NORMAL
CHLORIDE SERPL-SCNC: 99 MMOL/L (ref 98–107)
CO2 SERPL-SCNC: 21 MMOL/L (ref 21–32)
CREAT SERPL-MCNC: 1.62 MG/DL (ref 0.5–1.3)
CREAT UR-MCNC: 81.5 MG/DL (ref 20–370)
EGFRCR SERPLBLD CKD-EPI 2021: 50 ML/MIN/1.73M*2
ERYTHROCYTE [DISTWIDTH] IN BLOOD BY AUTOMATED COUNT: 17.8 % (ref 11.5–14.5)
GLUCOSE BLD MANUAL STRIP-MCNC: 116 MG/DL (ref 74–99)
GLUCOSE BLD MANUAL STRIP-MCNC: 156 MG/DL (ref 74–99)
GLUCOSE BLD MANUAL STRIP-MCNC: 182 MG/DL (ref 74–99)
GLUCOSE BLD MANUAL STRIP-MCNC: 183 MG/DL (ref 74–99)
GLUCOSE SERPL-MCNC: 149 MG/DL (ref 74–99)
HCT VFR BLD AUTO: 27.1 % (ref 41–52)
HGB BLD-MCNC: 7.5 G/DL (ref 13.5–17.5)
MCH RBC QN AUTO: 23.8 PG (ref 26–34)
MCHC RBC AUTO-ENTMCNC: 27.7 G/DL (ref 32–36)
MCV RBC AUTO: 86 FL (ref 80–100)
NRBC BLD-RTO: 0.4 /100 WBCS (ref 0–0)
PCP UR QL SCN: NORMAL
PHOSPHATE SERPL-MCNC: 4.3 MG/DL (ref 2.5–4.9)
PLATELET # BLD AUTO: 255 X10*3/UL (ref 150–450)
POTASSIUM SERPL-SCNC: 4.7 MMOL/L (ref 3.5–5.3)
RBC # BLD AUTO: 3.15 X10*6/UL (ref 4.5–5.9)
SODIUM SERPL-SCNC: 136 MMOL/L (ref 136–145)
VANCOMYCIN SERPL-MCNC: 23.7 UG/ML (ref 5–20)
VANCOMYCIN SERPL-MCNC: 25.8 UG/ML (ref 5–20)
WBC # BLD AUTO: 10.7 X10*3/UL (ref 4.4–11.3)

## 2025-08-21 PROCEDURE — 2500000002 HC RX 250 W HCPCS SELF ADMINISTERED DRUGS (ALT 637 FOR MEDICARE OP, ALT 636 FOR OP/ED)

## 2025-08-21 PROCEDURE — 94640 AIRWAY INHALATION TREATMENT: CPT

## 2025-08-21 PROCEDURE — 2500000004 HC RX 250 GENERAL PHARMACY W/ HCPCS (ALT 636 FOR OP/ED): Performed by: NURSE PRACTITIONER

## 2025-08-21 PROCEDURE — 80202 ASSAY OF VANCOMYCIN: CPT | Performed by: PHARMACIST

## 2025-08-21 PROCEDURE — 2500000002 HC RX 250 W HCPCS SELF ADMINISTERED DRUGS (ALT 637 FOR MEDICARE OP, ALT 636 FOR OP/ED): Performed by: ANESTHESIOLOGY

## 2025-08-21 PROCEDURE — 2500000004 HC RX 250 GENERAL PHARMACY W/ HCPCS (ALT 636 FOR OP/ED): Performed by: INTERNAL MEDICINE

## 2025-08-21 PROCEDURE — 2500000001 HC RX 250 WO HCPCS SELF ADMINISTERED DRUGS (ALT 637 FOR MEDICARE OP)

## 2025-08-21 PROCEDURE — 2500000001 HC RX 250 WO HCPCS SELF ADMINISTERED DRUGS (ALT 637 FOR MEDICARE OP): Performed by: NURSE PRACTITIONER

## 2025-08-21 PROCEDURE — 2500000004 HC RX 250 GENERAL PHARMACY W/ HCPCS (ALT 636 FOR OP/ED)

## 2025-08-21 PROCEDURE — 84100 ASSAY OF PHOSPHORUS: CPT | Performed by: NURSE PRACTITIONER

## 2025-08-21 PROCEDURE — 85027 COMPLETE CBC AUTOMATED: CPT | Performed by: NURSE PRACTITIONER

## 2025-08-21 PROCEDURE — 2500000005 HC RX 250 GENERAL PHARMACY W/O HCPCS: Performed by: INTERNAL MEDICINE

## 2025-08-21 PROCEDURE — 2500000001 HC RX 250 WO HCPCS SELF ADMINISTERED DRUGS (ALT 637 FOR MEDICARE OP): Performed by: INTERNAL MEDICINE

## 2025-08-21 PROCEDURE — 99291 CRITICAL CARE FIRST HOUR: CPT

## 2025-08-21 PROCEDURE — 94668 MNPJ CHEST WALL SBSQ: CPT

## 2025-08-21 PROCEDURE — 2020000001 HC ICU ROOM DAILY

## 2025-08-21 PROCEDURE — 36415 COLL VENOUS BLD VENIPUNCTURE: CPT | Performed by: NURSE PRACTITIONER

## 2025-08-21 PROCEDURE — 36415 COLL VENOUS BLD VENIPUNCTURE: CPT | Performed by: PHARMACIST

## 2025-08-21 PROCEDURE — 82947 ASSAY GLUCOSE BLOOD QUANT: CPT

## 2025-08-21 PROCEDURE — 94660 CPAP INITIATION&MGMT: CPT

## 2025-08-21 RX ORDER — QUETIAPINE FUMARATE 100 MG/1
200 TABLET, FILM COATED ORAL 2 TIMES DAILY
Status: DISCONTINUED | OUTPATIENT
Start: 2025-08-21 | End: 2025-08-23

## 2025-08-21 RX ORDER — LIDOCAINE HYDROCHLORIDE 20 MG/ML
1 JELLY TOPICAL ONCE
Status: COMPLETED | OUTPATIENT
Start: 2025-08-21 | End: 2025-08-21

## 2025-08-21 RX ORDER — METOPROLOL TARTRATE 25 MG/1
12.5 TABLET, FILM COATED ORAL 2 TIMES DAILY
Status: DISCONTINUED | OUTPATIENT
Start: 2025-08-21 | End: 2025-08-26 | Stop reason: HOSPADM

## 2025-08-21 RX ORDER — INSULIN GLARGINE 100 [IU]/ML
30 INJECTION, SOLUTION SUBCUTANEOUS EVERY 24 HOURS
Status: DISCONTINUED | OUTPATIENT
Start: 2025-08-21 | End: 2025-08-25

## 2025-08-21 RX ADMIN — QUETIAPINE FUMARATE 200 MG: 100 TABLET ORAL at 22:20

## 2025-08-21 RX ADMIN — POLYETHYLENE GLYCOL 3350 17 G: 17 POWDER, FOR SOLUTION ORAL at 22:18

## 2025-08-21 RX ADMIN — HYDROXYZINE HYDROCHLORIDE 25 MG: 25 TABLET, FILM COATED ORAL at 22:17

## 2025-08-21 RX ADMIN — ATORVASTATIN CALCIUM 80 MG: 80 TABLET, FILM COATED ORAL at 22:17

## 2025-08-21 RX ADMIN — HYDROMORPHONE HYDROCHLORIDE 0.4 MG: 1 INJECTION, SOLUTION INTRAMUSCULAR; INTRAVENOUS; SUBCUTANEOUS at 05:35

## 2025-08-21 RX ADMIN — IPRATROPIUM BROMIDE AND ALBUTEROL SULFATE 3 ML: 2.5; .5 SOLUTION RESPIRATORY (INHALATION) at 07:15

## 2025-08-21 RX ADMIN — INSULIN GLARGINE 30 UNITS: 100 INJECTION, SOLUTION SUBCUTANEOUS at 23:45

## 2025-08-21 RX ADMIN — IPRATROPIUM BROMIDE AND ALBUTEROL SULFATE 3 ML: 2.5; .5 SOLUTION RESPIRATORY (INHALATION) at 11:15

## 2025-08-21 RX ADMIN — ASPIRIN 81 MG: 81 TABLET, DELAYED RELEASE ORAL at 08:29

## 2025-08-21 RX ADMIN — PANTOPRAZOLE SODIUM 40 MG: 40 INJECTION, POWDER, FOR SOLUTION INTRAVENOUS at 06:00

## 2025-08-21 RX ADMIN — COLCHICINE 0.6 MG: 0.6 TABLET, FILM COATED ORAL at 08:30

## 2025-08-21 RX ADMIN — TORSEMIDE 60 MG: 20 TABLET ORAL at 22:20

## 2025-08-21 RX ADMIN — METOPROLOL TARTRATE 12.5 MG: 25 TABLET, FILM COATED ORAL at 08:29

## 2025-08-21 RX ADMIN — IPRATROPIUM BROMIDE AND ALBUTEROL SULFATE 3 ML: 2.5; .5 SOLUTION RESPIRATORY (INHALATION) at 01:39

## 2025-08-21 RX ADMIN — TORSEMIDE 60 MG: 20 TABLET ORAL at 08:30

## 2025-08-21 RX ADMIN — METOPROLOL TARTRATE 12.5 MG: 25 TABLET, FILM COATED ORAL at 22:17

## 2025-08-21 RX ADMIN — PIPERACILLIN SODIUM AND TAZOBACTAM SODIUM 3.38 G: 3; .375 INJECTION, SOLUTION INTRAVENOUS at 12:26

## 2025-08-21 RX ADMIN — PIPERACILLIN SODIUM AND TAZOBACTAM SODIUM 3.38 G: 3; .375 INJECTION, SOLUTION INTRAVENOUS at 05:35

## 2025-08-21 RX ADMIN — OXYCODONE HYDROCHLORIDE 10 MG: 5 TABLET ORAL at 17:05

## 2025-08-21 RX ADMIN — HYDROXYZINE HYDROCHLORIDE 25 MG: 25 TABLET, FILM COATED ORAL at 22:18

## 2025-08-21 RX ADMIN — HYDROMORPHONE HYDROCHLORIDE 0.4 MG: 1 INJECTION, SOLUTION INTRAMUSCULAR; INTRAVENOUS; SUBCUTANEOUS at 15:36

## 2025-08-21 RX ADMIN — PIPERACILLIN SODIUM AND TAZOBACTAM SODIUM 3.38 G: 3; .375 INJECTION, SOLUTION INTRAVENOUS at 18:14

## 2025-08-21 RX ADMIN — OXYCODONE HYDROCHLORIDE 10 MG: 5 TABLET ORAL at 07:22

## 2025-08-21 RX ADMIN — OXYCODONE HYDROCHLORIDE 10 MG: 5 TABLET ORAL at 22:47

## 2025-08-21 RX ADMIN — IPRATROPIUM BROMIDE AND ALBUTEROL SULFATE 3 ML: 2.5; .5 SOLUTION RESPIRATORY (INHALATION) at 20:30

## 2025-08-21 RX ADMIN — LIDOCAINE HYDROCHLORIDE 1 APPLICATION: 20 JELLY TOPICAL at 22:16

## 2025-08-21 RX ADMIN — DULOXETINE 60 MG: 30 CAPSULE, DELAYED RELEASE ORAL at 08:29

## 2025-08-21 RX ADMIN — IPRATROPIUM BROMIDE AND ALBUTEROL SULFATE 3 ML: 2.5; .5 SOLUTION RESPIRATORY (INHALATION) at 15:11

## 2025-08-21 RX ADMIN — APIXABAN 5 MG: 5 TABLET, FILM COATED ORAL at 22:17

## 2025-08-21 RX ADMIN — ACETAMINOPHEN 650 MG: 325 TABLET ORAL at 22:15

## 2025-08-21 RX ADMIN — AMLODIPINE BESYLATE 5 MG: 5 TABLET ORAL at 08:29

## 2025-08-21 RX ADMIN — QUETIAPINE FUMARATE 200 MG: 100 TABLET ORAL at 08:29

## 2025-08-21 RX ADMIN — POLYETHYLENE GLYCOL 3350 17 G: 17 POWDER, FOR SOLUTION ORAL at 08:33

## 2025-08-21 RX ADMIN — PIPERACILLIN SODIUM AND TAZOBACTAM SODIUM 3.38 G: 3; .375 INJECTION, SOLUTION INTRAVENOUS at 23:19

## 2025-08-21 RX ADMIN — Medication: at 07:15

## 2025-08-21 RX ADMIN — HYDROMORPHONE HYDROCHLORIDE 0.4 MG: 1 INJECTION, SOLUTION INTRAMUSCULAR; INTRAVENOUS; SUBCUTANEOUS at 02:34

## 2025-08-21 RX ADMIN — HYDROMORPHONE HYDROCHLORIDE 0.4 MG: 1 INJECTION, SOLUTION INTRAMUSCULAR; INTRAVENOUS; SUBCUTANEOUS at 19:54

## 2025-08-21 RX ADMIN — INSULIN LISPRO 2 UNITS: 100 INJECTION, SOLUTION INTRAVENOUS; SUBCUTANEOUS at 17:04

## 2025-08-21 RX ADMIN — APIXABAN 5 MG: 5 TABLET, FILM COATED ORAL at 08:29

## 2025-08-21 RX ADMIN — Medication: at 15:11

## 2025-08-21 RX ADMIN — DEXMEDETOMIDINE HYDROCHLORIDE 0.5 MCG/KG/HR: 400 INJECTION INTRAVENOUS at 01:59

## 2025-08-21 RX ADMIN — SENNOSIDES AND DOCUSATE SODIUM 1 TABLET: 8.6; 5 TABLET ORAL at 22:17

## 2025-08-21 ASSESSMENT — PAIN - FUNCTIONAL ASSESSMENT
PAIN_FUNCTIONAL_ASSESSMENT: 0-10
PAIN_FUNCTIONAL_ASSESSMENT: 0-10
PAIN_FUNCTIONAL_ASSESSMENT: UNABLE TO SELF-REPORT
PAIN_FUNCTIONAL_ASSESSMENT: 0-10

## 2025-08-21 ASSESSMENT — PAIN SCALES - GENERAL
PAINLEVEL_OUTOF10: 8
PAINLEVEL_OUTOF10: 10 - WORST POSSIBLE PAIN
PAINLEVEL_OUTOF10: 10 - WORST POSSIBLE PAIN
PAINLEVEL_OUTOF10: 8
PAINLEVEL_OUTOF10: 8
PAINLEVEL_OUTOF10: 10 - WORST POSSIBLE PAIN

## 2025-08-21 ASSESSMENT — PAIN DESCRIPTION - DESCRIPTORS: DESCRIPTORS: ACHING

## 2025-08-21 ASSESSMENT — PAIN DESCRIPTION - LOCATION
LOCATION: BACK
LOCATION: BACK

## 2025-08-22 LAB
1OH-MIDAZOLAM UR CFM-MCNC: 313 NG/ML
6MAM UR CFM-MCNC: <25 NG/ML
7AMINOCLONAZEPAM UR CFM-MCNC: <25 NG/ML
A-OH ALPRAZ UR CFM-MCNC: <25 NG/ML
ALBUMIN SERPL BCP-MCNC: 3 G/DL (ref 3.4–5)
ALPRAZ UR CFM-MCNC: <25 NG/ML
ANION GAP SERPL CALC-SCNC: 13 MMOL/L (ref 10–20)
ATRIAL RATE: 121 BPM
BACTERIA BLD CULT: NORMAL
BACTERIA BLD CULT: NORMAL
BUN SERPL-MCNC: 39 MG/DL (ref 6–23)
CALCIUM SERPL-MCNC: 7.8 MG/DL (ref 8.6–10.3)
CHLORDIAZEP UR CFM-MCNC: <25 NG/ML
CHLORIDE SERPL-SCNC: 99 MMOL/L (ref 98–107)
CLONAZEPAM UR CFM-MCNC: <25 NG/ML
CO2 SERPL-SCNC: 31 MMOL/L (ref 21–32)
CODEINE UR CFM-MCNC: <50 NG/ML
CREAT SERPL-MCNC: 1.43 MG/DL (ref 0.5–1.3)
DIAZEPAM UR CFM-MCNC: <25 NG/ML
EDDP UR CFM-MCNC: <25 NG/ML
EGFRCR SERPLBLD CKD-EPI 2021: 58 ML/MIN/1.73M*2
ERYTHROCYTE [DISTWIDTH] IN BLOOD BY AUTOMATED COUNT: 17.9 % (ref 11.5–14.5)
FENTANYL UR CFM-MCNC: 3.5 NG/ML
GLUCOSE BLD MANUAL STRIP-MCNC: 108 MG/DL (ref 74–99)
GLUCOSE BLD MANUAL STRIP-MCNC: 126 MG/DL (ref 74–99)
GLUCOSE BLD MANUAL STRIP-MCNC: 206 MG/DL (ref 74–99)
GLUCOSE BLD MANUAL STRIP-MCNC: 241 MG/DL (ref 74–99)
GLUCOSE SERPL-MCNC: 110 MG/DL (ref 74–99)
HCT VFR BLD AUTO: 26.2 % (ref 41–52)
HGB BLD-MCNC: 7.7 G/DL (ref 13.5–17.5)
HYDROCODONE CTO UR CFM-MCNC: <25 NG/ML
HYDROMORPHONE UR CFM-MCNC: 673 NG/ML
LORAZEPAM UR CFM-MCNC: <25 NG/ML
MCH RBC QN AUTO: 23.9 PG (ref 26–34)
MCHC RBC AUTO-ENTMCNC: 29.4 G/DL (ref 32–36)
MCV RBC AUTO: 81 FL (ref 80–100)
METHADONE UR CFM-MCNC: <25 NG/ML
MIDAZOLAM UR CFM-MCNC: 32 NG/ML
MORPHINE UR CFM-MCNC: <50 NG/ML
NORDIAZEPAM UR CFM-MCNC: <25 NG/ML
NORFENTANYL UR CFM-MCNC: 5.8 NG/ML
NORHYDROCODONE UR CFM-MCNC: <25 NG/ML
NOROXYCODONE UR CFM-MCNC: 181 NG/ML
NORTRAMADOL UR-MCNC: <50 NG/ML
NRBC BLD-RTO: 0 /100 WBCS (ref 0–0)
OXAZEPAM UR CFM-MCNC: <25 NG/ML
OXYCODONE UR CFM-MCNC: 217 NG/ML
OXYMORPHONE UR CFM-MCNC: 174 NG/ML
P AXIS: 33 DEGREES
P OFFSET: 193 MS
P ONSET: 139 MS
PHOSPHATE SERPL-MCNC: 3.3 MG/DL (ref 2.5–4.9)
PLATELET # BLD AUTO: 250 X10*3/UL (ref 150–450)
POTASSIUM SERPL-SCNC: 3.7 MMOL/L (ref 3.5–5.3)
PR INTERVAL: 152 MS
Q ONSET: 215 MS
QRS COUNT: 19 BEATS
QRS DURATION: 100 MS
QT INTERVAL: 334 MS
QTC CALCULATION(BAZETT): 474 MS
QTC FREDERICIA: 421 MS
R AXIS: 75 DEGREES
RBC # BLD AUTO: 3.22 X10*6/UL (ref 4.5–5.9)
SODIUM SERPL-SCNC: 139 MMOL/L (ref 136–145)
T AXIS: 101 DEGREES
T OFFSET: 382 MS
TEMAZEPAM UR CFM-MCNC: <25 NG/ML
TRAMADOL UR CFM-MCNC: <50 NG/ML
VANCOMYCIN TROUGH SERPL-MCNC: 15.3 UG/ML (ref 5–20)
VENTRICULAR RATE: 121 BPM
WBC # BLD AUTO: 10.2 X10*3/UL (ref 4.4–11.3)
ZOLPIDEM UR CFM-MCNC: <25 NG/ML
ZOLPIDEM UR-MCNC: <25 NG/ML

## 2025-08-22 PROCEDURE — 2500000004 HC RX 250 GENERAL PHARMACY W/ HCPCS (ALT 636 FOR OP/ED): Performed by: INTERNAL MEDICINE

## 2025-08-22 PROCEDURE — 94660 CPAP INITIATION&MGMT: CPT

## 2025-08-22 PROCEDURE — 2500000002 HC RX 250 W HCPCS SELF ADMINISTERED DRUGS (ALT 637 FOR MEDICARE OP, ALT 636 FOR OP/ED): Performed by: NURSE PRACTITIONER

## 2025-08-22 PROCEDURE — 2500000004 HC RX 250 GENERAL PHARMACY W/ HCPCS (ALT 636 FOR OP/ED): Performed by: NURSE PRACTITIONER

## 2025-08-22 PROCEDURE — 82947 ASSAY GLUCOSE BLOOD QUANT: CPT

## 2025-08-22 PROCEDURE — 2060000001 HC INTERMEDIATE ICU ROOM DAILY

## 2025-08-22 PROCEDURE — 2500000001 HC RX 250 WO HCPCS SELF ADMINISTERED DRUGS (ALT 637 FOR MEDICARE OP)

## 2025-08-22 PROCEDURE — 99232 SBSQ HOSP IP/OBS MODERATE 35: CPT | Performed by: NURSE PRACTITIONER

## 2025-08-22 PROCEDURE — 94640 AIRWAY INHALATION TREATMENT: CPT

## 2025-08-22 PROCEDURE — 80069 RENAL FUNCTION PANEL: CPT | Performed by: INTERNAL MEDICINE

## 2025-08-22 PROCEDURE — 36415 COLL VENOUS BLD VENIPUNCTURE: CPT | Performed by: INTERNAL MEDICINE

## 2025-08-22 PROCEDURE — 2500000002 HC RX 250 W HCPCS SELF ADMINISTERED DRUGS (ALT 637 FOR MEDICARE OP, ALT 636 FOR OP/ED)

## 2025-08-22 PROCEDURE — 80202 ASSAY OF VANCOMYCIN: CPT | Performed by: NURSE PRACTITIONER

## 2025-08-22 PROCEDURE — 2500000001 HC RX 250 WO HCPCS SELF ADMINISTERED DRUGS (ALT 637 FOR MEDICARE OP): Performed by: NURSE PRACTITIONER

## 2025-08-22 PROCEDURE — 2500000004 HC RX 250 GENERAL PHARMACY W/ HCPCS (ALT 636 FOR OP/ED)

## 2025-08-22 PROCEDURE — 94668 MNPJ CHEST WALL SBSQ: CPT

## 2025-08-22 PROCEDURE — 85027 COMPLETE CBC AUTOMATED: CPT | Performed by: INTERNAL MEDICINE

## 2025-08-22 PROCEDURE — 2500000002 HC RX 250 W HCPCS SELF ADMINISTERED DRUGS (ALT 637 FOR MEDICARE OP, ALT 636 FOR OP/ED): Performed by: ANESTHESIOLOGY

## 2025-08-22 PROCEDURE — 2500000005 HC RX 250 GENERAL PHARMACY W/O HCPCS: Performed by: NURSE PRACTITIONER

## 2025-08-22 RX ORDER — POTASSIUM CHLORIDE 14.9 MG/ML
20 INJECTION INTRAVENOUS
Status: COMPLETED | OUTPATIENT
Start: 2025-08-22 | End: 2025-08-22

## 2025-08-22 RX ORDER — AMOXICILLIN 250 MG
1 CAPSULE ORAL NIGHTLY
Status: DISCONTINUED | OUTPATIENT
Start: 2025-08-22 | End: 2025-08-26 | Stop reason: HOSPADM

## 2025-08-22 RX ADMIN — METOPROLOL TARTRATE 12.5 MG: 25 TABLET, FILM COATED ORAL at 09:11

## 2025-08-22 RX ADMIN — HYDROMORPHONE HYDROCHLORIDE 0.4 MG: 1 INJECTION, SOLUTION INTRAMUSCULAR; INTRAVENOUS; SUBCUTANEOUS at 00:20

## 2025-08-22 RX ADMIN — Medication: at 20:15

## 2025-08-22 RX ADMIN — QUETIAPINE FUMARATE 200 MG: 100 TABLET ORAL at 09:11

## 2025-08-22 RX ADMIN — ACETAMINOPHEN 650 MG: 325 TABLET ORAL at 02:28

## 2025-08-22 RX ADMIN — ATORVASTATIN CALCIUM 80 MG: 80 TABLET, FILM COATED ORAL at 20:19

## 2025-08-22 RX ADMIN — Medication: at 15:19

## 2025-08-22 RX ADMIN — PIPERACILLIN SODIUM AND TAZOBACTAM SODIUM 3.38 G: 3; .375 INJECTION, SOLUTION INTRAVENOUS at 05:45

## 2025-08-22 RX ADMIN — OXYCODONE HYDROCHLORIDE 10 MG: 5 TABLET ORAL at 20:19

## 2025-08-22 RX ADMIN — Medication: at 04:16

## 2025-08-22 RX ADMIN — COLCHICINE 0.6 MG: 0.6 TABLET, FILM COATED ORAL at 09:11

## 2025-08-22 RX ADMIN — QUETIAPINE FUMARATE 200 MG: 100 TABLET ORAL at 20:19

## 2025-08-22 RX ADMIN — FERROUS SULFATE TAB 325 MG (65 MG ELEMENTAL FE) 1 TABLET: 325 (65 FE) TAB at 09:16

## 2025-08-22 RX ADMIN — HYDROXYZINE HYDROCHLORIDE 25 MG: 25 TABLET, FILM COATED ORAL at 04:39

## 2025-08-22 RX ADMIN — PANTOPRAZOLE SODIUM 40 MG: 40 INJECTION, POWDER, FOR SOLUTION INTRAVENOUS at 07:00

## 2025-08-22 RX ADMIN — IPRATROPIUM BROMIDE AND ALBUTEROL SULFATE 3 ML: 2.5; .5 SOLUTION RESPIRATORY (INHALATION) at 15:19

## 2025-08-22 RX ADMIN — METOPROLOL TARTRATE 12.5 MG: 25 TABLET, FILM COATED ORAL at 20:19

## 2025-08-22 RX ADMIN — APIXABAN 5 MG: 5 TABLET, FILM COATED ORAL at 09:11

## 2025-08-22 RX ADMIN — Medication: at 00:19

## 2025-08-22 RX ADMIN — Medication: at 23:50

## 2025-08-22 RX ADMIN — DULOXETINE 60 MG: 30 CAPSULE, DELAYED RELEASE ORAL at 09:10

## 2025-08-22 RX ADMIN — AMLODIPINE BESYLATE 5 MG: 5 TABLET ORAL at 09:11

## 2025-08-22 RX ADMIN — HYDROMORPHONE HYDROCHLORIDE 0.4 MG: 1 INJECTION, SOLUTION INTRAMUSCULAR; INTRAVENOUS; SUBCUTANEOUS at 21:52

## 2025-08-22 RX ADMIN — PIPERACILLIN SODIUM AND TAZOBACTAM SODIUM 3.38 G: 3; .375 INJECTION, SOLUTION INTRAVENOUS at 11:24

## 2025-08-22 RX ADMIN — IPRATROPIUM BROMIDE AND ALBUTEROL SULFATE 3 ML: 2.5; .5 SOLUTION RESPIRATORY (INHALATION) at 11:21

## 2025-08-22 RX ADMIN — OXYCODONE HYDROCHLORIDE 5 MG: 5 TABLET ORAL at 11:33

## 2025-08-22 RX ADMIN — OXYCODONE HYDROCHLORIDE 10 MG: 5 TABLET ORAL at 04:39

## 2025-08-22 RX ADMIN — ASPIRIN 81 MG: 81 TABLET, DELAYED RELEASE ORAL at 09:11

## 2025-08-22 RX ADMIN — POTASSIUM CHLORIDE 20 MEQ: 14.9 INJECTION, SOLUTION INTRAVENOUS at 07:45

## 2025-08-22 RX ADMIN — HYDROMORPHONE HYDROCHLORIDE 0.4 MG: 1 INJECTION, SOLUTION INTRAMUSCULAR; INTRAVENOUS; SUBCUTANEOUS at 03:11

## 2025-08-22 RX ADMIN — INSULIN LISPRO 4 UNITS: 100 INJECTION, SOLUTION INTRAVENOUS; SUBCUTANEOUS at 15:27

## 2025-08-22 RX ADMIN — TORSEMIDE 60 MG: 20 TABLET ORAL at 09:16

## 2025-08-22 RX ADMIN — HYDROMORPHONE HYDROCHLORIDE 0.4 MG: 1 INJECTION, SOLUTION INTRAMUSCULAR; INTRAVENOUS; SUBCUTANEOUS at 15:27

## 2025-08-22 RX ADMIN — INSULIN LISPRO 4 UNITS: 100 INJECTION, SOLUTION INTRAVENOUS; SUBCUTANEOUS at 11:24

## 2025-08-22 RX ADMIN — TORSEMIDE 60 MG: 20 TABLET ORAL at 20:19

## 2025-08-22 RX ADMIN — HYDROMORPHONE HYDROCHLORIDE 0.4 MG: 1 INJECTION, SOLUTION INTRAMUSCULAR; INTRAVENOUS; SUBCUTANEOUS at 05:45

## 2025-08-22 RX ADMIN — IPRATROPIUM BROMIDE AND ALBUTEROL SULFATE 3 ML: 2.5; .5 SOLUTION RESPIRATORY (INHALATION) at 20:15

## 2025-08-22 RX ADMIN — PIPERACILLIN SODIUM AND TAZOBACTAM SODIUM 3.38 G: 3; .375 INJECTION, SOLUTION INTRAVENOUS at 17:54

## 2025-08-22 RX ADMIN — POTASSIUM CHLORIDE 20 MEQ: 14.9 INJECTION, SOLUTION INTRAVENOUS at 05:41

## 2025-08-22 RX ADMIN — APIXABAN 5 MG: 5 TABLET, FILM COATED ORAL at 20:19

## 2025-08-22 ASSESSMENT — PAIN SCALES - GENERAL
PAINLEVEL_OUTOF10: 10 - WORST POSSIBLE PAIN
PAINLEVEL_OUTOF10: 10 - WORST POSSIBLE PAIN
PAINLEVEL_OUTOF10: 8
PAINLEVEL_OUTOF10: 5 - MODERATE PAIN
PAINLEVEL_OUTOF10: 9
PAINLEVEL_OUTOF10: 9
PAINLEVEL_OUTOF10: 10 - WORST POSSIBLE PAIN
PAINLEVEL_OUTOF10: 5 - MODERATE PAIN
PAINLEVEL_OUTOF10: 5 - MODERATE PAIN
PAINLEVEL_OUTOF10: 7
PAINLEVEL_OUTOF10: 6
PAINLEVEL_OUTOF10: 7
PAINLEVEL_OUTOF10: 8
PAINLEVEL_OUTOF10: 9
PAINLEVEL_OUTOF10: 5 - MODERATE PAIN
PAINLEVEL_OUTOF10: 0 - NO PAIN
PAINLEVEL_OUTOF10: 8
PAINLEVEL_OUTOF10: 8
PAINLEVEL_OUTOF10: 7

## 2025-08-22 ASSESSMENT — COGNITIVE AND FUNCTIONAL STATUS - GENERAL
DRESSING REGULAR LOWER BODY CLOTHING: A LITTLE
DRESSING REGULAR LOWER BODY CLOTHING: A LITTLE
MOBILITY SCORE: 12
HELP NEEDED FOR BATHING: A LOT
DAILY ACTIVITIY SCORE: 18
TOILETING: A LITTLE
TURNING FROM BACK TO SIDE WHILE IN FLAT BAD: A LOT
PERSONAL GROOMING: A LITTLE
MOVING FROM LYING ON BACK TO SITTING ON SIDE OF FLAT BED WITH BEDRAILS: A LOT
STANDING UP FROM CHAIR USING ARMS: A LOT
TOILETING: A LITTLE
CLIMB 3 TO 5 STEPS WITH RAILING: A LOT
WALKING IN HOSPITAL ROOM: A LOT
DRESSING REGULAR UPPER BODY CLOTHING: A LITTLE
DRESSING REGULAR UPPER BODY CLOTHING: A LITTLE
MOVING FROM LYING ON BACK TO SITTING ON SIDE OF FLAT BED WITH BEDRAILS: A LOT
WALKING IN HOSPITAL ROOM: A LOT
DAILY ACTIVITIY SCORE: 19
CLIMB 3 TO 5 STEPS WITH RAILING: A LOT
PERSONAL GROOMING: A LITTLE
HELP NEEDED FOR BATHING: A LITTLE
STANDING UP FROM CHAIR USING ARMS: A LOT
TURNING FROM BACK TO SIDE WHILE IN FLAT BAD: A LOT
MOVING TO AND FROM BED TO CHAIR: A LOT
MOVING TO AND FROM BED TO CHAIR: A LOT
MOBILITY SCORE: 12

## 2025-08-22 ASSESSMENT — PAIN DESCRIPTION - LOCATION
LOCATION: BACK

## 2025-08-22 ASSESSMENT — PAIN - FUNCTIONAL ASSESSMENT

## 2025-08-22 ASSESSMENT — PAIN DESCRIPTION - DESCRIPTORS
DESCRIPTORS: DISCOMFORT;ACHING
DESCRIPTORS: ACHING;DISCOMFORT
DESCRIPTORS: DISCOMFORT;ACHING
DESCRIPTORS: ACHING;DISCOMFORT
DESCRIPTORS: ACHING;DISCOMFORT

## 2025-08-23 LAB
ALBUMIN SERPL BCP-MCNC: 3 G/DL (ref 3.4–5)
ANION GAP SERPL CALC-SCNC: 10 MMOL/L (ref 10–20)
BUN SERPL-MCNC: 36 MG/DL (ref 6–23)
CALCIUM SERPL-MCNC: 8.3 MG/DL (ref 8.6–10.3)
CHLORIDE SERPL-SCNC: 100 MMOL/L (ref 98–107)
CO2 SERPL-SCNC: 33 MMOL/L (ref 21–32)
CREAT SERPL-MCNC: 1.33 MG/DL (ref 0.5–1.3)
EGFRCR SERPLBLD CKD-EPI 2021: 64 ML/MIN/1.73M*2
ERYTHROCYTE [DISTWIDTH] IN BLOOD BY AUTOMATED COUNT: 17.6 % (ref 11.5–14.5)
GLUCOSE BLD MANUAL STRIP-MCNC: 204 MG/DL (ref 74–99)
GLUCOSE BLD MANUAL STRIP-MCNC: 226 MG/DL (ref 74–99)
GLUCOSE BLD MANUAL STRIP-MCNC: 286 MG/DL (ref 74–99)
GLUCOSE BLD MANUAL STRIP-MCNC: 298 MG/DL (ref 74–99)
GLUCOSE SERPL-MCNC: 234 MG/DL (ref 74–99)
HCT VFR BLD AUTO: 27.9 % (ref 41–52)
HGB BLD-MCNC: 7.8 G/DL (ref 13.5–17.5)
MCH RBC QN AUTO: 23.7 PG (ref 26–34)
MCHC RBC AUTO-ENTMCNC: 28 G/DL (ref 32–36)
MCV RBC AUTO: 85 FL (ref 80–100)
NRBC BLD-RTO: 0 /100 WBCS (ref 0–0)
PHOSPHATE SERPL-MCNC: 3.4 MG/DL (ref 2.5–4.9)
PLATELET # BLD AUTO: 231 X10*3/UL (ref 150–450)
POTASSIUM SERPL-SCNC: 4 MMOL/L (ref 3.5–5.3)
RBC # BLD AUTO: 3.29 X10*6/UL (ref 4.5–5.9)
SODIUM SERPL-SCNC: 139 MMOL/L (ref 136–145)
VANCOMYCIN SERPL-MCNC: 9.1 UG/ML (ref 5–20)
WBC # BLD AUTO: 8.1 X10*3/UL (ref 4.4–11.3)

## 2025-08-23 PROCEDURE — 99233 SBSQ HOSP IP/OBS HIGH 50: CPT | Performed by: STUDENT IN AN ORGANIZED HEALTH CARE EDUCATION/TRAINING PROGRAM

## 2025-08-23 PROCEDURE — 94640 AIRWAY INHALATION TREATMENT: CPT

## 2025-08-23 PROCEDURE — 80202 ASSAY OF VANCOMYCIN: CPT | Performed by: NURSE PRACTITIONER

## 2025-08-23 PROCEDURE — 94660 CPAP INITIATION&MGMT: CPT

## 2025-08-23 PROCEDURE — 2060000001 HC INTERMEDIATE ICU ROOM DAILY

## 2025-08-23 PROCEDURE — 2500000002 HC RX 250 W HCPCS SELF ADMINISTERED DRUGS (ALT 637 FOR MEDICARE OP, ALT 636 FOR OP/ED): Performed by: PSYCHIATRY & NEUROLOGY

## 2025-08-23 PROCEDURE — 85027 COMPLETE CBC AUTOMATED: CPT | Performed by: NURSE PRACTITIONER

## 2025-08-23 PROCEDURE — 2500000004 HC RX 250 GENERAL PHARMACY W/ HCPCS (ALT 636 FOR OP/ED): Performed by: NURSE PRACTITIONER

## 2025-08-23 PROCEDURE — 97161 PT EVAL LOW COMPLEX 20 MIN: CPT | Mod: GP

## 2025-08-23 PROCEDURE — 2500000002 HC RX 250 W HCPCS SELF ADMINISTERED DRUGS (ALT 637 FOR MEDICARE OP, ALT 636 FOR OP/ED): Performed by: NURSE PRACTITIONER

## 2025-08-23 PROCEDURE — 36415 COLL VENOUS BLD VENIPUNCTURE: CPT | Performed by: NURSE PRACTITIONER

## 2025-08-23 PROCEDURE — 99223 1ST HOSP IP/OBS HIGH 75: CPT | Performed by: PSYCHIATRY & NEUROLOGY

## 2025-08-23 PROCEDURE — 2500000001 HC RX 250 WO HCPCS SELF ADMINISTERED DRUGS (ALT 637 FOR MEDICARE OP): Performed by: NURSE PRACTITIONER

## 2025-08-23 PROCEDURE — 80069 RENAL FUNCTION PANEL: CPT | Performed by: NURSE PRACTITIONER

## 2025-08-23 PROCEDURE — 97530 THERAPEUTIC ACTIVITIES: CPT | Mod: GP

## 2025-08-23 PROCEDURE — 82947 ASSAY GLUCOSE BLOOD QUANT: CPT

## 2025-08-23 RX ORDER — QUETIAPINE FUMARATE 100 MG/1
200 TABLET, FILM COATED ORAL NIGHTLY
Status: DISCONTINUED | OUTPATIENT
Start: 2025-08-23 | End: 2025-08-26 | Stop reason: HOSPADM

## 2025-08-23 RX ADMIN — INSULIN GLARGINE 30 UNITS: 100 INJECTION, SOLUTION SUBCUTANEOUS at 01:35

## 2025-08-23 RX ADMIN — METOPROLOL TARTRATE 12.5 MG: 25 TABLET, FILM COATED ORAL at 22:06

## 2025-08-23 RX ADMIN — PANTOPRAZOLE SODIUM 40 MG: 40 TABLET, DELAYED RELEASE ORAL at 06:00

## 2025-08-23 RX ADMIN — PIPERACILLIN SODIUM AND TAZOBACTAM SODIUM 3.38 G: 3; .375 INJECTION, SOLUTION INTRAVENOUS at 05:49

## 2025-08-23 RX ADMIN — OXYCODONE HYDROCHLORIDE 5 MG: 5 TABLET ORAL at 15:44

## 2025-08-23 RX ADMIN — OXYCODONE HYDROCHLORIDE 10 MG: 5 TABLET ORAL at 08:28

## 2025-08-23 RX ADMIN — INSULIN LISPRO 4 UNITS: 100 INJECTION, SOLUTION INTRAVENOUS; SUBCUTANEOUS at 12:00

## 2025-08-23 RX ADMIN — TORSEMIDE 60 MG: 20 TABLET ORAL at 22:06

## 2025-08-23 RX ADMIN — AMLODIPINE BESYLATE 5 MG: 5 TABLET ORAL at 08:27

## 2025-08-23 RX ADMIN — APIXABAN 5 MG: 5 TABLET, FILM COATED ORAL at 08:27

## 2025-08-23 RX ADMIN — QUETIAPINE FUMARATE 200 MG: 100 TABLET ORAL at 08:28

## 2025-08-23 RX ADMIN — ASPIRIN 81 MG: 81 TABLET, DELAYED RELEASE ORAL at 08:27

## 2025-08-23 RX ADMIN — PIPERACILLIN SODIUM AND TAZOBACTAM SODIUM 3.38 G: 3; .375 INJECTION, SOLUTION INTRAVENOUS at 23:01

## 2025-08-23 RX ADMIN — INSULIN GLARGINE 30 UNITS: 100 INJECTION, SOLUTION SUBCUTANEOUS at 23:01

## 2025-08-23 RX ADMIN — PIPERACILLIN SODIUM AND TAZOBACTAM SODIUM 3.38 G: 3; .375 INJECTION, SOLUTION INTRAVENOUS at 17:54

## 2025-08-23 RX ADMIN — DULOXETINE 60 MG: 30 CAPSULE, DELAYED RELEASE ORAL at 08:27

## 2025-08-23 RX ADMIN — PIPERACILLIN SODIUM AND TAZOBACTAM SODIUM 3.38 G: 3; .375 INJECTION, SOLUTION INTRAVENOUS at 12:09

## 2025-08-23 RX ADMIN — Medication: at 19:41

## 2025-08-23 RX ADMIN — ATORVASTATIN CALCIUM 80 MG: 80 TABLET, FILM COATED ORAL at 22:06

## 2025-08-23 RX ADMIN — HYDROXYZINE HYDROCHLORIDE 25 MG: 25 TABLET, FILM COATED ORAL at 01:44

## 2025-08-23 RX ADMIN — COLCHICINE 0.6 MG: 0.6 TABLET, FILM COATED ORAL at 08:27

## 2025-08-23 RX ADMIN — HYDROXYZINE HYDROCHLORIDE 25 MG: 25 TABLET, FILM COATED ORAL at 17:56

## 2025-08-23 RX ADMIN — Medication: at 07:43

## 2025-08-23 RX ADMIN — SENNOSIDES, DOCUSATE SODIUM 1 TABLET: 50; 8.6 TABLET, FILM COATED ORAL at 22:06

## 2025-08-23 RX ADMIN — IPRATROPIUM BROMIDE AND ALBUTEROL SULFATE 3 ML: 2.5; .5 SOLUTION RESPIRATORY (INHALATION) at 11:21

## 2025-08-23 RX ADMIN — QUETIAPINE FUMARATE 200 MG: 100 TABLET ORAL at 22:06

## 2025-08-23 RX ADMIN — INSULIN LISPRO 6 UNITS: 100 INJECTION, SOLUTION INTRAVENOUS; SUBCUTANEOUS at 17:27

## 2025-08-23 RX ADMIN — INSULIN LISPRO 4 UNITS: 100 INJECTION, SOLUTION INTRAVENOUS; SUBCUTANEOUS at 08:27

## 2025-08-23 RX ADMIN — POLYETHYLENE GLYCOL 3350 17 G: 17 POWDER, FOR SOLUTION ORAL at 22:06

## 2025-08-23 RX ADMIN — APIXABAN 5 MG: 5 TABLET, FILM COATED ORAL at 22:06

## 2025-08-23 RX ADMIN — ACETAMINOPHEN 650 MG: 325 TABLET ORAL at 17:28

## 2025-08-23 RX ADMIN — METOPROLOL TARTRATE 12.5 MG: 25 TABLET, FILM COATED ORAL at 08:28

## 2025-08-23 RX ADMIN — OXYCODONE HYDROCHLORIDE 10 MG: 5 TABLET ORAL at 02:21

## 2025-08-23 RX ADMIN — LISINOPRIL 5 MG: 5 TABLET ORAL at 08:27

## 2025-08-23 RX ADMIN — PIPERACILLIN SODIUM AND TAZOBACTAM SODIUM 3.38 G: 3; .375 INJECTION, SOLUTION INTRAVENOUS at 00:50

## 2025-08-23 RX ADMIN — OXYCODONE HYDROCHLORIDE 10 MG: 5 TABLET ORAL at 22:16

## 2025-08-23 RX ADMIN — TORSEMIDE 60 MG: 20 TABLET ORAL at 08:28

## 2025-08-23 RX ADMIN — IPRATROPIUM BROMIDE AND ALBUTEROL SULFATE 3 ML: 2.5; .5 SOLUTION RESPIRATORY (INHALATION) at 07:39

## 2025-08-23 RX ADMIN — Medication: at 01:00

## 2025-08-23 RX ADMIN — HYDROMORPHONE HYDROCHLORIDE 0.4 MG: 1 INJECTION, SOLUTION INTRAMUSCULAR; INTRAVENOUS; SUBCUTANEOUS at 05:48

## 2025-08-23 RX ADMIN — IPRATROPIUM BROMIDE AND ALBUTEROL SULFATE 3 ML: 2.5; .5 SOLUTION RESPIRATORY (INHALATION) at 15:13

## 2025-08-23 ASSESSMENT — PAIN DESCRIPTION - DESCRIPTORS
DESCRIPTORS: THROBBING
DESCRIPTORS: ACHING;NAGGING
DESCRIPTORS: ACHING;NAGGING
DESCRIPTORS: ACHING
DESCRIPTORS: ACHING
DESCRIPTORS: ACHING;NAGGING

## 2025-08-23 ASSESSMENT — COGNITIVE AND FUNCTIONAL STATUS - GENERAL
WALKING IN HOSPITAL ROOM: A LOT
CLIMB 3 TO 5 STEPS WITH RAILING: A LOT
TOILETING: A LITTLE
DRESSING REGULAR UPPER BODY CLOTHING: A LITTLE
DAILY ACTIVITIY SCORE: 18
WALKING IN HOSPITAL ROOM: A LOT
MOVING TO AND FROM BED TO CHAIR: A LOT
STANDING UP FROM CHAIR USING ARMS: A LOT
CLIMB 3 TO 5 STEPS WITH RAILING: TOTAL
MOBILITY SCORE: 12
MOVING FROM LYING ON BACK TO SITTING ON SIDE OF FLAT BED WITH BEDRAILS: A LOT
DRESSING REGULAR LOWER BODY CLOTHING: A LITTLE
MOBILITY SCORE: 9
STANDING UP FROM CHAIR USING ARMS: TOTAL
MOVING TO AND FROM BED TO CHAIR: TOTAL
PERSONAL GROOMING: A LITTLE
HELP NEEDED FOR BATHING: A LOT
TURNING FROM BACK TO SIDE WHILE IN FLAT BAD: A LOT
TURNING FROM BACK TO SIDE WHILE IN FLAT BAD: A LOT
MOVING FROM LYING ON BACK TO SITTING ON SIDE OF FLAT BED WITH BEDRAILS: A LOT

## 2025-08-23 ASSESSMENT — PAIN SCALES - GENERAL
PAINLEVEL_OUTOF10: 0 - NO PAIN
PAINLEVEL_OUTOF10: 7
PAINLEVEL_OUTOF10: 7
PAINLEVEL_OUTOF10: 8
PAINLEVEL_OUTOF10: 0 - NO PAIN
PAINLEVEL_OUTOF10: 0 - NO PAIN
PAINLEVEL_OUTOF10: 6
PAINLEVEL_OUTOF10: 7
PAINLEVEL_OUTOF10: 7
PAINLEVEL_OUTOF10: 3

## 2025-08-23 ASSESSMENT — PAIN DESCRIPTION - LOCATION
LOCATION: GENERALIZED
LOCATION: BACK

## 2025-08-23 ASSESSMENT — PAIN DESCRIPTION - ORIENTATION: ORIENTATION: LOWER;MID;POSTERIOR

## 2025-08-23 ASSESSMENT — ENCOUNTER SYMPTOMS: BACK PAIN: 1

## 2025-08-24 VITALS
TEMPERATURE: 98.6 F | RESPIRATION RATE: 20 BRPM | WEIGHT: 315 LBS | SYSTOLIC BLOOD PRESSURE: 165 MMHG | BODY MASS INDEX: 44.1 KG/M2 | DIASTOLIC BLOOD PRESSURE: 60 MMHG | HEIGHT: 71 IN | HEART RATE: 77 BPM | OXYGEN SATURATION: 89 %

## 2025-08-24 LAB
ALBUMIN SERPL BCP-MCNC: 3.1 G/DL (ref 3.4–5)
ANION GAP SERPL CALC-SCNC: 7 MMOL/L (ref 10–20)
BUN SERPL-MCNC: 31 MG/DL (ref 6–23)
CALCIUM SERPL-MCNC: 8.6 MG/DL (ref 8.6–10.3)
CHLORIDE SERPL-SCNC: 100 MMOL/L (ref 98–107)
CO2 SERPL-SCNC: 36 MMOL/L (ref 21–32)
CREAT SERPL-MCNC: 1.32 MG/DL (ref 0.5–1.3)
EGFRCR SERPLBLD CKD-EPI 2021: 64 ML/MIN/1.73M*2
ERYTHROCYTE [DISTWIDTH] IN BLOOD BY AUTOMATED COUNT: 17.6 % (ref 11.5–14.5)
GLUCOSE BLD MANUAL STRIP-MCNC: 244 MG/DL (ref 74–99)
GLUCOSE BLD MANUAL STRIP-MCNC: 283 MG/DL (ref 74–99)
GLUCOSE BLD MANUAL STRIP-MCNC: 299 MG/DL (ref 74–99)
GLUCOSE BLD MANUAL STRIP-MCNC: 311 MG/DL (ref 74–99)
GLUCOSE SERPL-MCNC: 245 MG/DL (ref 74–99)
HCT VFR BLD AUTO: 29.8 % (ref 41–52)
HGB BLD-MCNC: 8.2 G/DL (ref 13.5–17.5)
MCH RBC QN AUTO: 23.3 PG (ref 26–34)
MCHC RBC AUTO-ENTMCNC: 27.5 G/DL (ref 32–36)
MCV RBC AUTO: 85 FL (ref 80–100)
NRBC BLD-RTO: 0 /100 WBCS (ref 0–0)
PHOSPHATE SERPL-MCNC: 2.7 MG/DL (ref 2.5–4.9)
PLATELET # BLD AUTO: 244 X10*3/UL (ref 150–450)
POTASSIUM SERPL-SCNC: 4 MMOL/L (ref 3.5–5.3)
RBC # BLD AUTO: 3.52 X10*6/UL (ref 4.5–5.9)
SODIUM SERPL-SCNC: 139 MMOL/L (ref 136–145)
WBC # BLD AUTO: 8.1 X10*3/UL (ref 4.4–11.3)

## 2025-08-24 PROCEDURE — 94668 MNPJ CHEST WALL SBSQ: CPT

## 2025-08-24 PROCEDURE — 94640 AIRWAY INHALATION TREATMENT: CPT

## 2025-08-24 PROCEDURE — 80069 RENAL FUNCTION PANEL: CPT | Performed by: NURSE PRACTITIONER

## 2025-08-24 PROCEDURE — 99233 SBSQ HOSP IP/OBS HIGH 50: CPT | Performed by: STUDENT IN AN ORGANIZED HEALTH CARE EDUCATION/TRAINING PROGRAM

## 2025-08-24 PROCEDURE — 82947 ASSAY GLUCOSE BLOOD QUANT: CPT

## 2025-08-24 PROCEDURE — 2500000004 HC RX 250 GENERAL PHARMACY W/ HCPCS (ALT 636 FOR OP/ED): Performed by: NURSE PRACTITIONER

## 2025-08-24 PROCEDURE — 9420000001 HC RT PATIENT EDUCATION 5 MIN

## 2025-08-24 PROCEDURE — 2500000002 HC RX 250 W HCPCS SELF ADMINISTERED DRUGS (ALT 637 FOR MEDICARE OP, ALT 636 FOR OP/ED): Performed by: NURSE PRACTITIONER

## 2025-08-24 PROCEDURE — 36415 COLL VENOUS BLD VENIPUNCTURE: CPT | Performed by: NURSE PRACTITIONER

## 2025-08-24 PROCEDURE — 85027 COMPLETE CBC AUTOMATED: CPT | Performed by: NURSE PRACTITIONER

## 2025-08-24 PROCEDURE — 2500000001 HC RX 250 WO HCPCS SELF ADMINISTERED DRUGS (ALT 637 FOR MEDICARE OP): Performed by: NURSE PRACTITIONER

## 2025-08-24 PROCEDURE — 1100000001 HC PRIVATE ROOM DAILY

## 2025-08-24 PROCEDURE — 97165 OT EVAL LOW COMPLEX 30 MIN: CPT | Mod: GO

## 2025-08-24 PROCEDURE — 2500000002 HC RX 250 W HCPCS SELF ADMINISTERED DRUGS (ALT 637 FOR MEDICARE OP, ALT 636 FOR OP/ED): Performed by: PSYCHIATRY & NEUROLOGY

## 2025-08-24 PROCEDURE — 2500000005 HC RX 250 GENERAL PHARMACY W/O HCPCS: Performed by: STUDENT IN AN ORGANIZED HEALTH CARE EDUCATION/TRAINING PROGRAM

## 2025-08-24 RX ADMIN — Medication: at 19:33

## 2025-08-24 RX ADMIN — DULOXETINE 60 MG: 30 CAPSULE, DELAYED RELEASE ORAL at 09:09

## 2025-08-24 RX ADMIN — IPRATROPIUM BROMIDE AND ALBUTEROL SULFATE 3 ML: 2.5; .5 SOLUTION RESPIRATORY (INHALATION) at 15:25

## 2025-08-24 RX ADMIN — LISINOPRIL 5 MG: 5 TABLET ORAL at 09:08

## 2025-08-24 RX ADMIN — METOPROLOL TARTRATE 12.5 MG: 25 TABLET, FILM COATED ORAL at 22:54

## 2025-08-24 RX ADMIN — TORSEMIDE 60 MG: 20 TABLET ORAL at 22:54

## 2025-08-24 RX ADMIN — IPRATROPIUM BROMIDE AND ALBUTEROL SULFATE 3 ML: 2.5; .5 SOLUTION RESPIRATORY (INHALATION) at 19:33

## 2025-08-24 RX ADMIN — ASPIRIN 81 MG: 81 TABLET, DELAYED RELEASE ORAL at 09:09

## 2025-08-24 RX ADMIN — HYDROXYZINE HYDROCHLORIDE 25 MG: 25 TABLET, FILM COATED ORAL at 11:59

## 2025-08-24 RX ADMIN — METOPROLOL TARTRATE 12.5 MG: 25 TABLET, FILM COATED ORAL at 09:08

## 2025-08-24 RX ADMIN — PIPERACILLIN SODIUM AND TAZOBACTAM SODIUM 3.38 G: 3; .375 INJECTION, SOLUTION INTRAVENOUS at 19:16

## 2025-08-24 RX ADMIN — TORSEMIDE 60 MG: 20 TABLET ORAL at 09:08

## 2025-08-24 RX ADMIN — IPRATROPIUM BROMIDE AND ALBUTEROL SULFATE 3 ML: 2.5; .5 SOLUTION RESPIRATORY (INHALATION) at 08:07

## 2025-08-24 RX ADMIN — APIXABAN 5 MG: 5 TABLET, FILM COATED ORAL at 09:08

## 2025-08-24 RX ADMIN — INSULIN GLARGINE 30 UNITS: 100 INJECTION, SOLUTION SUBCUTANEOUS at 22:55

## 2025-08-24 RX ADMIN — PIPERACILLIN SODIUM AND TAZOBACTAM SODIUM 3.38 G: 3; .375 INJECTION, SOLUTION INTRAVENOUS at 11:59

## 2025-08-24 RX ADMIN — Medication 1 DOSE: at 08:10

## 2025-08-24 RX ADMIN — INSULIN LISPRO 6 UNITS: 100 INJECTION, SOLUTION INTRAVENOUS; SUBCUTANEOUS at 11:58

## 2025-08-24 RX ADMIN — AMLODIPINE BESYLATE 5 MG: 5 TABLET ORAL at 09:08

## 2025-08-24 RX ADMIN — OXYCODONE HYDROCHLORIDE 10 MG: 5 TABLET ORAL at 06:40

## 2025-08-24 RX ADMIN — QUETIAPINE FUMARATE 200 MG: 100 TABLET ORAL at 22:54

## 2025-08-24 RX ADMIN — PIPERACILLIN SODIUM AND TAZOBACTAM SODIUM 3.38 G: 3; .375 INJECTION, SOLUTION INTRAVENOUS at 06:40

## 2025-08-24 RX ADMIN — COLCHICINE 0.6 MG: 0.6 TABLET, FILM COATED ORAL at 09:08

## 2025-08-24 RX ADMIN — IPRATROPIUM BROMIDE AND ALBUTEROL SULFATE 3 ML: 2.5; .5 SOLUTION RESPIRATORY (INHALATION) at 11:21

## 2025-08-24 RX ADMIN — APIXABAN 5 MG: 5 TABLET, FILM COATED ORAL at 22:54

## 2025-08-24 RX ADMIN — OXYCODONE HYDROCHLORIDE 10 MG: 5 TABLET ORAL at 12:45

## 2025-08-24 RX ADMIN — OXYCODONE HYDROCHLORIDE 10 MG: 5 TABLET ORAL at 22:54

## 2025-08-24 RX ADMIN — INSULIN LISPRO 4 UNITS: 100 INJECTION, SOLUTION INTRAVENOUS; SUBCUTANEOUS at 09:09

## 2025-08-24 RX ADMIN — ATORVASTATIN CALCIUM 80 MG: 80 TABLET, FILM COATED ORAL at 22:54

## 2025-08-24 RX ADMIN — PANTOPRAZOLE SODIUM 40 MG: 40 INJECTION, POWDER, FOR SOLUTION INTRAVENOUS at 06:40

## 2025-08-24 ASSESSMENT — COGNITIVE AND FUNCTIONAL STATUS - GENERAL
TOILETING: A LOT
DRESSING REGULAR UPPER BODY CLOTHING: A LITTLE
PERSONAL GROOMING: A LITTLE
MOVING TO AND FROM BED TO CHAIR: A LITTLE
MOBILITY SCORE: 13
STANDING UP FROM CHAIR USING ARMS: A LOT
TURNING FROM BACK TO SIDE WHILE IN FLAT BAD: A LOT
DRESSING REGULAR LOWER BODY CLOTHING: TOTAL
WALKING IN HOSPITAL ROOM: A LOT
CLIMB 3 TO 5 STEPS WITH RAILING: A LOT
DRESSING REGULAR UPPER BODY CLOTHING: A LOT
DAILY ACTIVITIY SCORE: 19
DRESSING REGULAR LOWER BODY CLOTHING: A LITTLE
HELP NEEDED FOR BATHING: A LOT
HELP NEEDED FOR BATHING: A LITTLE
DAILY ACTIVITIY SCORE: 13
EATING MEALS: A LITTLE
MOVING FROM LYING ON BACK TO SITTING ON SIDE OF FLAT BED WITH BEDRAILS: A LOT
TOILETING: A LITTLE
PERSONAL GROOMING: A LITTLE

## 2025-08-24 ASSESSMENT — PAIN - FUNCTIONAL ASSESSMENT
PAIN_FUNCTIONAL_ASSESSMENT: 0-10

## 2025-08-24 ASSESSMENT — PAIN SCALES - GENERAL
PAINLEVEL_OUTOF10: 8
PAINLEVEL_OUTOF10: 0 - NO PAIN
PAINLEVEL_OUTOF10: 10 - WORST POSSIBLE PAIN
PAINLEVEL_OUTOF10: 8
PAINLEVEL_OUTOF10: 6
PAINLEVEL_OUTOF10: 8

## 2025-08-24 ASSESSMENT — ENCOUNTER SYMPTOMS: BACK PAIN: 1

## 2025-08-24 ASSESSMENT — PAIN DESCRIPTION - LOCATION
LOCATION: GENERALIZED
LOCATION: GENERALIZED

## 2025-08-24 ASSESSMENT — ACTIVITIES OF DAILY LIVING (ADL): ADL_ASSISTANCE: INDEPENDENT

## 2025-08-24 ASSESSMENT — PAIN DESCRIPTION - ORIENTATION: ORIENTATION: LOWER;POSTERIOR

## 2025-08-24 ASSESSMENT — PAIN DESCRIPTION - DESCRIPTORS
DESCRIPTORS: CRUSHING
DESCRIPTORS: ACHING
DESCRIPTORS: ACHING

## 2025-08-25 ENCOUNTER — APPOINTMENT (OUTPATIENT)
Dept: CARDIOLOGY | Facility: HOSPITAL | Age: 55
DRG: 871 | End: 2025-08-25
Payer: MEDICARE

## 2025-08-25 LAB
ALBUMIN SERPL BCP-MCNC: 3.5 G/DL (ref 3.4–5)
ANION GAP SERPL CALC-SCNC: 16 MMOL/L (ref 10–20)
BUN SERPL-MCNC: 30 MG/DL (ref 6–23)
CALCIUM SERPL-MCNC: 8.6 MG/DL (ref 8.6–10.3)
CHLORIDE SERPL-SCNC: 96 MMOL/L (ref 98–107)
CO2 SERPL-SCNC: 31 MMOL/L (ref 21–32)
CREAT SERPL-MCNC: 1.34 MG/DL (ref 0.5–1.3)
EGFRCR SERPLBLD CKD-EPI 2021: 63 ML/MIN/1.73M*2
ERYTHROCYTE [DISTWIDTH] IN BLOOD BY AUTOMATED COUNT: 17.6 % (ref 11.5–14.5)
GLUCOSE BLD MANUAL STRIP-MCNC: 267 MG/DL (ref 74–99)
GLUCOSE BLD MANUAL STRIP-MCNC: 302 MG/DL (ref 74–99)
GLUCOSE BLD MANUAL STRIP-MCNC: 355 MG/DL (ref 74–99)
GLUCOSE BLD MANUAL STRIP-MCNC: 361 MG/DL (ref 74–99)
GLUCOSE SERPL-MCNC: 333 MG/DL (ref 74–99)
HCT VFR BLD AUTO: 33.3 % (ref 41–52)
HGB BLD-MCNC: 9.2 G/DL (ref 13.5–17.5)
MCH RBC QN AUTO: 23.5 PG (ref 26–34)
MCHC RBC AUTO-ENTMCNC: 27.6 G/DL (ref 32–36)
MCV RBC AUTO: 85 FL (ref 80–100)
NRBC BLD-RTO: 0 /100 WBCS (ref 0–0)
PHOSPHATE SERPL-MCNC: 2.7 MG/DL (ref 2.5–4.9)
PLATELET # BLD AUTO: 292 X10*3/UL (ref 150–450)
POTASSIUM SERPL-SCNC: 4.7 MMOL/L (ref 3.5–5.3)
RBC # BLD AUTO: 3.92 X10*6/UL (ref 4.5–5.9)
SODIUM SERPL-SCNC: 138 MMOL/L (ref 136–145)
WBC # BLD AUTO: 10.1 X10*3/UL (ref 4.4–11.3)

## 2025-08-25 PROCEDURE — 97535 SELF CARE MNGMENT TRAINING: CPT | Mod: GO,CO

## 2025-08-25 PROCEDURE — 97530 THERAPEUTIC ACTIVITIES: CPT | Mod: GO,CO

## 2025-08-25 PROCEDURE — 2500000002 HC RX 250 W HCPCS SELF ADMINISTERED DRUGS (ALT 637 FOR MEDICARE OP, ALT 636 FOR OP/ED): Performed by: NURSE PRACTITIONER

## 2025-08-25 PROCEDURE — 9420000001 HC RT PATIENT EDUCATION 5 MIN

## 2025-08-25 PROCEDURE — 82947 ASSAY GLUCOSE BLOOD QUANT: CPT

## 2025-08-25 PROCEDURE — 1100000001 HC PRIVATE ROOM DAILY

## 2025-08-25 PROCEDURE — 99233 SBSQ HOSP IP/OBS HIGH 50: CPT | Performed by: STUDENT IN AN ORGANIZED HEALTH CARE EDUCATION/TRAINING PROGRAM

## 2025-08-25 PROCEDURE — 93005 ELECTROCARDIOGRAM TRACING: CPT

## 2025-08-25 PROCEDURE — 2500000004 HC RX 250 GENERAL PHARMACY W/ HCPCS (ALT 636 FOR OP/ED): Performed by: NURSE PRACTITIONER

## 2025-08-25 PROCEDURE — 2500000002 HC RX 250 W HCPCS SELF ADMINISTERED DRUGS (ALT 637 FOR MEDICARE OP, ALT 636 FOR OP/ED): Performed by: STUDENT IN AN ORGANIZED HEALTH CARE EDUCATION/TRAINING PROGRAM

## 2025-08-25 PROCEDURE — 2500000002 HC RX 250 W HCPCS SELF ADMINISTERED DRUGS (ALT 637 FOR MEDICARE OP, ALT 636 FOR OP/ED): Performed by: PSYCHIATRY & NEUROLOGY

## 2025-08-25 PROCEDURE — 80069 RENAL FUNCTION PANEL: CPT | Performed by: NURSE PRACTITIONER

## 2025-08-25 PROCEDURE — 36415 COLL VENOUS BLD VENIPUNCTURE: CPT | Performed by: NURSE PRACTITIONER

## 2025-08-25 PROCEDURE — 93010 ELECTROCARDIOGRAM REPORT: CPT | Performed by: INTERNAL MEDICINE

## 2025-08-25 PROCEDURE — 2500000001 HC RX 250 WO HCPCS SELF ADMINISTERED DRUGS (ALT 637 FOR MEDICARE OP): Performed by: NURSE PRACTITIONER

## 2025-08-25 PROCEDURE — 85027 COMPLETE CBC AUTOMATED: CPT | Performed by: NURSE PRACTITIONER

## 2025-08-25 PROCEDURE — 94668 MNPJ CHEST WALL SBSQ: CPT

## 2025-08-25 PROCEDURE — 94640 AIRWAY INHALATION TREATMENT: CPT

## 2025-08-25 RX ORDER — INSULIN GLARGINE 100 [IU]/ML
40 INJECTION, SOLUTION SUBCUTANEOUS EVERY 24 HOURS
Status: DISCONTINUED | OUTPATIENT
Start: 2025-08-25 | End: 2025-08-26 | Stop reason: HOSPADM

## 2025-08-25 RX ORDER — IPRATROPIUM BROMIDE AND ALBUTEROL SULFATE 2.5; .5 MG/3ML; MG/3ML
3 SOLUTION RESPIRATORY (INHALATION)
Status: DISCONTINUED | OUTPATIENT
Start: 2025-08-25 | End: 2025-08-26 | Stop reason: HOSPADM

## 2025-08-25 RX ADMIN — Medication: at 23:32

## 2025-08-25 RX ADMIN — METOPROLOL TARTRATE 12.5 MG: 25 TABLET, FILM COATED ORAL at 08:41

## 2025-08-25 RX ADMIN — DULOXETINE 60 MG: 30 CAPSULE, DELAYED RELEASE ORAL at 08:42

## 2025-08-25 RX ADMIN — OXYCODONE HYDROCHLORIDE 10 MG: 5 TABLET ORAL at 08:54

## 2025-08-25 RX ADMIN — HYDROXYZINE HYDROCHLORIDE 25 MG: 25 TABLET, FILM COATED ORAL at 18:40

## 2025-08-25 RX ADMIN — ASPIRIN 81 MG: 81 TABLET, DELAYED RELEASE ORAL at 08:42

## 2025-08-25 RX ADMIN — INSULIN GLARGINE 40 UNITS: 100 INJECTION, SOLUTION SUBCUTANEOUS at 23:19

## 2025-08-25 RX ADMIN — INSULIN LISPRO 6 UNITS: 100 INJECTION, SOLUTION INTRAVENOUS; SUBCUTANEOUS at 17:35

## 2025-08-25 RX ADMIN — Medication: at 03:35

## 2025-08-25 RX ADMIN — TORSEMIDE 60 MG: 20 TABLET ORAL at 08:41

## 2025-08-25 RX ADMIN — ATORVASTATIN CALCIUM 80 MG: 80 TABLET, FILM COATED ORAL at 20:34

## 2025-08-25 RX ADMIN — APIXABAN 5 MG: 5 TABLET, FILM COATED ORAL at 08:42

## 2025-08-25 RX ADMIN — IPRATROPIUM BROMIDE AND ALBUTEROL SULFATE 3 ML: 2.5; .5 SOLUTION RESPIRATORY (INHALATION) at 13:56

## 2025-08-25 RX ADMIN — SENNOSIDES, DOCUSATE SODIUM 1 TABLET: 50; 8.6 TABLET, FILM COATED ORAL at 20:34

## 2025-08-25 RX ADMIN — TORSEMIDE 60 MG: 20 TABLET ORAL at 20:34

## 2025-08-25 RX ADMIN — HYDROXYZINE HYDROCHLORIDE 25 MG: 25 TABLET, FILM COATED ORAL at 08:54

## 2025-08-25 RX ADMIN — Medication: at 20:07

## 2025-08-25 RX ADMIN — IPRATROPIUM BROMIDE AND ALBUTEROL SULFATE 3 ML: 2.5; .5 SOLUTION RESPIRATORY (INHALATION) at 20:07

## 2025-08-25 RX ADMIN — APIXABAN 5 MG: 5 TABLET, FILM COATED ORAL at 20:34

## 2025-08-25 RX ADMIN — IPRATROPIUM BROMIDE AND ALBUTEROL SULFATE 3 ML: 2.5; .5 SOLUTION RESPIRATORY (INHALATION) at 07:24

## 2025-08-25 RX ADMIN — METOPROLOL TARTRATE 12.5 MG: 25 TABLET, FILM COATED ORAL at 20:34

## 2025-08-25 RX ADMIN — PANTOPRAZOLE SODIUM 40 MG: 40 INJECTION, POWDER, FOR SOLUTION INTRAVENOUS at 06:41

## 2025-08-25 RX ADMIN — LISINOPRIL 5 MG: 5 TABLET ORAL at 08:42

## 2025-08-25 RX ADMIN — PIPERACILLIN SODIUM AND TAZOBACTAM SODIUM 3.38 G: 3; .375 INJECTION, SOLUTION INTRAVENOUS at 01:43

## 2025-08-25 RX ADMIN — Medication: at 07:24

## 2025-08-25 RX ADMIN — COLCHICINE 0.6 MG: 0.6 TABLET, FILM COATED ORAL at 08:41

## 2025-08-25 RX ADMIN — INSULIN LISPRO 8 UNITS: 100 INJECTION, SOLUTION INTRAVENOUS; SUBCUTANEOUS at 08:39

## 2025-08-25 RX ADMIN — OXYCODONE HYDROCHLORIDE 10 MG: 5 TABLET ORAL at 20:34

## 2025-08-25 RX ADMIN — FERROUS SULFATE TAB 325 MG (65 MG ELEMENTAL FE) 1 TABLET: 325 (65 FE) TAB at 08:47

## 2025-08-25 RX ADMIN — PIPERACILLIN SODIUM AND TAZOBACTAM SODIUM 3.38 G: 3; .375 INJECTION, SOLUTION INTRAVENOUS at 12:43

## 2025-08-25 RX ADMIN — HYDROMORPHONE HYDROCHLORIDE 0.4 MG: 1 INJECTION, SOLUTION INTRAMUSCULAR; INTRAVENOUS; SUBCUTANEOUS at 01:57

## 2025-08-25 RX ADMIN — INSULIN LISPRO 10 UNITS: 100 INJECTION, SOLUTION INTRAVENOUS; SUBCUTANEOUS at 12:43

## 2025-08-25 RX ADMIN — PIPERACILLIN SODIUM AND TAZOBACTAM SODIUM 3.38 G: 3; .375 INJECTION, SOLUTION INTRAVENOUS at 06:42

## 2025-08-25 RX ADMIN — AMLODIPINE BESYLATE 5 MG: 5 TABLET ORAL at 08:42

## 2025-08-25 RX ADMIN — QUETIAPINE FUMARATE 200 MG: 100 TABLET ORAL at 20:34

## 2025-08-25 ASSESSMENT — COGNITIVE AND FUNCTIONAL STATUS - GENERAL
TOILETING: A LITTLE
TURNING FROM BACK TO SIDE WHILE IN FLAT BAD: A LOT
PERSONAL GROOMING: A LITTLE
TURNING FROM BACK TO SIDE WHILE IN FLAT BAD: A LOT
MOBILITY SCORE: 13
MOVING TO AND FROM BED TO CHAIR: A LITTLE
TOILETING: A LITTLE
PERSONAL GROOMING: A LITTLE
TURNING FROM BACK TO SIDE WHILE IN FLAT BAD: A LOT
MOVING FROM LYING ON BACK TO SITTING ON SIDE OF FLAT BED WITH BEDRAILS: A LOT
PERSONAL GROOMING: A LITTLE
DAILY ACTIVITIY SCORE: 19
CLIMB 3 TO 5 STEPS WITH RAILING: A LOT
STANDING UP FROM CHAIR USING ARMS: A LOT
TOILETING: A LITTLE
MOBILITY SCORE: 13
HELP NEEDED FOR BATHING: A LITTLE
DRESSING REGULAR UPPER BODY CLOTHING: A LITTLE
DRESSING REGULAR UPPER BODY CLOTHING: A LITTLE
CLIMB 3 TO 5 STEPS WITH RAILING: A LOT
HELP NEEDED FOR BATHING: A LOT
MOVING TO AND FROM BED TO CHAIR: A LITTLE
MOBILITY SCORE: 13
HELP NEEDED FOR BATHING: A LITTLE
DRESSING REGULAR LOWER BODY CLOTHING: TOTAL
WALKING IN HOSPITAL ROOM: A LOT
MOVING FROM LYING ON BACK TO SITTING ON SIDE OF FLAT BED WITH BEDRAILS: A LOT
EATING MEALS: A LITTLE
STANDING UP FROM CHAIR USING ARMS: A LOT
DRESSING REGULAR UPPER BODY CLOTHING: A LITTLE
DRESSING REGULAR LOWER BODY CLOTHING: A LITTLE
DRESSING REGULAR UPPER BODY CLOTHING: A LITTLE
DAILY ACTIVITIY SCORE: 19
MOVING FROM LYING ON BACK TO SITTING ON SIDE OF FLAT BED WITH BEDRAILS: A LOT
CLIMB 3 TO 5 STEPS WITH RAILING: A LOT
WALKING IN HOSPITAL ROOM: A LOT
MOVING TO AND FROM BED TO CHAIR: A LITTLE
DAILY ACTIVITIY SCORE: 14
DAILY ACTIVITIY SCORE: 19
DRESSING REGULAR LOWER BODY CLOTHING: A LITTLE
STANDING UP FROM CHAIR USING ARMS: A LOT
WALKING IN HOSPITAL ROOM: A LOT
PERSONAL GROOMING: A LITTLE
HELP NEEDED FOR BATHING: A LITTLE
DRESSING REGULAR LOWER BODY CLOTHING: A LITTLE
TOILETING: A LOT

## 2025-08-25 ASSESSMENT — ENCOUNTER SYMPTOMS: BACK PAIN: 1

## 2025-08-25 ASSESSMENT — PAIN DESCRIPTION - LOCATION
LOCATION: BACK
LOCATION: BACK

## 2025-08-25 ASSESSMENT — PAIN - FUNCTIONAL ASSESSMENT
PAIN_FUNCTIONAL_ASSESSMENT: 0-10
PAIN_FUNCTIONAL_ASSESSMENT: FLACC (FACE, LEGS, ACTIVITY, CRY, CONSOLABILITY)

## 2025-08-25 ASSESSMENT — PAIN DESCRIPTION - ORIENTATION
ORIENTATION: MID
ORIENTATION: LOWER

## 2025-08-25 ASSESSMENT — PAIN DESCRIPTION - DESCRIPTORS
DESCRIPTORS: ACHING;CRUSHING;CRAMPING
DESCRIPTORS: ACHING

## 2025-08-25 ASSESSMENT — ACTIVITIES OF DAILY LIVING (ADL)
HOME_MANAGEMENT_TIME_ENTRY: 18
BATHING_LEVEL_OF_ASSISTANCE: MAXIMUM ASSISTANCE
BATHING_WHERE_ASSESSED: OTHER (COMMENT)

## 2025-08-25 ASSESSMENT — PAIN SCALES - GENERAL
PAINLEVEL_OUTOF10: 4
PAINLEVEL_OUTOF10: 5 - MODERATE PAIN
PAINLEVEL_OUTOF10: 0 - NO PAIN
PAINLEVEL_OUTOF10: 9
PAINLEVEL_OUTOF10: 3
PAINLEVEL_OUTOF10: 3
PAINLEVEL_OUTOF10: 0 - NO PAIN
PAINLEVEL_OUTOF10: 0 - NO PAIN
PAINLEVEL_OUTOF10: 8

## 2025-08-26 VITALS
SYSTOLIC BLOOD PRESSURE: 123 MMHG | TEMPERATURE: 98.2 F | RESPIRATION RATE: 20 BRPM | WEIGHT: 315 LBS | HEART RATE: 94 BPM | BODY MASS INDEX: 44.1 KG/M2 | DIASTOLIC BLOOD PRESSURE: 71 MMHG | HEIGHT: 71 IN | OXYGEN SATURATION: 96 %

## 2025-08-26 LAB
ALBUMIN SERPL BCP-MCNC: 3.5 G/DL (ref 3.4–5)
ANION GAP SERPL CALC-SCNC: 9 MMOL/L (ref 10–20)
ATRIAL RATE: 79 BPM
BUN SERPL-MCNC: 25 MG/DL (ref 6–23)
CALCIUM SERPL-MCNC: 8.9 MG/DL (ref 8.6–10.3)
CHLORIDE SERPL-SCNC: 93 MMOL/L (ref 98–107)
CO2 SERPL-SCNC: 39 MMOL/L (ref 21–32)
CREAT SERPL-MCNC: 1.41 MG/DL (ref 0.5–1.3)
EGFRCR SERPLBLD CKD-EPI 2021: 59 ML/MIN/1.73M*2
ERYTHROCYTE [DISTWIDTH] IN BLOOD BY AUTOMATED COUNT: 17.6 % (ref 11.5–14.5)
GLUCOSE BLD MANUAL STRIP-MCNC: 350 MG/DL (ref 74–99)
GLUCOSE BLD MANUAL STRIP-MCNC: 350 MG/DL (ref 74–99)
GLUCOSE SERPL-MCNC: 354 MG/DL (ref 74–99)
HCT VFR BLD AUTO: 30.7 % (ref 41–52)
HGB BLD-MCNC: 8.5 G/DL (ref 13.5–17.5)
MCH RBC QN AUTO: 23.3 PG (ref 26–34)
MCHC RBC AUTO-ENTMCNC: 27.7 G/DL (ref 32–36)
MCV RBC AUTO: 84 FL (ref 80–100)
NRBC BLD-RTO: 0 /100 WBCS (ref 0–0)
P AXIS: 63 DEGREES
P OFFSET: 164 MS
P ONSET: 142 MS
PHOSPHATE SERPL-MCNC: 2.3 MG/DL (ref 2.5–4.9)
PLATELET # BLD AUTO: 278 X10*3/UL (ref 150–450)
POTASSIUM SERPL-SCNC: 4.1 MMOL/L (ref 3.5–5.3)
PR INTERVAL: 160 MS
Q ONSET: 222 MS
QRS COUNT: 13 BEATS
QRS DURATION: 92 MS
QT INTERVAL: 422 MS
QTC CALCULATION(BAZETT): 483 MS
QTC FREDERICIA: 462 MS
R AXIS: 50 DEGREES
RBC # BLD AUTO: 3.65 X10*6/UL (ref 4.5–5.9)
SODIUM SERPL-SCNC: 137 MMOL/L (ref 136–145)
T AXIS: 122 DEGREES
T OFFSET: 433 MS
VENTRICULAR RATE: 79 BPM
WBC # BLD AUTO: 9.7 X10*3/UL (ref 4.4–11.3)

## 2025-08-26 PROCEDURE — 2500000001 HC RX 250 WO HCPCS SELF ADMINISTERED DRUGS (ALT 637 FOR MEDICARE OP): Performed by: NURSE PRACTITIONER

## 2025-08-26 PROCEDURE — 2500000002 HC RX 250 W HCPCS SELF ADMINISTERED DRUGS (ALT 637 FOR MEDICARE OP, ALT 636 FOR OP/ED): Performed by: NURSE PRACTITIONER

## 2025-08-26 PROCEDURE — 36415 COLL VENOUS BLD VENIPUNCTURE: CPT | Performed by: STUDENT IN AN ORGANIZED HEALTH CARE EDUCATION/TRAINING PROGRAM

## 2025-08-26 PROCEDURE — 85027 COMPLETE CBC AUTOMATED: CPT | Performed by: STUDENT IN AN ORGANIZED HEALTH CARE EDUCATION/TRAINING PROGRAM

## 2025-08-26 PROCEDURE — 2500000002 HC RX 250 W HCPCS SELF ADMINISTERED DRUGS (ALT 637 FOR MEDICARE OP, ALT 636 FOR OP/ED): Performed by: STUDENT IN AN ORGANIZED HEALTH CARE EDUCATION/TRAINING PROGRAM

## 2025-08-26 PROCEDURE — 94640 AIRWAY INHALATION TREATMENT: CPT

## 2025-08-26 PROCEDURE — 99239 HOSP IP/OBS DSCHRG MGMT >30: CPT | Performed by: INTERNAL MEDICINE

## 2025-08-26 PROCEDURE — 82947 ASSAY GLUCOSE BLOOD QUANT: CPT

## 2025-08-26 PROCEDURE — 80069 RENAL FUNCTION PANEL: CPT | Performed by: STUDENT IN AN ORGANIZED HEALTH CARE EDUCATION/TRAINING PROGRAM

## 2025-08-26 RX ORDER — METOPROLOL TARTRATE 25 MG/1
12.5 TABLET, FILM COATED ORAL 2 TIMES DAILY
Start: 2025-08-26

## 2025-08-26 RX ADMIN — APIXABAN 5 MG: 5 TABLET, FILM COATED ORAL at 09:32

## 2025-08-26 RX ADMIN — OXYCODONE HYDROCHLORIDE 10 MG: 5 TABLET ORAL at 09:40

## 2025-08-26 RX ADMIN — INSULIN LISPRO 8 UNITS: 100 INJECTION, SOLUTION INTRAVENOUS; SUBCUTANEOUS at 09:31

## 2025-08-26 RX ADMIN — TORSEMIDE 60 MG: 20 TABLET ORAL at 09:31

## 2025-08-26 RX ADMIN — METOPROLOL TARTRATE 12.5 MG: 25 TABLET, FILM COATED ORAL at 09:32

## 2025-08-26 RX ADMIN — ASPIRIN 81 MG: 81 TABLET, DELAYED RELEASE ORAL at 09:32

## 2025-08-26 RX ADMIN — AMLODIPINE BESYLATE 5 MG: 5 TABLET ORAL at 09:32

## 2025-08-26 RX ADMIN — Medication: at 07:46

## 2025-08-26 RX ADMIN — LISINOPRIL 5 MG: 5 TABLET ORAL at 09:32

## 2025-08-26 RX ADMIN — Medication: at 03:05

## 2025-08-26 RX ADMIN — OXYCODONE HYDROCHLORIDE 10 MG: 5 TABLET ORAL at 04:13

## 2025-08-26 RX ADMIN — HYDROXYZINE HYDROCHLORIDE 25 MG: 25 TABLET, FILM COATED ORAL at 04:13

## 2025-08-26 RX ADMIN — DULOXETINE 60 MG: 30 CAPSULE, DELAYED RELEASE ORAL at 09:33

## 2025-08-26 RX ADMIN — IPRATROPIUM BROMIDE AND ALBUTEROL SULFATE 3 ML: 2.5; .5 SOLUTION RESPIRATORY (INHALATION) at 07:46

## 2025-08-26 RX ADMIN — INSULIN LISPRO 8 UNITS: 100 INJECTION, SOLUTION INTRAVENOUS; SUBCUTANEOUS at 12:47

## 2025-08-26 RX ADMIN — COLCHICINE 0.6 MG: 0.6 TABLET, FILM COATED ORAL at 09:31

## 2025-08-26 ASSESSMENT — PAIN - FUNCTIONAL ASSESSMENT
PAIN_FUNCTIONAL_ASSESSMENT: 0-10

## 2025-08-26 ASSESSMENT — COGNITIVE AND FUNCTIONAL STATUS - GENERAL
PERSONAL GROOMING: A LITTLE
STANDING UP FROM CHAIR USING ARMS: A LOT
MOBILITY SCORE: 13
DRESSING REGULAR LOWER BODY CLOTHING: A LITTLE
DRESSING REGULAR UPPER BODY CLOTHING: A LITTLE
WALKING IN HOSPITAL ROOM: A LOT
CLIMB 3 TO 5 STEPS WITH RAILING: A LOT
HELP NEEDED FOR BATHING: A LITTLE
DAILY ACTIVITIY SCORE: 19
MOVING TO AND FROM BED TO CHAIR: A LITTLE
TOILETING: A LITTLE
MOVING FROM LYING ON BACK TO SITTING ON SIDE OF FLAT BED WITH BEDRAILS: A LOT
TURNING FROM BACK TO SIDE WHILE IN FLAT BAD: A LOT

## 2025-08-26 ASSESSMENT — PAIN SCALES - GENERAL
PAINLEVEL_OUTOF10: 8
PAINLEVEL_OUTOF10: 3
PAINLEVEL_OUTOF10: 0 - NO PAIN
PAINLEVEL_OUTOF10: 7

## 2025-08-26 ASSESSMENT — PAIN DESCRIPTION - LOCATION
LOCATION: BACK
LOCATION: BACK

## 2025-08-26 ASSESSMENT — PAIN DESCRIPTION - DESCRIPTORS: DESCRIPTORS: ACHING;CRAMPING

## 2025-08-26 ASSESSMENT — PAIN DESCRIPTION - ORIENTATION: ORIENTATION: LOWER
